# Patient Record
Sex: FEMALE | Race: BLACK OR AFRICAN AMERICAN | NOT HISPANIC OR LATINO | Employment: STUDENT | URBAN - METROPOLITAN AREA
[De-identification: names, ages, dates, MRNs, and addresses within clinical notes are randomized per-mention and may not be internally consistent; named-entity substitution may affect disease eponyms.]

---
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
## 2018-02-01 ENCOUNTER — OFFICE VISIT (OUTPATIENT)
Dept: PSYCHIATRY | Facility: CLINIC | Age: 18
End: 2018-02-01
Attending: PSYCHIATRY & NEUROLOGY
Payer: COMMERCIAL

## 2018-02-01 VITALS
SYSTOLIC BLOOD PRESSURE: 104 MMHG | HEART RATE: 104 BPM | BODY MASS INDEX: 19.95 KG/M2 | DIASTOLIC BLOOD PRESSURE: 66 MMHG | WEIGHT: 123.6 LBS

## 2018-02-01 DIAGNOSIS — F31.2 BIPOLAR DISORDER, CURRENT EPISODE MANIC, SEVERE WITH PSYCHOTIC FEATURES (H): ICD-10-CM

## 2018-02-01 DIAGNOSIS — Z79.899 ENCOUNTER FOR LONG-TERM (CURRENT) USE OF MEDICATIONS: Primary | ICD-10-CM

## 2018-02-01 PROCEDURE — G0463 HOSPITAL OUTPT CLINIC VISIT: HCPCS | Mod: ZF

## 2018-02-01 RX ORDER — LITHIUM CARBONATE 450 MG
900 TABLET, EXTENDED RELEASE ORAL AT BEDTIME
Qty: 60 TABLET | Refills: 1 | Status: SHIPPED | OUTPATIENT
Start: 2018-02-01 | End: 2018-04-30

## 2018-02-01 ASSESSMENT — PAIN SCALES - GENERAL: PAINLEVEL: NO PAIN (0)

## 2018-02-01 NOTE — MR AVS SNAPSHOT
After Visit Summary   2/1/2018    Cristina Boyd    MRN: [id number]           Patient Information     Date Of Birth          2000        Visit Information        Provider Department      2/1/2018 8:55 AM Jazmine Key MD Psychiatry Clinic        Today's Diagnoses     Encounter for long-term (current) use of medications    -  1    Bipolar disorder, current episode manic, severe with psychotic features (H)          Care Instructions    - Take 1 tab (450 mg) of Lithium tonight, increase to 2 tabs (900 mg) tomorrow night    - I will call you within the next few days about starting another medication    - When I'm gone you will follow up with Dr. Wendy Mccray           Follow-ups after your visit        Follow-up notes from your care team     Return in about 3 weeks (around 2/22/2018) for 60 MFU with Dr. Mccray.      Your next 10 appointments already scheduled     Feb 23, 2018  8:45 AM CST   Adult Med Follow UP with Wendy Mccray MD   Psychiatry Clinic ([facility])    [place] [id number] [address] [phone]              Who to contact     Please call your clinic at [phone] to:    Ask questions about your health    Make or cancel appointments    Discuss your medicines    Learn about your test results    Speak to your doctor            Additional Information About Your Visit        MyChart Information     [employer] is an electronic gateway that provides easy, online access to your medical records. With [employer], you can request a clinic appointment, read your test results, renew a prescription or communicate with your care team.     To sign up for [employer] visit the website at [email or web address]   You will be asked to enter the access code listed below, as well as some personal information. Please follow the directions to create your username and password.     Your access code is: [email or web address]  Expires: 2/15/2018  3:05  PM     Your access code will  in 90 days. If you need help or a new code, please contact your [facility] or call [phone] for assistance.        Care EveryWhere ID     This is your Care EveryWhere ID. This could be used by other organizations to access your [facility] medical records  [id number]        Your Vitals Were     Pulse BMI (Body Mass Index)                104 19.95 kg/m2           Blood Pressure from Last 3 Encounters:   18 104/66   17 112/72   17 104/72    Weight from Last 3 Encounters:   18 56.1 kg (123 lb 9.6 oz) (41 %)*   17 61 kg (134 lb 6.4 oz) (61 %)*   05/10/17 63.5 kg (140 lb) (71 %)*     * Growth percentiles are based on [facility] 2-20 Years data.                 Today's Medication Changes          These changes are accurate as of 18 11:59 PM.  If you have any questions, ask your nurse or doctor.               These medicines have changed or have updated prescriptions.        Dose/Directions    lithium 450 MG CR tablet   Commonly known as:  ESKALITH   This may have changed:    - how much to take  - how to take this  - when to take this  - additional instructions   Used for:  Bipolar disorder, current episode manic, severe with psychotic features (H)   Changed by:  Jazmine Key MD        Dose:  900 mg   Take 2 tablets (900 mg) by mouth At Bedtime   Quantity:  60 tablet   Refills:  1            Where to get your medicines      These medications were sent to [facility] - [place], [place] - [address]  [address] [address]     Phone:  [phone]     lithium 450 MG CR tablet                Primary Care Provider Office Phone # Fax #    Adriana Garibay [phone]       [place] [id number] [facility] [address]        Equal Access to Services     HUMAIRA HUFFMAN AH: Aleah Sales, cirilo calvillo, bunny lewis  alfred edwardsjosecollin la'aan ah. So [facility] [phone].    ATENCIÓN: Si vandanala sandy, tiene a shaver disposición servicios gratuitos de asistencia lingüística. Bryon al [phone].    We comply with applicable federal civil rights laws and [place] laws. We do not discriminate on the basis of race, color, national origin, age, disability, sex, sexual orientation, or gender identity.            Thank you!     Thank you for choosing PSYCHIATRY CLINIC  for your care. Our goal is always to provide you with excellent care. Hearing back from our patients is one way we can continue to improve our services. Please take a few minutes to complete the written survey that you may receive in the mail after your visit with us. Thank you!             Your Updated Medication List - Protect others around you: Learn how to safely use, store and throw away your medicines at www.disposemymeds.org.          This list is accurate as of 2/1/18 11:59 PM.  Always use your most recent med list.                   Brand Name Dispense Instructions for use Diagnosis    carboxymethylcellulose 1 % ophthalmic solution    CELLUVISC/REFRESH LIQUIGEL    1 Bottle    Place 2 drops into both eyes 3 times daily as needed for dry eyes Reported on 3/8/2017        lithium 450 MG CR tablet    ESKALITH    60 tablet    Take 2 tablets (900 mg) by mouth At Bedtime    Bipolar disorder, current episode manic, severe with psychotic features (H)       polyethylene glycol Packet    MIRALAX/GLYCOLAX    30 packet    Take 17 g by mouth daily as needed for constipation    Drug-induced constipation

## 2018-02-01 NOTE — NURSING NOTE
Chief Complaint   Patient presents with     Recheck Medication     Bipolar disorder, current episode manic, severe with psychotic features      Obtained weight, blood pressure and heart rate, pain level, and smoking status   Reviewed allergies, medications, and pharmacy preference

## 2018-02-01 NOTE — PATIENT INSTRUCTIONS
- Take 1 tab (450 mg) of Lithium tonight, increase to 2 tabs (900 mg) tomorrow night    - I will call you within the next few days about starting another medication    - When I'm gone you will follow up with Dr. Wendy Mccray

## 2018-02-01 NOTE — PROGRESS NOTES
"PSYCHIATRY CLINIC PROGRESS NOTE   The initial CHILD DIAG EVAL was 3/10/16.  Transfer Eval was 1/30/17    Pertinent background:  Patient has a history of psychotic features during episodes of felton. Treated for Bipolar 1 Disorder with Psychotic Features.    PSYCH CRITICAL ITEM HISTORY:  psychosis [sxs include delusional thoughts] and psych hosp (<3).     INTERIM HISTORY                                                   Cristina Boyd is a 17 year old female who was last seen in clinic on 11/17/17 at which time Lithium was restarted. Reports poor adherence. Mother and older sister present with patient today.     Since the last visit:  - Stopped taking Lithium 2-3 weeks ago and had been taking it intermittently during the period of time before that. Stopped because she had been feeling better.   - Currently reports low mood, poor concentration, decreased appetite, family reports that her weight has dropped and that she is less interactive than usual. Endorses ruminations and frustrations about having this illness. Also reports she has been \"overthinking\" everything. Notes that she has been feeling more anxious which manifested as a tightness in her stomach and feeling physically tense. Family has noticed that when she has a physical ailment it seems to set her mood back. For example, she sprained her ankle within the past 2 weeks and this had a more significant impact on her mood than would typically be expected. Also, she got her menstrual period earlier than usual which caused her to worry.  - Does note that she has an increase in mood related symptoms prior to her period and this resolves pretty quickly after the menses starts.   - Did start school about 3 weeks ago but attendance has been poor the past few weeks. Has a meeting with Disability Services this morning.     RECENT SYMPTOMS:   PSYCHOSIS:  none;  DENIES- delusions  FELTON/HYPOMANIA:  none;  DENIES- elevated mood, decreased need for sleep, pressured speech " (per self, others) and racing thoughts   DEPRESSION: anhedonia, feeling down, sleep difficulty, poor appetite, low energy, difficulty concentrating, psychomotor retardation noticed by others DENIES- SI    RECENT SUBSTANCE USE:     ALCOHOL-  never         TOBACCO- none          CAFFEINE- not discussed        OPIOIDS- none   CANNABIS- none               OTHER ILLICIT DRUGS- none    Financial Support- family or friend  Living Situation- with parents and siblings     MEDICAL ROS:  Reports physical tension, stomach tightness.               PSYCH and CD Critical Summary Points since Jan 2017           - Feb 2017: Hospitalized [facility]    - Abilify 30 --> 15 mg    - Started Ativan, Depakote, Cogentin  - Mar 2017: Decreased Depakote 1000 --> 750 mg  - Apr 2017: Decreased Ativan 2 --> 1 mg  - Sept/Oct 2017: Ran out of all medications (Abilify 15 mg, Cogentin, Depakote 750 mg, Lithium 900 mg, Ativan 1 mg)   - Nov 2017: Restarted Lithium 900 mg  - Jan 2018: pt self discontinued [place]    PAST MED TRIALS   - See Hx of Psychiatric Care under Problem List    MEDICAL / SURGICAL HISTORY                                   CARE TEAM:          PCP- Adriana Garibay                    Therapist- none    Pregnant or breastfeeding:  NO      Contraception- not discussed, not currently sexually active  Patient Active Problem List   Diagnosis     Adela (H)     Bipolar affective disorder, current episode manic with psychotic symptoms (H)     Suicidal ideation     Bipolar disorder, curr episode mixed, severe, with psychotic features (H)     Hx of psychiatric care     Bipolar I disorder, current or most recent episode manic, with psychotic features (H)       ALLERGY                                Review of patient's allergies indicates no known allergies.  MEDICATIONS                               Current Outpatient Prescriptions   Medication Sig Dispense Refill     lithium (ESKALITH) 450 MG CR tablet Take 1 tab (450 mg) x4 days then  "increase to 2 tabs (900 mg) QHS 60 tablet 1     polyethylene glycol (MIRALAX/GLYCOLAX) Packet Take 17 g by mouth daily as needed for constipation 30 packet 0     carboxymethylcellulose (CELLUVISC/REFRESH LIQUIGEL) 1 % ophthalmic solution Place 2 drops into both eyes 3 times daily as needed for dry eyes Reported on 3/8/2017 1 Bottle        VITALS   /66  Pulse 104  Wt 56.1 kg (123 lb 9.6 oz)  BMI 19.95 kg/m2   MENTAL STATUS EXAM                                                             Alertness: alert and oriented  Appearance: well groomed. Of note, neck with protrusion visualized in the past  Behavior/Demeanor: relatively cooperative, with fair eye contact  Speech: normal with regular rhythm and rate  Language: intact  Psychomotor: normal  Mood:  \"not great\"  Affect: blunted; was congruent to mood; was congruent to content   Thought Process/Associations: unremarkable  Thought Content:  Denies suicidal ideation, violent ideation and psychotic thought  Perception:  Denies hallucinations  Insight: limited  Judgment: fair  Cognition:  does appear grossly intact; formal cognitive testing was not done    LABS and DATA     LITHIUM LABS    [level, renal, SG, TSH, WBC]  q6 mo  Recent Labs   Lab Test  11/17/17   1512  03/08/17   1100  02/15/17   0826  02/10/17   0740   LITHIUM  <0.20*  1.0  1.0  1.1     Recent Labs   Lab Test  11/17/17   1512  03/08/17   1100  02/01/17   0936   CR  0.63  0.61  0.61  0.49*   GFRESTIMATED  >90  >90  >90  Non African American GFR Calc    >90  Non  GFR Calc     NA  134  143  143  137   BHANU  9.3  8.9  8.9  9.4     Recent Labs   Lab Test  03/08/17   1408  02/01/17   0124   SG  1.006  1.000*     Recent Labs   Lab Test  11/17/17   1512  03/08/17   1100  02/02/17   0748   TSH  1.52  2.07  2.07  4.64*     ANTIPSYCHOTIC LABS   [glu, A1C, lipids (focus LDL), liver enzymes, WBC, ANEU, Hgb, plts]  q12mo  Recent Labs   Lab Test  11/17/17   1512  03/08/17   1100   GLC  79  83 "  83     Recent Labs   Lab Test  01/13/17   0936  02/06/16   0805   CHOL  83  82   TRIG  34  34   LDL  36  31   HDL  40*  44*     Recent Labs   Lab Test  03/08/17   1100  02/01/17   0936   AST  17  17  14   ALT  19  19  20   ALKPHOS  70  70  83     Recent Labs   Lab Test  02/01/17   0936  01/20/17   0825  01/13/17   0936   WBC  7.6  4.9  5.1   ANEU  4.8   --   2.0   HGB  12.6  12.1  12.5   PLT  315  285  278     VALPROIC ACID LABS     [liver enzymes, WBC, ANEU, Hgb, plts, +ammonia]  q6-12 mo  Recent Labs   Lab Test  02/15/17   0826  02/10/17   0740   IRINA  99  99       PHQ9 TODAY = not completed today  PHQ-9 SCORE 4/19/2017 5/10/2017 5/31/2017   Total Score 0 0 0       PSYCHIATRIC DIAGNOSES                                                                                                   Bipolar 1 Disorder     ASSESSMENT                                     TODAY: Cristina endorses symptoms of depression which is somewhat new for her. Has not been taking Lithium consistently and completely quit taking it about 3 weeks ago. Will plan to resume this and get up to 900 mg as she's been on before. Will review records and consider an additional adjunctive agent. She is open to considering therapy but will need help with transportation as prior insurance Co provided this but current insurance Co does not.     Will write a letter for Disability Services at her college. May be in her best interest to take off this semester.     In the future, will need to explore relationship of mood symptoms to her menstrual period further. Did not have time to get into details about this today but noted sx, so would like to r/o PMDD. Also, did not have time today to f/u on events of November - but would like to do this without family in the room in the future.    Of note, can also try contacting patient's father (per mother) if need to get in touch.                             PLAN                                                                                                        1) MEDICATION:      - Restart Lithium 450 mg x2 nights then increase to 900 mg QHS    2) THERAPY:  None current, would consider it if she could get transportation figured out.     3) LABS NEXT DUE:  Li labs due at next visit - ordered, pt will need to be reminded        RATING SCALES:     none    4) REFERRALS [CD, medical, other]:  Social Work re: transportation / therapy    5) :  none    6) RTC: 2-3 weeks with Dr. Mccray for 60 U    7) CRISIS NUMBERS: Provided routinely in AVS     TREATMENT RISK STATEMENT:  The risks, benefits, alternatives and potential adverse effects have been discussed and are understood by the patient/ patient's guardian. The pt understands the risks of using street drugs or alcohol.  There are no medical contraindications, the pt agrees to treatment with the ability to do so.  The patient understands to call 911 or come to the nearest ED if life threatening or urgent symptoms present.       RESIDENT:   Jazmine Key MD    Staffed with Dr. Cochran who will sign note. Dr. Cochran is supervisor.   I saw the patient with the resident, and participated in key portions of the service, including the mental status examination and developing the plan of care. I reviewed key portions of the history with the resident. I agree with the findings and plan as documented in this note.    Amelia Cochran

## 2018-02-01 NOTE — Clinical Note
Hi - It's the morning of transportation needs. This patient would be interested in therapy (possibly here) but needs help figuring out transportation. Apparently former insurance Co gave rides to [place] when she was in that but current Insurance Co doesn't do that. Can you contact them (they are ok with it) to explore options. If it made more sense for her to see a therapist elsewhere, that would be fine too. It would be great if we could get her connected with someone. She's dealing with frustrations about having Bipolar and the need to be on medications. Lives at home, is Somalian - some clash with family having somewhat unrealistic expectations.  THANKS! Jazmine

## 2018-02-02 ASSESSMENT — PATIENT HEALTH QUESTIONNAIRE - PHQ9: SUM OF ALL RESPONSES TO PHQ QUESTIONS 1-9: 8

## 2018-02-13 ENCOUNTER — TELEPHONE (OUTPATIENT)
Dept: PSYCHIATRY | Facility: CLINIC | Age: 18
End: 2018-02-13

## 2018-02-13 NOTE — TELEPHONE ENCOUNTER
Social Work   Incoming/Outgoing Call  [facility]    Outgoing Call To: Cristina Boyd    Reason for Call:  SW following up on referral from Dr. Key: This patient would be interested in therapy (possibly here) but needs help figuring out transportation. Apparently former insurance Co gave rides to [place] when she was in that but current Insurance Co doesn't do that. Can you contact them to explore options. If it made more sense for her to see a therapist elsewhere, that would be fine too. It would be great if we could get her connected with someone. She's dealing with frustrations about having Bipolar and the need to be on medications. Lives at home, is Somalian.     Response/Plan:  On first attempt to contact patient, she answered and asked that writer call her back in 10 minutes.    Writer called patient for a second time and she did not answer. Her phone does not allow voicemail.    Patient does not have a [place] [employer] insurance, which is why they will not cover medical rides. SW will attempt to call patient in 2-3 business days to explore other options.      Will route to patient's current psychiatric provider(s) as an FYI.   Please call or EPIC message with any questions or concerns.    Elizabeth Jenkins, MARCELINA, [facility] HospitalSW  [phone]

## 2018-02-16 ENCOUNTER — TELEPHONE (OUTPATIENT)
Dept: PSYCHIATRY | Facility: CLINIC | Age: 18
End: 2018-02-16

## 2018-02-16 NOTE — TELEPHONE ENCOUNTER
Social Work   Incoming/Outgoing Call  [facility]    Outgoing Call To: Cristina Boyd    Reason for Call:  SW following up on resources for transportation or services near her home.    Response/Plan:  SW unable to leave a voicemail. SW willing to follow up with patient next time she is in clinic.      Will route to patient's current psychiatric provider(s) as an FYI.   Please call or EPIC message with any questions or concerns.    Elizabeth Jenkins, MSW, LICSW

## 2018-02-20 DIAGNOSIS — Z20.1 H/O EXPOSURE TO TUBERCULOSIS: Primary | ICD-10-CM

## 2018-02-20 NOTE — PROGRESS NOTES
Brother Quantiferon positive and suspected to have active TB.  Needs screening.      Discussed with Dr. Wendy Bautista.    Claudia Shetty MD, PhD  Adult & Pediatric Infectious Diseases Fellow PGY6, [facility]  Phone: [phone]  Pager: [phone]

## 2018-02-26 ENCOUNTER — RADIANT APPOINTMENT (OUTPATIENT)
Dept: GENERAL RADIOLOGY | Facility: CLINIC | Age: 18
End: 2018-02-26
Attending: INTERNAL MEDICINE
Payer: COMMERCIAL

## 2018-02-26 DIAGNOSIS — Z20.1 H/O EXPOSURE TO TUBERCULOSIS: ICD-10-CM

## 2018-02-26 PROCEDURE — 36415 COLL VENOUS BLD VENIPUNCTURE: CPT | Performed by: INTERNAL MEDICINE

## 2018-02-26 PROCEDURE — 86480 TB TEST CELL IMMUN MEASURE: CPT | Performed by: INTERNAL MEDICINE

## 2018-02-26 PROCEDURE — 71046 X-RAY EXAM CHEST 2 VIEWS: CPT

## 2018-02-27 LAB
M TB TUBERC IFN-G BLD QL: NEGATIVE
M TB TUBERC IFN-G/MITOGEN IGNF BLD: 0.01 IU/ML

## 2018-03-01 ENCOUNTER — TELEPHONE (OUTPATIENT)
Dept: INFECTIOUS DISEASES | Facility: CLINIC | Age: 18
End: 2018-03-01

## 2018-03-06 ENCOUNTER — TELEPHONE (OUTPATIENT)
Dept: PSYCHIATRY | Facility: CLINIC | Age: 18
End: 2018-03-06

## 2018-03-09 NOTE — TELEPHONE ENCOUNTER
Social Work   Incoming/Outgoing Email  [facility]    Correspondence with: Cristina Boyd    Reason for contact:  Cristina requested therapy referrals be sent to her via email    Content:      Yonas Evans,  Thank you for calling earlier in the week. As discussed, here are a few possible resources for you. If these do not work, please feel free to call me and I will try to find some other resources. It looks like all 3 accept your insurance, but you may still want to ask when you make an appointment.  [facility]  [address] [place], [place]  [phone]  [facility]  [address]  [place], [place]   [phone]  Tasha Mckay  [address]  [place] (I know this is in [place], but it is really close to the [place] area)  [phone]  ?          Will route to patient's current psychiatric provider(s) as an FYI.   Please call or EPIC message with any questions or concerns.    MARCELINA Dunn, [facility]  [phone]

## 2018-03-09 NOTE — TELEPHONE ENCOUNTER
Social Work   Incoming/Outgoing Call  [facility]    Incoming From:  Cristina Boyd    Reason for Call:  Cristina following up on calls from [facility].    Response/Plan:  Cristina is interested in therapy. She is looking for someone who speaks nicely and is conversational. She had OT in the past and likely attended day treatment. Cristina reported that she liked these sessions.    Based on information about enjoying OT sessions, it may be beneficial for Cristina to engage in art therapy. [facility] provided information on an art therapist, Samanta Hdz at [phone]. She appears to take patient's insurance.     When NHAN provided office location, Cristina felt this was too far away. She would like a therapist either in [place] or near the [facility].    NHAN noted she would follow up with patient after doing some review. Cristina noted that it is hard for her to answer the phone and requested writer send an email with therapist names. NHAN reviewed that content in email is not always secure. Cristina continued to request information by email. She does not have a current My Chart.      Will route to patient's current psychiatric provider(s) as an FYI.   Please call or EPIC message with any questions or concerns.    MARCELINA Dunn, LICSW  [phone]

## 2018-04-02 ENCOUNTER — HOSPITAL ENCOUNTER (EMERGENCY)
Facility: CLINIC | Age: 18
Discharge: HOME | End: 2018-04-02
Attending: EMERGENCY MEDICINE | Admitting: EMERGENCY MEDICINE
Payer: COMMERCIAL

## 2018-04-02 VITALS
TEMPERATURE: 98.2 F | DIASTOLIC BLOOD PRESSURE: 79 MMHG | OXYGEN SATURATION: 100 % | WEIGHT: 125 LBS | HEART RATE: 97 BPM | BODY MASS INDEX: 20.18 KG/M2 | SYSTOLIC BLOOD PRESSURE: 115 MMHG | RESPIRATION RATE: 18 BRPM

## 2018-04-02 DIAGNOSIS — T50.905A ADVERSE EFFECT OF DRUG, INITIAL ENCOUNTER: ICD-10-CM

## 2018-04-02 DIAGNOSIS — F41.9 ANXIETY: ICD-10-CM

## 2018-04-02 LAB
ALBUMIN SERPL-MCNC: 3.9 G/DL (ref 3.4–5)
ALBUMIN UR-MCNC: 10 MG/DL
ALP SERPL-CCNC: 74 U/L (ref 40–150)
ALT SERPL W P-5'-P-CCNC: 11 U/L (ref 0–50)
AMPHETAMINES UR QL SCN: NEGATIVE
ANION GAP SERPL CALCULATED.3IONS-SCNC: 4 MMOL/L (ref 3–14)
APPEARANCE UR: CLEAR
AST SERPL W P-5'-P-CCNC: 20 U/L (ref 0–45)
BARBITURATES UR QL: NEGATIVE
BASOPHILS # BLD AUTO: 0 10E9/L (ref 0–0.2)
BASOPHILS NFR BLD AUTO: 0.1 %
BENZODIAZ UR QL: NEGATIVE
BILIRUB SERPL-MCNC: 0.3 MG/DL (ref 0.2–1.3)
BILIRUB UR QL STRIP: NEGATIVE
BUN SERPL-MCNC: 11 MG/DL (ref 7–30)
CALCIUM SERPL-MCNC: 9 MG/DL (ref 8.5–10.1)
CANNABINOIDS UR QL SCN: NEGATIVE
CHLORIDE SERPL-SCNC: 108 MMOL/L (ref 94–109)
CO2 SERPL-SCNC: 25 MMOL/L (ref 20–32)
COCAINE UR QL: NEGATIVE
COLOR UR AUTO: YELLOW
CREAT SERPL-MCNC: 0.5 MG/DL (ref 0.52–1.04)
DIFFERENTIAL METHOD BLD: NORMAL
EOSINOPHIL # BLD AUTO: 0.1 10E9/L (ref 0–0.7)
EOSINOPHIL NFR BLD AUTO: 0.7 %
ERYTHROCYTE [DISTWIDTH] IN BLOOD BY AUTOMATED COUNT: 13.6 % (ref 10–15)
ETHANOL UR QL SCN: NEGATIVE
GFR SERPL CREATININE-BSD FRML MDRD: >90 ML/MIN/1.7M2
GLUCOSE SERPL-MCNC: 75 MG/DL (ref 70–99)
GLUCOSE UR STRIP-MCNC: NEGATIVE MG/DL
HCG UR QL: NEGATIVE
HCT VFR BLD AUTO: 39.1 % (ref 35–47)
HGB BLD-MCNC: 12.8 G/DL (ref 11.7–15.7)
HGB UR QL STRIP: NEGATIVE
IMM GRANULOCYTES # BLD: 0 10E9/L (ref 0–0.4)
IMM GRANULOCYTES NFR BLD: 0.1 %
KETONES UR STRIP-MCNC: NEGATIVE MG/DL
LACTATE BLD-SCNC: 1.6 MMOL/L (ref 0.7–2)
LEUKOCYTE ESTERASE UR QL STRIP: ABNORMAL
LIPASE SERPL-CCNC: 89 U/L (ref 73–393)
LITHIUM SERPL-SCNC: 0.4 MMOL/L (ref 0.6–1.2)
LYMPHOCYTES # BLD AUTO: 2 10E9/L (ref 0.8–5.3)
LYMPHOCYTES NFR BLD AUTO: 28.2 %
MAGNESIUM SERPL-MCNC: 2.3 MG/DL (ref 1.6–2.3)
MCH RBC QN AUTO: 29.6 PG (ref 26.5–33)
MCHC RBC AUTO-ENTMCNC: 32.7 G/DL (ref 31.5–36.5)
MCV RBC AUTO: 90 FL (ref 78–100)
MONOCYTES # BLD AUTO: 1 10E9/L (ref 0–1.3)
MONOCYTES NFR BLD AUTO: 13.7 %
MUCOUS THREADS #/AREA URNS LPF: PRESENT /LPF
NEUTROPHILS # BLD AUTO: 4.1 10E9/L (ref 1.6–8.3)
NEUTROPHILS NFR BLD AUTO: 57.2 %
NITRATE UR QL: NEGATIVE
NRBC # BLD AUTO: 0 10*3/UL
NRBC BLD AUTO-RTO: 0 /100
OPIATES UR QL SCN: NEGATIVE
PH UR STRIP: 5.5 PH (ref 5–7)
PLATELET # BLD AUTO: 276 10E9/L (ref 150–450)
POTASSIUM SERPL-SCNC: 4.5 MMOL/L (ref 3.4–5.3)
PROT SERPL-MCNC: 9 G/DL (ref 6.8–8.8)
RBC # BLD AUTO: 4.33 10E12/L (ref 3.8–5.2)
RBC #/AREA URNS AUTO: 1 /HPF (ref 0–2)
SODIUM SERPL-SCNC: 137 MMOL/L (ref 133–144)
SOURCE: ABNORMAL
SP GR UR STRIP: 1.02 (ref 1–1.03)
SQUAMOUS #/AREA URNS AUTO: 1 /HPF (ref 0–1)
T4 FREE SERPL-MCNC: 1.04 NG/DL (ref 0.76–1.46)
TSH SERPL DL<=0.005 MIU/L-ACNC: 4.1 MU/L (ref 0.4–4)
UROBILINOGEN UR STRIP-MCNC: NORMAL MG/DL (ref 0–2)
WBC # BLD AUTO: 7.1 10E9/L (ref 4–11)
WBC #/AREA URNS AUTO: 1 /HPF (ref 0–5)

## 2018-04-02 PROCEDURE — 84443 ASSAY THYROID STIM HORMONE: CPT | Performed by: EMERGENCY MEDICINE

## 2018-04-02 PROCEDURE — 25000132 ZZH RX MED GY IP 250 OP 250 PS 637: Performed by: EMERGENCY MEDICINE

## 2018-04-02 PROCEDURE — 81001 URINALYSIS AUTO W/SCOPE: CPT | Performed by: EMERGENCY MEDICINE

## 2018-04-02 PROCEDURE — 99285 EMERGENCY DEPT VISIT HI MDM: CPT | Mod: 25 | Performed by: EMERGENCY MEDICINE

## 2018-04-02 PROCEDURE — 80320 DRUG SCREEN QUANTALCOHOLS: CPT | Performed by: EMERGENCY MEDICINE

## 2018-04-02 PROCEDURE — 83605 ASSAY OF LACTIC ACID: CPT | Performed by: EMERGENCY MEDICINE

## 2018-04-02 PROCEDURE — 90791 PSYCH DIAGNOSTIC EVALUATION: CPT

## 2018-04-02 PROCEDURE — 99284 EMERGENCY DEPT VISIT MOD MDM: CPT | Mod: Z6 | Performed by: EMERGENCY MEDICINE

## 2018-04-02 PROCEDURE — 84439 ASSAY OF FREE THYROXINE: CPT | Performed by: EMERGENCY MEDICINE

## 2018-04-02 PROCEDURE — 85025 COMPLETE CBC W/AUTO DIFF WBC: CPT | Performed by: EMERGENCY MEDICINE

## 2018-04-02 PROCEDURE — 80053 COMPREHEN METABOLIC PANEL: CPT | Performed by: EMERGENCY MEDICINE

## 2018-04-02 PROCEDURE — 83690 ASSAY OF LIPASE: CPT | Performed by: EMERGENCY MEDICINE

## 2018-04-02 PROCEDURE — 80178 ASSAY OF LITHIUM: CPT | Performed by: EMERGENCY MEDICINE

## 2018-04-02 PROCEDURE — 83735 ASSAY OF MAGNESIUM: CPT | Performed by: EMERGENCY MEDICINE

## 2018-04-02 PROCEDURE — 81025 URINE PREGNANCY TEST: CPT | Performed by: EMERGENCY MEDICINE

## 2018-04-02 PROCEDURE — 80307 DRUG TEST PRSMV CHEM ANLYZR: CPT | Performed by: EMERGENCY MEDICINE

## 2018-04-02 RX ORDER — HYDROXYZINE HYDROCHLORIDE 25 MG/1
25 TABLET, FILM COATED ORAL EVERY 6 HOURS PRN
Qty: 10 TABLET | Refills: 0 | Status: SHIPPED | OUTPATIENT
Start: 2018-04-02 | End: 2018-04-30

## 2018-04-02 RX ORDER — LORAZEPAM 0.5 MG/1
0.5 TABLET ORAL ONCE
Status: COMPLETED | OUTPATIENT
Start: 2018-04-02 | End: 2018-04-02

## 2018-04-02 RX ADMIN — LORAZEPAM 0.5 MG: 0.5 TABLET ORAL at 21:44

## 2018-04-02 ASSESSMENT — ENCOUNTER SYMPTOMS
RHINORRHEA: 0
FREQUENCY: 0
NAUSEA: 1
DYSURIA: 0
VOMITING: 0
SORE THROAT: 0
DIARRHEA: 0
TREMORS: 1
COUGH: 0
HALLUCINATIONS: 0

## 2018-04-02 NOTE — LETTER
April 2, 2018      To Whom It May Concern:      Cristina Boyd was seen in our Emergency Department today, 04/02/18.  Pleas excuse her from classwork or allow an extension.       Sincerely,        Yaima Mcmahan MD

## 2018-04-02 NOTE — ED AVS SNAPSHOT
[facility] [place], Emergency Department    [address]    [facility] [address]    Phone:  [phone]    Fax:  [phone]                                       Cristina Boyd   MRN: [id number]    Department:  [facility], Emergency Department   Date of Visit:  4/2/2018           After Visit Summary Signature Page     I have received my discharge instructions, and my questions have been answered. I have discussed any challenges I see with this plan with the nurse or doctor.    ..........................................................................................................................................  Patient/Patient Representative Signature      ..........................................................................................................................................  Patient Representative Print Name and Relationship to Patient    ..................................................               ................................................  Date                                            Time    ..........................................................................................................................................  Reviewed by Signature/Title    ...................................................              ..............................................  Date                                                            Time

## 2018-04-02 NOTE — ED AVS SNAPSHOT
[facility], Emergency Department    [address]    [place] [address]    Phone:  [phone]    Fax:  [phone]                                       Cristina Boyd   MRN: [id number]    Department:  [facility], Emergency Department   Date of Visit:  4/2/2018           Patient Information     Date Of Birth          2000        Your diagnoses for this visit were:     Anxiety     Adverse effect of drug, initial encounter        You were seen by Yaima Mcmahan MD.        Discharge Instructions       Please make an appointment to follow up with your psychiatrist as soon as possible.      Increase lithium to prescribed dose.  You may use hydroxyzine as needed for anxiety.      If you have any worsening symptoms, worsening difficulty sleeping, inability to complete tasks, thoughts of self harm/suicidal thought/harm to others, immediately return to the emergency department for re-evaluation.       Anxiety Reaction  Anxiety is the feeling we all get when we think something bad might happen. It is a normal response to stress and usually causes only a mild reaction. When anxiety becomes more severe, it can interfere with daily life. In some cases, you may not even be aware of what it is you re anxious about. There may also be a genetic link or it may be a learned behavior in the home.  Both psychological and physical triggers cause stress reaction. It's often a response to fear or emotional stress, real or imagined. This stress may come from home, family, work, or social relationships.  During an anxiety reaction, you may feel:    Helpless    Nervous    Depressed    Irritable  Your body may show signs of anxiety in many ways. You may experience:    Dry mouth    Shakiness    Dizziness    Weakness    Trouble breathing    Breathing fast (hyperventilating)    Chest pressure    Sweating    Headache    Nausea    Diarrhea    Tiredness    Inability to sleep    Sexual problems  Home care    Try to locate  the sources of stress in your life. They may not be obvious. These may include:    Daily hassles of life (traffic jams, missed appointments, car troubles, etc.)    Major life changes, both good (new baby, job promotion) and bad (loss of job, loss of loved one)    Overload: feeling that you have too many responsibilities and can't take care of all of them at once    Feeling helpless, feeling that your problems are beyond what you re able to solve    Notice how your body reacts to stress. Learn to listen to your body signals. This will help you take action before the stress becomes severe.    When you can, do something about the source of your stress. (Avoid hassles, limit the amount of change that happens in your life at one time and take a break when you feel overloaded).    Unfortunately, many stressful situations can't be avoided. It is necessary to learn how to better manage stress. There are many proven methods that will reduce your anxiety. These include simple things like exercise, good nutrition and adequate rest. Also, there are certain techniques that are helpful:    Relaxation    Breathing exercises    Visualization    Biofeedback    Meditation  For more information about this, consult your doctor or go to a local bookstore and review the many books and tapes available on this subject.  Follow-up care  If you feel that your anxiety is not responding to self-help measures, contact your doctor or make an appointment with a counselor. You may need short-term psychological counseling and temporary medicine to help you manage stress.  Call 911  Call your healthcare provider right away if any of these occur:    Trouble breathing    Confusion    Drowsiness or trouble wakening    Fainting or loss of consciousness    Rapid heart rate    Seizure    New chest pain that becomes more severe, lasts longer, or spreads into your shoulder, arm, neck, jaw, or back  When to seek medical advice  Call your healthcare provider  right away if any of these occur:    Your symptoms get worse    Severe headache not relieved by rest and mild pain reliever  Date Last Reviewed: 9/29/2015 2000-2017 [employer]. [address] [place] [address], [place] [address]. All rights reserved. This information is not intended as a substitute for professional medical care. Always follow your healthcare professional's instructions.             24 Hour Appointment Hotline       To make an appointment at any [facility], call [phone] ([phone]). If you don't have a family doctor or clinic, we will help you find one. [place] clinics are conveniently located to serve the needs of you and your family.             Review of your medicines      START taking        Dose / Directions Last dose taken    hydrOXYzine 25 MG tablet   Commonly known as:  ATARAX   Dose:  25 mg   Quantity:  10 tablet        Take 1 tablet (25 mg) by mouth every 6 hours as needed for anxiety   Refills:  0          Our records show that you are taking the medicines listed below. If these are incorrect, please call your family doctor or clinic.        Dose / Directions Last dose taken    lithium 450 MG CR tablet   Commonly known as:  ESKALITH   Dose:  900 mg   Quantity:  60 tablet        Take 2 tablets (900 mg) by mouth At Bedtime   Refills:  1                Prescriptions were sent or printed at these locations (1 Prescription)                   Other Prescriptions                Printed at Department/Unit printer (1 of 1)         hydrOXYzine (ATARAX) 25 MG tablet                Procedures and tests performed during your visit     CBC with platelets differential    Comprehensive metabolic panel    Drug abuse screen 6 urine (chem dep)    HCG qualitative urine (UPT)    Lactic acid whole blood    Lipase    Lithium level    Magnesium    T4 free    TSH    UA with Microscopic      Orders Needing Specimen Collection     None      Pending Results     Date and Time Order Name  "Status Description    2018 Drug abuse screen 6 urine (chem dep) In process             Pending Culture Results     Date and Time Order Name Status Description    2018 Drug abuse screen 6 urine (chem dep) In process             Pending Results Instructions     If you had any lab results that were not finalized at the time of your Discharge, you can call the ED Lab Result RN at [phone]. You will be contacted by this team for any positive Lab results or changes in treatment. The nurses are available 7 days a week from 10A to 6:30P.  You can leave a message 24 hours per day and they will return your call.        Thank you for choosing [facility]       Thank you for choosing [facility] for your care. Our goal is always to provide you with excellent care. Hearing back from our patients is one way we can continue to improve our services. Please take a few minutes to complete the written survey that you may receive in the mail after you visit with us. Thank you!        [employer] Information     [employer] lets you send messages to your doctor, view your test results, renew your prescriptions, schedule appointments and more. To sign up, go to [email or web address] . Click on \"Log in\" on the left side of the screen, which will take you to the Welcome page. Then click on \"Sign up Now\" on the right side of the page.     You will be asked to enter the access code listed below, as well as some personal information. Please follow the directions to create your username and password.     Your access code is: [email or web address]  Expires: 2018  9:55 PM     Your access code will  in 90 days. If you need help or a new code, please call your [facility] clinic or [phone].        Care EveryWhere ID     This is your Care EveryWhere ID. This could be used by other organizations to access your [facility] medical records  [id number]        Equal Access to Services     HUMAIRA ONEAL: cirilo Archuleta " bunny calvillo waxay idiin hayaan adeeg kharash la'aan ah. So [facility] [phone].    ATENCIÓN: Si habla sandy, tiene a shaver disposición servicios gratuitos de asistencia lingüística. Llame al [phone].    We comply with applicable federal civil rights laws and [place] laws. We do not discriminate on the basis of race, color, national origin, age, disability, sex, sexual orientation, or gender identity.            After Visit Summary       This is your record. Keep this with you and show to your community pharmacist(s) and doctor(s) at your next visit.

## 2018-04-03 NOTE — DISCHARGE INSTRUCTIONS
Please make an appointment to follow up with your psychiatrist as soon as possible.      Increase lithium to prescribed dose.  You may use hydroxyzine as needed for anxiety.      If you have any worsening symptoms, worsening difficulty sleeping, inability to complete tasks, thoughts of self harm/suicidal thought/harm to others, immediately return to the emergency department for re-evaluation.       Anxiety Reaction  Anxiety is the feeling we all get when we think something bad might happen. It is a normal response to stress and usually causes only a mild reaction. When anxiety becomes more severe, it can interfere with daily life. In some cases, you may not even be aware of what it is you re anxious about. There may also be a genetic link or it may be a learned behavior in the home.  Both psychological and physical triggers cause stress reaction. It's often a response to fear or emotional stress, real or imagined. This stress may come from home, family, work, or social relationships.  During an anxiety reaction, you may feel:    Helpless    Nervous    Depressed    Irritable  Your body may show signs of anxiety in many ways. You may experience:    Dry mouth    Shakiness    Dizziness    Weakness    Trouble breathing    Breathing fast (hyperventilating)    Chest pressure    Sweating    Headache    Nausea    Diarrhea    Tiredness    Inability to sleep    Sexual problems  Home care    Try to locate the sources of stress in your life. They may not be obvious. These may include:    Daily hassles of life (traffic jams, missed appointments, car troubles, etc.)    Major life changes, both good (new baby, job promotion) and bad (loss of job, loss of loved one)    Overload: feeling that you have too many responsibilities and can't take care of all of them at once    Feeling helpless, feeling that your problems are beyond what you re able to solve    Notice how your body reacts to stress. Learn to listen to your body signals.  This will help you take action before the stress becomes severe.    When you can, do something about the source of your stress. (Avoid hassles, limit the amount of change that happens in your life at one time and take a break when you feel overloaded).    Unfortunately, many stressful situations can't be avoided. It is necessary to learn how to better manage stress. There are many proven methods that will reduce your anxiety. These include simple things like exercise, good nutrition and adequate rest. Also, there are certain techniques that are helpful:    Relaxation    Breathing exercises    Visualization    Biofeedback    Meditation  For more information about this, consult your doctor or go to a local bookstore and review the many books and tapes available on this subject.  Follow-up care  If you feel that your anxiety is not responding to self-help measures, contact your doctor or make an appointment with a counselor. You may need short-term psychological counseling and temporary medicine to help you manage stress.  Call 911  Call your healthcare provider right away if any of these occur:    Trouble breathing    Confusion    Drowsiness or trouble wakening    Fainting or loss of consciousness    Rapid heart rate    Seizure    New chest pain that becomes more severe, lasts longer, or spreads into your shoulder, arm, neck, jaw, or back  When to seek medical advice  Call your healthcare provider right away if any of these occur:    Your symptoms get worse    Severe headache not relieved by rest and mild pain reliever  Date Last Reviewed: 9/29/2015 2000-2017 [employer]. [address] [place] [address]. All rights reserved. This information is not intended as a substitute for professional medical care. Always follow your healthcare professional's instructions.

## 2018-04-03 NOTE — ED PROVIDER NOTES
"  History     Chief Complaint   Patient presents with     Medication Reaction     pt states she thinks she is having a lithium reaction. She states she is having hand trmeors, \"I feel like my whole body is buzzing\". Pt went has been on it for 2 months, has not had lithium levels checked     HPI  Cristina Boyd is a 18 year old female with a history of bipolar disorder who presents to the Emergency Department due to multiple complaints. Patient reports slight tremors throughout her body, nausea, and \"buzzing sensation\" throughout her body.  These symptoms have been worsening over the last two weeks. Patient began taking lithium early February 2018. Patient denies vomiting, abdominal pain, chest pain, shortness of breath, runny nose, sore throat, cough, diarrhea, dysuria, or urinary frequency. Last menstrual period ended two weeks ago. Recently, the patient has been only getting 3-4 hours of sleep at night. This has been happening for the last 10-14 days. She also has been having difficulty concentrating, especially earlier tonight when she was taking a test. She notes that she is usually a good student and this is not normal for her. Patient denies use of other medications besides lithium including herbal supplements or OTC meds such as ibuprofen. She also denies visual or auditory hallucinations. Denies thoughts of self harm.  She is currently taking 450 mg lithium as opposed to her recommended 900 mg.      No current facility-administered medications for this encounter.      Current Outpatient Prescriptions   Medication     lithium (ESKALITH) 450 MG CR tablet     Past Medical History:   Diagnosis Date     Bipolar disorder (H)      Depressive disorder        History reviewed. No pertinent surgical history.    Family History   Problem Relation Age of Onset     Depression Other      Depression Maternal Uncle        Social History   Substance Use Topics     Smoking status: Never Smoker     Smokeless tobacco: Never Used " "    Alcohol use No     No Known Allergies    I have reviewed the Medications, Allergies, Past Medical and Surgical History, and Social History in the Epic system.    Review of Systems   HENT: Negative for rhinorrhea and sore throat.    Respiratory: Negative for cough.    Cardiovascular: Negative for chest pain.   Gastrointestinal: Positive for nausea. Negative for diarrhea and vomiting.   Genitourinary: Negative for dysuria and frequency.   Musculoskeletal:        Positive for \"neck spasms\"   Neurological: Positive for tremors.   Psychiatric/Behavioral: Negative for hallucinations.   All other systems reviewed and are negative.      Physical Exam   BP: 123/78  Heart Rate: 107  Temp: 99.8  F (37.7  C)  Resp: 20  Weight: 56.7 kg (125 lb)  SpO2: 100 %      Physical Exam   General: patient is alert and oriented, anxious appearing  Head: atraumatic and normocephalic   EENT: moist mucus membranes without tonsillar erythema or exudates, pupils 3 mm, equal round and reactive, sclera anicteric  Neck: supple full range of motion, no meningismus  Cardiovascular: regular rate and rhythm, no murmur appreciated, extremities warm and well perfused, no lower extremity edema  Pulmonary: lungs clear to auscultation bilaterally   Abdomen: soft, nondistended, initially noted tenderness to palpation in the left upper quadrant however on reevaluation tenderness is not present.  No CVA tenderness   musculoskeletal: normal range of motion   Neurological: alert and oriented, moving all extremities symmetrically, CN II-XII intact, strength 5/5 and symmetric in , elbow flexion/extension, hip flexion/extension, knee flexion/extension and ankle plantar/dorsiflexion, sensation to light touch in distal upper and lower extremities intact, normal gait, no ataxia, very fine tremor in the hands  Skin: warm, dry       ED Course   7:36 PM  The patient was seen and examined by Dr. Mcmahan in Room 5.   ED Course     Procedures             Critical " "Care time:  none             Labs Ordered and Resulted from Time of ED Arrival Up to the Time of Departure from the ED - No data to display         Assessments & Plan (with Medical Decision Making)   Patient is a 18-year-old female with a history of bipolar disorder who presents with multiple concerns including a fine tremor, a \"buzzing sensation throughout her body\" as well as a feeling of increasing anxiety.  She is noted to be slightly tachycardic with a pulse of 107.  She is a febrile, normotensive and in no respiratory distress.  She denies taking any other medications in addition to the lithium or any over-the-counter medications including ibuprofen or supplements.  She is quite anxious appearing and perseverating on multiple symptoms. Labs are obtained including CBC, CMP, TSH, UA, lactate and lithium level. Her lithium level is subtherapeutic at 0.4.  History is not consistent with toxicity.  She does have a very slight tremor which may be medication side effect of lithium versus worsening anxiety.  Her electrolytes are within normal limits.  Renal function is normal at 0.5.  Her TSH is slightly elevated at 4.10 similar to previous values however T4 is appropriate at 1.04.  Does not have signs of thyroid dysfunction secondary to lithium.  She does not appear dehydrated.  UA is negative for evidence of UTI.  Urine drug screen is negative and urine pregnancy is negative.  Her history and exam are not consistent with infectious etiology.  She was seen by [facility] assessors and history and exam are most consistent with anxiety.  She does not have signs of shaina or hypomania at this time.  She denies any suicidal ideation.  She was given 1 dose of lorazepam in the emergency department and a prescription for hydroxyzine at home.  She was recommended to follow-up with her outpatient psychiatrist as soon as possible.  Have also recommended that she take her lithium as prescribed and she is subtherapeutic which may be " contributing to her symptoms.  She was given close return instructions for the emergency department and voiced understanding.    This part of the medical record was transcribed by Chelsie Murguia, Medical Scribe, from a dictation done by Yaima Mcmahan MD.     I have reviewed the nursing notes.    I have reviewed the findings, diagnosis, plan and need for follow up with the patient.    Discharge Medication List as of 4/2/2018  9:55 PM      START taking these medications    Details   hydrOXYzine (ATARAX) 25 MG tablet Take 1 tablet (25 mg) by mouth every 6 hours as needed for anxiety, Disp-10 tablet, R-0, Local Print             Final diagnoses:   Anxiety   Adverse effect of drug, initial encounter   I, Chelsie Murguia, am serving as a trained medical scribe to document services personally performed by Yaima Mcmahan MD, based on the provider's statements to me.   I, Yaima Mcmahan MD, was physically present and have reviewed and verified the accuracy of this note documented by Chelsie Murugia.    4/2/2018   [facility], [place], EMERGENCY DEPARTMENT     Yaima Mcmahan MD  04/02/18 4113

## 2018-04-30 ENCOUNTER — OFFICE VISIT (OUTPATIENT)
Dept: PSYCHIATRY | Facility: CLINIC | Age: 18
End: 2018-04-30
Attending: PSYCHIATRY & NEUROLOGY
Payer: COMMERCIAL

## 2018-04-30 VITALS
SYSTOLIC BLOOD PRESSURE: 108 MMHG | BODY MASS INDEX: 19.82 KG/M2 | DIASTOLIC BLOOD PRESSURE: 72 MMHG | WEIGHT: 122.8 LBS | HEART RATE: 92 BPM

## 2018-04-30 DIAGNOSIS — R94.6 ABNORMAL FINDING ON THYROID FUNCTION TEST: ICD-10-CM

## 2018-04-30 DIAGNOSIS — F43.22 ADJUSTMENT DISORDER WITH ANXIOUS MOOD: Primary | ICD-10-CM

## 2018-04-30 DIAGNOSIS — F31.2 BIPOLAR DISORDER, CURRENT EPISODE MANIC, SEVERE WITH PSYCHOTIC FEATURES (H): ICD-10-CM

## 2018-04-30 LAB
LITHIUM SERPL-SCNC: 0.8 MMOL/L (ref 0.6–1.2)
TSH SERPL DL<=0.005 MIU/L-ACNC: 3.42 MU/L (ref 0.4–4)

## 2018-04-30 PROCEDURE — G0463 HOSPITAL OUTPT CLINIC VISIT: HCPCS | Mod: ZF

## 2018-04-30 PROCEDURE — 80178 ASSAY OF LITHIUM: CPT | Performed by: PSYCHIATRY & NEUROLOGY

## 2018-04-30 PROCEDURE — 86376 MICROSOMAL ANTIBODY EACH: CPT | Performed by: PSYCHIATRY & NEUROLOGY

## 2018-04-30 PROCEDURE — 84443 ASSAY THYROID STIM HORMONE: CPT | Performed by: PSYCHIATRY & NEUROLOGY

## 2018-04-30 PROCEDURE — 36415 COLL VENOUS BLD VENIPUNCTURE: CPT | Performed by: PSYCHIATRY & NEUROLOGY

## 2018-04-30 PROCEDURE — 86800 THYROGLOBULIN ANTIBODY: CPT | Performed by: PSYCHIATRY & NEUROLOGY

## 2018-04-30 RX ORDER — HYDROXYZINE HYDROCHLORIDE 25 MG/1
25 TABLET, FILM COATED ORAL EVERY 6 HOURS PRN
Qty: 30 TABLET | Refills: 0 | Status: SHIPPED | OUTPATIENT
Start: 2018-04-30 | End: 2018-05-30

## 2018-04-30 RX ORDER — LITHIUM CARBONATE 450 MG
900 TABLET, EXTENDED RELEASE ORAL AT BEDTIME
Qty: 60 TABLET | Refills: 1 | Status: SHIPPED | OUTPATIENT
Start: 2018-04-30 | End: 2018-05-30

## 2018-04-30 ASSESSMENT — PAIN SCALES - GENERAL: PAINLEVEL: SEVERE PAIN (6)

## 2018-04-30 NOTE — PATIENT INSTRUCTIONS
Women's Health Provider 5/11/18 10 AM - 3rd Floor Professional Building - with Dr. Jovel  Get labs drawn today    Thank you for coming to the PSYCHIATRY CLINIC.    Lab Testing:  If you had lab testing today and your results are reassuring or normal they will be mailed to you or sent through lifeaction games within 7 days.   If the lab tests need quick action we will call you with the results.  The phone number we will call with results is # [phone] (home) . If this is not the best number please call our clinic and change the number.    Medication Refills:  If you need any refills please call your pharmacy and they will contact us. Our fax number for refills is [phone]. Please allow three business for refill processing.   If you need to  your refill at a new pharmacy, please contact the new pharmacy directly. The new pharmacy will help you get your medications transferred.     Scheduling:  If you have any concerns about today's visit or wish to schedule another appointment please call our office during normal business hours [phone] (8-5:00 M-F)    Contact Us:  Please call [phone] during business hours (8-5:00 M-F).  If after clinic hours, or on the weekend, please call  [phone].    Financial Assistance [phone]  [employer] Billing [phone]  [facility] Billing [phone]  Medical Records [phone]      MENTAL HEALTH CRISIS NUMBERS:  [facility]:   [facility] - [phone]   Crisis Residence [facility] - [phone]   Walk-In Counseling Center [facility] [phone]   COPE 24/7 [place] Mobile Team for Adults - [[phone]]; Child - [[phone]]     Crisis Connection - [phone]     [facility]:   [facility] - [phone]   Walk-in counseling [facility] - [phone]   Walk-in counseling [facility] - [phone]   Crisis Residence [facility] - [phone]   Urgent  Care Adult Mental Health:   --Drop-in, 24/7 crisis line, and [facility] Mobile Team [[phone]]    CRISIS TEXT LINE: Text [phone] from anywhere, anytime, any crisis 24/7;    OR SEE [email or web address]     Poison Control Center - [phone]    CHILD: [facility] needs assessment team - [phone]     [facility] Lifeline - [phone]; or [facility] Lifeline - [phone]    If you have a medical emergency please call 911or go to the nearest ER.                    _____________________________________________    Again thank you for choosing PSYCHIATRY CLINIC and please let us know how we can best partner with you to improve you and your family's health.  You may be receiving a survey in the mail regarding this appointment. We would love to have your feedback, both positive and negative, so please fill out the survey and return it using the provided envolpe. The survey is done by an external company, so your answers are anonymous.

## 2018-04-30 NOTE — PROGRESS NOTES
PSYCHIATRY CLINIC PROGRESS NOTE   The initial CHILD DIAG EVAL was 3/10/16.  Transfer Eval was 1/30/17    Pertinent background:  Patient has a history of psychotic features during episodes of felton. Treated for Bipolar 1 Disorder with Psychotic Features.    PSYCH CRITICAL ITEM HISTORY:  psychosis [sxs include delusional thoughts] and psych hosp (<3).     INTERIM HISTORY                                                   Cristina Boyd is a 18 year old female who was last seen in clinic on 2/1/18 at which time Lithium was restarted. Reports good adherence. Patient presented by herself.     Since the last visit:  -Feels a little anxious with some stomach pain and muscle aches off and on. Doesn't feel bipolar symptom but feels may be side effect of Li.  -Taking Li as prescribed x 3 weeks - parents ask her to do that  -In school at [employer] - [employer] - but finals right now. Has two weeks left.  -Having weird menses - more closely spaced every 3 weeks from every 4 weeks. Last one was really heavy.  -Feels more manic with menses.   -Feels mood is fine except occasionally muscle aches  -Anxiety started 5 weeks ago - really sensitive to caffiene - never used to affect her. Denies chronic use but occasional.    RECENT SYMPTOMS:   FELTON/HYPOMANIA:  reports-none;  DENIES- increased energy, decreased sleep need and distractibility   PSYCHOSIS:  reports-none;  DENIES- delusions, auditory hallucinations and visual hallucinations  ANXIETY:  excessive worry and nervous/overwhelmed   DEPRESSION: anhedonia, feeling down, sleep difficulty, poor appetite, low energy, difficulty concentrating, psychomotor retardation noticed by others DENIES- SI    RECENT SUBSTANCE USE:     ALCOHOL-  never         TOBACCO- none          CAFFEINE- not discussed        OPIOIDS- none   CANNABIS- none               OTHER ILLICIT DRUGS- none    Financial Support- family or friend  Living Situation- with parents and siblings     MEDICAL ROS:  Reports physical  tension, stomach tightness.               PSYCH and CD Critical Summary Points since Jan 2017           - Feb 2017: Hospitalized [facility]    - Abilify 30 --> 15 mg    - Started Ativan, Depakote, Cogentin  - Mar 2017: Decreased Depakote 1000 --> 750 mg  - Apr 2017: Decreased Ativan 2 --> 1 mg  - Sept/Oct 2017: Ran out of all medications (Abilify 15 mg, Cogentin, Depakote 750 mg, Lithium 900 mg, Ativan 1 mg)   - Nov 2017: Restarted Lithium 900 mg  - Jan 2018: pt self discontinued Lithium  - April 2,2018 resumed Li    PAST MED TRIALS   - See Hx of Psychiatric Care under Problem List      MEDICAL / SURGICAL HISTORY                                   CARE TEAM:          PCP- Adriana Garibay                    Therapist- none    Pregnant or breastfeeding:  NO      Contraception- abstinent not on birth control; LMP 4/11/18,  ~3/18/18  Patient Active Problem List   Diagnosis     Adela (H)     Bipolar affective disorder, current episode manic with psychotic symptoms (H)     Suicidal ideation     Bipolar disorder, curr episode mixed, severe, with psychotic features (H)     Hx of psychiatric care     Bipolar I disorder, current or most recent episode manic, with psychotic features (H)       ALLERGY                                Review of patient's allergies indicates no known allergies.  MEDICATIONS                               Current Outpatient Prescriptions   Medication Sig Dispense Refill     hydrOXYzine (ATARAX) 25 MG tablet Take 1 tablet (25 mg) by mouth every 6 hours as needed for anxiety 30 tablet 0     lithium (ESKALITH) 450 MG CR tablet Take 2 tablets (900 mg) by mouth At Bedtime 60 tablet 1       VITALS   /72  Pulse 92  Wt 55.7 kg (122 lb 12.8 oz)  BMI 19.82 kg/m2   MENTAL STATUS EXAM                                                             Alertness: alert and oriented  Appearance: well groomed. Of note, neck with protrusion visualized in the past  Behavior/Demeanor: relatively cooperative,  "with fair eye contact  Speech: normal with regular rhythm and rate  Language: intact  Psychomotor: normal  Mood:  \"not great\"  Affect: blunted; was congruent to mood; was congruent to content   Thought Process/Associations: unremarkable  Thought Content:  Denies suicidal ideation, violent ideation and psychotic thought  Perception:  Denies hallucinations  Insight: limited  Judgment: fair  Cognition:  does appear grossly intact; formal cognitive testing was not done    LABS and DATA     LITHIUM LABS    [level, renal, SG, TSH, WBC]  q6 mo  Recent Labs   Lab Test  04/30/18   1413  04/02/18 2003 11/17/17   1512  03/08/17   1100   LITHIUM  0.80  0.40*  <0.20*  1.0     Recent Labs   Lab Test  04/02/18 2003 11/17/17   1512  03/08/17   1100   CR  0.50*  0.63  0.61  0.61   GFRESTIMATED  >90  >90  >90  >90  Non African American GFR Calc     NA  137  134  143  143   BHANU  9.0  9.3  8.9  8.9     Recent Labs   Lab Test  04/02/18   2057  03/08/17   1408   SG  1.022  1.006     Recent Labs   Lab Test  04/30/18   1413  04/02/18 2003 11/17/17   1512   TSH  3.42  4.10*  1.52     ANTIPSYCHOTIC LABS   [glu, A1C, lipids (focus LDL), liver enzymes, WBC, ANEU, Hgb, plts]  q12mo  Recent Labs   Lab Test  04/02/18 2003 11/17/17   1512   GLC  75  79     Recent Labs   Lab Test  01/13/17   0936  02/06/16   0805   CHOL  83  82   TRIG  34  34   LDL  36  31   HDL  40*  44*     Recent Labs   Lab Test  04/02/18 2003 03/08/17   1100   AST  20  17  17   ALT  11  19  19   ALKPHOS  74  70  70     Recent Labs   Lab Test  04/02/18 2003 02/01/17   0936   WBC  7.1  7.6   ANEU  4.1  4.8   HGB  12.8  12.6   PLT  276  315       PHQ9 TODAY = not completed today  PHQ-9 SCORE 5/10/2017 5/31/2017 2/1/2018   Total Score 0 0 8       PSYCHIATRIC DIAGNOSES                                                                                                   Bipolar 1 Disorder     ASSESSMENT                                     TODAY: Cristnia endorses " "closer spaced menses and some musculoskeletal discomfort in the context of finishing finals for [employer]. Feels euthymic but at times slightly anxious, however feels it's a good anxiety as it motivates her to get her schooling done. Endorses adherence to li for 3 weeks, and agrees to follow up with new PCP in 's since doesn't currently see PCP and has multiple symptoms concerns as well as menstrual issues. Agrees to redraw of Li level as well as TFT's today as had slight increase in TSH early this month.     Per Dr. Villagomez:  \"In the future, will need to explore relationship of mood symptoms to her menstrual period further. Did not have time to get into details about this today but noted sx, so would like to r/o PMDD. Also, did not have time today to f/u on events of November - but would like to do this without family in the room in the future.\"                              PLAN                                                                                                       1) MEDICATION:      - Continue Lithium 900 mg QHS    2) THERAPY:  None current, would consider it if she could get transportation figured out.     3) LABS NEXT DUE:  Normal 4/2018       RATING SCALES:     none    4) REFERRALS [CD, medical, other]:  Social Work re: transportation / therapy    5) :  none    6) RTC: 3 weeks with Dr. Villagomez    7) CRISIS NUMBERS: Provided routinely in AVS     TREATMENT RISK STATEMENT:  The risks, benefits, alternatives and potential adverse effects have been discussed and are understood by the patient/ patient's guardian. The pt understands the risks of using street drugs or alcohol.  There are no medical contraindications, the pt agrees to treatment with the ability to do so.  The patient understands to call 911 or come to the nearest ED if life threatening or urgent symptoms present.       RESIDENT:   Wendy Mccray MD    Staffed with Dr. Agosto who will sign note. Dr. Haq is supervisor. "     Addendum - TFT's, Thyroid Abx normal from 4/30/18 and Li level 0.8. Results called to patient.      Supervisor Attestation:  I saw Cristina Boyd with the resident, and participated in key portions of the service, including the mental status examination and developing the plan of care. I reviewed key portions of the history with the resident. I agree with the findings and plan as documented in this note.  Zhang Agosto MD

## 2018-04-30 NOTE — NURSING NOTE
Chief Complaint   Patient presents with     Recheck Medication     Bipolar disorder, current episode manic, severe with psychotic features

## 2018-04-30 NOTE — MR AVS SNAPSHOT
After Visit Summary   4/30/2018    Cristina Boyd    MRN: [id number]           Patient Information     Date Of Birth          2000        Visit Information        Provider Department      4/30/2018 12:45 PM Wendy Mccray MD Psychiatry Clinic        Today's Diagnoses     Adjustment disorder with anxious mood    -  1    Bipolar disorder, current episode manic, severe with psychotic features (H)        Abnormal finding on thyroid function test          Care Instructions    Women's Health Provider 5/11/18 10 AM - 3rd Floor Professional Building - with Dr. Jovel  Get labs drawn today    Thank you for coming to the PSYCHIATRY CLINIC.    Lab Testing:  If you had lab testing today and your results are reassuring or normal they will be mailed to you or sent through [employer] within 7 days.   If the lab tests need quick action we will call you with the results.  The phone number we will call with results is # [phone] (home) . If this is not the best number please call our clinic and change the number.    Medication Refills:  If you need any refills please call your pharmacy and they will contact us. Our fax number for refills is [phone]. Please allow three business for refill processing.   If you need to  your refill at a new pharmacy, please contact the new pharmacy directly. The new pharmacy will help you get your medications transferred.     Scheduling:  If you have any concerns about today's visit or wish to schedule another appointment please call our office during normal business hours [phone] (8-5:00 M-F)    Contact Us:  Please call [phone] during business hours (8-5:00 M-F).  If after clinic hours, or on the weekend, please call  [phone].    Financial Assistance [phone]  [employer] Billing [phone]  [facility] Billing [phone]  Medical Records [phone]      MENTAL HEALTH CRISIS NUMBERS:  [facility]:   [facility] [place] [facility] - [phone]    Crisis Residence [facility] - [phone]   Walk-In Counseling Center [facility] - [phone]   COPE 24/7 [place] Mobile Team for Adults - [[phone]]; Child - [[phone]]     Crisis Connection - [phone]     [facility]:   [facility] - [phone]   Walk-in counseling [facility] - [phone]   Walk-in counseling [facility] - [phone]   Crisis Residence [facility] - [phone]   Urgent Care Adult Mental Health:   --Drop-in, 24/7 crisis line, and [place] Mobile Team [[phone]]    CRISIS TEXT LINE: Text [phone] from anywhere, anytime, any crisis 24/7;    OR SEE www.crisistextline.org     Poison Control Center - [phone]    CHILD: [facility] needs assessment team - [phone]     [facility] Lifeline - 1-799.646.1910; or [employer] Lifeline - 1-438.195.4697    If you have a medical emergency please call 911or go to the nearest ER.                    _____________________________________________    Again thank you for choosing PSYCHIATRY CLINIC and please let us know how we can best partner with you to improve you and your family's health.  You may be receiving a survey in the mail regarding this appointment. We would love to have your feedback, both positive and negative, so please fill out the survey and return it using the provided envolpe. The survey is done by an external company, so your answers are anonymous.           Follow-ups after your visit        Your next 10 appointments already scheduled     May 30, 2018  8:10 AM CDT   Adult Med Follow UP with Jazmine Key MD   Psychiatry Clinic ([facility])    [address] [address] [phone]              Who to contact     Please call your clinic at [phone] to:    Ask questions about your health    Make or cancel appointments    Discuss your medicines    Learn  about your test results    Speak to your doctor            Additional Information About Your Visit        [employer] Information     [employer]Taxit is an electronic gateway that provides easy, online access to your medical records. With SureFire, you can request a clinic appointment, read your test results, renew a prescription or communicate with your care team.     To sign up for [employer] visit the website at [email or web address]   You will be asked to enter the access code listed below, as well as some personal information. Please follow the directions to create your username and password.     Your access code is: [email or web address]  Expires: 2018  9:55 PM     Your access code will  in 90 days. If you need help or a new code, please contact your [facility] or call [phone] for assistance.        Care EveryWhere ID     This is your Care EveryWhere ID. This could be used by other organizations to access your [place] medical records  [id number]        Your Vitals Were     Pulse BMI (Body Mass Index)                92 19.82 kg/m2           Blood Pressure from Last 3 Encounters:   18 108/72   18 115/79   18 104/66    Weight from Last 3 Encounters:   18 55.7 kg (122 lb 12.8 oz)   18 56.7 kg (125 lb)   18 56.1 kg (123 lb 9.6 oz) (41 %)*     * Growth percentiles are based on [facility] 2-20 Years data.              We Performed the Following     ANTI THYROGLOBULIN ANTIBODY     Lithium Level     THYROID PEROXIDASE ANTIBODY     TSH with free T4 reflex          Where to get your medicines      These medications were sent to [facility] [place], [place] - [address]  [address] [address]     Phone:  [phone]     hydrOXYzine 25 MG tablet    lithium 450 MG CR tablet          Primary Care Provider Fax #    Physician No Ref-Primary [phone]       No address on file        Equal Access to Services     HUMAIRA HUFFMAN  AH: Aleah ferro Steveali, wabejnaminda luqadaha, qaybta kasabina abrillyndsayjohnlove viviane hayrasheed alvaradojosecollin goyal. So [facility] [phone].    ATENCIÓN: Si habla español, tiene a shaver disposición servicios gratuitos de asistencia lingüística. Llame al [phone].    We comply with applicable federal civil rights laws and [place] laws. We do not discriminate on the basis of race, color, national origin, age, disability, sex, sexual orientation, or gender identity.            Thank you!     Thank you for choosing PSYCHIATRY CLINIC  for your care. Our goal is always to provide you with excellent care. Hearing back from our patients is one way we can continue to improve our services. Please take a few minutes to complete the written survey that you may receive in the mail after your visit with us. Thank you!             Your Updated Medication List - Protect others around you: Learn how to safely use, store and throw away your medicines at www.disposemymeds.org.          This list is accurate as of 4/30/18 11:59 PM.  Always use your most recent med list.                   Brand Name Dispense Instructions for use Diagnosis    hydrOXYzine 25 MG tablet    ATARAX    30 tablet    Take 1 tablet (25 mg) by mouth every 6 hours as needed for anxiety    Bipolar disorder, current episode manic, severe with psychotic features (H), Adjustment disorder with anxious mood       lithium 450 MG CR tablet    ESKALITH    60 tablet    Take 2 tablets (900 mg) by mouth At Bedtime    Bipolar disorder, current episode manic, severe with psychotic features (H)

## 2018-05-01 LAB
THYROGLOB AB SERPL IA-ACNC: <20 IU/ML (ref 0–40)
THYROPEROXIDASE AB SERPL-ACNC: 14 IU/ML

## 2018-05-30 ENCOUNTER — OFFICE VISIT (OUTPATIENT)
Dept: PSYCHIATRY | Facility: CLINIC | Age: 18
End: 2018-05-30
Attending: PSYCHIATRY & NEUROLOGY
Payer: COMMERCIAL

## 2018-05-30 VITALS
BODY MASS INDEX: 18.76 KG/M2 | DIASTOLIC BLOOD PRESSURE: 72 MMHG | HEART RATE: 61 BPM | WEIGHT: 116.2 LBS | SYSTOLIC BLOOD PRESSURE: 112 MMHG

## 2018-05-30 DIAGNOSIS — F31.2 BIPOLAR DISORDER, CURRENT EPISODE MANIC, SEVERE WITH PSYCHOTIC FEATURES (H): ICD-10-CM

## 2018-05-30 PROCEDURE — G0463 HOSPITAL OUTPT CLINIC VISIT: HCPCS | Mod: ZF

## 2018-05-30 RX ORDER — LITHIUM CARBONATE 450 MG
900 TABLET, EXTENDED RELEASE ORAL AT BEDTIME
Qty: 60 TABLET | Refills: 1 | Status: ON HOLD | OUTPATIENT
Start: 2018-05-30 | End: 2018-06-12

## 2018-05-30 ASSESSMENT — PAIN SCALES - GENERAL: PAINLEVEL: NO PAIN (0)

## 2018-05-30 NOTE — NURSING NOTE
Chief Complaint   Patient presents with     Recheck Medication     Adjustment disorder with anxious mood     BP and Pulse x2

## 2018-05-30 NOTE — Clinical Note
Yonas Santiago - This is the pt I mentioned to you in the hallway. I'd be more than happy to learn where is a good place to look up therapists so I can use that resource in the future.  Thanks! Jazmine

## 2018-05-30 NOTE — PROGRESS NOTES
"PSYCHIATRY CLINIC PROGRESS NOTE   The initial CHILD DIAG EVAL was 3/10/16.  Transfer Eval was 1/30/17    Pertinent background:  Patient has a history of psychotic features during episodes of felton. Treated for Bipolar 1 Disorder with Psychotic Features.    PSYCH CRITICAL ITEM HISTORY:  psychosis [sxs include delusional thoughts] and psych hosp (<3).     INTERIM HISTORY                                                   Cristina Boyd is a 18 year old female who was last seen in clinic on 4/30/18 by Dr. Mccray at which time no changes were made. Reports good adherence. Patient presented by herself.     Since the last visit:  - Explains anxiety from the past few months. Reports that the month of April was quite bad as she was experiencing muscle twitches, heart palpitations, tremulous hands and GI upset in addition to back pain. This was impacting her sleep and she would \"wake up in a panic\" sometimes. Now that she has completed school for the semester, she attributes the anxiety to school as she is feeling drastically better. Is not currently experiencing those symptoms. School went ok, was taking 6 classes. Failed one (English Composition) due to attendance. Earned As/Bs in all the other classes.   - Is concerned about an itching sensation and a rash that has been intermittent. Reports trying to take showers and use lotion but does not think this is helping. Discussed calling PCP clinic.  - Notes that it is Ramadan right now so she is supposed to fast all day. Hasn't always been doing this because it makes her feel sick. Is drinking water. Also asks about impact on staying up late and sleeping in. Discussed importance of large blocks of sleep.  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   - Would be interested in therapy if she could find something close to home or get help with transportation.    RECENT SYMPTOMS:   FELTON/HYPOMANIA:  reports-none;  DENIES- increased energy, decreased sleep need and " "distractibility   PSYCHOSIS:  reports-none;  DENIES- delusions, auditory hallucinations and visual hallucinations  ANXIETY:  Worry about \"rash\" and itchy sensation, other Sx improved as above   DEPRESSION: poor appetite, feels tired DENIES- SI    RECENT SUBSTANCE USE:     ALCOHOL-  never         TOBACCO- none          CAFFEINE- not discussed        OPIOIDS- none   CANNABIS- none               OTHER ILLICIT DRUGS- none    Financial Support- family or friend  Living Situation- with parents and siblings     MEDICAL ROS:  Reports itchy and concern for rash. Denies polydipsia, polyuria, nocturia, wt gain, gum or teeth problems, ice water craving and acne          PSYCH and CD Critical Summary Points since Jan 2017           - Feb 2017: Hospitalized [facility]    - Abilify 30 --> 15 mg    - Started Ativan, Depakote, Cogentin  - Mar 2017: Decreased Depakote 1000 --> 750 mg  - Apr 2017: Decreased Ativan 2 --> 1 mg  - Sept/Oct 2017: Ran out of all medications (Abilify 15 mg, Cogentin, Depakote 750 mg, Lithium 900 mg, Ativan 1 mg)   - Nov 2017: Restarted Lithium 900 mg  - Jan 2018: pt self discontinued Lithium  - April 2018: resumed Li    PAST MED TRIALS   - See Hx of Psychiatric Care under Problem List    MEDICAL / SURGICAL HISTORY                                   CARE TEAM:          PCP- Adriana Garibay                    Therapist- none    Pregnant or breastfeeding:  NO      Contraception- abstinent not on birth control  Patient Active Problem List   Diagnosis     Adela (H)     Bipolar affective disorder, current episode manic with psychotic symptoms (H)     Suicidal ideation     Bipolar disorder, curr episode mixed, severe, with psychotic features (H)     Hx of psychiatric care     Bipolar I disorder, current or most recent episode manic, with psychotic features (H)       ALLERGY                                Review of patient's allergies indicates no known allergies.  MEDICATIONS                           " "    Current Outpatient Prescriptions   Medication Sig Dispense Refill     hydrOXYzine (ATARAX) 25 MG tablet Take 1 tablet (25 mg) by mouth every 6 hours as needed for anxiety 30 tablet 0     lithium (ESKALITH) 450 MG CR tablet Take 2 tablets (900 mg) by mouth At Bedtime 60 tablet 1       VITALS   /72  Pulse 61  Wt 52.7 kg (116 lb 3.2 oz)  BMI 18.76 kg/m2   MENTAL STATUS EXAM                                                             Alertness: alert and oriented  Appearance: well groomed. Of note, neck with protrusion visualized in the past  Behavior/Demeanor: relatively cooperative, with fair eye contact  Speech: normal with regular rhythm and rate  Language: intact  Psychomotor: normal  Mood:  \"okay\"  Affect: blunted; was congruent to mood; was congruent to content   Thought Process/Associations: unremarkable  Thought Content:  Denies suicidal ideation, violent ideation and psychotic thought  Perception:  Denies hallucinations  Insight: limited  Judgment: fair  Cognition:  does appear grossly intact; formal cognitive testing was not done    LABS and DATA     LITHIUM LABS    [level, renal, SG, TSH, WBC]  q6 mo  Recent Labs   Lab Test  04/30/18   1413  04/02/18 2003 11/17/17   1512  03/08/17   1100   LITHIUM  0.80  0.40*  <0.20*  1.0     Recent Labs   Lab Test  04/02/18 2003 11/17/17   1512  03/08/17   1100   CR  0.50*  0.63  0.61  0.61   GFRESTIMATED  >90  >90  >90  >90  Non African American GFR Calc     NA  137  134  143  143   BHANU  9.0  9.3  8.9  8.9     Recent Labs   Lab Test  04/02/18   2057  03/08/17   1408   SG  1.022  1.006     Recent Labs   Lab Test  04/30/18   1413  04/02/18 2003 11/17/17   1512   TSH  3.42  4.10*  1.52     ANTIPSYCHOTIC LABS   [glu, A1C, lipids (focus LDL), liver enzymes, WBC, ANEU, Hgb, plts]  q12mo  Recent Labs   Lab Test  04/02/18 2003 11/17/17   1512   GLC  75  79     Recent Labs   Lab Test  01/13/17   0936  02/06/16   0805   CHOL  83  82   TRIG  34  34   LDL "  36  31   HDL  40*  44*     Recent Labs   Lab Test  04/02/18 2003 03/08/17   1100   AST  20  17  17   ALT  11  19  19   ALKPHOS  74  70  70     Recent Labs   Lab Test  04/02/18 2003 02/01/17   0936   WBC  7.1  7.6   ANEU  4.1  4.8   HGB  12.8  12.6   PLT  276  315       PHQ9 TODAY = 3  PHQ-9 SCORE 5/10/2017 5/31/2017 2/1/2018   Total Score 0 0 8       PSYCHIATRIC DIAGNOSES                                                                                                   Bipolar 1 Disorder     ASSESSMENT                                     TODAY: Cristina reports improvement in symptoms of anxiety following the completion of the semester. No current Sx of shaina or depression. Continues to take Lithium. Today is concerned about rash and itchy sensation. Reports having a rash a few months ago also. No rash noted on upper back. Did not visualize arms. Encouraged her to contact PCP office, provided her with phone number. Also discussed trial of low dose Benadryl that she could buy OTC. Discussed importance of hydration while on Lithium. No medication changes today. She expressed some interest in therapy but finds it difficult to come here, is willing to talk with SW.                            PLAN                                                                                                       1) MEDICATION:      - Continue Lithium 900 mg QHS    2) THERAPY:  None current, would consider it if she could get transportation figured out.     3) LABS NEXT DUE:  Li labs due 10/2018       RATING SCALES:     none    4) REFERRALS [CD, medical, other]:  Social Work re: transportation / therapy. Contact PCP re: itching.     5) :  none    6) RTC: 4 weeks    7) CRISIS NUMBERS: Provided routinely in AVS     TREATMENT RISK STATEMENT:  The risks, benefits, alternatives and potential adverse effects have been discussed and are understood by the patient/ patient's guardian. The pt understands the risks of using  street drugs or alcohol.  There are no medical contraindications, the pt agrees to treatment with the ability to do so.  The patient understands to call 911 or come to the nearest ED if life threatening or urgent symptoms present.       RESIDENT:   Jazmine Key MD    Not staffed in clinic. Dr. Cochran is supervisor who will sign note.   I did not see this pt directly. I have reviewed the documentation, and I agree with the resident's plan of care.    Amelia Cochran

## 2018-05-30 NOTE — MR AVS SNAPSHOT
After Visit Summary   5/30/2018    Cristina Boyd    MRN: [id number]           Patient Information     Date Of Birth          2000        Visit Information        Provider Department      5/30/2018 8:10 AM Jazmine Key MD Psychiatry Clinic        Today's Diagnoses     Bipolar disorder, current episode manic, severe with psychotic features (H)          Care Instructions    - No medication changes today    - Call your primary doctor to discuss itching - [phone]          Follow-ups after your visit        Follow-up notes from your care team     Return in about 4 weeks (around 6/27/2018) for 30 MFU.      Your next 10 appointments already scheduled     Jun 27, 2018 10:10 AM CDT   Adult Med Follow UP with Jazmine Key MD   Psychiatry Clinic ([facility])    [place] [id number]  [address] [address] [phone]              Who to contact     Please call your clinic at [phone] to:    Ask questions about your health    Make or cancel appointments    Discuss your medicines    Learn about your test results    Speak to your doctor            Additional Information About Your Visit        Care EveryWhere ID     This is your Care EveryWhere ID. This could be used by other organizations to access your [place] medical records  [id number]        Your Vitals Were     Pulse BMI (Body Mass Index)                134 18.76 kg/m2           Blood Pressure from Last 3 Encounters:   05/30/18 139/74   04/30/18 108/72   04/02/18 115/79    Weight from Last 3 Encounters:   05/30/18 52.7 kg (116 lb 3.2 oz)   04/30/18 55.7 kg (122 lb 12.8 oz)   04/02/18 56.7 kg (125 lb)              Today, you had the following     No orders found for display         Today's Medication Changes          These changes are accurate as of 5/30/18  8:46 AM.  If you have any questions, ask your nurse or doctor.               Stop taking these medicines if you haven't  already. Please contact your care team if you have questions.     hydrOXYzine 25 MG tablet   Commonly known as:  ATARAX   Stopped by:  Jazmine Key MD                Where to get your medicines      These medications were sent to [facility] - [place], [place] - [address]  [address], [facility] [place] [address]     Phone:  [phone]     lithium 450 MG CR tablet                Primary Care Provider Fax #    Physician No Ref-Primary [phone]       No address on file        Equal Access to Services     HUMAIRA HUFFMAN : Hadii aad ku hadasho Soomaali, waaxda luqadaha, qaybta kaalmada adeegyada, waxay idiin hayaan umair desouza . So [facility] [phone].    ATENCIÓN: Si habla español, tiene a shaver disposición servicios gratuitos de asistencia lingüística. [facility] [phone].    We comply with applicable federal civil rights laws and [place] laws. We do not discriminate on the basis of race, color, national origin, age, disability, sex, sexual orientation, or gender identity.            Thank you!     Thank you for choosing PSYCHIATRY CLINIC  for your care. Our goal is always to provide you with excellent care. Hearing back from our patients is one way we can continue to improve our services. Please take a few minutes to complete the written survey that you may receive in the mail after your visit with us. Thank you!             Your Updated Medication List - Protect others around you: Learn how to safely use, store and throw away your medicines at www.disposemymeds.org.          This list is accurate as of 5/30/18  8:46 AM.  Always use your most recent med list.                   Brand Name Dispense Instructions for use Diagnosis    lithium 450 MG CR tablet    ESKALITH    60 tablet    Take 2 tablets (900 mg) by mouth At Bedtime    Bipolar disorder, current episode manic, severe with psychotic features (H)

## 2018-05-31 ENCOUNTER — HOSPITAL ENCOUNTER (INPATIENT)
Facility: CLINIC | Age: 18
LOS: 12 days | Discharge: ANOTHER HOSPITAL | DRG: 885 | End: 2018-06-13
Attending: EMERGENCY MEDICINE | Admitting: PSYCHIATRY & NEUROLOGY
Payer: COMMERCIAL

## 2018-05-31 ENCOUNTER — TELEPHONE (OUTPATIENT)
Dept: PSYCHIATRY | Facility: CLINIC | Age: 18
End: 2018-05-31

## 2018-05-31 DIAGNOSIS — R59.1 LA (LYMPHADENOPATHY): ICD-10-CM

## 2018-05-31 DIAGNOSIS — F31.2 BIPOLAR DISORDER, CURRENT EPISODE MANIC, SEVERE WITH PSYCHOTIC FEATURES (H): ICD-10-CM

## 2018-05-31 DIAGNOSIS — H04.123 DRY EYES: ICD-10-CM

## 2018-05-31 DIAGNOSIS — F31.2 BIPOLAR I DISORDER, CURRENT OR MOST RECENT EPISODE MANIC, WITH PSYCHOTIC FEATURES (H): ICD-10-CM

## 2018-05-31 DIAGNOSIS — J30.2 SEASONAL ALLERGIC RHINITIS, UNSPECIFIED CHRONICITY, UNSPECIFIED TRIGGER: Primary | ICD-10-CM

## 2018-05-31 DIAGNOSIS — R23.8 SKIN IRRITATION: ICD-10-CM

## 2018-05-31 DIAGNOSIS — R07.0 THROAT PAIN: ICD-10-CM

## 2018-05-31 DIAGNOSIS — R05.9 COUGH: ICD-10-CM

## 2018-05-31 PROCEDURE — 99285 EMERGENCY DEPT VISIT HI MDM: CPT | Mod: 25 | Performed by: EMERGENCY MEDICINE

## 2018-05-31 PROCEDURE — 90791 PSYCH DIAGNOSTIC EVALUATION: CPT

## 2018-05-31 PROCEDURE — 99285 EMERGENCY DEPT VISIT HI MDM: CPT | Mod: Z6 | Performed by: EMERGENCY MEDICINE

## 2018-05-31 ASSESSMENT — PATIENT HEALTH QUESTIONNAIRE - PHQ9: SUM OF ALL RESPONSES TO PHQ QUESTIONS 1-9: 3

## 2018-05-31 NOTE — IP AVS SNAPSHOT
[address] [address]    Phone:  [phone]                                       After Visit Summary   5/31/2018    Cristina Boyd    MRN: [id number]           After Visit Summary Signature Page     I have received my discharge instructions, and my questions have been answered. I have discussed any challenges I see with this plan with the nurse or doctor.    ..........................................................................................................................................  Patient/Patient Representative Signature      ..........................................................................................................................................  Patient Representative Print Name and Relationship to Patient    ..................................................               ................................................  Date                                            Time    ..........................................................................................................................................  Reviewed by Signature/Title    ...................................................              ..............................................  Date                                                            Time

## 2018-05-31 NOTE — TELEPHONE ENCOUNTER
Social Work   Incoming/Outgoing Call  [facility]    Outgoing Call To: Cristina Boyd    Reason for Call:  Referral by Dr. Key for therapy resources. Patient is looking for therapist that will be easier for her to access as transportation is an issue.    Response/Plan:  SW talked with Cristina. SW found 2 clinics within 5 miles of her address that accept her insurance and appear to be on bus line. Cristina requested that information be emailed to her.    SW provided following referrals via email:    [facility]  [address] [place], [place]  [phone]    [facility]  [address], [place]   [phone]      Will route to patient's current psychiatric provider(s) as an FYI.   Please call or EPIC message with any questions or concerns.    MARCELINA Dunn, LICSW  [phone]

## 2018-05-31 NOTE — IP AVS SNAPSHOT
"                  MRN:[id number]                      After Visit Summary   5/31/2018    Cristina Boyd    MRN: [id number]           Thank you!     Thank you for choosing [facility] for your care. Our goal is always to provide you with excellent care.        Patient Information     Date Of Birth          2000        Designated Caregiver       Most Recent Value    Caregiver    Will someone help with your care after discharge? no [\"I don't know, G-d only knows\"]      About your hospital stay     You were admitted on:  June 1, 2018 You last received care in the:  [facility]    You were discharged on:  June 13, 2018       Who to Call     For medical emergencies, please call 911.  For non-urgent questions about your medical care, please call your primary care provider or clinic, None          Attending Provider     Provider Specialty    Dylan Hartman MD Emergency Medicine    Wichelita, Marychuy Martínez MD Psychiatry    Bonita, Severo BROWN MD Psychiatry       Primary Care Provider Fax #    Physician No Ref-Primary [phone]      Follow-up Appointments     Follow Up ([facility])       Follow up with primary care provider, within 7 days.  See ENT for consultation within 1-2 weeks of discharge.    Appointments on [facility] and/or [facility] (with [facility] or [facility] provider or service). Call [phone] if you haven't heard regarding these appointments within 7 days of discharge.                  Your next 10 appointments already scheduled     Jun 27, 2018 10:10 AM CDT   Adult Med Follow UP with Jazmine Key MD   Psychiatry Clinic ([facility])    [address] [address] [phone]              Additional Services     Medication Therapy Management Referral       MTM referral reason            Lithium prescribed     This service is designed to help you get the most from your medications.  A specially trained pharmacist will work closely with " you and your doctors  to solve any problems related to your medications and to help you get the   best results from taking them.      The Medication Therapy Management staff will call you to schedule an appointment.            OTOLARYNGOLOGY REFERRAL       Your provider has referred you to: [facility]: Adult Ear, Nose and Throat Clinic (Otolaryngology) - [place] [phone]  [email or web address]    Please be aware that coverage of these services is subject to the terms and limitations of your health insurance plan.  Call member services at your health plan with any benefit or coverage questions.      Please bring the following with you to your appointment:    (1) Any X-Rays, CTs or MRIs which have been performed.  Contact the facility where they were done to arrange for  prior to your scheduled appointment.   (2) List of current medications  (3) This referral request   (4) Any documents/labs given to you for this referral                  Future tests that were ordered for you     AFB Culture Non Blood [[id number]]       STOP: SWABS, MATERIAL COLLECTED or TRANSPORTED ON SWABS, and DRY MATERIAL are UNACCEPTABLE FOR AFB CULTURE/STAIN.  SPECIMEN WILL BE REJECTED FOR TESTING.              AFB Culture Non Blood [[id number]]       STOP: SWABS, MATERIAL COLLECTED or TRANSPORTED ON SWABS, and DRY MATERIAL are UNACCEPTABLE FOR AFB CULTURE/STAIN.  SPECIMEN WILL BE REJECTED FOR TESTING.              AFB Culture Non Blood [[id number]]       STOP: SWABS, MATERIAL COLLECTED or TRANSPORTED ON SWABS, and DRY MATERIAL are UNACCEPTABLE FOR AFB CULTURE/STAIN.  SPECIMEN WILL BE REJECTED FOR TESTING.              AFB Stain Non Blood [[id number]]       STOP: SWABS, MATERIAL COLLECTED or TRANSPORTED ON SWABS, and DRY MATERIAL are UNACCEPTABLE FOR AFB CULTURE/STAIN.  SPECIMEN WILL BE REJECTED FOR TESTING.              AFB Stain Non Blood [[id number]]       STOP: SWABS, MATERIAL COLLECTED or TRANSPORTED ON  SWABS, and DRY MATERIAL are UNACCEPTABLE FOR AFB CULTURE/STAIN.  SPECIMEN WILL BE REJECTED FOR TESTING.              AFB Stain Non Blood [[id number]]       STOP: SWABS, MATERIAL COLLECTED or TRANSPORTED ON SWABS, and DRY MATERIAL are UNACCEPTABLE FOR AFB CULTURE/STAIN.  SPECIMEN WILL BE REJECTED FOR TESTING.              Gram stain ([id number])           Gram stain ([id number])           Gram stain ([id number])                 Further instructions from your care team       Behavioral Discharge Planning and Instructions    Summary:   You were admitted to [facility] on 5/31/18 with acute manic symptoms under the care of Dr. Mesa.  You met with Dr. Mesa and his team daily for ongoing psychiatric assessment and medication management.  You had opportunities to participate in therapeutic groups on the unit.   At this time you are requesting discharge. You report your mood is stabilizing and you deny having thoughts or intent to harm yourself or others.   You will be discharged home and will resume care with your outpatient providers.    Main Diagnosis:   Bipolar Affective Disorder    Major Treatments, Procedures and Findings:   Medications were  managed throughout your stay. An internal medicine consult was completed during your stay. You had the opportunity to participate in treatment programming while on the unit including occupational therapy, mental health support and education and spiritual services.     Symptoms to Report:   Please report if you are experiencing increased aggression and/or confusion, problematic loss of sleep, worsening mood, or thoughts of suicide to your treatment team or notify your primary provider.   IF THE SYMPTOMS YOU ARE EXPERIENCING ARE A MEDICAL EMERGENCY, CALL 911 IMMEDIATELY    Lifestyle Adjustment:   1. Adjust your lifestyle to get enough sleep, relaxation, exercise and good nutrition.  Continue to develop healthy coping skills to decrease stress and promote a healthy lifestyle.  2. Abstain  "from all substances of abuse.  3. Take medications as prescribed.  Please work with your doctor to discuss any concerns you have with your medications or side effects you may be experiencing.  4. Follow up with appointments as scheduled.      Psychiatry Follow-up:   Appointment : Psychiatry: Dr. Jazmine Key: 6/27/18 at 10:10am  [facility] 2nd Floor [address] [facility], [address]   [phone]      Resources:   *[facility] Crisis: COPE: ([phone]) 24 hour mobile crisis support for people having a mental health crisis in [facility].   *Acute Psychiatric Services ([phone]). 24-hour walk-in crisis psychiatric support at [facility]; Emergency Medications Clinic available 7:30am - 2:00pm  *Cvnm0zfhn: Text  \"life\"  to [phone]   A trained crisis counselor will respond immediately  *Crisis [facility]: ([phone])  24-hour confidential telephone counseling   *[facility] Emergency Room: [phone]      General Medication Instructions:   See your medication sheet(s) for instructions.   Take all medicines as directed.  Make no changes unless your doctor suggests them.   Go to all your doctor visits.  Be sure to have all your required lab tests. This way, your medicines can be refilled on time.  Do not use any drugs not prescribed by your doctor.    The treatment team has appreciated the opportunity to work with you.  We wish you the best in the future.    If you have any questions or concerns our unit number is [phone].         Pending Results     Date and Time Order Name Status Description    6/11/2018 1427 M Tuberculosis by [facility]iferon ! Follow QTB collection process In process             Admission Information     Date & Time Provider Department Dept. Phone    5/31/2018 Severo Mesa MD [facility] [phone]      Your Vitals Were     Blood Pressure Pulse Temperature " "Respirations Height Weight    120/78 102 98.3  F (36.8  C) (Tympanic) 12 1.676 m (5' 6\") 57.6 kg (127 lb)    Pulse Oximetry BMI (Body Mass Index)                99% 20.5 kg/m2          Care EveryWhere ID     This is your Care EveryWhere ID. This could be used by other organizations to access your [facility] medical records  [id number]        Equal Access to Services     HUMAIRA HUFFMAN AH: Hadii sarika ku hadasho Soomaali, waaxda luqadaha, qaybta kaalmada adeegyada, alfred malhotrain auren umair farrah laakiljayant . So [facility] [phone].    ATENCIÓN: Si habla sandy, tiene a shaver disposición servicios gratuitos de asistencia lingüística. Shaniqueame al [phone].    We comply with applicable federal civil rights laws and [place] laws. We do not discriminate on the basis of race, color, national origin, age, disability, sex, sexual orientation, or gender identity.               Review of your medicines      START taking        Dose / Directions    benzocaine-menthol 15-3.6 MG lozenge   Commonly known as:  CEPACOL   Used for:  Throat pain        Dose:  1 lozenge   Place 1 lozenge inside cheek every hour as needed for sore throat   Quantity:  36 lozenge   Refills:  0       Carboxymethylcellulose Sod PF 0.5 % Soln ophthalmic solution   Commonly known as:  REFRESH PLUS   Used for:  Dry eyes        Dose:  1 drop   Place 1 drop into both eyes 3 times daily as needed for dry eyes   Quantity:  1 Bottle   Refills:  0       cetirizine 10 MG tablet   Commonly known as:  zyrTEC   Used for:  Seasonal allergic rhinitis, unspecified chronicity, unspecified trigger        Dose:  10 mg   Take 1 tablet (10 mg) by mouth daily   Quantity:  30 tablet   Refills:  0       propranolol 20 MG tablet   Commonly known as:  INDERAL   Used for:  Bipolar I disorder, current or most recent episode manic, with psychotic features (H)        Dose:  20 mg   Take 1 tablet (20 mg) by mouth 2 times daily   Quantity:  60 tablet   Refills:  0       * QUEtiapine 100 MG tablet "   Commonly known as:  SEROquel   Used for:  Bipolar I disorder, current or most recent episode manic, with psychotic features (H)        Dose:  100 mg   Take 1 tablet (100 mg) by mouth 3 times daily as needed (insomnia or agitation associated with shaina)   Quantity:  60 tablet   Refills:  0       * QUEtiapine 400 MG tablet   Commonly known as:  SEROquel   Used for:  Bipolar I disorder, current or most recent episode manic, with psychotic features (H)        Dose:  400 mg   Take 1 tablet (400 mg) by mouth At Bedtime   Quantity:  30 tablet   Refills:  0       triamcinolone 0.1 % cream   Commonly known as:  KENALOG   Used for:  Skin irritation        Apply topically 2 times daily as needed for irritation   Quantity:  80 g   Refills:  0       * Notice:  This list has 2 medication(s) that are the same as other medications prescribed for you. Read the directions carefully, and ask your doctor or other care provider to review them with you.      CONTINUE these medicines which have NOT CHANGED        Dose / Directions    lithium 450 MG CR tablet   Commonly known as:  ESKALITH   Used for:  Bipolar disorder, current episode manic, severe with psychotic features (H)        Dose:  900 mg   Take 2 tablets (900 mg) by mouth At Bedtime   Quantity:  60 tablet   Refills:  0            Where to get your medicines      These medications were sent to [facility] [place], [place] - [address]  [address] [address]     Phone:  [phone]     benzocaine-menthol 15-3.6 MG lozenge    Carboxymethylcellulose Sod PF 0.5 % Soln ophthalmic solution    cetirizine 10 MG tablet    lithium 450 MG CR tablet    propranolol 20 MG tablet    QUEtiapine 100 MG tablet    QUEtiapine 400 MG tablet    triamcinolone 0.1 % cream                Protect others around you: Learn how to safely use, store and throw away your medicines at www.disposemymeds.org.             Medication List: This is a list of all your  medications and when to take them. Check marks below indicate your daily home schedule. Keep this list as a reference.      Medications           Morning Afternoon Evening Bedtime As Needed    benzocaine-menthol 15-3.6 MG lozenge   Commonly known as:  CEPACOL   Place 1 lozenge inside cheek every hour as needed for sore throat   Last time this was given:  1 lozenge on 6/11/2018  3:19 AM                                Carboxymethylcellulose Sod PF 0.5 % Soln ophthalmic solution   Commonly known as:  REFRESH PLUS   Place 1 drop into both eyes 3 times daily as needed for dry eyes   Last time this was given:  1 drop on 6/6/2018 11:46 PM                                cetirizine 10 MG tablet   Commonly known as:  zyrTEC   Take 1 tablet (10 mg) by mouth daily   Last time this was given:  10 mg on 6/13/2018  9:05 AM                                lithium 450 MG CR tablet   Commonly known as:  ESKALITH   Take 2 tablets (900 mg) by mouth At Bedtime   Last time this was given:  900 mg on 6/12/2018  9:41 PM                                propranolol 20 MG tablet   Commonly known as:  INDERAL   Take 1 tablet (20 mg) by mouth 2 times daily   Last time this was given:  20 mg on 6/13/2018  9:04 AM                                * QUEtiapine 100 MG tablet   Commonly known as:  SEROquel   Take 1 tablet (100 mg) by mouth 3 times daily as needed (insomnia or agitation associated with shaina)   Last time this was given:  400 mg on 6/12/2018  9:41 PM                                * QUEtiapine 400 MG tablet   Commonly known as:  SEROquel   Take 1 tablet (400 mg) by mouth At Bedtime   Last time this was given:  400 mg on 6/12/2018  9:41 PM                                triamcinolone 0.1 % cream   Commonly known as:  KENALOG   Apply topically 2 times daily as needed for irritation                                * Notice:  This list has 2 medication(s) that are the same as other medications prescribed for you. Read the directions carefully,  and ask your doctor or other care provider to review them with you.

## 2018-06-01 LAB
ALBUMIN SERPL-MCNC: 4.1 G/DL (ref 3.4–5)
ALP SERPL-CCNC: 69 U/L (ref 40–150)
ALT SERPL W P-5'-P-CCNC: 7 U/L (ref 0–50)
AMPHETAMINES UR QL SCN: NEGATIVE
ANION GAP SERPL CALCULATED.3IONS-SCNC: 12 MMOL/L (ref 3–14)
AST SERPL W P-5'-P-CCNC: 14 U/L (ref 0–45)
BARBITURATES UR QL: NEGATIVE
BASOPHILS # BLD AUTO: 0 10E9/L (ref 0–0.2)
BASOPHILS NFR BLD AUTO: 0.3 %
BENZODIAZ UR QL: NEGATIVE
BILIRUB SERPL-MCNC: 0.4 MG/DL (ref 0.2–1.3)
BUN SERPL-MCNC: 12 MG/DL (ref 7–30)
CALCIUM SERPL-MCNC: 9.4 MG/DL (ref 8.5–10.1)
CANNABINOIDS UR QL SCN: NEGATIVE
CHLORIDE SERPL-SCNC: 105 MMOL/L (ref 94–109)
CO2 SERPL-SCNC: 22 MMOL/L (ref 20–32)
COCAINE UR QL: NEGATIVE
CREAT SERPL-MCNC: 0.56 MG/DL (ref 0.52–1.04)
DIFFERENTIAL METHOD BLD: NORMAL
EOSINOPHIL # BLD AUTO: 0.1 10E9/L (ref 0–0.7)
EOSINOPHIL NFR BLD AUTO: 0.7 %
ERYTHROCYTE [DISTWIDTH] IN BLOOD BY AUTOMATED COUNT: 13.3 % (ref 10–15)
ETHANOL UR QL SCN: NEGATIVE
GFR SERPL CREATININE-BSD FRML MDRD: >90 ML/MIN/1.7M2
GLUCOSE SERPL-MCNC: 91 MG/DL (ref 70–99)
HCG SERPL QL: NEGATIVE
HCG UR QL: NEGATIVE
HCT VFR BLD AUTO: 39.6 % (ref 35–47)
HGB BLD-MCNC: 13.1 G/DL (ref 11.7–15.7)
IMM GRANULOCYTES # BLD: 0 10E9/L (ref 0–0.4)
IMM GRANULOCYTES NFR BLD: 0.1 %
LITHIUM SERPL-SCNC: 1.29 MMOL/L (ref 0.6–1.2)
LITHIUM SERPL-SCNC: 1.48 MMOL/L (ref 0.6–1.2)
LYMPHOCYTES # BLD AUTO: 2.5 10E9/L (ref 0.8–5.3)
LYMPHOCYTES NFR BLD AUTO: 36.9 %
MCH RBC QN AUTO: 29.3 PG (ref 26.5–33)
MCHC RBC AUTO-ENTMCNC: 33.1 G/DL (ref 31.5–36.5)
MCV RBC AUTO: 89 FL (ref 78–100)
MONOCYTES # BLD AUTO: 0.8 10E9/L (ref 0–1.3)
MONOCYTES NFR BLD AUTO: 12 %
NEUTROPHILS # BLD AUTO: 3.3 10E9/L (ref 1.6–8.3)
NEUTROPHILS NFR BLD AUTO: 50 %
NRBC # BLD AUTO: 0 10*3/UL
NRBC BLD AUTO-RTO: 0 /100
OPIATES UR QL SCN: NEGATIVE
PLATELET # BLD AUTO: 319 10E9/L (ref 150–450)
POTASSIUM SERPL-SCNC: 4.1 MMOL/L (ref 3.4–5.3)
PROT SERPL-MCNC: 9.1 G/DL (ref 6.8–8.8)
RBC # BLD AUTO: 4.47 10E12/L (ref 3.8–5.2)
SODIUM SERPL-SCNC: 139 MMOL/L (ref 133–144)
T4 FREE SERPL-MCNC: 1.62 NG/DL (ref 0.76–1.46)
TSH SERPL DL<=0.005 MIU/L-ACNC: 4.83 MU/L (ref 0.4–4)
WBC # BLD AUTO: 6.7 10E9/L (ref 4–11)

## 2018-06-01 PROCEDURE — 84439 ASSAY OF FREE THYROXINE: CPT | Performed by: STUDENT IN AN ORGANIZED HEALTH CARE EDUCATION/TRAINING PROGRAM

## 2018-06-01 PROCEDURE — 81025 URINE PREGNANCY TEST: CPT | Performed by: STUDENT IN AN ORGANIZED HEALTH CARE EDUCATION/TRAINING PROGRAM

## 2018-06-01 PROCEDURE — 80053 COMPREHEN METABOLIC PANEL: CPT | Performed by: EMERGENCY MEDICINE

## 2018-06-01 PROCEDURE — 12400007 ZZH R&B MH INTERMEDIATE UMMC

## 2018-06-01 PROCEDURE — 80320 DRUG SCREEN QUANTALCOHOLS: CPT | Performed by: STUDENT IN AN ORGANIZED HEALTH CARE EDUCATION/TRAINING PROGRAM

## 2018-06-01 PROCEDURE — 84703 CHORIONIC GONADOTROPIN ASSAY: CPT | Performed by: EMERGENCY MEDICINE

## 2018-06-01 PROCEDURE — 97150 GROUP THERAPEUTIC PROCEDURES: CPT | Mod: GO

## 2018-06-01 PROCEDURE — 99223 1ST HOSP IP/OBS HIGH 75: CPT | Mod: AI | Performed by: PSYCHIATRY & NEUROLOGY

## 2018-06-01 PROCEDURE — 84443 ASSAY THYROID STIM HORMONE: CPT | Performed by: EMERGENCY MEDICINE

## 2018-06-01 PROCEDURE — 36415 COLL VENOUS BLD VENIPUNCTURE: CPT | Performed by: STUDENT IN AN ORGANIZED HEALTH CARE EDUCATION/TRAINING PROGRAM

## 2018-06-01 PROCEDURE — 80178 ASSAY OF LITHIUM: CPT | Performed by: EMERGENCY MEDICINE

## 2018-06-01 PROCEDURE — 25000132 ZZH RX MED GY IP 250 OP 250 PS 637: Performed by: EMERGENCY MEDICINE

## 2018-06-01 PROCEDURE — 90853 GROUP PSYCHOTHERAPY: CPT

## 2018-06-01 PROCEDURE — 25000132 ZZH RX MED GY IP 250 OP 250 PS 637: Performed by: STUDENT IN AN ORGANIZED HEALTH CARE EDUCATION/TRAINING PROGRAM

## 2018-06-01 PROCEDURE — 80307 DRUG TEST PRSMV CHEM ANLYZR: CPT | Performed by: STUDENT IN AN ORGANIZED HEALTH CARE EDUCATION/TRAINING PROGRAM

## 2018-06-01 PROCEDURE — 85025 COMPLETE CBC W/AUTO DIFF WBC: CPT | Performed by: EMERGENCY MEDICINE

## 2018-06-01 PROCEDURE — 80178 ASSAY OF LITHIUM: CPT | Performed by: STUDENT IN AN ORGANIZED HEALTH CARE EDUCATION/TRAINING PROGRAM

## 2018-06-01 RX ORDER — ACETAMINOPHEN 325 MG/1
650 TABLET ORAL EVERY 4 HOURS PRN
Status: DISCONTINUED | OUTPATIENT
Start: 2018-06-01 | End: 2018-06-13 | Stop reason: HOSPADM

## 2018-06-01 RX ORDER — OLANZAPINE 10 MG/2ML
10 INJECTION, POWDER, FOR SOLUTION INTRAMUSCULAR
Status: DISCONTINUED | OUTPATIENT
Start: 2018-06-01 | End: 2018-06-13 | Stop reason: HOSPADM

## 2018-06-01 RX ORDER — LITHIUM CARBONATE 450 MG
900 TABLET, EXTENDED RELEASE ORAL AT BEDTIME
Status: DISCONTINUED | OUTPATIENT
Start: 2018-06-01 | End: 2018-06-13 | Stop reason: HOSPADM

## 2018-06-01 RX ORDER — POLYETHYLENE GLYCOL 3350 17 G/17G
17 POWDER, FOR SOLUTION ORAL DAILY PRN
Status: DISCONTINUED | OUTPATIENT
Start: 2018-06-01 | End: 2018-06-13 | Stop reason: HOSPADM

## 2018-06-01 RX ORDER — CALCIUM CARBONATE 500 MG/1
500-1000 TABLET, CHEWABLE ORAL EVERY 4 HOURS PRN
Status: DISCONTINUED | OUTPATIENT
Start: 2018-06-01 | End: 2018-06-13 | Stop reason: HOSPADM

## 2018-06-01 RX ORDER — CARBOXYMETHYLCELLULOSE SODIUM 5 MG/ML
1 SOLUTION/ DROPS OPHTHALMIC 3 TIMES DAILY PRN
Status: DISCONTINUED | OUTPATIENT
Start: 2018-06-01 | End: 2018-06-13 | Stop reason: HOSPADM

## 2018-06-01 RX ORDER — OLANZAPINE 10 MG/1
10 TABLET ORAL
Status: DISCONTINUED | OUTPATIENT
Start: 2018-06-01 | End: 2018-06-04

## 2018-06-01 RX ORDER — QUETIAPINE FUMARATE 100 MG/1
100 TABLET, FILM COATED ORAL AT BEDTIME
Status: DISCONTINUED | OUTPATIENT
Start: 2018-06-01 | End: 2018-06-02

## 2018-06-01 RX ORDER — OLANZAPINE 10 MG/1
10 TABLET, ORALLY DISINTEGRATING ORAL ONCE
Status: COMPLETED | OUTPATIENT
Start: 2018-06-01 | End: 2018-06-01

## 2018-06-01 RX ORDER — PROPRANOLOL HYDROCHLORIDE 20 MG/1
20 TABLET ORAL 2 TIMES DAILY
Status: DISCONTINUED | OUTPATIENT
Start: 2018-06-01 | End: 2018-06-13 | Stop reason: HOSPADM

## 2018-06-01 RX ADMIN — PROPRANOLOL HYDROCHLORIDE 20 MG: 20 TABLET ORAL at 22:10

## 2018-06-01 RX ADMIN — OLANZAPINE 10 MG: 10 TABLET, FILM COATED ORAL at 23:31

## 2018-06-01 RX ADMIN — QUETIAPINE FUMARATE 100 MG: 100 TABLET ORAL at 22:10

## 2018-06-01 RX ADMIN — LITHIUM CARBONATE 900 MG: 450 TABLET, EXTENDED RELEASE ORAL at 22:10

## 2018-06-01 RX ADMIN — OLANZAPINE 10 MG: 10 TABLET, ORALLY DISINTEGRATING ORAL at 02:16

## 2018-06-01 ASSESSMENT — ACTIVITIES OF DAILY LIVING (ADL)
ORAL_HYGIENE: PROMPTS
ORAL_HYGIENE: INDEPENDENT
LAUNDRY: WITH SUPERVISION
GROOMING: INDEPENDENT
HYGIENE/GROOMING: PROMPTS
DRESS: SCRUBS (BEHAVIORAL HEALTH);INDEPENDENT
DRESS: SCRUBS (BEHAVIORAL HEALTH);INDEPENDENT
GROOMING: INDEPENDENT
ORAL_HYGIENE: INDEPENDENT
DRESS: SCRUBS (BEHAVIORAL HEALTH)

## 2018-06-01 ASSESSMENT — ENCOUNTER SYMPTOMS
DECREASED CONCENTRATION: 1
SEIZURES: 0
NERVOUS/ANXIOUS: 1
WOUND: 0
NAUSEA: 0
LIGHT-HEADEDNESS: 0
PALPITATIONS: 0
NECK STIFFNESS: 0
SLEEP DISTURBANCE: 1
MYALGIAS: 0
SORE THROAT: 0
HYPERACTIVE: 1
RHINORRHEA: 0
NECK PAIN: 0
CHEST TIGHTNESS: 0
BRUISES/BLEEDS EASILY: 0
SHORTNESS OF BREATH: 0
ARTHRALGIAS: 0
DIARRHEA: 0
DIZZINESS: 0
DYSURIA: 0
TREMORS: 0
DYSPHORIC MOOD: 1
ABDOMINAL PAIN: 0
ACTIVITY CHANGE: 1
APPETITE CHANGE: 1
CHILLS: 0
COUGH: 0
HEADACHES: 0
AGITATION: 1
DIAPHORESIS: 0
FEVER: 0

## 2018-06-01 NOTE — H&P
"Psychiatry History and Physical    Cristina Boyd MRN# [id number]   Age: 18 year old YOB: 2000     Date of Admission:  5/31/2018          Contacts:   Primary Outpatient Psychiatrist: Jazmine Key MD, [facility]  Primary Care Physician: None         Chief Complaint:   History obtained from patient and chart review confirmed with patient  Patient interviewed in ED    \"I'm manic right now\"       History of Present Illness:   Cristina Boyd is a 18 year old female with hx of BPAD1 who was brought to ED by family due to concerns for shaina. Patient has slept very little over the past 5 days, reports about 2-3 hours each of the past 2 nights. Other symptoms include racing thoughts, vague paranoia. No use of drugs or alcohol. Patient reports that she has been lying to her family and providers about Li adherence, hasn't been taking it for about 1 mo. Due to her burgeoning concern about being manic, she took 900mg earlier tonight around 9pm. ROS positive for VH of \"angels\" that look like normal people. ROS negative for AH, SI.            Psychiatric History:   Past diagnoses: BPAD1, with psychosis  Psychiatric Hospitalizations: 2, Jan and Feb 2017 for shaina with psychosis.  History of Psychosis: yes  Prior ECT: no  Court Commitment: unknown  Suicide Attempts: none  Self-injurious Behavior: none  Violence toward others: none  Use of Psychotropics:  - Feb 2017: Hospitalized [facility]                                                - Abilify 30 --> 15 mg                                                - Started Ativan, Depakote, Cogentin  - Mar 2017: Decreased Depakote 1000 --> 750 mg  - Apr 2017: Decreased Ativan 2 --> 1 mg  - Sept/Oct 2017: Ran out of all medications (Abilify 15 mg, Cogentin, Depakote 750 mg, Lithium 900 mg, Ativan 1 mg)   - Nov 2017: Restarted Lithium 900 mg  - Jan 2018: pt self discontinued Lithium  - April 2018: resumed Li         Past Medical and Surgical History:     Past Medical History: " "  Diagnosis Date     Bipolar disorder (H)      Depressive disorder      History reviewed. No pertinent surgical history.    History of seizures: none  History of Hepatitis, Head Trauma with or without loss of consciousness: none         Social History:   Lives with parents. Full time student at [employer], although hasn't been to school in about 2 wks.  No history of trauma, abuse, or legal problems.         Substance Use History:   Alcohol: none  Nicotine: none  Cannabis: none  Stimulants: none  Opiates: none  Benzodiazepines: none  Hallucinogens: none  Other: none    Prior Chemical Dependency treatment: none          Family History:     Family History   Problem Relation Age of Onset     Depression Other      Depression Maternal Uncle        H/o completed suicides in family: none         Medications:   I have reviewed this patient's current medications.    No current facility-administered medications on file prior to encounter.   Current Outpatient Prescriptions on File Prior to Encounter:  lithium (ESKALITH) 450 MG CR tablet Take 2 tablets (900 mg) by mouth At Bedtime            Allergies:    No Known Allergies         Psychiatric Review of Systems:   Depressive Sx: None  Manic Sx: does not need to sleep, increased energy, rapid speech and rapid thoughts  Anxiety Sx: worries  Psychosis: VH disorganized thinking  PTSD: none  Cluster B: none  ADHD: none         Medical Review of Systems:   10 point review of systems is otherwise negative unless noted above.         Psychiatric Mental Status Examination:   /70  Pulse 78  Temp 98  F (36.7  C)  Resp 14  Wt 51.7 kg (114 lb)  SpO2 98%  BMI 18.4 kg/m2  Weight is 114 lbs 0 oz  Body mass index is 18.4 kg/(m^2).    Appearance:  awake, alert, adequately groomed and dressed in hospital scrubs  Attitude:  cooperative  Eye Contact:  good  Mood:  \"manic\"  Affect:  mood congruent and labile  Speech:  hyperverbal, mildly pressured  Psychomotor Behavior:  fidgeting and " physical agitation  Thought Process:  Goal oriented, however disorganized and tangental   Associations:  loosening of associations present  Thought Content:  no evidence of suicidal ideation or homicidal ideation, no auditory hallucinations present and visual hallucinations vs delusions present (marco steward)  Insight:  partial  Judgment:  limited  Oriented to:  time, person, and place  Attention Span and Concentration:  limited  Recent and Remote Memory:  fair  Language: Able to name objects and Able to repeat phrases  Fund of Knowledge: appropriate  Muscle Strength and Tone: normal  Gait and Station: Normal         Physical Exam:   Please refer to physical exam completed by ED provider on this date.          Labs:     Recent Results (from the past 24 hour(s))   CBC with platelets differential    Collection Time: 06/01/18  2:01 AM   Result Value Ref Range    WBC 6.7 4.0 - 11.0 10e9/L    RBC Count 4.47 3.8 - 5.2 10e12/L    Hemoglobin 13.1 11.7 - 15.7 g/dL    Hematocrit 39.6 35.0 - 47.0 %    MCV 89 78 - 100 fl    MCH 29.3 26.5 - 33.0 pg    MCHC 33.1 31.5 - 36.5 g/dL    RDW 13.3 10.0 - 15.0 %    Platelet Count 319 150 - 450 10e9/L    Diff Method Automated Method     % Neutrophils 50.0 %    % Lymphocytes 36.9 %    % Monocytes 12.0 %    % Eosinophils 0.7 %    % Basophils 0.3 %    % Immature Granulocytes 0.1 %    Nucleated RBCs 0 0 /100    Absolute Neutrophil 3.3 1.6 - 8.3 10e9/L    Absolute Lymphocytes 2.5 0.8 - 5.3 10e9/L    Absolute Monocytes 0.8 0.0 - 1.3 10e9/L    Absolute Eosinophils 0.1 0.0 - 0.7 10e9/L    Absolute Basophils 0.0 0.0 - 0.2 10e9/L    Abs Immature Granulocytes 0.0 0 - 0.4 10e9/L    Absolute Nucleated RBC 0.0    Comprehensive metabolic panel    Collection Time: 06/01/18  2:01 AM   Result Value Ref Range    Sodium 139 133 - 144 mmol/L    Potassium 4.1 3.4 - 5.3 mmol/L    Chloride 105 94 - 109 mmol/L    Carbon Dioxide 22 20 - 32 mmol/L    Anion Gap 12 3 - 14 mmol/L    Glucose 91 70 - 99 mg/dL    Urea  Nitrogen 12 7 - 30 mg/dL    Creatinine 0.56 0.52 - 1.04 mg/dL    GFR Estimate >90 >60 mL/min/1.7m2    GFR Estimate If Black >90 >60 mL/min/1.7m2    Calcium 9.4 8.5 - 10.1 mg/dL    Bilirubin Total 0.4 0.2 - 1.3 mg/dL    Albumin 4.1 3.4 - 5.0 g/dL    Protein Total 9.1 (H) 6.8 - 8.8 g/dL    Alkaline Phosphatase 69 40 - 150 U/L    ALT 7 0 - 50 U/L    AST 14 0 - 45 U/L   TSH    Collection Time: 06/01/18  2:01 AM   Result Value Ref Range    TSH 4.83 (H) 0.40 - 4.00 mU/L   Lithium level    Collection Time: 06/01/18  2:01 AM   Result Value Ref Range    Lithium Level 1.48 (H) 0.60 - 1.20 mmol/L            Assessment:   18 year old female with history of BPAD1 who presents with an episode of shaina that began about 5 days ago, characterized by decreased need for sleep, paranoia, delusions, and mixed features (some degree of dysphoria). Patient was brought to ED by family due to their concern for her current state. Presentation occurs in the context of Li non-adherence over the past month. Patient restarted her Li tonight, took 900 mg around 930pm. Spot level at 2am (essentially a peak level) was 1.48. No signs/symptoms of toxicity. Have ordered trough level for AM.  No substance use concerns. Patient's goals of care are to restart Li and stabilize her mood.     Inpatient psychiatric hospitalization is warranted at this time for safety, stabilization, and possible adjustment in medications.    Diagnoses:  1. BPAD1, current episode shaina         Plan:   Medications:  Continue:  - Li CR 900mg QHS starting tomorrow (already took a dose at home this evening around 9pm). Please verify urine has been collected and there is a neg HCG result prior to evening dose    Hold:  none    New:  - olanzapine 10 mg PO/IM Q2H PRN agitation  - miralax 1 packet daily PRN constipation  - Tylenol 650 mg Q4H PRN pain    Laboratory/Imaging: CBC, CMP wnl. Peak Li level 1.48.  - UDS, HCG, TSH pending    Consults: none    Patient will be treated in  therapeutic milieu with appropriate individual and group therapies as described.    Medical diagnoses to be addressed this admission: none    Relevant psychosocial stressors: school, family dynamics    Legal Status: Voluntary    Safety Assessment:   Checks: Status 15  Precautions: Suicide  Self-harm   Single room due to lability  Pt has not required locked seclusion or restraints in the past 24 hours to maintain safety, please refer to RN documentation for further details.  The risks, benefits, alternatives and side effects have been discussed and are understood by the patient and other caregivers.    Anticipated Disposition/Discharge Date: Pending clinical stabilization and formulation of an appropriate outpatient treatment plan.    Patient to be staffed in AM with Marychuy Guzman MD.    Derrick Reese PGY-2  pg [phone]  2:55 AM 06/01/2018

## 2018-06-01 NOTE — PLAN OF CARE
"Problem: Psychotic Symptoms  Goal: Psychotic Symptoms  Signs and symptoms of listed problems will be absent or manageable.   Cristina cooperated with providing a urine sample.  Hcg negative, drug screen negative.  She refused offer of ice water, ginger ale etc. \"No, not now.\".  I sounded like she said that she was thirsty but she corrected and said, \"No I am not thirsty.  (thinking of) ginger ale I feel nauseous.  \".   She did meet with team and attended one group ('s) so far today.      "

## 2018-06-01 NOTE — H&P
"Psychiatry History and Physical    Cristina Boyd MRN# [id number]   Age: 18 year old YOB: 2000     Date of Admission:  5/31/2018          Contacts:   Primary Outpatient Psychiatrist: Jazmine Key MD, [facility]  Primary Care Physician: None         Chief Complaint:   History obtained from patient and chart review confirmed with patient  Patient interviewed in ED    \"I'm manic right now\"       History of Present Illness:   Cristina Boyd is a 18 year old female with hx of BPAD1 who was brought to ED by family due to concerns for shaina. Patient has slept very little over the past 5 days, reports about 2-3 hours each of the past 2 nights. Other symptoms include racing thoughts, vague paranoia. No use of drugs or alcohol. Patient reports that she has been lying to her family and providers about Li adherence, hasn't been taking it for about 1 mo. Due to her burgeoning concern about being manic, she took 900mg earlier tonight around 9pm. ROS positive for VH of \"angels\" that look like normal people. ROS negative for AH, SI.            Psychiatric History:   Past diagnoses: BPAD1, with psychosis  Psychiatric Hospitalizations: 2, Jan and Feb 2017 for shaina with psychosis.  History of Psychosis: yes  Prior ECT: no  Court Commitment: unknown  Suicide Attempts: none  Self-injurious Behavior: none  Violence toward others: none  Use of Psychotropics:  - Feb 2017: Hospitalized [facility]                                                - Abilify 30 --> 15 mg                                                - Started Ativan, Depakote, Cogentin  - Mar 2017: Decreased Depakote 1000 --> 750 mg  - Apr 2017: Decreased Ativan 2 --> 1 mg  - Sept/Oct 2017: Ran out of all medications (Abilify 15 mg, Cogentin, Depakote 750 mg, Lithium 900 mg, Ativan 1 mg)   - Nov 2017: Restarted Lithium 900 mg  - Jan 2018: pt self discontinued Lithium  - April 2018: resumed Li         Past Medical and Surgical History:     Past Medical History: " "  Diagnosis Date     Bipolar disorder (H)      Depressive disorder      History reviewed. No pertinent surgical history.    History of seizures: none  History of Hepatitis, Head Trauma with or without loss of consciousness: none         Social History:   Lives with parents. Full time student at [employer], although hasn't been to school in about 2 wks.  No history of trauma, abuse, or legal problems.         Substance Use History:   Alcohol: none  Nicotine: none  Cannabis: none  Stimulants: none  Opiates: none  Benzodiazepines: none  Hallucinogens: none  Other: none    Prior Chemical Dependency treatment: none          Family History:     Family History   Problem Relation Age of Onset     Depression Other      Depression Maternal Uncle        H/o completed suicides in family: none         Medications:   I have reviewed this patient's current medications.    No current facility-administered medications on file prior to encounter.   Current Outpatient Prescriptions on File Prior to Encounter:  lithium (ESKALITH) 450 MG CR tablet Take 2 tablets (900 mg) by mouth At Bedtime            Allergies:    No Known Allergies         Psychiatric Review of Systems:   Depressive Sx: None  Manic Sx: does not need to sleep, increased energy, rapid speech and rapid thoughts  Anxiety Sx: worries  Psychosis: VH disorganized thinking  PTSD: none  Cluster B: none  ADHD: none         Medical Review of Systems:   10 point review of systems is otherwise negative unless noted above.         Psychiatric Mental Status Examination:   /70  Pulse 78  Temp 98  F (36.7  C)  Resp 14  Wt 51.7 kg (114 lb)  SpO2 98%  BMI 18.4 kg/m2  Weight is 114 lbs 0 oz  Body mass index is 18.4 kg/(m^2).    Appearance:  awake, alert, adequately groomed and dressed in hospital scrubs  Attitude:  cooperative  Eye Contact:  good  Mood:  \"manic\"  Affect:  mood congruent and labile  Speech:  hyperverbal, mildly pressured  Psychomotor Behavior:  fidgeting and " physical agitation  Thought Process:  Goal oriented, however disorganized and tangental   Associations:  loosening of associations present  Thought Content:  no evidence of suicidal ideation or homicidal ideation, no auditory hallucinations present and visual hallucinations vs delusions present (marco steward)  Insight:  partial  Judgment:  limited  Oriented to:  time, person, and place  Attention Span and Concentration:  limited  Recent and Remote Memory:  fair  Language: Able to name objects and Able to repeat phrases  Fund of Knowledge: appropriate  Muscle Strength and Tone: normal  Gait and Station: Normal         Physical Exam:   Please refer to physical exam completed by ED provider on this date.          Labs:     Recent Results (from the past 24 hour(s))   CBC with platelets differential    Collection Time: 06/01/18  2:01 AM   Result Value Ref Range    WBC 6.7 4.0 - 11.0 10e9/L    RBC Count 4.47 3.8 - 5.2 10e12/L    Hemoglobin 13.1 11.7 - 15.7 g/dL    Hematocrit 39.6 35.0 - 47.0 %    MCV 89 78 - 100 fl    MCH 29.3 26.5 - 33.0 pg    MCHC 33.1 31.5 - 36.5 g/dL    RDW 13.3 10.0 - 15.0 %    Platelet Count 319 150 - 450 10e9/L    Diff Method Automated Method     % Neutrophils 50.0 %    % Lymphocytes 36.9 %    % Monocytes 12.0 %    % Eosinophils 0.7 %    % Basophils 0.3 %    % Immature Granulocytes 0.1 %    Nucleated RBCs 0 0 /100    Absolute Neutrophil 3.3 1.6 - 8.3 10e9/L    Absolute Lymphocytes 2.5 0.8 - 5.3 10e9/L    Absolute Monocytes 0.8 0.0 - 1.3 10e9/L    Absolute Eosinophils 0.1 0.0 - 0.7 10e9/L    Absolute Basophils 0.0 0.0 - 0.2 10e9/L    Abs Immature Granulocytes 0.0 0 - 0.4 10e9/L    Absolute Nucleated RBC 0.0    Comprehensive metabolic panel    Collection Time: 06/01/18  2:01 AM   Result Value Ref Range    Sodium 139 133 - 144 mmol/L    Potassium 4.1 3.4 - 5.3 mmol/L    Chloride 105 94 - 109 mmol/L    Carbon Dioxide 22 20 - 32 mmol/L    Anion Gap 12 3 - 14 mmol/L    Glucose 91 70 - 99 mg/dL    Urea  Nitrogen 12 7 - 30 mg/dL    Creatinine 0.56 0.52 - 1.04 mg/dL    GFR Estimate >90 >60 mL/min/1.7m2    GFR Estimate If Black >90 >60 mL/min/1.7m2    Calcium 9.4 8.5 - 10.1 mg/dL    Bilirubin Total 0.4 0.2 - 1.3 mg/dL    Albumin 4.1 3.4 - 5.0 g/dL    Protein Total 9.1 (H) 6.8 - 8.8 g/dL    Alkaline Phosphatase 69 40 - 150 U/L    ALT 7 0 - 50 U/L    AST 14 0 - 45 U/L   TSH    Collection Time: 06/01/18  2:01 AM   Result Value Ref Range    TSH 4.83 (H) 0.40 - 4.00 mU/L   Lithium level    Collection Time: 06/01/18  2:01 AM   Result Value Ref Range    Lithium Level 1.48 (H) 0.60 - 1.20 mmol/L            Assessment:   18 year old female with history of BPAD1 who presents with an episode of shaina that began about 5 days ago, characterized by decreased need for sleep, paranoia, delusions, and mixed features (some degree of dysphoria). Patient was brought to ED by family due to their concern for her current state. Presentation occurs in the context of Li non-adherence over the past month. Patient restarted her Li tonight, took 900 mg around 930pm. Spot level at 2am (essentially a peak level) was 1.48. No signs/symptoms of toxicity. Have ordered trough level for AM.  No substance use concerns. Patient's goals of care are to restart Li and stabilize her mood.     Inpatient psychiatric hospitalization is warranted at this time for safety, stabilization, and possible adjustment in medications.    Diagnoses:  1. BPAD1, current episode shaina         Plan:   Medications:  Continue:  - Li CR 900mg QHS starting tomorrow (already took a dose at home this evening around 9pm). Please verify urine has been collected and there is a neg HCG result prior to evening dose    Hold:  none    New:  - olanzapine 10 mg PO/IM Q2H PRN agitation  - miralax 1 packet daily PRN constipation  - Tylenol 650 mg Q4H PRN pain    Laboratory/Imaging: CBC, CMP wnl. Peak Li level 1.48.  - UDS, HCG, TSH pending    Consults: none    Patient will be treated in  therapeutic milieu with appropriate individual and group therapies as described.    Medical diagnoses to be addressed this admission: none    Relevant psychosocial stressors: school, family dynamics    Legal Status: Voluntary    Safety Assessment:   Checks: Status 15  Precautions: Suicide  Self-harm   Single room due to lability  Pt has not required locked seclusion or restraints in the past 24 hours to maintain safety, please refer to RN documentation for further details.  The risks, benefits, alternatives and side effects have been discussed and are understood by the patient and other caregivers.    Anticipated Disposition/Discharge Date: Pending clinical stabilization and formulation of an appropriate outpatient treatment plan.    Patient to be staffed in AM with Marychuy Guzman MD.    Derrick Reese PGY-2  pg [phone]  2:55 AM 06/01/2018  Attending Admission Attestation Note:    I personally interviewed and examined Cristina on June 1, 2018, I reviewed the admission history/examination and other documents related to the admission.  I confirmed the findings in the admission note and I agree with the diagnosis and treatment plan with the following corrections, clarifications, additional findings, and assessments: I certify that the treatment plan was reviewed and approved or developed by me in accordance with standard psychiatric practice. I certifiy that the inpatient services were ordered in accordance with the Medicare regulations governing the order. This includes certification that hospital inpatient services are reasonable and necessary and in the case of services not specified as inpatient-only under 42 .22(n), that they are appropriately provided as inpatient services in accordance with the 2-midnight benchmark under 42 .3(e).     The reason for inpatient status is Acute Psychosis and Bipolar Disorder. High degree of complexity due to non-adherence with medications, acute psychosis    19 y/o  [place]-American F known to me from admission in 2017, admitted again for shaina in context of non-adherence with Li/antipsychotic combination. She reports she was doing well at some times since that admission, now is sleeping poorly and speaking very rapidly, similar presentation to past relapses. Li+ has already been restarted, but we will consolidate all at HS. Decreased need for sleep will be addressed with addition of quetiapine, as had weight gain on olanzapine and EPS on aripiprazole, starting with 100 mg QHS, titrating rapidly to effective dose.    Pradeep Hernandez M.D.  of Psychiatry  Pager: [phone]    email: [email or web address]

## 2018-06-01 NOTE — ED NOTES
Patient frequently coming out of room, requiring frequent redirection by staff, pushing staff emergency button in room.  Patient able to be redirected, will continue to monitor.

## 2018-06-01 NOTE — PROGRESS NOTES
Pt was difficult to wake this morning and did not eat breakfast. Once pt woke up she paced the halls and attended some groups. Pt appears anxious at times when talking to staff. Pt is hyperverbal and spent some time talking about [employer] and wanting to be out in the sun. Pt cried at times and was sad about having to be in the hospital. Pt agreed that she may need to be in the hospital because she as not been sleeping well. Pt denies SI and SIB. Pt does not feel she is having racing thoughts, but she switches the subject often. Pt is oriented to person, place, date and time.      06/01/18 1415   Behavioral Health   Hallucinations denies / not responding to hallucinations   Thinking distractable   Orientation person: oriented;place: oriented;date: oriented;time: oriented   Memory baseline memory   Insight poor   Judgement impaired   Eye Contact at examiner   Affect full range affect   Mood mood is calm   Physical Appearance/Attire disheveled   Hygiene neglected grooming - unclean body, hair, teeth   1. Wish to be Dead No   2. Non-Specific Active Suicidal Thoughts  No   Elopement Statements about wanting to leave   Activity restless   Speech clear   Medication Sensitivity no observed side effects   Psychomotor / Gait balanced;steady   Sleep/Rest/Relaxation   Day/Evening Time Hours napping   Number of hours napping 3 hours   Safety   Suicidality Status 15   Coping/Psychosocial   Verbalized Emotional State sadness  (understands she may need to be here)   Psycho Education   Type of Intervention 1:1 intervention   Response participates with encouragement   Hours 0.5   Activities of Daily Living   Hygiene/Grooming prompts   Oral Hygiene prompts   Dress scrubs (behavioral health)   Behavioral Health Interventions   Psychotic Symptoms maintain safety precautions;reality orientation;simple, clear language;decrease environmental stimulation;build upon strengths;provide emotional support   Social and Therapeutic Interventions  (Psychotic Symptoms) encourage effective boundaries with peers

## 2018-06-01 NOTE — PROGRESS NOTES
Behavioral Health  Note   Behavioral Health  Spirituality Group Note     [facility]    Name: Cristina Boyd    YOB: 2000   MRN: [id number]    Age: 18 year old     Patient attended -led group, which included discussion of spirituality, coping with illness and building resilience.   Patient attended group for 1.0 hrs.   patient minimally participated, but was respectful to the group process.     [facility]  Staff    Page [phone]

## 2018-06-01 NOTE — PROGRESS NOTES
06/01/18 0404   Patient Belongings   Did you bring any home meds/supplements to the hospital?  No   Patient Belongings plastic bag;clothing   Disposition of Belongings Locker  (plastic bag c clothing)   Belongings Search Yes   Clothing Search Yes   Second Staff Jewels MCCURDY R.N. and Amna Garza Tech   General Info Comment no cash or credit cards   A               Admission:  I am responsible for any personal items that are not sent to the safe or pharmacy.  [facility] is not responsible for loss, theft or damage of any property in my possession.    Signature:  _________________________________ Date: _______  Time: _____                                              Staff Signature:  ____________________________ Date: ________  Time: _____      2nd Staff person, if patient is unable/unwilling to sign:    Signature: ________________________________ Date: ________  Time: _____     Discharge:  [facility] has returned all of my personal belongings:    Signature: _________________________________ Date: ________  Time: _____                                          Staff Signature:  ____________________________ Date: ________  Time: _____

## 2018-06-01 NOTE — PROGRESS NOTES
Initial Psychosocial Assessment    I have reviewed the chart, met with the patient, and developed Care Plan.  Information for assessment was obtained from:     Patient: team interview and Chart: review of admission and history    Presenting Problem:      patient reports she has felt and underlying anxiety that has been worse since the beginning of this year. Not anxiety triggered by identified stressors. Says she is feeling that she is currently having shaina. Is not able to sleep, racing thoughts , is more irritable which she sees are her primary symptoms when manic. Says she does not like the feeling of shaina.  Says she was taking medication sporadically as she was concerned about side effects and specifically that she thought she was developing a tremor from the Lithium. She is open to medication suggestions and is help seeking.     History of Mental Health and Chemical Dependency:  Past diagnoses: BPAD1, with psychosis  Psychiatric Hospitalizations: 2, Jan and Feb 2017 for shaina with psychosis.  History of Psychosis: yes  Prior ECT: no  Court Commitment: unknown  Suicide Attempts: none  Self-injurious Behavior: none  Violence toward others: none    No history of substance abuse.     Family Description (Constellation, Family Psychiatric History):  Family History   Problem Relation Age of Onset     Depression Other      Depression Maternal Uncle     no history of completed suicides in family    Significant Life Events (Illness, Abuse, Trauma, Death):  No history of trauma     Living Situation:  Lives with parents    Educational Background:  Has been taking coursed at [employer] - 6 courses - full time hasn't attended in last few weeks reports that she is not doing well in a a few of the courses.     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 eKy  [facility] ([facility]) - [facility] 2nd Floor [id number]. [facility] [facility], [address] phone: [phone]    Social Service Assessment/Plan:  Medication management per psychiatry . CTC to assess ongoing further social service referral needs.

## 2018-06-01 NOTE — ED NOTES
"Patient pacing and singing in hallway with permission from security.  After 1-2 minutes patient became progressively louder and asked to stay near her chair.  Patient expresses that she does not have to listen to anybody \"except God\".  This RN informed patient that while in the ED there are rules that need to be followed and that these rules are to keep her safe.  Patient became agitated and attempted to throw a chair at this RN.  Security present and used hands on technique to keep patient from throwing another chair.  Patient immediately stopped aggressive behavior and became tearful.  Dr. Hartman present and medication ordered.  Patient allowed to stand still by chair but then began pacing, progressively moving farther away from the nurses/security.  Patient moved to room 15.  "

## 2018-06-01 NOTE — ED PROVIDER NOTES
History     Chief Complaint   Patient presents with     Anxiety     pt's bipolar sx have been trigered by fasting and seasonal allergies. pt has slept carlos than an hour for past several nights. pt vauge about suicide but states she does not feel safe.     Suicidal     HPI  Cristina Boyd is a 18 year old female with history of bipolar affective disorder with psychotic type, shaina, and depressive disorder who presents to the ED for manic symptoms.  Per review of her chart, patient has previous psych admissions here at [facility] and [facility].  She is currently an [employer] student and is followed by the [facility].  She is currently on lithium, but she states that she has not been compliant with her lithium lately.  She denies any other substance use and denies any active suicidal ideation.  Patient has not slept for the past 4-5 days.  Her parents have noted that she is very disorganized and has been talking very fast.  She has also not been eating and losing weight.  Patient believes people are out to get her and feels very anxious and paranoid.  She was last seen by her psychiatrist on 5/30 in clinic. She is pacing and agitated. She walks around the ED singing and refuses to stay in her room. She exhibits rapid and pressured speech. Denies drug or alcohol use. No recent illness. Unclear if compliant with her medications.    PAST MEDICAL HISTORY  Past Medical History:   Diagnosis Date     Bipolar disorder (H)      Depressive disorder      PAST SURGICAL HISTORY  History reviewed. No pertinent surgical history.  FAMILY HISTORY  Family History   Problem Relation Age of Onset     Depression Other      Depression Maternal Uncle      SOCIAL HISTORY  Social History   Substance Use Topics     Smoking status: Never Smoker     Smokeless tobacco: Never Used     Alcohol use No     MEDICATIONS  No current facility-administered medications for this encounter.      Current Outpatient Prescriptions   Medication     lithium  (ESKALITH) 450 MG CR tablet     ALLERGIES  No Known Allergies    I have reviewed the Medications, Allergies, Past Medical and Surgical History, and Social History in the Epic system.    Review of Systems   Constitutional: Positive for activity change (hasn't slept in 4-5 days) and appetite change (loss of appetite). Negative for chills, diaphoresis and fever.   HENT: Negative for congestion, rhinorrhea and sore throat.    Eyes: Negative for visual disturbance.   Respiratory: Negative for cough, chest tightness and shortness of breath.    Cardiovascular: Negative for chest pain, palpitations and leg swelling.   Gastrointestinal: Negative for abdominal pain, diarrhea and nausea.   Genitourinary: Negative for dysuria.   Musculoskeletal: Negative for arthralgias, gait problem, myalgias, neck pain and neck stiffness.   Skin: Negative for rash and wound.   Allergic/Immunologic: Negative for immunocompromised state.   Neurological: Negative for dizziness, tremors, seizures, syncope, light-headedness and headaches.   Hematological: Does not bruise/bleed easily.   Psychiatric/Behavioral: Positive for agitation, decreased concentration, dysphoric mood and sleep disturbance. Negative for suicidal ideas. The patient is nervous/anxious and is hyperactive.    All other systems reviewed and are negative.      Physical Exam   BP: 110/70  Pulse: 78  Temp: 98  F (36.7  C)  Resp: 14  Weight: 51.7 kg (114 lb)  SpO2: 98 %      Physical Exam   Constitutional:   Thin female, awake and alert, rapid and pressured speech, uncooperative, agitated, paranoid, pacing and singing in hallways.   HENT:   Head: Normocephalic and atraumatic.   Mouth/Throat: Oropharynx is clear and moist.   Eyes: Conjunctivae and EOM are normal.   Neck: Normal range of motion. Neck supple.   Cardiovascular: Normal rate, regular rhythm, normal heart sounds and intact distal pulses.    Pulmonary/Chest: Effort normal. No respiratory distress.   Abdominal: Soft. There is  no tenderness.   Musculoskeletal: She exhibits no edema or tenderness.   Neurological: She is alert.   Skin: Skin is warm and dry.   Psychiatric: Her affect is labile and inappropriate. Her speech is rapid and/or pressured. She is agitated. She is not combative. Thought content is paranoid. She expresses inappropriate judgment. She expresses no homicidal and no suicidal ideation.   Nursing note and vitals reviewed.      ED Course     ED Course     Procedures             Critical Care time:  none             Labs Ordered and Resulted from Time of ED Arrival Up to the Time of Departure from the ED   CBC WITH PLATELETS DIFFERENTIAL   COMPREHENSIVE METABOLIC PANEL   TSH   LITHIUM LEVEL   DRUG ABUSE SCREEN 6 CHEM DEP URINE ([facility])   HCG QUALITATIVE URINE            Assessments & Plan (with Medical Decision Making)   Bipolar disorder, manic episode. Labs pending. Ordered olanzapine. Needs emergency admission for safe stabilization and treatment. See also mental health  note.    I have reviewed the nursing notes.    I have reviewed the findings, diagnosis, plan and need for follow up with the patient.    New Prescriptions    No medications on file       Final diagnoses:   Bipolar I disorder, current or most recent episode manic, with psychotic features (H)       5/31/2018   [facility], [place], EMERGENCY DEPARTMENT     Dylan Hartman MD  06/01/18 0238

## 2018-06-01 NOTE — PLAN OF CARE
Problem: Patient Care Overview  Goal: Team Discussion  Team Plan:   BEHAVIORAL TEAM DISCUSSION    Participants:   Pradeep Hernandez MD Attending Psychiatrist  Derrick Germain MD PGY1  Breezy Gardner MD PGY2  Nelly Mendez MSW, [facility] Clinical Treatment Coordinator  CESAR Shay, MS4  Progress: initial team meeting  Continued Stay Criteria/Rationale: patient admitted for assessment and treatment of increased symptoms of shaina  Medical/Physical: see psychiatry physician progress note:   Precautions:   Behavioral Orders   Procedures     Assault precautions     Code 1 - Restrict to Unit     Routine Programming     As clinically indicated     Self Injury Precaution     Single Room     Status 15     Every 15 minutes.     Suicide precautions     Patients on Suicide Precautions should have a Combination Diet ordered that includes a Diet selection(s) AND a Behavioral Tray selection for Safe Tray - with utensils, or Safe Tray - NO utensils       Plan: Psychiatric assessment. Medication management. Therapeutic Milieu. Individual care planning and after care planning. Patient to participate in unit groups and activities. Individual and group support on unit.   Rationale for change in precautions or plan: per initial assessment.

## 2018-06-01 NOTE — PROGRESS NOTES
"Admitted a 17 y/o [place] female w/hx od BPAD1, brought in by family to [facility] ED r/t worsening shaina w/ psychosis;  Per report has not slept for the past 4 to 5 days;  Is experiencing increased paranoia, delusions, racing thoughts, crying episodes, poor appetite w/ weight loss- unknown loss-  and is reportedly not consistently taking her medications including her Lithium.  Pt. states that she hasn't taken the lithium for \"about a month\" until last night at which time she endorses having taken 900 mg around 9 pm.   Lithium Level is 1.48 tonight.    When questioned re: hallucinations, pt. remarked that she sees \"angels all the time\" but they look like normal people.  Does not appear to be actively hallucinating.   Denies being suicidal/homicidal.  Denies any Etoh/illicit drug use.  Quite irritable and resistant on admission, w/ disorganization of thought.  Minimally cooperative although sedated after having been given Zyprexa in ED due to aggressive behaviors -   throwing a chair across the purvis and refusing to follow directives from staff.  Multiple previous admits to this unit. Adamantly and angrily refused participation in the interview process demanding \"i just want to sleep\".  Would only give up her shoes w/ long strings and pants w/ strings following lengthy, patient, supportive  Intervention.  Gait unsteady r/t sedation and impulsivity.  Had po fluids. Accompanied to room and assisted w/ measured conducive to sleep.   Immediately off to sleep w/ regular non-labored respirations.   Safe, therapeutic environment maintained.  Admitted for safety, further eval/tx.  Will endorse to AM shift for admission continuation/completion.    "

## 2018-06-01 NOTE — ED NOTES
Patient requesting food.  This RN asked patient if blood could be drawn, patient requesting to eat first.   Will re-attempt.

## 2018-06-01 NOTE — PROGRESS NOTES
"INITIAL O.T. ASSESSMENT:  Cristina has attended OT regularly since admission. Today she  attended 3 of 3 OT groups. Distractible and preoccupied. Reported fighting with her parents prior to her admission. Stated that she feels she has been \"possessed\" since January. When asked to discuss this further, she shrugged and stated \"just haven't been able to sleep.\"       Was given and completed a written self assessment. Cited \"sleep\" as the reason for her current hospitalization. When asked what staff can do to be most helpful during hospitalization, pt responded \"anything\".     Goals prioritized for hospitalization (selected from list): Getting through this, Self-care, Sleep quality    Goals prioritized for hospitalization (self-described): \"to sleep.\"    OT staff explained the purpose of pt being involved in current treatment plan.      Plan: Encourage ongoing attendance as able to meet self-stated goals, implement positive coping skills, engage in graded opportunities for success, and provide assessment ongoing.       06/01/18 1600   General Information   Date Initially Attended OT 06/01/18   Clinical Impression   Affect Anxious;Vacant;Fluctuates   Orientation Oriented to person, place and time   Appearance and ADLs Disheveled   Attention to Internal Stimuli Appears distracted/preoccupied internally   Interaction Skills Interacts appropriately with staff;Interacts appropriately with peers   Ability to Communicate Needs Does so with prompts   Verbal Content Needs further assessment   Ability to Maintain Boundaries Maintains appropriate physical boundaries;Maintains appropriate verbal boundaries   Participation Participates with minimal encouragement   Concentration Concentrates <5 minutes   Ability to Concentrate With structure;Easily distracted   Follows and Comprehends Directions Comprehends, but disregards direction   Memory Needs further assessment   Organization Needs occasional assistance    Decision Making Changes " mind frequently   Planning and Problem Solving Indentifies problems but not alternatives   Ability to Apply and Learn Concepts Comprehends concepts, but needs assist to apply   Frustrations / Stress Tolerance Independently identifies sources of frustration/stress;Utilizes coping skills with prompts   Level of Insight Some insight;Needs further assessment   Self Esteem Takes risks with support and encouragement   Social Supports Needs further assessment

## 2018-06-01 NOTE — PROGRESS NOTES
"    ----------------------------------------------------------------------------------------------------------  [facility], [place]   Psychiatric Progress Note  Hospital Day 0     Interim History:   The patient's care was discussed with the treatment team and chart notes were reviewed.     Sleep: 2 hours recorded  Scheduled meds: Not applicable. Scheduled meds begin evening of 6/1.  PRN meds: None    Staff report: No acute events since arrival to the unit. Cristina declined to participate in the  admission interview with a psych associate upon arrival to the unit.    Patient interview: Cristina met with the treatment team in the conference room. She reported that she has been feeling a great deal of anxiety, regardless of whether she is \"happy or sad\". She stated that the anxiety started \"at the beginning of the year\" and that she occasionally has a \"good week\". She reported a period of symptom improvement sometime during the spring and then experienced worsening of her anxiety in April due to academic stress related to \"messing up\" one of her courses. Cristina reported that the anxiety has created significant conflict in her life. She is a full-time student at [employer] and was taking six courses this semester, four of which she did well in and 2 of which she failed due to poor attendance. Cristina described her anxiety as \"feeling like [she] should be doing more\". She reported somatic symptoms and stated that she \"hate[s] feeling physically sick more than anything\". Cristina recently saw a primary care provider because she felt \"on fire\" throughout her body however no cause was identified at the visit.  Cristina stated that she is \"for sure\" manic now because she \"cannot sleep\" and is an \"angry...irritable person\". She described her current state as \"the worst thing ever\" and stated that no one ever notices when she is depressed because she appears \"normal\". In addition to poor sleep and anxiety, she " "stated that she has lost her appetite and feels like her stomach is a \"tiny hole\". She reported that she is \"always sleepy\".     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 She reported negative side effects from lithium and believes that her dose may have been too high. She stated that she always has a feeling of tremulousness in her left hand (even if not apparent to others) and that at one point the tremor became so severe that she \"could not type or write\". She reported that she has never been prescribed propanolol but is willing to try it during this hospitalization. At one point Cristina stated that she does not think lithium is helpful and that she may need a different medication however she later agreed that she may just require a higher dose of lithium. She reported that she has never been prescribed scheduled Seroquel in the past to help with difficulty sleeping.     The risks, benefits, alternatives and side effects of any medication changes have been discussed and are understood by the patient and other caregivers.    Psychiatric Review of systems:     The Review of Systems is negative other than noted in the HPI         Psychiatric Examination:   BP 94/70  Pulse 107  Temp 98  F (36.7  C) (Tympanic)  Resp 16  Ht 1.676 m (5' 6\")  Wt 52.2 kg (115 lb)  SpO2 99%  BMI 18.56 kg/m2  Weight is 115 lbs 0 oz  Body mass index is 18.56 kg/(m^2).    Appearance: Awake, alert, and appears stated age. Dressed in hospital scrubs with neon yellow sweatshirt. Wearing a matching pair of earrings.   Attitude: Cooperative  Eye Contact: Limited  Mood: \"Anxious\"    Affect: Mood congruent, blunted.    Speech: Pressured, difficult to understand at times.   Psychomotor Behavior: No evidence of tardive dyskinesia, dystonia, or tics. Continuously runs hands through hair.  "   Thought Process: Logical and linear.    Associations: No loose associations.   Thought Content: Does not appear to be responding to internal stimuli.    Insight: Fair  Judgment: Fair  Oriented to: Person, place, time, and situation.   Attention Span and Concentration: Adequate, able to participate in conversation with treatment team.  Recent and Remote Memory: Seems adequate.  Language: Speaks English fluently with appropriate syntax and grammar.  Fund of Knowledge: Seems appropriate.  Muscle Strength and Tone: Not formally assessed.  Gait and Station: Normal gait. Station not formally assessed.     Assessment    Diagnostic Impression: Cristina Boyd is a 18 year old woman with a history of bipolar I disorder previously managed with lithium who presented to [facility] ED with acute shaina that began around 5/26/18. The current episode is characterized by decreased need for sleep, paranoia, delusions, and mixed features (some dysphoria). The patient's family brought Cristina to the ED due to concern for recent decompensation. The current episode occurs in the context of recent lithium non-adherence. Low concern for substance-induced shaina or shaina explained by another medical condition. Recent psychosocial stressors include being a full-time college student and anxiety that has impacted her ability pass two of her six courses. Cristina's current presentation is consistent with a manic episode, possibly with anxious distress. Her goals of care are to restart lithium and stabilize her mood. Inpatient hospitalization is warranted at this time for safety, clinical stabilization, and medication management.     Hospital course: Cristina Boyd was admitted to [facility] as a voluntary patient with acute shaina on 5/31/18. She had previously been non-adherent to her prescribed lithium but did take one dose of 900 mg prior to admission and agreed to resume treatment with 900 mg lithium QHS. Her lithium level was found to be  1.48 mmol/L approximately 5 hours after taking her first dose. Twelve hours after resuming treatment with lithium, her serum lithium level was 1.28 mmol/L. PRN olanzapine was made available for agitation. Quetiapine was started to target manic symptoms and propranolol was started to address patient's tremor.     Medical course: Cristina Boyd was medically cleared by the ED prior to admission to the unit.     Plan     Principal Diagnosis: Bipolar I disorder, current manic episode    Secondary psychiatric diagnoses of concern this admission: None    Changes Today:  Started Propranolol 20mg BID  Started Quetiapine 100mg qHS  Started Refresh plus eyedrops     Medications:   - 900 mg lithium QHS  - 20 mg propranolol BID  - 100 mg quetiapine QHS  - 10 mg olanzapine PO or IM q2H PRN for agitation    - 650 mg acetaminophen q4H PRN  - 500-1000 mg calcium carbonate q4H PRN  - Refresh plus eyedrops TID PRN  - 17 grams Miralax daily PRN    Laboratory/Imaging:   - See results below.    Plan:   - Initiate treatment with quetiapine for difficulty sleeping with plan to titrate in 100mg daily increments over the weekend to target of 300mg daily.  - Initiate treatment with propranolol for management of hand tremors associated with lithium treatment.  - Patient will be treated in therapeutic milieu with appropriate individual and group therapies as described.  - The patient requires continued inpatient hospitalization due to acute shaina and inability to care for self.     Medical diagnoses to be addressed this admission:     Elevated TSH, free T4: Found to have elevated TSH (4.83) and elevated free T4 (1.62) upon admission. Her TSH has historically ranged from 1.52 to 4.96 and her free T4 has ranged from 1.04 to 1.88. Recent (April 2018) evaluation of thyroid stimulating immunoglobulin and thyroglobulin antibody were within normal limits. Workup in early 2017 for elevated TSH and T4 demonstrated normal thyroid ultrasound and CT, as  well as KHANH within normal limits. TSH level appears to vary based on whether or not Cristina is taking lithium (low TSH when taking lithium), therefore high TSH in context of recent medication non-adherence is to be expected. Furthermore, acute shaina can cause elevated T4 levels.  - Trend TFTs when patient is no longer acutely manic   - Recommend outpatient follow-up after resolution of current episode of shaina.    Legal Status: Voluntary    Safety Assessment:   Behavioral Orders   Procedures     Assault precautions     Code 1 - Restrict to Unit     Routine Programming     As clinically indicated     Self Injury Precaution     Single Room     Status 15     Every 15 minutes.     Suicide precautions     Patients on Suicide Precautions should have a Combination Diet ordered that includes a Diet selection(s) AND a Behavioral Tray selection for Safe Tray - with utensils, or Safe Tray - NO utensils         Anticipated Disposition/Discharge Date: Pending medication management and clinical stabilization.    Scribed by Rahel Alexandre, MS4, for Dr. Derrick Germain, Resident.    I have reviewed and edited the documentation recorded by the scribe.  This documentation accurately reflects the services I personally performed and treatment decisions made by me in consultation with the attending physician Pradeep Hernandez MD.    Landry Germain MD  Psychiatry PGY-1  [phone]    Psychiatry Attending Attestation:  This patient has been seen and evaluated by me, Pradeep Hernandez M.D.  The patient's condition and treatment plan were discussed with the resident, and care coordinated with the CTC and RN. I reviewed, edited and agree with the findings and plan in this note.    17 y/o [place]ican-American F known to me from admission in 2017, admitted again for shaina in context of non-adherence with Li/antipsychotic combination. She reports she was doing well at some times since that admission, now is sleeping poorly and speaking very rapidly, similar  presentation to past relapses. Li+ has already been restarted, but we will consolidate all at HS. Decreased need for sleep will be addressed with addition of quetiapine, as had weight gain on olanzapine and EPS on aripiprazole, starting with 100 mg QHS, titrating rapidly to effective dose.     Pradeep Hernandez M.D.   of Psychiatry     Labs & Imaging   6/1/18  CBC: WNL  CMP: WNL except total protein 9.1 (elevated)  TSH: 4.83 (elevated)  Free T4: 1.62 (elevated)  Lithium level:   0200: 1.48 (elevated)   0810: 1.29 (elevated)  Serum HCG: Negative  Urine drug screen: Negative  Urine HCG: Negative

## 2018-06-02 PROCEDURE — 25000132 ZZH RX MED GY IP 250 OP 250 PS 637: Performed by: STUDENT IN AN ORGANIZED HEALTH CARE EDUCATION/TRAINING PROGRAM

## 2018-06-02 PROCEDURE — 12400007 ZZH R&B MH INTERMEDIATE UMMC

## 2018-06-02 PROCEDURE — 25000132 ZZH RX MED GY IP 250 OP 250 PS 637: Performed by: PSYCHIATRY & NEUROLOGY

## 2018-06-02 RX ORDER — QUETIAPINE FUMARATE 200 MG/1
200 TABLET, FILM COATED ORAL AT BEDTIME
Status: DISCONTINUED | OUTPATIENT
Start: 2018-06-02 | End: 2018-06-03

## 2018-06-02 RX ADMIN — ACETAMINOPHEN 650 MG: 325 TABLET ORAL at 20:58

## 2018-06-02 RX ADMIN — OLANZAPINE 10 MG: 10 TABLET, FILM COATED ORAL at 08:56

## 2018-06-02 RX ADMIN — PROPRANOLOL HYDROCHLORIDE 20 MG: 20 TABLET ORAL at 22:44

## 2018-06-02 RX ADMIN — PROPRANOLOL HYDROCHLORIDE 20 MG: 20 TABLET ORAL at 08:16

## 2018-06-02 RX ADMIN — QUETIAPINE FUMARATE 200 MG: 200 TABLET ORAL at 22:44

## 2018-06-02 RX ADMIN — LITHIUM CARBONATE 900 MG: 450 TABLET, EXTENDED RELEASE ORAL at 22:44

## 2018-06-02 RX ADMIN — BENZOCAINE, MENTHOL 1 LOZENGE: 15; 3.6 LOZENGE ORAL at 11:36

## 2018-06-02 ASSESSMENT — ACTIVITIES OF DAILY LIVING (ADL)
ORAL_HYGIENE: INDEPENDENT
LAUNDRY: WITH SUPERVISION
GROOMING: INDEPENDENT
DRESS: INDEPENDENT

## 2018-06-02 NOTE — PROGRESS NOTES
Psychiatry Cross Cover Note    Per staff, patient reports tolerating the Quetiapine 100mg qhs dose, with no reported side effects.     - Per primary team's recommendation, quetiapine increased to 200mg qhs.     Selma Swanson MD  Psychiatry Resident PGY-2

## 2018-06-02 NOTE — PROGRESS NOTES
Pt irritable and at times liable when she does not get her requests. Pt was upset and tossed menus off counter and was directed to her room for a calming. She took her medication and immediately came out her room. Pt visited with parents and stated they frustrate her because they do not bring her the food she likes. Pt denies SI/SIB.     06/02/18 1456   Behavioral Health   Hallucinations denies / not responding to hallucinations   Thinking distractable;paranoid   Orientation person: oriented   Memory confabulation   Insight poor   Judgement impaired   Eye Contact at examiner   Affect full range affect   Mood anxious;labile;irritable   Suicidality other (see comments)  (pt denies)   Self Injury other (see comment)  (pt denies)   Activity restless   Behavioral Health Interventions   Psychotic Symptoms maintain safety precautions;maintain safe secure environment   Social and Therapeutic Interventions (Psychotic Symptoms) encourage socialization with peers;encourage effective boundaries with peers;encourage participation in therapeutic groups and milieu activities

## 2018-06-02 NOTE — PROGRESS NOTES
"Pt. observed to have slept for approx 3 hrs thus far.  Unable to remain asleep and declined any offer/encouragement for prn.  Continues w/ pressured conversation upon awakening this morning.  Able to verbalize that she feels much better after a little sleep but unwilling to try and sleep anymore.  States \"I would be in bed  every night by 10:00pm but I'm scared to go to sleep\".  Prefers to have lights on in room and does respond positively to realistic, supportive intervention r/t her safety.  Hovering desk area continually, making many menial requests;  At times is quite directable, at other times is resistant and becomes easily irritable.  Very labile w/ loose associations of speech.  Quite disorganized at times - questioned 'how old am I?\"  Then held her hands head in her hands stating \"I'm very confused\".  Reality orientation provided as appropriate.  Paces,  Is intrusive w/ both peers and staff and is for the most part difficult to direct.  Will continue to support, educate and monitor.    "

## 2018-06-02 NOTE — PROGRESS NOTES
"Received patient in the lounge. Patient has been non-stop with both requests and complaints. Patient complaining about her sinuses and yelling that staff did not care. \"you are all Mother F....ers. I'm not [place] nice, i'm [place] ice.\" Patient standing aty work station throwing insults at staff. Patient told nursing that she had a fear of being \"rape.\" patient asked for  And received an ice tiffanie. Patient cursing at nursing \"you're a cunt and I hope you burn in hell.\" patient up and down the halls and visiting with male peer in the lounge. Patient needing constant redirection for her foul verbalizations. Will continue to monitor and assess.  "

## 2018-06-02 NOTE — PROGRESS NOTES
"Pt was present in the milieu throughout most of the shift. Pt was observed pacing around the milieu, talking on the phone, and talking to staff. Pt attended the community meeting and complied to a staff check-in. Pt presented with a full range affect but appeared anxious and paranoid. Pt stated she was anxious and paranoid and said she felt as if she was speaking quickly. Towards the end of the shift the pt sated she was going to kill herself if she couldn't get a room change in with any other patient. When asked, the pt said she would do this by choking herself and then put her hands around her neck. It is in this 's opinion that she did this superficially due to her want to have a roommate which stems from a fear of being \"raped.\" She quickly recanted what she said and said she didn't mean it. Pt denied HI.        06/01/18 2050   Behavioral Health   Hallucinations denies / not responding to hallucinations   Thinking distractable;paranoid;poor concentration   Orientation person: oriented   Memory (appears baseline)   Insight admits / accepts;poor   Judgement impaired   Eye Contact at examiner   Affect full range affect   Mood anxious;mood is calm   Physical Appearance/Attire untidy   Hygiene (adequate)   Suicidality (pt denied thoughts and urges)   Self Injury (pt denied thoughts and urges)   Elopement Statements about wanting to leave   Activity restless   Speech pressured;clear;coherent   Medication Sensitivity no stated side effects;no observed side effects   Psychomotor / Gait balanced;steady   Psycho Education   Type of Intervention (structured groups, 1:1 intervention)   Response participates, initiates socially appropriate   Hours 0.5   Treatment Detail (community meeting, check-in)   Activities of Daily Living   Hygiene/Grooming independent   Oral Hygiene independent   Dress scrubs (behavioral health);independent   Laundry (pt did not complete)   Room Organization independent   Activity   Activity " Assistance Provided independent

## 2018-06-02 NOTE — PROGRESS NOTES
"Psychiatry Cross Cover Note    S: Notified by staff that patient is endorsing stuff nose and sore throat. She reports she was exposed to her brother who had TB about 2 months ago. She denied cough, SOB, chest pain.     O: BP 99/81  Pulse 94  Temp 97.7  F (36.5  C) (Tympanic)  Resp 16  Ht 1.676 m (5' 6\")  Wt 52.2 kg (115 lb)  SpO2 99%  BMI 18.56 kg/m2    A/P:  Per chart review, in feb 2018 patient was assessed for TB. She had a TB Quanteferon test that was negative as well as a CXR that was not significant. She is currently stable and afebrile. Discussed the remington with medicine, who recommended no further intervention at this point.   - lozenge made available for sore throat  - nasal spray prn for stuff nose   - continue plan per primary team  - if febrile or develops symptoms, would reassess.     Selma Swanson MD  Psychiatry Resident PGY-2  "

## 2018-06-02 NOTE — PLAN OF CARE
"Problem: Psychotic Symptoms  Goal: Psychotic Symptoms  Signs and symptoms of listed problems will be absent or manageable.   \"You know I feel violent, physically violent (toward others).  I hate being annoying, I hate asking for things, I should not be here.  All I need is a therapist.  I feel violent.\".  Per report she was throwing some papers and not accepting redirection.  She did go to her room try to regain her composure.  She washed her face and when offered Zyprexa she said, \"I need to dry my face first.\".  She did take medications but blood pressure systolic under 100, she does not complain of dizziness and walks with steady gait.  \"Could we walk the purvis while we talk.\".  She spoke rapidly, changing topics.  She did accept that her brain is working too fast and that the medicine will help.  She talked for 10 minutes after medication given  - 100% reliably took medication.      "

## 2018-06-03 PROCEDURE — 12400007 ZZH R&B MH INTERMEDIATE UMMC

## 2018-06-03 PROCEDURE — 25000132 ZZH RX MED GY IP 250 OP 250 PS 637: Performed by: STUDENT IN AN ORGANIZED HEALTH CARE EDUCATION/TRAINING PROGRAM

## 2018-06-03 RX ORDER — QUETIAPINE FUMARATE 300 MG/1
300 TABLET, FILM COATED ORAL AT BEDTIME
Status: DISCONTINUED | OUTPATIENT
Start: 2018-06-03 | End: 2018-06-04

## 2018-06-03 RX ORDER — QUETIAPINE FUMARATE 100 MG/1
100 TABLET, FILM COATED ORAL DAILY PRN
Status: DISCONTINUED | OUTPATIENT
Start: 2018-06-03 | End: 2018-06-04

## 2018-06-03 RX ADMIN — CARBOXYMETHYLCELLULOSE SODIUM 1 DROP: 5 SOLUTION/ DROPS OPHTHALMIC at 16:19

## 2018-06-03 RX ADMIN — OLANZAPINE 10 MG: 10 TABLET, FILM COATED ORAL at 04:41

## 2018-06-03 RX ADMIN — LITHIUM CARBONATE 900 MG: 450 TABLET, EXTENDED RELEASE ORAL at 19:45

## 2018-06-03 RX ADMIN — PROPRANOLOL HYDROCHLORIDE 20 MG: 20 TABLET ORAL at 19:45

## 2018-06-03 RX ADMIN — QUETIAPINE FUMARATE 300 MG: 300 TABLET ORAL at 21:03

## 2018-06-03 RX ADMIN — OLANZAPINE 10 MG: 10 TABLET, FILM COATED ORAL at 19:44

## 2018-06-03 RX ADMIN — PROPRANOLOL HYDROCHLORIDE 20 MG: 20 TABLET ORAL at 08:17

## 2018-06-03 ASSESSMENT — ACTIVITIES OF DAILY LIVING (ADL)
DRESS: INDEPENDENT
GROOMING: INDEPENDENT
ORAL_HYGIENE: INDEPENDENT
LAUNDRY: WITH SUPERVISION

## 2018-06-03 NOTE — PROGRESS NOTES
"   06/02/18 2100   Behavioral Health   Hallucinations denies / not responding to hallucinations   Thinking poor concentration   Orientation person: oriented;place: oriented   Memory baseline memory   Insight poor   Judgement impaired   Eye Contact at examiner   Affect full range affect   Mood mood is calm   Physical Appearance/Attire neat   Hygiene well groomed   Suicidality other (see comments)  (denies )   1. Wish to be Dead No   2. Non-Specific Active Suicidal Thoughts  No   Self Injury other (see comment)  (denies )   Elopement (none reported or observed )   Activity (attended group and socialized with others )   Speech clear;coherent   Activities of Daily Living   Hygiene/Grooming independent   Oral Hygiene independent   Dress independent   Laundry with supervision   Room Organization independent       Pt denied SI and SIB.  Pt reported feeling depressed (3), sad (3), anxious (3), labile (3), irritated (10) and agitated (10).  Pt seems calm, visible in the milieu, attended group and socialized with others.  Pt daily goal \" to get out of here.\"  Pt goal after discharge  \"just want to go outside listen to music I don't want to work anymore I'm going to live off my parents.\"  Pt reported no nutritional concerns.  Pt is independent with ADL's.  Pt wants visitors.    "

## 2018-06-03 NOTE — PROGRESS NOTES
Patient remains disorganized and intrusive.  She was awake for most of the night and sometimes difficult to redirect. She only slept 2.5 hrs.  PRN Zyprexa 10 mg given at 0441. She continues to maintain good fluid intake.
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
## 2018-06-04 PROCEDURE — 90853 GROUP PSYCHOTHERAPY: CPT

## 2018-06-04 PROCEDURE — 25000132 ZZH RX MED GY IP 250 OP 250 PS 637: Performed by: STUDENT IN AN ORGANIZED HEALTH CARE EDUCATION/TRAINING PROGRAM

## 2018-06-04 PROCEDURE — 12400007 ZZH R&B MH INTERMEDIATE UMMC

## 2018-06-04 PROCEDURE — 99232 SBSQ HOSP IP/OBS MODERATE 35: CPT | Mod: GC | Performed by: PSYCHIATRY & NEUROLOGY

## 2018-06-04 PROCEDURE — 99207 ZZC CONSULT E&M CHANGED TO INITIAL LEVEL: CPT | Performed by: PHYSICIAN ASSISTANT

## 2018-06-04 PROCEDURE — 25000132 ZZH RX MED GY IP 250 OP 250 PS 637: Performed by: PSYCHIATRY & NEUROLOGY

## 2018-06-04 PROCEDURE — 99222 1ST HOSP IP/OBS MODERATE 55: CPT | Performed by: PHYSICIAN ASSISTANT

## 2018-06-04 PROCEDURE — 97150 GROUP THERAPEUTIC PROCEDURES: CPT | Mod: GO

## 2018-06-04 RX ORDER — QUETIAPINE FUMARATE 100 MG/1
100 TABLET, FILM COATED ORAL 3 TIMES DAILY PRN
Status: DISCONTINUED | OUTPATIENT
Start: 2018-06-04 | End: 2018-06-13 | Stop reason: HOSPADM

## 2018-06-04 RX ADMIN — PROPRANOLOL HYDROCHLORIDE 20 MG: 20 TABLET ORAL at 08:24

## 2018-06-04 RX ADMIN — CARBOXYMETHYLCELLULOSE SODIUM 1 DROP: 5 SOLUTION/ DROPS OPHTHALMIC at 22:25

## 2018-06-04 RX ADMIN — ACETAMINOPHEN 650 MG: 325 TABLET ORAL at 23:48

## 2018-06-04 RX ADMIN — QUETIAPINE FUMARATE 100 MG: 100 TABLET ORAL at 03:43

## 2018-06-04 RX ADMIN — PROPRANOLOL HYDROCHLORIDE 20 MG: 20 TABLET ORAL at 20:28

## 2018-06-04 RX ADMIN — QUETIAPINE 100 MG: 100 TABLET, FILM COATED ORAL at 22:47

## 2018-06-04 RX ADMIN — CARBOXYMETHYLCELLULOSE SODIUM 1 DROP: 5 SOLUTION/ DROPS OPHTHALMIC at 13:22

## 2018-06-04 RX ADMIN — LITHIUM CARBONATE 900 MG: 450 TABLET, EXTENDED RELEASE ORAL at 21:26

## 2018-06-04 RX ADMIN — SALINE NASAL SPRAY 1 SPRAY: 1.5 SOLUTION NASAL at 16:40

## 2018-06-04 RX ADMIN — CARBOXYMETHYLCELLULOSE SODIUM 1 DROP: 5 SOLUTION/ DROPS OPHTHALMIC at 04:17

## 2018-06-04 ASSESSMENT — ACTIVITIES OF DAILY LIVING (ADL)
GROOMING: HANDWASHING
LAUNDRY: WITH SUPERVISION
ORAL_HYGIENE: INDEPENDENT
ORAL_HYGIENE: INDEPENDENT
DRESS: INDEPENDENT
DRESS: STREET CLOTHES
GROOMING: INDEPENDENT
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
## 2018-06-04 NOTE — CONSULTS
Internal Medicine Initial Visit      Cristina Boyd MRN# [id number]   YOB: 2000 Age: 18 year old   Date of Admission: 5/31/2018  PCP: No Ref-Primary, Physician    Referring Provider: Behavioral Health - Marychuy Guzman MD  Reason for Visit: Sore throat         Assessment and Recommendations:   Cristina Boyd is a 18 year old woman with a history of bipolar disorder who is admitted to station 22 with shaina.        Bipolar disorder: Management per psychiatry team.     Sore throat: Two day history. Based on exam, most consistent with post-nasal drip 2/2 seasonal allergies vs viral process. No evidence for concern of strep throat.    - Can trial Flonase, 2 sprays each nare daily and/or start scheduled 2nd generation antihistamine (e.g., loratadine 10 mg daily).   - Continue supportive cares of lozenges, tylenol PRN    Goiter / elevated TSH / elevated free T4: TSH 4.83, free T4 1.62 on admission. TSH has ranged from 1.52 to 4.96 and T4 from 1.04 to 1.88 in the past. Had negative thyroglobulin antibody and thyroid stimulating immunoglobulin in April, negative thyroid US & CT in 01/2017. In the past her TSH has been normal when she is on lithium.    - Recommend repeat thyroid studies as an outpatient once shaina has resolved.       No further medicine recommendations at this time, medicine signing off. Please page with any additional concerns.     Bandar Gilliland PA-C  Internal Medicine Hospitalist   [facility]  [phone]        Reason for Admission:   Shaina         History of Present Illness:   History is obtained from the patient and medical record.     This patient is a 18 year old female with a history of bipolar disorder admitted to behavioral health for shaina. She was admitted on 5/31/18 with ~5 days of insomnia, racing thoughts, and vague paranoia. She had not been taking her lithium for approximately 1 month.     Internal Medicine service was asked to see patient for a  general medication evaluation. Currently, patient is reporting 2 days of a sore throat and also neck swelling. The throat pain is constant and worse with swallowing. She has tried lozenges with some relief.  She indicates that her neck swelling is over the thyroid area, where she does have an appreciable goiter. Denies any pain or neck stiffness. Patient is a poor historian and it is unclear how long she has had the swelling for but she reports it has not been worsening. She also has rhinorrhea and endorses a history of seasonal allergies, does not take any medications for this. She denies any cough, fever/chills, headaches. Overall history is difficult to obtain as patient is a poor historian and has a difficult time staying on track during our conversation.             Review of Systems:   Review of systems as mentioned in HPI above, otherwise was pan-positive secondary to patient's acute shaina.             Past Medical History:   Reviewed and updated in Epic.   Past Medical History:   Diagnosis Date     Bipolar disorder (H)      Depressive disorder              Past Surgical History:   Reviewed and updated in Epic.   History reviewed. No pertinent surgical history.          Social History:     Social History     Social History     Marital status: Single     Spouse name: N/A     Number of children: N/A     Years of education: N/A     Occupational History     Not on file.     Social History Main Topics     Smoking status: Never Smoker     Smokeless tobacco: Never Used     Alcohol use No     Drug use: No      Comment: x1 marijuana, in October, 2016     Sexual activity: No     Other Topics Concern     Parent/Sibling W/ Cabg, Mi Or Angioplasty Before 65f 55m? No     Social History Narrative   She lives in [place] with her parents. Single, no children. Was attending [employer] for general classes, finished the semester 2 weeks ago. Not currently working.           Family History:     Family History   Problem Relation Age  "of Onset     Depression Other      Depression Maternal Uncle              Allergies:   No Known Allergies          Medications:     Prescriptions Prior to Admission   Medication Sig Dispense Refill Last Dose     lithium (ESKALITH) 450 MG CR tablet Take 2 tablets (900 mg) by mouth At Bedtime 60 tablet 1 5/31/2018 at Unknown time        Current Facility-Administered Medications   Medication     acetaminophen (TYLENOL) tablet 650 mg     benzocaine-menthol (CEPACOL) 15-3.6 MG lozenge 1 lozenge     calcium carbonate (TUMS) chewable tablet 500-1,000 mg     Carboxymethylcellulose Sod PF (REFRESH PLUS) 0.5 % ophthalmic solution 1 drop     lithium (ESKALITH) CR tablet 900 mg     OLANZapine (zyPREXA) tablet 10 mg    Or     OLANZapine (zyPREXA) injection 10 mg     polyethylene glycol (MIRALAX/GLYCOLAX) Packet 17 g     propranolol (INDERAL) tablet 20 mg     QUEtiapine (SEROquel) tablet 100 mg     QUEtiapine (SEROquel) tablet 300 mg     sodium chloride (OCEAN) 0.65 % nasal spray 1 spray            Physical Exam:   Blood pressure 129/83, pulse 104, temperature 97  F (36.1  C), temperature source Tympanic, resp. rate 14, height 1.676 m (5' 6\"), weight 53.5 kg (118 lb), SpO2 99 %, not currently breastfeeding.  Body mass index is 19.05 kg/(m^2).  GENERAL: Alert and oriented x 3. Racing thoughts, has a difficult time staying on topic.   HEENT: Anicteric sclera. Mucous membranes moist. Oropharynx non-erythematous, clear without exudate, scant clear drainage present in posterior oropharynx. Goiter present, non-tender, no palpable nodules. Palpable preauricular lymph nodes, non-tender, no other lymphadenopathy.   CV: RRR. S1, S2. No murmurs appreciated.   RESPIRATORY: Effort normal on room air. Lungs CTAB with no wheezing, rales, rhonchi.   GI: Abdomen soft, non distended, non tender.   MUSCULOSKELETAL: No joint swelling or tenderness.  NEUROLOGICAL: No focal deficits. Moves all extremities.   EXTREMITIES: No peripheral edema. Intact " bilateral pedal pulses.   SKIN: No jaundice. No rashes.           Data:   CBC:  Recent Labs   Lab Test  06/01/18   0201   WBC  6.7   RBC  4.47   HGB  13.1   HCT  39.6   MCV  89   MCH  29.3   MCHC  33.1   RDW  13.3   PLT  319       CMP:  Recent Labs   Lab Test  06/01/18   0201   NA  139   POTASSIUM  4.1   CHLORIDE  105   BHANU  9.4   CO2  22   BUN  12   CR  0.56   GLC  91   AST  14   ALT  7   BILITOTAL  0.4   ALBUMIN  4.1   PROTTOTAL  9.1*   ALKPHOS  69       TSH:  TSH   Date Value Ref Range Status   06/01/2018 4.83 (H) 0.40 - 4.00 mU/L Final       Tox screen: Negative  HCG: Negative    Unresulted Labs Ordered in the Past 30 Days of this Admission     No orders found from 4/1/2018 to 6/1/2018.

## 2018-06-04 NOTE — PROGRESS NOTES
Pt came out to nursing station at 0315 and appeared to be sleep walking. She was told she needed to go back to her room so she doesn't fall. Pt was then walked back to her room. Writer showed Pt where her bed was and told her to get some sleep. Pt then started to take her clothes off. Pt was told this was inappropriate and her door was closed for privacy. Shortly after that pt came back out to [facility] appearing to walk around with her eyes closed. Pt needed to be redirected because she tried walking into another patients room. PRN Seroquel was administered at 0343. At 0422 pt came up to nursing station complaining of dry eyes. PRN eye drops were administered. At 0445 pt went back into her room and fell back to sleep.

## 2018-06-04 NOTE — PLAN OF CARE
Problem: Psychotic Symptoms  Goal: Social and Therapeutic (Psychotic Symptoms)  Signs and symptoms of listed problems will be absent or manageable.     Cristina attended two of three OT sessions offered this date.  In both groups she had difficulty focusing on task for more than 3-5 minutes, frequently interrupted others and came in and out of the room.  Pleasant and accepting of redirection when given.

## 2018-06-04 NOTE — PROGRESS NOTES
1. What PRN did patient receive? Anti-Psychotic (Seroquel)    2. What was the patient doing that led to the PRN medication? Agitation    3. Did they require R/S? NO    4. Side effects to PRN medication? None    5. After 1 Hour, patient appeared: Calm

## 2018-06-04 NOTE — PROGRESS NOTES
"CLINICAL NUTRITION SERVICES - ASSESSMENT NOTE     Nutrition Prescription    RECOMMENDATIONS FOR MDs/PROVIDERS TO ORDER:  None today     Malnutrition Status:    Non-severe malnutrition in the context of acute illness.     Recommendations already ordered by Registered Dietitian (RD):  Ordered double portions     Future/Additional Recommendations:  1. Encourage PO intakes of TID meals and snacks from the unit. Consider Boost Plus if pt's weight declines.      REASON FOR ASSESSMENT  Cristina Boyd is a 18 year old female assessed by the dietitian for provider order - pt requesting double portions, reports recent spike in hunger, in context of recent start of quetiapine. Pt on Halal diet. Seeking recs to best manage.     NUTRITION HISTORY  Patient reports she had been \"starving\" herself over the past few months unintentionally (related to mental health) but now she feels very hungry and is not satisfied by the amount of food she gets on her meal trays here. She reports she wants to gain weight because she feels weaker.     CURRENT NUTRITION ORDERS  Diet: Regular (no pork, Yarsani Halal Diet)   Intake/Tolerance: Patient states she is eating well but would like more food on her trays.     LABS  Labs reviewed    MEDICATIONS  Medications reviewed    ANTHROPOMETRICS  Height: 167.6 cm (5' 6\")  Most Recent Weight: 53.5 kg (118 lb)    IBW: 59 kg  BMI: 19.05 kg/m^2; Normal BMI  Weight History: Patient reports her UBW is around 125-130 lb. Weight loss of 6% body weight x 3 months.   Wt Readings from Last 10 Encounters:   06/03/18 53.5 kg (118 lb)   04/02/18 56.7 kg (125 lb)   03/14/17 64.4 kg (142 lb) (74 %)*   02/16/17 65.1 kg (143 lb 8 oz) (76 %)*   01/24/17 65 kg (143 lb 4.8 oz) (76 %)*   01/12/17 63.5 kg (140 lb) (72 %)*   02/24/16 62.4 kg (137 lb 9.6 oz) (73 %)*     * Growth percentiles are based on CDC 2-20 Years data.     Dosing Weight: 54 kg (current)     ASSESSED NUTRITION NEEDS  Estimated Energy Needs: 2330-9501 kcals/day " "(25 - 30 kcals/kg)  Justification: Maintenance  Estimated Protein Needs: 55-66 grams protein/day (1 - 1.2 grams of pro/kg)  Justification: Maintenance  Estimated Fluid Needs: 1 mL/kcal  Justification: Maintenance    PHYSICAL FINDINGS  See malnutrition section below.    MALNUTRITION  % Intake: < 75% for >/= 1 month (non-severe)  % Weight Loss: Up to 7.5% in 3 months (non-severe)  Subcutaneous Fat Loss: None observed  Muscle Loss: None observed  Fluid Accumulation/Edema: None noted  Malnutrition Diagnosis: Non-severe malnutrition in the context of acute illness.     NUTRITION DIAGNOSIS  Predicted inadequate nutrient intake (calories/protein) related to variable appetite as evidenced by potential for intakes < estimated needs.       INTERVENTIONS  Implementation  Nutrition education for recommended modifications - discussed importance of TID meals and snacks to meet nutrition needs and prevent further weight loss. Pt requesting double portions to increase her calorie intake. She states she doesn't necessarily need a \"halal\" diet but does not eat pork. Denies need for nutrition supplements at this time.   Modify composition of meals/snacks - ordered double portions     Goals  Patient to consume % of nutritionally adequate meal trays TID, or the equivalent with supplements/snacks.     Monitoring/Evaluation  Progress toward goals will be monitored and evaluated per protocol.    Angela Lam RD, LD  Pager #[phone]        "

## 2018-06-04 NOTE — PROGRESS NOTES
"   06/04/18 0958   Behavioral Health   Hallucinations denies / not responding to hallucinations   Thinking poor concentration   Orientation place: oriented;person: oriented;date: oriented;time: oriented   Memory baseline memory   Insight poor   Judgement impaired   Eye Contact at examiner   Affect blunted, flat   Mood mood is calm   Physical Appearance/Attire attire appropriate to age and situation   Hygiene neglected grooming - unclean body, hair, teeth   Suicidality other (see comments)  (denied)   1. Wish to be Dead No   2. Non-Specific Active Suicidal Thoughts  No   Self Injury other (see comment)  (denied)   Elopement (none observed)   Activity restless;withdrawn   Speech coherent;clear   Medication Sensitivity no observed side effects;no stated side effects   Psychomotor / Gait balanced   Psycho Education   Type of Intervention 1:1 intervention   Response participates, initiates socially appropriate   Hours 0.5   Treatment Detail check in    Activities of Daily Living   Hygiene/Grooming handwashing   Oral Hygiene independent   Dress street clothes   Room Organization independent   Activity   Activity Assistance Provided independent   Behavioral Health Interventions   Psychotic Symptoms monitor need to revise level of observation;maintain safety precautions;maintain safe secure environment   Social and Therapeutic Interventions (Psychotic Symptoms) encourage socialization with peers;encourage effective boundaries with peers;encourage participation in therapeutic groups and milieu activities     Pt was visible in the milieu and attend community meeting. Pt denied any SI/SIb this morning. Pt admit being depressed and anxious this morning due to not sleep well. \"Pt goal today is to stay positive and attend more groups if I'm able to\"   "
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
## 2018-06-05 PROCEDURE — 25000132 ZZH RX MED GY IP 250 OP 250 PS 637: Performed by: STUDENT IN AN ORGANIZED HEALTH CARE EDUCATION/TRAINING PROGRAM

## 2018-06-05 PROCEDURE — H2032 ACTIVITY THERAPY, PER 15 MIN: HCPCS

## 2018-06-05 PROCEDURE — 99232 SBSQ HOSP IP/OBS MODERATE 35: CPT | Mod: GC | Performed by: PSYCHIATRY & NEUROLOGY

## 2018-06-05 PROCEDURE — 25000132 ZZH RX MED GY IP 250 OP 250 PS 637: Performed by: PSYCHIATRY & NEUROLOGY

## 2018-06-05 PROCEDURE — 25000132 ZZH RX MED GY IP 250 OP 250 PS 637

## 2018-06-05 PROCEDURE — 97150 GROUP THERAPEUTIC PROCEDURES: CPT | Mod: GO

## 2018-06-05 PROCEDURE — 12400007 ZZH R&B MH INTERMEDIATE UMMC

## 2018-06-05 RX ORDER — QUETIAPINE 150 MG/1
300 TABLET, FILM COATED, EXTENDED RELEASE ORAL AT BEDTIME
Status: DISCONTINUED | OUTPATIENT
Start: 2018-06-05 | End: 2018-06-05

## 2018-06-05 RX ORDER — QUETIAPINE FUMARATE 300 MG/1
300 TABLET, FILM COATED ORAL AT BEDTIME
Status: DISCONTINUED | OUTPATIENT
Start: 2018-06-05 | End: 2018-06-08

## 2018-06-05 RX ADMIN — BENZOCAINE, MENTHOL 1 LOZENGE: 15; 3.6 LOZENGE ORAL at 07:06

## 2018-06-05 RX ADMIN — PROPRANOLOL HYDROCHLORIDE 20 MG: 20 TABLET ORAL at 08:44

## 2018-06-05 RX ADMIN — SALINE NASAL SPRAY 1 SPRAY: 1.5 SOLUTION NASAL at 08:44

## 2018-06-05 RX ADMIN — PROPRANOLOL HYDROCHLORIDE 20 MG: 20 TABLET ORAL at 21:08

## 2018-06-05 RX ADMIN — QUETIAPINE 300 MG: 300 TABLET, FILM COATED ORAL at 21:08

## 2018-06-05 RX ADMIN — SALINE NASAL SPRAY 1 SPRAY: 1.5 SOLUTION NASAL at 21:08

## 2018-06-05 RX ADMIN — SALINE NASAL SPRAY 1 SPRAY: 1.5 SOLUTION NASAL at 22:49

## 2018-06-05 RX ADMIN — CARBOXYMETHYLCELLULOSE SODIUM 1 DROP: 5 SOLUTION/ DROPS OPHTHALMIC at 21:08

## 2018-06-05 RX ADMIN — ACETAMINOPHEN 650 MG: 325 TABLET ORAL at 07:05

## 2018-06-05 RX ADMIN — LITHIUM CARBONATE 900 MG: 450 TABLET, EXTENDED RELEASE ORAL at 21:08

## 2018-06-05 RX ADMIN — SALINE NASAL SPRAY 1 SPRAY: 1.5 SOLUTION NASAL at 07:03

## 2018-06-05 RX ADMIN — CARBOXYMETHYLCELLULOSE SODIUM 1 DROP: 5 SOLUTION/ DROPS OPHTHALMIC at 07:04

## 2018-06-05 RX ADMIN — CALCIUM CARBONATE (ANTACID) CHEW TAB 500 MG 1000 MG: 500 CHEW TAB at 16:06

## 2018-06-05 ASSESSMENT — ACTIVITIES OF DAILY LIVING (ADL)
ORAL_HYGIENE: INDEPENDENT
GROOMING: INDEPENDENT
DRESS: INDEPENDENT
LAUNDRY: WITH SUPERVISION
LAUNDRY: WITH SUPERVISION
ORAL_HYGIENE: INDEPENDENT
DRESS: INDEPENDENT
GROOMING: INDEPENDENT
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
## 2018-06-05 NOTE — PROGRESS NOTES
Has not slept. Presenting to desk every few minutes with multiple requests. Limits to on the hour requests moderately successful. Very pleasant. Says she is here for PTSD  from falling on the ice two weeks ago. Requesting to return to Station 22 because she liked it better . Eating frequently. Complains of dry eyes,sore throat,sinus pain. Some relief with drops,spray and warm packs.

## 2018-06-05 NOTE — PLAN OF CARE
"Problem: Psychotic Symptoms  Goal: Social and Therapeutic (Psychotic Symptoms)  Signs and symptoms of listed problems will be absent or manageable.     Pt attended 1 out of 2 OT groups offered. Pt actively participated in occupational therapy clinic. Pt was able to ask for assistance as needed, and independently initiated a self-selected, creative expression task, and mentioned that she was familiar with this task from \"day treatment.\" Pt demonstrated limited attention to task and attention to detail, and appeared to have some awareness of this, as she stated \"this is the messiest I've ever done this.\" Shared that she was making her project for her brother. Pt was somewhat demanding in her music requests, which was frustrating to peers; however, pt was pleasant and accepting of redirection. Complained of \"heartburn.\" Shared that she was hoping to discharge today, explaining that she felt better but didn't want to get her hopes up. Pt's affect brightened when listening to songs she requested.         "

## 2018-06-06 LAB
BASOPHILS # BLD AUTO: 0 10E9/L (ref 0–0.2)
BASOPHILS NFR BLD AUTO: 0.1 %
DEPRECATED S PYO AG THROAT QL EIA: NORMAL
DIFFERENTIAL METHOD BLD: ABNORMAL
EOSINOPHIL # BLD AUTO: 0.6 10E9/L (ref 0–0.7)
EOSINOPHIL NFR BLD AUTO: 7.2 %
ERYTHROCYTE [DISTWIDTH] IN BLOOD BY AUTOMATED COUNT: 13.4 % (ref 10–15)
HCT VFR BLD AUTO: 35.9 % (ref 35–47)
HGB BLD-MCNC: 11.5 G/DL (ref 11.7–15.7)
IMM GRANULOCYTES # BLD: 0 10E9/L (ref 0–0.4)
IMM GRANULOCYTES NFR BLD: 0.4 %
LITHIUM SERPL-SCNC: 1.11 MMOL/L (ref 0.6–1.2)
LYMPHOCYTES # BLD AUTO: 1 10E9/L (ref 0.8–5.3)
LYMPHOCYTES NFR BLD AUTO: 12.2 %
MCH RBC QN AUTO: 29.3 PG (ref 26.5–33)
MCHC RBC AUTO-ENTMCNC: 32 G/DL (ref 31.5–36.5)
MCV RBC AUTO: 91 FL (ref 78–100)
MONOCYTES # BLD AUTO: 0.8 10E9/L (ref 0–1.3)
MONOCYTES NFR BLD AUTO: 10.5 %
NEUTROPHILS # BLD AUTO: 5.5 10E9/L (ref 1.6–8.3)
NEUTROPHILS NFR BLD AUTO: 69.6 %
NRBC # BLD AUTO: 0 10*3/UL
NRBC BLD AUTO-RTO: 0 /100
PLATELET # BLD AUTO: 245 10E9/L (ref 150–450)
RBC # BLD AUTO: 3.93 10E12/L (ref 3.8–5.2)
SPECIMEN SOURCE: NORMAL
TSH SERPL DL<=0.005 MIU/L-ACNC: 0.91 MU/L (ref 0.4–4)
WBC # BLD AUTO: 7.9 10E9/L (ref 4–11)

## 2018-06-06 PROCEDURE — 87389 HIV-1 AG W/HIV-1&-2 AB AG IA: CPT | Performed by: STUDENT IN AN ORGANIZED HEALTH CARE EDUCATION/TRAINING PROGRAM

## 2018-06-06 PROCEDURE — 80178 ASSAY OF LITHIUM: CPT

## 2018-06-06 PROCEDURE — 87880 STREP A ASSAY W/OPTIC: CPT | Performed by: STUDENT IN AN ORGANIZED HEALTH CARE EDUCATION/TRAINING PROGRAM

## 2018-06-06 PROCEDURE — 36415 COLL VENOUS BLD VENIPUNCTURE: CPT

## 2018-06-06 PROCEDURE — 25000132 ZZH RX MED GY IP 250 OP 250 PS 637: Performed by: STUDENT IN AN ORGANIZED HEALTH CARE EDUCATION/TRAINING PROGRAM

## 2018-06-06 PROCEDURE — 12400007 ZZH R&B MH INTERMEDIATE UMMC

## 2018-06-06 PROCEDURE — 99232 SBSQ HOSP IP/OBS MODERATE 35: CPT | Mod: GC | Performed by: PSYCHIATRY & NEUROLOGY

## 2018-06-06 PROCEDURE — 25000132 ZZH RX MED GY IP 250 OP 250 PS 637

## 2018-06-06 PROCEDURE — 84443 ASSAY THYROID STIM HORMONE: CPT | Performed by: STUDENT IN AN ORGANIZED HEALTH CARE EDUCATION/TRAINING PROGRAM

## 2018-06-06 PROCEDURE — 86308 HETEROPHILE ANTIBODY SCREEN: CPT | Performed by: STUDENT IN AN ORGANIZED HEALTH CARE EDUCATION/TRAINING PROGRAM

## 2018-06-06 PROCEDURE — 85025 COMPLETE CBC W/AUTO DIFF WBC: CPT | Performed by: STUDENT IN AN ORGANIZED HEALTH CARE EDUCATION/TRAINING PROGRAM

## 2018-06-06 PROCEDURE — 25000132 ZZH RX MED GY IP 250 OP 250 PS 637: Performed by: PSYCHIATRY & NEUROLOGY

## 2018-06-06 PROCEDURE — 87081 CULTURE SCREEN ONLY: CPT | Performed by: STUDENT IN AN ORGANIZED HEALTH CARE EDUCATION/TRAINING PROGRAM

## 2018-06-06 PROCEDURE — 36415 COLL VENOUS BLD VENIPUNCTURE: CPT | Performed by: STUDENT IN AN ORGANIZED HEALTH CARE EDUCATION/TRAINING PROGRAM

## 2018-06-06 RX ORDER — TRIAMCINOLONE ACETONIDE 1 MG/G
CREAM TOPICAL 2 TIMES DAILY PRN
Status: DISCONTINUED | OUTPATIENT
Start: 2018-06-06 | End: 2018-06-13 | Stop reason: HOSPADM

## 2018-06-06 RX ORDER — CETIRIZINE HYDROCHLORIDE 10 MG/1
10 TABLET ORAL DAILY
Status: DISCONTINUED | OUTPATIENT
Start: 2018-06-06 | End: 2018-06-13 | Stop reason: HOSPADM

## 2018-06-06 RX ADMIN — SALINE NASAL SPRAY 1 SPRAY: 1.5 SOLUTION NASAL at 16:19

## 2018-06-06 RX ADMIN — PROPRANOLOL HYDROCHLORIDE 20 MG: 20 TABLET ORAL at 20:47

## 2018-06-06 RX ADMIN — LITHIUM CARBONATE 900 MG: 450 TABLET, EXTENDED RELEASE ORAL at 20:47

## 2018-06-06 RX ADMIN — CARBOXYMETHYLCELLULOSE SODIUM 1 DROP: 5 SOLUTION/ DROPS OPHTHALMIC at 06:51

## 2018-06-06 RX ADMIN — QUETIAPINE 300 MG: 300 TABLET, FILM COATED ORAL at 20:47

## 2018-06-06 RX ADMIN — ACETAMINOPHEN 650 MG: 325 TABLET ORAL at 19:30

## 2018-06-06 RX ADMIN — PROPRANOLOL HYDROCHLORIDE 20 MG: 20 TABLET ORAL at 09:26

## 2018-06-06 RX ADMIN — CETIRIZINE HYDROCHLORIDE 10 MG: 10 TABLET, FILM COATED ORAL at 09:27

## 2018-06-06 RX ADMIN — BENZOCAINE, MENTHOL 1 LOZENGE: 15; 3.6 LOZENGE ORAL at 16:19

## 2018-06-06 RX ADMIN — BENZOCAINE, MENTHOL 1 LOZENGE: 15; 3.6 LOZENGE ORAL at 09:27

## 2018-06-06 RX ADMIN — ACETAMINOPHEN 650 MG: 325 TABLET ORAL at 06:26

## 2018-06-06 RX ADMIN — SALINE NASAL SPRAY 1 SPRAY: 1.5 SOLUTION NASAL at 06:51

## 2018-06-06 RX ADMIN — CARBOXYMETHYLCELLULOSE SODIUM 1 DROP: 5 SOLUTION/ DROPS OPHTHALMIC at 23:46

## 2018-06-06 ASSESSMENT — ACTIVITIES OF DAILY LIVING (ADL)
GROOMING: INDEPENDENT
ORAL_HYGIENE: INDEPENDENT
DRESS: INDEPENDENT
DRESS: STREET CLOTHES;SCRUBS (BEHAVIORAL HEALTH);INDEPENDENT
HYGIENE/GROOMING: INDEPENDENT
ORAL_HYGIENE: INDEPENDENT
LAUNDRY: WITH SUPERVISION

## 2018-06-06 NOTE — PROGRESS NOTES
"    ----------------------------------------------------------------------------------------------------------  [facility], [place]   Psychiatric Progress Note  Hospital Day 5     Interim History:   The patient's care was discussed with the treatment team and chart notes were reviewed.     Sleep: 6.25 hours  Scheduled meds: adherent  PRN meds: none    Staff report: Patient's mood was noted to be labile with restlessness throughout the day yesterday, but continues to be cooperative. Elopement precautions were removed and patient is eating well.  Cristina denies SI, SIB, anxiety, depression, and hallucinations. She had positive visits with Mom and Dad. Patient reported the following somatic concerns: heartburn, stomach ache, ear pain, dry eyes/nose, and swollen feet; treated with PRNs.     Patient interview: Cristina continues to be alert and oriented this morning, but with noted difficulty keeping eyes open and decreased energy during interview despite getting 6.25 hours of sleep. Speech continues to be rapid with a tangential thought process. Patient reports feeling \"fine\" and \"rested\" with no anxiety about sleeping. She would like to discharge today, but will get input from her parents. No tremors or nightmares are reported.     Cristina continues to report somatic symptoms of heartburn, stomach ache, ear pain and swollen feet. She has an increased concern regarding possible TB-infection because of a sore throat, cough, and dry mouth, as well as a recent history of having a brother being treated for Tb in mid-February. During that time, she tested negative on screening.     Appropriate medication compliance is observed.  Patient is tolerating Seroquel 300mg without significant adverse rxn, and not requiring additional PRN doses.     The risks, benefits, alternatives and side effects of any medication changes have been discussed and are understood by the patient and other caregivers.    The " "Review of Systems is negative other than noted above         Psychiatric Examination:   /81 (BP Location: Left arm)  Pulse 90  Temp 98.2  F (36.8  C) (Oral)  Resp 16  Ht 1.676 m (5' 6\")  Wt 54.2 kg (119 lb 8 oz)  SpO2 99%  BMI 19.29 kg/m2  Weight is 119 lbs 8 oz  Body mass index is 19.29 kg/(m^2).    Appearance: awake and alert, some difficulty keeping eyes open, appears stated age, dressed in home clothes.   Attitude: Mostly cooperative  Eye Contact: Limited, but appropriate   Mood: depressed, improving  Affect: Mood congruent, restricted affect  Speech: soft spoken, rapid, hyperverbal  Psychomotor Behavior: No evidence of tardive dyskinesia, dystonia, or tics.    Thought Process: goal-directed, mildly tangential     Associations: No loose associations.   Thought Content: no evidence of SI or hallucinations, focused on discharge   Insight: Fair  Judgment: Fair  Oriented to: Person, place, time, and situation.   Attention Span and Concentration: Adequate, able to participate in conversation with treatment team.  Recent and Remote Memory: Seems adequate.  Language: Speaks English fluently with appropriate syntax and grammar.  Fund of Knowledge: Seems appropriate.  Muscle Strength and Tone: Not formally assessed.  Gait and Station: Normal gait. Station not formally assessed.     Assessment    Diagnostic Impression: Cristina Boyd is a 18 year old female with a history of bipolar I disorder previously managed with lithium who presented to [facility] ED with acute shaina that began around 5/26/18. The current episode is characterized by decreased need for sleep, paranoia, delusions, and mixed features (some dysphoria). The patient's family brought Cristina to the ED due to concern for recent decompensation. The current episode occurs in the context of recent lithium non-adherence. Low concern for substance-induced shaina or shaina explained by another medical condition. Recent psychosocial stressors " include being a full-time college student and anxiety that has impacted her ability pass two of her six courses. Cristina's current presentation is consistent with a mixed manic episode. Her goals of care are to restart lithium and stabilize her mood, though her commitment to lithium is ambivalent at best. Given her history of medication non-adherence, she would be a good candidate for a ASHFORD. However, given her desire for short hospitalization, this may have to be transitioned as an outpatient.     Inpatient hospitalization is warranted at this time for safety, clinical stabilization, and medication management in the context of shaina.     Hospital course: Cristina Boyd was admitted to station 22 as a voluntary patient with acute shaina. She had previously been non-adherent to her prescribed lithium but did take one dose of 900 mg prior to admission and agreed to resume treatment with 900 mg lithium QHS. Her lithium level was found to be 1.48 mmol/L approximately 5 hours after taking her first dose. Twelve hours after resuming treatment with lithium, her serum lithium level was 1.28 mmol/L. PRN olanzapine was made available for agitation. Quetiapine was started to target manic symptoms and it was titrated to a final dose of 300 mg HS.  Propranolol 20 mg BID was started to address patient's tremor, which pt reports was successful.  Pt was transferred to Station 20 at pt's request, to avoid a peer she found upsetting.    Medical course: Cristina Boyd was medically cleared by the ED prior to admission to the unit.     Plan     Principal Diagnosis: Bipolar I disorder, current mixed manic episode    Secondary psychiatric diagnoses of concern this admission: None    Medications:   - Lithium 900 mg QHS  - Propranolol 20 mg BID (hold if BP < 100/60)  - Seroquel 300 mg HS  - Seroquel 100 mg TID prn for insomnia or agitation    Laboratory/Imaging: TB Quantiferon Gold test     - Patient will be treated in therapeutic milieu with  appropriate individual and group therapies as described.    Medical diagnoses to be addressed this admission:     # Elevated TSH, free T4  Found to have elevated TSH (4.83) and elevated free T4 (1.62) upon admission. Workup in early 2017 demonstrated normal thyroid ultrasound and CT, as well as KHANH within normal limits. Current TSH elevation is likely due to recent lithium non-compliance, and elevated T4, secondary to acute shaina.  - Trend TFTs when patient is no longer acutely manic   - Recommend outpatient follow-up after resolution of current episode of shaina.    # Throat pain:   - IM consulted, appreciate recs  - Likely post-nasal drip 2/2 seasonal allergies vs viral process  - Tylenol prn, lozenges prn, nasal spray prn, and cetirizine 10 mg daily    Legal Status: Voluntary    Safety Assessment:   Behavioral Orders   Procedures     Code 2     Routine Programming     As clinically indicated     Single Room     Status 15     Every 15 minutes.       Anticipated Disposition/Discharge Date: Pending medication management and clinical stabilization.    --------------------------------------------------------------------------  Scribed by Huang Suárez, MS4, for Dr. Adam Blanco, Resident.  I have reviewed and edited the documentation recorded by the scribe.  This documentation accurately reflects the services I personally performed and treatment decisions made by me in consultation with the attending physician Severo Mesa MD.    Adam Blanco MD  Psychiatry PGY-1  [phone]    ATTENDING:  Severo SANDERS, saw and evaluated the patient with the resident physician.  I agree with the findings and plan of care as documented in the resident note.  I have reviewed all labs and vital signs.       Labs & Imaging   CBC: WNL  CMP: WNL except total protein 9.1 (elevated)  TSH: 4.83 (elevated)  Free T4: 1.62 (elevated)  Serum HCG: Negative  Urine drug screen: Negative  Urine HCG: Negative  Lithium 1.29 on admission (on 900  mg/daily)  Lithium 1.11 (on 900 mg/daily)

## 2018-06-06 NOTE — PROGRESS NOTES
Visited with pt on the basis of spiritual support of pt. Reflected with pt around her hospital experience, sources of spiritual and emotional support and current spiritual health needs. Pt talked about her current illness experience and what it means for her and her family. She expressed her emotion and cried. She also shared her frustration about her situation and been in the hospital. She said  I am in [facility]  .  Emotional support. Reflective conversation integrating elements of illness. Encouraged and help pt to focus on present. Encouraged self-care, follow and take advised from doctors and nurses. I gave Islamic Prayer Booklet, Several of Islamic payer Bookmarkers and copy of Quran.   Pt received spiritual support and reflective conversation in the context of this hospitalization. Pt. agreed that she needs to be a part of her own self care through more positive coping strategies. Pt expressed appreciation for the visit and the encouragement that she felt.  Will continue to provide support to pt/family during their hospitalization at least 1x/wk.

## 2018-06-06 NOTE — PROGRESS NOTES
Patient was hyperactive in the milieu, in and out of groups and pacing purvis. Complained of abdomen cramps from menstrual, requesting for multiple hot packs to placed on stomach. Reports feeling sad and making statement about wanting to leave. Denied all psychotic symptoms. Patient's father visited and stated visit went well.       06/06/18 1452   Behavioral Health   Hallucinations denies / not responding to hallucinations   Thinking distractable;poor concentration   Orientation person: oriented;place: oriented;date: oriented;time: oriented   Memory baseline memory   Insight poor   Judgement impaired   Eye Contact at examiner   Affect sad;full range affect   Mood anxious   Physical Appearance/Attire appears stated age   Hygiene well groomed   Suicidality other (see comments)  ( denies )   1. Wish to be Dead No   2. Non-Specific Active Suicidal Thoughts  No   Self Injury other (see comment)  (denies)   Elopement Statements about wanting to leave   Activity restless   Speech clear;other (see comments)  (hyperactive )   Medication Sensitivity no stated side effects   Psychomotor / Gait balanced;steady   Safety   Suicidality Status 15   Psycho Education   Type of Intervention 1:1 intervention   Response participates, initiates socially appropriate   Hours 0.5   Treatment Detail (check in)   Activities of Daily Living   Hygiene/Grooming independent   Oral Hygiene independent   Dress independent   Laundry with supervision   Room Organization independent   Groups   Details (in and out of groups)   Behavioral Health Interventions   Psychotic Symptoms maintain safety precautions;encourage nutrition and hydration;encourage participation / independence with adls;provide emotional support;establish therapeutic relationship;assist with developing & utilizing healthy coping strategies;build upon strengths   Social and Therapeutic Interventions (Psychotic Symptoms) encourage socialization with peers;encourage participation in  therapeutic groups and milieu activities

## 2018-06-06 NOTE — PLAN OF CARE
Problem: Psychotic Symptoms  Goal: Psychotic Symptoms  Signs and symptoms of listed problems will be absent or manageable.   Outcome: No Change  Patient was restless most of the evening.  She spent time pacing and was at the desk often making requests.  Patient had several somatic complaints (heartburn, stomachache, ear pain, dry eyes/nose, swollen feet). Patient was observed to have trace non-pitting edema in both feet, vitals obtained and WNL.  Patient was encouraged to rest and elevate her feet, but this was difficult for her to remain still.  Patient denies anxiety, depression, SI, and hallucinations.  She was cooperative with medications and staff direction.    Patient's mom visited and brought in patient's prescribed cetirizine and Triamcinolone, these were sent home with mom as the hospital supplies these medications.  Patient is requesting that these are ordered for her here on the unit.

## 2018-06-07 LAB — HETEROPH AB SER QL: NEGATIVE

## 2018-06-07 PROCEDURE — 12400007 ZZH R&B MH INTERMEDIATE UMMC

## 2018-06-07 PROCEDURE — 99232 SBSQ HOSP IP/OBS MODERATE 35: CPT | Performed by: PHYSICIAN ASSISTANT

## 2018-06-07 PROCEDURE — 25000132 ZZH RX MED GY IP 250 OP 250 PS 637: Performed by: STUDENT IN AN ORGANIZED HEALTH CARE EDUCATION/TRAINING PROGRAM

## 2018-06-07 PROCEDURE — 99207 ZZC CDG-MDM COMPONENT: MEETS LOW - DOWN CODED: CPT | Performed by: PHYSICIAN ASSISTANT

## 2018-06-07 PROCEDURE — 25000132 ZZH RX MED GY IP 250 OP 250 PS 637: Performed by: PSYCHIATRY & NEUROLOGY

## 2018-06-07 PROCEDURE — 99232 SBSQ HOSP IP/OBS MODERATE 35: CPT | Mod: GC | Performed by: PSYCHIATRY & NEUROLOGY

## 2018-06-07 PROCEDURE — 25000132 ZZH RX MED GY IP 250 OP 250 PS 637

## 2018-06-07 PROCEDURE — 90853 GROUP PSYCHOTHERAPY: CPT

## 2018-06-07 PROCEDURE — 97150 GROUP THERAPEUTIC PROCEDURES: CPT | Mod: GO

## 2018-06-07 RX ADMIN — BENZOCAINE, MENTHOL 1 LOZENGE: 15; 3.6 LOZENGE ORAL at 00:43

## 2018-06-07 RX ADMIN — ACETAMINOPHEN 650 MG: 325 TABLET ORAL at 10:08

## 2018-06-07 RX ADMIN — LITHIUM CARBONATE 900 MG: 450 TABLET, EXTENDED RELEASE ORAL at 20:14

## 2018-06-07 RX ADMIN — QUETIAPINE 100 MG: 100 TABLET, FILM COATED ORAL at 16:23

## 2018-06-07 RX ADMIN — CETIRIZINE HYDROCHLORIDE 10 MG: 10 TABLET, FILM COATED ORAL at 09:29

## 2018-06-07 RX ADMIN — ACETAMINOPHEN 650 MG: 325 TABLET ORAL at 05:47

## 2018-06-07 RX ADMIN — PROPRANOLOL HYDROCHLORIDE 20 MG: 20 TABLET ORAL at 20:14

## 2018-06-07 RX ADMIN — SALINE NASAL SPRAY 1 SPRAY: 1.5 SOLUTION NASAL at 06:44

## 2018-06-07 RX ADMIN — QUETIAPINE 300 MG: 300 TABLET, FILM COATED ORAL at 20:14

## 2018-06-07 RX ADMIN — PROPRANOLOL HYDROCHLORIDE 20 MG: 20 TABLET ORAL at 09:29

## 2018-06-07 NOTE — PROGRESS NOTES
"    ----------------------------------------------------------------------------------------------------------  [facility], [place]   Psychiatric Progress Note  Hospital Day 6     Interim History:   The patient's care was discussed with the treatment team and chart notes were reviewed.     Sleep: 0.5 hours  Scheduled meds: adherent  PRN meds: none    Staff report: Patient is frustrated with continuing to remain inpatient and would like to discharge. Reported hyperactivity overnight, needing some redirection. Patient reported myalgias, sore throat, non-pitting pedal edema, and feeling febrile. On assessment, she was found to have slight right tonsillar enlargement, soft enlarged thyroid, and tachycardia. Patient was treated with antipyretics and q2h vitals checks when awake. Negative rapid strep screen,  CBC and TSH unremarkable.     Patient interview: Cristina is alert and oriented on interview with pressured speech and a tangential thought process. She denies SI/SIB. This morning she feels as if the \"world is crashing on my face,\" as a reaction to the somatic symptoms (above) overnight along with increased abdominal cramping and a heavy menstrual period. She is most concerned because her periods have become more frequent over the last year, occurring every 2 weeks and lasting 5-7 days. Cristina denies headaches, vision changes, shortness of breath, stiffness, or new onset abdominal pain. She additionally reports feeling energized this morning, warm, and as if time has slowed down. Similar symptoms were reported when she had used marijuana in the past, which also made her feel like her \"eyes were opened.\"    Cristina expressed concern about being admitted much longer, saying she does not feel safe here around other peers who have been admitted for much longer. She reports fear of depression much more than shaina, which makes her \"feel it all.\" She reports crying as being cathartic and notes " "increased paranoia about peers watching her in the general room. Today Cristina is also worried about dying young, because all the chosen ones die young; although she does not feel like she is a \"chosen one.\"    Cristina looks forward to music group later this afternoon, as music puts her at ease. She repeatedly requested the care team play a song for her in the interview room. She was hesitant to leave the interview room, and she required multiple prompts before walking out of the room.     The risks, benefits, alternatives and side effects of any medication changes have been discussed and are understood by the patient and other caregivers.    The Review of Systems is negative other than noted above         Psychiatric Examination:   /82  Pulse 110  Temp 98.5  F (36.9  C) (Oral)  Resp 16  Ht 1.676 m (5' 6\")  Wt 54.2 kg (119 lb 8 oz)  SpO2 99%  BMI 19.29 kg/m2  Weight is 119 lbs 8 oz  Body mass index is 19.29 kg/(m^2).    Appearance: awake and alert, appears stated age, dressed in home clothes.   Attitude: Mostly cooperative  Eye Contact: Limited, but appropriate   Mood: \"manic\"\"happy\"  Affect: Mood congruent, elevated, labile  Speech: soft spoken, rapid, hyperverbal  Psychomotor Behavior: No evidence of tardive dyskinesia, dystonia, or tics.    Thought Process: tangential     Associations: No loose associations.   Thought Content: no evidence of SI or hallucinations, focused on discharge   Insight: Fair  Judgment: Fair  Oriented to: Person, place, time, and situation.   Attention Span and Concentration: Distractable  Recent and Remote Memory: Intact  Language: Speaks English fluently with appropriate syntax and grammar.  Fund of Knowledge: Appropriate  Muscle Strength and Tone: Not formally assessed.  Gait and Station: Normal gait     Assessment    Diagnostic Impression: Cristina Boyd is a 18 year old female with a history of bipolar I disorder previously managed with lithium who presented to [place] [facility] [place] ED with acute shaina that began around 5/26/18. The current episode is characterized by decreased need for sleep, paranoia, delusions, and mixed features (some dysphoria). The patient's family brought Cristina to the ED due to concern for recent decompensation. The current episode occurs in the context of recent lithium non-adherence. Low concern for substance-induced shaina or shaina explained by another medical condition. Recent psychosocial stressors include being a full-time college student and anxiety that has impacted her ability pass two of her six courses. Cristina's current presentation is consistent with a mixed manic episode. Her goals of care are to restart lithium and stabilize her mood, though her commitment to lithium is ambivalent at best. Given her history of medication non-adherence, she would be a good candidate for a ASHFORD. However, given her desire for short hospitalization, this may have to be transitioned as an outpatient.     Inpatient hospitalization is warranted at this time for safety, clinical stabilization, and medication management in the context of shaina.     Hospital course: Cristina Boyd was admitted to station 22 as a voluntary patient with acute shaina. She had previously been non-adherent to her prescribed lithium but did take one dose of 900 mg prior to admission and agreed to resume treatment with 900 mg lithium QHS. Her lithium level was found to be 1.48 mmol/L approximately 5 hours after taking her first dose. Twelve hours after resuming treatment with lithium, her serum lithium level was 1.28 mmol/L. PRN olanzapine was made available for agitation. Quetiapine was started to target manic symptoms and it was titrated to a final dose of 300 mg HS.  Propranolol 20 mg BID was started to address patient's tremor, which pt reports was successful.  Pt was transferred to Station 20 at pt's request, to avoid a peer she found upsetting.     Medical course: IM was consulted when patient  developed a fever of 101.5, with sore throat and menstrual cramps. Fever and sore throat most likely a viral process, and she was treated with supportive cares. She was given Midol prn for menstrual cramps.     Plan     Principal Diagnosis: Bipolar I disorder, current mixed manic episode    Secondary psychiatric diagnoses of concern this admission: None    Medications:   - Lithium 900 mg QHS  - Propranolol 20 mg BID (hold if BP < 100/60)  - Seroquel 300 mg HS  - Seroquel 100 mg TID prn for insomnia or agitation    Laboratory/Imaging: HIV    - Patient will be treated in therapeutic milieu with appropriate individual and group therapies as described.    Medical diagnoses to be addressed this admission:     # Elevated TSH, free T4  Found to have elevated TSH (4.83) and elevated free T4 (1.62) upon admission. Workup in early 2017 demonstrated normal thyroid ultrasound and CT, as well as KHANH within normal limits. Current TSH elevation is likely due to recent lithium non-compliance, and elevated T4, secondary to acute shaina.  - Trend TFTs when patient is no longer acutely manic   - Recommend outpatient follow-up after resolution of current episode of shaina.    # Throat pain/Fever:  - IM consulted, appreciate recs. Most likely a viral process  - Tylenol prn, lozenges prn, nasal spray prn, and cetirizine 10 mg daily  - Monospot and strep culture pending    # Menstrual cramps:  - Midol prn      Legal Status: Voluntary    Safety Assessment:   Behavioral Orders   Procedures     Code 2     Routine Programming     As clinically indicated     Single Room     Status 15     Every 15 minutes.       Anticipated Disposition/Discharge Date: Pending medication management and clinical stabilization.    --------------------------------------------------------------------------  Scribed by Huang Suárez, MS4, for Dr. Adam Blanco, Resident.  I have reviewed and edited the documentation recorded by the scribe.  This documentation accurately  reflects the services I personally performed and treatment decisions made by me in consultation with the attending physician Severo Mesa MD.    Adam Blanco MD  Psychiatry PGY-1  [phone]    ATTENDING:  I, Severo Mesa, saw and evaluated the patient with the resident physician.  I agree with the findings and plan of care as documented in the resident note.  I have reviewed all labs and vital signs.           Labs & Imaging   CBC: WNL  CMP: WNL except total protein 9.1 (elevated)  TSH: 4.83 (elevated)  Free T4: 1.62 (elevated)  Serum HCG: Negative  Urine drug screen: Negative  Urine HCG: Negative  Lithium 1.29 on admission (on 900 mg/daily)  6/6 Lithium 1.11 (on 900 mg/daily)  6/7 TB Quant Gold: pending  6/7 HIV: pending  6/6 TSH: 0.91  6/6 CBC: slight decrease in hgb (11.5)  6/6 Rapid strep: negative

## 2018-06-07 NOTE — PROGRESS NOTES
Pt had com[laints of not feeling well. She complained of a sore throat, she was given a lozenge. She also reported feeling like her muscles were sore and achy. She was given  Tylenol 650 mg. And a warm pack/She reported the tylenol and warm pack were not helpfulwas  not helpful. She was encouraged fluids. MD called ans she was assessed. T 101.5 P-90 B/p 110/75

## 2018-06-07 NOTE — PROGRESS NOTES
" Patient was in periphery of milieu much of shift.  Patient was at desk making requests frequently.  Patient declines group activities.  Patient had a visit from her father this shift.  Patient states \"my anxiety has improved.  I am also less paranoid.\".  Patient denies SI/SIB.  Patient has numerous physical complaints.     06/07/18 1434   Behavioral Health   Hallucinations denies / not responding to hallucinations   Thinking distractable   Orientation person: oriented;place: oriented;date: oriented;time: oriented   Memory baseline memory   Insight poor   Judgement impaired   Eye Contact at examiner   Affect full range affect   Mood anxious;mood is calm   Physical Appearance/Attire attire appropriate to age and situation   Hygiene well groomed   Suicidality other (see comments)  (denies)   1. Wish to be Dead No   2. Non-Specific Active Suicidal Thoughts  No   Self Injury other (see comment)  (denies)   Elopement (none noted)   Activity restless   Speech clear;coherent   Medication Sensitivity no stated side effects   Psychomotor / Gait balanced;steady   Psycho Education   Type of Intervention 1:1 intervention   Response participates, initiates socially appropriate   Hours 0.5   Behavioral Health Interventions   Psychotic Symptoms maintain safe secure environment   Social and Therapeutic Interventions (Psychotic Symptoms) encourage participation in therapeutic groups and milieu activities     "

## 2018-06-07 NOTE — CONSULTS
Internal Medicine Progress Note     Patient: Cristina Boyd  MRN: [id number]  Admission Date: 5/31/2018  Hospital Day # 6    Assessment & Recommendations: Cristina Boyd is a 18 year old woman with a history of bipolar disorder who is admitted to station 20 with shaina. Medicine consulted today for fever.     Fever / sore throat / rhinorrhea:  5 day history. Based on exam, this is most consistent with viral process. Rapid strep negative with culture pending. No evidence of bacterial infection. Patient concerned that her symptoms are caused by TB, but she does not have a cough.    - Monospot   - TB quantiferon is pending   - Follow strep culture   - Continue supportive cares of Tylenol, lozenges PRN    Dysmenorrhea / menorrhagia: Heavy periods with cramping for the last 5-6 months, previously regular. No h/o bleeding problems. Negative HCG on 06/01, has otherwise not been worked-up in the past. Etiology unclear at this time; possible etiologies include endometriosis, fibroids, ovulatory dysfunction, or thyroid dysregulation.   - Gynecology referral as an outpatient    Normocytic anemia: Hgb 13.1 on admission --> 11.5 yesterday. Heavy menstrual bleeding since yesterday. Most likely 2/2 blood loss related to menorrhagia, recent fasting may also be playing a role but less likely as she broke her fast 2 weeks ago. She is hemodynamically stable without signs/symptoms of other bleeding source.   - Start prn Midol   - Recheck hemoglobin in 2 days if heavy bleeding persists or she is having other signs/symptoms of continued blood loss, otherwise would follow as outpatient    Lower extremity edema: Onset 2 days ago, stable. May be related to decreased venous return 2/2 prolonged standing. Also possible may be related to thyroid disease, but unlikely as appearance is not consistent with pretibial myxedma and TSH is currently WNL. No concern for DVT, cardiac process.   - Encouraged pt to take rest breaks with legs elevated   -  Closely monitor for any worsening, skin changes    Goiter: On admission TSH 4.83 and free T4 1.62. TSH now 0.91 with T4 pending. Restarted on lithium on admission, which may account for normalized TSH. TSH has ranged from 1.52 to 4.96 and T4 from 1.04 to 1.88 in the past. Had negative thyroglobulin antibody and thyroid stimulating immunoglobulin in April, negative thyroid US & CT in 01/2017. In the past her TSH has been normal when she is on lithium.    - Recommend repeat thyroid studies as an outpatient once shaina has resolved      Medicine will follow up on pending monospot test and throat culture. No further medicine recommendations at this time, please page with any additional concerns.      Bandar Gilliland PA-C  Internal Medicine Hospitalist   [facility]  [phone]   _________________________________________________________________    Subjective & Interval History:  Chief complaint of fever, irregular periods.     Patient reports onset of fever yesterday evening to 101.5 and again had a fever this morning. Defervesces with Tylenol. Medicine initially saw her on 06/04, at which time she had 2 days of sore throat and rhinorrhea. She continues to have both the sore throat and rhinorrhea and reports both are unchanged. The sore throat is worse with swallowing. She has a goiter and reports that this is unchanged in size or appearance.     Her primary concern is irregular periods and she is very concerned that she has endometriosis. Her period started yesterday, which is about 14 days early for her. She is having cramping and heavy bleeding, going through 1-2 pads per hour. The cramping improves with heat packs and Tylenol. She is also having nausea and reports 1 episode of nonbloody nonbilious emesis yesterday. LMP was 2 weeks ago. Over the last 5-6 months she has been getting her period every 2 weeks with associated heavy bleeding and cramping. She reports going through 1 pad/hour with bleeding  "typically lasting for 5 days. Prior to this, she was having regular periods every 30 days since menses onset at age 11. During the past 5-6 months she also reports weight loss, polyphagia, insomnia, and increased anterior neck swelling.    She denies any GYN or other work-up for her dysmenorrhea. Has not used hormonal contraception, is not sexually active. Denies personal or known family history of bleeding/clotting disorders, endometriosis, or other GYN problems. Of note, she was fasting for Ramadan for the first ~10 days, has not been fasting for almost 2 weeks now.     Additionally, she reports bilateral swelling of her feet and ankles that she first noticed 2 days ago. She has spent the majority of her time standing on her feet or walking around the unit and feels that the swelling is due to her prolonged standing. The swelling has remained stable, denies warmth or pain. No dyspnea, chest pain/palpitations, headaches.       Last 24 hour care team notes reviewed.   ROS: 4 point ROS (including Respiratory, CV, GI and ) was performed and negative unless otherwise noted in HPI.     Medications: Reviewed in EPIC.    Physical Exam:    Blood pressure 128/82, pulse 110, temperature 98.5  F (36.9  C), temperature source Oral, resp. rate 16, height 1.676 m (5' 6\"), weight 54.2 kg (119 lb 8 oz), SpO2 99 %, not currently breastfeeding.    GENERAL: Alert and oriented x 3. Has a difficult time staying on topic, pacing around the room.   HEENT: Anicteric sclera. Mucous membranes moist. Tonsils 2+ without erythema or exudates, right slightly larger than left. Oropharynx without erythema or exudate, scant clear drainage present in posterior oropharynx.   NECK: Goiter present, non-tender, no palpable nodules. Palpable submandibular lymph nodes, non-tender, no other lymphadenopathy.   CV: RRR. S1, S2. No murmurs appreciated.   RESPIRATORY: Effort normal. Lungs CTAB with no wheezing, rales, rhonchi.   GI: Abdomen soft, non " distended, non tender. Normoactive bowel sounds. No organomegaly.   NEUROLOGICAL: No focal deficits. Moves all extremities.    EXTREMITIES: Bilateral non-pitting edema extending from dorsum of the feet through distal ~1/3 of lower legs. No overlying skin changes. Intact bilateral pedal pulses.   SKIN: No jaundice. No rashes.      Labs & Studies of Note: I personally reviewed the following studies:    CBC: WBC of 7.9, hemoglobin of 11.5 (down from 13.1 on 06/01)  TSH: 0.91    Unresulted Labs Ordered in the Past 30 Days of this Admission     Date and Time Order Name Status Description    6/7/2018 0030 M Tuberculosis by Quantiferon ! Follow QTB collection process In process     6/6/2018 2218 HIV Antigen Antibody Combo In process     6/6/2018 2200 Beta strep group A culture Preliminary

## 2018-06-07 NOTE — PLAN OF CARE
"Problem: Psychotic Symptoms  Goal: Social and Therapeutic (Psychotic Symptoms)  Signs and symptoms of listed problems will be absent or manageable.   Cristina  participated in OT clinic this afternoon. Did not work on a creative project but chatted rapidly and enthusiastically requested songs to play on the iPad. Reported feeling a \"little anxious\" about upcoming discharge.         "

## 2018-06-07 NOTE — PROGRESS NOTES
S:  Writer was notified that patient has developed body temp> 102 (repeat readings confirmed). Patient took Tylenol at 19:30, about an hour and half before this reading. Per chart patient has many somatic concerns. She had reported cough and sore throat in the morning and ear pain in the past few days. She was also concerned that she might have contracted TB from her brother who received treatment for TB in February 2018.     Patient reported that she coughs in frequently, feels palpitation and feverish. She was walking in her room and listening to music in no acute distress. She denied headache, N/V/C/D. She reported sore throat, myalgia, lump in her neck, lack of appetite, and  Leg swelling. She also told me that she got her periods 15 days earlier (today). She was also delusional and paranoid and she was concerned whether or not the sanitary pads are used.    O.      General: awake, alert, cooperative with exam, no acute distress  HEENT: NC/AT; EOMI, no eye discharge or injection; nares clear without congestion or rhinorrhea; no oropharyngeal lesions or exudate; moist mucous membranes, right tonsil slightly enlarged  Neck: supple, no significant cervical lymphadenopathy, exam remarkable for soft palpable enlarged thyroid glad.   CV: Rapid heart rate (>100) and regular rhythm, S1/S2, no murmurs or extra heart sounds  Resp: lungs clear to auscultation bilaterally, normal respiratory effort on room air  Abd: soft, non-tender, non-distended, normoactive bowel sounds, no masses or HSM  Neuro: alert and oriented, normal strength and tone, CN II-XII grossly intact  MSK: moves all extremities equally with full range of motion  Skin: no significant rashes or lesions, warm and well-perfused  Extremities: Bilateral non pitting edema    A/S:     Patient is a 19 yo patient with diagnosis of bipolar disorder who is currently in manic episode, delusional and paranoid. She is on lithium and Seroquel and has been compliant while  "in the hospital. She was detected to have abnl TFTs upon admission. IM consult was placed then and the following is per their recommendations:    \"Sore throat: Two day history. Based on exam, most consistent with post-nasal drip 2/2 seasonal allergies vs viral process. No evidence for concern of strep throat.    - Can trial Flonase, 2 sprays each nare daily and/or start scheduled 2nd generation antihistamine (e.g., loratadine 10 mg daily).   - Continue supportive cares of lozenges, tylenol PRN    Goiter / elevated TSH / elevated free T4: TSH 4.83, free T4 1.62 on admission. TSH has ranged from 1.52 to 4.96 and T4 from 1.04 to 1.88 in the past. Had negative thyroglobulin antibody and thyroid stimulating immunoglobulin in April, negative thyroid US & CT in 01/2017. In the past her TSH has been normal when she is on lithium.    - Recommend repeat thyroid studies as an outpatient once shaina has resolved. \"    The etiology of the fever is unclear. Patient's response is pan positive to physical ROS. Based on exam and the results of TB screening, I'm not concerned about TB at this time. She reports cough but the nurse has not heard her coughing in the entire shift.     Plan:    - Please check vitals every 2 hrs when patient awake.   - Manage the fever per medicine recommendation  - Ordered rapid strep screen test  - Ordered CBC with diff and TSH with free T4 STAT  -- Notify if the patient's fever persisted, patient became more ill and /or temp >104 developed OR if other sxs developed (productive cough, ...).   - Will consider IM consult       Jessica Weinstein MD  PGY2 Psychiatry Resident  Pager: [phone]    "

## 2018-06-07 NOTE — PROGRESS NOTES
Pt still very hyperactive with lots of requests this shift. Denies SI/SIB. Still saying she is here voluntarily and she will leave Friday. However, within seconds she realizes she needs to be able to sleep first. Says only got a little bit this morning before the code happened and woke her up. Still needing lots of redirection. Was quite symptomatic this evening.        06/06/18 2113   Behavioral Health   Hallucinations denies / not responding to hallucinations   Thinking distractable;poor concentration   Orientation person: oriented;place: oriented   Memory baseline memory   Insight poor   Judgement impaired   Eye Contact at examiner   Affect full range affect   Mood mood is calm;anxious   Physical Appearance/Attire appears stated age;neat;attire appropriate to age and situation   Hygiene other (see comment)  (adequate)   Suicidality other (see comments)  (denies)   1. Wish to be Dead No   2. Non-Specific Active Suicidal Thoughts  No   Self Injury other (see comment)  (denies)   Elopement Statements about wanting to leave   Activity restless   Speech clear;coherent   Medication Sensitivity no observed side effects;no stated side effects   Psychomotor / Gait balanced;steady   Activities of Daily Living   Hygiene/Grooming independent   Oral Hygiene independent   Dress street clothes;scrubs (behavioral health);independent   Room Organization independent

## 2018-06-07 NOTE — PROGRESS NOTES
Behavioral Health  Note   Behavioral Health  Spirituality Group Note     Unit [facility]    Name: Cristina Boyd    YOB: 2000   MRN: [id number]    Age: 18 year old     Patient attended -led group, which included discussion of spirituality, coping with illness and building resilience.   Patient attended group for 1.0 hrs.   The patient actively participated in group discussion     [facility]  Staff    Page [phone]

## 2018-06-07 NOTE — PROGRESS NOTES
Patients temp at 0545 was 101.3 given tylenol and rechecked at 0700 it was 99.5. Will continue to monitor.

## 2018-06-08 LAB — HIV 1+2 AB+HIV1 P24 AG SERPL QL IA: NONREACTIVE

## 2018-06-08 PROCEDURE — 25000132 ZZH RX MED GY IP 250 OP 250 PS 637: Performed by: STUDENT IN AN ORGANIZED HEALTH CARE EDUCATION/TRAINING PROGRAM

## 2018-06-08 PROCEDURE — 25000132 ZZH RX MED GY IP 250 OP 250 PS 637

## 2018-06-08 PROCEDURE — 12400007 ZZH R&B MH INTERMEDIATE UMMC

## 2018-06-08 PROCEDURE — 99232 SBSQ HOSP IP/OBS MODERATE 35: CPT | Mod: GC | Performed by: PSYCHIATRY & NEUROLOGY

## 2018-06-08 RX ORDER — QUETIAPINE FUMARATE 400 MG/1
400 TABLET, FILM COATED ORAL AT BEDTIME
Status: DISCONTINUED | OUTPATIENT
Start: 2018-06-08 | End: 2018-06-13 | Stop reason: HOSPADM

## 2018-06-08 RX ORDER — OLANZAPINE 15 MG/1
15 TABLET ORAL ONCE
Status: COMPLETED | OUTPATIENT
Start: 2018-06-08 | End: 2018-06-08

## 2018-06-08 RX ADMIN — OLANZAPINE 15 MG: 15 TABLET, FILM COATED ORAL at 13:14

## 2018-06-08 RX ADMIN — QUETIAPINE FUMARATE 400 MG: 400 TABLET ORAL at 21:15

## 2018-06-08 RX ADMIN — LITHIUM CARBONATE 900 MG: 450 TABLET, EXTENDED RELEASE ORAL at 21:15

## 2018-06-08 RX ADMIN — PROPRANOLOL HYDROCHLORIDE 20 MG: 20 TABLET ORAL at 21:15

## 2018-06-08 RX ADMIN — SALINE NASAL SPRAY 1 SPRAY: 1.5 SOLUTION NASAL at 21:37

## 2018-06-08 ASSESSMENT — ACTIVITIES OF DAILY LIVING (ADL)
LAUNDRY: WITH SUPERVISION
GROOMING: INDEPENDENT
LAUNDRY: UNABLE TO COMPLETE
DRESS: INDEPENDENT
ORAL_HYGIENE: INDEPENDENT
DRESS: INDEPENDENT
GROOMING: SHOWER;INDEPENDENT
ORAL_HYGIENE: INDEPENDENT

## 2018-06-08 NOTE — PROGRESS NOTES
"The pt was disorganized and intrusive.  She made several rude comments towards staff and other pts, and this writer was worried that something may evolve negatively, but thankfully the other pts left it alone.  The pt had to be redirected several times; from the desk, from other pts, etc.  She made repeated requests for the same thing, lost and found her headphones.  She reported to this writer that \"I feel like I'm so special.  U feel like I should be up in the [place] with the birds.  I smell better too.\"  This writer tried to tell the pt that this was a symptom of her shaina, and that she should report these thoughts to staff.\"  She showered in her clothes.  She walked aimlessly around the unit, repeatedly ignoring staff's attempts to get her to lie down and rest.     06/07/18 2114   Behavioral Health   Hallucinations denies / not responding to hallucinations   Thinking delusional;poor concentration   Orientation situation, disoriented;person: oriented;place: oriented   Memory other (see comment)  (unknown)   Insight poor   Judgement impaired   Eye Contact at examiner   Affect incongruent;blunted, flat;tense   Mood grandiose   Physical Appearance/Attire multiple layers;disheveled   Hygiene well groomed   Suicidality other (see comments)  (denies)   1. Wish to be Dead No   2. Non-Specific Active Suicidal Thoughts  No   Self Injury other (see comment)  (denies)   Elopement Statements about wanting to leave   Activity restless   Speech coherent;clear   Medication Sensitivity no observed side effects;no stated side effects   Psychomotor / Gait balanced;steady     "

## 2018-06-08 NOTE — PROGRESS NOTES
"    ----------------------------------------------------------------------------------------------------------  [facility], [place]   Psychiatric Progress Note  Hospital Day 7     Interim History:   The patient's care was discussed with the treatment team and chart notes were reviewed.     Sleep: 5 hours  Scheduled meds: adherent  PRN meds: none    Staff report: Patient remained in the periphery of [facility] most of the day. She approached the desk with frequent requests, often for the same thing. Patient reports less paranoia, anxiety, no SI/SIB. Noted disorganization, intrusive behavior, and rude comments to staff and patents. Patient also witnessed to have wandered around aimlessly over the course of the day and showered with her clothes on.     Over the course of the day, Patient was treated with antipyretics and q2h vitals checks over waking hours. Somatic symptoms and patient health appears stable. Negative rapid strep screen, TSH wnl, monospot negative,  CBC and TSH unremarkable. HIV test pending.     Patient interview: Cristina is alert and oriented with pressured speech and a tangential thought process with a flight of ideas. She denies SI/SIB, with decreasing anxiety and paranoia. Patient is again insisting on discharge today. She reports being irritated and \"definitely angry\" this morning because she feels there are too many restrictions on her.     Cristina's thoughts are very disparate and more delusional. She explains \"the world is deception\" and that the \"truth will come to light.\" She then begins to explain the opening of her \"third eye\" and the feeling that she is in \"literally in the first level of hell\" and can feel herself burning. This makes her want to pray 5 times per day. Cristina then explains her belief in astrology, Scott, and Sikhism, explaining that she has seen the dennis of death and knows her name. She believes that Scott is literally on the earth right now. Her third " "eye also has given her the ability of controlling fate, and this is how she was able to \"control my fever\". Her next thought is that she is the reincarnation of her grandmother with the same name and visage who passed away when the patient was 4 years old. Patient also believes she is developing diabetes because of this relation and her recent craving for sweets.       Patient signed a 12 hour intent to leave. After discussion with her father, the patient later rescinded the 12 hours intent to leave.     The risks, benefits, alternatives and side effects of any medication changes have been discussed and are understood by the patient and other caregivers.    The Review of Systems is negative other than noted above         Psychiatric Examination:   /71  Pulse 99  Temp 99  F (37.2  C) (Oral)  Resp 16  Ht 1.676 m (5' 6\")  Wt 54.2 kg (119 lb 8 oz)  SpO2 99%  BMI 19.29 kg/m2  Weight is 119 lbs 8 oz  Body mass index is 19.29 kg/(m^2).    Appearance: awake and alert, appears stated age, dressed in home clothes.   Attitude: Mostly cooperative  Eye Contact: Limited, but appropriate   Mood: \"angry\", \"irritable\",\"feeling really well\"  Affect: Mood congruent, elevated, labile, angry  Speech: soft spoken, rapid, hyperverbal  Psychomotor Behavior: No evidence of tardive dyskinesia, dystonia, or tics.    Thought Process: tangential w/ flight of ideas    Associations: loose associations  Thought Content: no evidence of SI or hallucinations, focused on discharge  Insight: Fair  Judgment: Fair  Oriented to: Person, place, time, and situation.   Attention Span and Concentration: Distractable  Recent and Remote Memory: Intact  Language: Speaks English fluently with appropriate syntax and grammar.  Fund of Knowledge: Appropriate  Muscle Strength and Tone: Not formally assessed.  Gait and Station: Normal gait     Assessment    Diagnostic Impression: Cristina Boyd is a 18 year old female with a history of bipolar I disorder " previously managed with lithium who presented to [facility] ED with acute shaina that began around 5/26/18. The current episode is characterized by decreased need for sleep, paranoia, delusions, and mixed features (some dysphoria). The patient's family brought Cristina to the ED due to concern for recent decompensation. The current episode occurs in the context of recent lithium non-adherence. Low concern for substance-induced shaina or shaina explained by another medical condition. Recent psychosocial stressors include being a full-time college student and anxiety that has impacted her ability pass two of her six courses. Cristina's current presentation is consistent with a mixed manic episode. Her goals of care are to restart lithium and stabilize her mood, though her commitment to lithium is ambivalent at best. Given her history of medication non-adherence, she would be a good candidate for a [facility]. However, given her desire for short hospitalization, this may have to be transitioned as an outpatient.     Inpatient hospitalization is warranted at this time for safety, clinical stabilization, and medication management in the context of shaina.     Hospital course: Cristina Boyd was admitted to station 22 as a voluntary patient with acute shaina. She had previously been non-adherent to her prescribed lithium but did take one dose of 900 mg prior to admission and agreed to resume treatment with 900 mg lithium QHS. Her lithium level was found to be 1.48 mmol/L approximately 5 hours after taking her first dose. Twelve hours after resuming treatment with lithium, her serum lithium level was 1.28 mmol/L. PRN olanzapine was made available for agitation. Quetiapine was started to target manic symptoms and it was titrated to a final dose of 400 mg HS.  Propranolol 20 mg BID was started to address patient's tremor, which pt reports was successful.  Pt was transferred to Station 20 at pt's request, to avoid a peer she found  upsetting.     Medical course: IM was consulted when patient developed a fever of 101.5, with sore throat and menstrual cramps. Fever and sore throat most likely a viral process, and she was treated with supportive cares. She was given Midol prn for menstrual cramps.     Plan     Principal Diagnosis: Bipolar I disorder, current mixed manic episode    Secondary psychiatric diagnoses of concern this admission: None    Medications:   - Lithium 900 mg QHS  - Propranolol 20 mg BID (hold if BP < 100/60)  - Seroquel 400 mg HS  - Seroquel 100 mg TID prn for insomnia or agitation  - One time dose of Olanzapine 15 mg     Laboratory/Imaging: None    - Patient will be treated in therapeutic milieu with appropriate individual and group therapies as described.    Medical diagnoses to be addressed this admission:     # Elevated TSH, free T4  Found to have elevated TSH (4.83) and elevated free T4 (1.62) upon admission. Workup in early 2017 demonstrated normal thyroid ultrasound and CT, as well as KHANH within normal limits. Current TSH elevation is likely due to recent lithium non-compliance, and elevated T4, secondary to acute shaina.  - Trend TFTs when patient is no longer acutely manic   - Recommend outpatient follow-up after resolution of current episode of shaina.    # Throat pain/Fever:  - IM consulted, appreciate recs. Most likely a viral process  - Tylenol prn, lozenges prn, nasal spray prn, and cetirizine 10 mg daily  - Monospot and strep culture pending    # Menstrual cramps:  - Midol prn      Legal Status: Voluntary    Safety Assessment:   Behavioral Orders   Procedures     Code 2     Routine Programming     As clinically indicated     Single Room     Status 15     Every 15 minutes.       Anticipated Disposition/Discharge Date: Pending medication management and clinical stabilization.    --------------------------------------------------------------------------  Scribed by Huang Suárez, MS4, for Dr. Adam Blanco, Resident.  I  have reviewed and edited the documentation recorded by the scribe.  This documentation accurately reflects the services I personally performed and treatment decisions made by me in consultation with the attending physician Severo Mesa MD.    Adam Blanco MD  Psychiatry PGY-1  [phone]    ATTENDING:  I, Severo Mesa, saw and evaluated the patient with the resident physician.  I agree with the findings and plan of care as documented in the resident note.  I have reviewed all labs and vital signs.         Labs & Imaging   CBC: WNL  CMP: WNL except total protein 9.1 (elevated)  TSH: 4.83 (elevated)  Free T4: 1.62 (elevated)  Serum HCG: Negative  Urine drug screen: Negative  Urine HCG: Negative  Lithium 1.29 on admission (on 900 mg/daily)  6/6 Lithium 1.11 (on 900 mg/daily)  6/7 TB Quant Gold: pending  6/7 HIV: negative  6/6 TSH: 0.91  6/6 CBC: slight decrease in hgb (11.5)  6/6 Rapid strep: negative  6/6 Monospot: negative

## 2018-06-08 NOTE — DISCHARGE INSTRUCTIONS
Behavioral Discharge Planning and Instructions    Summary:   You were admitted to [facility] on 5/31/18 with acute manic symptoms under the care of Dr. Mesa.  You met with Dr. Mesa and his team daily for ongoing psychiatric assessment and medication management.  You had opportunities to participate in therapeutic groups on the unit.   At this time you are requesting discharge. You report your mood is stabilizing and you deny having thoughts or intent to harm yourself or others.   You will be discharged home and will resume care with your outpatient providers.    Main Diagnosis:   Bipolar Affective Disorder    Major Treatments, Procedures and Findings:   Medications were  managed throughout your stay. An internal medicine consult was completed during your stay. You had the opportunity to participate in treatment programming while on the unit including occupational therapy, mental health support and education and spiritual services.     Symptoms to Report:   Please report if you are experiencing increased aggression and/or confusion, problematic loss of sleep, worsening mood, or thoughts of suicide to your treatment team or notify your primary provider.   IF THE SYMPTOMS YOU ARE EXPERIENCING ARE A MEDICAL EMERGENCY, CALL 911 IMMEDIATELY    Lifestyle Adjustment:   1. Adjust your lifestyle to get enough sleep, relaxation, exercise and good nutrition.  Continue to develop healthy coping skills to decrease stress and promote a healthy lifestyle.  2. Abstain from all substances of abuse.  3. Take medications as prescribed.  Please work with your doctor to discuss any concerns you have with your medications or side effects you may be experiencing.  4. Follow up with appointments as scheduled.      Psychiatry Follow-up:   Appointment : Psychiatry: Dr. Jazmine Key: 6/27/18 at 10:10am  [facility] 2nd Floor [id number] [facility], [address]  "  [phone]      Resources:   *[facility] Crisis: COPE: ([phone]) 24 hour mobile crisis support for people having a mental health crisis in [facility].   *Acute Psychiatric Services ([phone]). 24-hour walk-in crisis psychiatric support at [facility]; Emergency Medications Clinic available 7:30am - 2:00pm  *Ycfw3iwpi: Text  \"life\"  to [phone]   A trained crisis counselor will respond immediately  *[facility]: ([phone])  24-hour confidential telephone counseling   *[facility] Emergency Room: [phone]      General Medication Instructions:   See your medication sheet(s) for instructions.   Take all medicines as directed.  Make no changes unless your doctor suggests them.   Go to all your doctor visits.  Be sure to have all your required lab tests. This way, your medicines can be refilled on time.  Do not use any drugs not prescribed by your doctor.    The treatment team has appreciated the opportunity to work with you.  We wish you the best in the future.    If you have any questions or concerns our unit number is [phone].       "

## 2018-06-08 NOTE — PROGRESS NOTES
Vivek tore up her wristband and said she is no longer a patient here.  Asked for a 12 hour intent form, which she signed at 0754.  Cristina's nurse was notified.

## 2018-06-08 NOTE — PROGRESS NOTES
Patient was hypervigilant on this shift, pacing purvis and wants to know everything someone says. At times, patient was disorganized, struggles completing a task and intrusive. Continuously making statements that she is okay and would like to leave. Denied hallucination, SI/SIB and all other psychotic symptoms.        06/08/18 1446   Behavioral Health   Hallucinations denies / not responding to hallucinations   Thinking distractable;delusional;poor concentration   Orientation person: oriented;place: oriented;date: oriented;time: oriented   Memory baseline memory   Insight poor   Judgement impaired   Eye Contact at examiner   Affect tense   Mood anxious   Physical Appearance/Attire appears stated age   Hygiene well groomed   Suicidality other (see comments)  (denies)   1. Wish to be Dead No   2. Non-Specific Active Suicidal Thoughts  No   Self Injury other (see comment)  (denies)   Elopement Statements about wanting to leave   Activity hyperactive (agitated, impulsive)   Speech clear;coherent   Medication Sensitivity no stated side effects   Psychomotor / Gait balanced;steady   Safety   Suicidality Status 15   Psycho Education   Type of Intervention 1:1 intervention   Response participates, initiates socially appropriate   Hours 0.5   Treatment Detail (check in)   Activities of Daily Living   Hygiene/Grooming shower;independent   Oral Hygiene independent   Dress independent   Laundry with supervision   Room Organization independent   Groups   Details (did not attend groups)   Behavioral Health Interventions   Psychotic Symptoms maintain safety precautions;encourage nutrition and hydration;encourage participation / independence with adls;provide emotional support;establish therapeutic relationship;assist with developing & utilizing healthy coping strategies;build upon strengths   Social and Therapeutic Interventions (Psychotic Symptoms) encourage socialization with peers;encourage participation in therapeutic groups and  milieu activities

## 2018-06-08 NOTE — PROGRESS NOTES
Patient slept a total of 5 hours this evening/night shift.  Continues to be very manic with many many requests.  Up at the  repeatedly when awake. Requests for definitions of words, VS, snacks, assessment of her sore throat, putting the TV on in the lounge at 0525, etc.  Seems very distracted and forgetful in not following redirection. No aggression or rude comments so far this night shift.  Continues, however, to walk aimlessly around the unit.

## 2018-06-09 LAB
BACTERIA SPEC CULT: NORMAL
Lab: NORMAL
SPECIMEN SOURCE: NORMAL

## 2018-06-09 PROCEDURE — 25000132 ZZH RX MED GY IP 250 OP 250 PS 637: Performed by: STUDENT IN AN ORGANIZED HEALTH CARE EDUCATION/TRAINING PROGRAM

## 2018-06-09 PROCEDURE — 25000132 ZZH RX MED GY IP 250 OP 250 PS 637

## 2018-06-09 PROCEDURE — 25000132 ZZH RX MED GY IP 250 OP 250 PS 637: Performed by: PSYCHIATRY & NEUROLOGY

## 2018-06-09 PROCEDURE — 12400007 ZZH R&B MH INTERMEDIATE UMMC

## 2018-06-09 RX ADMIN — SALINE NASAL SPRAY 1 SPRAY: 1.5 SOLUTION NASAL at 18:58

## 2018-06-09 RX ADMIN — QUETIAPINE 100 MG: 100 TABLET, FILM COATED ORAL at 18:15

## 2018-06-09 RX ADMIN — PROPRANOLOL HYDROCHLORIDE 20 MG: 20 TABLET ORAL at 09:04

## 2018-06-09 RX ADMIN — BENZOCAINE, MENTHOL 1 LOZENGE: 15; 3.6 LOZENGE ORAL at 00:14

## 2018-06-09 RX ADMIN — PROPRANOLOL HYDROCHLORIDE 20 MG: 20 TABLET ORAL at 21:02

## 2018-06-09 RX ADMIN — CETIRIZINE HYDROCHLORIDE 10 MG: 10 TABLET, FILM COATED ORAL at 09:04

## 2018-06-09 RX ADMIN — QUETIAPINE 100 MG: 100 TABLET, FILM COATED ORAL at 00:14

## 2018-06-09 RX ADMIN — CALCIUM CARBONATE (ANTACID) CHEW TAB 500 MG 1000 MG: 500 CHEW TAB at 00:14

## 2018-06-09 RX ADMIN — QUETIAPINE FUMARATE 400 MG: 400 TABLET ORAL at 21:03

## 2018-06-09 RX ADMIN — LITHIUM CARBONATE 900 MG: 450 TABLET, EXTENDED RELEASE ORAL at 21:03

## 2018-06-09 ASSESSMENT — ACTIVITIES OF DAILY LIVING (ADL)
LAUNDRY: WITH SUPERVISION
ORAL_HYGIENE: INDEPENDENT
HYGIENE/GROOMING: INDEPENDENT
DRESS: INDEPENDENT

## 2018-06-09 NOTE — PROGRESS NOTES
"   06/09/18 1523   Behavioral Health   Hallucinations denies / not responding to hallucinations   Thinking poor concentration;distractable;paranoid   Orientation person: oriented;place: oriented;date: oriented;time: oriented   Memory baseline memory   Insight poor   Judgement impaired   Eye Contact at examiner   Affect tense   Mood other (see comments)  (Manic`)   Physical Appearance/Attire neat   Hygiene well groomed   Suicidality other (see comments)  (Denied)   1. Wish to be Dead No   2. Non-Specific Active Suicidal Thoughts  No   Self Injury other (see comment)  (Denied)   Elopement (\"I need to get out of here.\")   Activity hyperactive (agitated, impulsive)   Speech clear;coherent   Medication Sensitivity no stated side effects   Psychomotor / Gait balanced;steady   Psycho Education   Type of Intervention 1:1 intervention   Response participates, initiates socially appropriate   Hours 0.5   Treatment Detail Check-In   Activities of Daily Living   Hygiene/Grooming independent   Oral Hygiene independent   Dress independent   Laundry with supervision   Room Organization prompts     Pt was manic throughout the shift. Pt was also paranoid and terrified of some patients. Pt kept stating that those patients were psychotic and because of that, she was afraid of them. During lunch, pt was afraid to come out to the milieu, because a pt that was \"obsessed\" with her was out in the milieu. Pt was counseled by her family during visiting hours. After that, pt calmed down a bit and came out to the milieu.   "

## 2018-06-09 NOTE — PLAN OF CARE
"Problem: Psychotic Symptoms  Goal: Psychotic Symptoms  Signs and symptoms of listed problems will be absent or manageable.   Outcome: No Change    Patient has had an okay shift. She has many somatic complaints this evening. She feels as though she has a foot problem, diabetes, acid reflux and infection to name a few. Patient took her night medications willingly. She became tearful during her check in and said \"can I tell you a secret? I think tomorrow is going to be a good day.\" Writer reassured patient that good days are a good thing and that should make her happy and not cry. She appeared confused about that. Patient appears to not be tracking well and have poor insight. She denies SI/SIB. Will continue to monitor and assess.       "

## 2018-06-10 PROCEDURE — 25000132 ZZH RX MED GY IP 250 OP 250 PS 637: Performed by: STUDENT IN AN ORGANIZED HEALTH CARE EDUCATION/TRAINING PROGRAM

## 2018-06-10 PROCEDURE — 97150 GROUP THERAPEUTIC PROCEDURES: CPT | Mod: GO

## 2018-06-10 PROCEDURE — 25000132 ZZH RX MED GY IP 250 OP 250 PS 637

## 2018-06-10 PROCEDURE — 12400007 ZZH R&B MH INTERMEDIATE UMMC

## 2018-06-10 RX ADMIN — LITHIUM CARBONATE 900 MG: 450 TABLET, EXTENDED RELEASE ORAL at 22:09

## 2018-06-10 RX ADMIN — CETIRIZINE HYDROCHLORIDE 10 MG: 10 TABLET, FILM COATED ORAL at 09:02

## 2018-06-10 RX ADMIN — ACETAMINOPHEN 650 MG: 325 TABLET ORAL at 19:05

## 2018-06-10 RX ADMIN — QUETIAPINE 100 MG: 100 TABLET, FILM COATED ORAL at 19:05

## 2018-06-10 RX ADMIN — ACETAMINOPHEN 650 MG: 325 TABLET ORAL at 09:02

## 2018-06-10 RX ADMIN — PROPRANOLOL HYDROCHLORIDE 20 MG: 20 TABLET ORAL at 22:09

## 2018-06-10 RX ADMIN — PROPRANOLOL HYDROCHLORIDE 20 MG: 20 TABLET ORAL at 09:02

## 2018-06-10 ASSESSMENT — ACTIVITIES OF DAILY LIVING (ADL)
GROOMING: INDEPENDENT
LAUNDRY: WITH SUPERVISION
LAUNDRY: WITH SUPERVISION
HYGIENE/GROOMING: INDEPENDENT
DRESS: STREET CLOTHES
DRESS: STREET CLOTHES
ORAL_HYGIENE: INDEPENDENT
ORAL_HYGIENE: INDEPENDENT

## 2018-06-10 NOTE — PROGRESS NOTES
06/10/18 1330   Behavioral Health   Hallucinations appears responding   Thinking distractable;delusional   Orientation time: oriented;date: oriented;place: oriented;person: oriented   Memory baseline memory   Insight poor   Judgement impaired   Eye Contact at examiner   Affect irritable;tense   Mood anxious;labile   Physical Appearance/Attire appears stated age   Hygiene well groomed   Suicidality other (see comments)  (denies)   1. Wish to be Dead No   2. Non-Specific Active Suicidal Thoughts  No   Self Injury other (see comment)  (Denies)   Activity hyperactive (agitated, impulsive)   Speech clear;coherent   Medication Sensitivity no stated side effects   Psychomotor / Gait balanced;steady   Psycho Education   Type of Intervention 1:1 intervention   Response participates, initiates socially appropriate   Hours 0.5   Activities of Daily Living   Hygiene/Grooming independent   Oral Hygiene independent   Dress street clothes   Laundry with supervision   Room Organization independent   Activity   Activity Assistance Provided independent   Behavioral Health Interventions   Psychotic Symptoms maintain safety precautions;assist patient in developing safety plan;assist patient in following safety plan;provide emotional support;encourage participation / independence with adls;build upon strengths;assist with developing & utilizing healthy coping strategies   Social and Therapeutic Interventions (Psychotic Symptoms) encourage socialization with peers;encourage participation in therapeutic groups and milieu activities   Pt appears experiencing internal stimuli and disorganized . She observed listening to headphone and she states that she wants to go back to [facility]. She reported to the writer that other peers says to that she is a trigger. She denies suicidal ideation or hearing voices.

## 2018-06-10 NOTE — PROGRESS NOTES
06/10/18 0900   Values Beliefs and Spiritual Care   C: Community: In support of your spiritual health, is there someone we may contact for you? (identify all that apply) hospital   (Triaged for Kleber for 6-11)   Visit Information   Visit Made By Staff    Type of Visit On-call;Staff consultation/triage   SPIRITUAL HEALTH SERVICES  [facility] ([facility]  ON-CALL VISIT  REFERRAL SOURCE: EPIC consult.     Arranged with staff for Cristina to be seen by Chaplain Shahid ([facility]), on Monday.     PLAN: Will inform Kleber of request.       [address]  Staff   Spiritual Health Services  Pgr: [phone]    * [facility] remains available 24/7 for emergent requests/referrals, either by having the switchboard page the on-call  or by entering an ASAP/STAT consult in Epic (this will also page the on-call ).*

## 2018-06-11 ENCOUNTER — APPOINTMENT (OUTPATIENT)
Dept: CT IMAGING | Facility: CLINIC | Age: 18
DRG: 885 | End: 2018-06-11
Attending: PHYSICIAN ASSISTANT
Payer: COMMERCIAL

## 2018-06-11 LAB
BASOPHILS # BLD AUTO: 0 10E9/L (ref 0–0.2)
BASOPHILS NFR BLD AUTO: 0.3 %
CRP SERPL-MCNC: 70.1 MG/L (ref 0–8)
DIFFERENTIAL METHOD BLD: ABNORMAL
EOSINOPHIL # BLD AUTO: 0.6 10E9/L (ref 0–0.7)
EOSINOPHIL NFR BLD AUTO: 8.7 %
ERYTHROCYTE [DISTWIDTH] IN BLOOD BY AUTOMATED COUNT: 13.4 % (ref 10–15)
ERYTHROCYTE [SEDIMENTATION RATE] IN BLOOD BY WESTERGREN METHOD: 72 MM/H (ref 0–20)
HCT VFR BLD AUTO: 32.5 % (ref 35–47)
HGB BLD-MCNC: 10.7 G/DL (ref 11.7–15.7)
IMM GRANULOCYTES # BLD: 0 10E9/L (ref 0–0.4)
IMM GRANULOCYTES NFR BLD: 0.3 %
LYMPHOCYTES # BLD AUTO: 1.3 10E9/L (ref 0.8–5.3)
LYMPHOCYTES NFR BLD AUTO: 19.8 %
MCH RBC QN AUTO: 29.4 PG (ref 26.5–33)
MCHC RBC AUTO-ENTMCNC: 32.9 G/DL (ref 31.5–36.5)
MCV RBC AUTO: 89 FL (ref 78–100)
MONOCYTES # BLD AUTO: 0.9 10E9/L (ref 0–1.3)
MONOCYTES NFR BLD AUTO: 14.4 %
NEUTROPHILS # BLD AUTO: 3.7 10E9/L (ref 1.6–8.3)
NEUTROPHILS NFR BLD AUTO: 56.5 %
NRBC # BLD AUTO: 0 10*3/UL
NRBC BLD AUTO-RTO: 0 /100
PLATELET # BLD AUTO: 260 10E9/L (ref 150–450)
PROCALCITONIN SERPL-MCNC: <0.05 NG/ML
RBC # BLD AUTO: 3.64 10E12/L (ref 3.8–5.2)
WBC # BLD AUTO: 6.5 10E9/L (ref 4–11)

## 2018-06-11 PROCEDURE — 71260 CT THORAX DX C+: CPT

## 2018-06-11 PROCEDURE — 25000132 ZZH RX MED GY IP 250 OP 250 PS 637

## 2018-06-11 PROCEDURE — 85025 COMPLETE CBC W/AUTO DIFF WBC: CPT | Performed by: STUDENT IN AN ORGANIZED HEALTH CARE EDUCATION/TRAINING PROGRAM

## 2018-06-11 PROCEDURE — 25000125 ZZHC RX 250: Performed by: PSYCHIATRY & NEUROLOGY

## 2018-06-11 PROCEDURE — 99232 SBSQ HOSP IP/OBS MODERATE 35: CPT | Mod: GC | Performed by: PSYCHIATRY & NEUROLOGY

## 2018-06-11 PROCEDURE — 85027 COMPLETE CBC AUTOMATED: CPT | Performed by: PHYSICIAN ASSISTANT

## 2018-06-11 PROCEDURE — 25000132 ZZH RX MED GY IP 250 OP 250 PS 637: Performed by: PSYCHIATRY & NEUROLOGY

## 2018-06-11 PROCEDURE — 99231 SBSQ HOSP IP/OBS SF/LOW 25: CPT | Performed by: PHYSICIAN ASSISTANT

## 2018-06-11 PROCEDURE — 86140 C-REACTIVE PROTEIN: CPT | Performed by: PHYSICIAN ASSISTANT

## 2018-06-11 PROCEDURE — 25000132 ZZH RX MED GY IP 250 OP 250 PS 637: Performed by: STUDENT IN AN ORGANIZED HEALTH CARE EDUCATION/TRAINING PROGRAM

## 2018-06-11 PROCEDURE — 36415 COLL VENOUS BLD VENIPUNCTURE: CPT | Performed by: STUDENT IN AN ORGANIZED HEALTH CARE EDUCATION/TRAINING PROGRAM

## 2018-06-11 PROCEDURE — 70491 CT SOFT TISSUE NECK W/DYE: CPT

## 2018-06-11 PROCEDURE — 36415 COLL VENOUS BLD VENIPUNCTURE: CPT | Performed by: PHYSICIAN ASSISTANT

## 2018-06-11 PROCEDURE — 90853 GROUP PSYCHOTHERAPY: CPT

## 2018-06-11 PROCEDURE — 12400007 ZZH R&B MH INTERMEDIATE UMMC

## 2018-06-11 PROCEDURE — 84145 PROCALCITONIN (PCT): CPT | Performed by: PHYSICIAN ASSISTANT

## 2018-06-11 PROCEDURE — 25000128 H RX IP 250 OP 636: Performed by: PSYCHIATRY & NEUROLOGY

## 2018-06-11 PROCEDURE — 85652 RBC SED RATE AUTOMATED: CPT | Performed by: PHYSICIAN ASSISTANT

## 2018-06-11 RX ORDER — IOPAMIDOL 755 MG/ML
100 INJECTION, SOLUTION INTRAVASCULAR ONCE
Status: COMPLETED | OUTPATIENT
Start: 2018-06-11 | End: 2018-06-11

## 2018-06-11 RX ORDER — IBUPROFEN 600 MG/1
600 TABLET, FILM COATED ORAL EVERY 6 HOURS PRN
Status: DISCONTINUED | OUTPATIENT
Start: 2018-06-11 | End: 2018-06-12

## 2018-06-11 RX ADMIN — SODIUM CHLORIDE 80 ML: 9 INJECTION, SOLUTION INTRAVENOUS at 20:40

## 2018-06-11 RX ADMIN — SALINE NASAL SPRAY 1 SPRAY: 1.5 SOLUTION NASAL at 03:19

## 2018-06-11 RX ADMIN — LITHIUM CARBONATE 900 MG: 450 TABLET, EXTENDED RELEASE ORAL at 21:21

## 2018-06-11 RX ADMIN — IOPAMIDOL 100 ML: 755 INJECTION, SOLUTION INTRAVENOUS at 20:40

## 2018-06-11 RX ADMIN — PROPRANOLOL HYDROCHLORIDE 20 MG: 20 TABLET ORAL at 09:16

## 2018-06-11 RX ADMIN — PROPRANOLOL HYDROCHLORIDE 20 MG: 20 TABLET ORAL at 21:21

## 2018-06-11 RX ADMIN — QUETIAPINE FUMARATE 400 MG: 400 TABLET ORAL at 21:21

## 2018-06-11 RX ADMIN — CETIRIZINE HYDROCHLORIDE 10 MG: 10 TABLET, FILM COATED ORAL at 09:16

## 2018-06-11 RX ADMIN — BENZOCAINE, MENTHOL 1 LOZENGE: 15; 3.6 LOZENGE ORAL at 03:19

## 2018-06-11 ASSESSMENT — ACTIVITIES OF DAILY LIVING (ADL)
LAUNDRY: WITH SUPERVISION
DRESS: STREET CLOTHES
GROOMING: INDEPENDENT
ORAL_HYGIENE: INDEPENDENT
GROOMING: INDEPENDENT
ORAL_HYGIENE: INDEPENDENT
DRESS: STREET CLOTHES

## 2018-06-11 NOTE — PROGRESS NOTES
06/11/18 1328   Behavioral Health   Hallucinations appears responding   Thinking distractable;delusional;poor concentration   Orientation person: oriented;place: oriented;date: oriented;time: oriented   Memory baseline memory   Insight poor   Judgement impaired   Eye Contact at examiner   Affect tense;irritable   Mood anxious;labile;irritable   Physical Appearance/Attire appears stated age;attire appropriate to age and situation   Hygiene well groomed   Suicidality other (see comments)  (pt denies)   1. Wish to be Dead No   2. Non-Specific Active Suicidal Thoughts  No   Self Injury other (see comment)  (pt denies)   Elopement (none observed)   Activity hyperactive (agitated, impulsive)   Speech rambling   Psychomotor / Gait balanced;steady   Psycho Education   Type of Intervention 1:1 intervention   Response participates, initiates socially appropriate   Hours 0.5   Treatment Detail check-in   Activities of Daily Living   Hygiene/Grooming independent   Oral Hygiene independent   Dress street clothes   Room Organization independent   Activity   Activity Assistance Provided independent   Behavioral Health Interventions   Psychotic Symptoms maintain safety precautions;reality orientation;maintain safe secure environment;simple, clear language;build upon strengths;establish therapeutic relationship;provide emotional support   Social and Therapeutic Interventions (Psychotic Symptoms) encourage socialization with peers;encourage effective boundaries with peers;encourage participation in therapeutic groups and milieu activities       Pt presents as manic. Visible around the milieu and social with other patients. Pt was intrusive with some patients and needs some redirection at times. During staff check-in pt stated she feels great and is working on being occupied throughout the day.

## 2018-06-11 NOTE — PROGRESS NOTES
"Asked to review for fever, continued sore throat.     S: The patient notes she came in with a viral illness, now feels that being on the unit is making her sick. Notes intermittent fevers. Notes pain with swallowing and movement of her neck due to her lymph nodes being swollen. Hasn't tried much for it. Notes some nasal congestion. No headache. No nausea/vomiting or photophobia described. Notes a cough, sometimes productive, not worse at any time of the day.     O: /78  Pulse 102  Temp 98.8  F (37.1  C) (Oral)  Resp 16  Ht 1.676 m (5' 6\")  Wt 56.5 kg (124 lb 8 oz)  SpO2 99%  BMI 20.09 kg/m2  GEN: Awake, alert, ambulating independently  HEENT: Throat w/o erythema, no tonsillar hypertrophy or exudates, uvula midline and intact, anterior cervical lymph nodes w/ hypertrophy and TTP, pre auricular lymphadenopathy on right. TMs w/o erythema or bulging.    A/P:  #Fever, lymphadenopathy: Tmax of 101.3 w/ normal WBC count, negative procal and elevated inflammatory markers. Complains of mild viral likely symptoms but exam not c/w viral illness (no edema or erythema of posterior pharynx, no rhinorrhea, no cough). Recent w/u for similar symptoms on 6/6 with negative strep screen and culture, negative HIV and mono. Fortunately, I did see this patient >1 year ago for similar symptoms, CT at that time with lymphadenopathy recommended f/u CT which patient has not obtained. Concern for lymphoma vs. Rheumatologic condition. Also still considering viral etiology.   -Discussed with psychiatry, they will order quant gold as stated in their notes  -Discussed with Dr. Cochran, will order CT chest and soft tissue neck to further evaluate. Pending results, consider ENT consult for biopsy and discussion with Oncology regarding further w/u.    Medicine will continue to follow. Discussed with psychiatry resident, please notify medicine if CT scans w/ acute abnormality.    JOVANI Montenegro  "

## 2018-06-11 NOTE — PROGRESS NOTES
"    ----------------------------------------------------------------------------------------------------------  [facility], [place]   Psychiatric Progress Note  Hospital Day 10     Interim History:   The patient's care was discussed with the treatment team and chart notes were reviewed.     Sleep: 5/8 (4.25 hours), 5/9 (7 hours), 5/10 (3 hours)  Scheduled meds: adherent  PRN meds:   6/8 - no psychoactive prns  6/9 - Quetiapine 100mg 2x (0014, 1815)  6/10 - Quetiapine 100mg 1x    Staff report:  Continues to appear manic and paranoid. Also appeared to respond to internal stimuli at times.    Patient interview:   Patient was seen in the conference room today. She was notably more alert and oriented. She was more logical, and speech was less pressured. Her mood is \"really good, 10/10.\" She initially requested to go down on her medications, stating that she thought that they were making her hallucinate and that she was getting a sensation of being slowed down. When asked about auditory hallucinations and paranoia, she stated that she had this previously but this had improved significantly. She conceded that this may be a result of her medications. Patient stated that she hoped to discharge soon. Team discussed that she was appearing improved but wanted to see a consistent period of stability. Collaborated on a possible goal of discharge by Wednesday.     Patient's father visited during midday and briefly sat down for a family meeting. He notes that he has seen Cristina improve over the weekend. He is aware that Cristina hopes to discharge Wednesday as it is the end of Ramadan and Cristina wants to be part of the celebrations. He agrees that patient would benefit from being in the hospital a few more days to ensure stability. His primary concern is Cristina overwhelming herself by taking too many classes as this historically has been the cause of manic episodes.     Patient continued to endorse having " "some throat discomfort. She was evaluated by IM today who recommended follow-up neck CT imaging as this had been recommended during her previous hospitalization 1.5 years ago. Given the long duration of neck issues, there is some concern for a malignant or rheumatological process.     Reason for continued hospitalization is shaina and need to monitor for mood stability prior to discharge.     The risks, benefits, alternatives and side effects of any medication changes have been discussed and are understood by the patient and other caregivers.    The Review of Systems is negative other than noted above         Psychiatric Examination:   /72  Pulse 109  Temp 100.9  F (38.3  C) (Oral)  Resp 18  Ht 1.676 m (5' 6\")  Wt 56.5 kg (124 lb 8 oz)  SpO2 99%  BMI 20.09 kg/m2  Weight is 124 lbs 8 oz  Body mass index is 20.09 kg/(m^2).    Appearance: awake and alert, appears stated age, dressed in home clothes.   Attitude: Mostly cooperative  Eye Contact: Limited, but appropriate   Mood: \"really good, 10/10\"  Affect: Mood congruent, elevated  Speech: soft spoken, less rapid  Psychomotor Behavior: No evidence of tardive dyskinesia, dystonia, or tics.    Thought Process: more linear, less tangential    Associations: less loose  Thought Content: no evidence of SI or hallucinations, focused on discharge  Insight: Fair  Judgment: Fair  Oriented to: Person, place, time, and situation.   Attention Span and Concentration: Fair, Distractable  Recent and Remote Memory: Intact  Language: Speaks English fluently with appropriate syntax and grammar.  Fund of Knowledge: Appropriate  Muscle Strength and Tone: Not formally assessed.  Gait and Station: Normal gait     Assessment    Diagnostic Impression: Cristina Boyd is a 18 year old female with a history of bipolar I disorder previously managed with lithium who presented to [facility] ED with acute shaina that began around 5/26/18. The current episode is characterized by " decreased need for sleep, paranoia, delusions, and mixed features (some dysphoria). The patient's family brought Cristina to the ED due to concern for recent decompensation. The current episode occurs in the context of recent lithium non-adherence. Low concern for substance-induced shaina or shaina explained by another medical condition. Recent psychosocial stressors include being a full-time college student and anxiety that has impacted her ability pass two of her six courses. Cristina's current presentation is consistent with a mixed manic episode. Her goals of care are to restart lithium and stabilize her mood, though her commitment to lithium is ambivalent at best. Given her history of medication non-adherence, she would be a good candidate for a [facility]. However, given her desire for short hospitalization, this may have to be transitioned as an outpatient.     Inpatient hospitalization is warranted at this time for safety, clinical stabilization, and medication management in the context of shaina.     Hospital course: Cristina Boyd was admitted to station [facility] as a voluntary patient with acute shaina. She had previously been non-adherent to her prescribed lithium but did take one dose of 900 mg prior to admission and agreed to resume treatment with 900 mg lithium QHS. Her lithium level was found to be 1.48 mmol/L approximately 5 hours after taking her first dose. Twelve hours after resuming treatment with lithium, her serum lithium level was 1.28 mmol/L. PRN olanzapine was made available for agitation. Quetiapine was started to target manic symptoms and it was titrated to a final dose of 400 mg HS.  Propranolol 20 mg BID was started to address patient's tremor, which pt reports was successful.  Pt was transferred to [facility] at pt's request, to avoid a peer she found upsetting. By hospital 10, patient's shaina appeared to be improving. Her speech was less pressured, and her thought process became easier to follow.      Medical course: IM was consulted when patient developed a fever of 101.5, with sore throat and menstrual cramps. Fever and sore throat most likely a viral process, and she was treated with supportive cares. She was given Midol prn for menstrual cramps. 6/11, IM was again consulted for continued low grade fever with lymph node enlargement. CT, Quantiferon gold, and inflammatory markers were ordered.    Plan     Principal Diagnosis: Bipolar I disorder, current mixed manic episode    Secondary psychiatric diagnoses of concern this admission: None    Medications:   - Lithium  mg QHS  - Propranolol 20 mg BID (hold if BP < 100/60)  - Seroquel 400 mg HS  - Seroquel 100 mg TID prn for insomnia or agitation     Laboratory/Imaging: None    - Patient will be treated in therapeutic milieu with appropriate individual and group therapies as described.    Medical diagnoses to be addressed this admission:     # Throat pain/Fever:  - IM consulted, appreciate recs  - Tylenol prn, lozenges prn, nasal spray prn, and cetirizine 10 mg daily  - Tests: CXR, TB Quant Gold, neck CT pending    # Elevated TSH, free T4  Found to have elevated TSH (4.83) and elevated free T4 (1.62) upon admission. Workup in early 2017 demonstrated normal thyroid ultrasound and CT, as well as KHANH within normal limits. Current TSH elevation is likely due to recent lithium non-compliance, and elevated T4, secondary to acute shaina.  - Trend TFTs when patient is no longer acutely manic   - Recommend outpatient follow-up after resolution of current episode of shaina.    # Menstrual cramps:  - Midol prn      Legal Status: Voluntary    Safety Assessment:   Behavioral Orders   Procedures     Code 2     Routine Programming     As clinically indicated     Status 15     Every 15 minutes.       Anticipated Disposition/Discharge Date: Pending medication management and clinical  stabilization.    --------------------------------------------------------------------------  Scribed by Huang Suárez, MS4, for Dr. Awan, Resident.    I have reviewed and edited the documentation recorded by the scribe.  This documentation accurately reflects the services I personally performed and treatment decisions made by me in consultation with the attending physician Severo Mesa MD.    Aguilar Awan MD  Psychiatry PGY-2  [phone]    I, Severo Mesa, saw and evaluated the patient with the resident physician.  I agree with the findings and plan of care as documented in the resident note.  I have reviewed all labs and vital signs.             Labs & Imaging   CBC: WNL  CMP: WNL except total protein 9.1 (elevated)  TSH: 4.83 (elevated)  Free T4: 1.62 (elevated)  Serum HCG: Negative  Urine drug screen: Negative  Urine HCG: Negative  Lithium 1.29 on admission (on 900 mg/daily)  6/6 Lithium 1.11 (on 900 mg/daily)  6/7 HIV: negative  6/6 TSH: 0.91  6/6 CBC: slight decrease in hgb (11.5)  6/6 Rapid strep: negative  6/6 Monospot: negative  6/11 CBC: notable for WBC wnl, and low RBC count (3.64) and hgb (10.7)  6/11 ESR: elevated (72)  6/11 CRP: 70.1  6/11 Pro-calcitonin: wnl;

## 2018-06-11 NOTE — PROGRESS NOTES
During my attempted visit, pt was not available.     Will continue to provide support to pt/family during their hospitalization at least 1x/wk.

## 2018-06-11 NOTE — PROGRESS NOTES
"Patient reports feeling \"high\" with tactile hallucinations, slowed perception and hypersensitivity to all sensations. Patient reported numerous physical complaints including swollen feet, swollen glands in her neck, numbness in her extremities, and fever. Patient was hyperactive with rambling speech and labile affect. Patient also reported feeling tired but unable to sleep.     06/10/18 2100   Behavioral Health   Hallucinations appears responding   Thinking distractable;delusional;paranoid;poor concentration   Orientation person: oriented;place: oriented;date: oriented;time: oriented   Memory baseline memory   Insight poor   Judgement impaired   Eye Contact at examiner   Affect tense;irritable   Mood anxious;labile;irritable   Physical Appearance/Attire appears stated age;attire appropriate to age and situation   Hygiene well groomed   Suicidality other (see comments)  (Denies)   1. Wish to be Dead No   2. Non-Specific Active Suicidal Thoughts  No   Self Injury other (see comment)  (Denies)   Elopement Statements about wanting to leave   Activity hyperactive (agitated, impulsive);restless   Speech rambling   Medication Sensitivity no stated side effects;no observed side effects   Psychomotor / Gait balanced;steady   Activities of Daily Living   Hygiene/Grooming independent   Oral Hygiene independent   Dress street clothes   Laundry with supervision   Room Organization independent     "

## 2018-06-11 NOTE — PLAN OF CARE
"Problem: Psychotic Symptoms  Goal: Social and Therapeutic (Psychotic Symptoms)  Signs and symptoms of listed problems will be absent or manageable.     Pt attended 1 out of 3 OT groups offered. Pt actively participated in a structured occupational therapy group with a focus on connecting song titles to life events and personal feelings. Using a list of song titles, pt identified Imagine, Brown Eyed Girl, Amazing Sridevi, and Walking on Sunshine as song titles that relate to her life, as well as I Can See Clearly Now as a song title that indicates something about her future. Pt also independently identified \"Rolling Stone by the Weeknd\" as a song that she personally relates to. Pt then completed a visual scanning task while listening to identified songs to facilitate focus and mood elevation. Attention to task was limited, as she chose to spend a majority of group socializing with peers, and appeared hyperverbal. Appropriately sked a peer to explain the phrase \"rolling stone\" to her. Pt caught herself being disruptive x1, and quickly apologized. Pleasant and bright throughout group.        "

## 2018-06-12 ENCOUNTER — DOCUMENTATION ONLY (OUTPATIENT)
Dept: INTERVENTIONAL RADIOLOGY/VASCULAR | Facility: CLINIC | Age: 18
End: 2018-06-12

## 2018-06-12 LAB
BACTERIA SPEC CULT: ABNORMAL
FUNGUS SPEC CULT: ABNORMAL
GRAM STN SPEC: ABNORMAL
Lab: ABNORMAL
SPECIMEN SOURCE: ABNORMAL

## 2018-06-12 PROCEDURE — 25000132 ZZH RX MED GY IP 250 OP 250 PS 637: Performed by: STUDENT IN AN ORGANIZED HEALTH CARE EDUCATION/TRAINING PROGRAM

## 2018-06-12 PROCEDURE — 87205 SMEAR GRAM STAIN: CPT | Performed by: PHYSICIAN ASSISTANT

## 2018-06-12 PROCEDURE — 97150 GROUP THERAPEUTIC PROCEDURES: CPT | Mod: GO

## 2018-06-12 PROCEDURE — 12400007 ZZH R&B MH INTERMEDIATE UMMC

## 2018-06-12 PROCEDURE — 99207 ZZC CDG-MDM COMPONENT: MEETS LOW - DOWN CODED: CPT | Performed by: PHYSICIAN ASSISTANT

## 2018-06-12 PROCEDURE — 99232 SBSQ HOSP IP/OBS MODERATE 35: CPT | Mod: GC | Performed by: PSYCHIATRY & NEUROLOGY

## 2018-06-12 PROCEDURE — 86480 TB TEST CELL IMMUN MEASURE: CPT | Performed by: STUDENT IN AN ORGANIZED HEALTH CARE EDUCATION/TRAINING PROGRAM

## 2018-06-12 PROCEDURE — 87102 FUNGUS ISOLATION CULTURE: CPT | Performed by: PHYSICIAN ASSISTANT

## 2018-06-12 PROCEDURE — 25000132 ZZH RX MED GY IP 250 OP 250 PS 637

## 2018-06-12 PROCEDURE — 36415 COLL VENOUS BLD VENIPUNCTURE: CPT | Performed by: STUDENT IN AN ORGANIZED HEALTH CARE EDUCATION/TRAINING PROGRAM

## 2018-06-12 PROCEDURE — 99232 SBSQ HOSP IP/OBS MODERATE 35: CPT | Performed by: PHYSICIAN ASSISTANT

## 2018-06-12 RX ORDER — TRIAMCINOLONE ACETONIDE 1 MG/G
CREAM TOPICAL 2 TIMES DAILY PRN
Qty: 80 G | Refills: 0 | Status: SHIPPED | OUTPATIENT
Start: 2018-06-12 | End: 2020-08-31

## 2018-06-12 RX ORDER — CETIRIZINE HYDROCHLORIDE 10 MG/1
10 TABLET ORAL DAILY
Qty: 30 TABLET | Refills: 0 | Status: SHIPPED | OUTPATIENT
Start: 2018-06-13 | End: 2018-07-11

## 2018-06-12 RX ORDER — PROPRANOLOL HYDROCHLORIDE 20 MG/1
20 TABLET ORAL 2 TIMES DAILY
Qty: 60 TABLET | Refills: 0 | Status: ON HOLD | OUTPATIENT
Start: 2018-06-12 | End: 2018-06-21

## 2018-06-12 RX ORDER — CARBOXYMETHYLCELLULOSE SODIUM 5 MG/ML
1 SOLUTION/ DROPS OPHTHALMIC 3 TIMES DAILY PRN
Qty: 1 BOTTLE | Refills: 0 | Status: SHIPPED | OUTPATIENT
Start: 2018-06-12 | End: 2020-08-31

## 2018-06-12 RX ORDER — QUETIAPINE FUMARATE 100 MG/1
100 TABLET, FILM COATED ORAL 3 TIMES DAILY PRN
Qty: 60 TABLET | Refills: 0 | Status: ON HOLD | OUTPATIENT
Start: 2018-06-12 | End: 2018-06-21

## 2018-06-12 RX ORDER — LITHIUM CARBONATE 450 MG
900 TABLET, EXTENDED RELEASE ORAL AT BEDTIME
Qty: 60 TABLET | Refills: 0 | Status: ON HOLD | OUTPATIENT
Start: 2018-06-12 | End: 2018-06-21

## 2018-06-12 RX ORDER — QUETIAPINE FUMARATE 400 MG/1
400 TABLET, FILM COATED ORAL AT BEDTIME
Qty: 30 TABLET | Refills: 0 | Status: ON HOLD | OUTPATIENT
Start: 2018-06-12 | End: 2018-06-21

## 2018-06-12 RX ADMIN — PROPRANOLOL HYDROCHLORIDE 20 MG: 20 TABLET ORAL at 19:55

## 2018-06-12 RX ADMIN — QUETIAPINE FUMARATE 400 MG: 400 TABLET ORAL at 21:41

## 2018-06-12 RX ADMIN — LITHIUM CARBONATE 900 MG: 450 TABLET, EXTENDED RELEASE ORAL at 21:41

## 2018-06-12 RX ADMIN — CETIRIZINE HYDROCHLORIDE 10 MG: 10 TABLET, FILM COATED ORAL at 08:47

## 2018-06-12 RX ADMIN — PROPRANOLOL HYDROCHLORIDE 20 MG: 20 TABLET ORAL at 08:46

## 2018-06-12 RX ADMIN — CALCIUM CARBONATE (ANTACID) CHEW TAB 500 MG 1000 MG: 500 CHEW TAB at 16:54

## 2018-06-12 ASSESSMENT — ACTIVITIES OF DAILY LIVING (ADL)
ORAL_HYGIENE: INDEPENDENT
LAUNDRY: WITH SUPERVISION
DRESS: INDEPENDENT
HYGIENE/GROOMING: INDEPENDENT
DRESS: INDEPENDENT
GROOMING: INDEPENDENT
ORAL_HYGIENE: INDEPENDENT

## 2018-06-12 NOTE — PROGRESS NOTES
06/11/18 2231   Behavioral Health   Hallucinations denies / not responding to hallucinations   Thinking distractable;poor concentration   Orientation time: oriented;date: oriented;place: oriented;person: oriented   Memory baseline memory   Insight poor   Judgement impaired   Eye Contact at examiner   Affect tense;irritable   Mood anxious;labile   Physical Appearance/Attire appears stated age   Hygiene well groomed   Suicidality other (see comments)  (Denies)   1. Wish to be Dead No   2. Non-Specific Active Suicidal Thoughts  No   Self Injury other (see comment)  (Denies)   Activity hyperactive (agitated, impulsive)   Speech coherent;clear   Psychomotor / Gait steady;balanced   Psycho Education   Type of Intervention 1:1 intervention   Response participates, initiates socially appropriate   Hours 0.5   Activities of Daily Living   Hygiene/Grooming independent   Oral Hygiene independent   Dress street clothes   Laundry with supervision   Room Organization independent   Activity   Activity Assistance Provided independent   Behavioral Health Interventions   Psychotic Symptoms maintain safety precautions;assist patient in developing safety plan;assist patient in following safety plan;encourage participation / independence with adls;provide emotional support;assist with developing & utilizing healthy coping strategies;build upon strengths   Social and Therapeutic Interventions (Psychotic Symptoms) encourage socialization with peers;encourage participation in therapeutic groups and milieu activities   Pt attended groups this shift and she did go for CT scan. She denies suicidal ideation and she stated that she is less manic. She appears less intrusive and hyperverbal. She was observed pacing in purvis and napped in her room but reported having nightmares. She was sociable with peers in the lounge.

## 2018-06-12 NOTE — PLAN OF CARE
Problem: Psychotic Symptoms  Goal: Social and Therapeutic (Psychotic Symptoms)  Signs and symptoms of listed problems will be absent or manageable.     Pt attended 1 out of 2 OT groups offered. Pt actively participated in occupational therapy clinic. Pt was able to ask for assistance as needed, and independently initiated a self-selected creative expression task. Pt demonstrated good focus, though took frequent breaks to socialize with peers appropriately. Pt continues to present as somewhat hyperverbal, though was significantly less disruptive during group today. Enjoys discussing music with peers and writer. Pleasant and cooperative.

## 2018-06-12 NOTE — PROGRESS NOTES
"    ----------------------------------------------------------------------------------------------------------  [facility], [place]   Psychiatric Progress Note  Hospital Day 11     Interim History:   The patient's care was discussed with the treatment team and chart notes were reviewed.     Sleep: 7 hrs  Scheduled meds: adherent  PRN meds: none    Staff report:  No acute events. Patient no longer observed to be responding to internal stimuli and is less hyperverbal, intrusive, and manic.  Patient is also more sociable.      Patient interview:   Patient attended team interview in the conference room. Her jacket was zipped up to hide the swelling and lymphadenopathy in her neck. She was alert, oriented, with logical thought, and less pressured, clear speech. Patient reported being tired and having \"low energy\", in contrast to her manic state. No reported hallucinations, depression, or SI.      Patient is looking forward to the previously agreed upon discharge goal of Wednesday. She reports a decreased appetite since improvement of her shaina and shares a desire to \"make up fasting for Ramadan\" in the winter. Cristina reflects that she normally has a depressive episode prior to shaina in the winter months, and she will remain vigilant of a recurrence of this cycle in the future. She was counseled to obtain appropriate support from family and psychiatry resources when she notices this.      Cristina continues to report throat pain with swelling, and mild cough. She reports concern of TB infection because she interacted with her brother, who was previously treated for it. Treatment team discussed results of CT imaging and lab evaluations with the patient. Findings were positive for elevated inflammatory markers (ESR, CRP). CT demonstrated patchy right upper and lower lung opacities, and mild, multilevel cervical lymph node enlargement. Pending TB quantiferon test.  Ongoing concern for chronic " "infection vs malignancy vs rheumatological process.     Reason for continued hospitalization: monitoring for mood stability with resolving shaina prior to discharge.     The risks, benefits, alternatives and side effects of any medication changes have been discussed and are understood by the patient and other caregivers.    The Review of Systems is negative other than noted above         Psychiatric Examination:   /78  Pulse 102  Temp 98.8  F (37.1  C) (Oral)  Resp 16  Ht 1.676 m (5' 6\")  Wt 56.5 kg (124 lb 8 oz)  SpO2 99%  BMI 20.09 kg/m2  Weight is 124 lbs 8 oz  Body mass index is 20.09 kg/(m^2).    Appearance: awake and alert, appears stated age, dressed in home clothes.   Attitude: Cooperative  Eye Contact: Limited, but appropriate   Mood: \"tired\"   Affect: Mood congruent  Speech: soft spoken, quick, less pressured  Psychomotor Behavior: No evidence of tardive dyskinesia, dystonia, or tics.    Thought Process: more linear, less tangential    Associations: less loose  Thought Content: no evidence of SI or hallucinations, focused on discharge  Insight: Fair, improving  Judgment: Fair, improving  Oriented to: Person, place, time, and situation.   Attention Span and Concentration: Fair, Distractable  Recent and Remote Memory: Intact  Language: Speaks English fluently with appropriate syntax and grammar.  Fund of Knowledge: Appropriate  Muscle Strength and Tone: Not formally assessed.  Gait and Station: Normal gait     Assessment    Diagnostic Impression: Cristina Boyd is a 18 year old female with a history of bipolar I disorder previously managed with lithium who presented to [facility] ED with acute shaina that began around 5/26/18. The current episode is characterized by decreased need for sleep, paranoia, delusions, and mixed features (some dysphoria). The patient's family brought Cristina to the ED due to concern for recent decompensation. The current episode occurs in the context of recent " lithium non-adherence. Low concern for substance-induced adela or adela explained by another medical condition. Recent psychosocial stressors include being a full-time college student and anxiety that has impacted her ability pass two of her six courses. Cristina's current presentation is consistent with a mixed manic episode. Her goals of care are to restart lithium and stabilize her mood, though her commitment to lithium is ambivalent at best. Given her history of medication non-adherence, she would be a good candidate for a [facility]. However, given her desire for short hospitalization, this may have to be transitioned as an outpatient.     Inpatient hospitalization was warranted for safety, clinical stabilization, and medication management. Adela is now resolving with patient returning to baseline, likely appropriate for discharge tomorrow.     Hospital course: Cristina Boyd was admitted to station 22 as a voluntary patient with acute adela. She had previously been non-adherent to her prescribed lithium but did take one dose of 900 mg prior to admission and agreed to resume treatment with 900 mg lithium QHS. Her lithium level was found to be 1.48 mmol/L approximately 5 hours after taking her first dose. Twelve hours after resuming treatment with lithium, her serum lithium level was 1.28 mmol/L. PRN olanzapine was made available for agitation. Quetiapine was started to target manic symptoms and it was titrated to a final dose of 400 mg HS.  Propranolol 20 mg BID was started to address patient's tremor, which pt reports was successful.  Pt was transferred to Station 20 at pt's request, to avoid a peer she found upsetting. By hospital 10, patient's adela appeared to be improving. Her speech was less pressured, and her thought process became easier to follow.     Medical course: IM was consulted when patient developed a fever of 101.5, with sore throat and menstrual cramps. Fever and sore throat most likely a viral process,  and she was treated with supportive cares. She was given Midol prn for menstrual cramps. 6/11, IM was again consulted for continued low grade fever with lymph node enlargement. Inflammatory markers were elevated, with normal procalcitonin and WBC count. CT chest shows right sided patchy opacities. CT neck demonstrates multilevel 1-2mm enlargement of cervical lymph nodes. Quantiferon gold ordered for today.   Plan     Principal Diagnosis: Bipolar I disorder, current mixed manic episode    Secondary psychiatric diagnoses of concern this admission: None    Medications:   - Lithium  mg QHS  - Propranolol 20 mg BID (hold if BP < 100/60)  - Seroquel 400 mg HS  - Seroquel 100 mg TID prn for insomnia or agitation     Laboratory/Imaging:   Quantiferon Gold: ordered for today     - Patient will be treated in therapeutic milieu with appropriate individual and group therapies as described.    Medical diagnoses to be addressed this admission:     # Throat pain/Fever/Lymphadenopathy:  Constitutional symptoms in context of normal WBC count, negative procal, elevated inflammatory markers, and history of lymphadenopathy during previous hospitalization (>1yr ago). Noted right sided lung opacities and re-demonstration w/ slight enlargement of cervical lymphnodes on CT. Concern for infection vs malignancy vs rheumatologic process.  - IM consulted, appreciate recs  - Tylenol prn, lozenges prn, nasal spray prn, and cetirizine 10 mg daily  - Tests: TB Quant Gold pending     # Elevated TSH, free T4  Found to have elevated TSH (4.83) and elevated free T4 (1.62) upon admission. Workup in early 2017 demonstrated normal thyroid ultrasound and CT, as well as KHANH within normal limits. Current TSH elevation is likely due to recent lithium non-compliance, and elevated T4, secondary to acute shaina.  - Trend TFTs when patient is no longer acutely manic   - Recommend outpatient follow-up after resolution of current episode of shaina.    #  Menstrual cramps -> resolved   - Midol prn    Legal Status: Voluntary    Safety Assessment:   Behavioral Orders   Procedures     Code 2     Routine Programming     As clinically indicated     Status 15     Every 15 minutes.       Anticipated Disposition/Discharge Date: Pending medication management and clinical stabilization.    --------------------------------------------------------------------------  Scribed by Huang Suárez, MS4, for Dr. Aguilar Awan, Resident.    I have reviewed and edited the documentation recorded by the scribe.  This documentation accurately reflects the services I personally performed and treatment decisions made by me in consultation with the attending physician Severo Mesa MD.    Aguilar Awan MD  Psychiatry PGY-2  [phone]    ATTENDING:  I, Severo Mesa, saw and evaluated the patient with the resident physician.  I agree with the findings and plan of care as documented in the resident note.  I have reviewed all labs and vital signs.           Labs & Imaging   CBC: WNL  CMP: WNL except total protein 9.1 (elevated)  TSH: 4.83 (elevated)  Free T4: 1.62 (elevated)  Serum HCG: Negative  Urine drug screen: Negative  Urine HCG: Negative  Lithium 1.29 on admission (on 900 mg/daily)  6/6 Lithium 1.11 (on 900 mg/daily)  6/7 HIV: negative  6/6 TSH: 0.91  6/6 CBC: slight decrease in hgb (11.5)  6/6 Rapid strep: negative  6/6 Monospot: negative  6/11 CBC: notable for WBC wnl, and low RBC count (3.64) and hgb (10.7)  6/11 ESR: elevated (72)  6/11 CRP: 70.1  6/11 Pro-calcitonin: wnl  6/11 CT Chest: multifocal patchy consolidative opacities in RUL and posterior RLL; No lymphadenopathy  6/11 CT neck/soft tissue: slight increase in size of multilevel enlarged cervical lymph nodes (compared to 3/2/2017 CT)  6/12 TB Quantiferon gold:

## 2018-06-12 NOTE — PLAN OF CARE
Problem: Patient Care Overview  Goal: Team Discussion  Team Plan:   BEHAVIORAL TEAM DISCUSSION    Continued Stay Criteria/Rationale: Patient admitted voluntarily due to shaina  Plan: Psychiatric Assessment. Medication Management. Therapeutic Mileu. Individual and group support.  Participants: Severo Mesa MD, Bereket Awan MD, Kera Mcknight RN, medical student  Plan: Doing a med work up on him.  Tent plan to D/C on Wed.  Slow improvement  Summary/Recommendation: The plan is to assess the patient for mental health and medication needs.  The patient will be prescribed medications to treat the identified symptoms.  Upon discharge the patient will be referred to services as appropriate based on the assessment.  Medical/Physical: Per internal med consult  Progress: Initial assessment  Precautions:   Behavioral Orders   Procedures     Code 2     Routine Programming     As clinically indicated     Status 15     Every 15 minutes.

## 2018-06-12 NOTE — PROGRESS NOTES
"Brief Medicine Note    Reviewed chest and neck CT findings with patient and her parents.     Cristina reports feeling okay today. She notes ongoing throat pain. She previously has reported throat pain for several months, though with her parents present, she reports throat pain for 1-2 weeks prior to admission. She had been having fevers and night sweats for 1-2 weeks prior to admission as well. Her parents have not noticed a cough prior to admission. She does report a cough over the last several weeks, at times productive of yellowish sputum. She denies nausea or vomiting. No diarrhea or constipation. Ongoing neck swelling and pain with swallowing.     She was [place]. She reports that she is often around individuals who have recently travelled from [place]. She travelled to [facility] about 3 years ago. Her brother was treated for active TB, diagnosed in February 2018. He is still completing his prescribed therapy.     Today's vital signs, medications, and nursing notes were reviewed.    /78  Pulse 102  Temp 98.4  F (36.9  C) (Oral)  Resp 16  Ht 1.676 m (5' 6\")  Wt 57.6 kg (127 lb)  SpO2 99%  BMI 20.5 kg/m2  General: A&O. NAD.   HEENT: palpable anterior cervical lymph nodes, tender to palpation, pre auricular lymphadenopathy on the right, throat without erythema or exudates.   Lungs: CTAB    A/P:  1. Fever, lymphadenopathy. Neck CT notes slight increase in size of multilevel enlarged cervical lymph nodes, also noted on CT 3/2/17. Differential includes infection, inflammation and neoplasm.  2. Chest CT with multifocal patchy consolidative opacities in the RUL and posterior segment of RLL, concerning for infectious process.   3. Odynophagia. Worse in the last 2 weeks.    --Reviewed chest CT and neck CT findings with pulmonology, ENT, IR and infectious disease. While her primary reason for this admission is shaina, these findings are concerning and merit further evaluation.  --Will ask ID to formally " consult in the morning.  --Orders placed for sputum sample -- bacterial and fungal. Will plan to give a kit for her to obtain 3 additional samples as outpatient for AFB stain. (ordered)  --If cultures are negative, then would advise lymph node biopsy. This can be completed as outpatient through Interventional Radiology. (to schedule call [phone])  --Given that throat pain is of primary concern, would also advise outpatient ENT evaluation. They may also be able to complete fine needle aspiration in clinic. She may also benefit from further scoping and/or swallow evaluation.  --Reviewed the above recommendations with Cristina, as well as her mother and father. We reviewed the importance of close clinic follow up, given that plans are in place for likely discharge tomorrow or later this week.  --Advise that she establish care with a primary care provider within one week of discharge.      Angeli Swan PA-C  Hospitalist Service  Pager [phone]

## 2018-06-12 NOTE — PLAN OF CARE
Problem: Psychotic Symptoms  Goal: Psychotic Symptoms  Signs and symptoms of listed problems will be absent or manageable.   Outcome: Improving  48 hour assessment- at desk a lot less, pt having lot less requests. Pt appears less paranoid.  Pt no longer paranoid about coming to the medications window. Pt reports excited about going home tomorrow.  Pt did attend couple of groups but did stay for whole group.  Pt some pacing, having difficulty focusing.

## 2018-06-12 NOTE — PROGRESS NOTES
Patient to be placed  on Neuroradiology schedule  for  a US guided FNA neck  Lymph node Radiologist choice  .    Discussed with Dr. Jayla Bolden IR RPA  [phone] Call pager  [phone] pager

## 2018-06-13 ENCOUNTER — HOSPITAL ENCOUNTER (INPATIENT)
Facility: CLINIC | Age: 18
LOS: 8 days | Discharge: HOME | DRG: 854 | End: 2018-06-21
Attending: INTERNAL MEDICINE | Admitting: HOSPITALIST
Payer: COMMERCIAL

## 2018-06-13 VITALS
BODY MASS INDEX: 20.41 KG/M2 | DIASTOLIC BLOOD PRESSURE: 78 MMHG | HEART RATE: 100 BPM | RESPIRATION RATE: 20 BRPM | SYSTOLIC BLOOD PRESSURE: 130 MMHG | TEMPERATURE: 97.8 F | OXYGEN SATURATION: 99 % | HEIGHT: 66 IN | WEIGHT: 127 LBS

## 2018-06-13 DIAGNOSIS — F31.2 BIPOLAR DISORDER, CURRENT EPISODE MANIC, SEVERE WITH PSYCHOTIC FEATURES (H): ICD-10-CM

## 2018-06-13 DIAGNOSIS — E04.9 THYROID ENLARGEMENT: Primary | ICD-10-CM

## 2018-06-13 DIAGNOSIS — F31.2 BIPOLAR I DISORDER, CURRENT OR MOST RECENT EPISODE MANIC, WITH PSYCHOTIC FEATURES (H): ICD-10-CM

## 2018-06-13 PROBLEM — R59.1 LYMPHADENOPATHY: Status: ACTIVE | Noted: 2018-06-13

## 2018-06-13 LAB
BACTERIA SPEC CULT: ABNORMAL
FUNGUS SPEC CULT: ABNORMAL
GRAM STN SPEC: ABNORMAL
Lab: ABNORMAL
M TB TUBERC IFN-G BLD QL: NEGATIVE
M TB TUBERC IFN-G/MITOGEN IGNF BLD: 0.03 IU/ML
SPECIMEN SOURCE: ABNORMAL

## 2018-06-13 PROCEDURE — 99232 SBSQ HOSP IP/OBS MODERATE 35: CPT | Mod: GC | Performed by: PSYCHIATRY & NEUROLOGY

## 2018-06-13 PROCEDURE — 25000132 ZZH RX MED GY IP 250 OP 250 PS 637

## 2018-06-13 PROCEDURE — 25000132 ZZH RX MED GY IP 250 OP 250 PS 637: Performed by: PHYSICIAN ASSISTANT

## 2018-06-13 PROCEDURE — 99207 ZZC APP CREDIT; MD BILLING SHARED VISIT: CPT | Performed by: PHYSICIAN ASSISTANT

## 2018-06-13 PROCEDURE — 12000008 ZZH R&B INTERMEDIATE UMMC

## 2018-06-13 PROCEDURE — 87102 FUNGUS ISOLATION CULTURE: CPT | Performed by: STUDENT IN AN ORGANIZED HEALTH CARE EDUCATION/TRAINING PROGRAM

## 2018-06-13 PROCEDURE — 25000132 ZZH RX MED GY IP 250 OP 250 PS 637: Performed by: STUDENT IN AN ORGANIZED HEALTH CARE EDUCATION/TRAINING PROGRAM

## 2018-06-13 PROCEDURE — 99223 1ST HOSP IP/OBS HIGH 75: CPT | Mod: AI | Performed by: HOSPITALIST

## 2018-06-13 PROCEDURE — 87205 SMEAR GRAM STAIN: CPT | Performed by: STUDENT IN AN ORGANIZED HEALTH CARE EDUCATION/TRAINING PROGRAM

## 2018-06-13 PROCEDURE — 90853 GROUP PSYCHOTHERAPY: CPT

## 2018-06-13 RX ORDER — TRIAMCINOLONE ACETONIDE 1 MG/G
CREAM TOPICAL 2 TIMES DAILY PRN
Status: DISCONTINUED | OUTPATIENT
Start: 2018-06-13 | End: 2018-06-22 | Stop reason: HOSPADM

## 2018-06-13 RX ORDER — PROPRANOLOL HYDROCHLORIDE 20 MG/1
20 TABLET ORAL 2 TIMES DAILY
Status: DISCONTINUED | OUTPATIENT
Start: 2018-06-13 | End: 2018-06-22 | Stop reason: HOSPADM

## 2018-06-13 RX ORDER — ALBUTEROL SULFATE 0.83 MG/ML
2.5 SOLUTION RESPIRATORY (INHALATION)
Status: DISCONTINUED | OUTPATIENT
Start: 2018-06-13 | End: 2018-06-13 | Stop reason: HOSPADM

## 2018-06-13 RX ORDER — CETIRIZINE HYDROCHLORIDE 10 MG/1
10 TABLET ORAL DAILY
Status: DISCONTINUED | OUTPATIENT
Start: 2018-06-13 | End: 2018-06-22 | Stop reason: HOSPADM

## 2018-06-13 RX ORDER — NALOXONE HYDROCHLORIDE 0.4 MG/ML
.1-.4 INJECTION, SOLUTION INTRAMUSCULAR; INTRAVENOUS; SUBCUTANEOUS
Status: DISCONTINUED | OUTPATIENT
Start: 2018-06-13 | End: 2018-06-18

## 2018-06-13 RX ORDER — LITHIUM CARBONATE 450 MG
900 TABLET, EXTENDED RELEASE ORAL AT BEDTIME
Status: DISCONTINUED | OUTPATIENT
Start: 2018-06-13 | End: 2018-06-22 | Stop reason: HOSPADM

## 2018-06-13 RX ORDER — QUETIAPINE FUMARATE 100 MG/1
400 TABLET, FILM COATED ORAL AT BEDTIME
Status: DISCONTINUED | OUTPATIENT
Start: 2018-06-13 | End: 2018-06-22 | Stop reason: HOSPADM

## 2018-06-13 RX ORDER — ACETYLCYSTEINE 100 MG/ML
4 SOLUTION ORAL; RESPIRATORY (INHALATION) ONCE
Status: DISCONTINUED | OUTPATIENT
Start: 2018-06-13 | End: 2018-06-13 | Stop reason: HOSPADM

## 2018-06-13 RX ORDER — CARBOXYMETHYLCELLULOSE SODIUM 5 MG/ML
1 SOLUTION/ DROPS OPHTHALMIC 3 TIMES DAILY PRN
Status: DISCONTINUED | OUTPATIENT
Start: 2018-06-13 | End: 2018-06-22 | Stop reason: HOSPADM

## 2018-06-13 RX ORDER — QUETIAPINE FUMARATE 100 MG/1
100 TABLET, FILM COATED ORAL 3 TIMES DAILY PRN
Status: DISCONTINUED | OUTPATIENT
Start: 2018-06-13 | End: 2018-06-22 | Stop reason: HOSPADM

## 2018-06-13 RX ADMIN — PROPRANOLOL HYDROCHLORIDE 20 MG: 20 TABLET ORAL at 09:04

## 2018-06-13 RX ADMIN — CETIRIZINE HYDROCHLORIDE 10 MG: 10 TABLET, FILM COATED ORAL at 09:05

## 2018-06-13 RX ADMIN — QUETIAPINE FUMARATE 400 MG: 100 TABLET ORAL at 21:14

## 2018-06-13 RX ADMIN — PROPRANOLOL HYDROCHLORIDE 20 MG: 20 TABLET ORAL at 21:14

## 2018-06-13 RX ADMIN — LITHIUM CARBONATE 900 MG: 450 TABLET, EXTENDED RELEASE ORAL at 21:14

## 2018-06-13 ASSESSMENT — ACTIVITIES OF DAILY LIVING (ADL)
DRESS: 0-->INDEPENDENT
FALL_HISTORY_WITHIN_LAST_SIX_MONTHS: YES
NUMBER_OF_TIMES_PATIENT_HAS_FALLEN_WITHIN_LAST_SIX_MONTHS: 1
SWALLOWING: 0-->SWALLOWS FOODS/LIQUIDS WITHOUT DIFFICULTY
WHICH_OF_THE_ABOVE_FUNCTIONAL_RISKS_HAD_A_RECENT_ONSET_OR_CHANGE?: COGNITION
AMBULATION: 0-->INDEPENDENT
RETIRED_COMMUNICATION: 0-->UNDERSTANDS/COMMUNICATES WITHOUT DIFFICULTY
BATHING: 0-->INDEPENDENT
TRANSFERRING: 0-->INDEPENDENT
RETIRED_EATING: 0-->INDEPENDENT
COGNITION: 2 - DIFFICULTY WITH ORGANIZING THOUGHTS
TOILETING: 0-->INDEPENDENT

## 2018-06-13 NOTE — H&P
[facility]    Internal Medicine History and Physical - Gold Service       Date of Admission:  6/13/2018    Assessment & Plan   Cristina Boyd is a 18 year old female admitted on 6/13/2018. She has a history of depression and bipolar disorder and was transferred from inpatient psychiatry to medicine for further evaluation of fevers and cervical lymphadenopathy.    Fevers, Cervical Lymphadenopathy. Onset of fevers ~2 days PTA on psychiatry, intermittently spiking temps up to 101.5 F with last documented fever on 6/10. Endorsed symptoms including cough, congestion, rhinorrhea, and odynophagia. Noted to have cervical LAD on exam, CT neck 6/11 revealed slight increase in size of multilevel enlarged cervical lymph nodes which were previously observed on CT 2/2017. ID consulted on psychiatry and recommended transfer to [place] for further evaluation. Unclear etiology of LAD/fevers. Exposure to TB as her brother was diagnosed 2/2018 and is currently on DOT. Reports weight loss associated with Ramadan, now regained weight. Patient's quant gold negative 2/2018 and again on 6/12. TB antigen negative. HIV and mononucleosis negative, procal <0.05, WBC 6.5, ESR 72, CRP 70.1. Differential currently broad including infection (TB vs other), neoplastic, reactive/inflammatory, autoimmune. Patient currently afebrile, HR 87, /71.  - Hold off on antibiotics at this time, patient has remained afebrile >48 hours   - ID consulted, appreciate assistance  - RT to induce sputum for cultures (bacterial, fungal, AFB)  - ENT consulted to discuss possible lymph node excision  - Airborne/TB precautions  - CBC, CMP, CRP, ESR, and KHANH in AM    Productive Cough, Pulmonary Opacities. Endorses persistent cough over the past 2-3 weeks with associated rhinorrhea, nasal congestion. Also intermittently spiking fevers per above, last fever on 6/10. CT chest 6/11 with multifocal patchy consolidative opacities in the  RUL and posterior segment of RLL concerning for infectious process. Reports improvement in respiratory symptoms without intervention.  - Hold antibiotics per above, consider broad coverage if spikes  - Further work-up per above    Peripheral Edema. Acute onset over the past 2 weeks. Possibly side effect from seroquel (4% incidence of peripheral edema), also could be ?nephrotic syndrome or underlying autoimmune process.  - UA, urine protein/Cr ordered    Depression, Bipolar Disorder. Admitted to  psychiatry 5/31 with acute shaina in setting of medication non-compliance. Restarted on PTA lithium, also started on seroquel for sleep and propranolol for tremors. Mood improved and patient deemed safe for discharge from psychiatry perspective. No need to transfer back to psychiatry after medical work-up complete.  - Continue lithium 900 mg QHS  - Continue seroquel 400 mg QHS  - Continue propranolol 20 mg BID  - PRN seroquel 100 mg TID for agitation    # Pain Assessment:  Current Pain Score 6/13/2018   Patient currently in pain? denies   Pain score (0-10) -   Pain location -   Pain descriptors -   Cristina edmonds pain level was assessed and she currently denies pain.      Diet: Regular Diet Adult  Fluids: N/A  DVT Prophylaxis: Low Risk/Ambulatory with no VTE prophylaxis indicated  Code Status: Full Code    Disposition Plan   Expected discharge: 3-5 days; recommended to prior living arrangement once lymphadenopathy evaluation completed.     Entered: Linette Do 06/13/2018, 6:36 PM   Information in the above section will display in the discharge planner report.    The patient's care was discussed with the Attending Physician, Dr. Chapin.    Linette Do  Internal Medicine Staff Hospitalist Service  [facility]  Pager: [phone]  Please see sticky note for cross cover information  ______________________________________________________________________    Chief Complaint   Fevers,  LAD    History is obtained from the patient    History of Present Illness   Cristina Boyd is a 18 year old female admitted on 6/13/2018. She has a history of depression and bipolar disorder and was transferred from inpatient psychiatry to medicine for further evaluation of fevers and cervical lymphadenopathy.    Patient was admitted to psychiatry on 5/31 with acute shaina in setting of lithium non-compliance. While on psychiatry, she had intermittent fevers (Tmax of 101.5 F) with last documented fever on 6/10. Concurrent symptoms included rhinorrhea, congestion, odynophagia, and productive cough as well as lymph node swelling underneath her mandible which has reportedly been present for the past 2 weeks. Medicine evaluated patient while on psychiatry and ordered a CT of the neck which revealed slightly increased size of multilevel enlarged cervical lymph nodes. Patient recalls having similar neck swelling ~1 year ago which she believes was related to a viral illness and resolved shortly after. A CT chest was also obtained on psychiatry which revealed multifocal patchy consolidative opacities in RUL and RLL concerning for infectious process. Patient reports having TB exposure as her brother was diagnosed back in February of this year and was started on DOT for treatment. Patient's mother notes her brother's main symptoms were generalized fatigue and an enlarged node above his clavicle. Patient endorses an ~8 lb weight loss while fasting for Ramadan but has since regained this weight. No SOB, hemoptysis, night sweats, or excessive fatigue. Complains of some lower extremity swelling which is new over the past week or so. Believes this is related to her psychiatric medications. Reports good appetite, no nausea/vomiting. No recent travel. Otherwise denies headaches, dizziness, chest pain, SOB, abdominal pain, dysuria, diarrhea, constipation.    Review of Systems   The 10 point Review of Systems is negative other than noted  in the HPI.    Past Medical History    I have reviewed this patient's medical history and updated it with pertinent information if needed.   Past Medical History:   Diagnosis Date     Bipolar disorder (H)      Depressive disorder       Past Surgical History   I have reviewed this patient's surgical history and updated it with pertinent information if needed.  No past surgical history on file.     Social History   Social History   Substance Use Topics     Smoking status: Never Smoker     Smokeless tobacco: Never Used     Alcohol use No     Family History   I have reviewed this patient's family history and updated it with pertinent information if needed.   Family History   Problem Relation Age of Onset     Depression Other      Depression Maternal Uncle      Prior to Admission Medications   Prior to Admission Medications   Prescriptions Last Dose Informant Patient Reported? Taking?   Carboxymethylcellulose Sod PF (REFRESH PLUS) 0.5 % SOLN ophthalmic solution   No No   Sig: Place 1 drop into both eyes 3 times daily as needed for dry eyes   QUEtiapine (SEROQUEL) 100 MG tablet   No No   Sig: Take 1 tablet (100 mg) by mouth 3 times daily as needed (insomnia or agitation associated with shaina)   QUEtiapine (SEROQUEL) 400 MG tablet   No No   Sig: Take 1 tablet (400 mg) by mouth At Bedtime   benzocaine-menthol (CEPACOL) 15-3.6 MG lozenge   No No   Sig: Place 1 lozenge inside cheek every hour as needed for sore throat   cetirizine (ZYRTEC) 10 MG tablet   No No   Sig: Take 1 tablet (10 mg) by mouth daily   lithium (ESKALITH) 450 MG CR tablet   No No   Sig: Take 2 tablets (900 mg) by mouth At Bedtime   propranolol (INDERAL) 20 MG tablet   No No   Sig: Take 1 tablet (20 mg) by mouth 2 times daily   triamcinolone (KENALOG) 0.1 % cream   No No   Sig: Apply topically 2 times daily as needed for irritation      Facility-Administered Medications: None     Allergies   No Known Allergies    Physical Exam   Vital Signs: Temp: 99.1  F  (37.3  C) Temp src: Oral BP: 97/61 Pulse: 87   Resp: 16 SpO2: 100 % O2 Device: None (Room air)    Weight: 0 lbs 0 oz    General Appearance: Well-appearing female ambulating around room, NAD.  Eyes: Anicteric sclera.  HEENT: NC/AT. Moist mucous membranes. Tender cervical LAD, L>R.   Respiratory: Respiratory effort normal on RA. Lungs CTAB without rales, rhonchi, or wheezing.  Cardiovascular: RRR, S1/S2. No murmurs, rubs, or gallops.  GI: Abdomen soft, non-distended, non-tender. Bowel sounds normoactive.  Skin: No jaundice, rashes, or lesions evident on exposed skin.  Musculoskeletal/Extremities: 1-2+ pitting edema in bilateral LE, L>R. No swelling or tenderness.  Neurologic: A&O x 3. Passive movement of all extremities while ambulating around room. No focal neurologic deficits.  Psychiatric: Pleasant affect. Somewhat rapid speech. Brief responds to all questions.     Data   Data reviewed today: I reviewed all medications, new labs and imaging results over the last 24 hours. I personally reviewed no images or EKG's today.    Data     Recent Labs  Lab 06/11/18  0731 06/06/18  2218   WBC 6.5 7.9   HGB 10.7* 11.5*   MCV 89 91    245

## 2018-06-13 NOTE — IP AVS SNAPSHOT
[address] [address]    Phone:  [phone]                                       After Visit Summary   6/13/2018    Cristina Boyd    MRN: [id number]           After Visit Summary Signature Page     I have received my discharge instructions, and my questions have been answered. I have discussed any challenges I see with this plan with the nurse or doctor.    ..........................................................................................................................................  Patient/Patient Representative Signature      ..........................................................................................................................................  Patient Representative Print Name and Relationship to Patient    ..................................................               ................................................  Date                                            Time    ..........................................................................................................................................  Reviewed by Signature/Title    ...................................................              ..............................................  Date                                                            Time

## 2018-06-13 NOTE — PROGRESS NOTES
Called infection control re pt being ruled out for TB.  Per infection control pt to stay in room with door shut.  Pt to wear mask when out of room.  Staff to wear make when in contact with pt.  Staff are right now picking up N95 masks from [facility].

## 2018-06-13 NOTE — IP AVS SNAPSHOT
MRN:[id number]                      After Visit Summary   6/13/2018    Cristina Boyd    MRN: [id number]           Thank you!     Thank you for choosing [facility] for your care. Our goal is always to provide you with excellent care. Hearing back from our patients is one way we can continue to improve our services. Please take a few minutes to complete the written survey that you may receive in the mail after you visit with us. Thank you!        Patient Information     Date Of Birth          2000        About your hospital stay     You were admitted on:  June 13, 2018 You last received care in the:  [address] [facility]    You were discharged on:  June 21, 2018        Reason for your hospital stay       Admitted with fever, sore throat, cough and cervical LN enlargement after active TB exposure. Has been ruled out for TB.                  Who to Call     For medical emergencies, please call 911.  For non-urgent questions about your medical care, please call your primary care provider or clinic, None  For questions related to your surgery, please call your surgery clinic        Attending Provider     Provider Specialty    Adam Cote MD Internal Medicine    Servando Chapin MD Internal Medicine    Abraham Pickering MD Internal Medicine    Petr Mcgregor MD Internal Medicine       Primary Care Provider Fax #    Physician No Ref-Primary [phone]      After Care Instructions     Activity       Your activity upon discharge: activity as tolerated            Diet       Follow this diet upon discharge: Orders Placed This Encounter      Regular Diet Adult                  Follow-up Appointments     Adult [facility] Follow-up and recommended labs and tests       Follow up with primary care provider, Physician No Ref-Primary, within 7 days for hospital follow- up.  No follow up labs or test are needed.  FU with ID as scheduled on 7/11 at 830 am  FU with Psychiatry in two weeks time for  [employer]        Appointments on [place] and/or [facility] (with [facility] or [facility] provider or service). Call [phone] if you haven't heard regarding these appointments within 7 days of discharge.                  Your next 10 appointments already scheduled     Jun 27, 2018 10:10 AM CDT   Adult Med Follow UP with Jazmine Key MD   Psychiatry Clinic ([facility])    [address]  [address]  [facility] [id number] [phone]            Jul 11, 2018  8:30 AM CDT   New Visit with Jaz Richards MD   [facility], [place], Infectious Disease ([facility], [facility])    [address]  [address]  [facility] [address] [phone]              Future tests that were ordered for you     US Thyroid           TSH with free T4 reflex       Last Lab Result: TSH (mU/L)       Date                     Value                 06/06/2018               0.91             ----------                  Pending Results     Date and Time Order Name Status Description    6/16/2018 1217 Nocardia culture Preliminary     6/16/2018 1217 Fungus Culture, non-blood Preliminary     6/16/2018 1217 AFB Culture Non Blood Preliminary     6/15/2018 1241 Fungal Antibodies In process             Statement of Approval     Ordered          06/21/18 1504  I have reviewed and agree with all the recommendations and orders detailed in this document.  EFFECTIVE NOW     Approved and electronically signed by:  Petr Mcgregor MD             Admission Information     Date & Time Provider Department Dept. Phone    6/13/2018 Petr Mcgregor MD [address] [facility] [place] [phone]      Your Vitals Were     Blood Pressure Pulse Temperature Respirations Weight Pulse Oximetry    100/56 (BP Location: Left arm) 86 97.2  F (36.2  C) (Oral) 16 57.4 kg (126 lb 8 oz) 96%    BMI (Body Mass Index)                   20.42 kg/m2           Care EveryWhere ID     This  is your Care EveryWhere ID. This could be used by other organizations to access your [place] medical records  [id number]        Equal Access to Services     HUMAIRA HUFFMAN : Hadii sarika Sales, wabenjaminda coralkatelynnha, bunny kasabina edwards, alfred maryain hayaajayant samuelsstephanie yan laakiljayant brian So [facility] [phone].    ATENCIÓN: Si habla español, tiene a shaver disposición servicios gratuitos de asistencia lingüística. Llame al [phone].    We comply with applicable federal civil rights laws and [place] laws. We do not discriminate on the basis of race, color, national origin, age, disability, sex, sexual orientation, or gender identity.               Review of your medicines      CONTINUE these medicines which have NOT CHANGED        Dose / Directions    Carboxymethylcellulose Sod PF 0.5 % Soln ophthalmic solution   Commonly known as:  REFRESH PLUS   Used for:  Dry eyes        Dose:  1 drop   Place 1 drop into both eyes 3 times daily as needed for dry eyes   Quantity:  1 Bottle   Refills:  0       cetirizine 10 MG tablet   Commonly known as:  zyrTEC   Used for:  Seasonal allergic rhinitis, unspecified chronicity, unspecified trigger        Dose:  10 mg   Take 1 tablet (10 mg) by mouth daily   Quantity:  30 tablet   Refills:  0       lithium 450 MG CR tablet   Commonly known as:  ESKALITH   Used for:  Bipolar disorder, current episode manic, severe with psychotic features (H)        Dose:  900 mg   Take 2 tablets (900 mg) by mouth At Bedtime   Quantity:  60 tablet   Refills:  0       propranolol 20 MG tablet   Commonly known as:  INDERAL   Used for:  Bipolar I disorder, current or most recent episode manic, with psychotic features (H)        Dose:  20 mg   Take 1 tablet (20 mg) by mouth 2 times daily   Quantity:  60 tablet   Refills:  0       * QUEtiapine 100 MG tablet   Commonly known as:  SEROquel   Used for:  Bipolar I disorder, current or most recent episode manic, with psychotic features (H)        Dose:  100 mg    Take 1 tablet (100 mg) by mouth 3 times daily as needed (insomnia or agitation associated with shaina)   Quantity:  60 tablet   Refills:  0       * QUEtiapine 400 MG tablet   Commonly known as:  SEROquel   Used for:  Bipolar I disorder, current or most recent episode manic, with psychotic features (H)        Dose:  400 mg   Take 1 tablet (400 mg) by mouth At Bedtime   Quantity:  30 tablet   Refills:  0       triamcinolone 0.1 % cream   Commonly known as:  KENALOG   Used for:  Skin irritation        Apply topically 2 times daily as needed for irritation   Quantity:  80 g   Refills:  0       * Notice:  This list has 2 medication(s) that are the same as other medications prescribed for you. Read the directions carefully, and ask your doctor or other care provider to review them with you.      STOP taking     benzocaine-menthol 15-3.6 MG lozenge   Commonly known as:  CEPACOL                Where to get your medicines      These medications were sent to [facility] [address] [address]     Phone:  [phone]     lithium 450 MG CR tablet    propranolol 20 MG tablet    QUEtiapine 100 MG tablet    QUEtiapine 400 MG tablet                Protect others around you: Learn how to safely use, store and throw away your medicines at www.disposemymeds.org.             Medication List: This is a list of all your medications and when to take them. Check marks below indicate your daily home schedule. Keep this list as a reference.      Medications           Morning Afternoon Evening Bedtime As Needed    Carboxymethylcellulose Sod PF 0.5 % Soln ophthalmic solution   Commonly known as:  REFRESH PLUS   Place 1 drop into both eyes 3 times daily as needed for dry eyes                                cetirizine 10 MG tablet   Commonly known as:  zyrTEC   Take 1 tablet (10 mg) by mouth daily   Last time this was given:  10 mg on 6/20/2018 10:45 AM                                 lithium 450 MG CR tablet   Commonly known as:  ESKALITH   Take 2 tablets (900 mg) by mouth At Bedtime   Last time this was given:  900 mg on 6/20/2018 11:07 PM                                propranolol 20 MG tablet   Commonly known as:  INDERAL   Take 1 tablet (20 mg) by mouth 2 times daily   Last time this was given:  20 mg on 6/20/2018 10:45 AM                                * QUEtiapine 100 MG tablet   Commonly known as:  SEROquel   Take 1 tablet (100 mg) by mouth 3 times daily as needed (insomnia or agitation associated with shaina)   Last time this was given:  100 mg on 6/21/2018  4:30 AM                                * QUEtiapine 400 MG tablet   Commonly known as:  SEROquel   Take 1 tablet (400 mg) by mouth At Bedtime   Last time this was given:  100 mg on 6/21/2018  4:30 AM                                triamcinolone 0.1 % cream   Commonly known as:  KENALOG   Apply topically 2 times daily as needed for irritation                                * Notice:  This list has 2 medication(s) that are the same as other medications prescribed for you. Read the directions carefully, and ask your doctor or other care provider to review them with you.

## 2018-06-13 NOTE — PROGRESS NOTES
Pt has been isolated to  room wearing a N 95 mask. She is [facility]  [facility] t [facility] Pt has been afebrile She was sent with all belongings and denied ans SI. Repot was given to Nurse on 7c Dad and Resident were both notified of the dsicharge.

## 2018-06-13 NOTE — CONSULTS
"  GENERAL INFECTIOUS DISEASES CONSULTATION ([facility])      Patient:  Cristina Boyd   Date of birth 2000, Medical record number [id number]  Date of Visit:  06/13/2018  Date of Admission: 5/31/2018  Consult Requested by: Severo Mesa MD  Reason for Consult:  Concern for atypical infection or possible TB, abn CT findings, lymphadenopathy         Assessment and Recommendations:   Cristina Boyd is a 18 year old female with history of bipolar disorder, admitted to station [facility] ( psychiatric unit) on 5/31/18 with shaina.   She has scattered mildly prominent lymph nodes bilaterally on CT soft tissue neck with contrast on 2/2/17.  She has noted increased discomfort in her neck, sore throat and trouble with swallowing. She has on off productive cough with yellow colored sputum, nasal discharge . She had fever started 2 days prior to admission and temp of 101.5F on 6/6/18, 100.1F on 6/8/18 and 100.9F on 6/10/18. She denies chronic fever or night sweats. She has upper back pain, denies headaches. She has gained some weight but this is attributed to her psychiatric medications. She has also noticed  increased bilateral lower extremity swelling and numbness in her feet.  Appetite is described as good. She lives at home her parents and her brother,  who is 9 years old diagnosed with active TB in Feb 2018, currently on DOT. She says her brother also had enlarged cervical lymph nodes and   \" I think I have TB\".       1. Multifocal nodular consolidative opacities in the right upper lobe and posterior segment of right lower lobe   2. Enlarged cervical lymphadenopathy   3. Fever   4. Productive cough   5. Close contact ( brother) recently diagnosed with active TB in Feb 2018 , currently  On DOT      RECOMMENDATION:  - recommend transfer to [facility] for further work up to evaluate for active Tuberculosis.  - patient is unable to produce good sputum specimen. Will need induced sputum /RT to induce sputum/ Pulmonary " "consult  For bronchoscopy/BAL > gram stain, culture, AFB stain and culture, fungal stain and culture   - Enlarged cervical lymph nodes  - consult surgery/ ENT to excise lymph node for diagnosis . DDX - infectious/ inflammation/ malignancy   - TB / airborne precaution    Plan discussed with Primary team and Dr Tasha Manning ([facility] hospital )     Thank you for allowing us to participate in the care of this patient    Brian Mancilla MD, M.Med.Sc  Staff, Infectious Diseases   Pager : [phone]         History of Present Illness:     Cristina Boyd is a 18 year old female with history of bipolar disorder, admitted to station 20 ( psychiatric unit) on 5/31/18 with shaina.   She has scattered mildly prominent lymph nodes bilaterally on CT soft tissue neck with contrast on 2/2/17.   She has noted increased discomfort in her neck, sore throat and trouble with swallowing. She has on off productive cough with yellow colored sputum, nasal discharge . She had fever started 2 days prior to admission and 101.5F on 6/6/18, 100.1F on 6/8/18 and 100.9F on 6/10/18. She denies chronic fever or night sweats. She has upper back pain, denies headaches. She has gained some weight but this is attributed to her psychiatric medications. She  Has also noticed  increased bilateral lower extremity swelling and numbness in feet.  Appetite is described as good. She lives at home her parents and her brother,  who is 9 years old diagnosed with active TB in Feb 2018, currently on DOT. She says her brother also had enlarged cervical lymph nodes and   \" I think I have TB\". Primary team is planning to discharge her home this afternoon.       She is not able to produce good sputum specimen so far.   Beta Strep A - negative 6/6/18   Quantiferon negative on 2/26/18 and pending on 6/12/18   HIV Ag/Ab non reactive 6/6/18   Mononucleosis - negative 6/6/18     A repeat CT soft tissue neck w contrast 6/11/18   Findings:   Evaluation of " the mucosal space demonstrates no evident abnormality in the nasopharynx, oropharynx, hypopharynx or the glottis. The tongue base appears normal. The major salivary glands appear unremarkable.  The thyroid gland appears normal.  Redemonstration of multilevel large cervical lymph nodes, whichincrease in size compared to prior study for example left  submandibular lymph node measuring 1.3 x 1.2 cm, previously 1 x 1 cm using same measurement technique or conglomerate of mildly  hypoattenuating lymph nodesin the left level 2 measuring 2 x 1.4 cm, previously 1.4 x 1 cm 1.2 x 0.9 cm lymph node in the left level 4,  previously 0.8 x 1 cm. Persistent enlarged presumed intraparotid nodes, right larger than left. The fascial spaces in the neck are  intact bilaterally. The major vascular structures in the neck appear unremarkable.  Evaluation of the osseous structures demonstrate no worrisome lytic or sclerotic lesion. No overt spinal canal or neuroforaminal stenosis.  The visualized paranasal sinuses are clear. The mastoid air cells are clear. Reversal of the normal cervical lordosis, more notable at the  level of C4-C5.  Impression:  Slight increase in the size of multilevel enlarged cervical lymph nodes compared to CT on 3/2/2017. Differential includes  infection, inflammation and neoplasm.     CT chest w contrast 6/11/18   FINDINGS:   Lines and tubes: None    Lung: Multifocal nodular consolidative opacities in the right upper lobe and posterior segment of right lower lobe. No pleural effusion or  pneumothorax. The trachea and main bronchial tree are patent.  Mediastinum: The cardiac size is within normal limits. No pericardial effusion. The pulmonary arteries are unremarkable. The aortic arch and main arterial branches are within normal limits. No mediastinal, hilar, or axillary lymphadenopathy by size criteria.  The esophagus is  within normal limits.   Limited evaluation of upper abdomen: Unremarkable.     Bony  structures and subcutaneous tissue: No suspicious bony lesion.  IMPRESSION:   1. Multifocal patchy consolidative opacities in the right upper lobe and posterior segment of right lower lobe. These are concerning for an  infectious process. Recommend follow-up CT in 3 months.   2. No lymphadenopathy in the chest.    Recent culture results include:  Culture Micro   Date Value Ref Range Status   06/13/2018 Canceled, Test credited  Final   06/13/2018 (A)  Final    >10 Squamous epithelial cells/low power field indicates oral contamination. Please   recollect.     06/13/2018   Final    Notification of test cancellation was given to  Kera Mcknight RN on station [id number] at 10:45am 6/13/2018 ([facility])     06/13/2018 Canceled, Test credited  Final   06/13/2018 (A)  Final    >10 Squamous epithelial cells/low power field indicates oral contamination. Please   recollect.     06/13/2018   Final    Notification of test cancellation was given to  Kera Mcknight RN on station [id number] at 10:45am 6/13/2018 ()     06/12/2018 Canceled, Test credited  Final   06/12/2018 (A)  Final    >10 Squamous epithelial cells/low power field indicates oral contamination. Please   recollect.     06/12/2018   Final    Notification of test cancellation was given to  Amber Cabral RN/[facility]20[facility] at 2112 on 6/12/18. .     06/12/2018 Canceled, Test credited  Final   06/12/2018 (A)  Final    >10 Squamous epithelial cells/low power field indicates oral contamination. Please   recollect.     06/12/2018   Final    Notification of test cancellation was given to  Amber Cabral RN/UR20[facility] at 2112 on 6/12/18. EH.            Review of Systems:   CONSTITUTIONAL:  See HPI   EYES: negative for icterus  ENT:  See HPI   RESPIRATORY:  See HPI   CARDIOVASCULAR:  negative for chest pain, dyspnea  GASTROINTESTINAL:  negative for nausea, vomiting, diarrhea and constipation  GENITOURINARY:  negative for dysuria  HEME:  No easy bruising  INTEGUMENT:  negative for rash and  pruritus  NEURO:  See HPI          Past Medical History:     Past Medical History:   Diagnosis Date     Bipolar disorder (H)      Depressive disorder           Past Surgical History:   History reviewed. No pertinent surgical history.         Family History:     Family History   Problem Relation Age of Onset     Depression Other      Depression Maternal Uncle           Social History:     Social History   Substance Use Topics     Smoking status: Never Smoker     Smokeless tobacco: Never Used     Alcohol use No     History   Sexual Activity     Sexual activity: No          Current Medications (antimicrobials listed in bold):       acetylcysteine  4 mL Nebulization Once     cetirizine  10 mg Oral Daily     lithium  900 mg Oral At Bedtime     propranolol  20 mg Oral BID     QUEtiapine  400 mg Oral At Bedtime          Allergies:   No Known Allergies         Physical Exam:   Vitals were reviewed  Patient Vitals for the past 24 hrs:   Temp Temp [facility] Resp   06/13/18 0729 98.3  F (36.8  C) Tympanic 12     Ranges for his vital signs:  Temp:  [98.3  F (36.8  C)] 98.3  F (36.8  C)  Resp:  [12] 12    Physical Examination:  GENERAL:  Alert, oriented, in bed in no acute distress.   HEENT:  Head is normocephalic, atraumatic   EYES:  Eyes have anicteric sclerae without conjunctival injection   ENT:  Oropharynx is moist without exudates or ulcers. Tongue is midline  NECK:  No neck stiffness. Enlarged cervical lymphnodes , discomfort +   LUNGS:  Clear to auscultation bilateral.   CARDIOVASCULAR:  Regular rate and rhythm with no murmurs  ABDOMEN:  Normal bowel sounds, soft, nontender. No appreciable hepatosplenomegaly  SKIN:  No acute rashes.    NEUROLOGIC:  Grossly nonfocal. Active x4 extremities  No palpable enlarged axillary lymph nodes. Unable to examine inguinal area. Patient declined.             Laboratory Data:     Inflammatory Markers    Recent Labs   Lab Test  06/11/18   1309  02/09/16   0750   SED  72*  48*   CRP  70.1*   --       Hematology Studies  Recent Labs   Lab Test  06/11/18   0731  06/06/18 2218 06/01/18   0201 04/02/18 2003 02/01/17   0936  01/20/17   0825  01/13/17   0936   WBC  6.5  7.9  6.7  7.1  7.6  4.9  5.1   ANEU  3.7  5.5  3.3  4.1  4.8   --   2.0   AEOS  0.6  0.6  0.1  0.1  0.3   --   0.1   HGB  10.7*  11.5*  13.1  12.8  12.6  12.1  12.5   MCV  89  91  89  90  88  88  84   PLT  260  245  319  276  315  285  278     Metabolic Studies   Recent Labs   Lab Test  06/01/18 0201 04/02/18 2003 11/17/17   1512  03/08/17   1100  02/01/17   0936   NA  139  137  134  143  143  137   POTASSIUM  4.1  4.5  4.1  3.7  3.7  3.6   CHLORIDE  105  108  104  110  110  104   CO2  22  25  23  27  27  25   BUN  12  11  9  7  7  6*   CR  0.56  0.50*  0.63  0.61  0.61  0.49*   GFRESTIMATED  >90  >90  >90  >90  >90  Non African American GFR Calc    >90  Non  GFR Calc         Hepatic Studies  Recent Labs   Lab Test  06/01/18 0201 04/02/18 2003 03/08/17 1100 02/01/17   0936  01/13/17   0936  03/23/16   0940   BILITOTAL  0.4  0.3  0.3  0.3  0.4  0.4  0.4   ALKPHOS  69  74  70  70  83  77  79   ALBUMIN  4.1  3.9  3.2  3.2*  3.6  3.7  3.7   AST  14  20  17  17  14  16  25   ALT  7  11  19  19  20  17  18       Thyroid Studies    Recent Labs   Lab Test  06/06/18 2218 06/01/18   0810  06/01/18   0201 04/02/18 2003   TSH  0.91   --   4.83*   < >  4.10*   T4   --   1.62*   --    --   1.04    < > = values in this interval not displayed.       Microbiology:  Culture Micro   Date Value Ref Range Status   06/13/2018 Canceled, Test credited  Final   06/13/2018 (A)  Final    >10 Squamous epithelial cells/low power field indicates oral contamination. Please   recollect.     06/13/2018   Final    Notification of test cancellation was given to  Kera Mcknight RN on station [id number] at 10:45am 6/13/2018 (MC)     06/13/2018 Canceled, Test credited  Final   06/13/2018 (A)  Final    >10 Squamous epithelial cells/low  power field indicates oral contamination. Please   recollect.     06/13/2018   Final    Notification of test cancellation was given to  Kera Mcknight RN on station [id number] at 10:45am 6/13/2018 (MC)     06/12/2018 Canceled, Test credited  Final   06/12/2018 (A)  Final    >10 Squamous epithelial cells/low power field indicates oral contamination. Please   recollect.     06/12/2018   Final    Notification of test cancellation was given to  Amber Cabral RN/UR20[facility] at 2112 on 6/12/18. EH.     06/12/2018 Canceled, Test credited  Final   06/12/2018 (A)  Final    >10 Squamous epithelial cells/low power field indicates oral contamination. Please   recollect.     06/12/2018   Final    Notification of test cancellation was given to  Amber Cabral RN/UR20[facility] at 2112 on 6/12/18. EH.     06/06/2018 No Beta Streptococcus isolated  Final   01/21/2017 (A)  Final    Light growth Beta hemolytic Streptococcus, not group A     Urine Studies    Recent Labs   Lab Test  04/02/18   2057  02/01/17   0124  01/20/17   0930  02/10/16   1743   LEUKEST  Small*  Negative  Negative  Negative   WBCU  1  <1  <1   --

## 2018-06-13 NOTE — PROGRESS NOTES
Reviewed discharge paper with pt.  Pt denies SI.  Pt cooperative with reviewing discharge paperwork.  Currently waiting for for bed on medicine.  Pt currently in room with door closed per infection control.  Pt has discharge paperwork.

## 2018-06-13 NOTE — PROGRESS NOTES
Patient reports good mood, denies SI/SIB or psychotic symptoms. Patient does still report altered perception due to shaina, and complains of swollen throat and feet. Patient was visible and social throughout the shift with full range affect, often hyperverbal.     06/12/18 2250   Behavioral Health   Hallucinations denies / not responding to hallucinations   Thinking distractable;poor concentration   Orientation person: oriented;place: oriented;date: oriented;time: oriented   Memory baseline memory   Insight admits / accepts   Judgement impaired   Eye Contact at examiner   Affect full range affect   Mood elated   Physical Appearance/Attire appears stated age;attire appropriate to age and situation   Hygiene well groomed   Suicidality other (see comments)  (Denies)   1. Wish to be Dead No   2. Non-Specific Active Suicidal Thoughts  No   Self Injury other (see comment)  (Denies)   Activity hyperactive (agitated, impulsive);restless   Speech rambling;tangential   Medication Sensitivity no stated side effects;no observed side effects   Psychomotor / Gait balanced;steady   Activities of Daily Living   Hygiene/Grooming independent   Oral Hygiene independent   Dress independent   Laundry with supervision   Room Organization independent

## 2018-06-13 NOTE — DISCHARGE SUMMARY
"    ----------------------------------------------------------------------------------------------------------  [facility], [place]   Discharge Summary  Hospital Day #12      Cristina Boyd MRN# [id number]   Age: 18 year old YOB: 2000     Date of Admission:  5/31/2018  Date of Discharge:  6/13/2018  Admitting Physician:  Severo Mesa MD  Discharge Physician:  Severo Mesa MD         Event Leading to Hospitalization:     Cristina Boyd is a 18 year old female with hx of BPAD1 who was brought to ED by family due to concerns for shaina. Patient has slept very little over the past 5 days, reports about 2-3 hours each of the past 2 nights. Other symptoms include racing thoughts, vague paranoia. No use of drugs or alcohol. Patient reports that she has been lying to her family and providers about Li adherence, hasn't been taking it for about 1 mo. Due to her burgeoning concern about being manic, she took 900mg earlier tonight around 9pm. ROS positive for VH of \"angels\" that look like normal people. ROS negative for AH, SI.       See Admission note by Severo Mesa MD on 5/31/2018 for additional details.          Diagnoses:   Principal Diagnosis: Bipolar I disorder, current mixed manic episode     Secondary psychiatric diagnoses of concern this admission: None         Consults:     Internal Medicine  Infectious Disease         Hospital Course:   Psychiatric Course:  Cristina Boyd was admitted to station 22 as a voluntary patient with acute shaina. She had previously been non-adherent to her prescribed lithium but did take one dose of 900 mg prior to admission and agreed to resume treatment with 900 mg lithium QHS. Her lithium level was found to be 1.48 mmol/L approximately 5 hours after taking her first dose. Twelve hours after resuming treatment with lithium, her serum lithium level was 1.28 mmol/L. PRN olanzapine was made available for agitation. Quetiapine was started to " target manic symptoms and it was titrated to a final dose of 400 mg HS.  Propranolol 20 mg BID was started to address patient's tremor, which pt reports was successful.  Pt was transferred to Station 20 at pt's request, to avoid a peer she found upsetting. By hospital 10, patient's shaina appeared to be improving. Her speech was less pressured, and her thought process became easier to follow. Her father also confirmed that she appeared to be improving. By hospital day 12, patient appeared ready for discharge from a psychiatric standpoint. However, due to medical developments, she required transfer to a medical unit (see below)    Cristina Boyd was discharged to medicine. At the time of discharge Cristina Boyd was determined to not be a danger to herself or others. We reviewed the importance of medication adherence, keeping to a regular sleep schedule, and managing stress as measures to prevent another episode of shaina, and patient expressed understanding in this.     Medical Course:  IM was consulted when patient developed a fever of 101.5, with sore throat and menstrual cramps. Fever and sore throat most likely a viral process, and she was treated with supportive cares. She was given Midol prn for menstrual cramps. 6/11, IM was again consulted for continued low grade fever with lymph node enlargement. Inflammatory markers including ESR and CRP were elevated, with normal procalcitonin and WBC count. CT chest shows right sided patchy opacities. CT neck demonstrates multilevel 1-2mm enlargement of cervical lymph nodes compared to previous CT imaging. Infectious disease was subsequently consulted. They expressed significant concerns for possible TB infection given patient's exposure to a active TB through her brother, her low-grade fevers, abnormal lung CT findings, and her lymphadenopathy. Recommendations were made for patient to transfer to medicine [facility] in an isolation room for further workup. At time of  discharge from psychiatry, patient's Quantiferon gold remains pending.     Risk Assessment:  Cristina Boyd has notable risk factors for self-harm, including history of shaina. However, risk is mitigated by commitment to family, sobriety, absence of past attempts and ability to volunteer a safety plan. Additional steps taken to minimize risk include: arranging outpatient psychiatry follow-up. Therefore, based on all available evidence including the factors cited above, Cristina Boyd does not appear to be at imminent risk for self-harm, and is appropriate for outpatient level of care.     This document serves as a transfer of care to Cristina Boyd's outpatient providers.         Discharge Medications:     Current Discharge Medication List      START taking these medications    Details   benzocaine-menthol (CEPACOL) 15-3.6 MG lozenge Place 1 lozenge inside cheek every hour as needed for sore throat  Qty: 36 lozenge, Refills: 0    Associated Diagnoses: Throat pain      Carboxymethylcellulose Sod PF (REFRESH PLUS) 0.5 % SOLN ophthalmic solution Place 1 drop into both eyes 3 times daily as needed for dry eyes  Qty: 1 Bottle, Refills: 0    Associated Diagnoses: Dry eyes      cetirizine (ZYRTEC) 10 MG tablet Take 1 tablet (10 mg) by mouth daily  Qty: 30 tablet, Refills: 0    Associated Diagnoses: Seasonal allergic rhinitis, unspecified chronicity, unspecified trigger      propranolol (INDERAL) 20 MG tablet Take 1 tablet (20 mg) by mouth 2 times daily  Qty: 60 tablet, Refills: 0    Associated Diagnoses: Bipolar I disorder, current or most recent episode manic, with psychotic features (H)      !! QUEtiapine (SEROQUEL) 100 MG tablet Take 1 tablet (100 mg) by mouth 3 times daily as needed (insomnia or agitation associated with shaina)  Qty: 60 tablet, Refills: 0    Associated Diagnoses: Bipolar I disorder, current or most recent episode manic, with psychotic features (H)      !! QUEtiapine (SEROQUEL) 400 MG tablet Take 1 tablet (400  "mg) by mouth At Bedtime  Qty: 30 tablet, Refills: 0    Associated Diagnoses: Bipolar I disorder, current or most recent episode manic, with psychotic features (H)      triamcinolone (KENALOG) 0.1 % cream Apply topically 2 times daily as needed for irritation  Qty: 80 g, Refills: 0    Associated Diagnoses: Skin irritation       !! - Potential duplicate medications found. Please discuss with provider.      CONTINUE these medications which have CHANGED    Details   lithium (ESKALITH) 450 MG CR tablet Take 2 tablets (900 mg) by mouth At Bedtime  Qty: 60 tablet, Refills: 0    Associated Diagnoses: Bipolar disorder, current episode manic, severe with psychotic features (H)                  Psychiatric Examination:   Appearance:  awake, alert and adequately groomed  Attitude:  bright; initially upset about needing to transfer to medicine, but appeared to understand rationale  Eye Contact:  good  Mood:  \"feeling good\"  Affect:  mood congruent and generally bright  Speech:  hyperverbal but interruptible  Psychomotor Behavior:  no evidence of tardive dyskinesia, dystonia, or tics  Thought Process:  circumstantial  Associations:  no loose associations  Thought Content:  no evidence of suicidal ideation or homicidal ideation, no auditory hallucinations present and no visual hallucinations present  Insight:  fair  Judgment:  fair  Oriented to:  time, person, and place  Attention Span and Concentration:  intact  Recent and Remote Memory:  intact  Language: fluent in English  Fund of Knowledge: appropriate  Muscle Strength and Tone: normal  Gait and Station: Normal         Discharge Plan:   Psychiatry Follow-up:   Appointment : Psychiatry: Dr. Jazmine Key: 6/27/18 at 10:10am  [facility] 2nd Floor [id number] [place] [address], [id number] [phone]     Pt seen and discussed with my attending, MD Aguilar Romero MD  PGY-2 Resident  Pager: " [phone]    Attestation:  ISevero, saw and evaluated the patient with the resident physician.  I agree with the findings and plan of care as documented in the resident note.  I have reviewed all labs and vital signs.  I, Severo Mesa, have reviewed this summary and agree with the findings and discharge plan as written.            Appendix A: All Labs This Admission:     Results for orders placed or performed during the hospital encounter of 05/31/18   CT Soft Tissue Neck w Contrast    Narrative    CT SOFT TISSUE NECK W CONTRAST 6/11/2018 8:39 PM    History:  eval lymphadenopathy, compare to 02/02/2017 scan;       Comparison: CT on 2/2/2017    Technique: Following intravenous administration of nonionic iodinated  contrast medium, thin section helical CT images were obtained from the  skull base down to the level of the aortic arch.  Axial, coronal and  sagittal reformations were performed with 2-3 mm slice thickness  reconstruction. Images were reviewed in soft tissue, lung and bone  windows.    Contrast: SJF920, 100MLS    Findings:   Evaluation of the mucosal space demonstrates no evident abnormality in  the nasopharynx, oropharynx, hypopharynx or the glottis. The tongue  base appears normal. The major salivary glands appear unremarkable.  The thyroid gland appears normal.    Redemonstration of multilevel large cervical lymph nodes, which  increase in size compared to prior study for example left  submandibular lymph node measuring 1.3 x 1.2 cm, previously 1 x 1 cm  using same measurement technique or conglomerate of mildly  hypoattenuating lymph nodesin the left level 2 measuring 2 x 1.4 cm,  previously 1.4 x 1 cm 1.2 x 0.9 cm lymph node in the left level 4,  previously 0.8 x 1 cm. Persistent enlarged presumed intraparotid  nodes, right larger than left. The fascial spaces in the neck are  intact bilaterally. The major vascular structures in the neck appear  unremarkable.    Evaluation of the  osseous structures demonstrate no worrisome lytic or  sclerotic lesion. No overt spinal canal or neuroforaminal stenosis.  The visualized paranasal sinuses are clear. The mastoid air cells are  clear. Reversal of the normal cervical lordosis, more notable at the  level of C4-C5.      Impression    Impression:  Slight increase in the size of multilevel enlarged  cervical lymph nodes compared to CT on 3/2/2017. Differential includes  infection, inflammation and neoplasm.     I have personally reviewed the examination and initial interpretation  and I agree with the findings.    LETY RADER MD   CT Chest w Contrast    Narrative    EXAM: CT CHEST W CONTRAST 6/11/2018 8:41 PM    HISTORY:  eval for lymphadenopathy;      COMPARISON: None available    TECHNIQUE: Helical acquisition of the chest was obtained without   intravenous contrast and images are displayed at 1 and 5 mm intervals.  Images reviewed in lung, soft tissue, and bone windows.    Total DLP: 191 mGy*cm    FINDINGS:   Lines and tubes: None     Lung: Multifocal nodular consolidative opacities in the right upper  lobe and posterior segment of right lower lobe. No pleural effusion or  pneumothorax. The trachea and main bronchial tree are patent.    Mediastinum: The cardiac size is within normal limits. No pericardial  effusion. The pulmonary arteries are unremarkable. The aortic arch and  main arterial branches are within normal limits. No mediastinal,  hilar, or axillary lymphadenopathy by size criteria.  The esophagus is  within normal limits.    Limited evaluation of upper abdomen: Unremarkable.      Bony structures and subcutaneous tissue: No suspicious bony lesion.      Impression    IMPRESSION:   1. Multifocal patchy consolidative opacities in the right upper lobe  and posterior segment of right lower lobe. These are concerning for an  infectious process. Recommend follow-up CT in 3 months.   2. No lymphadenopathy in the chest.    I have personally  reviewed the examination and initial interpretation  and I agree with the findings.    CY HEATON MD   Drug abuse screen 6 urine (tox)   Result Value Ref Range    Amphetamine Qual Urine Negative NEG^Negative    Barbiturates Qual Urine Negative NEG^Negative    Benzodiazepine Qual Urine Negative NEG^Negative    Cannabinoids Qual Urine Negative NEG^Negative    Cocaine Qual Urine Negative NEG^Negative    Ethanol Qual Urine Negative NEG^Negative    Opiates Qualitative Urine Negative NEG^Negative   HCG qualitative urine   Result Value Ref Range    HCG Qual Urine Negative NEG^Negative   CBC with platelets differential   Result Value Ref Range    WBC 6.7 4.0 - 11.0 10e9/L    RBC Count 4.47 3.8 - 5.2 10e12/L    Hemoglobin 13.1 11.7 - 15.7 g/dL    Hematocrit 39.6 35.0 - 47.0 %    MCV 89 78 - 100 fl    MCH 29.3 26.5 - 33.0 pg    MCHC 33.1 31.5 - 36.5 g/dL    RDW 13.3 10.0 - 15.0 %    Platelet Count 319 150 - 450 10e9/L    Diff Method Automated Method     % Neutrophils 50.0 %    % Lymphocytes 36.9 %    % Monocytes 12.0 %    % Eosinophils 0.7 %    % Basophils 0.3 %    % Immature Granulocytes 0.1 %    Nucleated RBCs 0 0 /100    Absolute Neutrophil 3.3 1.6 - 8.3 10e9/L    Absolute Lymphocytes 2.5 0.8 - 5.3 10e9/L    Absolute Monocytes 0.8 0.0 - 1.3 10e9/L    Absolute Eosinophils 0.1 0.0 - 0.7 10e9/L    Absolute Basophils 0.0 0.0 - 0.2 10e9/L    Abs Immature Granulocytes 0.0 0 - 0.4 10e9/L    Absolute Nucleated RBC 0.0    Comprehensive metabolic panel   Result Value Ref Range    Sodium 139 133 - 144 mmol/L    Potassium 4.1 3.4 - 5.3 mmol/L    Chloride 105 94 - 109 mmol/L    Carbon Dioxide 22 20 - 32 mmol/L    Anion Gap 12 3 - 14 mmol/L    Glucose 91 70 - 99 mg/dL    Urea Nitrogen 12 7 - 30 mg/dL    Creatinine 0.56 0.52 - 1.04 mg/dL    GFR Estimate >90 >60 mL/min/1.7m2    GFR Estimate If Black >90 >60 mL/min/1.7m2    Calcium 9.4 8.5 - 10.1 mg/dL    Bilirubin Total 0.4 0.2 - 1.3 mg/dL    Albumin 4.1 3.4 - 5.0 g/dL     Protein Total 9.1 (H) 6.8 - 8.8 g/dL    Alkaline Phosphatase 69 40 - 150 U/L    ALT 7 0 - 50 U/L    AST 14 0 - 45 U/L   TSH   Result Value Ref Range    TSH 4.83 (H) 0.40 - 4.00 mU/L   Lithium level   Result Value Ref Range    Lithium Level 1.48 (H) 0.60 - 1.20 mmol/L   Lithium level   Result Value Ref Range    Lithium Level 1.29 (H) 0.60 - 1.20 mmol/L   HCG qualitative   Result Value Ref Range    HCG Qualitative Serum Negative NEG^Negative   T4 free   Result Value Ref Range    T4 Free 1.62 (H) 0.76 - 1.46 ng/dL   Lithium level   Result Value Ref Range    Lithium Level 1.11 0.60 - 1.20 mmol/L   CBC with platelets differential   Result Value Ref Range    WBC 7.9 4.0 - 11.0 10e9/L    RBC Count 3.93 3.8 - 5.2 10e12/L    Hemoglobin 11.5 (L) 11.7 - 15.7 g/dL    Hematocrit 35.9 35.0 - 47.0 %    MCV 91 78 - 100 fl    MCH 29.3 26.5 - 33.0 pg    MCHC 32.0 31.5 - 36.5 g/dL    RDW 13.4 10.0 - 15.0 %    Platelet Count 245 150 - 450 10e9/L    Diff Method Automated Method     % Neutrophils 69.6 %    % Lymphocytes 12.2 %    % Monocytes 10.5 %    % Eosinophils 7.2 %    % Basophils 0.1 %    % Immature Granulocytes 0.4 %    Nucleated RBCs 0 0 /100    Absolute Neutrophil 5.5 1.6 - 8.3 10e9/L    Absolute Lymphocytes 1.0 0.8 - 5.3 10e9/L    Absolute Monocytes 0.8 0.0 - 1.3 10e9/L    Absolute Eosinophils 0.6 0.0 - 0.7 10e9/L    Absolute Basophils 0.0 0.0 - 0.2 10e9/L    Abs Immature Granulocytes 0.0 0 - 0.4 10e9/L    Absolute Nucleated RBC 0.0    TSH with free T4 reflex   Result Value Ref Range    TSH 0.91 0.40 - 4.00 mU/L   HIV Antigen Antibody Combo   Result Value Ref Range    HIV Antigen Antibody Combo Nonreactive NR^Nonreactive       Mononucleosis screen   Result Value Ref Range    Mononucleosis Screen Negative NEG^Negative   CBC with platelets differential   Result Value Ref Range    WBC 6.5 4.0 - 11.0 10e9/L    RBC Count 3.64 (L) 3.8 - 5.2 10e12/L    Hemoglobin 10.7 (L) 11.7 - 15.7 g/dL    Hematocrit 32.5 (L) 35.0 - 47.0 %    MCV 89  78 - 100 fl    MCH 29.4 26.5 - 33.0 pg    MCHC 32.9 31.5 - 36.5 g/dL    RDW 13.4 10.0 - 15.0 %    Platelet Count 260 150 - 450 10e9/L    Diff Method Automated Method     % Neutrophils 56.5 %    % Lymphocytes 19.8 %    % Monocytes 14.4 %    % Eosinophils 8.7 %    % Basophils 0.3 %    % Immature Granulocytes 0.3 %    Nucleated RBCs 0 0 /100    Absolute Neutrophil 3.7 1.6 - 8.3 10e9/L    Absolute Lymphocytes 1.3 0.8 - 5.3 10e9/L    Absolute Monocytes 0.9 0.0 - 1.3 10e9/L    Absolute Eosinophils 0.6 0.0 - 0.7 10e9/L    Absolute Basophils 0.0 0.0 - 0.2 10e9/L    Abs Immature Granulocytes 0.0 0 - 0.4 10e9/L    Absolute Nucleated RBC 0.0    CRP inflammation   Result Value Ref Range    CRP Inflammation 70.1 (H) 0.0 - 8.0 mg/L   Erythrocyte sedimentation rate auto   Result Value Ref Range    Sed Rate 72 (H) 0 - 20 mm/h   Procalcitonin   Result Value Ref Range    Procalcitonin <0.05 ng/ml   Internal Medicine Adult IP Consult for [facility] General in [facility]: Patient to be seen: Routine within 24 hrs; Call back #: Station 22: *[phone]; Pt p/w 2 days of throat pain and external swelling not relieved by lozenges. Reports tylenol not help...    Narrative    Jovanny Gilliland PA-C     6/4/2018  2:30 PM    Internal Medicine Initial Visit      Cristina Boyd MRN# [id number]   YOB: 2000 Age: 18 year old   Date of Admission: 5/31/2018  PCP: No Ref-Primary, Physician    Referring Provider: Behavioral Health - Marychuy Guzman MD  Reason for Visit: Sore throat         Assessment and Recommendations:   Cristina Boyd is a 18 year old woman with a history of bipolar   disorder who is admitted to station 22 with shaina.        Bipolar disorder: Management per psychiatry team.     Sore throat: Two day history. Based on exam, most consistent with   post-nasal drip 2/2 seasonal allergies vs viral process. No   evidence for concern of strep throat.    - Can trial Flonase, 2 sprays each nare daily and/or start    scheduled 2nd generation antihistamine (e.g., loratadine 10 mg   daily).   - Continue supportive cares of lozenges, tylenol PRN    Goiter / elevated TSH / elevated free T4: TSH 4.83, free T4 1.62   on admission. TSH has ranged from 1.52 to 4.96 and T4 from 1.04   to 1.88 in the past. Had negative thyroglobulin antibody and   thyroid stimulating immunoglobulin in April, negative thyroid US   & CT in 01/2017. In the past her TSH has been normal when she is   on lithium.    - Recommend repeat thyroid studies as an outpatient once shaina   has resolved.       No further medicine recommendations at this time, medicine   signing off. Please page with any additional concerns.     Bandar Gilliland PA-C  Internal Medicine Hospitalist   [facility]  [phone]        Reason for Admission:   Shaina         History of Present Illness:   History is obtained from the patient and medical record.     This patient is a 18 year old female with a history of bipolar   disorder admitted to behavioral health for shaina. She was   admitted on 5/31/18 with ~5 days of insomnia, racing thoughts,   and vague paranoia. She had not been taking her lithium for   approximately 1 month.     Internal Medicine service was asked to see patient for a general   medication evaluation. Currently, patient is reporting 2 days of   a sore throat and also neck swelling. The throat pain is constant   and worse with swallowing. She has tried lozenges with some   relief.  She indicates that her neck swelling is over the thyroid   area, where she does have an appreciable goiter. Denies any pain   or neck stiffness. Patient is a poor historian and it is unclear   how long she has had the swelling for but she reports it has not   been worsening. She also has rhinorrhea and endorses a history of   seasonal allergies, does not take any medications for this. She   denies any cough, fever/chills, headaches. Overall history is   difficult to obtain as  patient is a poor historian and has a   difficult time staying on track during our conversation.             Review of Systems:   Review of systems as mentioned in HPI above, otherwise was   pan-positive secondary to patient's acute shaina.             Past Medical History:   Reviewed and updated in Epic.   Past Medical History:   Diagnosis Date     Bipolar disorder (H)      Depressive disorder              Past Surgical History:   Reviewed and updated in Epic.   History reviewed. No pertinent surgical history.          Social History:     Social History     Social History     Marital status: Single     Spouse name: N/A     Number of children: N/A     Years of education: N/A     Occupational History     Not on file.     Social History Main Topics     Smoking status: Never Smoker     Smokeless tobacco: Never Used     Alcohol use No     Drug use: No      Comment: x1 marijuana, in October, 2016     Sexual activity: No     Other Topics Concern     Parent/Sibling W/ Cabg, Mi Or Angioplasty Before 65f 55m? No     Social History Narrative   She lives in [place] with her parents. Single, no children. Was   attending [employer] for general classes, finished the semester 2 weeks   ago. Not currently working.           Family History:     Family History   Problem Relation Age of Onset     Depression Other      Depression Maternal Uncle              Allergies:   No Known Allergies          Medications:     Prescriptions Prior to Admission   Medication Sig Dispense Refill Last Dose     lithium (ESKALITH) 450 MG CR tablet Take 2 tablets (900 mg) by   mouth At Bedtime 60 tablet 1 5/31/2018 at Unknown time        Current Facility-Administered Medications   Medication     acetaminophen (TYLENOL) tablet 650 mg     benzocaine-menthol (CEPACOL) 15-3.6 MG lozenge 1 lozenge     calcium carbonate (TUMS) chewable tablet 500-1,000 mg     Carboxymethylcellulose Sod PF (REFRESH PLUS) 0.5 % ophthalmic   solution 1 drop     lithium (ESKALITH) CR  "tablet 900 mg     OLANZapine (zyPREXA) tablet 10 mg    Or     OLANZapine (zyPREXA) injection 10 mg     polyethylene glycol (MIRALAX/GLYCOLAX) Packet 17 g     propranolol (INDERAL) tablet 20 mg     QUEtiapine (SEROquel) tablet 100 mg     QUEtiapine (SEROquel) tablet 300 mg     sodium chloride (OCEAN) 0.65 % nasal spray 1 spray            Physical Exam:   Blood pressure 129/83, pulse 104, temperature 97  F (36.1  C),   temperature source Tympanic, resp. rate 14, height 1.676 m (5'   6\"), weight 53.5 kg (118 lb), SpO2 99 %, not currently   breastfeeding.  Body mass index is 19.05 kg/(m^2).  GENERAL: Alert and oriented x 3. Racing thoughts, has a difficult   time staying on topic.   HEENT: Anicteric sclera. Mucous membranes moist. Oropharynx   non-erythematous, clear without exudate, scant clear drainage   present in posterior oropharynx. Goiter present, non-tender, no   palpable nodules. Palpable preauricular lymph nodes, non-tender,   no other lymphadenopathy.   CV: RRR. S1, S2. No murmurs appreciated.   RESPIRATORY: Effort normal on room air. Lungs CTAB with no   wheezing, rales, rhonchi.   GI: Abdomen soft, non distended, non tender.   MUSCULOSKELETAL: No joint swelling or tenderness.  NEUROLOGICAL: No focal deficits. Moves all extremities.   EXTREMITIES: No peripheral edema. Intact bilateral pedal pulses.   SKIN: No jaundice. No rashes.           Data:   CBC:  Recent Labs   Lab Test  06/01/18   0201   WBC  6.7   RBC  4.47   HGB  13.1   HCT  39.6   MCV  89   MCH  29.3   MCHC  33.1   RDW  13.3   PLT  319       CMP:  Recent Labs   Lab Test  06/01/18   0201   NA  139   POTASSIUM  4.1   CHLORIDE  105   BHANU  9.4   CO2  22   BUN  12   CR  0.56   GLC  91   AST  14   ALT  7   BILITOTAL  0.4   ALBUMIN  4.1   PROTTOTAL  9.1*   ALKPHOS  69       TSH:  TSH   Date Value Ref Range Status   06/01/2018 4.83 (H) 0.40 - 4.00 mU/L Final       Tox screen: Negative  HCG: Negative    Unresulted Labs Ordered in the Past 30 Days of this " Admission     No orders found from 4/1/2018 to 6/1/2018.                    Internal Medicine Adult IP Consult for [facility] General in [facility]: Patient to be seen: Routine within 24 hrs; Call back #: [phone]; 18 year old female with new onset fever to 101.5, sore throat, nausea, and possible vomiting (uncomfirmed b...    Narrative    Jovanny Gilliland PA-C     6/7/2018  1:54 PM    Internal Medicine Progress Note     Patient: Cristina Boyd  MRN: [id number]  Admission Date: 5/31/2018  Hospital Day # 6    Assessment & Recommendations: Cristina Boyd is a 18 year old woman   with a history of bipolar disorder who is admitted to [facility]   with shaina. Medicine consulted today for fever.     Fever / sore throat / rhinorrhea:  5 day history. Based on exam,   this is most consistent with viral process. Rapid strep negative   with culture pending. No evidence of bacterial infection. Patient   concerned that her symptoms are caused by TB, but she does not   have a cough.    - Monospot   - TB quantiferon is pending   - Follow strep culture   - Continue supportive cares of Tylenol, lozenges PRN    Dysmenorrhea / menorrhagia: Heavy periods with cramping for the   last 5-6 months, previously regular. No h/o bleeding problems.   Negative HCG on 06/01, has otherwise not been worked-up in the   past. Etiology unclear at this time; possible etiologies include   endometriosis, fibroids, ovulatory dysfunction, or thyroid   dysregulation.   - Gynecology referral as an outpatient    Normocytic anemia: Hgb 13.1 on admission --> 11.5 yesterday.   Heavy menstrual bleeding since yesterday. Most likely 2/2 blood   loss related to menorrhagia, recent fasting may also be playing a   role but less likely as she broke her fast 2 weeks ago. She is   hemodynamically stable without signs/symptoms of other bleeding   source.   - Start prn Midol   - Recheck hemoglobin in 2 days if heavy bleeding persists or she   is having other  signs/symptoms of continued blood loss, otherwise   would follow as outpatient    Lower extremity edema: Onset 2 days ago, stable. May be related   to decreased venous return 2/2 prolonged standing. Also possible   may be related to thyroid disease, but unlikely as appearance is   not consistent with pretibial myxedma and TSH is currently WNL.   No concern for DVT, cardiac process.   - Encouraged pt to take rest breaks with legs elevated   - Closely monitor for any worsening, skin changes    Goiter: On admission TSH 4.83 and free T4 1.62. TSH now 0.91 with   T4 pending. Restarted on lithium on admission, which may account   for normalized TSH. TSH has ranged from 1.52 to 4.96 and T4 from   1.04 to 1.88 in the past. Had negative thyroglobulin antibody and   thyroid stimulating immunoglobulin in April, negative thyroid US   & CT in 01/2017. In the past her TSH has been normal when she is   on lithium.    - Recommend repeat thyroid studies as an outpatient once shaina   has resolved      Medicine will follow up on pending monospot test and throat   culture. No further medicine recommendations at this time, please   page with any additional concerns.      Bandar Gilliland PA-C  Internal Medicine Hospitalist   [facility]  [phone]   _________________________________________________________________    Subjective & Interval History:  Chief complaint of fever,   irregular periods.     Patient reports onset of fever yesterday evening to 101.5 and   again had a fever this morning. Defervesces with Tylenol.   Medicine initially saw her on 06/04, at which time she had 2 days   of sore throat and rhinorrhea. She continues to have both the   sore throat and rhinorrhea and reports both are unchanged. The   sore throat is worse with swallowing. She has a goiter and   reports that this is unchanged in size or appearance.     Her primary concern is irregular periods and she is very   concerned that she has  "endometriosis. Her period started   yesterday, which is about 14 days early for her. She is having   cramping and heavy bleeding, going through 1-2 pads per hour. The   cramping improves with heat packs and Tylenol. She is also having   nausea and reports 1 episode of nonbloody nonbilious emesis   yesterday. LMP was 2 weeks ago. Over the last 5-6 months she has   been getting her period every 2 weeks with associated heavy   bleeding and cramping. She reports going through 1 pad/hour with   bleeding typically lasting for 5 days. Prior to this, she was   having regular periods every 30 days since menses onset at age   11. During the past 5-6 months she also reports weight loss,   polyphagia, insomnia, and increased anterior neck swelling.    She denies any GYN or other work-up for her dysmenorrhea. Has not   used hormonal contraception, is not sexually active. Denies   personal or known family history of bleeding/clotting disorders,   endometriosis, or other GYN problems. Of note, she was fasting   for Ramadan for the first ~10 days, has not been fasting for   almost 2 weeks now.     Additionally, she reports bilateral swelling of her feet and   ankles that she first noticed 2 days ago. She has spent the   majority of her time standing on her feet or walking around the   unit and feels that the swelling is due to her prolonged   standing. The swelling has remained stable, denies warmth or   pain. No dyspnea, chest pain/palpitations, headaches.       Last 24 hour care team notes reviewed.   ROS: 4 point ROS (including Respiratory, CV, GI and ) was   performed and negative unless otherwise noted in HPI.     Medications: Reviewed in EPIC.    Physical Exam:    Blood pressure 128/82, pulse 110, temperature 98.5  F (36.9  C),   temperature source Oral, resp. rate 16, height 1.676 m (5' 6\"),   weight 54.2 kg (119 lb 8 oz), SpO2 99 %, not currently   breastfeeding.    GENERAL: Alert and oriented x 3. Has a difficult time " staying on   topic, pacing around the room.   HEENT: Anicteric sclera. Mucous membranes moist. Tonsils 2+   without erythema or exudates, right slightly larger than left.   Oropharynx without erythema or exudate, scant clear drainage   present in posterior oropharynx.   NECK: Goiter present, non-tender, no palpable nodules. Palpable   submandibular lymph nodes, non-tender, no other lymphadenopathy.   CV: RRR. S1, S2. No murmurs appreciated.   RESPIRATORY: Effort normal. Lungs CTAB with no wheezing, rales,   rhonchi.   GI: Abdomen soft, non distended, non tender. Normoactive bowel   sounds. No organomegaly.   NEUROLOGICAL: No focal deficits. Moves all extremities.    EXTREMITIES: Bilateral non-pitting edema extending from dorsum of   the feet through distal ~1/3 of lower legs. No overlying skin   changes. Intact bilateral pedal pulses.   SKIN: No jaundice. No rashes.      Labs & Studies of Note: I personally reviewed the following   studies:    CBC: WBC of 7.9, hemoglobin of 11.5 (down from 13.1 on 06/01)  TSH: 0.91    Unresulted Labs Ordered in the Past 30 Days of this Admission     Date and Time Order Name Status Description    6/7/2018 0030 M Tuberculosis by Quantiferon ! Follow QTB   collection process In process     6/6/2018 2218 HIV Antigen Antibody Combo In process     6/6/2018 2200 Beta strep group A culture Preliminary              Rapid strep screen   Result Value Ref Range    Specimen Description Throat     Rapid Strep A Screen       NEGATIVE: No Group A streptococcal antigen detected by immunoassay, await culture report.   Beta strep group A culture   Result Value Ref Range    Specimen Description Throat     Special Requests Specimen collected in eSwab transport (white cap)     Culture Micro No Beta Streptococcus isolated    Sputum Culture Aerobic Bacterial   Result Value Ref Range    Specimen Description Sputum     Culture Micro Canceled, Test credited     Culture Micro (A)      >10 Squamous epithelial  cells/low power field indicates oral contamination. Please   recollect.      Culture Micro       Notification of test cancellation was given to  Amber Cabral RN/UR20NB at 2112 on 6/12/18. EH.     Gram stain   Result Value Ref Range    Specimen Description Sputum     Special Requests Screen     Gram Stain (A)      >10 Squamous epithelial cells/low power field indicates oral contamination. Please   recollect.      Gram Stain <25 PMNs/low power field     Gram Stain       Many  Mixed gram positive and gram negative bacteria present.     Fungus culture   Result Value Ref Range    Specimen Description Sputum     Culture Micro Canceled, Test credited     Culture Micro (A)      >10 Squamous epithelial cells/low power field indicates oral contamination. Please   recollect.      Culture Micro       Notification of test cancellation was given to  Abmer Cabral RN/UR20NB at 2112 on 6/12/18. EH.     Gram stain   Result Value Ref Range    Specimen Description Sputum     Special Requests Screen     Gram Stain (A)      >10 Squamous epithelial cells/low power field indicates oral contamination. Please   recollect.      Gram Stain <25 PMNs/low power field     Gram Stain       Many  Mixed gram positive and gram negative bacteria present.     Sputum Culture Aerobic Bacterial   Result Value Ref Range    Specimen Description Sputum     Culture Micro Canceled, Test credited     Culture Micro (A)      >10 Squamous epithelial cells/low power field indicates oral contamination. Please   recollect.      Culture Micro       Notification of test cancellation was given to  Kera Mcknight RN on station [id number] at 10:45am 6/13/2018 ()     Fungus culture   Result Value Ref Range    Specimen Description Sputum     Culture Micro Canceled, Test credited     Culture Micro (A)      >10 Squamous epithelial cells/low power field indicates oral contamination. Please   recollect.      Culture Micro       Notification of test cancellation was given  to  Kera Mcknight RN on station [id number] at 10:45am 6/13/2018 ()

## 2018-06-13 NOTE — PROGRESS NOTES
Spoke to pt's dad via phone.  He confirmed a family member will pick her up for discharge at 1pm.

## 2018-06-13 NOTE — PROGRESS NOTES
"Pt attended 1.5 out of 3.0 OT groups offered. Pt actively participated in a structured occupational therapy group involving constructing a personal journal using various materials. Pt demonstrated improved planning, task organization, and attention to detail. Pleasant and cooperative. Reported that she is \"excited to discharge.\" Bright affect.  "

## 2018-06-13 NOTE — PROGRESS NOTES
[facility]  Transfer Triage Note    Date of call: 06/13/18  Time of call: 1:02 PM    Reason for Transfer:Need for negative airflow  Diagnosis: tb rule out    Outside Records: Available  Additional records requested to be faxed to [phone].    Stability of Patient: Patient is vitally stable, with no critical labs, and will likely remain stable throughout the transfer process    Expected Time of Arrival for Transfer: 0-8 hours    Recommendations for Management and Stabilization: Not needed    Additional Comments [facility] with bipolar[facility], now resolved.  Was in process of being discharged, however workup for mild fevers and lymphadenopathy led to concerns for active tb, and she has a recent exposure.    Accepted to medicine, awaiting negative airflow    Adam Cote MD

## 2018-06-14 LAB
ALBUMIN SERPL-MCNC: 2.8 G/DL (ref 3.4–5)
ALBUMIN UR-MCNC: NEGATIVE MG/DL
ALP SERPL-CCNC: 66 U/L (ref 40–150)
ALT SERPL W P-5'-P-CCNC: 31 U/L (ref 0–50)
ANION GAP SERPL CALCULATED.3IONS-SCNC: 8 MMOL/L (ref 3–14)
APPEARANCE UR: CLEAR
AST SERPL W P-5'-P-CCNC: 20 U/L (ref 0–45)
BACTERIA SPEC CULT: ABNORMAL
BACTERIA SPEC CULT: ABNORMAL
BACTERIA SPEC CULT: NORMAL
BILIRUB SERPL-MCNC: 0.1 MG/DL (ref 0.2–1.3)
BILIRUB UR QL STRIP: NEGATIVE
BUN SERPL-MCNC: 7 MG/DL (ref 7–30)
CALCIUM SERPL-MCNC: 8.8 MG/DL (ref 8.5–10.1)
CHLORIDE SERPL-SCNC: 108 MMOL/L (ref 94–109)
CO2 SERPL-SCNC: 20 MMOL/L (ref 20–32)
COLOR UR AUTO: ABNORMAL
CREAT SERPL-MCNC: 0.46 MG/DL (ref 0.52–1.04)
CREAT UR-MCNC: 52 MG/DL
CRP SERPL-MCNC: 32 MG/L (ref 0–8)
ERYTHROCYTE [DISTWIDTH] IN BLOOD BY AUTOMATED COUNT: 14.4 % (ref 10–15)
ERYTHROCYTE [SEDIMENTATION RATE] IN BLOOD BY WESTERGREN METHOD: 67 MM/H (ref 0–20)
FUNGUS SPEC CULT: ABNORMAL
FUNGUS SPEC CULT: ABNORMAL
GFR SERPL CREATININE-BSD FRML MDRD: >90 ML/MIN/1.7M2
GLUCOSE SERPL-MCNC: 122 MG/DL (ref 70–99)
GLUCOSE UR STRIP-MCNC: NEGATIVE MG/DL
GRAM STN SPEC: ABNORMAL
HCT VFR BLD AUTO: 36.5 % (ref 35–47)
HGB BLD-MCNC: 11.3 G/DL (ref 11.7–15.7)
HGB UR QL STRIP: NEGATIVE
KETONES UR STRIP-MCNC: NEGATIVE MG/DL
LEUKOCYTE ESTERASE UR QL STRIP: ABNORMAL
Lab: ABNORMAL
Lab: NORMAL
MCH RBC QN AUTO: 29.9 PG (ref 26.5–33)
MCHC RBC AUTO-ENTMCNC: 31 G/DL (ref 31.5–36.5)
MCV RBC AUTO: 97 FL (ref 78–100)
MUCOUS THREADS #/AREA URNS LPF: PRESENT /LPF
NITRATE UR QL: NEGATIVE
PH UR STRIP: 6 PH (ref 5–7)
PLATELET # BLD AUTO: 254 10E9/L (ref 150–450)
POTASSIUM SERPL-SCNC: 3.9 MMOL/L (ref 3.4–5.3)
PROT SERPL-MCNC: 7.3 G/DL (ref 6.8–8.8)
PROT UR-MCNC: 0.08 G/L
PROT/CREAT 24H UR: 0.14 G/G CR (ref 0–0.2)
RBC # BLD AUTO: 3.78 10E12/L (ref 3.8–5.2)
RBC #/AREA URNS AUTO: 1 /HPF (ref 0–2)
SODIUM SERPL-SCNC: 136 MMOL/L (ref 133–144)
SOURCE: ABNORMAL
SP GR UR STRIP: 1.01 (ref 1–1.03)
SPECIMEN SOURCE: ABNORMAL
SPECIMEN SOURCE: NORMAL
SQUAMOUS #/AREA URNS AUTO: 2 /HPF (ref 0–1)
UROBILINOGEN UR STRIP-MCNC: NORMAL MG/DL (ref 0–2)
WBC # BLD AUTO: 5.4 10E9/L (ref 4–11)
WBC #/AREA URNS AUTO: 3 /HPF (ref 0–5)

## 2018-06-14 PROCEDURE — 85027 COMPLETE CBC AUTOMATED: CPT | Performed by: PHYSICIAN ASSISTANT

## 2018-06-14 PROCEDURE — 87205 SMEAR GRAM STAIN: CPT | Performed by: PHYSICIAN ASSISTANT

## 2018-06-14 PROCEDURE — 86039 ANTINUCLEAR ANTIBODIES (ANA): CPT | Performed by: PHYSICIAN ASSISTANT

## 2018-06-14 PROCEDURE — 84156 ASSAY OF PROTEIN URINE: CPT | Performed by: PHYSICIAN ASSISTANT

## 2018-06-14 PROCEDURE — 12000001 ZZH R&B MED SURG/OB UMMC

## 2018-06-14 PROCEDURE — 25000132 ZZH RX MED GY IP 250 OP 250 PS 637: Performed by: PHYSICIAN ASSISTANT

## 2018-06-14 PROCEDURE — 80053 COMPREHEN METABOLIC PANEL: CPT | Performed by: PHYSICIAN ASSISTANT

## 2018-06-14 PROCEDURE — 87116 MYCOBACTERIA CULTURE: CPT | Performed by: PHYSICIAN ASSISTANT

## 2018-06-14 PROCEDURE — 86140 C-REACTIVE PROTEIN: CPT | Performed by: PHYSICIAN ASSISTANT

## 2018-06-14 PROCEDURE — 86698 HISTOPLASMA ANTIBODY: CPT | Performed by: PHYSICIAN ASSISTANT

## 2018-06-14 PROCEDURE — 87086 URINE CULTURE/COLONY COUNT: CPT | Performed by: HOSPITALIST

## 2018-06-14 PROCEDURE — 85652 RBC SED RATE AUTOMATED: CPT | Performed by: PHYSICIAN ASSISTANT

## 2018-06-14 PROCEDURE — 94640 AIRWAY INHALATION TREATMENT: CPT

## 2018-06-14 PROCEDURE — 40000275 ZZH STATISTIC RCP TIME EA 10 MIN

## 2018-06-14 PROCEDURE — 36415 COLL VENOUS BLD VENIPUNCTURE: CPT | Performed by: PHYSICIAN ASSISTANT

## 2018-06-14 PROCEDURE — 99233 SBSQ HOSP IP/OBS HIGH 50: CPT | Performed by: PEDIATRICS

## 2018-06-14 PROCEDURE — 87102 FUNGUS ISOLATION CULTURE: CPT | Performed by: PHYSICIAN ASSISTANT

## 2018-06-14 PROCEDURE — 81001 URINALYSIS AUTO W/SCOPE: CPT | Performed by: PHYSICIAN ASSISTANT

## 2018-06-14 PROCEDURE — 87206 SMEAR FLUORESCENT/ACID STAI: CPT | Performed by: PHYSICIAN ASSISTANT

## 2018-06-14 PROCEDURE — 86038 ANTINUCLEAR ANTIBODIES: CPT | Performed by: PHYSICIAN ASSISTANT

## 2018-06-14 RX ADMIN — PROPRANOLOL HYDROCHLORIDE 20 MG: 20 TABLET ORAL at 21:03

## 2018-06-14 RX ADMIN — LITHIUM CARBONATE 900 MG: 450 TABLET, EXTENDED RELEASE ORAL at 21:03

## 2018-06-14 RX ADMIN — CETIRIZINE HYDROCHLORIDE 10 MG: 10 TABLET, FILM COATED ORAL at 08:11

## 2018-06-14 RX ADMIN — PROPRANOLOL HYDROCHLORIDE 20 MG: 20 TABLET ORAL at 08:11

## 2018-06-14 RX ADMIN — QUETIAPINE FUMARATE 100 MG: 100 TABLET ORAL at 01:06

## 2018-06-14 NOTE — CONSULTS
[facility] ID SERVICE: NEW CONSULTATION   Cristina Boyd : 2000 Sex: Female   MRN: [id number]  Admission Date: 2018  Consult Requester: Abraham Pickering MD  Date of Service: 2018    REASON FOR CONSULTATION: Tuberculosis r/o    PROBLEM LIST:  1. Fever, submandibular/cervical lymphadenopathy, productive cough; known active tuberculosis exposure   2. Bipolar disorder  3. Goiter     RECOMMENDATIONS:   1. Induced sputum with AFB smear + culture and bacterial + fungal culture; strongly suggest bronchoscopy if induced sputum unsuccessful; collect samples Q8H with 1 in morning   2. Check fungal Ab panel, cryptococcal Ag, and urine histoplasma     3. Lymph node biopsy scheduled on 18; suggest sending lymph node biopsy tissue for AFB smear + culture and bacterial + fungal culture  4. Continue airborne precautions     DISCUSSION:   Oliver Evans is a 17 y/o female with h/o bipolar disorder who was admitted on 18 for shaina, currently being evaluated for tuberculosis in the setting of known active tuberculosis exposure in her shared household. Reports URI symptoms, fever, and lymphadenopathy that began approximately 2 days PTA. Febrile to 101.5F on 18 and 101.3F on 6/10/18; WBC stable at 6-7 throughout admission. CT chest/neck on 18 showed multifocal patchy consolidations in RUL and RLL concerning for infectious process and cervical lymphadenopathy. Agree with w/u to r/o tuberculosis; presentation may also be consistent with atypical pneumonia or fungal process. Recommend collecting induced sputum for AFB smear + culture and bacterial + fungal culture and checking fungal antibody panel, cryptococcal Ag, and urine histoplasma . Also suggest sending lymph node biopsy tissue for AFB smear + culture and bacterial + fungal culture. Continue airborne precautions until 3 negative sputum AFB stain + cultures.     Rahel Logan, MS4. Please page me with questions about this patient  Pager:  [phone]    Scribe Disclosure:   I, Rahel Lora, am serving as a scribe; to document services personally performed by Jaz Richards- -based on data collection and the provider's statements to me.     Provider Disclosure:  I agree with above History, Review of Systems, Physical exam and Plan.  I have reviewed the content of the documentation and have edited it as needed. I have personally performed the services documented here and the documentation accurately represents those services and the decisions I have made.      Electronically signed by:  Jaz Richards MD   of Medicine, Division of Infectious Diseases  pgr [phone]        HPI:   Oliver Evans is a 19 y/o female with h/o bipolar disorder who was admitted on 5/31/18 for shaina in the setting of lithium noncompliance. While in psych, reported fever, nasal congestion, sore throat, productive cough with yellow sputum, neck swelling, and odynophagia that started approximately 2 days PTA. She lives with her parents and younger brother who was diagnosed with active tuberculosis in 2/2018 and currently on DOT. No other family members have subsequently been diagnosed with tuberculosis. Febrile to 101.5F on 6/6/18 and 101.3F on 6/10/18; WBC stable at 6-7 throughout admission. CT chest/neck on 6/11/18 showed multifocal patchy consolidations in RUL and RLL concerning for infectious process and submandibular/cervical lymphadenopathy. Now complains of mild sore throat and nasal congestion; cough has since improved. Denies fever, chills, night sweats, headache, cough, hemoptysis, n/v, diarrhea, dysuria, or rashes. States that left submandibular lymph node is causing some discomfort.     Pertinent labs:  - Rapid strep: Negative 6/6/18  - Beta strep group A culture: Negative 6/6/18  - Quantiferon gold: Negative on 2/26/18, negative on 6/12/18  - HIV Ag/Ab combo: Nonreactive on 6/6/18  - Mononucleosis: Negative on 6/6/18    ANTI-INFECTIVES:    None    ROS:  A ten point ROS was obtained and was negative with the exception of that which is described in the above.    PMH:   Past Medical History:   Diagnosis Date     Bipolar disorder (H)      Depressive disorder      PSH:  No past surgical history on file.    ALLERGIES:  No known drugs allergies.     SOCIAL HISTORY:   Social History   Substance Use Topics     Smoking status: Never Smoker     Smokeless tobacco: Never Used     Alcohol use No     History   Sexual Activity     Sexual activity: No     FAMILY HISTORY:   Family History   Problem Relation Age of Onset     Depression Other      Depression Maternal Uncle      EXAMINATION:   VS: /69 (BP Location: Left arm)  Pulse 81  Temp 97.2  F (36.2  C) (Oral)  Resp 18  SpO2 99%  GENERAL: AOx3. NAD. Sitting comfortably in chair.   EYES: Sclerae anicteric without conjunctival injection or stigmata of endocarditis.  No proptosis.   ENT: AT/NC. MMM without lesions or exudates.   NECK:  Supple. Large goiter. Bilateral cervical lymphadenopathy. Large tender left submandibular lymph node.   HEART: RRR. No MRG.  LUNGS: CTAB. Normal respiratory effort.   ABDOMEN: Bowel sounds present. S/NT/ND. No hepatosplenomegaly.  SKIN: No rashes or sores.   NEURO: Grossly nonfocal. Spontaneously moves all extremities.   PSYCH: Mood appropriate. Somewhat flat affect.     BASIC LABS:  The following basic labs were personally reviewed:  Inflammatory Markers    Recent Labs   Lab Test  06/11/18   1309  02/09/16   0750   SED  72*  48*   CRP  70.1*   --      Hematology Studies    Recent Labs   Lab Test  06/11/18   0731  06/06/18   2218  06/01/18   0201  04/02/18 2003 02/01/17   0936  01/20/17   0825  01/13/17   0936   WBC  6.5  7.9  6.7  7.1  7.6  4.9  5.1   ANEU  3.7  5.5  3.3  4.1  4.8   --   2.0   AEOS  0.6  0.6  0.1  0.1  0.3   --   0.1   HGB  10.7*  11.5*  13.1  12.8  12.6  12.1  12.5   MCV  89  91  89  90  88  88  84   PLT  260  245  319  276  315  285  278     Metabolic  Studies     Recent Labs   Lab Test  06/01/18   0201  04/02/18 2003 11/17/17   1512  03/08/17   1100  02/01/17   0936   NA  139  137  134  143  143  137   POTASSIUM  4.1  4.5  4.1  3.7  3.7  3.6   CHLORIDE  105  108  104  110  110  104   CO2  22  25  23  27  27  25   BUN  12  11  9  7  7  6*   CR  0.56  0.50*  0.63  0.61  0.61  0.49*   GFRESTIMATED  >90  >90  >90  >90  >90  Non African American GFR Calc    >90  Non  GFR Calc       Hepatic Studies    Recent Labs   Lab Test  06/01/18   0201  04/02/18 2003 03/08/17   1100  02/01/17   0936  01/13/17   0936  03/23/16   0940   BILITOTAL  0.4  0.3  0.3  0.3  0.4  0.4  0.4   ALKPHOS  69  74  70  70  83  77  79   ALBUMIN  4.1  3.9  3.2  3.2*  3.6  3.7  3.7   AST  14  20  17  17  14  16  25   ALT  7  11  19  19  20  17  18     Thyroid Studies    Recent Labs   Lab Test  06/06/18   2218  06/01/18   0810  06/01/18   0201 04/02/18 2003   TSH  0.91   --   4.83*   < >  4.10*   T4   --   1.62*   --    --   1.04    < > = values in this interval not displayed.     MICROBIOLOGY LABS:  The following microbiology studies were personally reviewed:  Culture Micro   Date Value Ref Range Status   06/14/2018 (A)  Final    Canceled, Test credited  >10 Squamous epithelial cells/low power field indicates oral contamination. Please   recollect.     06/14/2018   Final    Notification of test cancellation was given to  TOMY LÓPEZ RN @0634 6/14/18. [facility]     06/14/2018 (A)  Final    Canceled, Test credited  >10 Squamous epithelial cells/low power field indicates oral contamination. Please   recollect.     06/14/2018   Final    Notification of test cancellation was given to  TOMY LÓPEZ RN @0634 6/14/18. [facility]     06/14/2018 PENDING  Preliminary   06/13/2018 Canceled, Test credited  Final   06/13/2018 (A)  Final    >10 Squamous epithelial cells/low power field indicates oral contamination. Please   recollect.     06/13/2018   Final    Notification of test  cancellation was given to  Kera Mcknight RN on station [id number] at 10:45am 6/13/2018 ()     06/13/2018 Canceled, Test credited  Final   06/13/2018 (A)  Final    >10 Squamous epithelial cells/low power field indicates oral contamination. Please   recollect.     06/13/2018   Final    Notification of test cancellation was given to  Kera Mcknight RN on station [id number] at 10:45am 6/13/2018 ()     06/12/2018 Canceled, Test credited  Final   06/12/2018 (A)  Final    >10 Squamous epithelial cells/low power field indicates oral contamination. Please   recollect.     06/12/2018   Final    Notification of test cancellation was given to  Amber Cabral RN/SUNSHINE at 2112 on 6/12/18. EH.     06/12/2018 Canceled, Test credited  Final   06/12/2018 (A)  Final    >10 Squamous epithelial cells/low power field indicates oral contamination. Please   recollect.     06/12/2018   Final    Notification of test cancellation was given to  Amber Cabral RN/SCOT20NB at 2112 on 6/12/18. EH.     06/06/2018 No Beta Streptococcus isolated  Final   01/21/2017 (A)  Final    Light growth Beta hemolytic Streptococcus, not group A     Urine Studies    Recent Labs   Lab Test  06/14/18   0030  04/02/18   2057  02/01/17   0124  01/20/17   0930  02/10/16   1743   LEUKEST  Large*  Small*  Negative  Negative  Negative   WBCU  3  1  <1  <1   --      IMAGING RESULTS  The following imaging studies were personally reviewed:    CT neck (6/11/18): Multilevel enlarged cervical lymph nodes slightly increased in size compared to prior on 3/2/17.   CT chest (6/11/18): Multifocal patchy consolidations in RUL and RLL concerning for infectious process.

## 2018-06-14 NOTE — PROGRESS NOTES
"Clinical Nutrition Services- Brief Note/+Admission screen: unintentional loss of 10 lbs or more in the past 2 mos    Per chart review: Patient with weight loss related to Ramadan. Now reports good appetite.     Pt seen and assessed by RD at inpatient psych on 6/1/18. Pt was requesting double portions. Per this visit pt notes she had been \"starving\" herself over the past few months (unintentionally) related to mental illness. She was wanting to gain weight because she felt weak.    Wt Readings from Last 10 Encounters:   06/12/18 57.6 kg (127 lb)   04/02/18 56.7 kg (125 lb)   03/14/17 64.4 kg (142 lb) (74 %)*   02/16/17 65.1 kg (143 lb 8 oz) (76 %)*   01/24/17 65 kg (143 lb 4.8 oz) (76 %)*   01/12/17 63.5 kg (140 lb) (72 %)*   02/24/16 62.4 kg (137 lb 9.6 oz) (73 %)*     * Growth percentiles are based on CDC 2-20 Years data.     Weight on 6/3: 53.5kg.     -Pt appears to be gaining weight back.    INTERVENTIONS  1. Ordered high calorie/protein diet to allow pt ability to order additional food items as desired.     No nutrition assessment warranted at this time. RD will continue to monitor and follow per nutrition protocol.    Hallie Mcknight RD, LD  Pager: [phone]    "

## 2018-06-14 NOTE — PLAN OF CARE
Problem: Patient Care Overview  Goal: Plan of Care/Patient Progress Review  Outcome: No Change  /71  Pulse 87  Temp 99.1  F (37.3  C) (Oral)  Resp 16  SpO2 100%    A & O x4, mood seems anxious, pt's speech is fast. Admitted for fevers and swollen lymphnodes from psych. Pt has been exposed to her brother who had TB in february. Dry cough, infrequently. Lungs clears. Had a BM today. Voiding. Up ad zuhair. No IV access. No order or need for pt to have IV.

## 2018-06-14 NOTE — PLAN OF CARE
Problem: Patient Care Overview  Goal: Plan of Care/Patient Progress Review  Outcome: Improving  Patient is unable to get a sputum sample, states she's feeling better and is not coughing anything up. Continues on airborne precautions for possible TB. Neck is swollen, will get a biopsy Monday.  Patient up ad zuhair in room. Tolerating diet. Patient showered this morning.

## 2018-06-14 NOTE — PLAN OF CARE
Problem: Patient Care Overview  Goal: Plan of Care/Patient Progress Review  Outcome: No Change  BP 99/57 (BP Location: Left arm)  Pulse 89  Temp 98.9  F (37.2  C) (Oral)  Resp 16  SpO2 99%    Denies pain and nausea. Gave PRN Seroquel, pt states she had watched a scary video and was shook up about it. Was pacing in her room. Calmed down after awhile. Speech still fast. Pt pleasant. Pt slept well through the night. Voiding. LE edema noted. Pt states she does have LE numbness that comes and goes. Dry cough. RT to induce sputum. Reminded pt to keep her mask on if for some reason she would have to come out in the purvis otherwise she needs to stay in her room for infection reasons. Pt has been compliant with cares. Regular diet. Up ad zuhair.

## 2018-06-15 LAB
ACID FAST STN SPEC QL: NORMAL
ANA PAT SER IF-IMP: ABNORMAL
ANA SER QL IF: ABNORMAL
ANA TITR SER IF: ABNORMAL {TITER}
COCCIDIOIDES AB TITR SER CF: NORMAL {TITER}
ERYTHROCYTE [DISTWIDTH] IN BLOOD BY AUTOMATED COUNT: 13.7 % (ref 10–15)
HCT VFR BLD AUTO: 33.2 % (ref 35–47)
HGB BLD-MCNC: 10.5 G/DL (ref 11.7–15.7)
MCH RBC QN AUTO: 28.9 PG (ref 26.5–33)
MCHC RBC AUTO-ENTMCNC: 31.6 G/DL (ref 31.5–36.5)
MCV RBC AUTO: 92 FL (ref 78–100)
MYCOBACTERIUM SPEC CULT: NORMAL
PLATELET # BLD AUTO: 337 10E9/L (ref 150–450)
RBC # BLD AUTO: 3.63 10E12/L (ref 3.8–5.2)
SPECIMEN SOURCE: NORMAL
SPECIMEN SOURCE: NORMAL
WBC # BLD AUTO: 6.6 10E9/L (ref 4–11)

## 2018-06-15 PROCEDURE — 86612 BLASTOMYCES ANTIBODY: CPT | Performed by: HOSPITALIST

## 2018-06-15 PROCEDURE — 86698 HISTOPLASMA ANTIBODY: CPT | Performed by: PEDIATRICS

## 2018-06-15 PROCEDURE — 86606 ASPERGILLUS ANTIBODY: CPT | Performed by: HOSPITALIST

## 2018-06-15 PROCEDURE — 85027 COMPLETE CBC AUTOMATED: CPT | Performed by: PEDIATRICS

## 2018-06-15 PROCEDURE — 99233 SBSQ HOSP IP/OBS HIGH 50: CPT | Performed by: PEDIATRICS

## 2018-06-15 PROCEDURE — 86635 COCCIDIOIDES ANTIBODY: CPT | Performed by: HOSPITALIST

## 2018-06-15 PROCEDURE — 25000132 ZZH RX MED GY IP 250 OP 250 PS 637: Performed by: PHYSICIAN ASSISTANT

## 2018-06-15 PROCEDURE — 36415 COLL VENOUS BLD VENIPUNCTURE: CPT | Performed by: PEDIATRICS

## 2018-06-15 PROCEDURE — 12000001 ZZH R&B MED SURG/OB UMMC

## 2018-06-15 PROCEDURE — 86698 HISTOPLASMA ANTIBODY: CPT | Performed by: HOSPITALIST

## 2018-06-15 RX ADMIN — PROPRANOLOL HYDROCHLORIDE 20 MG: 20 TABLET ORAL at 07:51

## 2018-06-15 RX ADMIN — CETIRIZINE HYDROCHLORIDE 10 MG: 10 TABLET, FILM COATED ORAL at 07:51

## 2018-06-15 RX ADMIN — QUETIAPINE FUMARATE 400 MG: 100 TABLET ORAL at 01:28

## 2018-06-15 NOTE — CONSULTS
[facility]    Pulmonary, Allergy, Critical Care and Sleep Medicine    Initial Consultation  Ashlee 15, 2018      Cristina Boyd MRN# [id number]   Age: 18 year old YOB: 2000     Date of Admission:  6/13/2018  Reason for Consultation: Bronchoscopy and evaluation for possible tuberculosis  Requesting Physician: Dr. Pickering     Assessment and Plan:  Cristina Boyd IS a 18 year old female with bipolar shaina and cervical lymphadenopathy, abnormal chest imaging admitted on 6/13/2018.  Given her exposure, there is a concern for tuberculosis and she has had multiple unsuccessful attempts at sputum production.  Other diagnostic considerations include other infectious etiologies, including fungal disease and nontuberculous microbacteria, less likely lymphoma.    -Bronchoscopy with bronchoalveolar lavage scheduled for Saturday at noon endoscopy, patient should be n.p.o. after midnight            Chief Complaint:     History obtained from patient.    History of Present Illness: Klaus was admitted to this hospital 2 days ago after a several week stay at the inpatient psychiatry unit.  Initially, her mother took her there because she believes she was having shaina, as manifested by not sleeping for several nights in a row.  Klaus reports that she was not sleeping because she was itching in her skin was hot.  She does have a history of bipolar shaina, but does not think she was having it then and does not think she is having it now.   She does report a cough recently which is not normal for her, it has been a dry cough and multiple attempts to induce sputum have been made unsuccessfully.  She is [place]-American, was born and raised in the [place], but did spend 3 years in [place] and returned in 2015.  Her brother was recently diagnosed with TB, and she believes that his symptoms are very different than hers currently.  He had fevers and was coughing up blood.  She has been googling her symptoms and  is worried that she might have lymphoma.  She denies any abdominal complaints.  She has submandibular lymphadenopathy, but she did not notice it until it was detected on physical exam by a psychiatrist at the [facility].  She does however think that is getting better, based on her research she believes is most likely a viral syndrome.  She initially had a sore throat and fatigue which are both improved.  She has never had a PPD skin test but says she has had blood tests that were negative for TB, she is not known to have ever had or been treated for tuberculosis.  Her brother is currently undergoing a six-month course of treatment.      She reports a history of frequent sinusitis, currently has nasal congestion.                    Past Medical History:     Past Medical History:   Diagnosis Date     Bipolar disorder (H)      Depressive disorder             Past Surgical History:   none         Social History:     Social History     Social History     Marital status: Single     Spouse name: N/A     Number of children: N/A     Years of education: N/A     Occupational History     Not on file.     Social History Main Topics     Smoking status: Never Smoker     Smokeless tobacco: Never Used     Alcohol use No     Drug use: No      Comment: x1 marijuana, in October, 2016     Sexual activity: No     Other Topics Concern     Parent/Sibling W/ Cabg, Mi Or Angioplasty Before 65f 55m? No     Social History Narrative            Family History:     Family History   Problem Relation Age of Onset     Depression Other      Depression Maternal Uncle           Allergies:   Please see allergy list which was reviewed this admission.         Medications:       cetirizine  10 mg Oral Daily     lithium  900 mg Oral At Bedtime     propranolol  20 mg Oral BID     QUEtiapine  400 mg Oral At Bedtime     benzocaine-menthol, Carboxymethylcellulose Sod PF, melatonin, naloxone, QUEtiapine, triamcinolone         Review of Systems:   CONSTITUTIONAL:  negative for fever, chills, change in weight  INTEGUMENTARY/SKIN: no rash or obvious new lesions  ENT/MOUTH: no sore throat, new sinus pain or nasal drainage  RESP: see interval history  CV: negative for chest pain, palpitations or peripheral edema  GI: no nausea, vomiting, change in stools  : no dysuria  MUSCULOSKELETAL: no myalgias, arthralgias  ENDOCRINE: blood sugars with adequate control  PSYCHIATRIC: mood stable  LYMPHATIC: no new lymphadenopathy  HEME: no bleeding or easy bruisability  NEURO: no numbness, weakness, headaches         Physical Exam:   Temp:  [96.8  F (36  C)-99.4  F (37.4  C)] 98.2  F (36.8  C)  Pulse:  [] 107  Resp:  [16-18] 18  BP: ()/(50-66) 95/62  SpO2:  [98 %-100 %] 99 %    Intake/Output Summary (Last 24 hours) at 06/15/18 1609  Last data filed at 06/14/18 1657   Gross per 24 hour   Intake                0 ml   Output              400 ml   Net             -400 ml       Constitutional:   Awake, alert and in no apparent distress     Eyes:   Nonicteric, TERRELL     ENT:    oral mucosa moist without lesions     Neck:   Supple without supraclavicular or cervical lymphadenopathy     Lungs:   Good air flow.  No crackles. No rhonchi.  No wheezes.     Cardiovascular:   Normal S1 and S2.  RRR.  No murmur, gallop or rub.  Radial, DP and PT pulses normal and symmetric     Abdomen:   NABS, soft, nontender, nondistended.  No HSM.     Musculoskeletal:   No edema. Strength 5/5 and symmetric     Neurologic:   Alert and conversant. Cranial nerves II-XII intact.       Skin:   Warm, dry.  No rash on limited exam.           Data:   All laboratory and imaging data reviewed.    CMP  Recent Labs  Lab 06/14/18  1014      POTASSIUM 3.9   CHLORIDE 108   CO2 20   ANIONGAP 8   *   BUN 7   CR 0.46*   GFRESTIMATED >90   GFRESTBLACK >90   BHANU 8.8   PROTTOTAL 7.3   ALBUMIN 2.8*   BILITOTAL 0.1*   ALKPHOS 66   AST 20   ALT 31     CBC  Recent Labs  Lab 06/15/18  1241 06/14/18  1014 06/11/18  0731    WBC 6.6 5.4 6.5   RBC 3.63* 3.78* 3.64*   HGB 10.5* 11.3* 10.7*   HCT 33.2* 36.5 32.5*   MCV 92 97 89   MCH 28.9 29.9 29.4   MCHC 31.6 31.0* 32.9   RDW 13.7 14.4 13.4    254 260     INRNo lab results found in last 7 days.  Arterial Blood GasNo lab results found in last 7 days.  Urine Studies  Recent Labs   Lab Test  06/14/18   0030  04/02/18   2057  02/01/17   0124  01/20/17   0930  02/10/16   1743   URINEPH  6.0  5.5  6.0  6.0  5.5   NITRITE  Negative  Negative  Negative  Negative  Negative   LEUKEST  Large*  Small*  Negative  Negative  Negative   WBCU  3  1  <1  <1   --      CMV viral loads  No lab results found.  No results found for: CMQNT, CMVQ  EBV viral loads   No lab results found.  No results found for: EBVDN, EBRES, EBVDN, EBVSP, EBVPC, EBVPCR  Respiratory Virus Testing    No results found for: RS, FLUAG   Sputum Cultures in the last 3 months:  Specimen Description   Date Value Ref Range Status   06/14/2018 Sputum  Final   06/14/2018 Sputum  Final   06/14/2018 Sputum  Final   06/14/2018 Sputum  Final    Culture Micro   Date Value Ref Range Status   06/14/2018 (A)  Final    Canceled, Test credited  >10 Squamous epithelial cells/low power field indicates oral contamination. Please   recollect.     06/14/2018   Final    Notification of test cancellation was given to  TOMY LÓPEZ RN @0634 6/14/18. [facility]     06/14/2018 (A)  Final    Canceled, Test credited  >10 Squamous epithelial cells/low power field indicates oral contamination. Please   recollect.     06/14/2018   Final    Notification of test cancellation was given to  TOMY LÓPEZ RN @0634 6/14/18. [facility]

## 2018-06-15 NOTE — PROGRESS NOTES
SW following via chart review as pt was an admission from [facility].  Per [facility] team, pt was discharged to community and does not at this time appear to be requiring continued inpatient treatment.  Medical team updated that if pt is assessed as needing inpatient psych, a psychiatry referral/consult will be needed for review.  No other needs identified for SW at this time.    RUTHY Parr, DEREKW  6C Unit   Phone: [phone]  Pager: [phone]  Unit: [phone]

## 2018-06-15 NOTE — PROGRESS NOTES
Internal Medicine Progress Note    Date of Service (when I saw the patient): 06/15/2018    Assessment & Plan   Cristina Boyd is a 18 year old female admitted on 6/13/2018. She has a history of depression and bipolar disorder and was transferred from inpatient psychiatry to medicine for further evaluation of fevers and cervical lymphadenopathy.     Onset of fevers ~2 days PTA on psychiatry, intermittently spiking temps up to 101.5 F with last documented fever on 6/10. Had associated cough, congestion, rhinorrhea, and odynophagia. Exam showed cervical LAD, CT neck 6/11 revealed slight increase in size of multilevel enlarged cervical lymph nodes which were previously observed on CT 2/2017    Today:  Stable, afebrile.  Still unable to provide adequate sputum.  Discussed with pulmonology who will perform a bronch tomorrow.    Possible Active TB: Exposure to TB as her brother was diagnosed 2/2018 and is currently on DOT. Reports weight loss associated with Ramadan, now regained weight. Patient's quant gold negative 2/2018 and again on 6/12. TB antigen negative. HIV and mononucleosis negative, procal <0.05, WBC 6.5, ESR 72, CRP 70.1. Patient currently afebrile, HR 87, /71.  ID following.   Afebrile >48 hours.  CRP dropped to 32 (6/14/18).   So far sputums have not been of sufficient quality to run AFBs.   - RT to induce sputum for cultures (bacterial, fungal, AFB)   - IR unable to excise lymph node until Monday   - Bronchoscopy tomorrow by pulmonary   - NPO after midnight   - Continue isolation in negative pressure room    Fevers, Cervical Lymphadenopathy. Ddx currently broad including infection (TB vs other), neoplastic, reactive/inflammatory, autoimmune.  Exposure to TB as her brother was diagnosed 2/2018 and is currently on DOT. Reports weight loss associated with Ramadan, now regained weight. Patient's quant gold negative 2/2018 and again on 6/12. TB antigen negative. HIV and mononucleosis negative, procal  <0.05, WBC 6.5, ESR 72, CRP 70.1. Patient currently afebrile, HR 87, /71.  ID following.  Afebrile >48 hours.  CRP dropped to 32.   So far sputums have not been of sufficient quality to run AFBs.  - No antibiotics at this time  - ID consulted, appreciate assistance    Productive Cough, Pulmonary Opacities. Endorses persistent cough over the past 2-3 weeks with associated rhinorrhea, nasal congestion. Also intermittently spiking fevers per above, last fever on 6/10. CT chest 6/11 with multifocal patchy consolidative opacities in the RUL and posterior segment of RLL concerning for infectious process. Reports improvement in respiratory symptoms without intervention.  - Hold antibiotics per above, consider broad coverage if spikes      Peripheral Edema. Acute onset over the past 2 weeks. Possibly side effect from seroquel (4% incidence of peripheral edema), also could be ?nephrotic syndrome or underlying autoimmune process.  UA negative for protein or blood.        Depression, Bipolar Disorder. Admitted to  psychiatry 5/31 with acute shaina in setting of medication non-compliance. Restarted on PTA lithium, also started on seroquel for sleep and propranolol for tremors. Mood improved and patient deemed safe for discharge from psychiatry perspective.  - Continue lithium 900 mg QHS  - Continue seroquel 400 mg QHS  - Continue propranolol 20 mg BID  - PRN seroquel 100 mg TID for agitation  - Will reevaluate       Diet:   Active Diet Order      High Kcal/High Protein Diet, ADULT      NPO for Medical/Clinical Reasons Except for: Meds, Ice Chips  DVT Prophylaxis: Low Risk/Ambulatory with no VTE prophylaxis indicated  Code Status: Full Code    Disposition: Expected discharge in >5 days once evaluation for TB is completed.      Abraham Pickering MD  Internal Medicine-Pediatrics Staff  Pager: [phone]    Please see sticky note for cross cover information     ------    Interval History   Feeling Ok.  No fevers, chills, shortness  of breath.  Anxious about possibly having lymphoma today.  Discussed differential and reassured that we are pursuing a broad differential.  Reviewed need to fully evaluate for TB before going home.      Appetite good.  Slept well.    Physical Exam     There were no vitals filed for this visit.  Vital Signs with Ranges  Temp:  [96.8  F (36  C)-99.4  F (37.4  C)] 98.2  F (36.8  C)  Pulse:  [] 107  Resp:  [16-18] 18  BP: ()/(50-66) 95/62  SpO2:  [98 %-100 %] 99 %  I/O last 3 completed shifts:  In: -   Out: 400 [Urine:400]    (Exam unchanged from yesterday 6/14/18)  Constitutional: alert and no distress   Lymph: Mildly tender cervical lymphadenopathy  Cardiovascular: RRR. No murmurs.  Respiratory: Percussion normal. Good diaphragmatic excursion. Lungs clear  Psychiatric: mentation appears normal and anxious    Medications       cetirizine  10 mg Oral Daily     lithium  900 mg Oral At Bedtime     propranolol  20 mg Oral BID     QUEtiapine  400 mg Oral At Bedtime       Data     ---    Nursing notes reviewed.  Patient seen with bedside nurse.     I have reviewed today's Medications, Vital Signs and Labs    Discussed with: Pulmonology

## 2018-06-15 NOTE — PLAN OF CARE
Problem: Patient Care Overview  Goal: Plan of Care/Patient Progress Review  Outcome: No Change  Patient cannot get a sputum sample, will possibly need it bronch induced but patient ate lunch today and needs to be NPO. IR biopsy scheduled Monday. Patient is up independently in room, family visiting, gave them all masks to wear in the room. Patient is bored, reinforced that TB needs to be ruled out.

## 2018-06-15 NOTE — PROGRESS NOTES
[facility] ID SERVICE: ONGOING CONSULTATION   Cristina Boyd : 2000 Sex: female  MRN: [id number] Attending Physician: Abraham Pickering MD  Date of Service: Ashlee 15, 2018    REASON FOR CONSULTATION: Tuberculosis r/o     PROBLEM LIST:  1. Fever, submandibular/cervical lymphadenopathy, productive cough; known active tuberculosis exposure   2. Bipolar disorder  3. Goiter      RECOMMENDATIONS (no new recommendations today):   1. Induced sputum with AFB smear + culture and bacterial + fungal culture - collect samples Q8H with 1 in morning ; strongly suggest bronchoscopy if induced sputum unsuccessful;   2. f/u fungal Ab panela     3. Lymph node biopsy scheduled on 18; suggest sending lymph node biopsy tissue for AFB smear + culture and bacterial + fungal culture  4. Continue airborne precautions      DISCUSSION:   Oliver Evans is a 19 y/o female with h/o bipolar disorder who was admitted on 18 for shaina, currently being evaluated for tuberculosis in the setting of known active tuberculosis exposure in her shared household. Reports URI symptoms, fever, and lymphadenopathy that began approximately 2 days PTA. Febrile to 101.5F on 18 and 101.3F on 6/10/18; WBC stable at 6-7 throughout admission. CT chest/neck on 18 showed multifocal patchy consolidations in RUL and RLL concerning for infectious process and cervical lymphadenopathy. Agree with w/u to r/o tuberculosis; presentation may also be consistent with atypical pneumonia or fungal process. Recommend collecting induced sputum for AFB smear + culture and bacterial + fungal culture and checking fungal antibody panel, cryptococcal Ag, and urine histoplasma . Also suggest sending lymph node biopsy tissue for AFB smear + culture and bacterial + fungal culture. Continue airborne precautions until 3 negative sputum AFB stain + cultures.     Patients brother being treated for extrapulmonary tuberculosis is Emilia Boyd; updated [facility]  Health (#[phone]) regarding r/o of a potential secondary case.      Rahel Desmond, MS4.   Please page me with questions about this patient.  Pager: [phone]     Scribe Disclosure:   I, Rahel Lora, am serving as a scribe; to document services personally performed by Jaz Richards- -based on data collection and the provider's statements to me.      Provider Disclosure:  I agree with above History, Review of Systems, Physical exam and Plan.  I have reviewed the content of the documentation and have edited it as needed. I have personally performed the services documented here and the documentation accurately represents those services and the decisions I have made.       Electronically signed by:  Jaz Richards MD   of Medicine, Division of Infectious Diseases  [facility] [phone]         CHIEF INFECTIOUS DISEASES COMPLAINT:  Tuberculosis r/o    INTERVAL HISTORY:   No overnight events. Continues to be afebrile; Tmax 99.4F; WBC 6.6. States that she is sometimes itchy and warm which keeps her from sleeping. Denies fever, chills, headache, cough, n/v, diarrhea, dysuria, or rashes.     ROS:  A five-point review of systems was obtained and was negative with the exception of that which is described above.    ALLERGIES:  No known drug allergies.     CURRENT MEDICATIONS:  Current Facility-Administered Medications   Medication     benzocaine-menthol (CEPACOL) 15-3.6 MG lozenge 1 lozenge     Carboxymethylcellulose Sod PF (REFRESH PLUS) 0.5 % ophthalmic solution 1 drop     cetirizine (zyrTEC) tablet 10 mg     lithium (ESKALITH) CR tablet 900 mg     melatonin tablet 1 mg     naloxone (NARCAN) injection 0.1-0.4 mg     propranolol (INDERAL) tablet 20 mg     QUEtiapine (SEROquel) tablet 100 mg     QUEtiapine (SEROquel) tablet 400 mg     triamcinolone (KENALOG) 0.1 % cream     CURRENT ANTI-INFECTIVES:   None    EXAMINATION:   VS: /66 (BP Location: Left arm)  Pulse 99  Temp 97.7  F (36.5  C) (Oral)   Resp 16  SpO2 98%  GENERAL: AOx3. NAD. Eating breakfast in bed.   EYES: Sclerae anicteric without conjunctival injection or stigmata of endocarditis.  No proptosis.   ENT: AT/NC. MMM without lesions or exudates.   NECK:  Supple. Large goiter. Bilateral cervical lymphadenopathy. Large tender left submandibular lymph node.   HEART: RRR. No MRG.  LUNGS: CTAB. Normal respiratory effort.   ABDOMEN: Bowel sounds present. S/NT/ND. No hepatosplenomegaly.  SKIN: No rashes or sores.   EXTREMITIES: Bilateral shin/foot nonpitting edema.    NEURO: Grossly nonfocal. Spontaneously moves all extremities.   PSYCH: Mood appropriate. Somewhat flat affect.     NEW DATA/RESULTS:   All interval basic labs, microbiology results, and imaging were personally reviewed.    Culture Micro   Date Value Ref Range Status   06/14/2018 (A)  Final    Canceled, Test credited  >10 Squamous epithelial cells/low power field indicates oral contamination. Please   recollect.     06/14/2018   Final    Notification of test cancellation was given to  TOMY LÓPEZ RN @0634 6/14/18. [facility]     06/14/2018 (A)  Final    Canceled, Test credited  >10 Squamous epithelial cells/low power field indicates oral contamination. Please   recollect.     06/14/2018   Final    Notification of test cancellation was given to  TOMY LÓPEZ RN @0634 6/14/18. [facility]     06/14/2018   Final    10,000 to 50,000 colonies/mL  mixed urogenital zina  Susceptibility testing not routinely done       Recent Labs   Lab Test  06/14/18   1014  06/11/18   1309   CRP  32.0*  70.1*     Recent Labs   Lab Test  06/14/18   1014  06/11/18   0731  06/06/18   2218  06/01/18   0201  04/02/18 2003 02/01/17   0936   WBC  5.4  6.5  7.9  6.7  7.1  7.6     Recent Labs   Lab Test  06/14/18   1014  06/01/18   0201  04/02/18 2003 11/17/17   1512   CR  0.46*  0.56  0.50*  0.63   GFRESTIMATED  >90  >90  >90  >90     Hematology Studies:   Recent Labs   Lab Test  06/14/18   1014  06/11/18   0731  06/06/18    2218  06/01/18   0201  04/02/18 2003 02/01/17   0936   01/13/17   0936   WBC  5.4  6.5  7.9  6.7  7.1  7.6   < >  5.1   ANEU   --   3.7  5.5  3.3  4.1  4.8   --   2.0   AEOS   --   0.6  0.6  0.1  0.1  0.3   --   0.1   HCT  36.5  32.5*  35.9  39.6  39.1  38.1   < >  36.8   PLT  254  260  245  319  276  315   < >  278    < > = values in this interval not displayed.     Metabolic Studies:   Recent Labs   Lab Test  06/14/18   1014  06/01/18   0201  04/02/18 2003   NA  136  139  137   BUN  7  12  11   CO2  20  22  25   CR  0.46*  0.56  0.50*   GFRESTIMATED  >90  >90  >90     Hepatic Studies:   Recent Labs   Lab Test  06/14/18   1014  06/01/18   0201  04/02/18 2003   BILITOTAL  0.1*  0.4  0.3   ALKPHOS  66  69  74   ALBUMIN  2.8*  4.1  3.9   AST  20  14  20   ALT  31  7  11     Immunologlobulins:   Recent Labs   Lab Test  06/14/18   1014  06/11/18   1309  02/09/16   0750   SED  67*  72*  48*

## 2018-06-15 NOTE — PROGRESS NOTES
Internal Medicine Progress Note    Date of Service (when I saw the patient): 06/14/2018    Assessment & Plan   Cristina Boyd is a 18 year old female admitted on 6/13/2018. She has a history of depression and bipolar disorder and was transferred from inpatient psychiatry to medicine for further evaluation of fevers and cervical lymphadenopathy.    Onset of fevers ~2 days PTA on psychiatry, intermittently spiking temps up to 101.5 F with last documented fever on 6/10. Had associated cough, congestion, rhinorrhea, and odynophagia. Exam showed cervical LAD, CT neck 6/11 revealed slight increase in size of multilevel enlarged cervical lymph nodes which were previously observed on CT 2/2017     Fevers, Cervical Lymphadenopathy.  Ddx currently broad including infection (TB vs other), neoplastic, reactive/inflammatory, autoimmune.  Exposure to TB as her brother was diagnosed 2/2018 and is currently on DOT. Reports weight loss associated with Ramadan, now regained weight. Patient's quant gold negative 2/2018 and again on 6/12. TB antigen negative. HIV and mononucleosis negative, procal <0.05, WBC 6.5, ESR 72, CRP 70.1. Patient currently afebrile, HR 87, /71.  ID following.  Afebrile >48 hours.  CRP dropped to 32.   So far sputums have not been of sufficient quality to run AFBs.  - Hold off on antibiotics at this time  - RT to induce sputum for cultures (bacterial, fungal, AFB)  - IR unable to excise lymph node until Monday  - Airborne/TB precautions    Productive Cough, Pulmonary Opacities. Endorses persistent cough over the past 2-3 weeks with associated rhinorrhea, nasal congestion. Also intermittently spiking fevers per above, last fever on 6/10. CT chest 6/11 with multifocal patchy consolidative opacities in the RUL and posterior segment of RLL concerning for infectious process. Reports improvement in respiratory symptoms without intervention.  - Hold antibiotics per above, consider broad coverage if  spikes     Peripheral Edema. Acute onset over the past 2 weeks. Possibly side effect from seroquel (4% incidence of peripheral edema), also could be ?nephrotic syndrome or underlying autoimmune process.  UA negative for protein or blood.       Depression, Bipolar Disorder. Admitted to  psychiatry 5/31 with acute shaina in setting of medication non-compliance. Restarted on PTA lithium, also started on seroquel for sleep and propranolol for tremors. Mood improved and patient deemed safe for discharge from psychiatry perspective.  - Continue lithium 900 mg QHS  - Continue seroquel 400 mg QHS  - Continue propranolol 20 mg BID  - PRN seroquel 100 mg TID for agitation  - Will reevaluate       Diet:   Active Diet Order      High Kcal/High Protein Diet, ADULT  DVT Prophylaxis: Low Risk/Ambulatory with no VTE prophylaxis indicated  Code Status: Full Code    Disposition: Expected discharge in > 3 days once TB evaluated.      Abraham Pickering MD  Internal Medicine-Pediatrics Staff  Pager: [phone]    Please see sticky note for cross cover information     ------    Interval History   Feeling better today.  Requesting to go home soon.  Discussed need to rule out TB before leaving.  No fevers or chills overnight.  Feels lymph nodes are smaller now.  No cough.    Appetite ok.  Slept ok.    Physical Exam     There were no vitals filed for this visit.  Vital Signs with Ranges  Temp:  [97.2  F (36.2  C)-98.9  F (37.2  C)] 98.4  F (36.9  C)  Pulse:  [81-89] 86  Resp:  [16-18] 18  BP: ()/(57-69) 102/59  SpO2:  [99 %] 99 %  I/O last 3 completed shifts:  In: 1170 [P.O.:1170]  Out: 2900 [Urine:2900]    Constitutional: alert and no distress   Lymph: Mildly tender cervical lymphadenopathy  Cardiovascular: RRR. No murmurs.  Respiratory: Percussion normal. Good diaphragmatic excursion. Lungs clear  Psychiatric: mentation appears normal and anxious    Medications       cetirizine  10 mg Oral Daily     lithium  900 mg Oral At Bedtime      propranolol  20 mg Oral BID     QUEtiapine  400 mg Oral At Bedtime       Data   Results for orders placed or performed during the hospital encounter of 05/31/18   CT Soft Tissue Neck w Contrast    Narrative    CT SOFT TISSUE NECK W CONTRAST 6/11/2018 8:39 PM    History:  eval lymphadenopathy, compare to 02/02/2017 scan;       Comparison: CT on 2/2/2017      Impression:  Slight increase in the size of multilevel enlarged  cervical lymph nodes compared to CT on 3/2/2017. Differential includes  infection, inflammation and neoplasm.     I have personally reviewed the examination and initial interpretation  and I agree with the findings.    LETY RADER MD   CT Chest w Contrast    Narrative    EXAM: CT CHEST W CONTRAST 6/11/2018 8:41 PM    HISTORY:  eval for lymphadenopathy;      COMPARISON: None available    TECHNIQUE: Helical acquisition of the chest was obtained without   intravenous contrast and images are displayed at 1 and 5 mm intervals.  Images reviewed in lung, soft tissue, and bone windows.    Total DLP: 191 mGy*cm      Impression    IMPRESSION:   1. Multifocal patchy consolidative opacities in the right upper lobe  and posterior segment of right lower lobe. These are concerning for an  infectious process. Recommend follow-up CT in 3 months.   2. No lymphadenopathy in the chest.    I have personally reviewed the examination and initial interpretation  and I agree with the findings.    CY HEATON MD        ---    Nursing notes reviewed.  Patient seen with bedside nurse.     I have reviewed today's Medications, Vital Signs, Labs and Imaging    Discussed with: Interventional Radiology

## 2018-06-15 NOTE — PLAN OF CARE
"Problem: Patient Care Overview  Goal: Plan of Care/Patient Progress Review  Outcome: Improving  Pt A/Ox4 high temp 99.4 otherwise VSS, pt enies pain however states Amber foot is sensitive when palpate, BLE +2--+3, refused scheduled seroquel for previous shift, however did take @0100, pt later stated that medication makes her unable to move and concern dosage is \"too much\". Sputum needed continue to monitor and follow POC      "

## 2018-06-15 NOTE — PLAN OF CARE
Problem: Patient Care Overview  Goal: Plan of Care/Patient Progress Review  Outcome: Improving  VSS, afebrile. Airborne precautions for TB rule out. Up independently.  Fair appetite. States she is coughing, but has been unable to produce a sputum sample thus far. Plan for a biopsy of cervical lymph nodes on Monday. Refused seroquel this shift, will continue to encourage adherence to scheduled medications.   Continue POC.

## 2018-06-16 LAB
ANION GAP SERPL CALCULATED.3IONS-SCNC: 7 MMOL/L (ref 3–14)
APPEARANCE FLD: NORMAL
BUN SERPL-MCNC: 9 MG/DL (ref 7–30)
CALCIUM SERPL-MCNC: 9.3 MG/DL (ref 8.5–10.1)
CHLORIDE SERPL-SCNC: 108 MMOL/L (ref 94–109)
CO2 SERPL-SCNC: 25 MMOL/L (ref 20–32)
COLOR FLD: COLORLESS
CREAT SERPL-MCNC: 0.49 MG/DL (ref 0.52–1.04)
EOSINOPHIL NFR FLD MANUAL: 1 %
ERYTHROCYTE [DISTWIDTH] IN BLOOD BY AUTOMATED COUNT: 13.6 % (ref 10–15)
GFR SERPL CREATININE-BSD FRML MDRD: >90 ML/MIN/1.7M2
GLUCOSE SERPL-MCNC: 86 MG/DL (ref 70–99)
GRAM STN SPEC: NORMAL
HCT VFR BLD AUTO: 32.8 % (ref 35–47)
HGB BLD-MCNC: 10.6 G/DL (ref 11.7–15.7)
INR PPP: 1.09 (ref 0.86–1.14)
LYMPHOCYTES NFR FLD MANUAL: 6 %
MCH RBC QN AUTO: 29.1 PG (ref 26.5–33)
MCHC RBC AUTO-ENTMCNC: 32.3 G/DL (ref 31.5–36.5)
MCV RBC AUTO: 90 FL (ref 78–100)
OTHER CELLS FLD MANUAL: 93 %
PLATELET # BLD AUTO: 325 10E9/L (ref 150–450)
POTASSIUM SERPL-SCNC: 3.9 MMOL/L (ref 3.4–5.3)
RBC # BLD AUTO: 3.64 10E12/L (ref 3.8–5.2)
SODIUM SERPL-SCNC: 140 MMOL/L (ref 133–144)
SPECIMEN SOURCE FLD: NORMAL
SPECIMEN SOURCE: NORMAL
WBC # BLD AUTO: 5.5 10E9/L (ref 4–11)
WBC # FLD AUTO: 87 /UL

## 2018-06-16 PROCEDURE — 99152 MOD SED SAME PHYS/QHP 5/>YRS: CPT | Performed by: INTERNAL MEDICINE

## 2018-06-16 PROCEDURE — 88312 SPECIAL STAINS GROUP 1: CPT | Performed by: INTERNAL MEDICINE

## 2018-06-16 PROCEDURE — 25000128 H RX IP 250 OP 636: Performed by: INTERNAL MEDICINE

## 2018-06-16 PROCEDURE — 87102 FUNGUS ISOLATION CULTURE: CPT | Performed by: INTERNAL MEDICINE

## 2018-06-16 PROCEDURE — 87206 SMEAR FLUORESCENT/ACID STAI: CPT | Performed by: INTERNAL MEDICINE

## 2018-06-16 PROCEDURE — 87070 CULTURE OTHR SPECIMN AEROBIC: CPT | Performed by: INTERNAL MEDICINE

## 2018-06-16 PROCEDURE — 87106 FUNGI IDENTIFICATION YEAST: CPT | Performed by: INTERNAL MEDICINE

## 2018-06-16 PROCEDURE — 87205 SMEAR GRAM STAIN: CPT | Performed by: INTERNAL MEDICINE

## 2018-06-16 PROCEDURE — 87633 RESP VIRUS 12-25 TARGETS: CPT | Performed by: INTERNAL MEDICINE

## 2018-06-16 PROCEDURE — 36415 COLL VENOUS BLD VENIPUNCTURE: CPT | Performed by: PEDIATRICS

## 2018-06-16 PROCEDURE — 12000001 ZZH R&B MED SURG/OB UMMC

## 2018-06-16 PROCEDURE — 85027 COMPLETE CBC AUTOMATED: CPT | Performed by: PEDIATRICS

## 2018-06-16 PROCEDURE — 40000802 ZZH SITE CHECK

## 2018-06-16 PROCEDURE — 80048 BASIC METABOLIC PNL TOTAL CA: CPT | Performed by: PEDIATRICS

## 2018-06-16 PROCEDURE — 25000132 ZZH RX MED GY IP 250 OP 250 PS 637: Performed by: PHYSICIAN ASSISTANT

## 2018-06-16 PROCEDURE — 25000125 ZZHC RX 250: Performed by: INTERNAL MEDICINE

## 2018-06-16 PROCEDURE — 0B9D8ZX DRAINAGE OF RIGHT MIDDLE LUNG LOBE, VIA NATURAL OR ARTIFICIAL OPENING ENDOSCOPIC, DIAGNOSTIC: ICD-10-PCS | Performed by: INTERNAL MEDICINE

## 2018-06-16 PROCEDURE — 87116 MYCOBACTERIA CULTURE: CPT | Performed by: INTERNAL MEDICINE

## 2018-06-16 PROCEDURE — 89051 BODY FLUID CELL COUNT: CPT | Performed by: INTERNAL MEDICINE

## 2018-06-16 PROCEDURE — 88108 CYTOPATH CONCENTRATE TECH: CPT | Performed by: INTERNAL MEDICINE

## 2018-06-16 PROCEDURE — 40000141 ZZH STATISTIC PERIPHERAL IV START W/O US GUIDANCE

## 2018-06-16 PROCEDURE — 87015 SPECIMEN INFECT AGNT CONCNTJ: CPT | Performed by: INTERNAL MEDICINE

## 2018-06-16 PROCEDURE — 31624 DX BRONCHOSCOPE/LAVAGE: CPT | Performed by: INTERNAL MEDICINE

## 2018-06-16 PROCEDURE — 25000132 ZZH RX MED GY IP 250 OP 250 PS 637: Performed by: PEDIATRICS

## 2018-06-16 PROCEDURE — 85610 PROTHROMBIN TIME: CPT | Performed by: PEDIATRICS

## 2018-06-16 PROCEDURE — 40000588 ZZH STATISTIC BRONCH IN ENDO (20 MIN) THERAPIST TIME

## 2018-06-16 RX ORDER — LIDOCAINE HYDROCHLORIDE 40 MG/ML
INJECTION, SOLUTION RETROBULBAR PRN
Status: DISCONTINUED | OUTPATIENT
Start: 2018-06-16 | End: 2018-06-16 | Stop reason: HOSPADM

## 2018-06-16 RX ORDER — ACETAMINOPHEN 325 MG/1
650 TABLET ORAL EVERY 4 HOURS PRN
Status: DISCONTINUED | OUTPATIENT
Start: 2018-06-16 | End: 2018-06-22 | Stop reason: HOSPADM

## 2018-06-16 RX ORDER — ALBUTEROL SULFATE 0.83 MG/ML
SOLUTION RESPIRATORY (INHALATION) PRN
Status: DISCONTINUED | OUTPATIENT
Start: 2018-06-16 | End: 2018-06-16 | Stop reason: HOSPADM

## 2018-06-16 RX ORDER — FENTANYL CITRATE 50 UG/ML
INJECTION, SOLUTION INTRAMUSCULAR; INTRAVENOUS PRN
Status: DISCONTINUED | OUTPATIENT
Start: 2018-06-16 | End: 2018-06-16 | Stop reason: HOSPADM

## 2018-06-16 RX ORDER — LIDOCAINE 40 MG/G
CREAM TOPICAL
Status: DISCONTINUED | OUTPATIENT
Start: 2018-06-16 | End: 2018-06-22 | Stop reason: HOSPADM

## 2018-06-16 RX ADMIN — QUETIAPINE FUMARATE 400 MG: 100 TABLET ORAL at 02:48

## 2018-06-16 RX ADMIN — ACETAMINOPHEN 650 MG: 325 TABLET, FILM COATED ORAL at 06:41

## 2018-06-16 RX ADMIN — LITHIUM CARBONATE 900 MG: 450 TABLET, EXTENDED RELEASE ORAL at 00:14

## 2018-06-16 NOTE — OR NURSING
Bronchoscopy with lavage completed and pt tolerated well with conscious sedation and on 2L nc oxygen.  Report called to 7C RN and transport to take pt back to dept.  Pt is conversing, but sleepy, and is now on RA oxygen.

## 2018-06-16 NOTE — PLAN OF CARE
Problem: Patient Care Overview  Goal: Plan of Care/Patient Progress Review  Patient remains A&O, pleasant and up ad zuhair on airborne isolation for TB r/o. Patient aware of plan for noon bronch tomorrow with NPO at midnight; IR lymph node biopsy plan for Monday. Propanolol BID held for BP 94/60 per parameters. Patient with good appetite; reporting boredom. Patient will need to have IV placed prior to bronch tomorrow.

## 2018-06-16 NOTE — PLAN OF CARE
Problem: Patient Care Overview  Goal: Plan of Care/Patient Progress Review  Outcome: No Change  Patient was NPO for a bronch this morning, returned from the procedure just after noon. VSS, patient states she's hungry, is walking around her room alert and oriented. No swallowing difficulties as she resumes diet. Patient showered this morning. Continues in airborne isolation to rule out TB. No coughing noted.

## 2018-06-16 NOTE — PROCEDURES
Procedure:  Bronchoscopy  Indication:  Pulmonary infiltrates, rule out Tb  Providers:  Lorena Rene MD, Manjeet Holland  Medications:   9 ml 4% lidocaine, 6 ml 1% lidocaine, 0.5 mg versed and 25 mcg fentanyl    Time Out:  Performed    The patient's medical record has been reviewed.  The indication for the procedure was reviewed and is pulmonary infiltrates, concern for Tb.  The necessary history and physical examination was performed.  The risks, benefits and alternatives of the procedure were discussed with the the patient in detail and she had the opportunity to ask questions.  All questions were answered and verbal and written informed consent was obtained.  The proposed procedure and the patient's identification were verified prior to the procedure.     The patient was assessed for the adequacy for the procedure and to receive medications.   Mental Status:  Alert and oriented x 3  Pulmonary:  Clear to ausculation bilaterally  CV:  RRR, no murmurs or gallops  ASA Grade:  (I)  Normal healthy patient    After clinical evaluation and reviewing the indication, risks, alternatives and benefits of the procedure the patient was deemed to be in satisfactory condition to undergo the procedure.      Immediately before administration of medications the patient was re-assessed for adequacy to receive sedatives including the heart rate, respiratory rate, mental status, oxygen saturation, blood pressure and adequacy of pulmonary ventilation. These same parameters were continuously monitored throughout the procedure.    Maneuvers / Procedure:     The bronchoscope was inserted through the right Nare, the cords were anesthetized with lidocaine. Upper airway structures, vocal cords (anatomy and function) appear to be normal.  A complete airway examination was performed of the right lung. There were no endobronchial lesion, there were minimal thin secretions, and the mucosa was normal.      A BAL was performed in the [facility]  Medial.  A total of 120 ml of fluid was instilled.   A total of 40 ml of cellular fluid was returned.   The samples were  combined and sent for immunocompromised battery, including AFB stain and culture.     Complications: None    Estimated Blood Loss: None    The patient tolerated the procedure well without undue discomfort, hypotension or arrhythmia, or hypoxia.    The procedure was performed in Endoscopy    The case is discussed with the patient after the procedure.     Procedure was supervised by Dr. Skyler Rene  Pulmonary and Critical Care Fellow

## 2018-06-16 NOTE — PLAN OF CARE
Problem: Patient Care Overview  Goal: Plan of Care/Patient Progress Review  Outcome: No Change  VSS. Denies pain. Tolerating diet. Voiding spontaneously, not saving. Patient requested IV removal. Continues in airborne isolation to rule out TB. No coughing noted. Sputum collected during bronch today. Continue current POC.

## 2018-06-17 LAB
BASOPHILS # BLD AUTO: 0 10E9/L (ref 0–0.2)
BASOPHILS NFR BLD AUTO: 0.6 %
CMV DNA SPEC NAA+PROBE-ACNC: NORMAL [IU]/ML
CMV DNA SPEC NAA+PROBE-LOG#: NORMAL {LOG_IU}/ML
DIFFERENTIAL METHOD BLD: ABNORMAL
EOSINOPHIL # BLD AUTO: 0.5 10E9/L (ref 0–0.7)
EOSINOPHIL NFR BLD AUTO: 6.3 %
ERYTHROCYTE [DISTWIDTH] IN BLOOD BY AUTOMATED COUNT: 13.8 % (ref 10–15)
HCT VFR BLD AUTO: 34.5 % (ref 35–47)
HGB BLD-MCNC: 11.3 G/DL (ref 11.7–15.7)
IMM GRANULOCYTES # BLD: 0.1 10E9/L (ref 0–0.4)
IMM GRANULOCYTES NFR BLD: 1.5 %
INR PPP: 1.01 (ref 0.86–1.14)
LYMPHOCYTES # BLD AUTO: 1.6 10E9/L (ref 0.8–5.3)
LYMPHOCYTES NFR BLD AUTO: 21.5 %
MCH RBC QN AUTO: 29.3 PG (ref 26.5–33)
MCHC RBC AUTO-ENTMCNC: 32.8 G/DL (ref 31.5–36.5)
MCV RBC AUTO: 89 FL (ref 78–100)
MONOCYTES # BLD AUTO: 0.7 10E9/L (ref 0–1.3)
MONOCYTES NFR BLD AUTO: 10.2 %
NEUTROPHILS # BLD AUTO: 4.4 10E9/L (ref 1.6–8.3)
NEUTROPHILS NFR BLD AUTO: 59.9 %
NRBC # BLD AUTO: 0 10*3/UL
NRBC BLD AUTO-RTO: 0 /100
PLATELET # BLD AUTO: 407 10E9/L (ref 150–450)
RBC # BLD AUTO: 3.86 10E12/L (ref 3.8–5.2)
SPECIMEN SOURCE: NORMAL
WBC # BLD AUTO: 7.3 10E9/L (ref 4–11)

## 2018-06-17 PROCEDURE — 85025 COMPLETE CBC W/AUTO DIFF WBC: CPT | Performed by: RADIOLOGY PRACTITIONER ASSISTANT

## 2018-06-17 PROCEDURE — 85610 PROTHROMBIN TIME: CPT | Performed by: RADIOLOGY PRACTITIONER ASSISTANT

## 2018-06-17 PROCEDURE — 36415 COLL VENOUS BLD VENIPUNCTURE: CPT | Performed by: RADIOLOGY PRACTITIONER ASSISTANT

## 2018-06-17 PROCEDURE — 25000132 ZZH RX MED GY IP 250 OP 250 PS 637: Performed by: PHYSICIAN ASSISTANT

## 2018-06-17 PROCEDURE — 12000001 ZZH R&B MED SURG/OB UMMC

## 2018-06-17 RX ADMIN — QUETIAPINE FUMARATE 400 MG: 100 TABLET ORAL at 00:03

## 2018-06-17 RX ADMIN — LITHIUM CARBONATE 900 MG: 450 TABLET, EXTENDED RELEASE ORAL at 00:03

## 2018-06-17 NOTE — PLAN OF CARE
Problem: Patient Care Overview  Goal: Plan of Care/Patient Progress Review  Outcome: No Change  Tachycardic, OVSS on room air. Temp max 99.5. Denies pain and nausea. Voiding spontaneously, not saving. On regular diet with fair appetite. Alert and oriented x4. Up ad zuhair. On airborne precautions for TB rule out.

## 2018-06-17 NOTE — PROVIDER NOTIFICATION
Notified MD at 0835 AM regarding changes in vital signs.      Spoke with: Gem Pickering MD Pager [phone]    Orders were obtained.    Comments: Paged MD regarding BP 71/54. Pt asymptomatic. Per MD take BP again in 30 minutes.

## 2018-06-17 NOTE — PROVIDER NOTIFICATION
Notified MD at 0206 PM regarding lab results.      Spoke with: Gem Pickering MD    Orders were not obtained.    Comments: Gem VERNON regarding lab result of + for Human Rhino Virus. Waiting for orders.

## 2018-06-17 NOTE — PROGRESS NOTES
Internal Medicine Progress Note    Date of Service (when I saw the patient): 06/17/2018    Assessment & Plan   Cristina Boyd is a 18 year old female admitted on 6/13/2018. She has a history of depression and bipolar disorder and was transferred from inpatient psychiatry to medicine for further evaluation of fevers and cervical lymphadenopathy.     Onset of fevers ~2 days PTA on psychiatry, intermittently spiking temps up to 101.5 F with last documented fever on 6/10. Had associated cough, congestion, rhinorrhea, and odynophagia. Exam showed cervical LAD, CT neck 6/11 revealed slight increase in size of multilevel enlarged cervical lymph nodes which were previously observed on CT 2/2017    Today:  Stable, afebrile.  Still unable to provide adequate sputum.  Discussed with pulmonology who will perform a bronch tomorrow.    Possible Active TB: Exposure to TB as her brother was diagnosed 2/2018 and is currently on DOT. Reports weight loss associated with Ramadan, now regained weight. Patient's quant gold negative 2/2018 and again on 6/12. TB antigen negative. HIV and mononucleosis negative, procal <0.05, WBC 6.5, ESR 72, CRP 70.1. Patient currently afebrile, HR 87, /71.  ID following.   Afebrile >48 hours.  CRP dropped to 32 (6/14/18).   So far sputums have not been of sufficient quality to run AFBs.   - RT to induce sputum for cultures (bacterial, fungal, AFB)   - IR unable to excise lymph node until Monday   - Bronchoscopy tomorrow by pulmonary   - NPO after midnight   - Continue isolation in negative pressure room    Fevers, Cervical Lymphadenopathy. Ddx currently broad including infection (TB vs other), neoplastic, reactive/inflammatory, autoimmune.  Exposure to TB as her brother was diagnosed 2/2018 and is currently on DOT. Reports weight loss associated with Ramadan, now regained weight. Patient's quant gold negative 2/2018 and again on 6/12. TB antigen negative. HIV and mononucleosis negative, procal  <0.05, WBC 6.5, ESR 72, CRP 70.1. Patient currently afebrile, HR 87, /71.  ID following.  Afebrile >48 hours.  CRP dropped to 32.   So far sputums have not been of sufficient quality to run AFBs.  - No antibiotics at this time  - ID consulted, appreciate assistance    Productive Cough, Pulmonary Opacities. Endorses persistent cough over the past 2-3 weeks with associated rhinorrhea, nasal congestion. Also intermittently spiking fevers per above, last fever on 6/10. CT chest 6/11 with multifocal patchy consolidative opacities in the RUL and posterior segment of RLL concerning for infectious process. Reports improvement in respiratory symptoms without intervention.  - Hold antibiotics per above, consider broad coverage if spikes      Peripheral Edema. Acute onset over the past 2 weeks. Possibly side effect from seroquel (4% incidence of peripheral edema), also could be ?nephrotic syndrome or underlying autoimmune process.  UA negative for protein or blood.        Depression, Bipolar Disorder. Admitted to  psychiatry 5/31 with acute shaina in setting of medication non-compliance. Restarted on PTA lithium, also started on seroquel for sleep and propranolol for tremors. Mood improved and patient deemed safe for discharge from psychiatry perspective.  - Continue lithium 900 mg QHS  - Continue seroquel 400 mg QHS  - Continue propranolol 20 mg BID  - PRN seroquel 100 mg TID for agitation  - Will reevaluate       Diet:   Active Diet Order      NPO per Anesthesia Guidelines for Procedure/Surgery Except for: Meds      Regular Diet Adult      NPO for Medical/Clinical Reasons Except for: Meds, Ice Chips  DVT Prophylaxis: Low Risk/Ambulatory with no VTE prophylaxis indicated  Code Status: Full Code    Disposition: Expected discharge in >5 days once evaluation for TB is completed.      Abraham Pickering MD  Internal Medicine-Pediatrics Staff  Pager: [phone]    Please see sticky note for cross cover information      ------    Interval History   Feeling Ok.  No fevers, chills, shortness of breath.  Anxious about possibly having lymphoma today.  Discussed differential and reassured that we are pursuing a broad differential.  Reviewed need to fully evaluate for TB before going home.      Appetite good.  Slept well.    Physical Exam     Vitals:    06/16/18 0900   Weight: 57.4 kg (126 lb 8 oz)     Vital Signs with Ranges  Temp:  [96.4  F (35.8  C)-99.5  F (37.5  C)] 99.2  F (37.3  C)  Pulse:  [] 118  Heart Rate:  [116-118] 116  Resp:  [12-16] 16  BP: ()/(54-67) 105/67  SpO2:  [97 %-100 %] 99 %  I/O last 3 completed shifts:  In: 360 [P.O.:360]  Out: -     (Exam unchanged from yesterday 6/14/18)  Constitutional: alert and no distress   Lymph: Mildly tender cervical lymphadenopathy  Cardiovascular: RRR. No murmurs.  Respiratory: Percussion normal. Good diaphragmatic excursion. Lungs clear  Psychiatric: mentation appears normal and anxious    Medications       cetirizine  10 mg Oral Daily     lithium  900 mg Oral At Bedtime     propranolol  20 mg Oral BID     QUEtiapine  400 mg Oral At Bedtime     sodium chloride (PF)  3 mL Intracatheter Q8H       Data     ---    Nursing notes reviewed.  Patient seen with bedside nurse.     I have reviewed today's Medications, Vital Signs and Labs    Discussed with: Pulmonology

## 2018-06-17 NOTE — PLAN OF CARE
Problem: Patient Care Overview  Goal: Plan of Care/Patient Progress Review  Outcome: No Change  VSS. Denies pain. Tolerating diet. Voiding spontaneously, not saving. Continues in airborne isolation to rule out TB. No coughing noted. + for Human Rhinovirus. NPO at midnight for lymph node biopsy in IR. Continue current POC.

## 2018-06-18 ENCOUNTER — APPOINTMENT (OUTPATIENT)
Dept: INTERVENTIONAL RADIOLOGY/VASCULAR | Facility: CLINIC | Age: 18
DRG: 854 | End: 2018-06-18
Attending: RADIOLOGY PRACTITIONER ASSISTANT
Payer: COMMERCIAL

## 2018-06-18 LAB
ANION GAP SERPL CALCULATED.3IONS-SCNC: 7 MMOL/L (ref 3–14)
B-HCG SERPL-ACNC: <1 IU/L (ref 0–5)
BACTERIA SPEC CULT: NORMAL
BUN SERPL-MCNC: 10 MG/DL (ref 7–30)
CALCIUM SERPL-MCNC: 8.7 MG/DL (ref 8.5–10.1)
CHLORIDE SERPL-SCNC: 107 MMOL/L (ref 94–109)
CO2 SERPL-SCNC: 23 MMOL/L (ref 20–32)
COPATH REPORT: NORMAL
CREAT SERPL-MCNC: 0.51 MG/DL (ref 0.52–1.04)
CRP SERPL-MCNC: 13 MG/L (ref 0–8)
ERYTHROCYTE [DISTWIDTH] IN BLOOD BY AUTOMATED COUNT: 13.8 % (ref 10–15)
FLUAV H1 2009 PAND RNA SPEC QL NAA+PROBE: NEGATIVE
FLUAV H1 RNA SPEC QL NAA+PROBE: NEGATIVE
FLUAV H3 RNA SPEC QL NAA+PROBE: NEGATIVE
FLUAV RNA SPEC QL NAA+PROBE: NEGATIVE
FLUBV RNA SPEC QL NAA+PROBE: NEGATIVE
GFR SERPL CREATININE-BSD FRML MDRD: >90 ML/MIN/1.7M2
GLUCOSE SERPL-MCNC: 84 MG/DL (ref 70–99)
HADV DNA SPEC QL NAA+PROBE: NEGATIVE
HADV DNA SPEC QL NAA+PROBE: NEGATIVE
HCT VFR BLD AUTO: 33 % (ref 35–47)
HGB BLD-MCNC: 10.4 G/DL (ref 11.7–15.7)
HMPV RNA SPEC QL NAA+PROBE: NEGATIVE
HPIV1 RNA SPEC QL NAA+PROBE: NEGATIVE
HPIV2 RNA SPEC QL NAA+PROBE: NEGATIVE
HPIV3 RNA SPEC QL NAA+PROBE: NEGATIVE
MCH RBC QN AUTO: 28.6 PG (ref 26.5–33)
MCHC RBC AUTO-ENTMCNC: 31.5 G/DL (ref 31.5–36.5)
MCV RBC AUTO: 91 FL (ref 78–100)
MICROBIOLOGIST REVIEW: NORMAL
PLATELET # BLD AUTO: 364 10E9/L (ref 150–450)
POTASSIUM SERPL-SCNC: 3.8 MMOL/L (ref 3.4–5.3)
RBC # BLD AUTO: 3.64 10E12/L (ref 3.8–5.2)
RHINOVIRUS RNA SPEC QL NAA+PROBE: NEGATIVE
RSV RNA SPEC QL NAA+PROBE: NEGATIVE
RSV RNA SPEC QL NAA+PROBE: NEGATIVE
SODIUM SERPL-SCNC: 137 MMOL/L (ref 133–144)
SPECIMEN SOURCE: NORMAL
SPECIMEN SOURCE: NORMAL
WBC # BLD AUTO: 6.3 10E9/L (ref 4–11)

## 2018-06-18 PROCEDURE — 40000141 ZZH STATISTIC PERIPHERAL IV START W/O US GUIDANCE

## 2018-06-18 PROCEDURE — 88184 FLOWCYTOMETRY/ TC 1 MARKER: CPT | Performed by: PEDIATRICS

## 2018-06-18 PROCEDURE — 88185 FLOWCYTOMETRY/TC ADD-ON: CPT | Performed by: PEDIATRICS

## 2018-06-18 PROCEDURE — 99153 MOD SED SAME PHYS/QHP EA: CPT

## 2018-06-18 PROCEDURE — 86140 C-REACTIVE PROTEIN: CPT | Performed by: PEDIATRICS

## 2018-06-18 PROCEDURE — 10022 IR LYMPH NODE BIOPSY: CPT

## 2018-06-18 PROCEDURE — 88172 CYTP DX EVAL FNA 1ST EA SITE: CPT | Performed by: PEDIATRICS

## 2018-06-18 PROCEDURE — 40001004 ZZHCL STATISTIC FLOW INT 9-15 ABY TC 88188: Performed by: PEDIATRICS

## 2018-06-18 PROCEDURE — 36415 COLL VENOUS BLD VENIPUNCTURE: CPT | Performed by: PEDIATRICS

## 2018-06-18 PROCEDURE — 07924ZX DRAINAGE OF LEFT NECK LYMPHATIC, PERCUTANEOUS ENDOSCOPIC APPROACH, DIAGNOSTIC: ICD-10-PCS | Performed by: RADIOLOGY

## 2018-06-18 PROCEDURE — 88173 CYTOPATH EVAL FNA REPORT: CPT | Performed by: PEDIATRICS

## 2018-06-18 PROCEDURE — 25000125 ZZHC RX 250: Performed by: STUDENT IN AN ORGANIZED HEALTH CARE EDUCATION/TRAINING PROGRAM

## 2018-06-18 PROCEDURE — 25000128 H RX IP 250 OP 636: Performed by: STUDENT IN AN ORGANIZED HEALTH CARE EDUCATION/TRAINING PROGRAM

## 2018-06-18 PROCEDURE — 85027 COMPLETE CBC AUTOMATED: CPT | Performed by: PEDIATRICS

## 2018-06-18 PROCEDURE — 84702 CHORIONIC GONADOTROPIN TEST: CPT | Performed by: PEDIATRICS

## 2018-06-18 PROCEDURE — 12000001 ZZH R&B MED SURG/OB UMMC

## 2018-06-18 PROCEDURE — 88305 TISSUE EXAM BY PATHOLOGIST: CPT | Performed by: PEDIATRICS

## 2018-06-18 PROCEDURE — 25000132 ZZH RX MED GY IP 250 OP 250 PS 637: Performed by: PHYSICIAN ASSISTANT

## 2018-06-18 PROCEDURE — 80048 BASIC METABOLIC PNL TOTAL CA: CPT | Performed by: PEDIATRICS

## 2018-06-18 PROCEDURE — 00000155 ZZHCL STATISTIC H-CELL BLOCK W/STAIN: Performed by: PEDIATRICS

## 2018-06-18 RX ORDER — NALOXONE HYDROCHLORIDE 0.4 MG/ML
.1-.4 INJECTION, SOLUTION INTRAMUSCULAR; INTRAVENOUS; SUBCUTANEOUS
Status: DISCONTINUED | OUTPATIENT
Start: 2018-06-18 | End: 2018-06-18 | Stop reason: HOSPADM

## 2018-06-18 RX ORDER — FLUMAZENIL 0.1 MG/ML
0.2 INJECTION, SOLUTION INTRAVENOUS
Status: DISCONTINUED | OUTPATIENT
Start: 2018-06-18 | End: 2018-06-18 | Stop reason: HOSPADM

## 2018-06-18 RX ORDER — FENTANYL CITRATE 50 UG/ML
25-50 INJECTION, SOLUTION INTRAMUSCULAR; INTRAVENOUS EVERY 5 MIN PRN
Status: DISCONTINUED | OUTPATIENT
Start: 2018-06-18 | End: 2018-06-18 | Stop reason: HOSPADM

## 2018-06-18 RX ADMIN — QUETIAPINE FUMARATE 400 MG: 100 TABLET ORAL at 22:34

## 2018-06-18 RX ADMIN — MIDAZOLAM 1.5 MG: 1 INJECTION INTRAMUSCULAR; INTRAVENOUS at 15:51

## 2018-06-18 RX ADMIN — LIDOCAINE HYDROCHLORIDE 15 ML: 10 INJECTION, SOLUTION EPIDURAL; INFILTRATION; INTRACAUDAL; PERINEURAL at 15:50

## 2018-06-18 RX ADMIN — QUETIAPINE FUMARATE 100 MG: 100 TABLET ORAL at 00:10

## 2018-06-18 RX ADMIN — FENTANYL CITRATE 75 MCG: 50 INJECTION INTRAMUSCULAR; INTRAVENOUS at 15:51

## 2018-06-18 RX ADMIN — LITHIUM CARBONATE 900 MG: 450 TABLET, EXTENDED RELEASE ORAL at 21:47

## 2018-06-18 RX ADMIN — LITHIUM CARBONATE 900 MG: 450 TABLET, EXTENDED RELEASE ORAL at 00:10

## 2018-06-18 NOTE — PROGRESS NOTES
Per Lab, previously reported + Human Rhinovirus result was incorrect. Patient is negative for Human Rhinovirus. Droplet precautions discontinued.

## 2018-06-18 NOTE — PROGRESS NOTES
Internal Medicine Progress Note    Date of Service (when I saw the patient): 06/16/2018    Assessment & Plan   Cristina Boyd is a 18 year old female admitted on 6/13/2018. She has a history of depression and bipolar disorder and was transferred from inpatient psychiatry to medicine for further evaluation of fevers and cervical lymphadenopathy.     Onset of fevers ~2 days PTA on psychiatry, intermittently spiking temps up to 101.5 F with last documented fever on 6/10. Had associated cough, congestion, rhinorrhea, and odynophagia. Exam showed cervical LAD, CT neck 6/11 revealed slight increase in size of multilevel enlarged cervical lymph nodes which were previously observed on CT 2/2017    Today:  Stable, afebrile.  Still unable to provide adequate sputum.  Pulmonology successfully perform a bronch today.    Possible Active TB: Exposure to TB as her brother was diagnosed 2/2018 and is currently on DOT. Reports weight loss associated with Ramadan, now regained weight. Patient's quant gold negative 2/2018 and again on 6/12. TB antigen negative. HIV and mononucleosis negative, procal <0.05, WBC 6.5, ESR 72, CRP 70.1. Patient currently afebrile, HR 87, /71.  ID following.   Afebrile >48 hours.  CRP dropped to 32 (6/14/18).   So far sputums have not been of sufficient quality to run AFBs.   - RT to induce sputum for cultures (bacterial, fungal, AFB)   - IR unable to excise lymph node until Monday   - Bronchoscopy today by pulmonary   - Continue isolation in negative pressure room    Fevers, Cervical Lymphadenopathy. Ddx currently broad including infection (TB vs other), neoplastic, reactive/inflammatory, autoimmune.  Exposure to TB as her brother was diagnosed 2/2018 and is currently on DOT. Reports weight loss associated with Ramadan, now regained weight. Patient's quant gold negative 2/2018 and again on 6/12. TB antigen negative. HIV and mononucleosis negative, procal <0.05, WBC 6.5, ESR 72, CRP 70.1. Patient  currently afebrile, HR 87, /71.  ID following.  Afebrile >48 hours.  CRP dropped to 32.   So far sputums have not been of sufficient quality to run AFBs.  - No antibiotics at this time  - ID consulted, appreciate assistance    Productive Cough, Pulmonary Opacities. Endorses persistent cough over the past 2-3 weeks with associated rhinorrhea, nasal congestion. Also intermittently spiking fevers per above, last fever on 6/10. CT chest 6/11 with multifocal patchy consolidative opacities in the RUL and posterior segment of RLL concerning for infectious process. Reports improvement in respiratory symptoms without intervention.  - Hold antibiotics per above, consider broad coverage if spikes      Peripheral Edema. Acute onset over the past 2 weeks. Possibly side effect from seroquel (4% incidence of peripheral edema), also could be ?nephrotic syndrome or underlying autoimmune process.  UA negative for protein or blood.        Depression, Bipolar Disorder. Admitted to  psychiatry 5/31 with acute shaina in setting of medication non-compliance. Restarted on PTA lithium, also started on seroquel for sleep and propranolol for tremors. Mood improved and patient deemed safe for discharge from psychiatry perspective.  - Continue lithium 900 mg QHS  - Continue seroquel 400 mg QHS  - Continue propranolol 20 mg BID  - PRN seroquel 100 mg TID for agitation  - Will reevaluate       Diet:   Active Diet Order      NPO for Medical/Clinical Reasons Except for: Meds, Ice Chips  DVT Prophylaxis: Low Risk/Ambulatory with no VTE prophylaxis indicated  Code Status: Full Code    Disposition: Expected discharge in >5 days once evaluation for TB is completed.      Abraham Pickering MD  Internal Medicine-Pediatrics Staff  Pager: [phone]    Please see sticky note for cross cover information     ------    Interval History   Feeling Ok.  No fevers, chills, shortness of breath.  Decreased tenderness of lymph nodes.  Anticipating bronch today.       Appetite good.  Slept well.    Physical Exam     Vitals:    06/16/18 0900   Weight: 57.4 kg (126 lb 8 oz)     Vital Signs with Ranges  Temp:  [96.5  F (35.8  C)-99.4  F (37.4  C)] 96.5  F (35.8  C)  Pulse:  [118] 118  Heart Rate:  [] 90  Resp:  [16-20] 20  BP: ()/(53-71) 99/71  SpO2:  [99 %-100 %] 100 %  I/O last 3 completed shifts:  In: 840 [P.O.:840]  Out: -     (Exam unchanged from yesterday 6/15/18)  Constitutional: alert and no distress   Lymph: Mildly tender cervical lymphadenopathy  Cardiovascular: RRR. No murmurs.  Respiratory: Percussion normal. Good diaphragmatic excursion. Lungs clear  Psychiatric: mentation appears normal and anxious    Medications       cetirizine  10 mg Oral Daily     lithium  900 mg Oral At Bedtime     propranolol  20 mg Oral BID     QUEtiapine  400 mg Oral At Bedtime     sodium chloride (PF)  3 mL Intracatheter Q8H       Data     ---    Nursing notes reviewed.  Patient seen with bedside nurse.     I have reviewed today's Medications, Vital Signs and Labs

## 2018-06-18 NOTE — PLAN OF CARE
Problem: Patient Care Overview  Goal: Plan of Care/Patient Progress Review  Outcome: No Change  VSS. Denies pain. Tolerating diet. Voiding spontaneously, not saving. Continues in airborne isolation to rule out TB. Droplet isolation discontinued. No coughing noted. Per lab, negative for Human Rhinovirus. NPO since midnight for lymph node biopsy in IR. Left unit for IR at 1400. Continue current POC.

## 2018-06-18 NOTE — BRIEF OP NOTE
Interventional Radiology Brief Post Procedure Note    Procedure: IR LYMPH NODE BIOPSY    Proceduralist: Bandar Patel MD     Assistant: Harshad Rodrigues MD    Time Out: Prior to the start of the procedure and with procedural staff participation, I verbally confirmed the patient s identity using two indicators, relevant allergies, that the procedure was appropriate and matched the consent or emergent situation, and that the correct equipment/implants were available. Immediately prior to starting the procedure I conducted the Time Out with the procedural staff and re-confirmed the patient s name, procedure, and site/side. (The Joint Commission universal protocol was followed.)  Yes    Medications   Medication Event Details Admin User Admin Time       Sedation: IR Nurse Monitored Care   Post Procedure Summary:  Prior to the start of the procedure and with procedural staff participation, I verbally confirmed the patient s identity using two indicators, relevant allergies, that the procedure was appropriate and matched the consent or emergent situation, and that the correct equipment/implants were available. Immediately prior to starting the procedure I conducted the Time Out with the procedural staff and re-confirmed the patient s name, procedure, and site/side. (The Joint Commission universal protocol was followed.)  Yes       Sedatives: Fentanyl and Midazolam (Versed)    Vital signs, airway and pulse oximetry were monitored and remained stable throughout the procedure and sedation was maintained until the procedure was complete.  The patient was monitored by staff until sedation discharge criteria were met.    Patient tolerance: Patient tolerated the procedure well with no immediate complications.    Time of sedation in minutes: 60 Minutes minutes from beginning to end of physician one to one monitoring.          Findings: Cervical lymph adenopathy     Estimated Blood Loss: None    Fluoroscopy Time: 0  minute(s)    SPECIMENS: Fine needle aspirate for cytological analysis    Complications: 1. None     Condition: Stable    Plan:   Return to Unit. ADAT. Resume cares per primary team. Samples to lab for analysis.    Comments: See dictated procedure note for full details.    Harshad Rodrigues MD

## 2018-06-18 NOTE — PROGRESS NOTES
Patient Name: Cristina Boyd  Medical Record Number: [id number]  Today's Date: 6/18/2018    Procedure: Cervical lymph biopsy  Proceduralist: Dr. Patel    Fentanyl Administered: 75 mcg  Versed Administered: 1.5 mg    Sedation start time: 1430  Sedation end time: 1530  Sedation medications administered: Fentanyl and Versed   Total sedation time: 1 hour    Procedure start time: 1515  Puncture time: 1517  Procedure end time: 1530    Report given to: Imelda GUERRERO    Other Notes: Pt arrived to IR room 6 from . Pt denies any questions or concerns regarding procedure. Pt positioned supine and monitored per protocol. Samples obtained and examined with Surg-Path present. Pt tolerated procedure without any noted complications. Pt transferred back to .    Cam Uribe RN

## 2018-06-18 NOTE — PROGRESS NOTES
Interventional Radiology Pre-Procedure Sedation Assessment   Time of Assessment: 2:36 PM    Expected Level: Moderate Sedation    Indication: Sedation is required for the following type of Procedure: Biopsy    Sedation and procedural consent: Risks, benefits and alternatives were discussed with Patient    PO Intake: Appropriately NPO for procedure    ASA Class: Class 3 - SEVERE SYSTEMIC DISEASE, DEFINITE FUNCTIONAL LIMITATIONS.    Mallampati: Grade 2:  Soft palate, base of uvula, tonsillar pillars, and portion of posterior pharyngeal wall visible    Lungs: Lungs Clear with good breath sounds bilaterally    Heart: Normal heart sounds and rate    History and physical reviewed and no updates needed. I have reviewed the lab findings, diagnostic data, medications, and the plan for sedation. I have determined this patient to be an appropriate candidate for the planned sedation and procedure and have reassessed the patient IMMEDIATELY PRIOR to sedation and procedure.    Harshad Rodrigues MD

## 2018-06-18 NOTE — PLAN OF CARE
Problem: Patient Care Overview  Goal: Plan of Care/Patient Progress Review  Outcome: No Change  Tachycardic, OVSS on room air. Temp max 99.4. On airborne precautions for TB rule out. No cough noted. Denies pain and nausea. Voiding spontaneously, not saving. Alert and oriented x4, excitable. Up ad zuhair. NPO and ice chips since 0000 for neck lymph node biopsy. Scrubs x2 completed last evening and showered before bed.

## 2018-06-19 ENCOUNTER — TELEPHONE (OUTPATIENT)
Dept: PSYCHIATRY | Facility: CLINIC | Age: 18
End: 2018-06-19

## 2018-06-19 LAB
ACID FAST STN SPEC QL: NORMAL
ACID FAST STN SPEC QL: NORMAL
COPATH REPORT: NORMAL
SPECIMEN SOURCE: NORMAL

## 2018-06-19 PROCEDURE — 25000132 ZZH RX MED GY IP 250 OP 250 PS 637: Performed by: PHYSICIAN ASSISTANT

## 2018-06-19 PROCEDURE — 12000001 ZZH R&B MED SURG/OB UMMC

## 2018-06-19 PROCEDURE — 99232 SBSQ HOSP IP/OBS MODERATE 35: CPT | Performed by: HOSPITALIST

## 2018-06-19 PROCEDURE — 99207 ZZC CDG-MDM COMPONENT: MEETS MODERATE - UP CODED: CPT | Performed by: HOSPITALIST

## 2018-06-19 PROCEDURE — 25000132 ZZH RX MED GY IP 250 OP 250 PS 637: Performed by: PEDIATRICS

## 2018-06-19 RX ADMIN — LITHIUM CARBONATE 900 MG: 450 TABLET, EXTENDED RELEASE ORAL at 22:53

## 2018-06-19 RX ADMIN — ACETAMINOPHEN 650 MG: 325 TABLET, FILM COATED ORAL at 20:21

## 2018-06-19 ASSESSMENT — PAIN DESCRIPTION - DESCRIPTORS: DESCRIPTORS: HEADACHE

## 2018-06-19 NOTE — PLAN OF CARE
Problem: Patient Care Overview  Goal: Plan of Care/Patient Progress Review  Outcome: No Change  VSS. Denies pain. Tolerating diet. Voiding spontaneously, not saving. Continues in airborne isolation to rule out TB. No coughing noted. Tolerating regular diet. Continue current POC.

## 2018-06-19 NOTE — TELEPHONE ENCOUNTER
On 6/1/2018, 7 pages of records was received from [facility]. This writer sent the original copies to scanning and put copies in Dr. Key's folder for her review.Alysha Dai LPN

## 2018-06-19 NOTE — PLAN OF CARE
Problem: Patient Care Overview  Goal: Plan of Care/Patient Progress Review  Outcome: Improving  A&Ox4, vitals at pt's baseline, afebrile. Tolerating RA. Denies SOB/pain. Ambulating independent in room. Adequate oral intake. Good UO. PIV SL. Awaiting TB result. Continue with cares.

## 2018-06-19 NOTE — PLAN OF CARE
Problem: Patient Care Overview  Goal: Plan of Care/Patient Progress Review  Outcome: Therapy, progress towards functional goals is fair  HD6 admitted with fevers and lymphadenopathy. A&Ox4, VSS, hypotensive at times. Denies pain, denies cough. Moves independently, LSC, BS+ passing gas, Voiding independently not saving. Tolerating a regular diet. PIV is SL. Had left lymph node biopsy today, site is CDI. Plan is to wait for results of TB. Expected DC >4 days.

## 2018-06-19 NOTE — PROGRESS NOTES
Internal Medicine Progress Note    Date of Service (when I saw the patient): 06/16/2018    Assessment & Plan   Cristina Boyd is a 18 year old female admitted on 6/13/2018. She has a history of depression and bipolar disorder and was transferred from inpatient psychiatry to medicine for further evaluation of fevers and cervical lymphadenopathy.     Onset of fevers ~2 days PTA on psychiatry, intermittently spiking temps up to 101.5 F with last documented fever on 6/10. Had associated cough, congestion, rhinorrhea, and odynophagia. Exam showed cervical LAD, CT neck 6/11 revealed slight increase in size of multilevel enlarged cervical lymph nodes which were previously observed on CT 2/2017      Possible Active TB: Exposure to TB as her brother was diagnosed 2/2018 and is currently on DOT. Reports weight loss associated with Ramadan, now regained weight. Patient's quant gold negative 2/2018 and again on 6/12. TB antigen negative. HIV and mononucleosis negative, procal <0.05, WBC 6.5, ESR 72, CRP 70.1. Patient currently afebrile, HR 87, /71.  ID following.   Afebrile >48 hours.  CRP dropped to 32 (6/14/18).   So far sputums have not been of sufficient quality to run AFBs.  -- FU broncoscopy and LN biopsy results.    -- Continue isolation in negative pressure room    Fevers, Cervical Lymphadenopathy. Ddx currently broad including infection (TB vs other), neoplastic, reactive/inflammatory, autoimmune.  Exposure to TB as her brother was diagnosed 2/2018 and is currently on DOT. Reports weight loss associated with Ramadan, now regained weight. Patient's quant gold negative 2/2018 and again on 6/12. TB antigen negative. HIV and mononucleosis negative, procal <0.05, WBC 6.5, ESR 72, CRP 70.1. Patient currently afebrile, HR 87, /71.  ID following.  Afebrile >48 hours.  CRP dropped to 32.   So far sputums have not been of sufficient quality to run AFBs.  - No antibiotics at this time  - ID consulted, appreciate  assistance    Productive Cough, Pulmonary Opacities. Endorses persistent cough over the past 2-3 weeks with associated rhinorrhea, nasal congestion. Also intermittently spiking fevers per above, last fever on 6/10. CT chest 6/11 with multifocal patchy consolidative opacities in the RUL and posterior segment of RLL concerning for infectious process. Reports improvement in respiratory symptoms without intervention.  - Hold antibiotics per above, consider broad coverage if spikes      Peripheral Edema. Acute onset over the past 2 weeks. Possibly side effect from seroquel (4% incidence of peripheral edema), also could be ?nephrotic syndrome or underlying autoimmune process.  UA negative for protein or blood.     Bipolar I disorder, current mixed manic episode       Bipolar I disorder, current mixed manic episode: Admitted to  psychiatry 5/31 with acute shaina in setting of medication non-compliance. Restarted on PTA lithium, also started on seroquel for sleep and propranolol for tremors. Mood improved and patient deemed safe for discharge from psychiatry perspective.  - Continue lithium 900 mg QHS  - Continue seroquel 400 mg QHS  - Continue propranolol 20 mg BID  - PRN seroquel 100 mg TID for agitation  - Will reevaluate       Diet:   Active Diet Order      Regular Diet Adult  DVT Prophylaxis: Low Risk/Ambulatory with no VTE prophylaxis indicated  Code Status: Full Code    Disposition: Expected discharge in >5 days once evaluation for TB is completed.      Petr Mcgregor MD  Internal Medicine-Pediatrics Staff  Pager: [phone]    Please see sticky note for cross cover information     ------    Interval History     No fever or cough. Feels better. No new issues.     Physical Exam     Vitals:    06/16/18 0900   Weight: 57.4 kg (126 lb 8 oz)     Vital Signs with Ranges  Temp:  [96.5  F (35.8  C)-99.2  F (37.3  C)] 99.2  F (37.3  C)  Heart Rate:  [] 114  Resp:  [16] 16  BP: ()/(56-72) 109/72  SpO2:  [98 %-100 %]  100 %  I/O last 3 completed shifts:  In: 600 [P.O.:600]  Out: 425 [Urine:425]    (Exam unchanged from yesterday 6/15/18)  Constitutional: alert and no distress   Lymph: Mildly tender cervical lymphadenopathy  Cardiovascular: RRR. No murmurs.  Respiratory: Percussion normal. Good diaphragmatic excursion. Lungs clear  Psychiatric: mentation appears normal and anxious    Medications       cetirizine  10 mg Oral Daily     lithium  900 mg Oral At Bedtime     propranolol  20 mg Oral BID     QUEtiapine  400 mg Oral At Bedtime     sodium chloride (PF)  3 mL Intracatheter Q8H       Data     ---    Nursing notes reviewed.  Patient seen with bedside nurse.     I have reviewed today's Medications, Vital Signs and Labs

## 2018-06-20 ENCOUNTER — TRANSFERRED RECORDS (OUTPATIENT)
Dept: HEALTH INFORMATION MANAGEMENT | Facility: CLINIC | Age: 18
End: 2018-06-20

## 2018-06-20 LAB — COPATH REPORT: NORMAL

## 2018-06-20 PROCEDURE — 40000957 ZZHCL STATISTIC MUMPS VIRUS PCR: Performed by: HOSPITALIST

## 2018-06-20 PROCEDURE — 12000001 ZZH R&B MED SURG/OB UMMC

## 2018-06-20 PROCEDURE — 25000132 ZZH RX MED GY IP 250 OP 250 PS 637: Performed by: PHYSICIAN ASSISTANT

## 2018-06-20 RX ADMIN — PROPRANOLOL HYDROCHLORIDE 20 MG: 20 TABLET ORAL at 10:45

## 2018-06-20 RX ADMIN — QUETIAPINE FUMARATE 100 MG: 100 TABLET ORAL at 04:40

## 2018-06-20 RX ADMIN — LITHIUM CARBONATE 900 MG: 450 TABLET, EXTENDED RELEASE ORAL at 23:07

## 2018-06-20 RX ADMIN — CETIRIZINE HYDROCHLORIDE 10 MG: 10 TABLET, FILM COATED ORAL at 10:45

## 2018-06-20 NOTE — PLAN OF CARE
Problem: Patient Care Overview  Goal: Plan of Care/Patient Progress Review  Outcome: No Change  On airborne precautions for TB rule out. No cough noted. Tylenol given x1 for headache. Primapore dressing CDI on left neck from lymph node biopsy. Pt refused bedtime Seroquel. Writer was completing hourly rounding at 0430, pt was still awake. PRN Seroquel given at that time. Voiding spontaneously, not saving. Alert and oriented x4. Up ad zuhair. On regular diet. Tachycardic, OVSS on room air. Temp max 99.4.

## 2018-06-20 NOTE — PLAN OF CARE
Problem: Patient Care Overview  Goal: Plan of Care/Patient Progress Review  Outcome: No Change  HD7- history of depression and bipolar disorder and was transferred from inpatient psychiatry to medicine for further evaluation of fevers and cervical lymphadenopathy(per MD note)  VS: AVSS  Neuro: A+Ox4, talks fast but no concerns for manic/depressive episodes.  Cardiac: wnl                 Respiratory: clear LS.  GI/: +BS, per pt has been passing flatus and BM last night, voiding but saved.  Diet/appetite: on regular diet, has refrigerator in room and family brings food, said not hungry when asked to order meals this shift.  Activity: UAL in room.  Pain: denies pain.  Skin/drains: left neck area/biopsy site with intact dressing.  Lines: refused PIVs.   P: will continue to monitor gen status and continue to monitor labs/diagnostic tests and maintain airborne precs.      Addendum 6:19 PM  Mumps PCR/swab specimen still needed to be collected.    Addendum 7:32 PM  Mumps PCR specimen collected.

## 2018-06-20 NOTE — PROGRESS NOTES
[facility] ID SERVICE: ONGOING CONSULTATION   Cristina Boyd : 2000 Sex: female:   Medical record number [id number] Attending Physician: Petr Mcgregor*  Date of Service: 2018  PROBLEM LIST:  1. Fever, submandibular/cervical lymphadenopathy, productive cough; known active tuberculosis exposure. BAL from  with AFB-smear neg specimen. FNA from  not sent for cuture, path pending.  Interestingly, symptoms seem to be self-limited.  2. Bipolar disorder  3. Goiter       RECOMMENDATIONS (no new recommendations today):   1. Would favor continuing airborne precautions since she has not formally been ruled out for TB. If alternate diagnosis is supported by FNA or other testing, could consider lifting airborne iso.   2. Consider sending mumps PCR. Await additional testing from BAL.  3. If she is feeling well, would not object to d/c to home with self-isolation until path returns. We have informed the [facility] that she is a possible secondary case. If her anticipated d/c is to a place other than home this plan would require modification. She reports she has been discharged from [facility] psych unit, though does still exhibit signs of hypomania.   4. If FNA yields AFB-positive staining, or her lymphadenoapthy is otherwise unexplained and does not remit in 1-2 weeks, would consider excisional biopsy.      DISCUSSION:   Oliver Evans is a 17 y/o female with h/o bipolar disorder who was admitted on 18 for shaina, currently being evaluated for tuberculosis in the setting of known active tuberculosis exposure in her shared household. Reports URI symptoms, fever, and lymphadenopathy that began approximately 2 days PTA. Febrile to 101.5F on 18 and 101.3F on 6/10/18; WBC stable at 6-7 throughout admission. CT chest/neck on 18 showed multifocal patchy consolidations in RUL and RLL concerning for infectious process and cervical lymphadenopathy.   Some her symptoms seem to be self-resolving,  which would be atypical for TB. Viral process possible, possibly mumps which would not be captured on RVP which was negative. Other possibility is NTM. As above would consider excisional bx if sx persist and remain unexplained.  Jaz Richards MD   of Medicine, Division of Infectious Diseases  [facility] [phone]      INTERVAL HISTORY: (Extended HPI requires four HPI elements or the status of three chronic problems)  Feeling well. No more cough. Feels her neck ERICA is improving. No diarrhea, No skin symptoms.  ROS: (Recommend ? 2systems)   A five-point review of systems was obtained and was negative with the exception of that which is described above.  No Known Allergies  Allergies were reviewed.  No current outpatient prescriptions on file.     CURRENT ANTI-INFECTIVES:   none  EXAMINATION: (Recommend at least 12 bullets from any organ systems)   Vital Signs: /60 (BP Location: Left arm)  Pulse 118  Temp 99.4  F (37.4  C) (Oral)  Resp 15  Wt 57.4 kg (126 lb 8 oz)  SpO2 100%  BMI 20.42 kg/m2   Awake, alert  Speech slightly pressured  Breathing normal, lungs clear. No cough  HR regular  L submandibular node less tender than previously, still markedly swollen  No skin rash  NEW DATA/RESULTS:   All interval basic labs, microbiology results and imaging were personally reviewed.  Reviewed medicine test (PFTs, EKG, cardiac echo or cath): NO    Culture Micro   Date Value Ref Range Status   06/16/2018   Preliminary    Culture received and in progress.  Positive AFB results are called as soon as detected.    Final report to follow in 7 to 8 weeks.     06/16/2018   Preliminary    Assayed at [facility] [address], [place] [id number] [phone]   06/16/2018 Culture negative after 3 days  Preliminary   06/16/2018 No growth after 3 days  Preliminary   06/16/2018 Light growth  Normal respiratory zina    Final       Recent Labs   Lab Test  06/18/18   0708  06/14/18   1014  06/11/18   1309    CRP  13.0*  32.0*  70.1*     Recent Labs   Lab Test  06/18/18   0708  06/17/18   1839  06/16/18   0900  06/15/18   1241  06/14/18   1014  06/11/18   0731   WBC  6.3  7.3  5.5  6.6  5.4  6.5     Recent Labs   Lab Test  06/18/18   0708  06/16/18   0838  06/14/18   1014  06/01/18   0201   CR  0.51*  0.49*  0.46*  0.56   GFRESTIMATED  >90  >90  >90  >90       Hematology Studies  Recent Labs   Lab Test  06/18/18   0708  06/17/18   1839  06/16/18   0900  06/15/18   1241  06/14/18   1014  06/11/18   0731  06/06/18   2218  06/01/18   0201  04/02/18 2003 02/01/17   0936   WBC  6.3  7.3  5.5  6.6  5.4  6.5  7.9  6.7  7.1  7.6   ANEU   --   4.4   --    --    --   3.7  5.5  3.3  4.1  4.8   AEOS   --   0.5   --    --    --   0.6  0.6  0.1  0.1  0.3   HCT  33.0*  34.5*  32.8*  33.2*  36.5  32.5*  35.9  39.6  39.1  38.1   PLT  364  407  325  337  254  260  245  319  276  315       Metabolic  Recent Labs   Lab Test  06/18/18   0708  06/16/18   0838  06/14/18   1014   NA  137  140  136   BUN  10  9  7   CO2  23  25  20   CR  0.51*  0.49*  0.46*   GFRESTIMATED  >90  >90  >90       Hepatic Studies  Recent Labs   Lab Test  06/14/18   1014  06/01/18   0201  04/02/18 2003   BILITOTAL  0.1*  0.4  0.3   ALKPHOS  66  69  74   ALBUMIN  2.8*  4.1  3.9   AST  20  14  20   ALT  31  7  11       Immunologlobulins  Recent Labs   Lab Test  06/14/18   1014  06/11/18   1309  02/09/16   0750   SED  67*  72*  48*

## 2018-06-21 VITALS
TEMPERATURE: 97.2 F | BODY MASS INDEX: 20.42 KG/M2 | OXYGEN SATURATION: 96 % | WEIGHT: 126.5 LBS | DIASTOLIC BLOOD PRESSURE: 56 MMHG | SYSTOLIC BLOOD PRESSURE: 100 MMHG | HEART RATE: 86 BPM | RESPIRATION RATE: 16 BRPM

## 2018-06-21 PROCEDURE — 25000132 ZZH RX MED GY IP 250 OP 250 PS 637: Performed by: PHYSICIAN ASSISTANT

## 2018-06-21 PROCEDURE — 99239 HOSP IP/OBS DSCHRG MGMT >30: CPT | Performed by: HOSPITALIST

## 2018-06-21 RX ORDER — PROPRANOLOL HYDROCHLORIDE 20 MG/1
20 TABLET ORAL 2 TIMES DAILY
Qty: 60 TABLET | Refills: 0 | Status: SHIPPED | OUTPATIENT
Start: 2018-06-21 | End: 2018-11-02

## 2018-06-21 RX ORDER — QUETIAPINE FUMARATE 400 MG/1
400 TABLET, FILM COATED ORAL AT BEDTIME
Qty: 30 TABLET | Refills: 0 | Status: ON HOLD | OUTPATIENT
Start: 2018-06-21 | End: 2018-07-10

## 2018-06-21 RX ORDER — LITHIUM CARBONATE 450 MG
900 TABLET, EXTENDED RELEASE ORAL AT BEDTIME
Qty: 60 TABLET | Refills: 0 | Status: SHIPPED | OUTPATIENT
Start: 2018-06-21 | End: 2018-11-02

## 2018-06-21 RX ORDER — QUETIAPINE FUMARATE 100 MG/1
100 TABLET, FILM COATED ORAL 3 TIMES DAILY PRN
Qty: 60 TABLET | Refills: 0 | Status: ON HOLD | OUTPATIENT
Start: 2018-06-21 | End: 2018-07-10

## 2018-06-21 RX ADMIN — QUETIAPINE FUMARATE 100 MG: 100 TABLET ORAL at 04:30

## 2018-06-21 NOTE — PLAN OF CARE
Problem: Patient Care Overview  Goal: Plan of Care/Patient Progress Review  Outcome: No Change  VSS on RA. Tmax 97.2 this shift. On airborne precautions for TB rule out. No cough noted. Primapore dressing CDI on left neck from lymph node biopsy.  Voiding spontaneously, not saving. Alert and oriented x4. Up ad zuhair. On regular diet. Per MD on Gold 1, pt will discharge today. Continue current plan of care.

## 2018-06-21 NOTE — PLAN OF CARE
Problem: Patient Care Overview  Goal: Plan of Care/Patient Progress Review  Outcome: Adequate for Discharge Date Met: 06/21/18  Adequate for discharge. Discharge education and paperwork completed, no further questions. Pt was given N95 masks and instructed to self isolate and call hospital if symptoms present. Pt waiting for father to get off work so he can pick her up.

## 2018-06-21 NOTE — PLAN OF CARE
Problem: Patient Care Overview  Goal: Plan of Care/Patient Progress Review  Outcome: No Change  On airborne precautions for TB rule out. No cough noted. Primapore dressing CDI on left neck from lymph node biopsy. Pt refused bedtime Seroquel, pt was still awake at 0430. PRN Seroquel given at that time. Voiding spontaneously, not saving. Alert and oriented x4. Up ad zuhair. On regular diet. Temp max 99.9, OVSS on room air.

## 2018-06-21 NOTE — PROGRESS NOTES
[facility] ID SERVICE: ONGOING CONSULTATION   Cristina Boyd : 2000 Sex: female:   Medical record number [id number] Attending Physician: Petr Mcgregor*  Date of Service: 2018  PROBLEM LIST:  1. Fever, submandibular/cervical lymphadenopathy, productive cough; known active tuberculosis exposure. BAL from  with AFB-smear neg specimen. FNA from  not sent for cuture, path benign (shows lymphocytes).  Interestingly, symptoms seem to be self-limited, suggesting possible viral process. TB lymphadenitis without pulmonary disease is another possibility. I have instructed her to self-isolate if symptoms return. She resides at home and does not begin school until this fall.  2. Bipolar disorder  3. Goiter       RECOMMENDATIONS (no new recommendations today):   1. Would favor continuing airborne precautions since she has not formally been ruled out for TB. If mumps is confirmed, and/or if her symptoms spontaneously cresencio, would lift airborne isolation..   2. Await mumps virus PCR. Await additional testing from BAL.  3. I have informed the [facility] that she was being worked up as a possible secondary case and I updated them on  with new data making pulm TB much less likely.  4. I have requested my clinic staff schedule her to be seen by me on  in [facility] ID clinic.      DISCUSSION:   Oliver Evans is a 17 y/o female with h/o bipolar disorder who was admitted on 18 for shaina, currently being evaluated for tuberculosis in the setting of known active tuberculosis exposure in her shared household. Reports URI symptoms, fever, and lymphadenopathy that began approximately 2 days PTA. Febrile to 101.5F on 18 and 101.3F on 6/10/18; WBC stable at 6-7 throughout admission. CT chest/neck on 18 showed multifocal patchy consolidations in RUL and RLL concerning for infectious process and cervical lymphadenopathy.   Some her symptoms seem to be self-resolving,  which would be atypical for TB. Viral process possible, possibly mumps which would not be captured on RVP which was negative. Other possibility is NTM. Would consider excisional bx if sx persist and remain unexplained.    Thanks for this consult. ID will sign off, but please page with additional questions!  Jaz Richards MD   of Medicine, Division of Infectious Diseases  [facility] [phone]      INTERVAL HISTORY: (Extended HPI requires four HPI elements or the status of three chronic problems)  Feeling well. No more cough. Feels her neck ERICA is improving. No diarrhea, No skin symptoms.  ROS: (Recommend ? 2systems)   A five-point review of systems was obtained and was negative with the exception of that which is described above.  No Known Allergies  Allergies were reviewed.  No current outpatient prescriptions on file.     CURRENT ANTI-INFECTIVES:   none  EXAMINATION: (Recommend at least 12 bullets from any organ systems)   Vital Signs: /56 (BP Location: Left arm)  Pulse 86  Temp 97.2  F (36.2  C) (Oral)  Resp 16  Wt 57.4 kg (126 lb 8 oz)  SpO2 96%  BMI 20.42 kg/m2   Awake, alert  Speech slightly pressured, impulsive  Breathing normal, lungs clear. No cough  HR regular  L submandibular node less tender than previously, still swollen  No skin rash  NEW DATA/RESULTS:   All interval basic labs, microbiology results and imaging were personally reviewed.  Reviewed medicine test (PFTs, EKG, cardiac echo or cath): NO    Culture Micro   Date Value Ref Range Status   06/16/2018   Preliminary    Culture received and in progress.  Positive AFB results are called as soon as detected.    Final report to follow in 7 to 8 weeks.     06/16/2018   Preliminary    Assayed at [facility] [address] [place] [address] [phone]   06/16/2018 Yeast isolated (A)  Preliminary   06/16/2018 No growth after 4 days  Preliminary   06/16/2018 Light growth  Normal respiratory zina    Final       Recent  Labs   Lab Test  06/18/18   0708  06/14/18   1014  06/11/18   1309   CRP  13.0*  32.0*  70.1*     Recent Labs   Lab Test  06/18/18   0708  06/17/18   1839  06/16/18   0900  06/15/18   1241  06/14/18   1014  06/11/18   0731   WBC  6.3  7.3  5.5  6.6  5.4  6.5     Recent Labs   Lab Test  06/18/18   0708  06/16/18   0838  06/14/18   1014  06/01/18   0201   CR  0.51*  0.49*  0.46*  0.56   GFRESTIMATED  >90  >90  >90  >90       Hematology Studies  Recent Labs   Lab Test  06/18/18   0708  06/17/18   1839  06/16/18   0900  06/15/18   1241  06/14/18   1014  06/11/18   0731  06/06/18   2218  06/01/18   0201  04/02/18 2003 02/01/17   0936   WBC  6.3  7.3  5.5  6.6  5.4  6.5  7.9  6.7  7.1  7.6   ANEU   --   4.4   --    --    --   3.7  5.5  3.3  4.1  4.8   AEOS   --   0.5   --    --    --   0.6  0.6  0.1  0.1  0.3   HCT  33.0*  34.5*  32.8*  33.2*  36.5  32.5*  35.9  39.6  39.1  38.1   PLT  364  407  325  337  254  260  245  319  276  315       Metabolic  Recent Labs   Lab Test  06/18/18   0708  06/16/18   0838  06/14/18   1014   NA  137  140  136   BUN  10  9  7   CO2  23  25  20   CR  0.51*  0.49*  0.46*   GFRESTIMATED  >90  >90  >90       Hepatic Studies  Recent Labs   Lab Test  06/14/18   1014  06/01/18   0201  04/02/18 2003   BILITOTAL  0.1*  0.4  0.3   ALKPHOS  66  69  74   ALBUMIN  2.8*  4.1  3.9   AST  20  14  20   ALT  31  7  11       Immunologlobulins  Recent Labs   Lab Test  06/14/18   1014  06/11/18   1309  02/09/16   0750   SED  67*  72*  48*

## 2018-06-22 ENCOUNTER — CARE COORDINATION (OUTPATIENT)
Dept: CARE COORDINATION | Facility: CLINIC | Age: 18
End: 2018-06-22

## 2018-06-22 NOTE — PROGRESS NOTES
Patient was called three times and no answer so post 24 hr DC follow up calls will be closed out, message was left with contact number for department seen by or following up with     Follow-up Appointments           [facility] Follow-up and recommended labs and tests         Follow up with primary care provider, Physician No Ref-Primary, within 7 days for hospital follow- up.  No follow up labs or test are needed.  FU with ID as scheduled on 7/11 at 830 am  FU with Psychiatry in two weeks time for BiPolar

## 2018-06-22 NOTE — PLAN OF CARE
2000: A&Ox4, AVSS on RA. Denies pain, nausea, or dyspnea. Voiding adequately. Good appetite. Pt adequate for discharge, waiting for ride.     2020: Pt family arrived to  pt. Discharge paperwork already completed by previous shift RN, reviewed briefly w/ pt, no questions. Reminded pt of followup appt on 7/11/18 w/ Dr. Richards. Assisted pt with gathering belongings. Pt left at approximately 2020.

## 2018-06-26 ENCOUNTER — TELEPHONE (OUTPATIENT)
Dept: PHARMACY | Facility: OTHER | Age: 18
End: 2018-06-26

## 2018-06-26 NOTE — TELEPHONE ENCOUNTER
MTM referral from: Transitions of Care (recent hospital discharge or ED visit)    MTM referral outreach attempt #2 on June 26, 2018 at 10:47 AM      Outcome: Patient not reachable after several attempts, will route to MTM Pharmacist/Provider as an FYI. Thank you for the referral.    Portia Cox, MTM Coordinator

## 2018-06-30 ENCOUNTER — HOSPITAL ENCOUNTER (INPATIENT)
Facility: CLINIC | Age: 18
LOS: 10 days | Discharge: HOME | DRG: 885 | End: 2018-07-10
Attending: EMERGENCY MEDICINE | Admitting: PSYCHIATRY & NEUROLOGY
Payer: COMMERCIAL

## 2018-06-30 DIAGNOSIS — R45.851 SUICIDAL IDEATION: ICD-10-CM

## 2018-06-30 DIAGNOSIS — F30.9 MANIA (H): ICD-10-CM

## 2018-06-30 DIAGNOSIS — F31.2 BIPOLAR AFFECTIVE DISORDER, CURRENT EPISODE MANIC WITH PSYCHOTIC SYMPTOMS (H): ICD-10-CM

## 2018-06-30 DIAGNOSIS — F31.89 MANIC DISORDER, RECURRENT EPISODE, MILD (H): ICD-10-CM

## 2018-06-30 DIAGNOSIS — F31.64 BIPOLAR DISORDER, CURR EPISODE MIXED, SEVERE, WITH PSYCHOTIC FEATURES (H): Primary | ICD-10-CM

## 2018-06-30 PROBLEM — F31.9 BIPOLAR 1 DISORDER (H): Status: ACTIVE | Noted: 2018-06-30

## 2018-06-30 LAB
AMPHETAMINES UR QL SCN: NEGATIVE
BARBITURATES UR QL: NEGATIVE
BENZODIAZ UR QL: NEGATIVE
CANNABINOIDS UR QL SCN: NEGATIVE
COCAINE UR QL: NEGATIVE
ETHANOL UR QL SCN: NEGATIVE
HCG UR QL: NEGATIVE
OPIATES UR QL SCN: NEGATIVE

## 2018-06-30 PROCEDURE — 80307 DRUG TEST PRSMV CHEM ANLYZR: CPT | Performed by: EMERGENCY MEDICINE

## 2018-06-30 PROCEDURE — 12400007 ZZH R&B MH INTERMEDIATE UMMC

## 2018-06-30 PROCEDURE — 99285 EMERGENCY DEPT VISIT HI MDM: CPT | Mod: Z6 | Performed by: EMERGENCY MEDICINE

## 2018-06-30 PROCEDURE — 81025 URINE PREGNANCY TEST: CPT | Performed by: EMERGENCY MEDICINE

## 2018-06-30 PROCEDURE — 90791 PSYCH DIAGNOSTIC EVALUATION: CPT

## 2018-06-30 PROCEDURE — 25000132 ZZH RX MED GY IP 250 OP 250 PS 637: Performed by: NURSE PRACTITIONER

## 2018-06-30 PROCEDURE — 99222 1ST HOSP IP/OBS MODERATE 55: CPT | Performed by: NURSE PRACTITIONER

## 2018-06-30 PROCEDURE — 80320 DRUG SCREEN QUANTALCOHOLS: CPT | Performed by: EMERGENCY MEDICINE

## 2018-06-30 PROCEDURE — H2032 ACTIVITY THERAPY, PER 15 MIN: HCPCS

## 2018-06-30 PROCEDURE — 99285 EMERGENCY DEPT VISIT HI MDM: CPT | Mod: 25 | Performed by: EMERGENCY MEDICINE

## 2018-06-30 PROCEDURE — 99207 ZZC CDG-MDM COMPONENT: MEETS MODERATE - DOWN CODED: CPT | Performed by: NURSE PRACTITIONER

## 2018-06-30 PROCEDURE — 25000132 ZZH RX MED GY IP 250 OP 250 PS 637: Performed by: FAMILY MEDICINE

## 2018-06-30 RX ORDER — QUETIAPINE FUMARATE 200 MG/1
400 TABLET, FILM COATED ORAL AT BEDTIME
Status: DISCONTINUED | OUTPATIENT
Start: 2018-06-30 | End: 2018-07-02

## 2018-06-30 RX ORDER — TRAZODONE HYDROCHLORIDE 50 MG/1
50 TABLET, FILM COATED ORAL
Status: DISCONTINUED | OUTPATIENT
Start: 2018-06-30 | End: 2018-07-10 | Stop reason: HOSPADM

## 2018-06-30 RX ORDER — LITHIUM CARBONATE 450 MG
900 TABLET, EXTENDED RELEASE ORAL AT BEDTIME
Status: DISCONTINUED | OUTPATIENT
Start: 2018-06-30 | End: 2018-07-10 | Stop reason: HOSPADM

## 2018-06-30 RX ORDER — HYDROXYZINE HYDROCHLORIDE 25 MG/1
25 TABLET, FILM COATED ORAL EVERY 4 HOURS PRN
Status: DISCONTINUED | OUTPATIENT
Start: 2018-06-30 | End: 2018-07-10 | Stop reason: HOSPADM

## 2018-06-30 RX ORDER — OLANZAPINE 10 MG/2ML
10 INJECTION, POWDER, FOR SOLUTION INTRAMUSCULAR
Status: DISCONTINUED | OUTPATIENT
Start: 2018-06-30 | End: 2018-07-10 | Stop reason: HOSPADM

## 2018-06-30 RX ORDER — OLANZAPINE 10 MG/1
10 TABLET ORAL
Status: DISCONTINUED | OUTPATIENT
Start: 2018-06-30 | End: 2018-07-10 | Stop reason: HOSPADM

## 2018-06-30 RX ORDER — ALUMINA, MAGNESIA, AND SIMETHICONE 2400; 2400; 240 MG/30ML; MG/30ML; MG/30ML
30 SUSPENSION ORAL EVERY 4 HOURS PRN
Status: DISCONTINUED | OUTPATIENT
Start: 2018-06-30 | End: 2018-07-10 | Stop reason: HOSPADM

## 2018-06-30 RX ORDER — ACETAMINOPHEN 325 MG/1
650 TABLET ORAL EVERY 4 HOURS PRN
Status: DISCONTINUED | OUTPATIENT
Start: 2018-06-30 | End: 2018-07-10 | Stop reason: HOSPADM

## 2018-06-30 RX ORDER — PROPRANOLOL HYDROCHLORIDE 20 MG/1
20 TABLET ORAL 2 TIMES DAILY
Status: DISCONTINUED | OUTPATIENT
Start: 2018-06-30 | End: 2018-07-10 | Stop reason: HOSPADM

## 2018-06-30 RX ORDER — OLANZAPINE 10 MG/1
10 TABLET, ORALLY DISINTEGRATING ORAL ONCE
Status: COMPLETED | OUTPATIENT
Start: 2018-06-30 | End: 2018-06-30

## 2018-06-30 RX ORDER — QUETIAPINE FUMARATE 100 MG/1
100 TABLET, FILM COATED ORAL ONCE
Status: DISCONTINUED | OUTPATIENT
Start: 2018-06-30 | End: 2018-06-30

## 2018-06-30 RX ORDER — PROPRANOLOL HYDROCHLORIDE 20 MG/1
20 TABLET ORAL 2 TIMES DAILY
Status: DISCONTINUED | OUTPATIENT
Start: 2018-06-30 | End: 2018-06-30

## 2018-06-30 RX ORDER — QUETIAPINE FUMARATE 100 MG/1
100 TABLET, FILM COATED ORAL 3 TIMES DAILY PRN
Status: DISCONTINUED | OUTPATIENT
Start: 2018-06-30 | End: 2018-07-10 | Stop reason: HOSPADM

## 2018-06-30 RX ADMIN — PROPRANOLOL HYDROCHLORIDE 20 MG: 10 TABLET ORAL at 22:07

## 2018-06-30 RX ADMIN — OLANZAPINE 10 MG: 10 TABLET, ORALLY DISINTEGRATING ORAL at 08:28

## 2018-06-30 RX ADMIN — LITHIUM CARBONATE 900 MG: 450 TABLET, EXTENDED RELEASE ORAL at 22:07

## 2018-06-30 RX ADMIN — HYDROXYZINE HYDROCHLORIDE 25 MG: 25 TABLET ORAL at 23:49

## 2018-06-30 RX ADMIN — HYDROXYZINE HYDROCHLORIDE 25 MG: 25 TABLET ORAL at 17:09

## 2018-06-30 RX ADMIN — PROPRANOLOL HYDROCHLORIDE 20 MG: 10 TABLET ORAL at 09:14

## 2018-06-30 ASSESSMENT — ACTIVITIES OF DAILY LIVING (ADL)
DRESS: INDEPENDENT
ORAL_HYGIENE: INDEPENDENT
LAUNDRY: UNABLE TO COMPLETE
TOILETING: 0-->INDEPENDENT
SWALLOWING: 0-->SWALLOWS FOODS/LIQUIDS WITHOUT DIFFICULTY
GROOMING: INDEPENDENT
BATHING: 0-->INDEPENDENT
RETIRED_EATING: 0-->INDEPENDENT
DRESS: 0-->INDEPENDENT
RETIRED_COMMUNICATION: 0-->UNDERSTANDS/COMMUNICATES WITHOUT DIFFICULTY

## 2018-06-30 NOTE — PROGRESS NOTES
" 06/30/18 1500   Art Therapy   Type of Intervention structured groups   Response participates with encouragement   Hours 2   Treatment Detail (Art Therapy/ coping)   Problem-Suicidal Ideation  Goal- Coping through Art Therapy/ Leisure  Outcome- the day was a lot of group activities and games and focus on coping skills. Group was for the most part cohesive. Pt was present in some groups. She is hyper verbal and interrupts a lot while others are speaking. She openly discussed her lack of patience and her boredom. She curses a lot. In group she did many times. It was starting to trigger a pt with DD. Writer told her if there is one more cuss word she will need to take a break, but that writer thought she could control it. She said\" yes I can control it,I live in a very Baptism household, do you think I do it there?\" Writer continued to give her gentle reminders about patience. She was rushing the group playing scrabble, to hurry up with their turns.  "

## 2018-06-30 NOTE — ED NOTES
ED to Behavioral Floor Handoff    SITUATION  Cristina Boyd is a 18 year old female who speaks Gibralt[place] and lives in a home with family members The patient arrived in the ED by private car from home with a complaint of Manic Behavior (Manic and unable to sleep at night.)  .The patient's current symptoms started/worsened 1 month(s) ago and during this time the symptoms have increased.   In the ED, pt was diagnosed with   Final diagnoses:   Manic disorder, recurrent episode, mild (H)   Suicidal ideation        Initial vitals were: BP: 120/80  Pulse: 92  Temp: 98.7  F (37.1  C)  Resp: 18  Weight: 57.3 kg (126 lb 6.4 oz)  SpO2: 96 %   --------  Is the patient diabetic? No   If yes, last blood glucose? --     If yes, was this treated in the ED? --  --------  Is the patient inebriated (ETOH) No or Impaired on other substances? No  MSSA done? N/A  Last MSSA score: --    Were withdrawal symptoms treated? N/A  Does the patient have a seizure history? No. If yes, date of most recent seizure--  --------  Is the patient patient experiencing suicidal ideation? denies current or recent suicidal ideation     Homicidal ideation? denies current or recent homicidal ideation or behaviors.    Self-injurious behavior/urges? denies current or recent self injurious behavior or ideation.  ------  Was pt aggressive in the ED No  Was a code called No  Is the pt now cooperative? Yes  -------  Meds given in ED:   Medications   QUEtiapine (SEROquel) tablet 100 mg (not administered)   OLANZapine zydis (zyPREXA) ODT tab 10 mg (10 mg Oral Given 6/30/18 7828)      Family present during ED course? Yes  Family currently present? No    BACKGROUND  Does the patient have a cognitive impairment or developmental disability? No  Allergies: No Known Allergies.   Social demographics are   Social History     Social History     Marital status: Single     Spouse name: N/A     Number of children: N/A     Years of education: N/A     Social History Main Topics      Smoking status: Never Smoker     Smokeless tobacco: Never Used     Alcohol use No     Drug use: No      Comment: x1 marijuana, in October, 2016     Sexual activity: No     Other Topics Concern     Parent/Sibling W/ Cabg, Mi Or Angioplasty Before 65f 55m? No     Social History Narrative        ASSESSMENT  Labs results   Labs Ordered and Resulted from Time of ED Arrival Up to the Time of Departure from the ED   DRUG ABUSE SCREEN 6 CHEM DEP URINE ([facility])   HCG QUALITATIVE URINE      Imaging Studies: No results found for this or any previous visit (from the past 24 hour(s)).   Most recent vital signs /76  Pulse 84  Temp 98.6  F (37  C) (Oral)  Resp 12  Wt 57.3 kg (126 lb 6.4 oz)  LMP  (LMP Unknown)  SpO2 97%  Breastfeeding? No  BMI 20.4 kg/m2   Abnormal labs/tests/findings requiring intervention:---   Pain control: pt had none  Nausea control: pt had none    RECOMMENDATION  Are any infection precautions needed (MRSA, VRE, etc.)? No If yes, what infection? --  ---  Does the patient have mobility issues? independently. If yes, what device does the pt use? ---  ---  Is patient on 72 hour hold or commitment? No If on 72 hour hold, have hold and rights been given to patient? N/A  Are admitting orders written if after 10 p.m. ?N/A  Tasks needing to be completed:---     Tommy harley-- [id number]   [phone] [place] ED   [phone] [facility] ED

## 2018-06-30 NOTE — IP AVS SNAPSHOT
MRN:[id number]                      After Visit Summary   6/30/2018    Cristina Boyd    MRN: [id number]           Patient Information     Date Of Birth          2000        About your hospital stay     You were admitted on:  June 30, 2018 You last received care in the:  [facility]    You were discharged on:  July 10, 2018       Who to Call     For medical emergencies, please call 911.  For non-urgent questions about your medical care, please call your primary care provider or clinic, None          Attending Provider     Provider Specialty    René Orellana MD Emergency Medicine    Emir Mac MD Emergency Medicine    Otf Hoyt MD Psychiatry    Derrick Metz MD Psychiatry       Primary Care Provider Fax #    Physician No Ref-Primary [phone]      Your next 10 appointments already scheduled     Jul 11, 2018  9:30 AM CDT   New Visit with Jaz Richards MD   [facility] [place], Infectious Disease ([facility])    [address]  [address]  [facility] [address] [phone]            Jul 12, 2018  2:30 PM CDT   (Arrive by 2:00 PM)   New Visit with MARIKA Adams   [facility] ([facility])    [facility]  [address]  [facility] [id number] [phone]            Jul 13, 2018  3:10 PM CDT   Adult Med Follow UP with Jazmine Key MD   Psychiatry Clinic ([facility])    [address]  [address] [place] [id number] [phone]              Additional Services     Medication Therapy Management Referral       MTM referral reason            antipsychotic medications: 2 or more prescribed    Lithium prescribed     This service is designed to help you get the most from your medications.  A specially trained pharmacist will work closely with you and your doctors  to solve any problems  related to your medications and to help you get the   best results from taking them.      The Medication Therapy Management staff will call you to schedule an appointment.                  Further instructions from your care team       Behavioral Discharge Planning and Instructions      Summary: You were admitted on 6/30/2018 to [address] due to Bipolar d/o, hypomanic.  You were treated by Debra Naegele, APRN CNS and Darrell Ferraro MD and discharged on 07/10/2018 to Home    Principal Diagnosis: Bipolar disorder type 1, severe, manic    Health Care Follow-up Appointments:   Medication Management  Date: 07/13/2018  Time: 3:10PM      Provider:Dr. Key  Address: [facility] ([facility]) [facility] 2nd Floor [address] [place] [address]. [facility], [facility]   Phone:[phone]       Therapy Appointment   Date: 07/12/2018  Time: 2:00pm      Provider: Keyana Caldera  Address: [address] Suite [facility], [place] [address]  Phone:[phone]          Attend all scheduled appointments with your outpatient providers. Call at least 24 hours in advance if you need to reschedule an appointment to ensure continued access to your outpatient providers.   Major Treatments, Procedures and Findings: You were provided with: a psychiatric assessment, assessed for medical stability, medication evaluation and/or management, group therapy, art therapy, milieu management, medical interventions and skills/OT groups.    Symptoms to Report: If you experience any of the following symptoms please report them right away to your provider or to family/friends; feeling more aggressive, increased confusion, losing more sleep, mood getting worse or thoughts of suicide.    Early warning signs can include: Early warning signs that could signal a potential relapse could include but not limited to the following; increased depression or anxiety sleep disturbances  "increased thoughts or behaviors of suicide or self-harm increased unusual thinking, such as paranoia or hearing voices.     Safety and Wellness:  Take all medicines as directed.  Make no changes unless your doctor suggests them. Follow treatment recommendations.  Refrain from alcohol and non-prescribed drugs. If there is a concern for safety, call [phone].    Resources: Mental health crisis response for your [facility] is offered 24 hours a day, 7 days a week. A trained counselor will assess your current situation, offer support and counseling and connect you with local resources. Please call  [facility] Crisis Response - Adult [phone]    Crisis Intervention: 163.612.8452 or 550-101-4233 (TTY: 484.480.5850).  Call anytime for help.  National [place] on Mental Illness (www.[place].norberto.org): 993.861.7237 or 348-230-2790.  Suicide Awareness Voices of Education (SAVE) (www.save.org): 972-924-XNAB (4149)  National Suicide Prevention Line (www.mentalhealthmn.org): 871-362-CTDS (3070)  Mental Health Consumer/Survivor Network of [place] (www.mhcsn.net): 209.699.2225 or 077-019-1328  Mental Health Association of [place] (www.mentalhealth.org): 755.551.5752 or 713-245-2470  Self- Management and Recovery Training., SMART-- Toll free: 187.295.8968  www.[employer]  Text 4 Life: txt \"LIFE\" to 14011 for immediate support and crisis intervention  Crisis text line: Text \"[place]\" to 643730. Free, confidential, 24/7.    The treatment team has appreciated the opportunity to work with you. Cristina, please take care and make your recovery a daily recovery. If you have any questions or concerns our unit number is [phone].  You will be receiving a follow-up phone call within the next three days from a representative from behavioral health.  You have identified the best phone number to reach you as [phone] (home)         Pending Results     No orders found from 6/28/2018 to 7/1/2018.            Admission Information     Date & Time Provider " "Department Dept. Phone    6/30/2018 Derrick Metz MD [facility] [phone]      Your Vitals Were     Blood Pressure Pulse Temperature Respirations Height Weight    101/73 (BP Location: Right arm) 94 98.6  F (37  C) (Tympanic) 16 1.676 m (5' 6\") 59.9 kg (132 lb 0.9 oz)    Last Period Pulse Oximetry BMI (Body Mass Index)             (LMP Unknown) 100% 21.31 kg/m2         Care EveryWhere ID     This is your Care EveryWhere ID. This could be used by other organizations to access your [facility] medical records  [id number]        Equal Access to Services     [facility] Hospital[facility]PHANIE AH: Hadjuan Sales, cirilo calvillo, bunny kaalmabrandon edwards, alfred goyal. [facility] [phone].    ATENCIÓN: Si habla español, tiene a shaver disposición servicios gratuitos de asistencia lingüística. [facility] [phone].    We comply with applicable federal civil rights laws and [place] laws. We do not discriminate on the basis of race, color, national origin, age, disability, sex, sexual orientation, or gender identity.               Review of your medicines      START taking        Dose / Directions    ARIPiprazole 30 MG tablet   Commonly known as:  ABILIFY   Used for:  Bipolar affective disorder, current episode manic with psychotic symptoms (H)        Dose:  30 mg   Start taking on:  7/11/2018   Take 1 tablet (30 mg) by mouth daily   Quantity:  30 tablet   Refills:  1       LORazepam 1 MG tablet   Commonly known as:  ATIVAN   Used for:  Adela (H)        Dose:  1 mg   Take 1 tablet (1 mg) by mouth once for 1 dose   Quantity:  1 tablet   Refills:  0         CONTINUE these medicines which have NOT CHANGED        Dose / Directions    Carboxymethylcellulose Sod PF 0.5 % Soln ophthalmic solution   Commonly known as:  REFRESH PLUS   Used for:  Dry eyes        Dose:  1 drop   Place 1 drop into both eyes 3 times daily as needed for dry eyes   Quantity:  1 Bottle   Refills:  0    "    cetirizine 10 MG tablet   Commonly known as:  zyrTEC   Used for:  Seasonal allergic rhinitis, unspecified chronicity, unspecified trigger        Dose:  10 mg   Take 1 tablet (10 mg) by mouth daily   Quantity:  30 tablet   Refills:  0       lithium 450 MG CR tablet   Commonly known as:  ESKALITH   Used for:  Bipolar disorder, current episode manic, severe with psychotic features (H)        Dose:  900 mg   Take 2 tablets (900 mg) by mouth At Bedtime   Quantity:  60 tablet   Refills:  0       propranolol 20 MG tablet   Commonly known as:  INDERAL   Used for:  Bipolar I disorder, current or most recent episode manic, with psychotic features (H)        Dose:  20 mg   Take 1 tablet (20 mg) by mouth 2 times daily   Quantity:  60 tablet   Refills:  0       triamcinolone 0.1 % cream   Commonly known as:  KENALOG   Used for:  Skin irritation        Apply topically 2 times daily as needed for irritation   Quantity:  80 g   Refills:  0         STOP taking     QUEtiapine 100 MG tablet   Commonly known as:  SEROquel           QUEtiapine 400 MG tablet   Commonly known as:  SEROquel                Where to get your medicines      These medications were sent to [facility] [address]  [address] [address]     Phone:  [phone]     ARIPiprazole 30 MG tablet         Some of these will need a paper prescription and others can be bought over the counter. Ask your nurse if you have questions.     Bring a paper prescription for each of these medications     LORazepam 1 MG tablet                Protect others around you: Learn how to safely use, store and throw away your medicines at www.disposemymeds.org.             Medication List: This is a list of all your medications and when to take them. Check marks below indicate your daily home schedule. Keep this list as a reference.      Medications           Morning Afternoon Evening Bedtime As Needed    ARIPiprazole 30 MG  tablet   Commonly known as:  ABILIFY   Take 1 tablet (30 mg) by mouth daily   Start taking on:  7/11/2018   Last time this was given:  30 mg on 7/10/2018  8:25 AM                                Carboxymethylcellulose Sod PF 0.5 % Soln ophthalmic solution   Commonly known as:  REFRESH PLUS   Place 1 drop into both eyes 3 times daily as needed for dry eyes                                cetirizine 10 MG tablet   Commonly known as:  zyrTEC   Take 1 tablet (10 mg) by mouth daily                                lithium 450 MG CR tablet   Commonly known as:  ESKALITH   Take 2 tablets (900 mg) by mouth At Bedtime   Last time this was given:  900 mg on 7/9/2018  8:48 PM                                LORazepam 1 MG tablet   Commonly known as:  ATIVAN   Take 1 tablet (1 mg) by mouth once for 1 dose   Last time this was given:  1 mg on 7/10/2018  8:25 AM                                propranolol 20 MG tablet   Commonly known as:  INDERAL   Take 1 tablet (20 mg) by mouth 2 times daily   Last time this was given:  20 mg on 7/10/2018  8:25 AM                                triamcinolone 0.1 % cream   Commonly known as:  KENALOG   Apply topically 2 times daily as needed for irritation

## 2018-06-30 NOTE — H&P
"Admitted:     06/30/2018      IDENTIFYING INFORMATION:  Ms. Cristina Boyd is a 18-year-old female admitted to the [facility] station 4A.  She was admitted as a voluntary patient through the Emergency Department due to manic symptoms.      CHIEF COMPLAINT:  \"My family thinks I'm crazy.\"      HISTORY OF PRESENT ILLNESS:  Ms. Boyd provides information for this assessment.  She repeatedly stated that she was too tired to talk, thus the assessment was very brief.  She is not a wholly reliable historian.  Intake data, outpatient records and records from previous hospitalizations were reviewed.      Ms. Boyd has a previous diagnosis of bipolar disorder type 1.  She was most recently hospitalized on the [place] team 05/31/2018 through 06/13/2018.  At that time, she was sleeping minimally and experiencing racing thoughts.  She had been noncompliant with her lithium.  She was stabilized on a combination of Seroquel, lithium and Inderal and was discharged to home.  The patient is currently claiming that she has been taking her medications as prescribed.  A lithium level will be drawn tomorrow.  Family brought her to the Emergency Department due to her increased energy and poor sleep.  Her father reported to the ER physician that she had made a statement about wanting to die.  She was described as having pressured speech.  Family reported she had been outdoors in the nearly 100 degree heat for much of the day yesterday.  Her behavior was erratic and she was making threats.  Her father indicated he did not feel as though he could keep her safe at home.      During the present assessment, the patient is extremely restless and distractible.  She inquired about a cup of water and then asked for a sweatshirt.  Eventually, she did agree to sit down and meet, though persistently stated that she was too tired to talk as a result of the Zyprexa given to her in the Emergency Department.  She " "perseverated on her family believing that she is \"crazy\" and the perceived injustice of the situation.  She described her mood as \"bipolar.\"  She stated that her appetite has been low.  Her concentration has been poor.  Her energy has been low.  She has been feeling irritable and anxious.  She is currently denying auditory and visual hallucinations.  She denies thoughts of harming herself or others.      PAST PSYCHIATRIC HISTORY:  The patient was diagnosed with bipolar disorder in February 2016, hospitalized on the [facility] ITC.  She was also hospitalized in January 2017, February 2017, and May through June 2018.  She denies any history of suicide attempts, self-injury or aggressive behavior toward others. In the past she has taken Abilify, Depakote, Ativan and Zyprexa.  No history of commitment.  No history of ECT.  Her psychiatrist is Dr. Jazmine Key at [facility].     SUBSTANCE USE HISTORY:  She denies any history of using illicit substances or abusing prescription medications, though previous records indicate she has tried marijuana.  She does not consume alcohol.  She does not use tobacco.      PAST MEDICAL HISTORY:  She reports no history of major medical concerns, surgeries, seizures or head injuries.      PHYSICAL EXAMINATION:  Please refer to the exam completed by Dr. Orellana in the Emergency Department 06/30/2018.      REVIEW OF SYSTEMS:  She endorses muscle twitches, heart palpitations and painful joints.  A 10-point review of systems was completed and is otherwise negative with the exception of HPI.      LABORATORY DATA:  Urine toxicology was negative.  Urine pregnancy was negative.      VITAL SIGNS:  Temperature 98.7, pulse 92, respirations 18, blood pressure 120/80.      PRIOR TO ADMISSION MEDICATIONS:   1.  Lithium  mg p.o. at bedtime.   2.  Propranolol 20 mg p.o. b.i.d.   3.  Seroquel 400 mg p.o. at bedtime.   4.  Seroquel 100 mg p.o. t.i.d. p.r.n.      FAMILY HISTORY:  Per " records, her maternal aunt and uncle have bipolar disorder.  She has a cousin with bipolar disorder.  She reports no family history of chemical dependency or suicides.      SOCIAL HISTORY:  She was [place]  Her family is originally from [place].  She was in [place] for 3 years and then moved back to the  the summer of 2015.  She resides with her mother, father, 3 brothers and 1 sister.  She has no history of abuse.  She has a history of some college course work.  She reports no legal problems.      MENTAL STATUS EXAMINATION:  She was awake, alert with adequate grooming and hygiene.  Attitude was minimally cooperative.  Eye contact was poor.  Mood was anxious.  Affect was notable for increased intensity.  Speech was pressured.  Volume was normal.  She had psychomotor agitation.  No evidence of tardive dyskinesia, dystonia or tics.  Thought process was somewhat disorganized and illogical.  She had no loosening of associations.  She currently denies suicidal and homicidal ideations.  She denies hallucinations.  She may have some paranoia regarding her family.  Insight was fair, judgment was fair.  She was oriented to person, place, time, date and reason for hospitalization.  Attention span and concentration were poor.  Recent and remote memory was impaired.  She had no peculiar use of language.  Fund of knowledge was appropriate.  Muscle strength and tone were normal.  Gait and station were normal.      DIAGNOSIS:  Bipolar disorder type 1, severe, manic.      RECOMMENDATIONS:  Ms. Boyd will continue as a voluntary patient on station 4A.  Her care will be assumed by Dr. Hoyt on Monday.  We will encourage her to be involved in unit activities.  We discussed options for medication management.  She will be resumed on prior to admission medications with p.r.n. Zyprexa available.  A Lithium level will be drawn tomorrow.  She will discharge to home when stable.  She does have outpatient psychiatry.  I provided  her with information regarding the risks and benefits of this treatment plan including medications and she provided consent.         EDWARD REDDY NP             D: 2018   T: 2018   MT: GULIHERME      Name:     SALLY KAPLAN   MRN:      [id number]        Account:      [id number]   :      2000        Admitted:     2018                   Document: [id number]

## 2018-06-30 NOTE — PROGRESS NOTES
06/30/18 1014   Patient Belongings   Did you bring any home meds/supplements to the hospital?  No   Patient Belongings clothing   Disposition of Belongings Locker   Belongings Search Yes   Clothing Search Yes   Second Staff Jack Aquino   General Info Comment no cash, no credit/debit cards, nothing sent to security   To locker: backpack, spiral notebook, assorted makeup, puzzle book, dress x2, slip, jacket, sweater with a hang-up string, scarf x2, socks  A               Admission:  I am responsible for any personal items that are not sent to the safe or pharmacy.  [facility] is not responsible for loss, theft or damage of any property in my possession.    Signature:  _________________________________ Date: _______  Time: _____                                              Staff Signature:  ____________________________ Date: ________  Time: _____      2nd Staff person, if patient is unable/unwilling to sign:    Signature: ________________________________ Date: ________  Time: _____     Discharge:  [facility] has returned all of my personal belongings:    Signature: _________________________________ Date: ________  Time: _____                                          Staff Signature:  ____________________________ Date: ________  Time: _____

## 2018-06-30 NOTE — ED PROVIDER NOTES
"  History     Chief Complaint   Patient presents with     Manic Behavior     Manic and unable to sleep at night.     HPI  Cristina Byod is a 18 year old female with bipolar I disorder who presents with increased energy and inability to sleep. Patient presents along with her father and brother who provide additional information. Patient reports \"being hypomanic the past two days\". She states she last slept during the day 1.5 days ago, feels increased energy and is \"more irritated\". Patient denies any suicidal ideation but the father stated that she said she wanted to die earlier this past day.     I have reviewed the Medications, Allergies, Past Medical and Surgical History, and Social History in the Epic system.    Review of Systems  A 10-system review of systems was completed with pertinent positives and negatives noted in the HPI, otherwise negative.     Physical Exam   BP: 120/80  Pulse: 92  Temp: 98.7  F (37.1  C)  Resp: 18  Weight: 57.3 kg (126 lb 6.4 oz)  SpO2: 96 %      Physical Exam  /80  Pulse 92  Temp 98.7  F (37.1  C)  Resp 18  Wt 57.3 kg (126 lb 6.4 oz)  LMP  (LMP Unknown)  SpO2 96%  Breastfeeding? No  BMI 20.4 kg/m2  General: well-appearing, overly active in room, speaking rapidly  HENT: MMM, no oropharyngeal lesions  Eyes: PERRL, normal sclerae   Cardio: Regular rate, extremities well perfused  Resp: Normal work of breathing, normal respiratory rate  Neuro: alert and fully oriented. CN II-XII grossly intact. Grossly normal strength and sensation in all extremities.   MSK: no deformities.   Integumentary/Skin: no rash, normal color  Psych: anxious affect, erratic behavior. Pressured speech. Sometimes endorsing and sometimes denying SI.     ED Course     ED Course     Procedures        Critical Care time:  none       Labs Ordered and Resulted from Time of ED Arrival Up to the Time of Departure from the ED   DRUG ABUSE SCREEN 6 CHEM DEP URINE ([facility])   HCG QUALITATIVE URINE        "     Assessments & Plan (with Medical Decision Making)   In the ED, the patient was afebrile and hemodynamically stable. History notable for lack of sleep, increased energy, occasionally endorsing suicidal ideation. Exam notable for pressured speech and overly active behavior. Initial ddx includes but not limited to psychosis, shaina, hypomania, suicidal ideation.    Patient had recent admission for shaina, prolonged by infection concerns. No further infection symptoms recently per patient and family.     DEC assessment completed. Hypomanic symptoms, pressured speech, initially denied SI, later endorsed SI, doesn't feel safe at home. Patient's father reported that she left the house in the nearly 100 degree heat for much of the day, has been erratic at home, making some threats. He feels she cannot be safely managed at home with her escalating behavior, is strongly urging admission. With hypomania that has been escalating over the past couple days, home disposition not available given father's current feeling he can't safely have her at home, the patient would likely benefit from inpatient psych stay. Patient did report long sleep a couple days ago after quetiapine, reports she will not take it again. Inpatient stay would allow for monitoring with medication adjustment as well.       Clinical Impression:  Hypomania   Suicidal ideation       René Orellana MD  Emergency Medicine       I have reviewed the nursing notes.    I have reviewed the findings, diagnosis, plan and need for follow up with the patient.    New Prescriptions    No medications on file       Final diagnoses:   Manic disorder, recurrent episode, mild (H)   Suicidal ideation       6/30/2018   [facility], [place], EMERGENCY DEPARTMENT     René Orellana MD  06/30/18 0803

## 2018-06-30 NOTE — PROGRESS NOTES
"Initial Psychosocial Assessment    I have reviewed the chart, met with the patient, and developed Care Plan. Writer not able to wake patient up to meet for assessment, all information taken from EMR and previous  admissions.    Presenting Problem:  Per ED: Cristina Boyd is a 18 year old female with bipolar I disorder who presents with increased energy and inability to sleep. Patient presents along with her father and brother who provide additional information. Patient reports \"being hypomanic the past two days\". She states she last slept during the day 1.5 days ago, feels increased energy and is \"more irritated\". Patient denies any suicidal ideation but the father stated that she said she wanted to die earlier this past day.     History of Mental Health and Chemical Dependency:  Mental health history: Patient was admitted at [facility] admission: 5/31/18-6/13/18, previous hospitalizations in Jan and Feb of 2017.  Past diagnoses: BPAD1, with psychosis     No history of substance abuse. Utox negative    Family Description (Const[facility], Family Psychiatric History):  She was born in the [place] Her family is originally from [place]. She was in [place] for 3 years and then [place] of [place]. Per records, her maternal aunt and uncle have bipolar disorder. She has a cousin with bipolar disorder. She reports no family history of chemical dependency or suicides.     Significant Life Events (Illness, Abuse, Trauma, Death):  Patient denies any abuse/trauma or negative life events that may be impacting their mental health symptoms.     Living Situation:  Patient resides with parent    Educational Background:  Has been taking coursed at [employer] - 6 courses - full time hasn't attended in last few weeks reports that she is not doing well in a a few of the courses.     Occupational History:  Patient is a student    Financial Status:  Supported by family    Legal Issues:  Patient denies     Ethnic/Cultural " Issues:  Jordanian female     Spiritual Orientation:  None identified     Service History:  Denies     Current Treatment Providers are:  Psychiatry - Dr. Key  [facility] ([facility]) - [facility] 2nd Floor [id number]. [place], [address] phone: [phone]    Social Functioning:  Patients mental health symptoms are not controlled at this point. Psychiatric stabilization is necessary to determine patient baseline.    Social Service Assessment/Plan:  Patient has been admitted for psychiatric stabilization. Patient will have psychiatric assessment and medication management by the psychiatrist. Medications will be reviewed and adjusted per MD as indicated. The treatment team will continue to assess and stabilize the patient's mental health symptoms with the use of medications and therapeutic programming. Hospital staff will provide a safe environment and a therapeutic milieu. Staff will continue to assess patient as needed. Patient will participate in unit groups and activities. Patient will receive individual and group support on the unit.  CTC will do individual inpatient treatment planning and after care planning. CTC will discuss options for increasing community supports with the patient. CTC will coordinate with outpatient providers and will place referrals to ensure appropriate follow up care is in place.  Patient would benefit from: Medication management

## 2018-06-30 NOTE — PLAN OF CARE
"Problem: Manic Symptoms  Goal: Manic Symptoms  Signs and symptoms of listed problems will be absent or manageable.        ADMIT: This is one of 6 admits for this young manic bipolar young lady. She has been on sta 32, 22 x2 & 20 x2 prior. Pt arrives on the unit at 0930 very hyper verbal, manic but is alert and oriented. She denies SI, no thoughts or plans but states she needs to get away from her family so is happy to be here. \"I hate everything and everyone\". Pt has settled on the unit doing ok at this moment. Pt was given zyprexa and propanaol in the ED this morning & pt fell asleep for a few hours today, awoke at 1315. Will continue to assess pt's shaina as the day moves forward.      "

## 2018-06-30 NOTE — IP AVS SNAPSHOT
[facility]    [facility] Station [address]AW    [address] [address]    Phone:  [phone]                                       After Visit Summary   6/30/2018    Cristina Boyd    MRN: [id number]           After Visit Summary Signature Page     I have received my discharge instructions, and my questions have been answered. I have discussed any challenges I see with this plan with the nurse or doctor.    ..........................................................................................................................................  Patient/Patient Representative Signature      ..........................................................................................................................................  Patient Representative Print Name and Relationship to Patient    ..................................................               ................................................  Date                                            Time    ..........................................................................................................................................  Reviewed by Signature/Title    ...................................................              ..............................................  Date                                                            Time

## 2018-06-30 NOTE — PROGRESS NOTES
Pt  indicated goal to stay calm and indicated being triggered by another patient  Pt indicated feeling paranoid that another patient on the unit will attack her  Pt indicated feeling depressed, agitated,irritable, sad, confused and anxious with poor concentration  Pt indicated SI/SIB thoughts only with no plans or intent to act  Pt denies visual or auditory hallucination  Pt indicated poor appetite, good sleep with pain  In all joints     06/30/18 1818   Behavioral Health   Hallucinations denies / not responding to hallucinations   Thinking confused;distractable;delusional;paranoid;poor concentration   Orientation person: oriented;place: oriented;date: oriented;time: oriented   Memory baseline memory   Insight admits / accepts;poor   Judgement impaired   Eye Contact at examiner   Affect blunted, flat;sad;irritable   Mood depressed;anxious;mood is calm   Physical Appearance/Attire attire appropriate to age and situation;neat   Suicidality thoughts only  (Pt indicated SI thoughts only with no plans or intent to act)   1. Wish to be Dead No   2. Non-Specific Active Suicidal Thoughts  No   Self Injury thoughts only  (Pt indicated SDIB thoughts only with no plans or intent to a)   Elopement (Pt is not at risk of elopement at the moment)   Activity (Pt participated in grp,socialized,visible in the milieu)   Speech clear;coherent   Medication Sensitivity no stated side effects;no observed side effects   Psychomotor / Gait balanced;steady   Psycho Education   Type of Intervention 1:1 intervention   Response participates with encouragement   Hours 0.5   Daily Care   Activity activity encouraged   Patient Performed Hygiene shaved face;shower   Activities of Daily Living   Hygiene/Grooming independent   Oral Hygiene independent   Dress independent   Laundry unable to complete   Room Organization independent   Activity   Activity Assistance Provided independent

## 2018-06-30 NOTE — ED NOTES
The pt is in room 8. She is refusing to stay in room. Wants to wander about the ed, she is pleasant and not aggressive but will not stay in room. I reviewed latest admission and discharge instructions- meds are seroquel 100 mg tid prn agitation and 400 mg at hs. The pt is not taking. I offered seroquel but she refused stating it makes me too sleepy. I will take something else. Will offer zyprexa. Admission is pending     Emir Mac MD  06/30/18 1327

## 2018-07-01 LAB — LITHIUM SERPL-SCNC: 0.96 MMOL/L (ref 0.6–1.2)

## 2018-07-01 PROCEDURE — H2032 ACTIVITY THERAPY, PER 15 MIN: HCPCS

## 2018-07-01 PROCEDURE — 36415 COLL VENOUS BLD VENIPUNCTURE: CPT | Performed by: NURSE PRACTITIONER

## 2018-07-01 PROCEDURE — 80178 ASSAY OF LITHIUM: CPT | Performed by: NURSE PRACTITIONER

## 2018-07-01 PROCEDURE — 25000132 ZZH RX MED GY IP 250 OP 250 PS 637: Performed by: NURSE PRACTITIONER

## 2018-07-01 PROCEDURE — 25000132 ZZH RX MED GY IP 250 OP 250 PS 637: Performed by: FAMILY MEDICINE

## 2018-07-01 PROCEDURE — 12400007 ZZH R&B MH INTERMEDIATE UMMC

## 2018-07-01 RX ADMIN — QUETIAPINE 100 MG: 100 TABLET, FILM COATED ORAL at 01:38

## 2018-07-01 RX ADMIN — LITHIUM CARBONATE 900 MG: 450 TABLET, EXTENDED RELEASE ORAL at 21:58

## 2018-07-01 RX ADMIN — QUETIAPINE 100 MG: 100 TABLET, FILM COATED ORAL at 22:43

## 2018-07-01 RX ADMIN — PROPRANOLOL HYDROCHLORIDE 20 MG: 10 TABLET ORAL at 08:27

## 2018-07-01 RX ADMIN — PROPRANOLOL HYDROCHLORIDE 20 MG: 10 TABLET ORAL at 21:58

## 2018-07-01 ASSESSMENT — ACTIVITIES OF DAILY LIVING (ADL)
DRESS: INDEPENDENT
DRESS: INDEPENDENT
GROOMING: INDEPENDENT
ORAL_HYGIENE: INDEPENDENT
HYGIENE/GROOMING: INDEPENDENT
ORAL_HYGIENE: INDEPENDENT

## 2018-07-01 NOTE — PROGRESS NOTES
"Pt's father arrived to visit pt at 1950. Staff went out to let father onto the unit, and he presented a [employer] employee badge to staff indicating that he was here to visit his daughter. When redirected to the main desk to obtain an ID sticker, pt's father became argumentative, raising his voice and posturing toward staff member. Staff member returned to notify writer, and writer went out with another staff to speak with father, again reiterating that per policy, we require that all visitors obtain an ID sticker from security. Pt's father was again argumentative, stating that his daughter was in the [facility] 20 days ago and that he never had a problem coming onto the unit, as he was a \"[employer] employee.\" Father requested to speak to the ANS. ANS notified and came to speak with pt's father, who was eventually agreeable to obtaining an ID sticker.   "

## 2018-07-01 NOTE — PROGRESS NOTES
Received page from Unit 4AW Charge RN Mayte Rucker at 1961 with request to come to unit to speak with a gentleman who reported that he is pt's father and who presented a [employer] Employee Badge when attempting to enter/access the unit to see pt. Per Mayte's description of the matter, when asked for his visitor ID, he stated he did not need one and, that when he has visited his daughter previously while she was admitted to other units, his [employer] ID badge was all that they required. Per Mayte, the aforementioned individual became angry/upset and raised his voice when  he was instructed to go to the security desk to sign in and get a visitor badge and had requested to speak with  a supervisor. After being so informed, I made my way to [facility] inpatient unit ASAP and had a brief conversation with Mayte Rucker RN , and also with Ishaan Hudson, [facility] Associate. Per Mayte, pt's father was presently sitting in the lobby just outside of the unit. During my discussion with Ishaan, she reported that she she was concerned because pt's father had raised his voice and moved closer to her during their conversation. I advised immediately aforementioned persons that I would meet with pt's father in the lobby and would discuss behavioral expectations with him including the expectation that when he visits his daughter on the unit, he will need to sign in and get a visitor badge before being allowed on the unit and also that when he interacts with staff, he will need to remain calm and not raise his voice. I then met with Mr Faria (who reported that he prefers to be addressed as Lawrence). During our brief conversation, Lawrence remained calm. 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 During our conversation, Lawrence pointed out that the wording on signage here is such that it seems to him that if he has an employee badge, that is all he needs. He also showed me a pair of glasses and indicated that all he wanted to do was spend a few minutes with his daughter and to give her the eye glasses. I discussed with Lawrence that while I understand his concerns and his frustration, I am uncertain as to why it may have been that he was not held to this standard when visiting his daughter previously, but that  because he is here as a visitor and not in his capacity as an employee, it is an expectation that he sign in as a visitor and get a visitor badge. Discussed with him also that he will need to do this in the future but that I will convey his concerns to the Nurse Manager so that any needed follow-up can occur .Lawrence verbalized understanding and was agreeable to obtaining a visitor ID badge. I advised him that I would be happy to accompany him to the security desk but before I leave the unit, I would like to let the unit Charge nurse know of the outcome of our discussion and also that I would like to call Security to be sure that an officer is still available in the [facility] to assist him with the visitor ID badge. He was agreeable to plan as stated. I then briefly stepped back on to unit 4AW where I informed aforementioned Charge RN and Psych Associate of the out come of my discussion with patient's father. I then also made TC to Security Dispatch and confirmed that an Officer was still present in the [facility]  to assist with the visitor ID process. After exiting the aforementioned nursing unit, I discussed with Lawrence that while I understand his frustration, it is important that he remain calm and not raise his voice when interacting with nursing staff. He verbalized  understanding and agreement. After accompanying Lawrence to the [facility] area, I departed the [facility] but have since had a follow-up telephone conversation with the 4AW Charge RN Mayte (At approx. 22:01). Mayte informed me that when patient's father returned to the unit, he was there for a brief amount of time and there were no further problems.

## 2018-07-01 NOTE — PROGRESS NOTES
" 07/01/18 1300   Art Therapy   Type of Intervention structured groups   Response participates with encouragement   Hours 3   Treatment Detail (Art Therapy)   Probem- Psychosis  Goal- Therapeutic intervention Art and Leisure- groups Art Therapy, positivity/ optimism themes  Outcome- pt was disruptive in groups. She would swear, get agitated about small things such as the hot water or tea wasn't being made quick enough. She had a positive contriibution to group discussion about positive words of wisdom. She stated a quote from the [employer] she likes \" After hardship comes ease. \" After that contribution however, she was unable to follow group expectations about no side talk when others are speaking and no provocative language. When writer outlined, no drug references, cursing or disrespect nor sexual references, she started talking about how sex was a sin. She was triggering other pts and interrupting, writer asked her to refrain or she would have to leave group. She got up on her own and said \" that is why I hate this place.\"  "

## 2018-07-01 NOTE — PROGRESS NOTES
"Pt was awake at the start of the shift and requested PRN hydroxyzine. Writer asked pt why she refused her HS seroquel, and pt stated that it made her \"trip out.\" Pt stated that the dose was too high and she believes that this made her manic. Pt continued to appear restless, had frequent requests and needed a lot of redirection to step away from the  after receiving PRN hydroxyzine. At 0130, pt reported to staff that she was considering taking her seroquel. When writer wasn't immediately available, pt stated she was just going to try to sleep instead. A few minutes later, pt came back out and declined seroquel when offered. Writer informed pt that she had a 100mg PRN dose that she could take instead of the 400 mg dose, and she agreed to take it.     At 0200 pt requested another 100mg of seroquel. Writer informed pt that she would not be able to receive another PRN dose so close to the last dose. Pt drank a cup of tea and returned to her room at 0215 and appeared sleeping at 0245.  "

## 2018-07-01 NOTE — PROGRESS NOTES
Pt declined HS Seroquel.  RN talked to pt about the risk vs benefits of taking her medication.  Pt continued to decline, RN re approached with no success, however pt did take her Propanolol and Lithium.  To monitor.

## 2018-07-01 NOTE — PROGRESS NOTES
"Pt has flight of ideas and is argumentative, but will consider staff ideas and accept redirection. Pt hyperverbal and tangential. Pt has spent most of the morning working on her makeup. Pt intrusive and needing redirection around peers because of inappropriate questions, swearing, and physical space boundary issues. Pt denies SI/SIB and states she lied about being suicidal to get in here because \"the doctor wanted to send me home, but I wanted to stay.\" Pt states she is hypomanic and when she is in a manic episode, she has panic attacks.      07/01/18 1300   Behavioral Health   Hallucinations denies / not responding to hallucinations   Thinking confused;distractable;poor concentration;paranoid   Orientation person: oriented;place: oriented   Memory short term;baseline memory   Insight admits / accepts   Judgement impaired   Eye Contact at examiner   Affect incongruent;irritable;blunted, flat;tense   Mood anxious;labile;irritable   Physical Appearance/Attire attire appropriate to age and situation   Hygiene pre-occupied with grooming   Suicidality other (see comments)  (denies)   1. Wish to be Dead No   2. Non-Specific Active Suicidal Thoughts  No   Self Injury other (see comment)  (\"nope\")   Elopement Statements about wanting to leave   Activity hyperactive (agitated, impulsive)   Speech rambling;tangential;flight of ideas   Medication Sensitivity no stated side effects;no observed side effects   Psychomotor / Gait balanced;steady   Psycho Education   Type of Intervention 1:1 intervention   Response participates with cues/redirection   Hours 0.5   Activities of Daily Living   Hygiene/Grooming independent   Oral Hygiene independent   Dress independent   Room Organization independent   Activity   Activity Assistance Provided independent     "

## 2018-07-02 PROCEDURE — 25000132 ZZH RX MED GY IP 250 OP 250 PS 637: Performed by: FAMILY MEDICINE

## 2018-07-02 PROCEDURE — 25000132 ZZH RX MED GY IP 250 OP 250 PS 637: Performed by: PSYCHIATRY & NEUROLOGY

## 2018-07-02 PROCEDURE — 99232 SBSQ HOSP IP/OBS MODERATE 35: CPT | Performed by: PSYCHIATRY & NEUROLOGY

## 2018-07-02 PROCEDURE — G0177 OPPS/PHP; TRAIN & EDUC SERV: HCPCS

## 2018-07-02 PROCEDURE — H2032 ACTIVITY THERAPY, PER 15 MIN: HCPCS

## 2018-07-02 PROCEDURE — 12400007 ZZH R&B MH INTERMEDIATE UMMC

## 2018-07-02 PROCEDURE — 25000132 ZZH RX MED GY IP 250 OP 250 PS 637: Performed by: NURSE PRACTITIONER

## 2018-07-02 RX ORDER — QUETIAPINE FUMARATE 100 MG/1
100 TABLET, FILM COATED ORAL 3 TIMES DAILY
Status: DISCONTINUED | OUTPATIENT
Start: 2018-07-02 | End: 2018-07-05

## 2018-07-02 RX ADMIN — QUETIAPINE 100 MG: 100 TABLET, FILM COATED ORAL at 23:31

## 2018-07-02 RX ADMIN — QUETIAPINE 100 MG: 100 TABLET, FILM COATED ORAL at 20:19

## 2018-07-02 RX ADMIN — TRAZODONE HYDROCHLORIDE 50 MG: 50 TABLET ORAL at 00:25

## 2018-07-02 RX ADMIN — QUETIAPINE 100 MG: 100 TABLET, FILM COATED ORAL at 03:05

## 2018-07-02 RX ADMIN — LITHIUM CARBONATE 900 MG: 450 TABLET, EXTENDED RELEASE ORAL at 20:19

## 2018-07-02 RX ADMIN — HYDROXYZINE HYDROCHLORIDE 25 MG: 25 TABLET ORAL at 15:19

## 2018-07-02 RX ADMIN — PROPRANOLOL HYDROCHLORIDE 20 MG: 10 TABLET ORAL at 20:19

## 2018-07-02 RX ADMIN — QUETIAPINE 100 MG: 100 TABLET, FILM COATED ORAL at 15:19

## 2018-07-02 RX ADMIN — TRAZODONE HYDROCHLORIDE 50 MG: 50 TABLET ORAL at 01:50

## 2018-07-02 RX ADMIN — PROPRANOLOL HYDROCHLORIDE 20 MG: 10 TABLET ORAL at 08:20

## 2018-07-02 ASSESSMENT — ACTIVITIES OF DAILY LIVING (ADL)
DRESS: INDEPENDENT
GROOMING: INDEPENDENT
ORAL_HYGIENE: INDEPENDENT
LAUNDRY: UNABLE TO COMPLETE
ORAL_HYGIENE: INDEPENDENT
DRESS: INDEPENDENT
GROOMING: INDEPENDENT

## 2018-07-02 NOTE — PROGRESS NOTES
Remains awake. Frequently walks in and out of room, in halls and lounge. Makes many requests. Once requests are made frequently makes another request. Has received Trazodone 50 mg and repeated once. Appears tired and staff frequently help her to her room and encourage sleep but patient leaves room a short time later. Reports she does not like having a roommate and worries they will wake up. Has also voiced concern about spirits getting to her at night.

## 2018-07-02 NOTE — PROGRESS NOTES
Pt was visible and social in milieu for shift. Pt did paced halls a lot. Pt socialized with peers a lot but did annoy some pts at times when talking to them. Pt would start talking on a subject but then lose focus and forget what she was talking about a few times. Denies SI, SIB AH and VH.        07/01/18 2200   Behavioral Health   Hallucinations denies / not responding to hallucinations   Thinking distractable;confused   Orientation person: oriented;place: oriented   Memory baseline memory   Insight insight appropriate to situation   Judgement impaired   Eye Contact at examiner   Affect blunted, flat   Mood anxious   Physical Appearance/Attire appears stated age;attire appropriate to age and situation   Hygiene well groomed   Suicidality other (see comments)  (denies)   1. Wish to be Dead No   2. Non-Specific Active Suicidal Thoughts  No   Self Injury other (see comment)  (denies)   Elopement (no concerns)   Activity hyperactive (agitated, impulsive)   Speech coherent;clear   Medication Sensitivity no stated side effects;no observed side effects   Psychomotor / Gait steady;balanced   Psycho Education   Type of Intervention 1:1 intervention   Response participates, initiates socially appropriate   Hours 0.5   Activities of Daily Living   Hygiene/Grooming independent   Oral Hygiene independent   Dress independent   Room Organization independent

## 2018-07-02 NOTE — PROGRESS NOTES
07/02/18 1300   Art Therapy   Type of Intervention structured groups   Response participates with encouragement   Hours 2   Treatment Detail (Art Therapy)   Probem- Bi Polar- Manic  Goal- Therapeutic intervention Art and Leisure- groups Art Therapy, Chris/ samson  Outcome- pt participated in group. She needed much redirection for swearing, interrupting and bringing up inappropriate comments. She was irritating multiple pts. They were having a hard time focusing in group with so many disruptions. Writer was concerned she was targeting a vulnerable pt. She repeatedly asked the other pt to stop staring at her. Writer redirected and talked abut respect. Writer brought concerns to staff during the day. Right after writer alerted staff, the other pt came out and told her nurse this pt was bothering her and she didn't know what to do. Writer was worried the interaction might get volatile if not monitored closely based on both pts reactions historically.

## 2018-07-02 NOTE — PROGRESS NOTES
Resting on bed in room at this time. Appeared to fall asleep around 0500. Received PRN Trazodone twice this shift at 0025 and 0150. Appeared tired after receiving the Trazodone but continued to stay out of her room making many requests and complained of being unable to sleep due to anxiety. Makes frequent requests. Eventually patient requested PRN Seroquel which was given at 0305. Continued to pace in purvis and made requests for some time after the PRN Seroquel was given. Appeared to be tired and staff encouraged her to attempt to sleep. Did not actually rest on bed until 0500. Once she did rest she appeared to fall asleep.

## 2018-07-02 NOTE — PROGRESS NOTES
Pt declined HS Seroquel for the second night in a row.  Pt states the medication makes her go crazy.  RN re- approached with no success.  Provider updated via sticky note.

## 2018-07-02 NOTE — PROGRESS NOTES
Pt indicated goal is to stay calm and relax  Pt denies SI/SIB thoughts, denies visual auditory hallucination  Pt indicated feeling depressed, sad and anxious with poor concentration  Pt indicated good appetite,good sleep with no pain  Pt did not participate in a group, paced the purvis,socialized and was visible in the milieu     07/02/18 1245   Behavioral Health   Hallucinations denies / not responding to hallucinations   Thinking confused;distractable;poor concentration   Orientation person: oriented;place: oriented;date: oriented;time: oriented   Memory baseline memory   Insight admits / accepts;poor   Judgement impaired   Eye Contact at examiner   Affect blunted, flat;sad;irritable   Mood depressed;anxious;mood is calm   Physical Appearance/Attire flamboyant;attire appropriate to age and situation;neat   Hygiene well groomed   Suicidality (Pt denies SI thoughts)   1. Wish to be Dead No   2. Non-Specific Active Suicidal Thoughts  No   Self Injury (Pt denies SI thoughts)   Elopement (Pt is not at risk of elopement)   Activity (Pt did not participate in a group, paced the purvis,socialized)   Speech rambling;tangential;clear;coherent   Medication Sensitivity no stated side effects;no observed side effects   Psychomotor / Gait balanced;steady   Psycho Education   Type of Intervention 1:1 intervention   Response observes from a distance   Hours 0.5   Activities of Daily Living   Hygiene/Grooming independent   Oral Hygiene independent   Dress independent   Laundry unable to complete   Room Organization independent   Activity   Activity Assistance Provided independent

## 2018-07-02 NOTE — PROGRESS NOTES
Behavioral Health  Note  Behavioral Health  Spirituality Group Note    Unit 4AW    Name: Cristina Boyd    YOB: 2000   MRN: [id number]    Age: 18 year old    Topic:  Shame  Spiritual Practice/Coping Skill taught:  Self-compassion  CBT/DBT connection:  Cognitive restructuring    Patient attended -led group, which included discussion of spirituality, coping with illness and building resilience.  Patient attended group for 1 hrs.  The patient actively participated in group discussion and had a hard time staying on topic and curbing excessive comments.    Adelita Preciado M.S., M.Div.  Staff   Pager [phone]

## 2018-07-02 NOTE — PROGRESS NOTES
"[facility], [place]   Psychiatric Progress Note        Interim History:   The patient's care was discussed with the treatment team during the daily team meeting and/or staff's chart notes were reviewed.  Staff report patient is pressured, disorganized and sleeping poorly. Refused Seroquel hs, but agreed to a smaller PRN dose. Lithium level was 0.9. Frequent requested and poor boundaries with peers. No SI or NERI. Independent with ADL.     Met patient with staff. The patient was guarded and suspicious. Asked for the door and blinds to be closed.  The patient was pressured and had difficulty staying on task. Refused to continued Seroquel and demanded Ativan. When I declined citing lack of indications, she agreed to change it to tid dosing. Noted that racing thoughts subsiding and denied hallucinations. Noted that feeling depr, anxi and \"angry\". Believes that she slept well and appetite is intact. Tolerating lithium well. Requested to start on birth control. She is willing to consider Depakote if shaina persist.     Discussed medications and care plan.        Medications:       lithium  900 mg Oral At Bedtime     propranolol  20 mg Oral BID     QUEtiapine  400 mg Oral At Bedtime          Allergies:   No Known Allergies       Labs:   No results found for this or any previous visit (from the past 24 hour(s)).       Psychiatric Examination:     Vitals:    07/01/18 0900 07/01/18 2155 07/01/18 2158 07/02/18 0813   BP: 95/63 103/63 103/63 113/57   Pulse: 83 75 75 112   Resp:  16  16   Temp: 96  F (35.6  C)   96.8  F (36  C)   TempSrc: Tympanic   Tympanic   SpO2: 100%      Weight: 57.9 kg (127 lb 10.3 oz)      Height:                         Sitting Orthostatic BP: 113/57      Sitting Orthostatic Pulse: 112 bpm      Standing Orthostatic BP: 148/125      Standing Orthostatic Pulse: 94 bpm       Weight is 127 lbs 10.34 oz  Body mass index is 20.6 kg/(m^2).    Appearance: awake, alert, adequately " groomed, appeared as age stated and no apparent distress  Attitude:  somewhat cooperative  Eye Contact:  intense  Mood:  angry, anxious and depressed  Affect:  intensity is heightened and guarded  Speech:  pressured speech  Psychomotor Behavior:  no evidence of tardive dyskinesia, dystonia, or tics and intact station, gait and muscle tone  Throught Process:  illogical and tangental  Associations:  no loose associations  Thought Content:  no evidence of suicidal ideation or homicidal ideation, no auditory hallucinations present, no visual hallucinations present and paranoia suspected.   Insight:  fair  Judgement:  fair  Oriented to:  time, person, and place  Attention Span and Concentration:  intact  Recent and Remote Memory:  intact           Pain Assessment:     # Pain Assessment:  Current Pain Score 7/2/2018   Patient currently in pain? yes   Pain score (0-10) -   Pain location Generalized   Pain descriptors -   Cristina edmonds pain level was assessed and she currently denies pain.             Precautions:     Behavioral Orders   Procedures     Code 1 - Restrict to Unit     Routine Programming     As clinically indicated     Status 15     Every 15 minutes.          Diagnoses:      Bipolar disorder type 1, severe, manic         Plan:     Medications:  -- Lithium: continued at 900 mg qhs. Lithium level on 7/1 was 0.96. May consider cross taper to Depakote if shaina persist.   -- Seroquel: continued at 400 mg qhs on admission. The patient refused to take if but agreed to switch to 100 mg TID.     Legal Status and Disposition:  -- volunt.   -- discharge will be granted once achieved mood stabilization, remission of shaina and safety in the community.

## 2018-07-03 PROCEDURE — 97150 GROUP THERAPEUTIC PROCEDURES: CPT | Mod: GO

## 2018-07-03 PROCEDURE — 99207 ZZC CDG-MDM COMPONENT: MEETS MODERATE - DOWN CODED: CPT | Performed by: PSYCHIATRY & NEUROLOGY

## 2018-07-03 PROCEDURE — 25000132 ZZH RX MED GY IP 250 OP 250 PS 637: Performed by: PSYCHIATRY & NEUROLOGY

## 2018-07-03 PROCEDURE — 99232 SBSQ HOSP IP/OBS MODERATE 35: CPT | Performed by: PSYCHIATRY & NEUROLOGY

## 2018-07-03 PROCEDURE — 25000132 ZZH RX MED GY IP 250 OP 250 PS 637: Performed by: FAMILY MEDICINE

## 2018-07-03 PROCEDURE — 12400007 ZZH R&B MH INTERMEDIATE UMMC

## 2018-07-03 PROCEDURE — 25000132 ZZH RX MED GY IP 250 OP 250 PS 637: Performed by: NURSE PRACTITIONER

## 2018-07-03 RX ADMIN — QUETIAPINE 100 MG: 100 TABLET, FILM COATED ORAL at 22:34

## 2018-07-03 RX ADMIN — QUETIAPINE 100 MG: 100 TABLET, FILM COATED ORAL at 14:05

## 2018-07-03 RX ADMIN — PROPRANOLOL HYDROCHLORIDE 20 MG: 10 TABLET ORAL at 20:48

## 2018-07-03 RX ADMIN — LITHIUM CARBONATE 900 MG: 450 TABLET, EXTENDED RELEASE ORAL at 20:48

## 2018-07-03 RX ADMIN — QUETIAPINE 100 MG: 100 TABLET, FILM COATED ORAL at 10:23

## 2018-07-03 RX ADMIN — PROPRANOLOL HYDROCHLORIDE 20 MG: 10 TABLET ORAL at 10:23

## 2018-07-03 RX ADMIN — QUETIAPINE 100 MG: 100 TABLET, FILM COATED ORAL at 01:53

## 2018-07-03 RX ADMIN — HYDROXYZINE HYDROCHLORIDE 25 MG: 25 TABLET ORAL at 22:33

## 2018-07-03 RX ADMIN — QUETIAPINE 100 MG: 100 TABLET, FILM COATED ORAL at 20:48

## 2018-07-03 ASSESSMENT — ACTIVITIES OF DAILY LIVING (ADL)
HYGIENE/GROOMING: INDEPENDENT
GROOMING: INDEPENDENT
DRESS: INDEPENDENT
DRESS: INDEPENDENT
ORAL_HYGIENE: INDEPENDENT
ORAL_HYGIENE: INDEPENDENT
LAUNDRY: WITH SUPERVISION

## 2018-07-03 NOTE — PROGRESS NOTES
"Pt was visible in the milieu and selectively social with peers and staff. Pt denies SI/SIB, anxiety, and depression today. Pt reports, \"I feel bad for being mean to [a peer]. I'm meaner when I'm manic.\" When pt asked if she feels manic today she reported \"no, not anymore.\" Pt adds that her goal for the evening is, \"to be calmer.\" Pt does present less agitated, though continues to have pressured, rambling speech. Pt approached staff and peers several times this evening saying, \"I swear my Haleigh is my relative. No one believes me.\"  Pt reports no new concerns this evening.    Update: Pt was disruptive with peers, telling multiple people to leave the community spaces, continued to misgender peers, and used the word \"nigger\" around peers even after being verbally redirected by staff. Pt ignores attempts to redirect this behavior, or states \"I can say whatever I want.\" Writer and other staff responded to many peers that expressed their frustration, stress, and discomfort being around this pt for the aforementioned reasons.      07/03/18 1639   Behavioral Health   Hallucinations denies / not responding to hallucinations   Thinking distractable   Orientation person: oriented;place: oriented;date: oriented   Memory baseline memory   Insight other (see comment)  (This is improving)   Judgement impaired   Eye Contact at examiner   Affect full range affect   Mood mood is calm   Physical Appearance/Attire appears stated age   Hygiene other (see comment)  (Fair)   Suicidality other (see comments)  (Denies)   1. Wish to be Dead No   2. Non-Specific Active Suicidal Thoughts  No   Self Injury other (see comment)  (Denies)   Elopement (None stated, none observed)   Activity other (see comment)  (Visible in milieu)   Speech clear;rambling   Medication Sensitivity no stated side effects   Psychomotor / Gait balanced;steady   Psycho Education   Type of Intervention 1:1 intervention   Response participates, initiates socially appropriate "   Hours 0.5   Treatment Detail ( Check-in)   Activities of Daily Living   Hygiene/Grooming independent   Oral Hygiene independent   Dress independent   Laundry with supervision   Room Organization independent

## 2018-07-03 NOTE — PROGRESS NOTES
Writer met with pt. Pt is asking about discharge. Pt states that she is no longer manic, but she is still a little hypomanic. Writer explained that if she is still experiencing any type of shaina she will most likely not be discharged. Writer explained that the provider would like her to be closer to her baseline. Pt states that she understands.

## 2018-07-03 NOTE — PROGRESS NOTES
"   07/03/18 1200   Behavioral Health   Hallucinations denies / not responding to hallucinations   Thinking poor concentration   Orientation person: oriented;place: oriented   Memory baseline memory   Insight poor   Judgement impaired   Eye Contact at examiner   Affect full range affect;irritable   Mood anxious;mood is calm   Physical Appearance/Attire appears stated age   Hygiene well groomed   Suicidality other (see comments)  (denies)   1. Wish to be Dead No   2. Non-Specific Active Suicidal Thoughts  No   Self Injury other (see comment)  (denies)   Elopement Statements about wanting to leave   Activity hyperactive (agitated, impulsive)   Speech rambling;tangential   Medication Sensitivity no stated side effects;no observed side effects   Psychomotor / Gait balanced;steady   Activities of Daily Living   Hygiene/Grooming independent   Oral Hygiene independent   Dress independent   Room Organization independent     Night staff reported that Pt was up all shift until Pt laid down in bed around 5am. Pt spelt until 1030. Pt respected top of the hour requests, was not irritable with waiting. Pt did frequently check-in about requests but was reminded of when the requests could be completed. Pt was visible in the milieu, in and out of group. Pt stated they did not want to be in group, \"they don't help me\", \"I don't need to be here\", \"I don't understand why I'm still here\", \"I'm pretty anxious and would feel better if I left\". Pt's speech was difficult to follow, bouncing from topic to topic. Pt denied SI and SIB. Pt denied hallucinations/psychotic symptoms.   "

## 2018-07-03 NOTE — PLAN OF CARE
Problem: Patient Care Overview  Goal: Individualization & Mutuality  BEHAVIORAL TEAM DISCUSSION    Participants: 4A Provider: Dr. Darrell Ferraro MD; 4A RN's: Giacomo Shi, RN and Mayda Koenig, RN; 4A CTC's: Paola Smalls (CTC) and Mar Garcia (CTC).  Progress: No Change.  Continued Stay Criteria/Rationale: N/A  Medical/Physical: Deferred (see medical notes).  Precautions:    Behavioral Orders   Procedures     Code 1 - Restrict to Unit     Routine Programming     As clinically indicated     Status 15     Every 15 minutes.     Plan: The following services will be provided to the patient; psychiatric assessment, medication management, therapeutic milieu, individual and group support, art therapy, and skills/OT groups.   Rationale for change in precautions or plan: N/A

## 2018-07-03 NOTE — PROGRESS NOTES
[facility], [place]   Psychiatric Progress Note      Impression:   This is a 18 year old female admitted for shaina.  We are adjusting medications to target mood.  We are also working with the patient on therapeutic skill building.           Diagnoses and Plan:     Principal Diagnosis: Bipolar I disorder, most recent episode manic with psychotic features  Unit: 4A  Attending: Jasmine  Medications: risks/benefits discussed with patient  - Lithium 900mg HS  -Seroquel 100mg TID ( after it was switched from 400mg HS, pt started taking it)  Laboratory/Imaging:  - Upreg neg, UDS neg, COMP wnl, TSH wnl, CBC wnl except for low Hb 10.4, Hct 33.0,  and Li level 0.96  Consults:  - as needed  Patient will be treated in therapeutic milieu with appropriate individual and group therapies as described.  Secondary psychiatric diagnoses of concern this admission:      Medical diagnoses to be addressed this admission:         Legal Status: Voluntary    Safety Assessment:   Checks: Status 15  Precautions: Restrict to Unit  Pt has not required locked seclusion or restraints in the past 24 hours to maintain safety, please refer to RN documentation for further details.    The risks, benefits, alternatives and side effects have been discussed and are understood by the patient and other caregivers.     Target symptoms to stabilize: mood lability, sleep issues, disorganization, impulsive and pressured speech, loose association, distractibility, increased psycomotor activity. insomnia  Target disposition: Day treatment    Attestation:  Patient has been seen and evaluated by me,  Darrell Ferraro MD          Interim History:   The patient's care was discussed with the treatment team and chart notes were reviewed.    Pt has shown some improvement in shaina but she continues to be disorganized, distractible, switches from one activity to another, unfocused, with pressured speech and loose association.   She started  "taking Seroquel 100mg TId which was switched from 400mg HS yesterday, due to pt's refusal .   She denies psychosis and SI, HI.   She is always moving around, does not sit still in one spot. She is restricted to unit.    Side effects to medication: denies  Sleep: slept through the night  Intake: eating/drinking without difficulty  Groups: demonstrating poor attention  Peer interactions: easily agitated by peers        The 10 point Review of Systems is negative other than noted in the HPI         Medications:       lithium  900 mg Oral At Bedtime     propranolol  20 mg Oral BID     QUEtiapine  100 mg Oral TID             Allergies:   No Known Allergies         Psychiatric Examination:   /67  Pulse 92  Temp 96.8  F (36  C) (Tympanic)  Resp 16  Ht 1.676 m (5' 6\")  Wt 57.9 kg (127 lb 10.3 oz)  LMP  (LMP Unknown)  SpO2 100%  Breastfeeding? No  BMI 20.6 kg/m2  Weight is 127 lbs 10.34 oz  Body mass index is 20.6 kg/(m^2).    Appearance:  awake, alert, adequately groomed, appeared as age stated, distracted, no apparent distress and casually dressed  Attitude:  very distractible and somewhat cooperative  Eye Contact:  fair  Mood:  reported as fine. does not look angry or sad  Affect:  intensity is flat  Speech:  Pressured.   Psychomotor Behavior:  no evidence of tardive dyskinesia, dystonia, or tics, fidgeting and goes from one activity to next. increased psychomotor activiti  Thought Process:  disorganized and tangental  Associations:  loosening of associations present  Thought Content:  no evidence of suicidal ideation or homicidal ideation, no auditory hallucinations present and no visual hallucinations present  Insight:  limited  Judgment:  poor  Oriented to:  time, person, and place  Attention Span and Concentration:  limited  Recent and Remote Memory:  limited  Language: Able to name objects  Fund of Knowledge: appropriate  Muscle Strength and Tone: normal  Gait and Station: Normal         Labs:   No " results found for this or any previous visit (from the past 24 hour(s)).

## 2018-07-03 NOTE — PLAN OF CARE
"Problem: Manic Symptoms  Goal: Manic Symptoms  Signs and symptoms of listed problems will be absent or manageable.     Pt presents with poor concentration and distractible thinking. Affect blunted, irritable and tense. Mood calm, depressed and anxious. Activity hyperactive. Speech tangential, rambling and pressured.   Pt explains that her appetite is \"really good\" with no issues reported. Pt explains that her sleep hygiene \"has not been good.\" Pt states \"I haven't been doing well with someone in my room, especially being around another female right. It's been triggering me to think of my Sister who I've had issues with in the past while living together. I know I'll start sleeping better now.\"  When asked if Pt is experiencing any adverse effects to medications, Pt states \"Yeah, I dunno. My main thing is that I'm more forgetful than I ever am. I am also trembling a little at times.\" No trembling seen this evening.   When asked if Pt is experience any acute physical ailments, Pt states \"My nails hurt me. I rip the skin by my cuticles and I tend to scratch myself because I'm a little nervous. I'm not trying to do it intentionally. It's like a being nervous thing or something.\" No scratches seen on Pt this evening.   Pt rates her anxiety a 2/10 and her depression a 10/10. Pt denies SI/SIB/HI. Pt denies AH and VH.   Pt seen visible in milieu for majority of shift. Pt seen to have poor boundaries and having many requests per hour. Pt also seen to need redirection multiple times during evening from staff. Pt exhibits irritability while stating \"I just don't always like people around me. I need me time and to be able to not be bothered by others.         "

## 2018-07-04 PROCEDURE — 25000132 ZZH RX MED GY IP 250 OP 250 PS 637: Performed by: FAMILY MEDICINE

## 2018-07-04 PROCEDURE — H2032 ACTIVITY THERAPY, PER 15 MIN: HCPCS

## 2018-07-04 PROCEDURE — 25000132 ZZH RX MED GY IP 250 OP 250 PS 637: Performed by: NURSE PRACTITIONER

## 2018-07-04 PROCEDURE — 12400007 ZZH R&B MH INTERMEDIATE UMMC

## 2018-07-04 PROCEDURE — 25000132 ZZH RX MED GY IP 250 OP 250 PS 637: Performed by: PSYCHIATRY & NEUROLOGY

## 2018-07-04 RX ADMIN — QUETIAPINE 100 MG: 100 TABLET, FILM COATED ORAL at 14:19

## 2018-07-04 RX ADMIN — SALINE NASAL SPRAY 1 SPRAY: 1.5 SOLUTION NASAL at 06:12

## 2018-07-04 RX ADMIN — ACETAMINOPHEN 650 MG: 325 TABLET, FILM COATED ORAL at 01:00

## 2018-07-04 RX ADMIN — SALINE NASAL SPRAY 1 SPRAY: 1.5 SOLUTION NASAL at 09:16

## 2018-07-04 RX ADMIN — TRAZODONE HYDROCHLORIDE 50 MG: 50 TABLET ORAL at 01:24

## 2018-07-04 RX ADMIN — QUETIAPINE 100 MG: 100 TABLET, FILM COATED ORAL at 10:04

## 2018-07-04 RX ADMIN — QUETIAPINE 100 MG: 100 TABLET, FILM COATED ORAL at 02:13

## 2018-07-04 RX ADMIN — LITHIUM CARBONATE 900 MG: 450 TABLET, EXTENDED RELEASE ORAL at 21:24

## 2018-07-04 RX ADMIN — QUETIAPINE 100 MG: 100 TABLET, FILM COATED ORAL at 08:03

## 2018-07-04 RX ADMIN — PROPRANOLOL HYDROCHLORIDE 20 MG: 10 TABLET ORAL at 21:24

## 2018-07-04 RX ADMIN — HYDROXYZINE HYDROCHLORIDE 25 MG: 25 TABLET ORAL at 10:04

## 2018-07-04 RX ADMIN — TRAZODONE HYDROCHLORIDE 50 MG: 50 TABLET ORAL at 00:16

## 2018-07-04 RX ADMIN — QUETIAPINE 100 MG: 100 TABLET, FILM COATED ORAL at 21:24

## 2018-07-04 RX ADMIN — PROPRANOLOL HYDROCHLORIDE 20 MG: 10 TABLET ORAL at 08:02

## 2018-07-04 RX ADMIN — HYDROXYZINE HYDROCHLORIDE 25 MG: 25 TABLET ORAL at 05:28

## 2018-07-04 RX ADMIN — QUETIAPINE 100 MG: 100 TABLET, FILM COATED ORAL at 23:28

## 2018-07-04 ASSESSMENT — ACTIVITIES OF DAILY LIVING (ADL)
DRESS: STREET CLOTHES;INDEPENDENT
ORAL_HYGIENE: INDEPENDENT
LAUNDRY: UNABLE TO COMPLETE
GROOMING: INDEPENDENT

## 2018-07-04 NOTE — PROGRESS NOTES
" 07/04/18 1600   Art Therapy   Type of Intervention structured groups   Response participates with encouragement   Hours 3   Treatment Detail (Art Therapy- free choice/ self compassion)   Problem- Psychosis  Goal- Coping and Expression through Art Therapy  Outcome- Pt was in attendance to some groups. She continues to need redirection multiple times for interruptions and oversharing. She is gaining a little more self awareness and now when there is an interruption, she apologizes. She is irritating some other pts with manic behavior. One pt in particular complained that she is\" treating them like a spectacle\" because they are transgender.  Writer will work to keep a distance between these pts in group.  "

## 2018-07-04 NOTE — PLAN OF CARE
Problem: Manic Symptoms  Goal: Manic Symptoms  Signs and symptoms of listed problems will be absent or manageable.   48 hour nursing assessment:  Pt evaluation continues. Assessed mood, anxiety, thoughts, and behavior. Is disorganized with thoughts towards goals. Pt requires a lot of redirection, constantly hanging out at the . Tangential, hypo manic. Only slept 2 hours last evening. Hopefully medication will continue to progress with pt becoming less manic and less disorganized. Encourage participation in groups and developing healthy coping skills. Pt denies auditory or visual  hallucinations. Refer to daily team meeting notes for individualized plan of care. Will continue to assess.

## 2018-07-04 NOTE — PROGRESS NOTES
Visited with pt on the basis of spiritual support of pt. Reflected with pt around her hospital experience, sources of spiritual and emotional support and current spiritual health needs. Pt talked about her current illness experience and what it means for her and her family. She expressed her emotion and cried. She also shared her frustration about her situation and been in the hospital.  Emotional support. Reflective conversation integrating elements of illness. Encouraged and help pt to focus on present. Encouraged self-care, follow and take advised from doctors and nurses. I gave Islamic Prayer Booklet and several of Islamic payer Bookmarkers.   Pt received spiritual support and reflective conversation in the context of this hospitalization. Pt. agreed that she needs to be a part of her own self care through more positive coping strategies. Pt expressed appreciation for the visit and the encouragement that she felt.  Will continue to provide support to pt/family during their hospitalization at least 1x/wk.

## 2018-07-05 ENCOUNTER — TELEPHONE (OUTPATIENT)
Dept: BEHAVIORAL HEALTH | Facility: CLINIC | Age: 18
End: 2018-07-05
Payer: COMMERCIAL

## 2018-07-05 PROCEDURE — 25000132 ZZH RX MED GY IP 250 OP 250 PS 637: Performed by: PSYCHIATRY & NEUROLOGY

## 2018-07-05 PROCEDURE — 12400007 ZZH R&B MH INTERMEDIATE UMMC

## 2018-07-05 PROCEDURE — 25000132 ZZH RX MED GY IP 250 OP 250 PS 637: Performed by: FAMILY MEDICINE

## 2018-07-05 PROCEDURE — 25000132 ZZH RX MED GY IP 250 OP 250 PS 637: Performed by: NURSE PRACTITIONER

## 2018-07-05 PROCEDURE — 97150 GROUP THERAPEUTIC PROCEDURES: CPT | Mod: GO

## 2018-07-05 PROCEDURE — 99233 SBSQ HOSP IP/OBS HIGH 50: CPT | Performed by: PSYCHIATRY & NEUROLOGY

## 2018-07-05 RX ORDER — LORAZEPAM 1 MG/1
1 TABLET ORAL 3 TIMES DAILY
Status: DISCONTINUED | OUTPATIENT
Start: 2018-07-05 | End: 2018-07-09

## 2018-07-05 RX ORDER — ARIPIPRAZOLE 10 MG/1
10 TABLET ORAL DAILY
Status: DISCONTINUED | OUTPATIENT
Start: 2018-07-05 | End: 2018-07-06

## 2018-07-05 RX ADMIN — SALINE NASAL SPRAY 1 SPRAY: 1.5 SOLUTION NASAL at 22:29

## 2018-07-05 RX ADMIN — OLANZAPINE 10 MG: 10 TABLET, FILM COATED ORAL at 14:06

## 2018-07-05 RX ADMIN — OLANZAPINE 10 MG: 10 TABLET, FILM COATED ORAL at 07:40

## 2018-07-05 RX ADMIN — PROPRANOLOL HYDROCHLORIDE 20 MG: 10 TABLET ORAL at 07:40

## 2018-07-05 RX ADMIN — LORAZEPAM 1 MG: 1 TABLET ORAL at 14:26

## 2018-07-05 RX ADMIN — ACETAMINOPHEN 650 MG: 325 TABLET, FILM COATED ORAL at 22:28

## 2018-07-05 RX ADMIN — PROPRANOLOL HYDROCHLORIDE 20 MG: 10 TABLET ORAL at 21:39

## 2018-07-05 RX ADMIN — SALINE NASAL SPRAY 1 SPRAY: 1.5 SOLUTION NASAL at 20:16

## 2018-07-05 RX ADMIN — LITHIUM CARBONATE 900 MG: 450 TABLET, EXTENDED RELEASE ORAL at 21:39

## 2018-07-05 RX ADMIN — ARIPIPRAZOLE 10 MG: 10 TABLET ORAL at 14:26

## 2018-07-05 RX ADMIN — LORAZEPAM 1 MG: 1 TABLET ORAL at 21:39

## 2018-07-05 RX ADMIN — HYDROXYZINE HYDROCHLORIDE 25 MG: 25 TABLET ORAL at 03:31

## 2018-07-05 RX ADMIN — QUETIAPINE 100 MG: 100 TABLET, FILM COATED ORAL at 07:40

## 2018-07-05 ASSESSMENT — ACTIVITIES OF DAILY LIVING (ADL)
ORAL_HYGIENE: INDEPENDENT
GROOMING: INDEPENDENT
DRESS: INDEPENDENT

## 2018-07-05 NOTE — TELEPHONE ENCOUNTER
Spoke with Dr. Ferraro. Dr. Ferraro is transferring pt to [facility] accepts. Both units aware.

## 2018-07-05 NOTE — PLAN OF CARE
Problem: Manic Symptoms  Goal: Manic Symptoms  Signs and symptoms of listed problems will be absent or manageable.       Pt remains quite intrusive with poor boundaries with other pt's. She remains hypomanic, disorganized and continues to not sleep at night even with prn medication. Pt requires a lot of redirection. Along with her scheduled meds, pt was given a po zyprexa 10 mg in the morning due to her verbal shaina and constant rambling to other pt's with disregard to others feelings. will discuss this with her provider.

## 2018-07-05 NOTE — PLAN OF CARE
"Problem: Manic Symptoms  Goal: Manic Symptoms  Signs and symptoms of listed problems will be absent or manageable.   Outcome: No Change   18   Manic Symptoms Assessed/Present   Manic Symptoms Assessed all   Manic Symptoms Present mood;anxiety;insight;thought process   Pt was labile this shift.  Pt continues to attend and participate in groups.  Pt reports she is sad because \"one of my best friend  last week by gunshot violence\".  Pt appeared upset and weepy this shift because she wanted to leave and continued asking staff if she is leaving tomorrow.  Pt was asked to discuss this with her doctor tomorrow.  Pt has poor insight into her illness and is convinced she is better and ready to leave.  Staff had 1:1 with pt on and off all evening.  Pt declined a prn but took all HS medication.  Pt denies SI/SIB but endorses depression which she rated 5 out of 10.  Pt has been trying to use her coping skills to distract herself (journaling and checking-in).  No c/o pain or medication side effect.  Will continue to assess.      "

## 2018-07-05 NOTE — PROGRESS NOTES
[facility], [place]   Psychiatric Progress Note      Impression:   This is a 18 year old female admitted for shaina.  We are adjusting medications to target mood.  We are also working with the patient on therapeutic skill building.            Diagnoses and Plan:      Principal Diagnosis: Bipolar I disorder, most recent episode manic with psychotic features  Unit: 4A  Attending: Jasmine  Medications: risks/benefits discussed with patient  - Lithium 900mg HS  -discontinue Seroquel 100mg TID ( after it was switched from 400mg HS, pt started taking it)  - -Start Abilify 10mg daily   -Start Ativan 1 mg TID today.  Laboratory/Imaging:  - Upreg neg, UDS neg, COMP wnl, TSH wnl, CBC wnl except for low Hb 10.4, Hct 33.0,  and Li level 0.96  Consults:  - as needed  Patient will be treated in therapeutic milieu with appropriate individual and group therapies as described.  Secondary psychiatric diagnoses of concern this admission:        Medical diagnoses to be addressed this admission:            Legal Status: Voluntary     Safety Assessment:   Checks: Status 15  Precautions: Restrict to Unit  Pt has not required locked seclusion or restraints in the past 24 hours to maintain safety, please refer to RN documentation for further details.    The risks, benefits, alternatives and side effects have been discussed and are understood by the patient and other caregivers.     Target symptoms to stabilize: mood lability, sleep issues, disorganization, impulsive and pressured speech, loose association, distractibility, increased psycomotor activity. insomnia  Target disposition: Transfer patient to the station 32, as requested by pt's father and patient today.     Today pt's father visited her and requested that pt transferred to the station 32, where she was admitted in the previous admission. His concern is that pt is not getting better, as quickly as she did in the last admission.   Writer offered to  change meds to the meds she was on when she was on the station 32, but her father insisted.   Writer later confirmed that previous meds were Lithium 450mg BID, Abilify 30mg HS. In the hospital, Ativan was used, too.           Attestation:  Patient has been seen and evaluated by me,  Darrell Ferraro MD          Interim History:   The patient's care was discussed with the treatment team and chart notes were reviewed.    Pt slept only 4474-2830 last night. She continues to be very manic, with poor sleep, intrusive comments and behaviors. Loose association in speech is worse than yesterday. She has been taking Seroquel 100mg TID and Lithium.   She shows anger and irritability today, which she did not show yesterday. She stated a few times that she was going to sign the 12 hour intent to leave, as she cannot stay here, she is very bothered by her peers.   Peers report that they have been offended by her comments. She told the patient who is a transgender that she is not a male, but she is a woman.   After one dose of Ativan 1mg and Abilify 10mg, she participated in groups and said that she was feeling much better.     She is going to be transferred to the station 32 this afternoon.     Side effects to medication: denies  Sleep: difficulty falling asleep, difficulty staying asleep and restless  Intake: eating/drinking without difficulty  Groups: participating, demonstrating poor attention, impulsive behaviors and agitating peers with inappropriate comments  Peer interactions: bullies peers and agitating peers with inappropriate comments      The 10 point Review of Systems is negative other than noted in the HPI         Medications:       ARIPiprazole  10 mg Oral Daily     lithium  900 mg Oral At Bedtime     LORazepam  1 mg Oral TID     propranolol  20 mg Oral BID             Allergies:   No Known Allergies         Psychiatric Examination:   /70  Pulse 88  Temp 97.5  F (36.4  C) (Oral)  Resp 16  Ht 1.676 m (5'  "6\")  Wt 59.7 kg (131 lb 9.8 oz)  LMP  (LMP Unknown)  SpO2 100%  Breastfeeding? No  BMI 21.24 kg/m2  Weight is 131 lbs 9.83 oz  Body mass index is 21.24 kg/(m^2).    Appearance:  awake, alert, appeared as age stated, uncooperative, severe distress and casually dressed  Attitude:  uncooperative  Eye Contact:  poor   Mood:  angry  Affect:  mood congruent  Speech:  clear, coherent and pressured speech  Psychomotor Behavior:  no evidence of tardive dyskinesia, dystonia, or tics, physical agitation and restless, pacing, frequent move  Thought Process:  disorganized and illogical  Associations:  loosening of associations present  Thought Content:  no evidence of suicidal ideation or homicidal ideation, no auditory hallucinations present, no visual hallucinations present and delusion present- that she is pregnant, talks about pregnancy a lot  Insight:  limited  Judgment:  poor  Oriented to:  time, person, and place  Attention Span and Concentration:  poor  Recent and Remote Memory:  limited  Language: Able to name objects  Fund of Knowledge: appropriate  Muscle Strength and Tone: normal  Gait and Station: Normal         Labs:   No results found for this or any previous visit (from the past 24 hour(s)).  "
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
## 2018-07-05 NOTE — PROGRESS NOTES
Pt went to bed at 0000 and woke up at 0300.  Pt got up, got out of her room and proceeded to come to the nursing desk.  Pt had multiple requests all night and had to be redirected back to her room several times, however, pt made several requests to sit in the lounge area even though she was told the lounge is closed till 0630.  Pt was given prn medication x 2.  Seroquel and Hydroxyzine. Medications were not effective.  Pt did not go back to sleep.  Pt slept 2.5 hours total.  Pt reports she is not sure she saw her menses.  Pt was told a pregnancy test was done on the 30th of June and it was negative.  Pt continues to perseverate on discharge and being pregnant.
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
## 2018-07-06 LAB
ASPERGILLUS AB TITR SER CF: NORMAL {TITER}
B DERMAT AB SER-ACNC: 0.3 IV
COCCIDIOIDES AB TITR SER CF: NORMAL {TITER}
H CAPSUL MYC AB TITR SER CF: NORMAL {TITER}
H CAPSUL YST AB TITR SER CF: NORMAL {TITER}

## 2018-07-06 PROCEDURE — 97150 GROUP THERAPEUTIC PROCEDURES: CPT | Mod: GO

## 2018-07-06 PROCEDURE — 25000132 ZZH RX MED GY IP 250 OP 250 PS 637: Performed by: FAMILY MEDICINE

## 2018-07-06 PROCEDURE — 99207 ZZC CDG-MDM COMPONENT: MEETS MODERATE - DOWN CODED: CPT | Performed by: PSYCHIATRY & NEUROLOGY

## 2018-07-06 PROCEDURE — 25000132 ZZH RX MED GY IP 250 OP 250 PS 637: Performed by: NURSE PRACTITIONER

## 2018-07-06 PROCEDURE — 25000132 ZZH RX MED GY IP 250 OP 250 PS 637: Performed by: PSYCHIATRY & NEUROLOGY

## 2018-07-06 PROCEDURE — 99232 SBSQ HOSP IP/OBS MODERATE 35: CPT | Performed by: PSYCHIATRY & NEUROLOGY

## 2018-07-06 PROCEDURE — 12400007 ZZH R&B MH INTERMEDIATE UMMC

## 2018-07-06 RX ORDER — ARIPIPRAZOLE 10 MG/1
20 TABLET ORAL DAILY
Status: COMPLETED | OUTPATIENT
Start: 2018-07-06 | End: 2018-07-06

## 2018-07-06 RX ORDER — ARIPIPRAZOLE 30 MG/1
30 TABLET ORAL DAILY
Status: DISCONTINUED | OUTPATIENT
Start: 2018-07-07 | End: 2018-07-10 | Stop reason: HOSPADM

## 2018-07-06 RX ADMIN — LORAZEPAM 1 MG: 1 TABLET ORAL at 14:39

## 2018-07-06 RX ADMIN — ARIPIPRAZOLE 20 MG: 10 TABLET ORAL at 07:58

## 2018-07-06 RX ADMIN — PROPRANOLOL HYDROCHLORIDE 20 MG: 10 TABLET ORAL at 07:58

## 2018-07-06 RX ADMIN — LORAZEPAM 1 MG: 1 TABLET ORAL at 07:58

## 2018-07-06 RX ADMIN — ACETAMINOPHEN 650 MG: 325 TABLET, FILM COATED ORAL at 12:08

## 2018-07-06 RX ADMIN — LITHIUM CARBONATE 900 MG: 450 TABLET, EXTENDED RELEASE ORAL at 20:35

## 2018-07-06 RX ADMIN — LORAZEPAM 1 MG: 1 TABLET ORAL at 20:35

## 2018-07-06 RX ADMIN — PROPRANOLOL HYDROCHLORIDE 20 MG: 10 TABLET ORAL at 20:34

## 2018-07-06 ASSESSMENT — ACTIVITIES OF DAILY LIVING (ADL)
ORAL_HYGIENE: WITH SUPERVISION
LAUNDRY: UNABLE TO COMPLETE
GROOMING: INDEPENDENT
DRESS: INDEPENDENT
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
## 2018-07-06 NOTE — PROGRESS NOTES
Problem: Manic Symptoms  Goal: Manic Symptoms  Signs and symptoms of listed problems will be absent or manageable.    OT: compared to previous days pt was more quiet and collect, remaining in seat and engaging in task, pt minimally completed project remaining to have difficulty with focusing on task and sequencing, pt remains tangential, though did not talk excessively throughout group. Pt reported being excited about discharge plans and traveling with pt father.       07/06/18 2692   Occupational Therapy   Type of Intervention structured groups   Response Participates   Hours 1

## 2018-07-06 NOTE — PROGRESS NOTES
Pt indicated gaol to attend groups but did not was withdrawn and isolated  Pt denies SI/SIB thoughts, denies visual or auditory hallucination     07/06/18 1400   Behavioral Health   Hallucinations denies / not responding to hallucinations   Thinking confused;distractable;poor concentration   Orientation person: oriented;place: oriented;date: oriented;time: oriented   Memory confabulation;baseline memory   Insight poor;insight appropriate to events   Judgement impaired   Eye Contact at examiner   Affect sad;tense;full range affect;irritable   Mood depressed;hopeless;shame/guilt;grandiose   1. Wish to be Dead No   2. Non-Specific Active Suicidal Thoughts  No
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
## 2018-07-06 NOTE — PROGRESS NOTES
Pt father called stating that he would like to come visit the pt around 2:00pm. He states that the medications are being changed he feels that she should stay on this unit. He states that he will attempt to talk to her about it when he visits.

## 2018-07-06 NOTE — PROGRESS NOTES
"Patient has an emesis and asked writer for clean clothes. Writer Cleaned patients room and provided her clean scrubs. Patient states \"this always happens on the first day on my period\" . VSS. BP 11/3 70, P 78, T 96 R16. Patient napped in her room briefly and then attended OT group. Will continue to monitor     Patients mom called, ALLI was signed today by patient. Mom wanted to know the patients night time sleep hours and If any medication changes were made.     "

## 2018-07-06 NOTE — PROGRESS NOTES
Patient requested PRN tylenol for menstrual cramps. She also c/o nausea and vomiting. Patient given gingerale and encouraged to eat small portions to ensure she is able to keep the food down. Writer also informed patient saltines can be used to help manage nausea. Will continue to monitor.

## 2018-07-06 NOTE — PROGRESS NOTES
[facility], [place]   Psychiatric Progress Note      Impression:   This is a 18 year old female admitted for shaina.  We are adjusting medications to target mood.  We are also working with the patient on therapeutic skill building.             Diagnoses and Plan:       Principal Diagnosis: Bipolar I disorder, most recent episode manic with psychotic features  Unit: 4A  Attending: Jasmine  Medications: risks/benefits discussed with patient  - Lithium 900mg HS  -discontinued Seroquel 100mg TID ( after it was switched from 400mg HS, pt started taking it)  - -Increase  Abilify to 20mg daily today, to 30mg daily tomorrow.   -Ativan 1 mg TID ( this should be tapered off as soon as patient becomes stable)  Laboratory/Imaging:  - Upreg neg, UDS neg, COMP wnl, TSH wnl, CBC wnl except for low Hb 10.4, Hct 33.0,  and Li level 0.96  Consults:  - as needed  Patient will be treated in therapeutic milieu with appropriate individual and group therapies as described.  Secondary psychiatric diagnoses of concern this admission:          Medical diagnoses to be addressed this admission:               Legal Status: Voluntary      Safety Assessment:   Checks: Status 15  Precautions: Restrict to Unit  Pt has not required locked seclusion or restraints in the past 24 hours to maintain safety, please refer to RN documentation for further details.    The risks, benefits, alternatives and side effects have been discussed and are understood by the patient and other caregivers.     Target symptoms to stabilize: mood lability, sleep issues, disorganization, impulsive and pressured speech, loose association, distractibility, increased psycomotor activity. insomnia  Target disposition: Transfer patient to the station 32, as requested by pt's father and patient .             Attestation:  Patient has been seen and evaluated by me,  Darrell Ferraro MD          Interim History:   The patient's care was discussed with the  "treatment team and chart notes were reviewed.    Pt states that she still wants to be transferred to the station 32. She cites various reasons why .   Currently we are waiting for private room to be open on the station 32.   She is tolerating Ativan and Abilify well without side effect. She states that as soon as they were started, she felt  Better yesterday.   On my examination, she appears slightly calmer and less distractible, a little more focused .     Side effects to medication: denies  Sleep: difficulty falling asleep and difficulty staying asleep  Intake: eating/drinking without difficulty  Groups: participating, demonstrating poor attention and impulsive behaviors  Peer interactions: agitates peers by rude comments/inappropriate comments        The 10 point Review of Systems is negative other than noted in the HPI         Medications:       [START ON 7/7/2018] ARIPiprazole  30 mg Oral Daily     lithium  900 mg Oral At Bedtime     LORazepam  1 mg Oral TID     propranolol  20 mg Oral BID             Allergies:   No Known Allergies         Psychiatric Examination:   BP 98/73  Pulse 77  Temp 96.8  F (36  C) (Tympanic)  Resp 16  Ht 1.676 m (5' 6\")  Wt 59.7 kg (131 lb 9.8 oz)  LMP  (LMP Unknown)  SpO2 100%  Breastfeeding? No  BMI 21.24 kg/m2  Weight is 131 lbs 9.83 oz  Body mass index is 21.24 kg/(m^2).    Appearance:  awake, alert, adequately groomed, appeared as age stated, cooperative, distracted, no apparent distress and casually dressed  Attitude:  very distractible and somewhat cooperative  Eye Contact:  poor   Mood:  good and irritable  Affect:  mood congruent  Speech:  pressured speech  Psychomotor Behavior:  no evidence of tardive dyskinesia, dystonia, or tics, fidgeting and intact station, gait and muscle tone  Thought Process:  disorganized and illogical  Associations:  loosening of associations present  Thought Content:  no evidence of suicidal ideation or homicidal ideation, no auditory " hallucinations present, no visual hallucinations present and paranoid ideation present.  Insight:  limited  Judgment:  poor  Oriented to:  time, person, and place  Attention Span and Concentration:  limited  Recent and Remote Memory:  limited  Language: Able to name objects  Fund of Knowledge: appropriate  Muscle Strength and Tone: normal  Gait and Station: Normal         Labs:   No results found for this or any previous visit (from the past 24 hour(s)).

## 2018-07-07 PROCEDURE — 97150 GROUP THERAPEUTIC PROCEDURES: CPT | Mod: GO

## 2018-07-07 PROCEDURE — 25000132 ZZH RX MED GY IP 250 OP 250 PS 637: Performed by: PSYCHIATRY & NEUROLOGY

## 2018-07-07 PROCEDURE — 12400007 ZZH R&B MH INTERMEDIATE UMMC

## 2018-07-07 PROCEDURE — 25000132 ZZH RX MED GY IP 250 OP 250 PS 637: Performed by: FAMILY MEDICINE

## 2018-07-07 PROCEDURE — 25000132 ZZH RX MED GY IP 250 OP 250 PS 637: Performed by: NURSE PRACTITIONER

## 2018-07-07 RX ADMIN — LORAZEPAM 1 MG: 1 TABLET ORAL at 08:08

## 2018-07-07 RX ADMIN — ACETAMINOPHEN 650 MG: 325 TABLET, FILM COATED ORAL at 12:40

## 2018-07-07 RX ADMIN — LORAZEPAM 1 MG: 1 TABLET ORAL at 15:23

## 2018-07-07 RX ADMIN — LORAZEPAM 1 MG: 1 TABLET ORAL at 21:02

## 2018-07-07 RX ADMIN — ACETAMINOPHEN 650 MG: 325 TABLET, FILM COATED ORAL at 01:09

## 2018-07-07 RX ADMIN — HYDROXYZINE HYDROCHLORIDE 25 MG: 25 TABLET ORAL at 01:09

## 2018-07-07 RX ADMIN — HYDROXYZINE HYDROCHLORIDE 25 MG: 25 TABLET ORAL at 23:51

## 2018-07-07 RX ADMIN — PROPRANOLOL HYDROCHLORIDE 20 MG: 10 TABLET ORAL at 21:02

## 2018-07-07 RX ADMIN — LITHIUM CARBONATE 900 MG: 450 TABLET, EXTENDED RELEASE ORAL at 21:02

## 2018-07-07 RX ADMIN — PROPRANOLOL HYDROCHLORIDE 20 MG: 10 TABLET ORAL at 08:08

## 2018-07-07 RX ADMIN — ARIPIPRAZOLE 30 MG: 30 TABLET ORAL at 08:08

## 2018-07-07 RX ADMIN — OLANZAPINE 10 MG: 10 TABLET, FILM COATED ORAL at 23:51

## 2018-07-07 ASSESSMENT — ACTIVITIES OF DAILY LIVING (ADL)
GROOMING: INDEPENDENT
LAUNDRY: UNABLE TO COMPLETE
DRESS: STREET CLOTHES;INDEPENDENT
LAUNDRY: UNABLE TO COMPLETE
ORAL_HYGIENE: PROMPTS
ORAL_HYGIENE: INDEPENDENT
GROOMING: INDEPENDENT
DRESS: INDEPENDENT

## 2018-07-07 NOTE — PLAN OF CARE
Problem: Manic Symptoms  Goal: Manic Symptoms  Signs and symptoms of listed problems will be absent or manageable.   Outcome: No Change  48 hour nursing assessment:  Pt evaluation continues. Assessed mood, anxiety, thoughts, and behavior. Is progressing towards goals. Encourage participation in groups and developing healthy coping skills. Pt denies auditory or visual  hallucinations. Refer to daily team meeting notes for individualized plan of care. Will continue to assess.    Pt presents with mostly flat affect, is visible and active in the milieu and at times will be intrusive with peers and have inappropriate boundaries. Pt's intrusive behavior has been upsetting to another peer (rm [phone]), and when pt is approached by staff about this pt is dismissive and accuses peer of similar behavior. Pt denies any thoughts or intent to harm self, denies experiencing any hallucinations at this time. Pt is eating well and denies any physical concerns at this time. Pt seems confused at times (will ask for something then walk away). Pt is awaiting transfer to another unit pending private bed availability. No other concerns at this time. Nursing will continue to monitor and assess.
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
## 2018-07-08 PROCEDURE — 12400007 ZZH R&B MH INTERMEDIATE UMMC

## 2018-07-08 PROCEDURE — 97150 GROUP THERAPEUTIC PROCEDURES: CPT | Mod: GO

## 2018-07-08 PROCEDURE — 25000132 ZZH RX MED GY IP 250 OP 250 PS 637: Performed by: NURSE PRACTITIONER

## 2018-07-08 PROCEDURE — 25000132 ZZH RX MED GY IP 250 OP 250 PS 637: Performed by: FAMILY MEDICINE

## 2018-07-08 PROCEDURE — 25000132 ZZH RX MED GY IP 250 OP 250 PS 637: Performed by: PSYCHIATRY & NEUROLOGY

## 2018-07-08 RX ADMIN — LORAZEPAM 1 MG: 1 TABLET ORAL at 20:28

## 2018-07-08 RX ADMIN — HYDROXYZINE HYDROCHLORIDE 25 MG: 25 TABLET ORAL at 21:52

## 2018-07-08 RX ADMIN — LITHIUM CARBONATE 900 MG: 450 TABLET, EXTENDED RELEASE ORAL at 20:27

## 2018-07-08 RX ADMIN — LORAZEPAM 1 MG: 1 TABLET ORAL at 15:22

## 2018-07-08 RX ADMIN — LORAZEPAM 1 MG: 1 TABLET ORAL at 09:20

## 2018-07-08 RX ADMIN — PROPRANOLOL HYDROCHLORIDE 20 MG: 10 TABLET ORAL at 09:21

## 2018-07-08 RX ADMIN — PROPRANOLOL HYDROCHLORIDE 20 MG: 10 TABLET ORAL at 20:27

## 2018-07-08 RX ADMIN — ARIPIPRAZOLE 30 MG: 30 TABLET ORAL at 09:21

## 2018-07-08 ASSESSMENT — ACTIVITIES OF DAILY LIVING (ADL)
GROOMING: INDEPENDENT
DRESS: INDEPENDENT
ORAL_HYGIENE: INDEPENDENT

## 2018-07-08 NOTE — PROGRESS NOTES
07/08/18 1400   Behavioral Health   Hallucinations denies / not responding to hallucinations   Thinking distractable;poor concentration   Orientation person: oriented;place: oriented   Memory confabulation;baseline memory   Insight poor   Judgement impaired   Eye Contact at examiner   Affect full range affect   Mood mood is calm   Physical Appearance/Attire attire appropriate to age and situation   Hygiene well groomed   Suicidality other (see comments)  (denies)   1. Wish to be Dead No   2. Non-Specific Active Suicidal Thoughts  No   Self Injury other (see comment)  (denies)   Elopement (none stated or observed)   Activity restless   Speech clear;coherent   Medication Sensitivity no stated side effects;no observed side effects   Psychomotor / Gait balanced;steady   Activities of Daily Living   Hygiene/Grooming independent   Oral Hygiene independent   Dress independent   Room Organization independent     Pt was calm, approachable, and cooperative. Pt was needed to be redirected from the desk at the start of the shift (first couple hours), the rest of the shift Pt did well with top of the hour requests and directing them their staff. Pt was appropriate with peers - boundaries and conversations. Pt appeared to have full range affect - bright. Pt did not appear tired, anxious, or irritable - mostly calm, restless at times due to excitement about planned trip in August. Pt attended and participated in groups. Pt denied SI and SIB. Pt denied hallucinations/psychotic symptoms.

## 2018-07-08 NOTE — PROGRESS NOTES
PRN hydroxyzine 25 mg and olanzapine 10 mg administered per pt request for anxiety, agitation and to help her relax and return to sleep. Will continue to monitor

## 2018-07-08 NOTE — PROGRESS NOTES
"Problem: Manic Symptoms  Goal: Manic Symptoms  Signs and symptoms of listed problems will be absent or manageable.     OT: pt was calm throughout groups however pt could not focus on completing task despite encouragement to pick out pages and magazine pt liked to complete pt directed task of collage, however, pt would not maintain focus. Pt had difficulty with verbal boundaries with peers. X3-4 during OT clinic pt was asked to leave after reporting inappropriate comments to peers including \"your all pussy's.\" Pt triggered several pts who were able to redirect with staff assistance. Pt continues to be tangential but not pressured, pt perseverates on traveling to [place] and reports \"I'm bored here.\" pt completed activity planning task with therapist but did not follow-through despite encouragement and materials provided.    "

## 2018-07-08 NOTE — PROGRESS NOTES
Pt had an okay shift. Pt was irritating other pts and got into several arguments, pt is still somewhat intrusive but has improved. Pt is redirectable. Pt has decent insight into mental health and admits she is hypo-manic, but insight is limited. Pt has less hypo-manic symptoms, pt is able to stick to one activity and memory has improved. Pt took a nap. Pt is hoping to leave Monday. Pt had a good visit with dad and another family member. Pt denies SI/SIB. Pt ate dinner.    SI: denies  SIB: denies  HALLUCINATIONS: denies  SLEEP: improved  APPETITE: decent  HYGIENE: adequate       07/07/18 2300   Behavioral Health   Hallucinations denies / not responding to hallucinations   Thinking distractable;poor concentration   Orientation person: oriented;place: oriented;date: oriented;time: oriented   Memory confabulation;baseline memory   Insight insight appropriate to situation;insight appropriate to events;other (see comment)  (limited)   Judgement impaired   Eye Contact at examiner   Affect blunted, flat   Mood mood is calm;irritable   Physical Appearance/Attire attire appropriate to age and situation   Hygiene other (see comment)  (fine)   Suicidality other (see comments)  (denies)   1. Wish to be Dead No   2. Non-Specific Active Suicidal Thoughts  No   Self Injury other (see comment)  (denies)   Elopement (none stated or observed)   Activity restless;other (see comment)  (present on milieu)   Speech rambling;clear;coherent   Medication Sensitivity no stated side effects;no observed side effects   Psychomotor / Gait paces;balanced;steady   Activities of Daily Living   Hygiene/Grooming independent   Oral Hygiene independent   Dress street clothes;independent   Laundry unable to complete   Room Organization independent

## 2018-07-09 PROCEDURE — 99232 SBSQ HOSP IP/OBS MODERATE 35: CPT | Performed by: CLINICAL NURSE SPECIALIST

## 2018-07-09 PROCEDURE — 12400007 ZZH R&B MH INTERMEDIATE UMMC

## 2018-07-09 PROCEDURE — 25000132 ZZH RX MED GY IP 250 OP 250 PS 637: Performed by: FAMILY MEDICINE

## 2018-07-09 PROCEDURE — 25000132 ZZH RX MED GY IP 250 OP 250 PS 637: Performed by: CLINICAL NURSE SPECIALIST

## 2018-07-09 PROCEDURE — H2032 ACTIVITY THERAPY, PER 15 MIN: HCPCS

## 2018-07-09 PROCEDURE — 25000132 ZZH RX MED GY IP 250 OP 250 PS 637: Performed by: PSYCHIATRY & NEUROLOGY

## 2018-07-09 PROCEDURE — 25000132 ZZH RX MED GY IP 250 OP 250 PS 637: Performed by: NURSE PRACTITIONER

## 2018-07-09 RX ORDER — LORAZEPAM 1 MG/1
1 TABLET ORAL 2 TIMES DAILY
Status: DISCONTINUED | OUTPATIENT
Start: 2018-07-09 | End: 2018-07-10

## 2018-07-09 RX ADMIN — LORAZEPAM 1 MG: 1 TABLET ORAL at 20:48

## 2018-07-09 RX ADMIN — ARIPIPRAZOLE 30 MG: 30 TABLET ORAL at 08:37

## 2018-07-09 RX ADMIN — PROPRANOLOL HYDROCHLORIDE 20 MG: 10 TABLET ORAL at 08:37

## 2018-07-09 RX ADMIN — PROPRANOLOL HYDROCHLORIDE 20 MG: 10 TABLET ORAL at 20:48

## 2018-07-09 RX ADMIN — LORAZEPAM 1 MG: 1 TABLET ORAL at 08:37

## 2018-07-09 RX ADMIN — LITHIUM CARBONATE 900 MG: 450 TABLET, EXTENDED RELEASE ORAL at 20:48

## 2018-07-09 ASSESSMENT — ACTIVITIES OF DAILY LIVING (ADL)
HYGIENE/GROOMING: INDEPENDENT
DRESS: INDEPENDENT
HYGIENE/GROOMING: INDEPENDENT
ORAL_HYGIENE: INDEPENDENT
DRESS: STREET CLOTHES
ORAL_HYGIENE: INDEPENDENT
LAUNDRY: WITH SUPERVISION

## 2018-07-09 NOTE — PLAN OF CARE
Problem: Manic Symptoms  Intervention: Manic Symptoms  Pt in and out of the milieu but not taking part in any activities.  Did not go to community meeting or the last OT group. Rude to another patient on the unit.  Did go to apologize to the other patient later. Seemed irritable the whole shift. Hygiene is good. Took a shower this evening. Medication compliant. No c/o nausea this shift. No problem with appetite. Denies SI/SIB.

## 2018-07-09 NOTE — PROGRESS NOTES
"Pt asked for her make-up from writer. Writer gave pt her make-up to put on. Pt returned make-up but did not return it all but said that she did. This is at least the second time that writer knows that has happened regarding her make-up. Writer informed pt that writer will no longer give pt make-up anymore and informed RN that pt should not be allowed to have make-up if she is going to keep pieces and lie about it. Writer confronted pt and she said \"what do you mean?\" writer said \"i know that you didn't return the compact with mirror\" pt laughed and said \"how do you know these things?\" writer asked pt where her compact was and she said on my pillow. Writer went into pts room and could not find compact anywhere. Writer went back to pt and she said \"oh yeah, I forgot, it's right here\" and pulled it out of the magazine in from of her. Writer told her she would not be able to have her makeup anymore to which pt responded \"i don't care because i'm leaving here tomorrow and will be able to have it again.   "

## 2018-07-09 NOTE — PROGRESS NOTES
"Dicussed with patient his/her Personal Plan of Care.      \"Reasons you are in the hospital;\" The patient identifies the following reasons for current hospitalization:   I thought I had a bad disease.  I was in the hospital for 2 months.  2 weeks all alone.  My friend passed away.      \"Goals for Discharge\" The patient identifies the following goals for discharge:  Therapist  Stabalize medications  Travel    "

## 2018-07-09 NOTE — PROGRESS NOTES
Behavioral Health  Note  Behavioral Health  Spirituality Group Note    Unit 4AW    Name: Cristina Boyd    YOB: 2000   MRN: [id number]    Age: 18 year old    Topic:  Hope  Spiritual Practice/Coping Skill taught:  Imaginative Hope Practice  CBT/DBT connection:  Cognitive restructuring    Patient attended -led group, which included discussion of spirituality, coping with illness and building resilience.  Patient attended group for nb hrs.  The patient actively participated in group discussion    Adelita Preciado M.S., M.Div.  Staff   Pager [phone]

## 2018-07-09 NOTE — PROGRESS NOTES
"Pt was restless during this shift, and required reminders to maintain appropriate boundaries with peers. Pt initially requested to transfer to another unit this morning, stating \"I hate it here. I get bullied and no one likes me.\" Shortly thereafter pt stated, \"I changed my mind. I like it here.\" Pt continues to be on top of the hour requests, though continues to make multiple requests throughout the hour. Pt reports no new concerns at this time.       07/09/18 1020   Behavioral Health   Hallucinations denies / not responding to hallucinations   Thinking distractable   Orientation place: oriented;person: oriented   Memory baseline memory   Insight poor   Judgement impaired   Eye Contact at examiner   Affect full range affect   Mood anxious   Physical Appearance/Attire appears stated age   Hygiene well groomed   Suicidality other (see comments)  (Denies)   1. Wish to be Dead No   2. Non-Specific Active Suicidal Thoughts  No   Self Injury other (see comment)  (Denies)   Elopement (None stated, none observed)   Activity restless   Speech tangential;rambling   Medication Sensitivity no stated side effects   Psychomotor / Gait balanced;steady   Psycho Education   Type of Intervention 1:1 intervention   Response participates, initiates socially appropriate   Hours 0.5   Treatment Detail ( Check-in)   Activities of Daily Living   Hygiene/Grooming independent   Oral Hygiene independent   Dress street clothes   Laundry with supervision   Room Organization independent     "

## 2018-07-09 NOTE — PLAN OF CARE
Problem: Manic Symptoms  Goal: Social and Therapeutic (Manic Symptoms)  Signs and symptoms of listed problems will be absent or manageable.   Pt did not attend community meeting or last OT group this shift. Being rude at times to other patients. Did go to apologize to one of the patients she was rude to. In and out of the milieu but not really participating. Took a shower this evening. Affect appears somewhat flat. Denies SI or SIB. No problems with medications. Asked for a sleeping pill tonight and was given.

## 2018-07-09 NOTE — PLAN OF CARE
Problem: Manic Symptoms  Goal: Social and Therapeutic (Manic Symptoms)  Signs and symptoms of listed problems will be absent or manageable.   Pt up and around in the milieu but not taking part in it. Did not go to community meeting or the last OT group. Was rude to other people on the unit. Did go to apologize to one of them afterward. Hygiene is good.Took a shower this evening. Affect appeared flat and a bit irritable. Denies SI/SIB. Med compliant.

## 2018-07-09 NOTE — PROGRESS NOTES
"[facility], [place]   Psychiatric Progress Note        Interim History:   The patient's care was discussed with the treatment team during the daily team meeting and/or staff's chart notes were reviewed.  Staff report patient is visible in the mileu.     Pateint presents with a stable mood. Pateint's sleep is improving. Speech is spontaneous not pressured. She is demonstrating organzied thinking. Patient has made paranoid statements about her peers.     Psychiatric symptoms and interventions:   Discussed Lithium 900 mg with patient. Patient has had trouble with her thyroid. Discussed switching to Depkoate. Patient is not willing to switch. Patient will attend appointment for her thyroid on 7/11. Patient will discussed lithium with her physician.     Medical issues; Thyroid    Psychological/ behavioral/social:   Eliciaeinlilliam has not been restratined since her admission. She has not been aggressive.          Medications:       ARIPiprazole  30 mg Oral Daily     lithium  900 mg Oral At Bedtime     LORazepam  1 mg Oral TID     propranolol  20 mg Oral BID          Allergies:   No Known Allergies       Labs:   No results found for this or any previous visit (from the past 24 hour(s)).       Psychiatric Examination:     /78  Pulse 98  Temp 98.9  F (37.2  C) (Oral)  Resp 16  Ht 1.676 m (5' 6\")  Wt 58.1 kg (128 lb)  LMP  (LMP Unknown)  SpO2 100%  Breastfeeding? No  BMI 20.66 kg/m2  Weight is 128 lbs 0 oz  Body mass index is 20.66 kg/(m^2).  Orthostatic Vitals       Most Recent      Sitting Orthostatic /66 07/09 0823    Sitting Orthostatic Pulse (bpm) 71 07/09 0823    Standing Orthostatic /72 07/09 0823    Standing Orthostatic Pulse (bpm) 92 07/09 0823            Appearance: awake, alert and adequately groomed  Attitude:  cooperative  Eye Contact:  good  Mood:  better  Affect:  appropriate and in normal range  Speech:  normal prosody  Psychomotor Behavior:  no evidence of " tardive dyskinesia, dystonia, or tics  Throught Process:  linear  Associations:  no loose associations  Thought Content:  no evidence of suicidal ideation or homicidal ideation  Insight:  fair  Judgement:  fair  Oriented to:  time, person, and place  Attention Span and Concentration:  intact  Recent and Remote Memory:  intact    Clinical Global Impressions  First:  Considering your total clinical experience with this particular patient population, how severe are the patient's symptoms at this time?: 7 (06/30/18 0956)  Compared to the patient's condition at the START of treatment, this patient's condition is:: 4 (06/30/18 0956)  Most recent:  Considering your total clinical experience with this particular patient population, how severe are the patient's symptoms at this time?: 7 (06/30/18 0956)  Compared to the patient's condition at the START of treatment, this patient's condition is:: 4 (06/30/18 0956)    # Pain Assessment:  Current Pain Score 7/8/2018   Patient currently in pain? denies   Pain score (0-10) -   Pain location -   Pain descriptors -   Patient denies any chronic pain issues.            Precautions:     Behavioral Orders   Procedures     Code 1 - Restrict to Unit     Routine Programming     As clinically indicated     Status 15     Every 15 minutes.          DIagnoses:     Bipolar I disorder, most recent episode manic        Plan:      Legal:Voluntary    Medication management: Continue Abilify 30 mg, lithium 900 mg, tapering Ativan, propranolol 20 mg Bid for impulsivity.    Disposition plan: Stabilization with medications, return to home with therapy.

## 2018-07-10 VITALS
TEMPERATURE: 98.6 F | OXYGEN SATURATION: 100 % | DIASTOLIC BLOOD PRESSURE: 73 MMHG | BODY MASS INDEX: 21.22 KG/M2 | WEIGHT: 132.06 LBS | HEIGHT: 66 IN | SYSTOLIC BLOOD PRESSURE: 101 MMHG | HEART RATE: 94 BPM | RESPIRATION RATE: 16 BRPM

## 2018-07-10 PROCEDURE — H2032 ACTIVITY THERAPY, PER 15 MIN: HCPCS

## 2018-07-10 PROCEDURE — 25000132 ZZH RX MED GY IP 250 OP 250 PS 637: Performed by: FAMILY MEDICINE

## 2018-07-10 PROCEDURE — 25000132 ZZH RX MED GY IP 250 OP 250 PS 637: Performed by: PSYCHIATRY & NEUROLOGY

## 2018-07-10 PROCEDURE — 25000132 ZZH RX MED GY IP 250 OP 250 PS 637: Performed by: CLINICAL NURSE SPECIALIST

## 2018-07-10 PROCEDURE — 99239 HOSP IP/OBS DSCHRG MGMT >30: CPT | Performed by: CLINICAL NURSE SPECIALIST

## 2018-07-10 RX ORDER — LORAZEPAM 1 MG/1
1 TABLET ORAL ONCE
Qty: 1 TABLET | Refills: 0 | Status: SHIPPED | OUTPATIENT
Start: 2018-07-10 | End: 2018-07-10

## 2018-07-10 RX ORDER — ARIPIPRAZOLE 30 MG/1
30 TABLET ORAL DAILY
Qty: 30 TABLET | Refills: 1 | Status: SHIPPED | OUTPATIENT
Start: 2018-07-11 | End: 2018-11-02

## 2018-07-10 RX ORDER — LORAZEPAM 1 MG/1
1 TABLET ORAL ONCE
Status: DISCONTINUED | OUTPATIENT
Start: 2018-07-10 | End: 2018-07-10 | Stop reason: HOSPADM

## 2018-07-10 RX ADMIN — PROPRANOLOL HYDROCHLORIDE 20 MG: 10 TABLET ORAL at 08:25

## 2018-07-10 RX ADMIN — ARIPIPRAZOLE 30 MG: 30 TABLET ORAL at 08:25

## 2018-07-10 RX ADMIN — LORAZEPAM 1 MG: 1 TABLET ORAL at 08:25

## 2018-07-10 NOTE — PROGRESS NOTES
Pt was visible in milieu. Pt mostly walked around and engaged in conversations with people on unit. Pt spent a while at  and had to be constantly redirected. Pt was given make-up (see other note by writer) and dishonest about what she returned. Pt said I don't care because i'm leaving tomorrow and i'll be able to have it on the other station. Denies SI, SIB, AH and VH.        07/09/18 1800   Behavioral Health   Hallucinations denies / not responding to hallucinations   Thinking distractable   Orientation person: oriented;place: oriented   Memory baseline memory   Insight poor   Judgement impaired   Eye Contact at examiner   Affect full range affect   Mood mood is calm   Physical Appearance/Attire attire appropriate to age and situation;appears stated age   Hygiene well groomed   Suicidality other (see comments)  (denies)   1. Wish to be Dead No   2. Non-Specific Active Suicidal Thoughts  No   Self Injury other (see comment)  (denies)   Elopement Statements about wanting to leave   Activity restless   Speech clear;coherent;rambling   Medication Sensitivity no observed side effects;no stated side effects   Psychomotor / Gait balanced;steady   Psycho Education   Type of Intervention 1:1 intervention   Response participates, initiates socially appropriate   Hours 0.5   Activities of Daily Living   Hygiene/Grooming independent   Oral Hygiene independent   Dress independent   Room Organization independent

## 2018-07-10 NOTE — PROGRESS NOTES
Art Therapy   Type of Intervention structured groups   Response participates with encouragement   Hours 2.5   Treatment Detail (Art Therapy)   Problem- Adela  Goal - Art Therapy coping and expression  Outcome- participated in groups, was positive and cooperative, she did better today in groups. She was less disruptive and engaged in collage and music selections.

## 2018-07-10 NOTE — DISCHARGE SUMMARY
Psychiatric Discharge Summary    Cristina Boyd MRN# [id number]   Age: 18 year old YOB: 2000     Date of Admission:  6/30/2018  Date of Discharge:  7/10/2018  Admitting Physician:  Derrick Metz MD  Discharge Physician:  Debra A. Naegele, APRN CNS (Contact: [phone])         Event Leading to Hospitalization:   Ms. Boyd provides information for this assessment.  She repeatedly stated that she was too tired to talk, thus the assessment was very brief.  She is not a wholly reliable historian.  Intake data, outpatient records and records from previous hospitalizations were reviewed.       Ms. Boyd has a previous diagnosis of bipolar disorder type 1.  She was most recently hospitalized on the [facility] team 05/31/2018 through 06/13/2018.  At that time, she was sleeping minimally and experiencing racing thoughts.  She had been noncompliant with her lithium.  She was stabilized on a combination of Seroquel, lithium and Inderal and was discharged to home.  The patient is currently claiming that she has been taking her medications as prescribed.  A lithium level will be drawn tomorrow.  Family brought her to the Emergency Department due to her increased energy and poor sleep.  Her father reported to the ER physician that she had made a statement about wanting to die.  She was described as having pressured speech.  Family reported she had been outdoors in the nearly 100 degree heat for much of the day yesterday.  Her behavior was erratic and she was making threats.  Her father indicated he did not feel as though he could keep her safe at home.       During the present assessment, the patient is extremely restless and distractible.  She inquired about a cup of water and then asked for a sweatshirt.  Eventually, she did agree to sit down and meet, though persistently stated that she was too tired to talk as a result of the Zyprexa given to her in the Emergency Department.  She perseverated on her family  "believing that she is \"crazy\" and the perceived injustice of the situation.  She described her mood as \"bipolar.\"  She stated that her appetite has been low.  Her concentration has been poor.  Her energy has been low.  She has been feeling irritable and anxious.  She is currently denying auditory and visual hallucinations.  She denies thoughts of harming herself or others.            See Admission note by Jessica Gaming NP on 6/30/2018 for additional details.          DIagnoses:     Bipolar I disorder, most recent episode manic          Labs:     Results for orders placed or performed during the hospital encounter of 06/30/18   Drug abuse screen 6 urine (chem dep)   Result Value Ref Range    Amphetamine Qual Urine Negative NEG^Negative    Barbiturates Qual Urine Negative NEG^Negative    Benzodiazepine Qual Urine Negative NEG^Negative    Cannabinoids Qual Urine Negative NEG^Negative    Cocaine Qual Urine Negative NEG^Negative    Ethanol Qual Urine Negative NEG^Negative    Opiates Qualitative Urine Negative NEG^Negative   HCG qualitative urine   Result Value Ref Range    HCG Qual Urine Negative NEG^Negative   Lithium level   Result Value Ref Range    Lithium Level 0.96 0.60 - 1.20 mmol/L            Consults:   No consultations were requested during this admission         Hospital Course:   Cristina Boyd was admitted to [facility] with attending Derrick Metz MD as a voluntary patient. The patient was placed under status 15 (15 minute checks) to ensure patient safety.     Patient was admitted for shaina. She was making delusional statements and was not sleeping. Patient was being treated with Seroquel but did not feel if was effective so it was discontinued. Patient's Abilify was increased from 20 mg to 30. She was re-started on Lithium 900 mg. Patient manic symptoms resolved. Ativan 1 mg TID ws tapered.     Reviewed labs: Hcg is negative, Creatinine 0.51, TSH 1.46 as 7/11    Patient will follow up with " her PCP.    Treatment plan discussed with patient and her father. Both agree with the treatment plan.     Cristina Boyd did participate in groups and was visible in the milieu. The patient's symptoms of adela improved.Patient has protective factors of supportive family and seeking out treatment.  Patient presents with a stable mood and denies suicidal thinking.     Patient no longer meets criteria for hospital level or care. She is at moderate risk of relapse.     # Discharge Pain Plan:   - Patient currently has NO PAIN and is not being prescribed pain medications on discharge.      Cristina Boyd was released to home. At the time of discharge Cristina Boyd was determined to not be a danger to herself or others.          Discharge Medications:     Current Discharge Medication List      START taking these medications    Details   ARIPiprazole (ABILIFY) 30 MG tablet Take 1 tablet (30 mg) by mouth daily  Qty: 30 tablet, Refills: 1    Associated Diagnoses: Bipolar affective disorder, current episode manic with psychotic symptoms (H)      LORazepam (ATIVAN) 1 MG tablet Take 1 tablet (1 mg) by mouth once for 1 dose  Qty: 1 tablet, Refills: 0    Associated Diagnoses: Adela (H)         CONTINUE these medications which have NOT CHANGED    Details   lithium (ESKALITH) 450 MG CR tablet Take 2 tablets (900 mg) by mouth At Bedtime  Qty: 60 tablet, Refills: 0    Associated Diagnoses: Bipolar disorder, current episode manic, severe with psychotic features (H)      propranolol (INDERAL) 20 MG tablet Take 1 tablet (20 mg) by mouth 2 times daily  Qty: 60 tablet, Refills: 0    Associated Diagnoses: Bipolar I disorder, current or most recent episode manic, with psychotic features (H)      Carboxymethylcellulose Sod PF (REFRESH PLUS) 0.5 % SOLN ophthalmic solution Place 1 drop into both eyes 3 times daily as needed for dry eyes  Qty: 1 Bottle, Refills: 0    Associated Diagnoses: Dry eyes      cetirizine (ZYRTEC) 10 MG tablet Take 1 tablet  (10 mg) by mouth daily  Qty: 30 tablet, Refills: 0    Associated Diagnoses: Seasonal allergic rhinitis, unspecified chronicity, unspecified trigger      triamcinolone (KENALOG) 0.1 % cream Apply topically 2 times daily as needed for irritation  Qty: 80 g, Refills: 0    Associated Diagnoses: Skin irritation         STOP taking these medications       QUEtiapine (SEROQUEL) 100 MG tablet Comments:   Reason for Stopping:         QUEtiapine (SEROQUEL) 400 MG tablet Comments:   Reason for Stopping:                    Psychiatric Examination:   Appearance:  awake, alert and adequately groomed  Attitude:  cooperative  Eye Contact:  good  Mood:  better  Affect:  intensity is normal  Speech:  clear, coherent  Psychomotor Behavior:  no evidence of tardive dyskinesia, dystonia, or tics  Thought Process:  logical, linear and goal oriented  Associations:  no loose associations  Thought Content:  no evidence of suicidal ideation or homicidal ideation  Insight:  fair  Judgment:  fair  Oriented to:  time, person, and place  Attention Span and Concentration:  intact  Recent and Remote Memory:  intact  Language: Able to name objects, Able to repeat phrases and Able to read and write  Fund of Knowledge: appropriate  Muscle Strength and Tone: normal  Gait and Station: Normal         Discharge Plan:         Summary: You were admitted on 6/30/2018 to [address] due to Bipolar d/o, hypomanic.  You were treated by Debra Naegele, APRN CNS and aDrrell Ferraro MD and discharged on 07/10/2018 to Home     Principal Diagnosis: Bipolar disorder type 1, severe, manic     Health Care Follow-up Appointments:   Medication Management  Date: 07/13/2018  Time: 3:10PM      Provider:Dr. Key  Address: [facility] ([facility] [facility] [address] [place] [address], [address]   Phone:[phone]         Therapy Appointment   Date: 07/12/2018  Time: 2:00pm       Provider: Keyana Caldera  Address: [address], [place] [address]  Phone:[phone]             Attend all scheduled appointments with your outpatient providers. Call at least 24 hours in advance if you need to reschedule an appointment to ensure continued access to your outpatient providers.   Major Treatments, Procedures and Findings: You were provided with: a psychiatric assessment, assessed for medical stability, medication evaluation and/or management, group therapy, art therapy, milieu management, medical interventions and skills/OT groups.     Symptoms to Report: If you experience any of the following symptoms please report them right away to your provider or to family/friends; feeling more aggressive, increased confusion, losing more sleep, mood getting worse or thoughts of suicide.     Early warning signs can include: Early warning signs that could signal a potential relapse could include but not limited to the following; increased depression or anxiety sleep disturbances increased thoughts or behaviors of suicide or self-harm increased unusual thinking, such as paranoia or hearing voices.      Safety and Wellness:  Take all medicines as directed.  Make no changes unless your doctor suggests them. Follow treatment recommendations.  Refrain from alcohol and non-prescribed drugs. If there is a concern for safety, call 481.     Resources: Mental health crisis response for your [facility] is offered 24 hours a day, 7 days a week. A trained counselor will assess your current situation, offer support and counseling and connect you with local resources. Please call  [facility] Crisis Response - Adult [phone]     Crisis Intervention: 784.677.6381 or 624-775-7292 (TTY: 631.910.3607).  Call anytime for help.  National [place] on Mental Illness (www.[place].norberto.org): 992.855.3109 or 567-084-5675.  Suicide Awareness Voices of Education (SAVE) (www.save.org): 256-101-DISP (5417)  National  "Suicide Prevention Line (www.mentalhealthmn.org): 576-650-SOSR (8255)  Mental Health Consumer/Survivor Network of [place] (www.mhcsn.net): 356.522.9884 or 355-146-9934  Mental Health Association of [place] (www.mentalhealth.org): 382.814.8675 or 019-373-2492  Self- Management and Recovery Training., SMART-- Toll free: [phone]  [employer]  Text 4 Life: txt \"LIFE\" to 81471 for immediate support and crisis intervention  Crisis text line: Text \"[place]\" to 730264. Free, confidential, 24/7.     The treatment team has appreciated the opportunity to work with you. Cristina, please take care and make your recovery a daily recovery. If you have any questions or concerns our unit number is [phone].  You will be receiving a follow-up phone call within the next three days from a representative from behavioral health.  You have identified the best phone number to reach you as [phone] (home)      Attestation:  The patient has been seen and evaluated by me,  Debra A. Naegele, LINDEN CNS on 7/10/2018  Discharge summary time > 30 minutes  "

## 2018-07-10 NOTE — DISCHARGE INSTRUCTIONS
Behavioral Discharge Planning and Instructions      Summary: You were admitted on 6/30/2018 to Station [address] [place] [address] due to Bipolar d/o, hypomanic.  You were treated by Debra Naegele, APRN CNS and Darrell Ferraro MD and discharged on 07/10/2018 to Home    Principal Diagnosis: Bipolar disorder type 1, severe, manic    Health Care Follow-up Appointments:   Medication Management  Date: 07/13/2018  Time: 3:10PM      Provider:Dr. Key  Address: [facility] ([facility]) [facility] 2nd Floor [address] [place] [address]. [facility]   Phone:[phone]       Therapy Appointment   Date: 07/12/2018  Time: 2:00pm      Provider: Keyana Caldera  Address: [address], [place] [address]  Phone:[phone]          Attend all scheduled appointments with your outpatient providers. Call at least 24 hours in advance if you need to reschedule an appointment to ensure continued access to your outpatient providers.   Major Treatments, Procedures and Findings: You were provided with: a psychiatric assessment, assessed for medical stability, medication evaluation and/or management, group therapy, art therapy, milieu management, medical interventions and skills/OT groups.    Symptoms to Report: If you experience any of the following symptoms please report them right away to your provider or to family/friends; feeling more aggressive, increased confusion, losing more sleep, mood getting worse or thoughts of suicide.    Early warning signs can include: Early warning signs that could signal a potential relapse could include but not limited to the following; increased depression or anxiety sleep disturbances increased thoughts or behaviors of suicide or self-harm increased unusual thinking, such as paranoia or hearing voices.     Safety and Wellness:  Take all medicines as directed.  Make no changes unless your doctor suggests them. Follow  "treatment recommendations.  Refrain from alcohol and non-prescribed drugs. If there is a concern for safety, call 911.    Resources: Mental health crisis response for your [facility] is offered 24 hours a day, 7 days a week. A trained counselor will assess your current situation, offer support and counseling and connect you with local resources. Please call  [facility] Crisis Response - Adult [phone]    Crisis Intervention: 727.998.3896 or [phone] (TTY: 128.392.5583).  Call anytime for help.  National [place] on Mental Illness (www.mn.norberto.org): 581.470.3830 or 218-159-5633.  Suicide Awareness Voices of Education (SAVE) (www.save.org): 013-759-LCDJ (7689)  National Suicide Prevention Line (www.mentalhealthmn.org): 722-485-EMTJ (6378)  Mental Health Consumer/Survivor Network of [place] (www.mhcsn.net): 803.131.5219 or 369-586-5548  Mental Health Association of [place] (www.mentalhealth.org): 254.190.7279 or 263-333-7557  Self- Management and Recovery Training., [employer]-- Toll free: 283.806.5490  www.[employer]  Text 4 Life: txt \"LIFE\" to 91745 for immediate support and crisis intervention  Crisis text line: Text \"[place]\" to 225878. Free, confidential, 24/7.    The treatment team has appreciated the opportunity to work with you. Cristina, please take care and make your recovery a daily recovery. If you have any questions or concerns our unit number is [phone].  You will be receiving a follow-up phone call within the next three days from a representative from behavioral health.  You have identified the best phone number to reach you as [phone] (home)       "

## 2018-07-11 ENCOUNTER — OFFICE VISIT (OUTPATIENT)
Dept: CT IMAGING | Facility: CLINIC | Age: 18
End: 2018-07-11
Attending: INTERNAL MEDICINE
Payer: COMMERCIAL

## 2018-07-11 VITALS
HEART RATE: 99 BPM | DIASTOLIC BLOOD PRESSURE: 84 MMHG | TEMPERATURE: 99.2 F | OXYGEN SATURATION: 100 % | SYSTOLIC BLOOD PRESSURE: 124 MMHG

## 2018-07-11 DIAGNOSIS — E04.9 GOITER: Primary | ICD-10-CM

## 2018-07-11 DIAGNOSIS — R59.1 LYMPHADENOPATHY: ICD-10-CM

## 2018-07-11 LAB
ALBUMIN SERPL-MCNC: 3.6 G/DL (ref 3.4–5)
ALP SERPL-CCNC: 73 U/L (ref 40–150)
ALT SERPL W P-5'-P-CCNC: 17 U/L (ref 0–50)
ANION GAP SERPL CALCULATED.3IONS-SCNC: 5 MMOL/L (ref 3–14)
AST SERPL W P-5'-P-CCNC: 14 U/L (ref 0–45)
BASOPHILS # BLD AUTO: 0 10E9/L (ref 0–0.2)
BASOPHILS NFR BLD AUTO: 0.5 %
BILIRUB SERPL-MCNC: 0.2 MG/DL (ref 0.2–1.3)
BUN SERPL-MCNC: 7 MG/DL (ref 7–30)
CALCIUM SERPL-MCNC: 8.6 MG/DL (ref 8.5–10.1)
CHLORIDE SERPL-SCNC: 107 MMOL/L (ref 94–109)
CO2 SERPL-SCNC: 27 MMOL/L (ref 20–32)
CREAT SERPL-MCNC: 0.51 MG/DL (ref 0.52–1.04)
CRP SERPL-MCNC: 4.5 MG/L (ref 0–8)
DIFFERENTIAL METHOD BLD: NORMAL
EOSINOPHIL # BLD AUTO: 0.1 10E9/L (ref 0–0.7)
EOSINOPHIL NFR BLD AUTO: 2.2 %
ERYTHROCYTE [DISTWIDTH] IN BLOOD BY AUTOMATED COUNT: 14.3 % (ref 10–15)
GFR SERPL CREATININE-BSD FRML MDRD: >90 ML/MIN/1.7M2
GLUCOSE SERPL-MCNC: 110 MG/DL (ref 70–99)
HCT VFR BLD AUTO: 37 % (ref 35–47)
HGB BLD-MCNC: 11.7 G/DL (ref 11.7–15.7)
IMM GRANULOCYTES # BLD: 0 10E9/L (ref 0–0.4)
IMM GRANULOCYTES NFR BLD: 0.3 %
LYMPHOCYTES # BLD AUTO: 1.8 10E9/L (ref 0.8–5.3)
LYMPHOCYTES NFR BLD AUTO: 31.4 %
MCH RBC QN AUTO: 28.3 PG (ref 26.5–33)
MCHC RBC AUTO-ENTMCNC: 31.6 G/DL (ref 31.5–36.5)
MCV RBC AUTO: 90 FL (ref 78–100)
MONOCYTES # BLD AUTO: 0.6 10E9/L (ref 0–1.3)
MONOCYTES NFR BLD AUTO: 9.8 %
NEUTROPHILS # BLD AUTO: 3.2 10E9/L (ref 1.6–8.3)
NEUTROPHILS NFR BLD AUTO: 55.8 %
NRBC # BLD AUTO: 0 10*3/UL
NRBC BLD AUTO-RTO: 0 /100
PLATELET # BLD AUTO: 310 10E9/L (ref 150–450)
POTASSIUM SERPL-SCNC: 3.7 MMOL/L (ref 3.4–5.3)
PROT SERPL-MCNC: 8.3 G/DL (ref 6.8–8.8)
RBC # BLD AUTO: 4.13 10E12/L (ref 3.8–5.2)
SODIUM SERPL-SCNC: 139 MMOL/L (ref 133–144)
T4 FREE SERPL-MCNC: 0.98 NG/DL (ref 0.76–1.46)
TSH SERPL DL<=0.005 MIU/L-ACNC: 1.46 MU/L (ref 0.4–4)
WBC # BLD AUTO: 5.8 10E9/L (ref 4–11)

## 2018-07-11 PROCEDURE — 80053 COMPREHEN METABOLIC PANEL: CPT | Performed by: INTERNAL MEDICINE

## 2018-07-11 PROCEDURE — G0463 HOSPITAL OUTPT CLINIC VISIT: HCPCS | Mod: ZF

## 2018-07-11 PROCEDURE — 86140 C-REACTIVE PROTEIN: CPT | Performed by: INTERNAL MEDICINE

## 2018-07-11 PROCEDURE — 84439 ASSAY OF FREE THYROXINE: CPT | Performed by: INTERNAL MEDICINE

## 2018-07-11 PROCEDURE — 84443 ASSAY THYROID STIM HORMONE: CPT | Performed by: INTERNAL MEDICINE

## 2018-07-11 PROCEDURE — 36415 COLL VENOUS BLD VENIPUNCTURE: CPT | Mod: ZF

## 2018-07-11 PROCEDURE — 85025 COMPLETE CBC W/AUTO DIFF WBC: CPT | Performed by: INTERNAL MEDICINE

## 2018-07-11 ASSESSMENT — PAIN SCALES - GENERAL: PAINLEVEL: NO PAIN (0)

## 2018-07-11 NOTE — MR AVS SNAPSHOT
After Visit Summary   7/11/2018    Cristina Boyd    MRN: [id number]           Patient Information     Date Of Birth          2000        Visit Information        Provider Department      7/11/2018 9:30 AM Jaz Richards MD [facility] [place], Infectious Disease        Today's Diagnoses     Goiter    -  1    Lymphadenopathy           Follow-ups after your visit        Follow-up notes from your care team     Return if symptoms worsen or fail to improve.      Your next 10 appointments already scheduled     Jul 13, 2018  3:10 PM CDT   Adult Med Follow UP with Jazmine Key MD   Psychiatry Clinic ([facility])    [address]  [address] [facility] [id number] [phone]            Aug 10, 2018  1:30 PM CDT   Return Visit with MARIKA Adams   [facility] ([facility])    [facility]  [address]  [address]  [facility] [id number] [phone]              Who to contact     Please call your clinic at [phone] to:    Ask questions about your health    Make or cancel appointments    Discuss your medicines    Learn about your test results    Speak to your doctor            Additional Information About Your Visit        Care EveryWhere ID     This is your Care EveryWhere ID. This could be used by other organizations to access your [place] medical records  [id number]        Your Vitals Were     Pulse Temperature Last Period Pulse Oximetry          99 99.2  F (37.3  C) (Oral) (LMP Unknown) 100%         Blood Pressure from Last 3 Encounters:   07/11/18 124/84   07/10/18 101/73   06/21/18 100/56    Weight from Last 3 Encounters:   07/10/18 59.9 kg (132 lb 0.9 oz)   06/16/18 57.4 kg (126 lb 8 oz)   06/12/18 57.6 kg (127 lb)              We Performed the Following     CBC with platelets differential     Comprehensive metabolic panel (BMP + Alb, Alk Phos, ALT, AST, Total. Bili, TP)     CRP inflammation      T4 FREE     TSH        Primary Care Provider Fax #    Physician No Ref-Primary [phone]       No address on file        Equal Access to Services     HUMAIRA HUFFMAN : Aleah aad ku hadozzie Sales, wabenjaminda sherimusa, bunny edwards, alfred reneejayant jyotsna. [facility] [phone].    ATENCIÓN: Si habla español, tiene a shaver disposición servicios gratuitos de asistencia lingüística. Shaniqueame al [phone].    We comply with applicable federal civil rights laws and [place] laws. We do not discriminate on the basis of race, color, national origin, age, disability, sex, sexual orientation, or gender identity.            Thank you!     Thank you for choosing [facility] [place], INFECTIOUS DISEASE  for your care. Our goal is always to provide you with excellent care. Hearing back from our patients is one way we can continue to improve our services. Please take a few minutes to complete the written survey that you may receive in the mail after your visit with us. Thank you!             Your Updated Medication List - Protect others around you: Learn how to safely use, store and throw away your medicines at www.disposemymeds.org.          This list is accurate as of 7/11/18 11:59 PM.  Always use your most recent med list.                   Brand Name Dispense Instructions for use Diagnosis    ARIPiprazole 30 MG tablet    ABILIFY    30 tablet    Take 1 tablet (30 mg) by mouth daily    Bipolar affective disorder, current episode manic with psychotic symptoms (H)       Carboxymethylcellulose Sod PF 0.5 % Soln ophthalmic solution    REFRESH PLUS    1 Bottle    Place 1 drop into both eyes 3 times daily as needed for dry eyes    Dry eyes       lithium 450 MG CR tablet    ESKALITH    60 tablet    Take 2 tablets (900 mg) by mouth At Bedtime    Bipolar disorder, current episode manic, severe with psychotic features (H)       propranolol 20 MG tablet    INDERAL    60 tablet    Take 1 tablet (20 mg) by mouth 2  times daily    Bipolar I disorder, current or most recent episode manic, with psychotic features (H)       triamcinolone 0.1 % cream    KENALOG    80 g    Apply topically 2 times daily as needed for irritation    Skin irritation

## 2018-07-11 NOTE — NURSING NOTE
Visit Reason: New, post hospital visit.      Evaluation / Assessment:   Patient states no pain today. Vitals taken, allergies and medications reviewed.      Labs: Yes, labs drawn peripherally by Infusion RN and dropped off at lab.      Procedure ordered? NA      Dressing Change: NA      Vaccine ordered / administered: NA      Other: NA

## 2018-07-11 NOTE — PROGRESS NOTES
Infectious disease follow-up visit note    1.  Fever, resolved  2.  Lymphadenopathy, primarily submandibular, resolving.  Etiology could have been self-limited viral infection versus TB lymphadenitis; lymphoma quite unlikely based on negative FNA.  If there is worsening of her symptoms, I would be inclined to refer for excisional biopsy, but I do not think this is necessary now.  3.  Goiter.  4.  Bipolar disorder, suboptimally controlled on Abilify and lithium.  Patient despite having recent hospitalization appears to have good insight into her disorder and follows closely with psychiatry.    Plan:  1.  We will recheck basic labs including CRP today.  2.  We will check thyroid function tests, as these can sometimes compound assessment of temp elevation.  3.  Follow-up with ID as needed.  Specifically, if she notices recurrence of lymphadenopathy, a draining sinus from any of her nodes, weight loss or other constitutional symptoms including fever, would aim to repeat CT of her neck and consider excisional lymph node biopsy, sending for AFB stain and culture.    Visit length 25 minutes, greater than 50% clinical counseling/care coordination.    Jaz Richards MD   of Medicine, Division of Infectious Diseases  [facility] [phone]      HPI:  This is a 18-year-old female known to me from her recent hospitalization at [facility] for lymphadenopathy and fever.  During a recent hospitalization on [facility] for decompensated shaina, the patient was found to have fever to 102 Fahrenheit.  She was found to have rather marked submandibular lymphadenopathy.  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  She defervesced upon arrival to [facility].  Workup included HIV antibody testing which was negative, Monospot which is negative, CRP which peaked at 70 and decreased to  13, fine-needle aspiration of left submandibular lymph node which revealed lymphoid tissue and was stain negative for AFB (per my review of specimen was unfortunately not sent for culture) and bronchoalveolar lavage which revealed growth of Candida albicans, AFB smear negative and culture negative to date.  Mumps PCR was also sent and was negative.  She is now following up approximately 4 weeks after her fever onset.  She is fortunately doing well, but was rehospitalized for reemergence of shaina, [facility].  She reports intermittent low-grade temperature elevations to  Fahrenheit.  She still has symptoms of hypomania.  She discloses that she had a boyfriend and sexual contact in early 2018, but ceased sexual contact in January or February 2018.  She still has some neck tenderness but her lymphadenopathy is markedly improved.      Current Outpatient Prescriptions on File Prior to Visit:  ARIPiprazole (ABILIFY) 30 MG tablet Take 1 tablet (30 mg) by mouth daily   lithium (ESKALITH) 450 MG CR tablet Take 2 tablets (900 mg) by mouth At Bedtime   propranolol (INDERAL) 20 MG tablet Take 1 tablet (20 mg) by mouth 2 times daily   Carboxymethylcellulose Sod PF (REFRESH PLUS) 0.5 % SOLN ophthalmic solution Place 1 drop into both eyes 3 times daily as needed for dry eyes (Patient not taking: Reported on 7/11/2018)   triamcinolone (KENALOG) 0.1 % cream Apply topically 2 times daily as needed for irritation (Patient not taking: Reported on 7/11/2018)     No current facility-administered medications on file prior to visit.     Exam:  /84 (BP Location: Left arm, Patient Position: Sitting, Cuff Size: Adult Regular)  Pulse 99  Temp 99.2  F (37.3  C) (Oral)  LMP  (LMP Unknown)  SpO2 100%  Awake, alert. Speech slightly pressure. Paces in the room a bit  Goiter present  Much improved submandibular lymphadenoapthy is slightly tender  HR regular  Lungs clear  No skin lesions  Well-developed,  well-nourished

## 2018-07-12 ENCOUNTER — OFFICE VISIT (OUTPATIENT)
Dept: PSYCHOLOGY | Facility: CLINIC | Age: 18
End: 2018-07-12
Payer: COMMERCIAL

## 2018-07-12 DIAGNOSIS — F31.9 BIPOLAR 1 DISORDER (H): Primary | ICD-10-CM

## 2018-07-12 PROCEDURE — 90834 PSYTX W PT 45 MINUTES: CPT | Performed by: SOCIAL WORKER

## 2018-07-12 ASSESSMENT — PATIENT HEALTH QUESTIONNAIRE - PHQ9: 5. POOR APPETITE OR OVEREATING: NEARLY EVERY DAY

## 2018-07-12 ASSESSMENT — ANXIETY QUESTIONNAIRES
6. BECOMING EASILY ANNOYED OR IRRITABLE: NEARLY EVERY DAY
2. NOT BEING ABLE TO STOP OR CONTROL WORRYING: NEARLY EVERY DAY
7. FEELING AFRAID AS IF SOMETHING AWFUL MIGHT HAPPEN: NEARLY EVERY DAY
GAD7 TOTAL SCORE: 21
5. BEING SO RESTLESS THAT IT IS HARD TO SIT STILL: NEARLY EVERY DAY
1. FEELING NERVOUS, ANXIOUS, OR ON EDGE: NEARLY EVERY DAY
3. WORRYING TOO MUCH ABOUT DIFFERENT THINGS: NEARLY EVERY DAY

## 2018-07-12 NOTE — DISCHARGE SUMMARY
[facility]  Discharge Summary    Cristina Boyd MRN# [id number]   YOB: 2000 Age: 18 year old     Date of Admission:  6/13/2018  Date of Discharge:  6/21/2018 10:11 PM  Admitting Physician:  Servando Chapin MD  Discharge Physician:  Petr Mcgregor MD  Discharging Service:  Internal Medicine     Primary Provider: No Ref-Primary, Physician              Discharge Diagnosis:     Primary Discharge Diagnosis:       Fevers, Cervical Lymphadenopathy, suspect viral infection, getting rule out for TB  Productive Cough, Pulmonary Opacities  Peripheral Edema  Bipolar I disorder, current mixed manic episode    Past Medical History:   Diagnosis Date     Bipolar disorder (H)      Depressive disorder                     Discharge Medications:     Discharge Medication List as of 6/21/2018  9:58 PM      CONTINUE these medications which have CHANGED    Details   lithium (ESKALITH) 450 MG CR tablet Take 2 tablets (900 mg) by mouth At Bedtime, Disp-60 tablet, R-0, E-Prescribe      propranolol (INDERAL) 20 MG tablet Take 1 tablet (20 mg) by mouth 2 times daily, Disp-60 tablet, R-0, E-Prescribe      !! QUEtiapine (SEROQUEL) 100 MG tablet Take 1 tablet (100 mg) by mouth 3 times daily as needed (insomnia or agitation associated with shaina), Disp-60 tablet, R-0, E-Prescribe      !! QUEtiapine (SEROQUEL) 400 MG tablet Take 1 tablet (400 mg) by mouth At Bedtime, Disp-30 tablet, R-0, E-Prescribe       !! - Potential duplicate medications found. Please discuss with provider.      CONTINUE these medications which have NOT CHANGED    Details   cetirizine (ZYRTEC) 10 MG tablet Take 1 tablet (10 mg) by mouth daily, Disp-30 tablet, R-0, E-Prescribe      Carboxymethylcellulose Sod PF (REFRESH PLUS) 0.5 % SOLN ophthalmic solution Place 1 drop into both eyes 3 times daily as needed for dry eyes, Disp-1 Bottle, R-0, E-Prescribe      triamcinolone (KENALOG) 0.1 % cream Apply topically 2 times daily as needed for  irritationDisp-80 g, [id number]         STOP taking these medications       benzocaine-menthol (CEPACOL) 15-3.6 MG lozenge Comments:   Reason for Stopping:                    Hospital Course:     Cristina Boyd is a 18 year old female admitted on 6/13/2018. She has a history of depression and bipolar disorder and was transferred from inpatient psychiatry to medicine for further evaluation of fevers and cervical lymphadenopathy.      Onset of fevers ~2 days PTA on psychiatry unit, intermittently spiking temps up to 101.5 F with last documented fever on 6/10. Had associated cough, congestion, rhinorrhea, and odynophagia. Exam showed cervical Lymph Adenopathy, CT neck 6/11 revealed slight increase in size of multilevel enlarged cervical lymph nodes which were previously observed on CT 2/2017     Hospital Course:          Fevers, Cervical Lymphadenopathy,    Productive Cough, Pulmonary Opacities    Exposure to TB as her brother was diagnosed 2/2018 and is currently on DOT. Reports weight loss associated with Ramadan, now regained weight. Patient's quant gold negative 2/2018 and again on 6/12. TB antigen negative.     Endorses persistent cough over the past 2-3 weeks with associated rhinorrhea, nasal congestion. Also intermittently spiking fevers per above, last fever on 6/10. CT chest 6/11 with multifocal patchy consolidative opacities in the RUL and posterior segment of RLL concerning for infectious process.     She underwent broncoscopy and LN biopsy results. Bronchoscopy cultures were negative. Fungal cultures showed candida albicans. NOcardia cultures negative. Fungal serologies are ngative. Brochial sample was negative for AFB on the stain, cultures are pending. LN biopsy did not show granuloma or lymphoma. LN biospy was not sent for cultures. HIV was negative and mononucleosis negative.       While in the hospital, her symptoms got better on its own. No antibiotics given. Fever, Cough and other symptoms  resolved. Given those are gone, this could be a viral infection.  ]    She was placed in airborne isolation while in the hospital. Seen by ID. All the work up is negative and symptoms have resolved. She is being discharged home. Per ID recommendations she does not need to be in isolation, but she was asked to report MD if her symptoms come back. State department was notified. FU plan has been outlined.          Peripheral Edema. Acute onset over the past 2 weeks. Possibly side effect from seroquel (4% incidence of peripheral edema), also could be ?nephrotic syndrome or underlying autoimmune process.  UA negative for protein or blood.           Bipolar I disorder, current mixed manic episode: Admitted to  psychiatry 5/31 with acute shaina in setting of medication non-compliance. Restarted on PTA lithium, also started on seroquel for sleep and propranolol for tremors. Mood improved and patient deemed safe for discharge from psychiatry perspective.  - Continue lithium 900 mg QHS  - Continue seroquel 400 mg QHS  - Continue propranolol 20 mg BID  - PRN seroquel 100 mg TID for agitation  - Will reevaluate                   Discharge Disposition:   Discharged to home           Condition on Discharge:   Discharge condition: Stable   Code status on discharge: Full Code           Procedures:               Consultations:   Consultation during this admission received from ID.               Final Day of Progress before Discharge:       Physical Exam:  Blood pressure 100/56, pulse 86, temperature 97.2  F (36.2  C), temperature source Oral, resp. rate 16, weight 57.4 kg (126 lb 8 oz), SpO2 96 %, not currently breastfeeding.  GENERAL: Alert and oriented x 3. NAD.   HEENT: Anicteric sclera. PERRL. Mucous membranes moist and without lesions.   CV: RRR. S1, S2. No murmurs appreciated.   RESPIRATORY: Effort normal. Lungs CTAB with no wheezing, rales, rhonchi.   GI: Abdomen soft and non distended with normoactive bowel sounds present  in all quadrants. No tenderness, rebound, guarding.      Data:  All laboratory data reviewed             Significant Results:     Results for orders placed or performed during the hospital encounter of 06/13/18   IR Lymph Node Biopsy    Narrative    Procedure: Ultrasound-guided fine-needle aspiration of left  submandibular lymph node.    History: Cervical lymphadenopathy,history of tuberculosis exposure.  Diagnostic fine-needle aspiration.    Radiologist(s): oJvanny Patel MD.    Medications: Fentanyl 75 mcg IV, Versed 1.5 mg IV, 1% lidocaine 5.0 ml  local.    Conscious sedation was provided by a trained IR nurse under my direct  supervision.    Sedation time: 60 minutes.    Nursing: Throughout the case the patient's vital signs were monitored  by nursing staff and remained stable.    Consent: Informed written consent was obtained from the patient prior  to performing the procedure. Risks, benefits, and alternatives were  discussed.    Procedure/Findings: Ultrasound was used to identify the location and  best approach to the left submandibular lymph nodes (level 1b). This  area was prepped and draped in usual sterile fashion. Approximately  5.0 ml of 1% lidocaine for local anesthesia was used to anesthetize  the overlying area of the left submandibular region over the left  submandibular lymph node. A 25 gaugeneedle system was used to sample  the left submandibular lymph node. A total of 5 specimens were  obtained from the left submandibular lymph node, which were submitted  to pathology for histologic evaluation. Pathology was on hand to  confirm that the submitted tissue was adequate for diagnosis. The site  was cleansed and dressed. Pressure was held over the biopsy site for  approximately 30 seconds. The procedure was well tolerated. The  patient was transferred back to the observation unit in good  condition.    Complications: None.      Impression    Impression: Technically successful ultrasound-guided  fine-needle  aspiration of the left submandibular lymph nodes (level 1b). Samples  sent for laboratory analysis.    I, NANCY RUIZ MD, attest that I was present in the procedure  room for the entire procedure.    I have personally reviewed the examination and initial interpretation  and I agree with the findings.    NANCY RUIZ MD   Protein  random urine with Creat Ratio   Result Value Ref Range    Protein Random Urine 0.08 g/L    Protein Total Urine g/gr Creatinine 0.14 0 - 0.2 g/g Cr   UA with Microscopic reflex to Culture   Result Value Ref Range    Color Urine Light Yellow     Appearance Urine Clear     Glucose Urine Negative NEG^Negative mg/dL    Bilirubin Urine Negative NEG^Negative    Ketones Urine Negative NEG^Negative mg/dL    Specific Gravity Urine 1.007 1.003 - 1.035    Blood Urine Negative NEG^Negative    pH Urine 6.0 5.0 - 7.0 pH    Protein Albumin Urine Negative NEG^Negative mg/dL    Urobilinogen mg/dL Normal 0.0 - 2.0 mg/dL    Nitrite Urine Negative NEG^Negative    Leukocyte Esterase Urine Large (A) NEG^Negative    Source Midstream Urine     WBC Urine 3 0 - 5 /HPF    RBC Urine 1 0 - 2 /HPF    Squamous Epithelial /HPF Urine 2 (H) 0 - 1 /HPF    Mucous Urine Present (A) NEG^Negative /LPF   Creatinine random urine   Result Value Ref Range    Creatinine Urine Random 52 mg/dL   CBC with platelets   Result Value Ref Range    WBC 5.4 4.0 - 11.0 10e9/L    RBC Count 3.78 (L) 3.8 - 5.2 10e12/L    Hemoglobin 11.3 (L) 11.7 - 15.7 g/dL    Hematocrit 36.5 35.0 - 47.0 %    MCV 97 78 - 100 fl    MCH 29.9 26.5 - 33.0 pg    MCHC 31.0 (L) 31.5 - 36.5 g/dL    RDW 14.4 10.0 - 15.0 %    Platelet Count 254 150 - 450 10e9/L   CRP inflammation   Result Value Ref Range    CRP Inflammation 32.0 (H) 0.0 - 8.0 mg/L   Erythrocyte sedimentation rate auto   Result Value Ref Range    Sed Rate 67 (H) 0 - 20 mm/h   Comprehensive metabolic panel   Result Value Ref Range    Sodium 136 133 - 144 mmol/L    Potassium 3.9 3.4 -  5.3 mmol/L    Chloride 108 94 - 109 mmol/L    Carbon Dioxide 20 20 - 32 mmol/L    Anion Gap 8 3 - 14 mmol/L    Glucose 122 (H) 70 - 99 mg/dL    Urea Nitrogen 7 7 - 30 mg/dL    Creatinine 0.46 (L) 0.52 - 1.04 mg/dL    GFR Estimate >90 >60 mL/min/1.7m2    GFR Estimate If Black >90 >60 mL/min/1.7m2    Calcium 8.8 8.5 - 10.1 mg/dL    Bilirubin Total 0.1 (L) 0.2 - 1.3 mg/dL    Albumin 2.8 (L) 3.4 - 5.0 g/dL    Protein Total 7.3 6.8 - 8.8 g/dL    Alkaline Phosphatase 66 40 - 150 U/L    ALT 31 0 - 50 U/L    AST 20 0 - 45 U/L   Anti Nuclear Bhavani IgG by IFA with Reflex   Result Value Ref Range    KHANH interpretation Borderline Positive (A) NEG^Negative    KHANH pattern 1 SPECKLED     KHANH titer 1 1:40    CBC with platelets   Result Value Ref Range    WBC 6.6 4.0 - 11.0 10e9/L    RBC Count 3.63 (L) 3.8 - 5.2 10e12/L    Hemoglobin 10.5 (L) 11.7 - 15.7 g/dL    Hematocrit 33.2 (L) 35.0 - 47.0 %    MCV 92 78 - 100 fl    MCH 28.9 26.5 - 33.0 pg    MCHC 31.6 31.5 - 36.5 g/dL    RDW 13.7 10.0 - 15.0 %    Platelet Count 337 150 - 450 10e9/L   Fungal Antibodies   Result Value Ref Range    Coccidiomycosis Bhavani Canceled, Test credited    Fungal Antibodies   Result Value Ref Range    Coccidiomycosis Bhavani SEE NOTE     Blastomyces Bhavani by EIA 0.3 <=0.9 IV    Histoplasmosis Myc <1:8 <1:8    Histoplasmosis Yeast <1:8 <1:8    Aspergillus Antibody <1:8 <1:8   Basic metabolic panel   Result Value Ref Range    Sodium 140 133 - 144 mmol/L    Potassium 3.9 3.4 - 5.3 mmol/L    Chloride 108 94 - 109 mmol/L    Carbon Dioxide 25 20 - 32 mmol/L    Anion Gap 7 3 - 14 mmol/L    Glucose 86 70 - 99 mg/dL    Urea Nitrogen 9 7 - 30 mg/dL    Creatinine 0.49 (L) 0.52 - 1.04 mg/dL    GFR Estimate >90 >60 mL/min/1.7m2    GFR Estimate If Black >90 >60 mL/min/1.7m2    Calcium 9.3 8.5 - 10.1 mg/dL   CBC with platelets   Result Value Ref Range    WBC 5.5 4.0 - 11.0 10e9/L    RBC Count 3.64 (L) 3.8 - 5.2 10e12/L    Hemoglobin 10.6 (L) 11.7 - 15.7 g/dL    Hematocrit 32.8 (L)  35.0 - 47.0 %    MCV 90 78 - 100 fl    MCH 29.1 26.5 - 33.0 pg    MCHC 32.3 31.5 - 36.5 g/dL    RDW 13.6 10.0 - 15.0 %    Platelet Count 325 150 - 450 10e9/L   INR   Result Value Ref Range    INR 1.09 0.86 - 1.14   CMV DNA quantification   Result Value Ref Range    CMV DNA Quantitation Specimen Bronchoalveolar Lavage     CMV Quant IU/mL CMV DNA Not Detected CMVND^CMV DNA Not Detected [IU]/mL    Log IU/mL of CMVQNT Not Calculated <2.1 [Log_IU]/mL   Cytology non gyn   Result Value Ref Range    Copath Report       Patient Name: SALLY KAPLAN  MR#: [id number]  Specimen #: [id number]  Collected: 6/16/2018  Received: 6/18/2018  Reported: 6/18/2018 12:59  Ordering Phy(s): GHISLAINE ESCALANTE    For improved result formatting, select 'View Enhanced Report Format' under   Linked Documents section.    SPECIMEN/STAIN PROCESS:  Bronchoalveolar lavage w/GMS, right middle lobe       Pap-Cyto x 2, GMS-Cyto x 2    ----------------------------------------------------------------    CYTOLOGIC INTERPRETATION:    Bronchoalveolar Lavage w/GMS, Right Middle Lobe:  - Negative for malignancy.  - Fungal and Pneumocystis organisms are absent on GMS stained   preparations.  - Viral cytopathic effect is absent.  Specimen Adequacy: Satisfactory for evaluation.    I have personally reviewed all specimens and/or slides, including the   listed special stains, and used them  with my medical judgement to determine or confirm the final diagnosis.    Electronically signed out by:  Alexi Hawthorne M.D., Physicians    Processed a nd screened at [facility]    CLINICAL HISTORY:  The patient is a 18-year-old female with recent tuberculosis exposure, now   with fevers and cervical  lymphadenopathy with clinical concern for possible tuberculosis.    ,    GROSS:  Bronchoalveolar lavage w/GMS, right middle lobe: Received 20 ml of   colorless, cloudy fluid, concentrated,  resuspended and processed as 2  Pap stained direct smears and 2 GMS stained   direct smears.    STAT results for fungal, Pneumocystis organisms and/or viral cytopathic   effect were reported to Manjeet Holland MD on 6/18/018 at 12:03. Results were read back to the reporting   technologist to verify accuracy.  MICKEY    MICROSCOPIC:  Microscopic examination performed.    Jimmy Cruz MD, Cytopathology Fellow          Alexi Hawthorne MD    CPT Codes:  A: 80568-LLB, 23355-CUXC    TESTING LAB LOCATION:  [address]   [address]  [phone]    COLLECTION SITE:  Client:  [facility]  Location:  [facility] (B)    Resident  IF     INR   Result Value Ref Range    INR 1.01 0.86 - 1.14   CBC with platelets differential   Result Value Ref Range    WBC 7.3 4.0 - 11.0 10e9/L    RBC Count 3.86 3.8 - 5.2 10e12/L    Hemoglobin 11.3 (L) 11.7 - 15.7 g/dL    Hematocrit 34.5 (L) 35.0 - 47.0 %    MCV 89 78 - 100 fl    MCH 29.3 26.5 - 33.0 pg    MCHC 32.8 31.5 - 36.5 g/dL    RDW 13.8 10.0 - 15.0 %    Platelet Count 407 150 - 450 10e9/L    Diff Method Automated Method     % Neutrophils 59.9 %    % Lymphocytes 21.5 %    % Monocytes 10.2 %    % Eosinophils 6.3 %    % Basophils 0.6 %    % Immature Granulocytes 1.5 %    Nucleated RBCs 0 0 /100    Absolute Neutrophil 4.4 1.6 - 8.3 10e9/L    Absolute Lymphocytes 1.6 0.8 - 5.3 10e9/L    Absolute Monocytes 0.7 0.0 - 1.3 10e9/L    Absolute Eosinophils 0.5 0.0 - 0.7 10e9/L    Absolute Basophils 0.0 0.0 - 0.2 10e9/L    Abs Immature Granulocytes 0.1 0 - 0.4 10e9/L    Absolute Nucleated RBC 0.0    Basic metabolic panel   Result Value Ref Range    Sodium 137 133 - 144 mmol/L    Potassium 3.8 3.4 - 5.3 mmol/L    Chloride 107 94 - 109 mmol/L    Carbon Dioxide 23 20 - 32 mmol/L    Anion Gap 7 3 - 14 mmol/L    Glucose 84 70 - 99 mg/dL    Urea Nitrogen 10 7 - 30 mg/dL    Creatinine 0.51 (L) 0.52 - 1.04 mg/dL     GFR Estimate >90 >60 mL/min/1.7m2    GFR Estimate If Black >90 >60 mL/min/1.7m2    Calcium 8.7 8.5 - 10.1 mg/dL   CBC with platelets   Result Value Ref Range    WBC 6.3 4.0 - 11.0 10e9/L    RBC Count 3.64 (L) 3.8 - 5.2 10e12/L    Hemoglobin 10.4 (L) 11.7 - 15.7 g/dL    Hematocrit 33.0 (L) 35.0 - 47.0 %    MCV 91 78 - 100 fl    MCH 28.6 26.5 - 33.0 pg    MCHC 31.5 31.5 - 36.5 g/dL    RDW 13.8 10.0 - 15.0 %    Platelet Count 364 150 - 450 10e9/L   CRP inflammation   Result Value Ref Range    CRP Inflammation 13.0 (H) 0.0 - 8.0 mg/L   HCG quantitative pregnancy   Result Value Ref Range    HCG Quantitative Serum <1 0 - 5 IU/L   Fine Needle Aspiration   Result Value Ref Range    Copath Report       Patient Name: SALLY KAPLAN  MR#: [id number]  Specimen #: [id number]  Collected: 6/18/2018  Received: 6/19/2018  Reported: 6/20/2018 13:12  Ordering Phy(s): MARY GRACE BONE  Additional Phy(s): WILLIAM MCNALLY    For improved result formatting, select 'View Enhanced Report Format' under   Linked Documents section.    SPECIMEN/STAIN PROCESS:  FNA-lymph node- Left Submandibular lymph node       Diff Quick Stain-cyto x 4, Cell Block w/ H&E-Cyto x 1, Save Ribbon, 1   x 1, Cell Block, Level 2 x 1,  Save Ribbon, 2 x 1, Cell Block, Level 3 x 1    ----------------------------------------------------------------    CYTOLOGIC INTERPRETATION:    Lymph Node, Left Submandibular, Ultrasound-Guided Fine Needle Aspiration:  Polymorphous lymphoid population consistent with lymph node sampling.   Negative for malignancy.  Please see comment.  Specimen Adequacy: Satisfactory for evaluation.    COMMENT:  The specimen demonstrates a polymorphous lymphoid population consistent   with lymph node fitz pling. There is no  morphologic evidence of carcinoma. No granulomas are identified. Please   correlate with concurrent flow  cytometry ([id number]) for complete evaluation.    I have personally reviewed all specimens and/or slides, including the    listed special stains, and used them  with my medical judgement to determine or confirm the final diagnosis.    Electronically signed out by:  Alexi Hawthorne M.D., [facility]    Processed and screened at [facility]    CLINICAL HISTORY:  The patient is a 18-year-old female with lymphadenopathy.    ,    GROSS:  FNA-lymph node - Left Submandibular lymph node: Received are 4 air dried   slides, processed for Diff Quik  stain, and material in formalin, processed for one hematoxylin stained   cell block. Material in RPMI sent to  Flow Cytometry.    INTRAOPERATIVE CONSULTATION:  FNA Performance: Fine needle aspiration was not performed by [facility],    Pathology staff.    Immediate [place]qua cy: On site specimen adequacy evaluation was performed by   Dr. SAI Hawthorne III, MD via  telepathology.    Onsite adequacy/interpretation:  Pass A2- A4: Adequate, Pass A5: put into RPMI.    MICROSCOPIC:  Microscopic examination performed.    Jimmy Cruz MD, Cytopathology Fellow          Alexi Hawthorne MD    CPT Codes:   26781-ZUDF-MPY, 57157-OEKB-JJS  A: 20828-YPSL, 19379-CBG, HCB    TESTING LAB LOCATION:  [facility], [address]   [id number] [phone]    COLLECTION SITE:  Client:  [facility]  Location:  [facility] ([facility])    Resident  [email or web address]     Leukemia Lymphoma Evaluation (Flow Cytometry)   Result Value Ref Range    Copath Report       Patient Name: SALLY KAPLAN  MR#: [id number]  Specimen #: [id number]  Collected: 6/18/2018 15:25  Received: 6/18/2018 16:01  Reported: 6/19/2018 16:06  Ordering Phy(s): MARY GRACE BONE  Additional Phy(s): TAYE KEY    For improved result formatting, select 'View Enhanced Report Format' under   Linked Documents section.  _________________________________________    SPECIMEN(S):  Lymph node, left  submandibular    INTERPRETATION:  Lymph node, left submandibular:       Polytypic B cells       No aberrant immunophenotype on T cells       See comment    COMMENT:  This specimen has decreased viability, which may affect the ability to   detect a neoplastic process.  There is no immunophenotypic evidence of non-Hodgkin lymphoma. Hodgkin   lymphoma cannot be excluded by flow  cytometry. T cell lymphomas cannot always be detected by this flow   cytometry assay. Neoplastic cells,  including large cells, may not survive specimen processing. This sample   may not be representative. F inal  interpretation requires correlation with morphologic and clinical   features.    RESULTS:  32% of total events are CD45 positive and are viable by 7-AAD. A low   percentage may be caused by low viability  and/or a low number of CD45 positive cells. Of the CD45 positive   leukocytes, 31% are viable by 7-AAD.    Percentages reported below are based on the total number of CD45 positive   viable leukocytes.    18% polytypic B cells  16% T cells with a CD4:CD8 ratio of 1.9:1.  0.5% NK cells    ANTIBODIES:  Eight and nine color analyses are performed for the following markers:   CD2, CD3, CD4, CD5, CD7, CD8, CD10,  CD19, CD20, CD34, CD38, CD45, CD56, and kappa and lambda immunoglobulin   light chains. Cells are gated to  isolate populations (CD45 versus side scatter and forward scatter versus   side scatter), to exclude debris  (forward scatter versus side scatter) and to exclude cell doublets   (forward scatter height versus forward  scatter width and side scatter height versus side s catter width). Forward   scatter varies with cell size. Side  scatter varies with the amount of cytoplasmic granules. Intensity for CD45   usually increases as hematolymphoid  cells mature.    CLINICAL HISTORY:  18 year old female undergoing biopsy of a left submandibular lymph node.    I have personally reviewed all specimens and/or slides, including the    listed special stains, and used them  with my medical judgment to determine the final diagnosis.    Electronically signed out by:    Rhoda Reyes M.D., [facility]    This test was developed and its performance characteristics determined by   [facility]. It has not been cleared or   approved by the US Food and Drug  Administration.  FDA does not require this test to go through premarket   FDA review. This test is used for  clinical purposes and should not be regarded as investigational or for   research.  This laboratory is certified  under the Clinical Laboratory I mprovement Amendments (CLIA) as qualified   to perform high complexity clinical  laboratory testing.    CPT Codes:  A: 20374-FF, 85611-CBNESEF, 80679-25-VGDO90(13), 68255-JOHX4-83    TESTING LAB LOCATION:  [address] [address]  [phone]    COLLECTION SITE:  Client:  [facility]  Location:  [facility] (B)     Infectious Disease General Adult IP Consult: Patient to be seen: Routine within 24 hrs; Call back #: [phone]; Cervical LAD, intermittent fevers, TB exposure; Consultant may enter orders: Yes    Narrative    Jaz Richards MD     2018  9:36 PM    [facility] GENERAL ID SERVICE: NEW CONSULTATION   Cristina Boyd : 2000 Sex: Female   MRN: [id number]  Admission Date: 2018  Consult Requester: Abraham Pickering MD  Date of Service: 2018    REASON FOR CONSULTATION: Tuberculosis r/o    PROBLEM LIST:  1. Fever, submandibular/cervical lymphadenopathy, productive   cough; known active tuberculosis exposure   2. Bipolar disorder  3. Goiter     RECOMMENDATIONS:   1. Induced sputum with AFB smear + culture and bacterial + fungal   culture; strongly suggest bronchoscopy if induced sputum   unsuccessful; collect samples Q8H with 1 in morning   2. Check fungal Ab  panel, cryptococcal Ag, and urine histoplasma       3. Lymph node biopsy scheduled on 6/18/18; suggest sending lymph   node biopsy tissue for AFB smear + culture and bacterial + fungal   culture  4. Continue airborne precautions     DISCUSSION:   Oliver Evans is a 19 y/o female with h/o bipolar disorder who was   admitted on 5/31/18 for shaina, currently being evaluated for   tuberculosis in the setting of known active tuberculosis exposure   in her shared household. Reports URI symptoms, fever, and   lymphadenopathy that began approximately 2 days PTA. Febrile to   101.5F on 6/6/18 and 101.3F on 6/10/18; WBC stable at 6-7   throughout admission. CT chest/neck on 6/11/18 showed multifocal   patchy consolidations in RUL and RLL concerning for infectious   process and cervical lymphadenopathy. Agree with w/u to r/o   tuberculosis; presentation may also be consistent with atypical   pneumonia or fungal process. Recommend collecting induced sputum   for AFB smear + culture and bacterial + fungal culture and   checking fungal antibody panel, cryptococcal Ag, and urine   histoplasma . Also suggest sending lymph node biopsy tissue for   AFB smear + culture and bacterial + fungal culture. Continue   airborne precautions until 3 negative sputum AFB stain +   cultures.     Rahel Logan, MS4. Please page me with questions about this   patient  Pager: [phone]    Scribe Disclosure:   I, Rahel Lora, am serving as a scribe; to document services   personally performed by Jaz Richards- -based on data collection   and the provider's statements to me.     Provider Disclosure:  I agree with above History, Review of Systems, Physical exam and   Plan.  I have reviewed the content of the documentation and have   edited it as needed. I have personally performed the services   documented here and the documentation accurately represents those   services and the decisions I have made.      Electronically signed by:  Jaz Richards,  MD   of Medicine, Division of Infectious Diseases  [facility] [phone]        HPI:   Oliver Evans is a 19 y/o female with h/o bipolar disorder who was   admitted on 5/31/18 for shaina in the setting of lithium   noncompliance. While in psych, reported fever, nasal congestion,   sore throat, productive cough with yellow sputum, neck swelling,   and odynophagia that started approximately 2 days PTA. She lives   with her parents and younger brother who was diagnosed with   active tuberculosis in 2/2018 and currently on DOT. No other   family members have subsequently been diagnosed with   tuberculosis. Febrile to 101.5F on 6/6/18 and 101.3F on 6/10/18;   WBC stable at 6-7 throughout admission. CT chest/neck on 6/11/18   showed multifocal patchy consolidations in RUL and RLL concerning   for infectious process and submandibular/cervical   lymphadenopathy. Now complains of mild sore throat and nasal   congestion; cough has since improved. Denies fever, chills, night   sweats, headache, cough, hemoptysis, n/v, diarrhea, dysuria, or   rashes. States that left submandibular lymph node is causing some   discomfort.     Pertinent labs:  - Rapid strep: Negative 6/6/18  - Beta strep group A culture: Negative 6/6/18  - Quantiferon gold: Negative on 2/26/18, negative on 6/12/18  - HIV Ag/Ab combo: Nonreactive on 6/6/18  - Mononucleosis: Negative on 6/6/18    ANTI-INFECTIVES:   None    ROS:  A ten point ROS was obtained and was negative with the exception   of that which is described in the above.    PMH:   Past Medical History:   Diagnosis Date     Bipolar disorder (H)      Depressive disorder      PSH:  No past surgical history on file.    ALLERGIES:  No known drugs allergies.     SOCIAL HISTORY:   Social History   Substance Use Topics     Smoking status: Never Smoker     Smokeless tobacco: Never Used     Alcohol use No     History   Sexual Activity     Sexual activity: No     FAMILY HISTORY:   Family History    Problem Relation Age of Onset     Depression Other      Depression Maternal Uncle      EXAMINATION:   VS: /69 (BP Location: Left arm)  Pulse 81  Temp 97.2  F   (36.2  C) (Oral)  Resp 18  SpO2 99%  GENERAL: AOx3. NAD. Sitting comfortably in chair.   EYES: Sclerae anicteric without conjunctival injection or   stigmata of endocarditis.  No proptosis.   ENT: AT/NC. MMM without lesions or exudates.   NECK:  Supple. Large goiter. Bilateral cervical lymphadenopathy.   Large tender left submandibular lymph node.   HEART: RRR. No MRG.  LUNGS: CTAB. Normal respiratory effort.   ABDOMEN: Bowel sounds present. S/NT/ND. No hepatosplenomegaly.  SKIN: No rashes or sores.   NEURO: Grossly nonfocal. Spontaneously moves all extremities.   PSYCH: Mood appropriate. Somewhat flat affect.     BASIC LABS:  The following basic labs were personally reviewed:  Inflammatory Markers    Recent Labs   Lab Test  06/11/18   1309  02/09/16   0750   SED  72*  48*   CRP  70.1*   --      Hematology Studies    Recent Labs   Lab Test  06/11/18   0731  06/06/18   2218  06/01/18   0201  04/02/18 2003 02/01/17   0936  01/20/17   0825  01/13/17   0936   WBC  6.5  7.9  6.7  7.1  7.6  4.9  5.1   ANEU  3.7  5.5  3.3  4.1  4.8   --   2.0   AEOS  0.6  0.6  0.1  0.1  0.3   --   0.1   HGB  10.7*  11.5*  13.1  12.8  12.6  12.1  12.5   MCV  89  91  89  90  88  88  84   PLT  260  245  319  276  315  285  278     Metabolic Studies     Recent Labs   Lab Test  06/01/18   0201  04/02/18 2003 11/17/17   1512  03/08/17   1100  02/01/17   0936   NA  139  137  134  143  143  137   POTASSIUM  4.1  4.5  4.1  3.7  3.7  3.6   CHLORIDE  105  108  104  110  110  104   CO2  22  25  23  27  27  25   BUN  12  11  9  7  7  6*   CR  0.56  0.50*  0.63  0.61  0.61  0.49*   GFRESTIMATED  >90  >90  >90  >90  >90  Non African American GFR Calc    >90  Non  GFR Calc       Hepatic Studies    Recent Labs   Lab Test  06/01/18   0201  04/02/18 2003   03/08/17   1100  02/01/17   0936  01/13/17   0936  03/23/16   0940   BILITOTAL  0.4  0.3  0.3  0.3  0.4  0.4  0.4   ALKPHOS  69  74  70  70  83  77  79   ALBUMIN  4.1  3.9  3.2  3.2*  3.6  3.7  3.7   AST  14  20  17  17  14  16  25   ALT  7  11  19  19  20  17  18     Thyroid Studies    Recent Labs   Lab Test  06/06/18   2218  06/01/18   0810  06/01/18   0201   04/02/18 2003   TSH  0.91   --   4.83*   < >  4.10*   T4   --   1.62*   --    --   1.04    < > = values in this interval not displayed.     MICROBIOLOGY LABS:  The following microbiology studies were personally reviewed:  Culture Micro   Date Value Ref Range Status   06/14/2018 (A)  Final    Canceled, Test credited  >10 Squamous epithelial cells/low power field indicates oral   contamination. Please   recollect.     06/14/2018   Final    Notification of test cancellation was given to  TOMY LÓPEZ RN @0634 6/14/18. [facility] [place]     06/14/2018 (A)  Final    Canceled, Test credited  >10 Squamous epithelial cells/low power field indicates oral   contamination. Please   recollect.     06/14/2018   Final    Notification of test cancellation was given to  TOMY LÓPEZ RN @0634 6/14/18. [facility] [place]     06/14/2018 PENDING  Preliminary   06/13/2018 Canceled, Test credited  Final   06/13/2018 (A)  Final    >10 Squamous epithelial cells/low power field indicates oral   contamination. Please   recollect.     06/13/2018   Final    Notification of test cancellation was given to  Kera Mcknight RN on station [id number] at 10:45am 6/13/2018 ()     06/13/2018 Canceled, Test credited  Final   06/13/2018 (A)  Final    >10 Squamous epithelial cells/low power field indicates oral   contamination. Please   recollect.     06/13/2018   Final    Notification of test cancellation was given to  Kera Mcknight RN on station [id number] at 10:45am 6/13/2018 ()     06/12/2018 Canceled, Test credited  Final   06/12/2018 (A)  Final    >10 Squamous epithelial cells/low power field indicates oral    contamination. Please   recollect.     06/12/2018   Final    Notification of test cancellation was given to  Amber Cabral RN/UR20NB at 2112 on 6/12/18. [facility].     06/12/2018 Canceled, Test credited  Final   06/12/2018 (A)  Final    >10 Squamous epithelial cells/low power field indicates oral   contamination. Please   recollect.     06/12/2018   Final    Notification of test cancellation was given to  Amber Cabral RN/UR20NB at 2112 on 6/12/18. [facility].     06/06/2018 No Beta Streptococcus isolated  Final   01/21/2017 (A)  Final    Light growth Beta hemolytic Streptococcus, not group A     Urine Studies    Recent Labs   Lab Test  06/14/18   0030  04/02/18   2057  02/01/17   0124  01/20/17   0930  02/10/16   1743   LEUKEST  Large*  Small*  Negative  Negative  Negative   WBCU  3  1  <1  <1   --      IMAGING RESULTS  The following imaging studies were personally reviewed:    CT neck (6/11/18): Multilevel enlarged cervical lymph nodes   slightly increased in size compared to prior on 3/2/17.   CT chest (6/11/18): Multifocal patchy consolidations in RUL and   RLL concerning for infectious process.       Interventional Radiology Adult/Peds IP Consult: Patient to be seen: Routine within 24 hours; Call back #: Edwardo hill) [phone], (p) [phone]; Core biopsy of cervical lymph node to evaluate for TB vs Inflammatory vs Other    Narrative    Niya Bolden, AVINASH     6/14/2018 10:50 AM      Patient is on Neuroradiology schedule 6/18/2018  for a US guided   cervical lymph node biopsy.   Labs WNL for procedure.    Orders for NPO, scrubs  have been entered.  Consent will be done prior to procedure.     Please contact the IR charge RN at [phone] for estimated time of   procedure.     Discussed with Dr. Dallin Bolden IR RPA  [phone] Call pager  [phone] pager               Pulmonary General Adult IP Consult: Patient to be seen: Routine within 24 hrs; Call back #: Edwardo Pickering [phone], [phone];  Evaluating for TB, known exposure, unable to obtain sputum.  Request for bronch.; Consultant may enter orders: Yes    Narrative    Dary Fuentes MD     6/15/2018  5:50 PM  [facility]    Pulmonary, Allergy, Critical Care and Sleep Medicine    Initial Consultation  Ashlee 15, 2018      Cristina Boyd MRN# [id number]   Age: 18 year old YOB: 2000     Date of Admission:  6/13/2018  Reason for Consultation: Bronchoscopy and evaluation for possible   tuberculosis  Requesting Physician: Dr. Pickering     Assessment and Plan:  Cristina Boyd IS a 18 year old female with bipolar shaina and   cervical lymphadenopathy, abnormal chest imaging admitted on   6/13/2018.  Given her exposure, there is a concern for   tuberculosis and she has had multiple unsuccessful attempts at   sputum production.  Other diagnostic considerations include other   infectious etiologies, including fungal disease and   nontuberculous microbacteria, less likely lymphoma.    -Bronchoscopy with bronchoalveolar lavage scheduled for Saturday   at noon endoscopy, patient should be n.p.o. after midnight            Chief Complaint:     History obtained from patient.    History of Present Illness: Klaus was admitted to this hospital 2   days ago after a several week stay at the inpatient psychiatry   unit.  Initially, her mother took her there because she believes   she was having shaina, as manifested by not sleeping for several   nights in a row.  Klaus reports that she was not sleeping because   she was itching in her skin was hot.  She does have a history of   bipolar shaina, but does not think she was having it then and does   not think she is having it now.   She does report a cough   recently which is not normal for her, it has been a dry cough and   multiple attempts to induce sputum have been made unsuccessfully.    She is [place]American, was born [place], but did spend 3 years in [place] and  returned in 2015.    Her brother was recently diagnosed with TB, and she believes that   his symptoms are very different than hers currently.  He had   fevers and was coughing up blood.  She has been googling her   symptoms and is worried that she might have lymphoma.  She denies   any abdominal complaints.  She has submandibular lymphadenopathy,   but she did not notice it until it was detected on physical exam   by a psychiatrist at the [facility] [place].  She does however think that   is getting better, based on her research she believes is most   likely a viral syndrome.  She initially had a sore throat and   fatigue which are both improved.  She has never had a PPD skin   test but says she has had blood tests that were negative for TB,   she is not known to have ever had or been treated for   tuberculosis.  Her brother is currently undergoing a six-month   course of treatment.      She reports a history of frequent sinusitis, currently has nasal   congestion.                    Past Medical History:     Past Medical History:   Diagnosis Date     Bipolar disorder (H)      Depressive disorder             Past Surgical History:   none         Social History:     Social History     Social History     Marital status: Single     Spouse name: N/A     Number of children: N/A     Years of education: N/A     Occupational History     Not on file.     Social History Main Topics     Smoking status: Never Smoker     Smokeless tobacco: Never Used     Alcohol use No     Drug use: No      Comment: x1 marijuana, in October, 2016     Sexual activity: No     Other Topics Concern     Parent/Sibling W/ Cabg, Mi Or Angioplasty Before 65f 55m? No     Social History Narrative            Family History:     Family History   Problem Relation Age of Onset     Depression Other      Depression Maternal Uncle           Allergies:   Please see allergy list which was reviewed this admission.         Medications:       cetirizine  10 mg Oral Daily      lithium  900 mg Oral At Bedtime     propranolol  20 mg Oral BID     QUEtiapine  400 mg Oral At Bedtime     benzocaine-menthol, Carboxymethylcellulose Sod PF, melatonin,   naloxone, QUEtiapine, triamcinolone         Review of Systems:   CONSTITUTIONAL: negative for fever, chills, change in weight  INTEGUMENTARY/SKIN: no rash or obvious new lesions  ENT/MOUTH: no sore throat, new sinus pain or nasal drainage  RESP: see interval history  CV: negative for chest pain, palpitations or peripheral edema  GI: no nausea, vomiting, change in stools  : no dysuria  MUSCULOSKELETAL: no myalgias, arthralgias  ENDOCRINE: blood sugars with adequate control  PSYCHIATRIC: mood stable  LYMPHATIC: no new lymphadenopathy  HEME: no bleeding or easy bruisability  NEURO: no numbness, weakness, headaches         Physical Exam:   Temp:  [96.8  F (36  C)-99.4  F (37.4  C)] 98.2  F (36.8  C)  Pulse:  [] 107  Resp:  [16-18] 18  BP: ()/(50-66) 95/62  SpO2:  [98 %-100 %] 99 %    Intake/Output Summary (Last 24 hours) at 06/15/18 1609  Last data filed at 06/14/18 1657   Gross per 24 hour   Intake                0 ml   Output              400 ml   Net             -400 ml       Constitutional:   Awake, alert and in no apparent distress     Eyes:   Nonicteric, TERRELL     ENT:    oral mucosa moist without lesions     Neck:   Supple without supraclavicular or cervical lymphadenopathy     Lungs:   Good air flow.  No crackles. No rhonchi.  No wheezes.     Cardiovascular:   Normal S1 and S2.  RRR.  No murmur, gallop or rub.  Radial, DP and PT pulses normal and symmetric     Abdomen:   NABS, soft, nontender, nondistended.  No HSM.     Musculoskeletal:   No edema. Strength 5/5 and symmetric     Neurologic:   Alert and conversant. Cranial nerves II-XII intact.       Skin:   Warm, dry.  No rash on limited exam.           Data:   All laboratory and imaging data reviewed.    CMP  Recent Labs  Lab 06/14/18  1014      POTASSIUM 3.9   CHLORIDE 108    CO2 20   ANIONGAP 8   *   BUN 7   CR 0.46*   GFRESTIMATED >90   GFRESTBLACK >90   BHANU 8.8   PROTTOTAL 7.3   ALBUMIN 2.8*   BILITOTAL 0.1*   ALKPHOS 66   AST 20   ALT 31     CBC  Recent Labs  Lab 06/15/18  1241 06/14/18  1014 06/11/18  0731   WBC 6.6 5.4 6.5   RBC 3.63* 3.78* 3.64*   HGB 10.5* 11.3* 10.7*   HCT 33.2* 36.5 32.5*   MCV 92 97 89   MCH 28.9 29.9 29.4   MCHC 31.6 31.0* 32.9   RDW 13.7 14.4 13.4    254 260     INRNo lab results found in last 7 days.  Arterial Blood GasNo lab results found in last 7 days.  Urine Studies  Recent Labs   Lab Test  06/14/18   0030  04/02/18   2057  02/01/17   0124  01/20/17   0930  02/10/16   1743   URINEPH  6.0  5.5  6.0  6.0  5.5   NITRITE  Negative  Negative  Negative  Negative  Negative   LEUKEST  Large*  Small*  Negative  Negative  Negative   WBCU  3  1  <1  <1   --      CMV viral loads  No lab results found.  No results found for: CMQNT, CMVQ  EBV viral loads   No lab results found.  No results found for: EBVDN, EBRES, EBVDN, EBVSP, EBVPC, EBVPCR  Respiratory Virus Testing    No results found for: RS, FLUAG   Sputum Cultures in the last 3 months:  Specimen Description   Date Value Ref Range Status   06/14/2018 Sputum  Final   06/14/2018 Sputum  Final   06/14/2018 Sputum  Final   06/14/2018 Sputum  Final    Culture Micro   Date Value Ref Range Status   06/14/2018 (A)  Final    Canceled, Test credited  >10 Squamous epithelial cells/low power field indicates oral   contamination. Please   recollect.     06/14/2018   Final    Notification of test cancellation was given to  TOMY LÓPEZ RN @0634 6/14/18. [facility] [place]     06/14/2018 (A)  Final    Canceled, Test credited  >10 Squamous epithelial cells/low power field indicates oral   contamination. Please   recollect.     06/14/2018   Final    Notification of test cancellation was given to  TOMY LÓPEZ RN @0634 6/14/18. [facility]                    AFB Culture Non Blood   Result Value Ref Range    Specimen Description  Sputum     Culture Micro Canceled, Test credited  Quantity not sufficient       Culture Micro       Insufficient volume received for Acid Fast Bacillus Culture and AFB Stain  with Reflex to Mycobacterium Tuberculosis Complex Detection and Rifampin Resistance by PCR  0.5mL was received, minimum required volume is 1 mL      Culture Micro       Notification of test cancellation was given to  CESAR Connelly on UU7C     AFB Stain Non Blood   Result Value Ref Range    Specimen Description Sputum     AFB Stain Canceled, Test credited  Quantity not sufficient       AFB Stain       Insufficient volume received for Acid Fast Bacillus Culture and AFB Stain  with Reflex to Mycobacterium Tuberculosis Complex Detection and Rifampin Resistance by PCR  0.5mL was received, minimum required volume is 1 mL      AFB Stain       Notification of test cancellation was given to  CESAR Connelly on UU7C     Fungus culture   Result Value Ref Range    Specimen Description Sputum     Culture Micro (A)      Canceled, Test credited  >10 Squamous epithelial cells/low power field indicates oral contamination. Please   recollect.      Culture Micro       Notification of test cancellation was given to  TOMY LÓPEZ RN @0634 6/14/18. [facility]     Sputum Culture Aerobic Bacterial   Result Value Ref Range    Specimen Description Sputum     Culture Micro (A)      Canceled, Test credited  >10 Squamous epithelial cells/low power field indicates oral contamination. Please   recollect.      Culture Micro       Notification of test cancellation was given to  TOMY LÓPEZ RN @0634 6/14/18. [facility]     Gram stain   Result Value Ref Range    Specimen Description Sputum     Special Requests Screen     Gram Stain (A)      >10 Squamous epithelial cells/low power field indicates oral contamination. Please   recollect.      Gram Stain >25 PMNs/low power field     Gram Stain       Many  Mixed gram positive and gram negative bacteria present.     Urine Culture Aerobic  Bacterial   Result Value Ref Range    Specimen Description Unspecified Urine     Special Requests Specimen received in preservative     Culture Micro       10,000 to 50,000 colonies/mL  mixed urogenital zina  Susceptibility testing not routinely done       *Note: Due to a large number of results and/or encounters for the requested time period, some results have not been displayed. A complete set of results can be found in Results Review.      No results found for this or any previous visit (from the past 48 hour(s)).             Pending Results:   Unresulted Labs Ordered in the Past 30 Days of this Admission     Date and Time Order Name Status Description    6/16/2018 1217 Nocardia culture Preliminary     6/16/2018 1217 Fungus Culture, non-blood Preliminary     6/16/2018 1217 AFB Culture Non Blood Preliminary                   Discharge Instructions and Follow-Up:     Discharge Procedure Orders  US Thyroid   Standing Status: Future  Standing Exp. Date: 09/19/18   Order Specific Question Answer Comments   Priority Routine      TSH with free T4 reflex   Standing Status: Future  Standing Exp. Date: 08/20/18   Order Comments: Last Lab Result: TSH (mU/L)      Date                     Value                06/06/2018               0.91             ----------     Reason for your hospital stay   Order Comments: Admitted with fever, sore throat, cough and cervical LN enlargement after active TB exposure. Has been ruled out for TB.     Adult [facility] Follow-up and recommended labs and tests   Order Comments: Follow up with primary care provider, Physician No Ref-Primary, within 7 days for hospital follow- up.  No follow up labs or test are needed.  FU with ID as scheduled on 7/11 at 830 am  FU with Psychiatry in two weeks time for BiPolar       Appointments on [place] and/or [facility] (with [facility] or [facility] provider or service). Call [phone] if you haven't heard regarding these appointments within 7 days of  discharge.     Activity   Order Comments: Your activity upon discharge: activity as tolerated   Order Specific Question Answer Comments   Is discharge order? Yes      Diet   Order Comments: Follow this diet upon discharge: Orders Placed This Encounter     Regular Diet Adult   Order Specific Question Answer Comments   Is discharge order? Yes               Petr Mcgregor  Internal Medicine Staff Hospitalist  [phone]  July 12, 2018        Time spent on patient: 35 minutes total including face to face and coordinating care time reviewing current illness, any medication changes, and the care plan for today.

## 2018-07-12 NOTE — PROGRESS NOTES
"                 Progress Note - Initial Session    Client Name:  Cristina Boyd Date: 7/12/2018         Service Type: Individual      Session Start Time: 2:35 pm  Session End Time: 3:20 pm      Session Length: 38 - 52      Session #: 1     Attendees: Client attended alone         Diagnostic Assessment in progress. Intake packet not completed, client will bring back next session completed.  Unable to complete documentation at the conclusion of the first session due to reviewing expectation for therapy and client's recent hospitalization. Client says she was hospitalized middle of May and was discharged 2 days ago due to manic episode. She says medication has helped her shaina, currently in hypomanic state.       Mental Status Assessment:  Appearance:   Appropriate   Eye Contact:   Good   Psychomotor Behavior: Agitated  Hyperactive   Attitude:   Cooperative   Orientation:   All  Speech   Rate / Production: Hyperverbal    Volume:  Normal   Mood:    Anxious  Hypomanic   Affect:    Blunted   Thought Content:  Clear   Thought Form:  Coherent  Flight of Ideas  Logical   Insight:    Fair       Safety Issues and Plan for Safety and Risk Management:  Client denies current fears or concerns for personal safety.  Client reports the following current or recent suicidal ideation or behaviors: Client answered \"several days\" to questions #9 on PHQ 9. She says \"this is when I am a drama villatoro.\" She explains that thoughts typically happen when she is depressed, and are automatic thoughts, no intent in doing it.  Client denies current or recent homicidal ideation or behaviors.  Client denies current or recent self injurious behavior or ideation.  Client denies other safety concerns.  A safety and risk management plan has not been developed at this time, however client was given the after-hours number / 911 should there be a change in any of these risk factors.  Client reports there are no firearms in the house.      Diagnostic " Criteria:  MANIC EPISODE - At least one lifetime manic episode is required for the dx of Bipolar I Disorder as evidenced by present symptoms or by history  A. A distinct period of abnormally and persistently elevated, expansive, or irritable mood, lasting at least 1 week (or any duration if hospitalization is necessary).   B. During the period of mood disturbance, three (or more) of the following symptoms (four if the mood is only irritable) have persisted and have been present to a significant degree:   - decreased need for sleep (e.g., feels rested after only 3 hours of sleep)    - more talkative than usual or pressure to keep talking    - flight of ideas or subjectivie experience that thoughts are racing   - distractibility   - increase in goal-directed activity   - excessive involvement in pleasurable activities that have a high potential for painful consequences, such as spending money or sexual indiscretion.  C. The mood disturbance is sufficiently severe to cause marked impairment in social or occupational functioning or to necessitate hospitalization to prevent harm to self or others, or there are severe psychotic features  D. The symptoms are not attributable to the physiologicial effects of a substance or to another medical condition  E. The episode is sufficiently severe enough to cause marked impairment in social or occupational functioning or to necessitate hospitalization to prevent harm to self or others, or there are psychotic features  F. The symptoms are not due to the direct physiological effects of a substance (eg, a drug of abuse, a medication, or other treatment) or a general medical condition (eg, hyperthyroidism).    - The aformentioned symptoms began 3 year(s) ago and occurs 7 days per week and is experienced as moderate.      DSM5 Diagnoses: (Sustained by DSM5 Criteria Listed Above)  Diagnoses: 296.40 Bipolar I Disorder Current or Most Recent Episode Hypomanic  300.02 (F41.1) Generalized  Anxiety Disorder  Psychosocial & Contextual Factors: Diagnosed with bipolar 3 years ago, family support is present, recent inpatient stay  WHODAS 2.0 (12 item)    Collateral Reports Completed:  no PCP listed for this client      PLAN: (Homework, other):  Client stated that she may follow up for ongoing services with [facility].  Second session scheduled in 8/2018.      MARCELINA Adams LGSW                             July 12, 2018      Note reviewed and clinical supervision by MARCELINA Butler [facility] 7/16/2018

## 2018-07-12 NOTE — MR AVS SNAPSHOT
MRN:[id number]                      After Visit Summary   7/12/2018    Cristina Boyd    MRN: [id number]           Visit Information        Provider Department      7/12/2018 2:30 PM Keyana Caldera LGSW [facility] Generic      Your next 10 appointments already scheduled     Jul 17, 2018  8:00 AM CDT   Office Visit with Adriana Torres [facility] ([facility])    [address] [address] [phone]           Bring a current list of meds and any records pertaining to this visit. For Physicals, please bring immunization records and any forms needing to be filled out. Please arrive 10 minutes early to complete paperwork.            Jul 25, 2018  8:40 AM CDT   Adult Med Follow UP with Jazmine Key MD   Psychiatry Clinic ([facility])    [place] [address]  [address] [address] [phone]            Aug 10, 2018  1:30 PM CDT   Return Visit with MARIKA Adams   [facility] ([facility])    [facility]  [address] [facility] [address]  [facility] [id number] [phone]              Care EveryWhere ID     This is your Care EveryWhere ID. This could be used by other organizations to access your [place] medical records  [id number]        Equal Access to Services     HUMAIRA HUFFMAN AH: Hadii sarika cervantes hadasho Soalejandroali, waaxda luqadaha, qaybta kaalmada adeegyada, alfred goyal. So [facility] [phone].    ATENCIÓN: Si habla español, tiene a shaver disposición servicios gratuitos de asistencia lingüística. Bryon al [phone].    We comply with applicable federal civil rights laws and [place] laws. We do not discriminate on the basis of race, color, national origin, age, disability, sex, sexual orientation, or gender  identity.

## 2018-07-13 ENCOUNTER — OFFICE VISIT (OUTPATIENT)
Dept: PSYCHIATRY | Facility: CLINIC | Age: 18
End: 2018-07-13
Attending: PSYCHIATRY & NEUROLOGY
Payer: COMMERCIAL

## 2018-07-13 VITALS
DIASTOLIC BLOOD PRESSURE: 77 MMHG | BODY MASS INDEX: 20.79 KG/M2 | HEART RATE: 109 BPM | SYSTOLIC BLOOD PRESSURE: 114 MMHG | WEIGHT: 128.8 LBS

## 2018-07-13 DIAGNOSIS — F31.9 BIPOLAR 1 DISORDER (H): Primary | ICD-10-CM

## 2018-07-13 PROBLEM — F31.64 BIPOLAR DISORDER, CURR EPISODE MIXED, SEVERE, WITH PSYCHOTIC FEATURES (H): Status: RESOLVED | Noted: 2017-02-01 | Resolved: 2018-07-13

## 2018-07-13 PROCEDURE — G0463 HOSPITAL OUTPT CLINIC VISIT: HCPCS | Mod: ZF

## 2018-07-13 ASSESSMENT — ANXIETY QUESTIONNAIRES: GAD7 TOTAL SCORE: 21

## 2018-07-13 ASSESSMENT — PAIN SCALES - GENERAL: PAINLEVEL: NO PAIN (0)

## 2018-07-13 ASSESSMENT — PATIENT HEALTH QUESTIONNAIRE - PHQ9: SUM OF ALL RESPONSES TO PHQ QUESTIONS 1-9: 18

## 2018-07-13 NOTE — MR AVS SNAPSHOT
After Visit Summary   7/13/2018    Cristina Boyd    MRN: [id number]           Patient Information     Date Of Birth          2000        Visit Information        Provider Department      7/13/2018 3:10 PM Jazmine Key MD Psychiatry Clinic        Today's Diagnoses     Bipolar 1 disorder (H)    -  1      Care Instructions    - Ok to use Seroquel 100 mg at bedtime for the next 2 weeks     - I will call Day Treatment          Follow-ups after your visit        Follow-up notes from your care team     Return in about 2 weeks (around 7/27/2018) for 30 MFU.      Your next 10 appointments already scheduled     Jul 19, 2018 10:00 AM CDT   Evaluation with Robyn Jordan [facility] ([facility])    [address] [place] [id number] [phone]            Jul 25, 2018  8:40 AM CDT   Adult Med Follow UP with Jazmine Key MD   Psychiatry Clinic ([facility])    [place] [id number]  [address] [id number] [phone]            Aug 10, 2018  1:30 PM CDT   Return Visit with Keyana Caldera NHAN   [facility]    [facility]  [address] [id number] [phone]              Who to contact     Please call your clinic at [phone] to:    Ask questions about your health    Make or cancel appointments    Discuss your medicines    Learn about your test results    Speak to your doctor            Additional Information About Your Visit        Care EveryWhere ID     This is your Care EveryWhere ID. This could be used by other organizations to access your [place] medical records  [id number]        Your Vitals Were     Pulse Last Period BMI (Body Mass Index)             109 (LMP Unknown) 20.79 kg/m2          Blood Pressure from Last 3 Encounters:   07/13/18 114/77   07/11/18 124/84    07/10/18 101/73    Weight from Last 3 Encounters:   07/13/18 58.4 kg (128 lb 12.8 oz)   07/10/18 59.9 kg (132 lb 0.9 oz)   06/16/18 57.4 kg (126 lb 8 oz)              Today, you had the following     No orders found for display       Primary Care Provider Fax #    Physician No Ref-Primary [phone]       No address on file        Equal Access to Services     HUMAIRA HUFFMAN : Hadii aad ku hadizabelo Soalejandroali, waaxda luqadaha, qaybta kaalmada adeegyada, waxlove viviane aurejayant samuelsstephanie yan lyly . So [facility] [phone].    ATENCIÓN: Si vandanala sandy, tiene a shaver disposición servicios gratuitos de asistencia lingüística. Shaniqueame al [phone].    We comply with applicable federal civil rights laws and [place] laws. We do not discriminate on the basis of race, color, national origin, age, disability, sex, sexual orientation, or gender identity.            Thank you!     Thank you for choosing PSYCHIATRY CLINIC  for your care. Our goal is always to provide you with excellent care. Hearing back from our patients is one way we can continue to improve our services. Please take a few minutes to complete the written survey that you may receive in the mail after your visit with us. Thank you!             Your Updated Medication List - Protect others around you: Learn how to safely use, store and throw away your medicines at www.disposemymeds.org.          This list is accurate as of 7/13/18 11:59 PM.  Always use your most recent med list.                   Brand Name Dispense Instructions for use Diagnosis    ARIPiprazole 30 MG tablet    ABILIFY    30 tablet    Take 1 tablet (30 mg) by mouth daily    Bipolar affective disorder, current episode manic with psychotic symptoms (H)       Carboxymethylcellulose Sod PF 0.5 % Soln ophthalmic solution    REFRESH PLUS    1 Bottle    Place 1 drop into both eyes 3 times daily as needed for dry eyes    Dry eyes       lithium 450 MG CR tablet    ESKALITH    60 tablet    Take 2 tablets (900  mg) by mouth At Bedtime    Bipolar disorder, current episode manic, severe with psychotic features (H)       propranolol 20 MG tablet    INDERAL    60 tablet    Take 1 tablet (20 mg) by mouth 2 times daily    Bipolar I disorder, current or most recent episode manic, with psychotic features (H)       triamcinolone 0.1 % cream    KENALOG    80 g    Apply topically 2 times daily as needed for irritation    Skin irritation

## 2018-07-13 NOTE — PROGRESS NOTES
PSYCHIATRY CLINIC PROGRESS NOTE   The initial CHILD DIAG EVAL was 3/10/16.  Transfer Eval was 1/30/17    Pertinent background:  Patient has a history of psychotic features during episodes of felton. Treated for Bipolar 1 Disorder with Psychotic Features.    PSYCH CRITICAL ITEM HISTORY:  psychosis [sxs include delusional thoughts] and psych hosp (<3).     INTERIM HISTORY                                                   Cristina Boyd is a 18 year old female who was last seen in clinic on 5/30/18 at which time no changes were made but patient was hospitalized shortly thereafter. Reports good adherence. Patient presented by herself.     Since the last visit:  - Discharged a few days ago from young adult unit.   - Reports feeling somewhat hypomanic. Endorses good sleep. Has some racing thoughts but feels these are improving. Mother agrees that things have been improving but has a little ways to go. A few days ago she was quite tearful.  - Cristina has been managing her own medications. Mom would like to be more involved and has questions about what she is to take. Cristina is aware of the medications currently prescribed but has also been taking 100 mg of Seroquel QHS as this had been prescribed during first admission but discontinued during most recent.   - Prior to first admission she hadn't been taking Li, reports that she'd often throw it away. States she has now accepted her diagnosis and is willing to take medications.     RECENT SYMPTOMS:   FELTON/HYPOMANIA:  reports- racing thoughts that are improving, pressured speech, distractibility;  DENIES- decreased sleep need   PSYCHOSIS:  reports-none;  DENIES- delusions, auditory hallucinations and visual hallucinations  ANXIETY:  Some generalized worry   DEPRESSION: feels down, low energy, appetite changes DENIES- SI    RECENT SUBSTANCE USE:     ALCOHOL-  never         TOBACCO- none          CAFFEINE- not discussed        OPIOIDS- none   CANNABIS- none               OTHER  ILLICIT DRUGS- none    Financial Support- family or friend  Living Situation- with parents and siblings     MEDICAL ROS:  Reports none Denies polydipsia, polyuria, nocturia, wt gain, gum or teeth problems, ice water craving and acne          PSYCH and CD Critical Summary Points since Jan 2017           - Feb 2017: [facility]ized [facility]    - Abilify 30 --> 15 mg    - Started Ativan, Depakote, Cogentin  - Mar 2017: Decreased Depakote 1000 --> 750 mg  - Apr 2017: Decreased Ativan 2 --> 1 mg  - Sept/Oct 2017: Ran out of all medications (Abilify 15 mg, Cogentin, Depakote 750 mg, Lithium 900 mg, Ativan 1 mg)   - Nov 2017: Restarted Lithium 900 mg  - Jan 2018: pt self discontinued Lithium  - April 2018: resumed Li  - May-July:   - 5/31-6/13 Admission on [facility] with acute adela --> transferred to Medicine and discharged 6/21 for r/o TB    - Resumed Li 900 mg    - Started Seroquel 400 mg QHS & 100 mg TID PRN    - Started propranolol 20 mg BID   - 6/30-7/10 Admission to Psychiatry with acute adela    - Started Abilify 30 mg    - Discontinued Seroquel    PAST MED TRIALS   - See Hx of Psychiatric Care under Problem List    MEDICAL / SURGICAL HISTORY                                   CARE TEAM:          PCP- Adriana Garibay                    Therapist- none    Pregnant or breastfeeding:  NO      Contraception- abstinent not on birth control  Patient Active Problem List   Diagnosis     Adela (H)     Bipolar affective disorder, current episode manic with psychotic symptoms (H)     Suicidal ideation     Bipolar disorder, curr episode mixed, severe, with psychotic features (H)     Hx of psychiatric care     Bipolar I disorder, current or most recent episode manic, with psychotic features (H)     Lymphadenopathy     Bipolar 1 disorder (H)       ALLERGY                                Review of patient's allergies indicates no active allergies.  MEDICATIONS                               Current Outpatient Prescriptions  "  Medication Sig Dispense Refill     ARIPiprazole (ABILIFY) 30 MG tablet Take 1 tablet (30 mg) by mouth daily 30 tablet 1     Carboxymethylcellulose Sod PF (REFRESH PLUS) 0.5 % SOLN ophthalmic solution Place 1 drop into both eyes 3 times daily as needed for dry eyes (Patient not taking: Reported on 7/11/2018) 1 Bottle 0     lithium (ESKALITH) 450 MG CR tablet Take 2 tablets (900 mg) by mouth At Bedtime 60 tablet 0     propranolol (INDERAL) 20 MG tablet Take 1 tablet (20 mg) by mouth 2 times daily 60 tablet 0     triamcinolone (KENALOG) 0.1 % cream Apply topically 2 times daily as needed for irritation (Patient not taking: Reported on 7/11/2018) 80 g 0       VITALS   /77  Pulse 109  Wt 58.4 kg (128 lb 12.8 oz)  LMP  (LMP Unknown)  BMI 20.79 kg/m2   MENTAL STATUS EXAM                                                             Alertness: alert and oriented  Appearance: well groomed.   Behavior/Demeanor: relatively cooperative, with fair eye contact  Speech:  Slightly pressured with increased rate  Language: intact  Psychomotor: normal  Mood:  \"okay\"  Affect: full range; was congruent to mood; was congruent to content   Thought Process/Associations: unremarkable  Thought Content:  Denies suicidal ideation, violent ideation and psychotic thought  Perception:  Denies hallucinations  Insight: limited  Judgment: fair  Cognition:  does appear grossly intact; formal cognitive testing was not done    LABS and DATA     LITHIUM LABS    [level, renal, SG, TSH, WBC]  q6 mo  Recent Labs   Lab Test  07/01/18   0829  06/06/18   0915  06/01/18   0810  06/01/18   0201   LITHIUM  0.96  1.11  1.29*  1.48*     Recent Labs   Lab Test  07/11/18   1050  06/18/18   0708  06/16/18   0838   CR  0.51*  0.51*  0.49*   GFRESTIMATED  >90  >90  >90   NA  139  137  140   BHANU  8.6  8.7  9.3     Recent Labs   Lab Test  06/14/18   0030  04/02/18   2057   SG  1.007  1.022     Recent Labs   Lab Test  07/11/18   1050  06/06/18   2218  06/01/18   " 0201   TSH  1.46  0.91  4.83*     ANTIPSYCHOTIC LABS   [glu, A1C, lipids (focus LDL), liver enzymes, WBC, ANEU, Hgb, plts]  q12mo  Recent Labs   Lab Test  07/11/18   1050  06/18/18   0708   GLC  110*  84     Recent Labs   Lab Test  01/13/17   0936  02/06/16   0805   CHOL  83  82   TRIG  34  34   LDL  36  31   HDL  40*  44*     Recent Labs   Lab Test  07/11/18   1050  06/14/18   1014   AST  14  20   ALT  17  31   ALKPHOS  73  66     Recent Labs   Lab Test  07/11/18   1050  06/18/18   0708  06/17/18   1839   WBC  5.8  6.3  7.3   ANEU  3.2   --   4.4   HGB  11.7  10.4*  11.3*   PLT  310  364  407       PHQ9 TODAY = 15  PHQ-9 SCORE 2/1/2018 5/30/2018 7/12/2018   Total Score 8 3 18       PSYCHIATRIC DIAGNOSES                                                                                                   Bipolar 1 Disorder     ASSESSMENT                                     TODAY: Cristina reports current hypomanic symptoms that she feels are slowly improving as noted above. Reviewed medications with patient and her mother. Discussed sleep hygiene and importance of regular sleep schedule. Will continue medications following current hospital discharge with additional okay to use Seroquel 100 mg tabs (as she has been since discharge) she had from previous admission during current hypomanic state to target insomnia. Will also refer to Day Treatment as this was helpful for her in the past. Additionally, plan to coordinate with therapist. Li labs not due until October but may consider getting a level sooner.                              PLAN                                                                                                       1) MEDICATION:      - Continue Lithium 900 mg QHS   - Continue Abilify 30 mg daily   - Continue propranolol 20 mg BID   - Continue seroquel 100 mg QHS PRN as pt has been taking during current hypomanic phase    2) THERAPY:  Continue    3) LABS NEXT DUE:  Li labs due 10/2018       RATING  SCALES:     none    4) REFERRALS [CD, medical, other]:  Day Treatment    5) :  none    6) RTC: 2-3 weeks    7) CRISIS NUMBERS: Provided routinely in AVS     TREATMENT RISK STATEMENT:  The risks, benefits, alternatives and potential adverse effects have been discussed and are understood by the patient/ patient's guardian. The pt understands the risks of using street drugs or alcohol.  There are no medical contraindications, the pt agrees to treatment with the ability to do so.  The patient understands to call 911 or come to the nearest ED if life threatening or urgent symptoms present.       RESIDENT:   Jazmine Key MD    Staffed in clinic Dr. Cochran who is supervisor and will sign note.   I saw the patient with the resident, and participated in key portions of the service, including the mental status examination and developing the plan of care. I reviewed key portions of the history with the resident. I agree with the findings and plan as documented in this note.    Amelia Cochran

## 2018-07-16 LAB
BACTERIA SPEC CULT: NORMAL
FUNGUS SPEC CULT: ABNORMAL
FUNGUS SPEC CULT: ABNORMAL
SPECIMEN SOURCE: ABNORMAL
SPECIMEN SOURCE: NORMAL

## 2018-07-17 ASSESSMENT — PATIENT HEALTH QUESTIONNAIRE - PHQ9: SUM OF ALL RESPONSES TO PHQ QUESTIONS 1-9: 15

## 2018-07-19 ENCOUNTER — BEH TREATMENT PLAN (OUTPATIENT)
Dept: BEHAVIORAL HEALTH | Facility: CLINIC | Age: 18
End: 2018-07-19
Attending: PSYCHIATRY & NEUROLOGY

## 2018-07-19 ENCOUNTER — HOSPITAL ENCOUNTER (OUTPATIENT)
Dept: BEHAVIORAL HEALTH | Facility: CLINIC | Age: 18
Discharge: HOME | End: 2018-07-19
Attending: PSYCHIATRY & NEUROLOGY | Admitting: PSYCHIATRY & NEUROLOGY
Payer: COMMERCIAL

## 2018-07-19 PROCEDURE — 90791 PSYCH DIAGNOSTIC EVALUATION: CPT

## 2018-07-19 ASSESSMENT — PAIN SCALES - GENERAL: PAINLEVEL: MILD PAIN (2)

## 2018-07-19 NOTE — PROGRESS NOTES
"Initial Individual Treatment Plan     Patient: Cristina Boyd   MRN: [id number]  : 2000  Age: 18 year old  Sex: female    Diagnostic Assessment Date / Date of Initial Individual Treatment Plan: 18      Immediate Health Concerns:  Yes. Identify health concern and plan to address: numb hands and feet, and slurred speech - call Dr. Cadet at [facility] to check into medicaiton     Immediate Safety Concerns:  No    Identify the issues to be addressed in treatment:  Symptom Management, Personal Safety, Community Resources/Discharge Planning, Abstinence/Relapse Prevention, Develop / Improve Independent Living Skills, Develop Socialization / Interpersonal Relationship Skills and Physical Health     Client Initial Individualized Goals for Treatment: \"I want to get rid of the side effects. I want to learn coping skills. Learn how to be patient\"    Initial Treatment suggestions for the client during the time between Diagnostic Assessment and completion of the Individualized Treatment Plan:  Follow Safety Plan  Abstain from Substance Use   Ask for more information, support and/or assistance as needed.  Follow up with providers/community supports as needed: psychiatry and therapy at [facility]  Report increases or changes in symptoms to staff.  Report any personal safety concerns to staff.   Take medications as prescribed.  Report medication changes and/or side effects to staff.  Attend and participate in groups as scheduled or notify staff if unable to do so.  Report any use of substances to staff as this may impact your symptoms and/or  personal safety.  Notify staff if you have any other issues that need to be addressed. This may include  any current abuse / neglect / exploitation or other vulnerability.  Follow recommendations of your treatment team and discuss concerns if not in  agreement.     Treatment Team Responsible: Day Treatment (DT)      Therapeutic Interventions/Treatment Strategies may " include:  Support, Redirection, Feedback, Limit/Boundaries, Safety Assessments, Structured Activity, Problem Solving, Clarification, Education, Motivational Enhancement and Relapse Prevention as needed.    Robyn Jordan, [facility] CenterSW

## 2018-07-19 NOTE — PROGRESS NOTES
Acknowledgement of Current Treatment Plan       I have reviewed my treatment plan with my therapist / counselor on 7/19/2018. I agree with the plan as it is written in the electronic health record.    Name Signature   Cristina Boyd    Name of Therapist / Counselor    RUTHY Vernon

## 2018-07-19 NOTE — PROGRESS NOTES
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 energy. She denies substance use for over 6 months, stating in the past she used cannabis and hookah. She denies suicidal ideation today.  She did state in April she impulsively looked up ways to kill herself on the Internet, and has also taken a knife out to cut herself, but claims it is for the drama and she never really wants to harm herself. She rated as a moderate risk on the Safety Scale. We did complete a Safety Plan.Cristina does recognize she has bipolar disorder and recognizes that she can benefit from medication. She is taking medication as prescribed.     Cristina lives with her parents and her older sister, and three younger brothers. She says her parents put pressure on her and she fights a lot with her brothers. Cristina was born in [place]. She did live in [place] for three years, between the ages of [age]. She says she first experienced anxiety and panic at age 14 in [place]. She is a student at [employer]. She said she passed 4 out of 6 classes last semester. She is planning on returning in the Fall, but may not take as many classes. She has some vulnerability with men, stating they ask her to send nude pictures. She did say she feels she can put up good boundaries and feels safe.     Cristina and this writer talked about the need for her to do well in the group setting in order to  be part of the program. Writer will talk with the treatment team further about these expectations for her so they can talk with her on start date. Writer also talked about her attending the program as scheduled since attendance was sporadic last year. She agrees to these terms, and they will be further developed by the  treatment team.  Cristina is working with a psychiatrist and therapist through the [facility] / [facility]. Her psychiatrist is Dr. Key and therapist is Keyana Escobar. Writer has sent a message to both that she will be starting the program and relaying concerns of medication side effects as well as presentation of symptoms during this interevie    Prognosis is Guarded and Fair. Without the recommended intervention, the client is likely to experience the following consequences of their symptoms: Increase in symptoms, decrease in overall functioning with possibility of requiring a higher level of care and intervention, including need for hosptializaton .    Referrals to services required by statute or rule:   Report to child/adult protection services was NA.   Referral to another professional/service is not indicated at this time..    Program Recommendation: Day Treatment (DT) .      Assessment Completed by: RUTHY Vernon

## 2018-07-19 NOTE — PROGRESS NOTES
"  Adult Outpatient Mental Health Programs    MY COPING PLAN FOR SAFETY  NAME: Cristina Boyd  MRN: [id number]      People and things that are most important to me & reasons for living: \"I live for myself. Life is an adventure. Traveling\"              My relapse Warning Signs: \"Increased sleep, increased anger and irritability\"  I will make my environment safer by: nothing required - concerns about parents watching her at home  When in crisis, I will use the following coping skills:     Distress Tolerance Strategies:  listen to positive and upbeat music: ., read a book: . and watch movies    Physical Activities: exercise: walking and being outside    Focus on helpful thoughts:  \"This is temporary\", \"It always passes\" and \"I need to calm down\"  I will use My Support System:     Personal Supports: I can ask for reminders, support, or for them to stay with me  Trusted Friend/s:  Moea  Family Member/s : Sister                    Professional Supports: I can ask for med changes, emergency appointments, help  [facility]  Therapist: [facility]       Crisis Lines I can contact to discuss options and to access support:    Suicide Prevention Lifeline: 5-643-128-TALK (9026)    Crisis Text Line Service (available 24 hours a day, 7 days a week): Text [place] to 588659    Call  **CRISIS (059841) from a cell phone to talk to a team of professionals who can help you.  Crisis Services By [facility]: Phone Number:   [place]     [phone]   [place]    [phone]   [place]    [phone]   [place]    [phone]   [place]    [phone]   [place] [phone]   [place]     [phone]       Call 911 or go to my nearest emergency department     X  I will attend my Treatment Program and talk to my one of my therapists:      X   I agree that I will report any current / recent thoughts, impulses or plans of suicide,           homicide, self injurious behavior or substance use .   X   I agree that " I will not act on the above symptoms, but will follow the above plan         instead.

## 2018-07-20 LAB
MUMPS CONFIRMATION PCR: NORMAL
MUMPS SPECIMEN TYPE: NORMAL

## 2018-07-23 ENCOUNTER — HOSPITAL ENCOUNTER (OUTPATIENT)
Dept: BEHAVIORAL HEALTH | Facility: CLINIC | Age: 18
End: 2018-07-23
Attending: PSYCHIATRY & NEUROLOGY
Payer: COMMERCIAL

## 2018-07-23 PROCEDURE — H2012 BEHAV HLTH DAY TREAT, PER HR: HCPCS

## 2018-07-23 ASSESSMENT — ANXIETY QUESTIONNAIRES
IF YOU CHECKED OFF ANY PROBLEMS ON THIS QUESTIONNAIRE, HOW DIFFICULT HAVE THESE PROBLEMS MADE IT FOR YOU TO DO YOUR WORK, TAKE CARE OF THINGS AT HOME, OR GET ALONG WITH OTHER PEOPLE: SOMEWHAT DIFFICULT
3. WORRYING TOO MUCH ABOUT DIFFERENT THINGS: SEVERAL DAYS
1. FEELING NERVOUS, ANXIOUS, OR ON EDGE: SEVERAL DAYS
GAD7 TOTAL SCORE: 7
5. BEING SO RESTLESS THAT IT IS HARD TO SIT STILL: SEVERAL DAYS
6. BECOMING EASILY ANNOYED OR IRRITABLE: SEVERAL DAYS
7. FEELING AFRAID AS IF SOMETHING AWFUL MIGHT HAPPEN: SEVERAL DAYS
2. NOT BEING ABLE TO STOP OR CONTROL WORRYING: SEVERAL DAYS

## 2018-07-23 ASSESSMENT — PATIENT HEALTH QUESTIONNAIRE - PHQ9: 5. POOR APPETITE OR OVEREATING: SEVERAL DAYS

## 2018-07-24 ENCOUNTER — HOSPITAL ENCOUNTER (OUTPATIENT)
Dept: BEHAVIORAL HEALTH | Facility: CLINIC | Age: 18
End: 2018-07-24
Attending: PSYCHIATRY & NEUROLOGY
Payer: COMMERCIAL

## 2018-07-24 PROCEDURE — H2012 BEHAV HLTH DAY TREAT, PER HR: HCPCS

## 2018-07-24 ASSESSMENT — PATIENT HEALTH QUESTIONNAIRE - PHQ9: SUM OF ALL RESPONSES TO PHQ QUESTIONS 1-9: 5

## 2018-07-24 ASSESSMENT — ANXIETY QUESTIONNAIRES: GAD7 TOTAL SCORE: 7

## 2018-07-27 NOTE — PROGRESS NOTES
"Adult Mental Health Outpatient Group Therapy Progress Note     Client Initial Individualized Goals for Treatment: \"I want to get rid of the side effects. I want to learn coping skills. Learn how to be patient\"    See Initial Treatment suggestions for the client during the time between Diagnostic Assessment and completion of the Master Individualized Treatment Plan.    Treatment Goals:     Follow Safety Plan  Abstain from Substance Use   Ask for more information, support and/or assistance as needed.  Follow up with providers/community supports as needed: psychiatry and therapy at [facility]  Report increases or changes in symptoms to staff.  Report any personal safety concerns to staff.   Take medications as prescribed.  Report medication changes and/or side effects to staff.  Attend and participate in groups as scheduled or notify staff if unable to do so.  Report any use of substances to staff as this may impact your symptoms and/or                      personal safety.  Notify staff if you have any other issues that need to be addressed. This may include              any current abuse / neglect / exploitation or other vulnerability.  Follow recommendations of your treatment team and discuss concerns if not in            agreement.     Area of Treatment Focus:  Symptom Management, Develop / Improve Independent Living Skills and Develop Socialization / Interpersonal Relationship Skills    Therapeutic Interventions/Treatment Strategies:  Support, Feedback, Safety Assessments, Structured Activity and Problem Solving    Response to Treatment Strategies:  Accepted Feedback, Gave Feedback, Listened, Focused on Goals, Attentive and Accepted Support    Name of Group:  Life Skills 100-150     Group Participants: 7 of 7.      Description and Outcome:  Client attended and participated in a structured life skills psychoeducation group where intervention focused on experiential learning, practice, and generalization " of skills through the use of supported social interaction, structured therapeutic tasks, and engagement in functional tasks in order to improve function in valued roles, routines, and independent living skills. Client had high level of energy. amber Mayers. Provided client with verbal psychoeducation material focused on benefits of life skills group in order to promote participation in valued roles, routines, and relationships. Client would benefit from additional opportunities to practice and implement content from this session. Client addressed ITP goal number initial goals in this session.    Is this a Weekly Review of the Progress on the Treatment Plan?  No

## 2018-07-30 ENCOUNTER — HOSPITAL ENCOUNTER (OUTPATIENT)
Dept: BEHAVIORAL HEALTH | Facility: CLINIC | Age: 18
End: 2018-07-30
Attending: PSYCHIATRY & NEUROLOGY
Payer: COMMERCIAL

## 2018-07-30 ENCOUNTER — CARE COORDINATION (OUTPATIENT)
Dept: PSYCHIATRY | Facility: CLINIC | Age: 18
End: 2018-07-30

## 2018-07-30 DIAGNOSIS — F31.9 BIPOLAR 1 DISORDER (H): Primary | ICD-10-CM

## 2018-07-30 PROCEDURE — H2012 BEHAV HLTH DAY TREAT, PER HR: HCPCS

## 2018-07-30 RX ORDER — QUETIAPINE FUMARATE 50 MG/1
50 TABLET, FILM COATED ORAL EVERY MORNING
Qty: 30 TABLET | Refills: 0 | Status: SHIPPED | OUTPATIENT
Start: 2018-07-30 | End: 2018-11-02

## 2018-07-30 RX ORDER — QUETIAPINE FUMARATE 300 MG/1
300 TABLET, FILM COATED ORAL AT BEDTIME
Qty: 30 TABLET | Refills: 0 | Status: SHIPPED | OUTPATIENT
Start: 2018-07-30 | End: 2018-11-02

## 2018-07-30 NOTE — PROGRESS NOTES
"- staff called writer as pt and pt's father at  wanting to be seen due to worsening symptoms/possible side effects    -According to the pt, she is feeling manic and more agitated. She says, \"It's like I can't keep my mouth closed.\" Pt did have pressured speech during interaction with writer. The pt also reports feeling \"sedated and fatigued.\" She reports getting approximately 7 hours of sleep a night, but the pt's father disagrees. He believes the pt often wakes during the night, falls back asleep and wakes up early in the morning. The pt reports an increase in appetite and risky behaviors. Recently, the pt jumped into a lake, fully clothed because she \"loves to swim and is a good swimmer.\" Due to her increased risky behaviors and agitation, the pt reports her sister asks her to take Seroquel more often. The pt says this is not a \"happy manic\" and that she feels as if she has \"no serotonin.\" She also believes she may be on the verge of full blown shaina when compared to her most recent visit on 7/13 when she was hypomanic.    Patient's current medication regimen:  -Abilify 30 mg daily: pt reports taking one tablet any time of the day  -Lithium 450 mg CR, 900 mg at bedtime: pt says she takes medication \"religiously\"  -Propranolol 20 mg BID: reports she takes 20 mg every morning and at bedtime  -Seroquel 100 mg at bedtime: pt taking at bedtime  -PRN Seroquel TID    -Writer discussed situation with pt's provider. Provider will discuss with Dr. Cochran and report back to writer with plan.   "

## 2018-07-30 NOTE — PROGRESS NOTES
-Writer called pt's father, Lawrence at [phone] to relay message. Requested that he or the pt's mother administer her medications. Lawrence agreed to this, but says it's often difficult since she is taking medications at different times of the day. Writer offered to send medication list with directions and scheduled times to administer. Lawrence said this can be emailed to [email or web address]. Printed and emailed to Lawrence.     -Writer called pt at [phone] and also relayed this information. Pt put on hold for 60 min appt on Thursday with Dr. Izquierdo. Message sent to scheduling. Pt requested medications with new directions be sent to preferred pharmacy. Writer e-prescribed 30 d/s of Seroquel 300 mg and 50 mg.

## 2018-07-30 NOTE — PROGRESS NOTES
"Adult Mental Health Outpatient Group Therapy Progress Note     Client Initial Individualized Goals for Treatment: \"I want to get rid of the side effects. I want to learn coping skills. Learn how to be patient\"     See Initial Treatment suggestions for the client during the time between Diagnostic Assessment and completion of the Master Individualized Treatment Plan.     Treatment Goals:      Follow Safety Plan  Abstain from Substance Use   Ask for more information, support and/or assistance as needed.  Follow up with providers/community supports as needed: psychiatry and therapy at [facility]  Report increases or changes in symptoms to staff.  Report any personal safety concerns to staff.   Take medications as prescribed.  Report medication changes and/or side effects to staff.  Attend and participate in groups as scheduled or notify staff if unable to do so.  Report any use of substances to staff as this may impact your symptoms and/or personal safety.  Notify staff if you have any other issues that need to be addressed. This may include any current abuse / neglect / exploitation or other vulnerability.  Follow recommendations of your treatment team and discuss concerns if not in agreement.    Area of Treatment Focus:  Symptom Management, Personal Safety and Community Resources/Discharge Planning    Therapeutic Interventions/Treatment Strategies:  Support, Feedback, Safety Assessments and Cognitive Behavioral Therapy    Response to Treatment Strategies:  Listened, Focused on Goals and Interrupted    Name of Group:  Group Psychotherapy 2:00-2:50 pm     Group Participants: 8 of 8.      Description and Outcome:  Writer welcomed and oriented client to groups.  Writer reviewed confidentiality, limitations of confidentiality, and group guidelines.  Cristina reported recent hospitalization.  Speech was pressured and rapid.  Client was observed to giggle and not be able to stop giggling.  She interrupted writer " and peers while they talked.  She apologized for interrupting.  She stated that she needed to take a break to help her regain control over the laughter.  Client denied suicidal ideation, intent and plan.     Client demonstrated understanding of session content by introducing herself and her symptoms to peers.  .      Is this a Weekly Review of the Progress on the Treatment Plan?  No

## 2018-07-30 NOTE — PROGRESS NOTES
Brief Note    Discussed case with RN team, Dr. Cochran.     Plan:  - Add Seroquel 300 mg at bedtime  - Add Seroquel 50 mg daily  - Discontinue PRN Seroquel  - Offer appointment on Thursday AM with either Dr. Izquierdo or Dr. Ann d/t this provider's absence    =============================================================================  Jazmine Key M.D.  PGY-4 Psychiatry Resident

## 2018-07-31 ENCOUNTER — HOSPITAL ENCOUNTER (OUTPATIENT)
Dept: BEHAVIORAL HEALTH | Facility: CLINIC | Age: 18
End: 2018-07-31
Attending: PSYCHIATRY & NEUROLOGY
Payer: COMMERCIAL

## 2018-07-31 ENCOUNTER — OFFICE VISIT (OUTPATIENT)
Dept: PSYCHOLOGY | Facility: CLINIC | Age: 18
End: 2018-07-31
Payer: COMMERCIAL

## 2018-07-31 DIAGNOSIS — F31.2 BIPOLAR I DISORDER, CURRENT OR MOST RECENT EPISODE MANIC, WITH PSYCHOTIC FEATURES (H): Primary | ICD-10-CM

## 2018-07-31 PROCEDURE — 90834 PSYTX W PT 45 MINUTES: CPT | Performed by: SOCIAL WORKER

## 2018-07-31 PROCEDURE — H2012 BEHAV HLTH DAY TREAT, PER HR: HCPCS

## 2018-07-31 NOTE — ADDENDUM NOTE
Encounter addended by: Tasha Murillo [facility] on: 7/31/2018  9:51 AM<BR>     Actions taken: Sign clinical note Vomiting x 6 episodes, some post-tussive.  Last 3 episodes were bilious.  +Cold symptoms for past week.  Received vaccines 2 days ago.  Decreased po/uo.  Mother reported pt having 10 cheerios and Pedialyte while in waiting room and tolerated it.

## 2018-07-31 NOTE — MR AVS SNAPSHOT
MRN:[id number]                      After Visit Summary   7/31/2018    Cristina Boyd    MRN: [id number]           Visit Information        Provider Department      7/31/2018 12:00 PM Keyana Caldera LGSW [facility] Generic      Your next 10 appointments already scheduled     Aug 06, 2018  1:00 PM CDT   Return Visit with ADULT MH DAY 3C   [facility] ([facility])    [address] [place] [address]   [phone]            Aug 07, 2018  1:00 PM CDT   Return Visit with ADULT MH DAY 3C   [facility] ([facility])    [address] [place] [address]   [phone]            Aug 09, 2018  1:00 PM CDT   Return Visit with ADULT MH DAY 3C   [facility] ([facility])    [address] [place] [address]   [phone]            Aug 10, 2018  1:30 PM CDT   Return Visit with MARIKA Adams   [facility] ([facility])    [facility]  [address] [facility]  [facility] [address]   [phone]            Aug 13, 2018  1:00 PM CDT   Return Visit with ADULT MH DAY 3C   [facility] ([facility])    [address] [place] [address]   [phone]            Aug 14, 2018  1:00 PM CDT   Return Visit with ADULT MH DAY 3C   [facility] ([facility])    [address] [place] [address]   [phone]            Aug 16, 2018  1:00 PM CDT   Return Visit with ADULT MH DAY 3C   [facility] ([facility])    [address] [place] [address]   [phone]            Aug 20, 2018  1:00 PM  CDT   Return Visit with ADULT  DAY 3C   [facility] ([facility])    [address] [address]   [phone]            Aug 21, 2018  1:00 PM CDT   Return Visit with ADULT  DAY 3C   [facility] ([facility])    [address] [address]   [phone]            Aug 23, 2018  1:00 PM CDT   Return Visit with ADULT  DAY 3C   [facility] ([facility])    [address] [address]   [phone]              Care EveryWhere ID     This is your Care EveryWhere ID. This could be used by other organizations to access your [facility] medical records  [id number]        Equal Access to Services     HUMAIRA HUFFMAN AH: Aleah Sales, wayana calvillo, bunny batesalgibran edwards, alfred goyal. So [facility] [phone].    ATENCIÓN: Si habla español, tiene a shaver disposición servicios gratuitos de asistencia lingüística. Llame al [phone].    We comply with applicable federal civil rights laws and [place] laws. We do not discriminate on the basis of race, color, national origin, age, disability, sex, sexual orientation, or gender identity.

## 2018-07-31 NOTE — ADDENDUM NOTE
Encounter addended by: Tasha Murillo [facility] on: 7/31/2018  9:50 AM<BR>     Actions taken: Flowsheet accepted, Sign clinical note

## 2018-07-31 NOTE — PROGRESS NOTES
"Adult Mental Health Outpatient Group Therapy Progress Note     Client Initial Individualized Goals for Treatment: \"I want to get rid of the side effects. I want to learn coping skills. Learn how to be patient\"     See Initial Treatment suggestions for the client during the time between Diagnostic Assessment and completion of the Master Individualized Treatment Plan.     Treatment Goals:      Follow Safety Plan  Abstain from Substance Use   Ask for more information, support and/or assistance as needed.  Follow up with providers/community supports as needed: psychiatry and therapy at [facility]  Report increases or changes in symptoms to staff.  Report any personal safety concerns to staff.   Take medications as prescribed.  Report medication changes and/or side effects to staff.  Attend and participate in groups as scheduled or notify staff if unable to do so.  Report any use of substances to staff as this may impact your symptoms and/or personal safety.  Notify staff if you have any other issues that need to be addressed. This may include any current abuse / neglect / exploitation or other vulnerability.  Follow recommendations of your treatment team and discuss concerns if not in agreement.    Area of Treatment Focus:  Symptom Management, Personal Safety and Community Resources/Discharge Planning    Therapeutic Interventions/Treatment Strategies:  Support, Feedback, Safety Assessments and Cognitive Behavioral Therapy    Response to Treatment Strategies:  Listened, Focused on Goals and Interrupted    Name of Group:  Group Psychotherapy 2:00-2:50 pm     Group Participants: 7 of 7.     Description and Outcome:  Cristina reported \"feeling more serious\".  She reported some irritable mood and some low mood. Client denied suicidal ideation, intent and plan.   She stated that she was ill over the weekend.  She stated that she was concerned about the impact of her symptoms on her ability to maintain friendships.  " Peers shared how they managed when they lost friendships due to symptoms.  Peers also shared how they reconnected with friends and shared symptoms.      Client demonstrated understanding of session content by introducing herself and her symptoms to peers.  .      Is this a Weekly Review of the Progress on the Treatment Plan?  No

## 2018-07-31 NOTE — ADDENDUM NOTE
Encounter addended by: Vesta Lopez, OT on: 7/31/2018  7:32 AM<BR>     Actions taken: Episode edited, Flowsheet accepted

## 2018-08-01 NOTE — PROGRESS NOTES
"                                             Progress Note    Client Name: Cristina Boyd  Date: 8/1/2018         Service Type: Individual      Session Start Time: 11 am  Session End Time: 11:45 am      Session Length: 45 mins     Session #: 2     Attendees: Client attended alone    Treatment Plan Last Reviewed: n/a  PHQ-9 / REGINO-7 : n/a     DATA      Progress Since Last Session (Related to Symptoms / Goals / Homework):   Symptoms: Improving -less activity, yet feeling low and unmotivated    Homework: Did not complete-did not complete intake packet      Episode of Care Goals: No improvement - CONTEMPLATION (Considering change and yet undecided); Intervened by assessing the negative and positive thinking (ambivalence) about behavior change     Current / Ongoing Stressors and Concerns:   She reports feeling really agitated lately, particularly towards her boyfriend and family members. She says she feels \"guilty\" afterwards however can't seem to stop herself in the moment. The medication has helped with the shaina, however now she feels \"depressed\" and unable to fully enjoy things she did before. Client has been taking her medications on her own for a month now, yet still feels her family doesn't trust her with taking it as prescribed. When she reflects on relationships in her life, she feels it is not an appropriate time for her to be in a relationship, and it might be important for her to remove \"toxic\" people in her life so she can focus on feeling better.      Treatment Objective(s) Addressed in This Session:   identify 1 strategies for more effectively managing Bipolar Disorder  Decrease frequency and intensity of feeling down, depressed, hopeless  Feel less tired and more energy during the day        Intervention:   CBT: Evaluating and challenging her concerns about how her \"impulsive\" response and aggression is impacting others  Motivational Interviewing: questions around reason for change, understanding around " her diagnosis, affirming client's commitment to her health and well-being, reflecting on the frustration that people will not know what we're going through ,emphasizing client's personal choice and control in how she would like her life to be        ASSESSMENT: Current Emotional / Mental Status (status of significant symptoms):   Risk status (Self / Other harm or suicidal ideation)   Client denies current fears or concerns for personal safety.   Client denies current or recent suicidal ideation or behaviors.   Client denies current or recent homicidal ideation or behaviors.   Client denies current or recent self injurious behavior or ideation.   Client denies other safety concerns.   Client Client reports there has been no change in risk factors since their last session.     Client Client reports there has been no change in protective factors since their last session.     A safety and risk management plan has not been developed at this time, however client was given the after-hours number / 911 should there be a change in any of these risk factors.     Appearance:   Appropriate    Eye Contact:   Good    Psychomotor Behavior: Normal    Attitude:   Cooperative    Orientation:   All   Speech    Rate / Production: Hyperverbal     Volume:  Normal    Mood:    Anxious  Depressed    Affect:    Flat    Thought Content:  Clear    Thought Form:  Coherent  Flight of Ideas  Logical    Insight:    Poor      Medication Review:   No changes to current psychiatric medication(s)     Medication Compliance:   Yes     Changes in Health Issues:   None reported     Chemical Use Review:   Substance Use: Chemical use reviewed, no active concerns identified      Tobacco Use: No current tobacco use.       Collateral Reports Completed:   Not Applicable    PLAN: (Client Tasks / Therapist Tasks / Other)  Client: Complete intake packet and return for next session        MARCELINA Adams                         August 1, 2018  Note reviewed and  clinical supervision by MARCELINA Butler [facility] 8/3/2018

## 2018-08-01 NOTE — PROGRESS NOTES
"Adult Mental Health Outpatient Group Therapy Progress Note     Client Initial Individualized Goals for Treatment: \"I want to get rid of the side effects. I want to learn coping skills. Learn how to be patient\"     See Initial Treatment suggestions for the client during the time between Diagnostic Assessment and completion of the Master Individualized Treatment Plan.     Treatment Goals:      Follow Safety Plan  Abstain from Substance Use   Ask for more information, support and/or assistance as needed.  Follow up with providers/community supports as needed: psychiatry and therapy at [facility]  Report increases or changes in symptoms to staff.  Report any personal safety concerns to staff.   Take medications as prescribed.  Report medication changes and/or side effects to staff.  Attend and participate in groups as scheduled or notify staff if unable to do so.  Report any use of substances to staff as this may impact your symptoms and/or personal safety.  Notify staff if you have any other issues that need to be addressed. This may include any current abuse / neglect / exploitation or other vulnerability.  Follow recommendations of your treatment team and discuss concerns if not in agreement.    Area of Treatment Focus:  Symptom Management, Personal Safety and Community Resources/Discharge Planning    Therapeutic Interventions/Treatment Strategies:  Support, Feedback, Safety Assessments and Cognitive Behavioral Therapy    Response to Treatment Strategies:  Listened, Focused on Goals and Interrupted    Name of Group:  Group Psychotherapy 2:00-2:50 pm     Group Participants: 6 of 6.     Description and Outcome:  Cristina reported feeling tired due to cold symptoms.  She stated that her Seroquel dose was adjusted by the psychiatry clinic nurse to help with morning sedation.  She stated that she had restful sleep of 8 hours.  She reported plan to meet with her psychiatrist on 8/2/18.  She reported taking her " medication as scheduled.  She took a break from group to control laughter.  Client looked sleepy, but speech continued to be rapid.  Client actively participated in session.  Writer redirected client to hold questions about her own concerns to her time to talk rather than in the middle of a peer's time to talk.  She denied current paranoia.  Client did not endorse suicidal ideation, intent, or plan.    Client demonstrated understanding of session content by reporting frustration with sedation and sharing current symptoms and stressors.        Is this a Weekly Review of the Progress on the Treatment Plan?  No

## 2018-08-02 ENCOUNTER — OFFICE VISIT (OUTPATIENT)
Dept: PSYCHIATRY | Facility: CLINIC | Age: 18
End: 2018-08-02
Attending: PSYCHIATRY & NEUROLOGY
Payer: COMMERCIAL

## 2018-08-02 VITALS
SYSTOLIC BLOOD PRESSURE: 93 MMHG | DIASTOLIC BLOOD PRESSURE: 51 MMHG | WEIGHT: 131.4 LBS | HEART RATE: 75 BPM | BODY MASS INDEX: 21.21 KG/M2

## 2018-08-02 DIAGNOSIS — F31.9 BIPOLAR I DISORDER (H): Primary | ICD-10-CM

## 2018-08-02 PROCEDURE — G0463 HOSPITAL OUTPT CLINIC VISIT: HCPCS | Mod: ZF

## 2018-08-02 RX ORDER — QUETIAPINE FUMARATE 200 MG/1
TABLET, FILM COATED ORAL
Qty: 30 TABLET | Refills: 1 | Status: SHIPPED | OUTPATIENT
Start: 2018-08-02 | End: 2018-11-02

## 2018-08-02 RX ORDER — QUETIAPINE FUMARATE 50 MG/1
TABLET, FILM COATED ORAL
Qty: 30 TABLET | Refills: 1 | Status: SHIPPED | OUTPATIENT
Start: 2018-08-02 | End: 2018-11-02

## 2018-08-02 ASSESSMENT — PAIN SCALES - GENERAL: PAINLEVEL: MODERATE PAIN (4)

## 2018-08-02 NOTE — MR AVS SNAPSHOT
After Visit Summary   8/2/2018    Cristina Boyd    MRN: [id number]           Patient Information     Date Of Birth          2000        Visit Information        Provider Department      8/2/2018 9:00 AM Krystal Izquierdo MD [facility]iat[facility]        Today's Diagnoses     Bipolar I disorder (H)    -  1       Follow-ups after your visit        Your next 10 appointments already scheduled     Aug 13, 2018  1:00 PM CDT   Return Visit with ADULT  DAY [address] [place] [address] [facility] ([facility])    [address] [place] [address]   [phone]            Aug 14, 2018  1:00 PM CDT   Return Visit with ADULT  DAY 3C [facility] ([facility])    [address] [place] [address]   [phone]            Aug 16, 2018  1:00 PM CDT   Return Visit with ADULT  DAY [address] [place] [address] [facility] ([facility])    [address] [place] [address]   [phone]            Aug 20, 2018  1:00 PM CDT   Return Visit with ADULT  DAY [address] [place] [address] [facility] ([facility])    [address] [place] [address]   [phone]            Aug 21, 2018  1:00 PM CDT   Return Visit with ADULT [address] [facility] ([facility])    [address] [place] [address]   [phone]            Aug 23, 2018  1:00 PM CDT   Return Visit with ADULT [address] [facility] ([facility])    [address] [place] [address]   [phone]            Aug 27, 2018  1:00 PM CDT   Return Visit with ADULT [address] [facility] ([facility])     [address] [address]   [phone]            Aug 28, 2018  1:00 PM CDT   Return Visit with ADULT  DAY 3C   [facility] ([facility])    [place] [address] [address]   [phone]            Aug 30, 2018  1:00 PM CDT   Return Visit with ADULT  DAY 3C   [facility] ([facility])    [place] [address] [address]   [phone]            Sep 04, 2018  1:00 PM CDT   Return Visit with ADULT  DAY 3C   [facility] ([facility])    [place] [address] [address]   [phone]              Who to contact     Please call your clinic at [phone] to:    Ask questions about your health    Make or cancel appointments    Discuss your medicines    Learn about your test results    Speak to your doctor            Additional Information About Your Visit        Care EveryWhere ID     This is your Care EveryWhere ID. This could be used by other organizations to access your [facility] medical records  [id number]        Your Vitals Were     Pulse Last Period BMI (Body Mass Index)             75 (LMP Unknown) 21.21 kg/m2          Blood Pressure from Last 3 Encounters:   08/02/18 93/51   07/13/18 114/77   07/11/18 124/84    Weight from Last 3 Encounters:   08/02/18 59.6 kg (131 lb 6.4 oz)   07/13/18 58.4 kg (128 lb 12.8 oz)   07/10/18 59.9 kg (132 lb 0.9 oz)              Today, you had the following     No orders found for display         Today's Medication Changes          These changes are accurate as of 8/2/18 11:59 PM.  If you have any questions, ask your nurse or doctor.               These medicines have changed or have updated prescriptions.        Dose/Directions    * QUEtiapine 50 MG tablet   Commonly known as:  SEROquel   This may have changed:  Another  medication with the same name was added. Make sure you understand how and when to take each.   Used for:  Bipolar 1 disorder (H)   Changed by:  Krystal Izquierdo MD        Dose:  50 mg   Take 1 tablet (50 mg) by mouth every morning   Quantity:  30 tablet   Refills:  0       * QUEtiapine 300 MG tablet   Commonly known as:  SEROquel   This may have changed:  Another medication with the same name was added. Make sure you understand how and when to take each.   Used for:  Bipolar 1 disorder (H)   Changed by:  Krystal Izquierdo MD        Dose:  300 mg   Take 1 tablet (300 mg) by mouth At Bedtime   Quantity:  30 tablet   Refills:  0       * QUEtiapine 200 MG tablet   Commonly known as:  SEROquel   This may have changed:  You were already taking a medication with the same name, and this prescription was added. Make sure you understand how and when to take each.   Used for:  Bipolar I disorder (H)   Changed by:  Krystal Izquierdo MD        Take one tab (200mg) by mouth at bedtime, along with one 50 mg tab, for a total daily dose of 250 mg   Quantity:  30 tablet   Refills:  1       * QUEtiapine 50 MG tablet   Commonly known as:  SEROQUEL   This may have changed:  You were already taking a medication with the same name, and this prescription was added. Make sure you understand how and when to take each.   Used for:  Bipolar I disorder (H)   Changed by:  Krystal Izquierdo MD        Take one tab (50 mg) by mouth at bedtime along with one 200 mg tab, for a total dose of 250 mg.   Quantity:  30 tablet   Refills:  1       * Notice:  This list has 4 medication(s) that are the same as other medications prescribed for you. Read the directions carefully, and ask your doctor or other care provider to review them with you.         Where to get your medicines      These medications were sent to [place], [place] - [address] [facility]  [address] [facility] [address]     Phone:  [phone]     QUEtiapine 200 MG  tablet    QUEtiapine 50 MG tablet                Primary Care Provider Fax #    Physician No Ref-Primary [phone]       No address on file        Equal Access to Services     HUMAIRA HUFFMAN : Hadii sarika cervantes pillo Sales, cirilo calvillo, bunny kasabina edwards, alfred goyal. So [facility] [phone].    ATENCIÓN: Si habla español, tiene a shaver disposición servicios gratuitos de asistencia lingüística. Llame al [phone].    We comply with applicable federal civil rights laws and [place] laws. We do not discriminate on the basis of race, color, national origin, age, disability, sex, sexual orientation, or gender identity.            Thank you!     Thank you for choosing PSY[facility]ATRY CLINIC  for your care. Our goal is always to provide you with excellent care. Hearing back from our patients is one way we can continue to improve our services. Please take a few minutes to complete the written survey that you may receive in the mail after your visit with us. Thank you!             Your Updated Medication List - Protect others around you: Learn how to safely use, store and throw away your medicines at www.disposemymeds.org.          This list is accurate as of 8/2/18 11:59 PM.  Always use your most recent med list.                   Brand Name Dispense Instructions for use Diagnosis    ARIPiprazole 30 MG tablet    ABILIFY    30 tablet    Take 1 tablet (30 mg) by mouth daily    Bipolar affective disorder, current episode manic with psychotic symptoms (H)       Carboxymethylcellulose Sod PF 0.5 % Soln ophthalmic solution    REFRESH PLUS    1 Bottle    Place 1 drop into both eyes 3 times daily as needed for dry eyes    Dry eyes       lithium 450 MG CR tablet    ESKALITH    60 tablet    Take 2 tablets (900 mg) by mouth At Bedtime    Bipolar disorder, current episode manic, severe with psychotic features (H)       propranolol 20 MG tablet    INDERAL    60 tablet    Take 1 tablet (20 mg) by  mouth 2 times daily    Bipolar I disorder, current or most recent episode manic, with psychotic features (H)       * QUEtiapine 50 MG tablet    SEROquel    30 tablet    Take 1 tablet (50 mg) by mouth every morning    Bipolar 1 disorder (H)       * QUEtiapine 300 MG tablet    SEROquel    30 tablet    Take 1 tablet (300 mg) by mouth At Bedtime    Bipolar 1 disorder (H)       * QUEtiapine 200 MG tablet    SEROquel    30 tablet    Take one tab (200mg) by mouth at bedtime, along with one 50 mg tab, for a total daily dose of 250 mg    Bipolar I disorder (H)       * QUEtiapine 50 MG tablet    SEROQUEL    30 tablet    Take one tab (50 mg) by mouth at bedtime along with one 200 mg tab, for a total dose of 250 mg.    Bipolar I disorder (H)       triamcinolone 0.1 % cream    KENALOG    80 g    Apply topically 2 times daily as needed for irritation    Skin irritation       * Notice:  This list has 4 medication(s) that are the same as other medications prescribed for you. Read the directions carefully, and ask your doctor or other care provider to review them with you.

## 2018-08-02 NOTE — PROGRESS NOTES
"Adult Mental Health Outpatient Group Therapy Progress Note     Client Initial Individualized Goals for Treatment: \"I want to get rid of the side effects. I want to learn coping skills. Learn how to be patient\"     See Initial Treatment suggestions for the client during the time between Diagnostic Assessment and completion of the Master Individualized Treatment Plan.     Treatment Goals:   Follow Safety Plan  Abstain from Substance Use   Ask for more information, support and/or assistance as needed.  Follow up with providers/community supports as needed: psychiatry and therapy at [facility]  Report increases or changes in symptoms to staff.  Report any personal safety concerns to staff.   Take medications as prescribed.  Report medication changes and/or side effects to staff.  Attend and participate in groups as scheduled or notify staff if unable to do so.  Report any use of substances to staff as this may impact your symptoms and/or personal safety.  Notify staff if you have any other issues that need to be addressed. This may include any current abuse / neglect / exploitation or other vulnerability.  Follow recommendations of your treatment team and discuss concerns if not in agreement.    Area of Treatment Focus:  Symptom Management, Personal Safety and Community Resources/Discharge Planning    Therapeutic Interventions/Treatment Strategies:  Support, Feedback, Safety Assessments and Cognitive Behavioral Therapy    Response to Treatment Strategies:  Listened, Focused on Goals and Interrupted    Name of Group:  Group Psychotherapy 2:00-2:50 pm     Group Participants: 3 of 6.     Description and Outcome:  Cristina reported feeling sedated and depressed.  She stated that she was \"less hypomanic\".  She  stated that she was fighting with her boyfriend of three months and was unsure if they would continue dating.  She stated that he was not understanding of her mental health symptoms.  She reported reading " for distraction.  Client was observed checking her make up in her phone's camera.  Writer redirected her to put the phone away.  Later client was observed to be text messaging.  Writer again asked client to put phone away and reviewed group rule of no cell phones on during group time.  She denied current paranoia.  Client did not endorse suicidal ideation, intent, or plan. She reported feeling unsupported by friends and boyfriends.    Client demonstrated understanding of session content by reporting frustration with sedation and sharing current symptoms and stressors.        Is this a Weekly Review of the Progress on the Treatment Plan?  No

## 2018-08-02 NOTE — PROGRESS NOTES
PSYCHIATRY CLINIC PROGRESS NOTE   The initial CHILD DIAG EVAL was 3/10/16.  Transfer Eval was 1/30/17    Pertinent background:  Patient has a history of psychotic features during episodes of felton. Treated for Bipolar 1 Disorder with Psychotic Features.    PSYCH CRITICAL ITEM HISTORY:  psychosis [sxs include delusional thoughts] and psych hosp (<3).     INTERIM HISTORY                                                   Cristina Boyd is a 18 year old female who was last seen in clinic on 5/30/18 at which time no changes were made but patient was hospitalized shortly thereafter. Reports good adherence. Patient presented with her mother.    Since the last visit: Note - covering for Dr. Key, safety check   - Cristina and her mother report that she is quite sedated since increasing Seroquel. Clarified dose - it seems have been mistakenly giving her 400 mg at night and  mg in the morning  - Both agree that pt is showing improvement in hypomanic symptoms. Reviewed warning signs, feel they can call should concerns arise.  - Klaus is currently enrolled in day treatment, this has been going well. Will not be going today due to opthalmology appt. Encouraged ongoing attendance.  - Clarified pt's score of one on safety question of PHQ -9. She states that she was rushing and didn't read the question fully. Denies SI.   - Plan is for Klaus to go to [place] next week with her father, and stay for two weeks. Encouraged her to call Dr. Key prior to this should she have any medication concerns. Agrees to schedule as as possible when she returns.    RECENT SYMPTOMS:   FELTON/HYPOMANIA:  reports- racing thoughts that are improving DENIES- decreased sleep need   PSYCHOSIS:  reports-none;  DENIES- delusions, auditory hallucinations and visual hallucinations  ANXIETY:  Some generalized worry   DEPRESSION: feels down, low energy, appetite changes DENIES- SI    RECENT SUBSTANCE USE:     ALCOHOL-  never         TOBACCO- none           CAFFEINE- not discussed        OPIOIDS- none   CANNABIS- none               OTHER ILLICIT DRUGS- none    Financial Support- family or friend  Living Situation- with parents and siblings     MEDICAL ROS:  Reports none Denies polydipsia, polyuria, nocturia, wt gain, gum or teeth problems, ice water craving and acne          PSYCH and CD Critical Summary Points since Jan 2017           - Feb 2017: [facility]    - Abilify 30 --> 15 mg    - Started Ativan, Depakote, Cogentin  - Mar 2017: Decreased Depakote 1000 --> 750 mg  - Apr 2017: Decreased Ativan 2 --> 1 mg  - Sept/Oct 2017: Ran out of all medications (Abilify 15 mg, Cogentin, Depakote 750 mg, Lithium 900 mg, Ativan 1 mg)   - Nov 2017: Restarted Lithium 900 mg  - Jan 2018: pt self discontinued Lithium  - April 2018: resumed Li  - May-July:   - 5/31-6/13 Admission on [facility] with acute adela --> transferred to Medicine and discharged 6/21 for r/o TB    - Resumed Li 900 mg    - Started Seroquel 400 mg QHS & 100 mg TID PRN    - Started propranolol 20 mg BID   - 6/30-7/10 Admission to Psychiatry with acute adela    - Started Abilify 30 mg    - Discontinued Seroquel    PAST MED TRIALS   - See Hx of Psychiatric Care under Problem List    MEDICAL / SURGICAL HISTORY                                   CARE TEAM:          PCP- Adriana Garibay                    Therapist- none    Pregnant or breastfeeding:  NO      Contraception- abstinent not on birth control  Patient Active Problem List   Diagnosis     Adela (H)     Bipolar affective disorder, current episode manic with psychotic symptoms (H)     Suicidal ideation     Hx of psychiatric care     Bipolar I disorder, current or most recent episode manic, with psychotic features (H)     Lymphadenopathy     Bipolar 1 disorder (H)       ALLERGY                                Review of patient's allergies indicates no known allergies.  MEDICATIONS                               Current Outpatient Prescriptions  "  Medication Sig Dispense Refill     ARIPiprazole (ABILIFY) 30 MG tablet Take 1 tablet (30 mg) by mouth daily 30 tablet 1     lithium (ESKALITH) 450 MG CR tablet Take 2 tablets (900 mg) by mouth At Bedtime 60 tablet 0     propranolol (INDERAL) 20 MG tablet Take 1 tablet (20 mg) by mouth 2 times daily 60 tablet 0     QUEtiapine (SEROQUEL) 300 MG tablet Take 1 tablet (300 mg) by mouth At Bedtime 30 tablet 0     QUEtiapine (SEROQUEL) 50 MG tablet Take 1 tablet (50 mg) by mouth every morning 30 tablet 0     Carboxymethylcellulose Sod PF (REFRESH PLUS) 0.5 % SOLN ophthalmic solution Place 1 drop into both eyes 3 times daily as needed for dry eyes (Patient not taking: Reported on 7/11/2018) 1 Bottle 0     triamcinolone (KENALOG) 0.1 % cream Apply topically 2 times daily as needed for irritation (Patient not taking: Reported on 7/11/2018) 80 g 0       VITALS   BP 93/51  Pulse 75  Wt 59.6 kg (131 lb 6.4 oz)  LMP  (LMP Unknown)  BMI 21.21 kg/m2   MENTAL STATUS EXAM                                                             Alertness: alert and oriented  Appearance: well groomed.   Behavior/Demeanor: relatively cooperative, with fair eye contact  Speech:  Regular rate  Language: intact  Psychomotor: normal  Mood:  \"okay\"  Affect: full range; was congruent to mood; was congruent to content   Thought Process/Associations: unremarkable  Thought Content:  Denies suicidal ideation, violent ideation and psychotic thought  Perception:  Denies hallucinations  Insight: limited  Judgment: fair  Cognition:  does appear grossly intact; formal cognitive testing was not done    LABS and DATA     LITHIUM LABS    [level, renal, SG, TSH, WBC]  q6 mo  Recent Labs   Lab Test  07/01/18   0829  06/06/18   0915  06/01/18   0810  06/01/18   0201   LITHIUM  0.96  1.11  1.29*  1.48*     Recent Labs   Lab Test  07/11/18   1050  06/18/18   0708  06/16/18   0838   CR  0.51*  0.51*  0.49*   GFRESTIMATED  >90  >90  >90   NA  139  137  140   BHANU  8.6  " 8.7  9.3     Recent Labs   Lab Test  06/14/18   0030  04/02/18   2057   SG  1.007  1.022     Recent Labs   Lab Test  07/11/18   1050  06/06/18   2218  06/01/18   0201   TSH  1.46  0.91  4.83*     ANTIPSYCHOTIC LABS   [glu, A1C, lipids (focus LDL), liver enzymes, WBC, ANEU, Hgb, plts]  q12mo  Recent Labs   Lab Test  07/11/18   1050  06/18/18   0708   GLC  110*  84     Recent Labs   Lab Test  01/13/17   0936  02/06/16   0805   CHOL  83  82   TRIG  34  34   LDL  36  31   HDL  40*  44*     Recent Labs   Lab Test  07/11/18   1050  06/14/18   1014   AST  14  20   ALT  17  31   ALKPHOS  73  66     Recent Labs   Lab Test  07/11/18   1050  06/18/18   0708  06/17/18   1839   WBC  5.8  6.3  7.3   ANEU  3.2   --   4.4   HGB  11.7  10.4*  11.3*   PLT  310  364  407       PHQ9 TODAY = 15  PHQ-9 SCORE 7/12/2018 7/13/2018 7/23/2018   Total Score 18 15 5       PSYCHIATRIC DIAGNOSES                                                                                                   Bipolar 1 Disorder     ASSESSMENT                                     TODAY: Cristina presents for urgent follow up after reporting increase in hypomanic symptoms and concern for impending shaina. Dr. Key instructed pt to increase PM Seroquel to 300 mg and add AM dose (50 mg). It seems that mother and pt mistakenly increased to  + 400, causing significant sedation. Hypomanic symptoms have reduced. Will decrease Seroquel for now to combat sedation. Patient and mother aware of signs of shaina, pt currently enrolled in day treatment.                            PLAN                                                                                                       1) MEDICATION:      - Continue Lithium 900 mg QHS   - Continue Abilify 30 mg daily   - Continue propranolol 20 mg BID   - Reduce Seroquel to 250 mg at bedtime. Discontinue AM dose due to sedation    2) THERAPY:  Continue    3) LABS NEXT DUE:  Li labs due 10/2018       RATING SCALES:      none    4) REFERRALS [CD, medical, other]:  Day Treatment    5) :  none    6) RTC: 2-3 weeks    7) CRISIS NUMBERS: Provided routinely in AVS     TREATMENT RISK STATEMENT:  The risks, benefits, alternatives and potential adverse effects have been discussed and are understood by the patient/ patient's guardian. The pt understands the risks of using street drugs or alcohol.  There are no medical contraindications, the pt agrees to treatment with the ability to do so.  The patient understands to call 911 or come to the nearest ED if life threatening or urgent symptoms present.       RESIDENT:   Jazmnie Key MD    Patient not staffed in clinic. Note will be reviewed and signed by supervisor, Dr. Espitia      I did not see this patient directly. I have reviewed the documentation and I agree with the resident's plan of care.     Adriana Espitia MD

## 2018-08-03 ASSESSMENT — PATIENT HEALTH QUESTIONNAIRE - PHQ9: SUM OF ALL RESPONSES TO PHQ QUESTIONS 1-9: 13

## 2018-08-06 ENCOUNTER — TELEPHONE (OUTPATIENT)
Dept: BEHAVIORAL HEALTH | Facility: CLINIC | Age: 18
End: 2018-08-06

## 2018-08-06 NOTE — TELEPHONE ENCOUNTER
Writer left VM for client as she has missed last two days of groups and per note from psychiatry,  she is in [place] for 2 weeks with her father. Left VM to verify.

## 2018-08-07 ENCOUNTER — HOSPITAL ENCOUNTER (OUTPATIENT)
Dept: BEHAVIORAL HEALTH | Facility: CLINIC | Age: 18
End: 2018-08-07
Attending: PSYCHIATRY & NEUROLOGY
Payer: COMMERCIAL

## 2018-08-07 PROCEDURE — H2012 BEHAV HLTH DAY TREAT, PER HR: HCPCS

## 2018-08-07 NOTE — PROGRESS NOTES
"Adult Mental Health Outpatient Group Therapy Progress Note    Client Initial Individualized Goals for Treatment: \"I want to get rid of the side effects. I want to learn coping skills. Learn how to be patient\"     See Initial Treatment suggestions for the client during the time between Diagnostic Assessment and completion of the Master Individualized Treatment Plan.     Treatment Goals:      Follow Safety Plan  Abstain from Substance Use   Ask for more information, support and/or assistance as needed.  Follow up with providers/community supports as needed: psychiatry and therapy at [facility]  Report increases or changes in symptoms to staff.  Report any personal safety concerns to staff.   Take medications as prescribed.  Report medication changes and/or side effects to staff.  Attend and participate in groups as scheduled or notify staff if unable to do so.  Report any use of substances to staff as this may impact your symptoms and/or                      personal safety.  Notify staff if you have any other issues that need to be addressed. This may include              any current abuse / neglect / exploitation or other vulnerability.  Follow recommendations of your treatment team and discuss concerns if not in            agreement.      Area of Treatment Focus:  Symptom Management, Develop / Improve Independent Living Skills and Develop Socialization / Interpersonal Relationship Skills     Therapeutic Interventions/Treatment Strategies:  Support, Feedback, Safety Assessments, Structured Activity and Problem Solving, redirection and limits     Response to Treatment Strategies:  Accepted Feedback, Gave Feedback, Listened, Focused on Goals, Attentive and Accepted Support     Name of Group:  Life Skills 100-150      Group Participants: 7 of 7.       Description and Outcome:  Cristina attended and participated in a structured life skills psychoeducation group where intervention focused on experiential " learning, practice, and generalization of skills through the use of supported social interaction, structured therapeutic tasks, and engagement in functional tasks in order to improve function in valued roles, routines, and independent living skills. She was hyperverbal throughout the session, often interrupting others, or making attempts to answer for others. She was able to choose to work on an individual therapeutic task, but needed some assistance to locate things, noting that the regular staff was to have left materials for her. She was able to focus on this for a few minutes at a time, often stating self negatives re: what she was doing and the results she was getting. Provided client with verbal psychoeducation material focused on benefits of life skills group in order to promote participation in valued roles, routines, and relationships. She was able to note at the end of the session that she had improved slightly with her skill level.    Is this a Weekly Review of the Progress on the Treatment Plan?  No

## 2018-08-09 ENCOUNTER — HOSPITAL ENCOUNTER (OUTPATIENT)
Dept: BEHAVIORAL HEALTH | Facility: CLINIC | Age: 18
End: 2018-08-09
Attending: PSYCHIATRY & NEUROLOGY
Payer: COMMERCIAL

## 2018-08-09 PROCEDURE — H2012 BEHAV HLTH DAY TREAT, PER HR: HCPCS

## 2018-08-09 NOTE — PROGRESS NOTES
Acknowledgement of Current Treatment Plan       I have reviewed my treatment plan with my therapist / counselor on 8/9/18.   I agree with the plan as it is written in the electronic health record. (3C)    Name:      Signature:  Cristina Cueto MD  Psychiatrist    Lucinda Brewer, NP    Tasha Murillo, MSW, [facility]  Psychotherapist    GURWINDER Herrera, RN, PHN  Nurse Liaison    Vesta Lopez MA, OTR/L   Occupational Therapist

## 2018-08-10 ENCOUNTER — CARE COORDINATION (OUTPATIENT)
Dept: PSYCHIATRY | Facility: CLINIC | Age: 18
End: 2018-08-10

## 2018-08-10 ENCOUNTER — OFFICE VISIT (OUTPATIENT)
Dept: PSYCHOLOGY | Facility: CLINIC | Age: 18
End: 2018-08-10
Payer: COMMERCIAL

## 2018-08-10 ENCOUNTER — FCC EXTENDED DOCUMENTATION (OUTPATIENT)
Dept: PSYCHOLOGY | Facility: CLINIC | Age: 18
End: 2018-08-10

## 2018-08-10 DIAGNOSIS — F31.2 BIPOLAR AFFECTIVE DISORDER, CURRENT EPISODE MANIC WITH PSYCHOTIC SYMPTOMS (H): Primary | ICD-10-CM

## 2018-08-10 PROCEDURE — 90791 PSYCH DIAGNOSTIC EVALUATION: CPT | Performed by: SOCIAL WORKER

## 2018-08-10 ASSESSMENT — ANXIETY QUESTIONNAIRES
GAD7 TOTAL SCORE: 0
3. WORRYING TOO MUCH ABOUT DIFFERENT THINGS: NOT AT ALL
6. BECOMING EASILY ANNOYED OR IRRITABLE: NOT AT ALL
5. BEING SO RESTLESS THAT IT IS HARD TO SIT STILL: NOT AT ALL
1. FEELING NERVOUS, ANXIOUS, OR ON EDGE: NOT AT ALL
2. NOT BEING ABLE TO STOP OR CONTROL WORRYING: NOT AT ALL
7. FEELING AFRAID AS IF SOMETHING AWFUL MIGHT HAPPEN: NOT AT ALL

## 2018-08-10 ASSESSMENT — PATIENT HEALTH QUESTIONNAIRE - PHQ9: 5. POOR APPETITE OR OVEREATING: NOT AT ALL

## 2018-08-10 NOTE — PROGRESS NOTES
"                                                                                                                                                                        Adult Intake Structured Interview  Standard Diagnostic Assessment      CLIENT'S NAME: Cristina Boyd  MRN:   [id number]  :   2000  ACCT. NUMBER: [id number]  DATE OF SERVICE: 8/10/18      Identifying Information:  Client is a 18 year old, [place], single female. Client was referred for counseling by self. Client is currently a student. Client attended the session alone.       Client's Statement of Presenting Concern:  Client reports the reason for seeking therapy at this time as due to multiple recommendations from providers. Client was recently hospitalized early this year and is concern about \"relapsing\" or experiencing another episode of shaina. Client stated that her symptoms have resulted in the following functional impairments: academic performance, management of the household and or completion of tasks, relationship(s), self-care and social interactions      History of Presenting Concern:  Client reports that these problem(s) began at 14 years old. Client remembers having typhoid and became \"depressed\" people people didn't visit. Client was free of symptoms after school ended and then her depression returned her senior year. Shortly afterwards, she experienced her first manic episode and was hospitalized 2016 and first diagnosed with Bipolar.  Client has attempted to resolve these concerns in the past through hospitalization, medications and day treatment. Client reports that other professional(s) are involved in providing support / services.       Social History:  Client reported she grew up in [place], [place]. Client spent 3 years in [facility] [employer] [facility]. They were the second born of five children. This is an intact family and parents remain . Client reported that her childhood was \"ok, I was a quiet kid.\" Client described herself as a \"perfect " "child\" with good grades and following rules. Client described her current relationships with family of origin as \"it's ok, sometime they breath down on my neck but it's ok.\"    Client reported a history of two committed relationships. Client has been partnered / significant other for less than a year. Client reported having no children. Client identified few stable and meaningful social connections. Client reported that she has been involved with the legal system, in [id number] charged with [employer].  Client's highest education level was high school graduate and some college. Client did not identify any learning problems. There are ethnic, cultural or Anabaptist factors that may be relavent for therapy. These factors will be addressed in the Preliminary Treatment plan. Client identified her preferred language to be English. Client reported she does not need the assistance of an  or other support involved in therapy. Modifications will not be used to assist communication in therapy. Client did not serve in the .     Client reports family history includes Depression in her maternal uncle and another family member.    Mental Health History:  Client reported the following biological family members or relatives with mental health issues: Uncle experienced Schizophrenia and Cousin experienced Bipolar Disorder.  Client previously received the following mental health diagnosis: Anxiety, Bipolar Disorder and Depression.  Client has received the following mental health services in the past: day treatment, inpatient mental health services, medication(s) from physician / PCP, primary care behavioral health provider and psychiatry.  Hospitalizations: [facility].  Client is currently receiving the following services: counseling, day treatment, inpatient mental health services, medication(s) from physician / PCP, primary care behavioral health provider and psychiatry.      Chemical " Health History:  Client reported no family history of chemical health issues. Client has not received chemical dependency treatment in the past. Client is not currently receiving any chemical dependency treatment. Client reports no problems as a result of their drinking / drug use.      Client Reports:  Client denies using alcohol.  Client denies using tobacco.  Client denies using marijuana.  Client reports using caffeine 3-4 times per week and drinks 2 at a time. Client started using caffeine at age 17.  Client denies using street drugs.  Client denies the non-medical use of prescription or over the counter drugs.    CAGE: None of the patient's responses to the CAGE screening were positive / Negative CAGE score   Based on the negative Cage-Aid score and clinical interview there  are not indications of drug or alcohol abuse.    Discussed the general effects of drugs and alcohol on health and well-being. Therapist gave client printed information about the effects of chemical use on her health and well being.      Significant Losses / Trauma / Abuse / Neglect Issues:  There are indications or report of significant loss, trauma, abuse or neglect issues related to: death of friend who  tragicly.    Issues of possible neglect are not present.      Medical Issues:  Client has had a physical exam to rule out medical causes for current symptoms. Date of last physical exam was within the past year. Client was encouraged to follow up with PCP if symptoms were to develop. The client has a [facility] Provider, who is named No Ref-Primary, Physician.. The client has a psychiatrist whose name and location are: Dr Jazmine Key, [facility]. Client reports no current medical concerns. The client denies the presence of chronic or episodic pain. There are not significant nutritional concerns.     Client reports current meds as:   Current Outpatient Prescriptions   Medication Sig     ARIPiprazole (ABILIFY) 30 MG  tablet Take 1 tablet (30 mg) by mouth daily     Carboxymethylcellulose Sod PF (REFRESH PLUS) 0.5 % SOLN ophthalmic solution Place 1 drop into both eyes 3 times daily as needed for dry eyes (Patient not taking: Reported on 7/11/2018)     lithium (ESKALITH) 450 MG CR tablet Take 2 tablets (900 mg) by mouth At Bedtime     propranolol (INDERAL) 20 MG tablet Take 1 tablet (20 mg) by mouth 2 times daily     QUEtiapine (SEROQUEL) 200 MG tablet Take one tab (200mg) by mouth at bedtime, along with one 50 mg tab, for a total daily dose of 250 mg     QUEtiapine (SEROQUEL) 300 MG tablet Take 1 tablet (300 mg) by mouth At Bedtime     QUEtiapine (SEROQUEL) 50 MG tablet Take one tab (50 mg) by mouth at bedtime along with one 200 mg tab, for a total dose of 250 mg.     QUEtiapine (SEROQUEL) 50 MG tablet Take 1 tablet (50 mg) by mouth every morning     triamcinolone (KENALOG) 0.1 % cream Apply topically 2 times daily as needed for irritation (Patient not taking: Reported on 7/11/2018)     No current facility-administered medications for this visit.        Client Allergies:  No Known Allergies  no allergies to medications    Medical History:  Past Medical History:   Diagnosis Date     Bipolar disorder (H)      Depressive disorder          Medication Adherence:  Client reports taking prescribed medications as prescribed.    Client was provided recommendation to follow-up with prescribing physician.    Mental Status Assessment:  Appearance:   Appropriate   Eye Contact:   Good   Psychomotor Behavior: Normal   Attitude:   Cooperative   Orientation:   All  Speech   Rate / Production: Normal    Volume:  Normal   Mood:    Normal  Affect:    Appropriate   Thought Content:  Clear   Thought Form:  Coherent  Logical   Insight:    Fair       Review of Symptoms:  Depression: Sleep Interest Energy Concentration Appetite Suicide Hopeless Helpless Worthless Ruminations Irritability  Adela:  Distractibility Impulsiveness Grandiosity Racing Thoughts  Activity Sleepless Pressured Speech  Psychosis: Auditory or Visual Hallucinations (saw a monster image) this was once, could be due to lack of sleep  Anxiety: Worries Nervousness  Panic:  Palpitations Tremors Shortness of Breath Tingling Sense of Impending Doom  Post Traumatic Stress Disorder: No symptoms  Obsessive Compulsive Disorder: No symptoms  Eating Disorder: No symptoms  Oppositional Defiant Disorder: No symptoms  ADD / ADHD: No symptoms  Conduct Disorder: No symptoms      Safety Assessment:    History of Safety Concerns:   Client reported a history of suicidal ideation.  Onset: 15 years old and frequency: a few days.  Client identified the following triggers to suicidal ideation: returning [place], school preparation, stress  Client denied a history of suicide attempts.    Client denied a history of homicidal ideation.    Client denied a history of self-injurious ideation and behaviors.    Client denied a history of personal safety concerns.    Client denied a history of assaultive behaviors.        Current Safety Concerns:  Client denies current suicidal ideation.    Client denies current homicidal ideation and behaviors.  Client denies current self-injurious ideation and behaviors.    Client denies current concerns for personal safety.    Client reports the following protective factors: spirituality, positive relationships positive family connections, forward/future oriented thinking, safe and stable environment, secure attachment, living with other people, committment to well-being and healthy fear of risky behaviors or pain    Client reports there are no firearms in the house.     Plan for Safety and Risk Management:  A safety and risk management plan has not been developed at this time, however client was given the after-hours number / 911 should there be a change in any of these risk factors.    Client's Strengths and Limitations:  Client identified the following strengths or resources that will help her  succeed in counseling: commitment to health and well being, vidal / spirituality, friends / good social support, family support, intelligence and sense of humor. Client identified the following supports: family, Catholic / spirituality and friends. Things that may interfere with the client's success in counseling include: few friends, financial hardship and transportation concerns.      Diagnostic Criteria:  MANIC EPISODE - At least one lifetime manic episode is required for the dx of Bipolar I Disorder as evidenced by present symptoms or by history  A. A distinct period of abnormally and persistently elevated, expansive, or irritable mood, lasting at least 1 week (or any duration if hospitalization is necessary).   B. During the period of mood disturbance, three (or more) of the following symptoms (four if the mood is only irritable) have persisted and have been present to a significant degree:   - inflated self-esteem or grandiosity    - decreased need for sleep (e.g., feels rested after only 3 hours of sleep)    - more talkative than usual or pressure to keep talking    - flight of ideas or subjectivie experience that thoughts are racing   - distractibility   - increase in goal-directed activity   - excessive involvement in pleasurable activities that have a high potential for painful consequences, such as spending money or sexual indiscretion.  C. The mood disturbance is sufficiently severe to cause marked impairment in social or occupational functioning or to necessitate hospitalization to prevent harm to self or others, or there are severe psychotic features  D. The symptoms are not attributable to the physiologicial effects of a substance or to another medical condition  E. The episode is sufficiently severe enough to cause marked impairment in social or occupational functioning or to necessitate hospitalization to prevent harm to self or others, or there are psychotic features  F. The symptoms are not due  to the direct physiological effects of a substance (eg, a drug of abuse, a medication, or other treatment) or a general medical condition (eg, hyperthyroidism).    - The aformentioned symptoms began 3 year(s) ago and occurs 5 days per week and is experienced as moderate.   - Depressed mood. Note: In children and adolescents, can be irritable mood.     - Diminished interest or pleasure in all, or almost all, activities.    - Feelings of worthlessness or inappropriate guilt.       Functional Status:  Client's symptoms have caused reduced functional status in the following areas: Academics / Education - getting to class, not sleeping, hyperfocusing  Activities of Daily Living - completing daily tasks, controlling impulses  Social / Relational - irritability towards others when depressed      DSM5 Diagnoses: (Sustained by DSM5 Criteria Listed Above)  Diagnoses: 296.45 Bipolar I Disorder Current or Most Recent Episode Hypomanic, in partial remssion  Psychosocial & Contextual Factors: Diagnosed with bipolar 3 years ago, family support is present, recent inpatient stay  WHODAS 2.0 (12 item)            This questionnaire asks about difficulties due to health conditions. Health conditions  include  disease or illnesses, other health problems that may be short or long lasting,  injuries, mental health or emotional problems, and problems with alcohol or drugs.                     Think back over the past 30 days and answer these questions, thinking about how much  difficulty you had doing the following activities. For each question, please [place] only  one response.    S1 Standing for long periods such as 30 minutes? Mild =           2   S2 Taking care of household responsibilities? Mild =           2   S3 Learning a new task, for example, learning how to get to a new place? Mild =           2   S4 How much of a problem do you have joining community activities (for example, festivals, Orthodox or other activities) in the same  way as anyone else can? Mild =           2   S5 How much have you been emotionally affected by your health problems? Mild =           2     In the past 30 days, how much difficulty did you have in:   S6 Concentrating on doing something for ten minutes? Mild =           2   S7 Walking a long distance such as a kilometer (or equivalent)? Mild =           2   S8 Washing your whole body? None =         1   S9 Getting dressed? None =         1   S10 Dealing with people you do not know? Mild =           2   S11 Maintaining a friendship? Moderate =   3   S12 Your day to day work? Moderate =   3     H1 Overall, in the past 30 days, how many days were these difficulties present? Record number of days 14   H2 In the past 30 days, for how many days were you totally unable to carry out your usual activities or work because of any health condition? Record number of days  5   H3 In the past 30 days, not counting the days that you were totally unable, for how many days did you cut back or reduce your usual activities or work because of any health condition? Record number of days 7     Attendance Agreement:  Client has signed Attendance Agreement:No: .      Collaboration:  The client is receiving treatment / structured support from the following professional(s) / service and treatment. Collaboration will be initiated with: primary care physician and psychiatry.      Preliminary Treatment Plan:  Client's identified from [place] background, does not identified herself as black. This can create conflicts with other individuals who identifies themselves as black. Client and her family are Muslims.     services are not indicated.    Modifications to assist communication are not indicated.    The concerns identified by the client will be addressed in therapy.    Initial Treatment will focus on: Depressed Mood - depressed mood, negative self-talk, cognitive distortions  Mood Instability - identifying triggers to mood  instability, psychoeducation around Bipolar.    As a preliminary treatment goal, client will experience a reduction in depressed mood, will develop more effective coping skills to manage depressive symptoms, will develop healthy cognitive patterns and beliefs, will increase ability to function adaptively and will continue to take medications as prescribed / participate in supportive activities and services , will experience a reduction in anxiety, will develop more effective coping skills to manage anxiety symptoms, will develop healthy cognitive patterns and beliefs and will increase ability to function adaptively and will develop better understanding of triggers and coping strategies to stabilize mood.    The focus of initial interventions will be to alleviate anxiety, alleviate depressed mood, alleviate lability of mood, facilitate appropriate expression of feelings, increase coping skills, provide homework to reinforce skill development, provide psychoeduction regarding depression and anxiety, reduce panic attacks, teach CBT skills, teach DBT skills, teach distress tolerance skills, teach emotional regulation, teach mindfulness skills and teach relaxation strategies.    Referral to another professional/service is not indicated at this time..    A Release of Information is not needed at this time.    Report to child / adult protection services was NA.    Client will have access to their [facility]' medical record.    MARCELINA Adams LGSW  August 10, 2018  Note reviewed and clinical supervision by MARCELINA Butler LICSW 8/17/2018

## 2018-08-10 NOTE — PROGRESS NOTES
"                                                                                                                                                                          Adult Intake Structured Interview  Standard Diagnostic Assessment      CLIENT'S NAME: Cristina Boyd  MRN:   [id number]  :   2000  ACCT. NUMBER: [id number]  DATE OF SERVICE: 8/10/18      Identifying Information:  Client is a 18 year old, [place], single female. Client was referred for counseling by self. Client is currently a student. Client attended the session alone.       Client's Statement of Presenting Concern:  Client reports the reason for seeking therapy at this time as due to multiple recommendations from providers. Client was recently hospitalized early this year and is concern about \"relapsing\" or experiencing another episode of shaina. Client stated that her symptoms have resulted in the following functional impairments: academic performance, management of the household and or completion of tasks, relationship(s), self-care and social interactions      History of Presenting Concern:  Client reports that these problem(s) began at 14 years old. Client remembers having typhoid and became \"depressed\" people people didn't visit. Client was free of symptoms after school ended and then her depression returned her senior year. Shortly afterwards, she experienced her first manic episode and was hospitalized 2016 and first diagnosed with Bipolar.  Client has attempted to resolve these concerns in the past through hospitalization, medications and day treatment. Client reports that other professional(s) are involved in providing support / services.       Social History:  Client reported she grew up in [place], [place]. Client spent 3 years in [facility]. They were the second born of five children. This is an intact family and parents remain . Client reported that her childhood was \"ok, I was a quiet kid.\" Client described herself as a \"perfect " "child\" with good grades and following rules. Client described her current relationships with family of origin as \"it's ok, sometime they breath down on my neck but it's ok.\"    Client reported a history of two committed relationships. Client has been partnered / significant other for less than a year. Client reported having no children. Client identified few stable and meaningful social connections. Client reported that she has been involved with the legal system, in [id number] charged with [employer].  Client's highest education level was high school graduate and some college. Client did not identify any learning problems. There are ethnic, cultural or Christian factors that may be relavent for therapy. These factors will be addressed in the Preliminary Treatment plan. Client identified her preferred language to be English. Client reported she does not need the assistance of an  or other support involved in therapy. Modifications will not be used to assist communication in therapy. Client did not serve in the .     Client reports family history includes Depression in her maternal uncle and another family member.    Mental Health History:  Client reported the following biological family members or relatives with mental health issues: Uncle experienced Schizophrenia and Cousin experienced Bipolar Disorder.  Client previously received the following mental health diagnosis: Anxiety, Bipolar Disorder and Depression.  Client has received the following mental health services in the past: day treatment, inpatient mental health services, medication(s) from physician / PCP, primary care behavioral health provider and psychiatry.  Hospitalizations: [facility].  Client is currently receiving the following services: counseling, day treatment, inpatient mental health services, medication(s) from physician / PCP, primary care behavioral health provider and psychiatry.      Chemical " Health History:  Client reported no family history of chemical health issues. Client has not received chemical dependency treatment in the past. Client is not currently receiving any chemical dependency treatment. Client reports no problems as a result of their drinking / drug use.      Client Reports:  Client denies using alcohol.  Client denies using tobacco.  Client denies using marijuana.  Client reports using caffeine 3-4 times per week and drinks 2 at a time. Client started using caffeine at age 17.  Client denies using street drugs.  Client denies the non-medical use of prescription or over the counter drugs.    CAGE: None of the patient's responses to the CAGE screening were positive / Negative CAGE score   Based on the negative Cage-Aid score and clinical interview there  are not indications of drug or alcohol abuse.    Discussed the general effects of drugs and alcohol on health and well-being. Therapist gave client printed information about the effects of chemical use on her health and well being.      Significant Losses / Trauma / Abuse / Neglect Issues:  There are indications or report of significant loss, trauma, abuse or neglect issues related to: death of friend who  tragicly.    Issues of possible neglect are not present.      Medical Issues:  Client has had a physical exam to rule out medical causes for current symptoms. Date of last physical exam was within the past year. Client was encouraged to follow up with PCP if symptoms were to develop. The client has a [facility] Primary Care Provider, who is named No Ref-Primary, Physician.. The client has a psychiatrist whose name and location are: Dr Jazmine Key, [facility]. Client reports no current medical concerns. The client denies the presence of chronic or episodic pain. There are not significant nutritional concerns.     Client reports current meds as:   Current Outpatient Prescriptions   Medication Sig     ARIPiprazole (ABILIFY) 30 MG  tablet Take 1 tablet (30 mg) by mouth daily     Carboxymethylcellulose Sod PF (REFRESH PLUS) 0.5 % SOLN ophthalmic solution Place 1 drop into both eyes 3 times daily as needed for dry eyes (Patient not taking: Reported on 7/11/2018)     lithium (ESKALITH) 450 MG CR tablet Take 2 tablets (900 mg) by mouth At Bedtime     propranolol (INDERAL) 20 MG tablet Take 1 tablet (20 mg) by mouth 2 times daily     QUEtiapine (SEROQUEL) 200 MG tablet Take one tab (200mg) by mouth at bedtime, along with one 50 mg tab, for a total daily dose of 250 mg     QUEtiapine (SEROQUEL) 300 MG tablet Take 1 tablet (300 mg) by mouth At Bedtime     QUEtiapine (SEROQUEL) 50 MG tablet Take one tab (50 mg) by mouth at bedtime along with one 200 mg tab, for a total dose of 250 mg.     QUEtiapine (SEROQUEL) 50 MG tablet Take 1 tablet (50 mg) by mouth every morning     triamcinolone (KENALOG) 0.1 % cream Apply topically 2 times daily as needed for irritation (Patient not taking: Reported on 7/11/2018)     No current facility-administered medications for this visit.        Client Allergies:  No Known Allergies  no allergies to medications    Medical History:  Past Medical History:   Diagnosis Date     Bipolar disorder (H)      Depressive disorder          Medication Adherence:  Client reports taking prescribed medications as prescribed.    Client was provided recommendation to follow-up with prescribing physician.    Mental Status Assessment:  Appearance:   Appropriate   Eye Contact:   Good   Psychomotor Behavior: Normal   Attitude:   Cooperative   Orientation:   All  Speech   Rate / Production: Normal    Volume:  Normal   Mood:    Normal  Affect:    Appropriate   Thought Content:  Clear   Thought Form:  Coherent  Logical   Insight:    Fair       Review of Symptoms:  Depression: Sleep Interest Energy Concentration Appetite Suicide Hopeless Helpless Worthless Ruminations Irritability  Adela:  Distractibility Impulsiveness Grandiosity Racing Thoughts  Activity Sleepless Pressured Speech  Psychosis: Auditory or Visual Hallucinations (saw a monster image) this was once, could be due to lack of sleep  Anxiety: Worries Nervousness  Panic:  Palpitations Tremors Shortness of Breath Tingling Sense of Impending Doom  Post Traumatic Stress Disorder: No symptoms  Obsessive Compulsive Disorder: No symptoms  Eating Disorder: No symptoms  Oppositional Defiant Disorder: No symptoms  ADD / ADHD: No symptoms  Conduct Disorder: No symptoms      Safety Assessment:    History of Safety Concerns:   Client reported a history of suicidal ideation.  Onset: 15 years old and frequency: a few days.  Client identified the following triggers to suicidal ideation: returning [place], school preparation, stress  Client denied a history of suicide attempts.    Client denied a history of homicidal ideation.    Client denied a history of self-injurious ideation and behaviors.    Client denied a history of personal safety concerns.    Client denied a history of assaultive behaviors.        Current Safety Concerns:  Client denies current suicidal ideation.    Client denies current homicidal ideation and behaviors.  Client denies current self-injurious ideation and behaviors.    Client denies current concerns for personal safety.    Client reports the following protective factors: spirituality, positive relationships positive family connections, forward/future oriented thinking, safe and stable environment, secure attachment, living with other people, committment to well-being and healthy fear of risky behaviors or pain    Client reports there are no firearms in the house.     Plan for Safety and Risk Management:  A safety and risk management plan has not been developed at this time, however client was given the after-hours number / 911 should there be a change in any of these risk factors.    Client's Strengths and Limitations:  Client identified the following strengths or resources that will help her  succeed in counseling: commitment to health and well being, vidal / spirituality, friends / good social support, family support, intelligence and sense of humor. Client identified the following supports: family, Hindu / spirituality and friends. Things that may interfere with the client's success in counseling include: few friends, financial hardship and transportation concerns.      Diagnostic Criteria:  MANIC EPISODE - At least one lifetime manic episode is required for the dx of Bipolar I Disorder as evidenced by present symptoms or by history  A. A distinct period of abnormally and persistently elevated, expansive, or irritable mood, lasting at least 1 week (or any duration if hospitalization is necessary).   B. During the period of mood disturbance, three (or more) of the following symptoms (four if the mood is only irritable) have persisted and have been present to a significant degree:   - inflated self-esteem or grandiosity    - decreased need for sleep (e.g., feels rested after only 3 hours of sleep)    - more talkative than usual or pressure to keep talking    - flight of ideas or subjectivie experience that thoughts are racing   - distractibility   - increase in goal-directed activity   - excessive involvement in pleasurable activities that have a high potential for painful consequences, such as spending money or sexual indiscretion.  C. The mood disturbance is sufficiently severe to cause marked impairment in social or occupational functioning or to necessitate hospitalization to prevent harm to self or others, or there are severe psychotic features  D. The symptoms are not attributable to the physiologicial effects of a substance or to another medical condition  E. The episode is sufficiently severe enough to cause marked impairment in social or occupational functioning or to necessitate hospitalization to prevent harm to self or others, or there are psychotic features  F. The symptoms are not due  to the direct physiological effects of a substance (eg, a drug of abuse, a medication, or other treatment) or a general medical condition (eg, hyperthyroidism).    - The aformentioned symptoms began 3 year(s) ago and occurs 5 days per week and is experienced as moderate.   - Depressed mood. Note: In children and adolescents, can be irritable mood.     - Diminished interest or pleasure in all, or almost all, activities.    - Feelings of worthlessness or inappropriate guilt.       Functional Status:  Client's symptoms have caused reduced functional status in the following areas: Academics / Education - getting to class, not sleeping, hyperfocusing  Activities of Daily Living - completing daily tasks, controlling impulses  Social / Relational - irritability towards others when depressed      DSM5 Diagnoses: (Sustained by DSM5 Criteria Listed Above)  Diagnoses: 296.45 Bipolar I Disorder Current or Most Recent Episode Hypomanic, in partial remssion  Psychosocial & Contextual Factors: Diagnosed with bipolar 3 years ago, family support is present, recent inpatient stay  WHODAS 2.0 (12 item)            This questionnaire asks about difficulties due to health conditions. Health conditions  include  disease or illnesses, other health problems that may be short or long lasting,  injuries, mental health or emotional problems, and problems with alcohol or drugs.                     Think back over the past 30 days and answer these questions, thinking about how much  difficulty you had doing the following activities. For each question, please Inaja only  one response.    S1 Standing for long periods such as 30 minutes? Mild =           2   S2 Taking care of household responsibilities? Mild =           2   S3 Learning a new task, for example, learning how to get to a new place? Mild =           2   S4 How much of a problem do you have joining community activities (for example, festivals, Yarsani or other activities) in the same  way as anyone else can? Mild =           2   S5 How much have you been emotionally affected by your health problems? Mild =           2     In the past 30 days, how much difficulty did you have in:   S6 Concentrating on doing something for ten minutes? Mild =           2   S7 Walking a long distance such as a kilometer (or equivalent)? Mild =           2   S8 Washing your whole body? None =         1   S9 Getting dressed? None =         1   S10 Dealing with people you do not know? Mild =           2   S11 Maintaining a friendship? Moderate =   3   S12 Your day to day work? Moderate =   3     H1 Overall, in the past 30 days, how many days were these difficulties present? Record number of days 14   H2 In the past 30 days, for how many days were you totally unable to carry out your usual activities or work because of any health condition? Record number of days  5   H3 In the past 30 days, not counting the days that you were totally unable, for how many days did you cut back or reduce your usual activities or work because of any health condition? Record number of days 7     Attendance Agreement:  Client has signed Attendance Agreement:No: .      Collaboration:  The client is receiving treatment / structured support from the following professional(s) / service and treatment. Collaboration will be initiated with: primary care physician and psychiatry.      Preliminary Treatment Plan:  Client's identified from [place] background, does not identified herself as black. This can create conflicts with other individuals who identifies themselves as black. Client and her family are Muslims.     services are not indicated.    Modifications to assist communication are not indicated.    The concerns identified by the client will be addressed in therapy.    Initial Treatment will focus on: Depressed Mood - depressed mood, negative self-talk, cognitive distortions  Mood Instability - identifying triggers to mood  instability, psychoeducation around Bipolar.    As a preliminary treatment goal, client will experience a reduction in depressed mood, will develop more effective coping skills to manage depressive symptoms, will develop healthy cognitive patterns and beliefs, will increase ability to function adaptively and will continue to take medications as prescribed / participate in supportive activities and services , will experience a reduction in anxiety, will develop more effective coping skills to manage anxiety symptoms, will develop healthy cognitive patterns and beliefs and will increase ability to function adaptively and will develop better understanding of triggers and coping strategies to stabilize mood.    The focus of initial interventions will be to alleviate anxiety, alleviate depressed mood, alleviate lability of mood, facilitate appropriate expression of feelings, increase coping skills, provide homework to reinforce skill development, provide psychoeduction regarding depression and anxiety, reduce panic attacks, teach CBT skills, teach DBT skills, teach distress tolerance skills, teach emotional regulation, teach mindfulness skills and teach relaxation strategies.    Referral to another professional/service is not indicated at this time..    A Release of Information is not needed at this time.    Report to child / adult protection services was NA.    Client will have access to their [facility]' medical record.    MARCELINA Adams LGSW  August 10, 2018  Note reviewed and clinical supervision by MARCELINA Butler LICSW 8/17/2018

## 2018-08-10 NOTE — MR AVS SNAPSHOT
MRN:[id number]                      After Visit Summary   8/10/2018    Cristina Boyd    MRN: [id number]           Visit Information        Provider Department      8/10/2018 1:30 PM Keyana Caldera LGSW [facility]      Your next 10 appointments already scheduled     Sep 27, 2018  8:00 AM CDT   Return Visit with MARIKA Adams   [facility] ([facility])    [facility]  [address] [place], [place] [address]  [facility] [address]   [phone]              Care EveryWhere ID     This is your Care EveryWhere ID. This could be used by other organizations to access your [place] medical records  [id number]        Equal Access to Services     HUMAIRA HUFFMAN : Aleah Sales, cirilo calvillo, bunny edwards, alfred goyal. So [facility] [phone].    ATENCIÓN: Si habla español, tiene a shaver disposición servicios gratuitos de asistencia lingüística. Llame al [phone].    We comply with applicable federal civil rights laws and [place] laws. We do not discriminate on the basis of race, color, national origin, age, disability, sex, sexual orientation, or gender identity.

## 2018-08-10 NOTE — PROGRESS NOTES
Care Coordination  Received: Today       Parvez Saleh Laura, RN       Phone Number: [phone] (Call me)                     Pt in clinic 8/10/18 regarding letter for school. Pt asked if the provider Yesi could please provide her with a letter for school, stating when she was inpatient at [facility].     Thanks       -Routed to provider

## 2018-08-10 NOTE — Clinical Note
Hi Dr. Key,  Please see note for completed diagnostic assessment. We will spend next session talking about treatment goals. Please advise if there are areas you believe would be helpful for us to include in the treatment plan.   Keyana

## 2018-08-10 NOTE — PROGRESS NOTES
"Adult Mental Health Outpatient Group Therapy Progress Note     Client Initial Individualized Goals for Treatment: \"I want to get rid of the side effects. I want to learn coping skills. Learn how to be patient\"    See Initial Treatment suggestions for the client during the time between Diagnostic Assessment and completion of the Master Individualized Treatment Plan.    Treatment Goals:     Follow Safety Plan  Abstain from Substance Use   Ask for more information, support and/or assistance as needed.  Follow up with providers/community supports as needed: psychiatry and therapy at [facility]  Report increases or changes in symptoms to staff.  Report any personal safety concerns to staff.   Take medications as prescribed.  Report medication changes and/or side effects to staff.  Attend and participate in groups as scheduled or notify staff if unable to do so.  Report any use of substances to staff as this may impact your symptoms and/or                      personal safety.  Notify staff if you have any other issues that need to be addressed. This may include              any current abuse / neglect / exploitation or other vulnerability.  Follow recommendations of your treatment team and discuss concerns if not in            agreement.     Area of Treatment Focus:  Symptom Management, Develop / Improve Independent Living Skills and Develop Socialization / Interpersonal Relationship Skills    Therapeutic Interventions/Treatment Strategies:  Support, Feedback, Safety Assessments, Structured Activity and Problem Solving    Response to Treatment Strategies:  Accepted Feedback, Gave Feedback, Listened, Focused on Goals, Attentive and Accepted Support    Name of Group:  Life Skills 100-150     Group Participants: 6/6    Description and Outcome:  Client attended and participated in a structured life skills psychoeducation group where intervention focused on experiential learning, practice, and generalization of " skills through the use of supported social interaction, structured therapeutic tasks, and engagement in functional tasks in order to improve function in valued roles, routines, and independent living skills. Client had an elevated energy level. Hyperverbal. Required frequent redirection. Provided client with verbal and experiential psychoeducation material focused on impulse control in order to promote participation in valued roles, routines, and relationships. Client would benefit from additional opportunities to practice and implement content from this session. Client addressed ITP goal number initial goals in this session.    Is this a Weekly Review of the Progress on the Treatment Plan?  No

## 2018-08-10 NOTE — ADDENDUM NOTE
Encounter addended by: Vesta Lopez, OT on: 8/10/2018  1:48 PM<BR>     Actions taken: Sign clinical note

## 2018-08-10 NOTE — Clinical Note
Yonas Key,    Not sure what else would be needed in this letter. Can you edit/add additional information if needed?    -Adina

## 2018-08-10 NOTE — LETTER
August 16, 2018      RE: Cristina Boyd  [address] [place] [address]         Whom it May Concern,    Please excuse any absences of Cristina Boyd from 5/31/18 to 7/10/18 as she was hospitalized during this time. If you have any additional questions, please call the clinic at the number listed above.      Sincerely,      Jazmine Key MD

## 2018-08-11 ASSESSMENT — ANXIETY QUESTIONNAIRES: GAD7 TOTAL SCORE: 0

## 2018-08-11 ASSESSMENT — PATIENT HEALTH QUESTIONNAIRE - PHQ9: SUM OF ALL RESPONSES TO PHQ QUESTIONS 1-9: 2

## 2018-08-12 LAB
MYCOBACTERIUM SPEC CULT: NORMAL
MYCOBACTERIUM SPEC CULT: NORMAL
SPECIMEN SOURCE: NORMAL

## 2018-08-13 NOTE — PROGRESS NOTES
Message  Received: Today       Jazmine Key MD Pratt, Laura, RN       Caller: Unspecified (3 days ago,  4:42 PM)                     Yonas Holden,     That would be great. Might be helpful to contact her and see who this letter needs to be addressed to?     Thanks,   Jazmine      -Johan provider, writer called pt to gather more information. According to the pt, she needs a letter stating she was inpatient from 5/31/18 to 7/10/18 (pt requested this timeframe to cover all admissions). The pt says the letter does not need to be addressed to anyone specifically with the school. She says her mother suggested getting a signed letter from the provider in case she runs into any issues in the future. Writer agreed to pass this along to the provider.

## 2018-08-14 NOTE — PROGRESS NOTES
"Adult Mental Health Outpatient Group Therapy Progress Note     Client Initial Individualized Goals for Treatment: \"I want to get rid of the side effects. I want to learn coping skills. Learn how to be patient\"    See Initial Treatment suggestions for the client during the time between Diagnostic Assessment and completion of the Master Individualized Treatment Plan.    Treatment Goals:     Follow Safety Plan  Abstain from Substance Use   Ask for more information, support and/or assistance as needed.  Follow up with providers/community supports as needed: psychiatry and therapy at [facility]  Report increases or changes in symptoms to staff.  Report any personal safety concerns to staff.   Take medications as prescribed.  Report medication changes and/or side effects to staff.  Attend and participate in groups as scheduled or notify staff if unable to do so.  Report any use of substances to staff as this may impact your symptoms and/or                      personal safety.  Notify staff if you have any other issues that need to be addressed. This may include              any current abuse / neglect / exploitation or other vulnerability.  Follow recommendations of your treatment team and discuss concerns if not in            agreement.     Area of Treatment Focus:  Symptom Management, Develop / Improve Independent Living Skills and Develop Socialization / Interpersonal Relationship Skills    Therapeutic Interventions/Treatment Strategies:  Support, Feedback, Safety Assessments, Structured Activity and Problem Solving    Response to Treatment Strategies:  Accepted Feedback, Gave Feedback, Listened, Focused on Goals, Attentive and Accepted Support    Name of Group:  Life Skills 100-150     Group Participants: 7/7    Description and Outcome:  Client attended and participated in a structured life skills psychoeducation group where intervention focused on experiential learning, practice, and generalization of " skills through the use of supported social interaction, structured therapeutic tasks, and engagement in functional tasks in order to improve function in valued roles, routines, and independent living skills. Client had a bright affect. Focused on school schedule. Preparing for a family trip Provided client with verbal and experiential psychoeducation material focused on prioritizing and planning to manage stress and plan for medication supply on trip in order to promote participation in valued roles, routines, and relationships. Client would benefit from additional opportunities to practice and implement content from this session. Client addressed ITP goal number initial goals in this session.    Is this a Weekly Review of the Progress on the Treatment Plan?  Yes.      Are Treatment Plan Goals being addressed?  Yes, continue treatment goals      Are Treatment Plan Strategies to Address Goals Effective?  Yes, continue treatment strategies      Are there any current contracts in place?  No

## 2018-08-14 NOTE — PROGRESS NOTES
"Adult Mental Health Outpatient Group Therapy Progress Note     Client Initial Individualized Goals for Treatment: \"I want to get rid of the side effects. I want to learn coping skills. Learn how to be patient\"    See Initial Treatment suggestions for the client during the time between Diagnostic Assessment and completion of the Master Individualized Treatment Plan.    Treatment Goals:     Follow Safety Plan  Abstain from Substance Use   Ask for more information, support and/or assistance as needed.  Follow up with providers/community supports as needed: psychiatry and therapy at [facility]  Report increases or changes in symptoms to staff.  Report any personal safety concerns to staff.   Take medications as prescribed.  Report medication changes and/or side effects to staff.  Attend and participate in groups as scheduled or notify staff if unable to do so.  Report any use of substances to staff as this may impact your symptoms and/or                      personal safety.  Notify staff if you have any other issues that need to be addressed. This may include              any current abuse / neglect / exploitation or other vulnerability.  Follow recommendations of your treatment team and discuss concerns if not in            agreement.     Area of Treatment Focus:  Symptom Management, Develop / Improve Independent Living Skills and Develop Socialization / Interpersonal Relationship Skills    Therapeutic Interventions/Treatment Strategies:  Support, Feedback, Safety Assessments, Structured Activity and Problem Solving    Response to Treatment Strategies:  Accepted Feedback, Gave Feedback, Listened, Focused on Goals, Attentive and Accepted Support    Name of Group:  Life Skills 100-150     Group Participants: 3/6    Description and Outcome:  Client attended and participated in a structured life skills psychoeducation group where intervention focused on experiential learning, practice, and generalization of " skills through the use of supported social interaction, structured therapeutic tasks, and engagement in functional tasks in order to improve function in valued roles, routines, and independent living skills. Client had an increased energy level. Distractible throughout session, requiring occasional cues to redirect. Provided client with verbal and experiential psychoeducation material focused on social and communication skills  in order to promote participation in valued roles, routines, and relationships. Client demonstrated understanding of session content by practicing skills in context of session. Client addressed ITP goal number initial goals in this session.    Is this a Weekly Review of the Progress on the Treatment Plan?  No

## 2018-08-14 NOTE — ADDENDUM NOTE
Encounter addended by: Vesta Lopez, OT on: 8/14/2018 10:15 AM<BR>     Actions taken: Sign clinical note
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
## 2018-08-14 NOTE — DISCHARGE SUMMARY
Adult Mental Health Intensive Outpatient Discharge Summary/Instructions      Patient: Cristina Boyd MRN: [id number]   : 2000 Age: 18 year old Sex: female     Admission Date: 18  Discharge Date: 18  Diagnosis: 296.44 Bipolar I Disorder Current or Most Recent Episode Manic, with psychotic features    Focus of Treatment / Progress    Personal Safety: Cristina denied safety concerns throughout participation in day treatment program.     * Follow your safety plan     * Call crisis lines as needed:    [facility] [phone] [facility] [phone]  [facility] [phone] Crisis Connection [phone]  [facility] [phone] [facility]E [phone]  [facility] [phone] National Suicide Prevention [phone]  [facility] [phone] Suicide Prevention [phone]  [facility] [phone]    Managing symptoms of:  In life skills groups, Cristina focused on prioritizing needs for structure for school year. She considered strategies to manage stressors related to workload.    Community support/health:  Cristina focused on medication compliance and reinforcing her decision to take her medications as prescribed.    Managing Symptoms and Preventing Relapse    * Go to all of your appointments    * Take all medications as directed.      * Carry a current list if medication with you    * Do not use illicit (street) drugs.  Avoid alcohol    * Report these symptoms to your care team. These are early signs of relapse:   Thoughts of suicide   Losing more sleep or a lot of daytime sleeping   Increased confusion   Mood getting worse or experiencing Delusions   Feeling more aggressive   Other:  Maintain Sobriety. Adela warning signs; racing thoughts, impulsiveness, unsafe behavior, grandiose thoughts, decreased need for sleep yet having tons of energy, pressured speech, hyperverbal.    *Use these skills daily:  Exercise regularly, ask for help     Copy of summary sent to:  Available in EPIC    Follow up with psychiatrist / main caregiver: [facility]    Next visit: As scheduled    Follow up with your therapist: MADDIE Adams   Next visit: 9/27/18 at 800AM, sees every 2-3 weeks.    Go to group therapy and / or support groups at: Consider [facility] young adult connections groups. Explore student social groups at [employer].    See your medical doctor about:  General health needs.    Other:  Cristina is leaving for a family trip to [place]. She plans to coordinate with her dad and pharmacy to ensure she has adequate medication supply while out of town. She will start classes at [employer] on 8/27/18. *Your treatment teams strongly recommends that you reduce the amount of courses that you are taking this semester.*    Client Signature:_______________________  Date / Time:___________  Staff Signature:________________________   Date / Time:___________

## 2018-08-16 NOTE — PROGRESS NOTES
Message  Received: 2 days ago       Jazmine Key MD Pratt, Laura, RN       Caller: Unspecified (6 days ago,  4:42 PM)                     Yonas Holden - Thanks for getting that info. Definitely ok with this.     Jazmine       -Letter drafted and routed to provider

## 2018-08-21 NOTE — PROGRESS NOTES
-Received signed letter from provider  -Called pt to confirm where she'd like it sent. Pt prefers to pick it up in clinic next week. Letter placed at  for pickup.

## 2018-09-26 ENCOUNTER — CARE COORDINATION (OUTPATIENT)
Dept: PSYCHIATRY | Facility: CLINIC | Age: 18
End: 2018-09-26

## 2018-09-26 NOTE — LETTER
September 27, 2018      RE: Cristina Boyd  [address] [place] [address]         To Whom it May Concern,    Cristina Boyd has been under my care since January of 2017. She was recently hospitalized from 5/31/18 to 7/10/18 for shaina associated with her diagnosis of Bipolar I Disorder. She continues to have symptoms related to her psychiatric diagnosis. Please allow Cristina extended time to complete assignments and tests. If you have questions regarding this matter, please call the clinic at the number above.      Sincerely,      Jazmine Key MD

## 2018-09-26 NOTE — Clinical Note
Hi Dr. Key,    Again, please review and edit this letter. Print, sign, and place in my folder. Thanks!    -Adina

## 2018-09-26 NOTE — Clinical Note
Hi Dr. Key,    Here is a drafted school letter for the pt. Please edit, as I typed this up quickly. I can print if needed, otherwise print, sign, and place in my folder. Thanks!    -Adina

## 2018-09-26 NOTE — LETTER
September 27, 2018      RE: Cristina Boyd  [address] [place] [address]         To Whom it May Concern,    On 7/30/18, Cristina Boyd presented to the [facility] for worsening symptoms related to her Bipolar I Disorder diagnosis. She was assessed by my RN. After discussing the RN's findings, it was evident Cristina was hypomanic at the time. Please consider Cristina's diagnosis when reviewing this case.      Sincerely,      Jazmine Key MD
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
## 2018-09-27 NOTE — PROGRESS NOTES
Message  Received: Today       Jazmine Key MD Pratt, Laura, RN       Caller: Unspecified (Yesterday,  4:07 PM)                     Yes of course.     Thanks so much for taking care of this! Also, FYI I'm planning to transfer this pt to Dr. Henao. She should probably make an appointment with him soon.     Jazmine      -Writer drafted two letters and routed to provider for review and completion.

## 2018-10-16 NOTE — PROGRESS NOTES
"Adult Mental Health Outpatient Group Therapy Progress Note     Client Initial Individualized Goals for Treatment: \"I want to get rid of the side effects. I want to learn coping skills. Learn how to be patient\"     See Initial Treatment suggestions for the client during the time between Diagnostic Assessment and completion of the Master Individualized Treatment Plan.     Treatment Goals:   Follow Safety Plan  Abstain from Substance Use   Ask for more information, support and/or assistance as needed.  Follow up with providers/community supports as needed: psychiatry and therapy at [facility]  Report increases or changes in symptoms to staff.  Report any personal safety concerns to staff.   Take medications as prescribed.  Report medication changes and/or side effects to staff.  Attend and participate in groups as scheduled or notify staff if unable to do so.  Report any use of substances to staff as this may impact your symptoms and/or personal safety.  Notify staff if you have any other issues that need to be addressed. This may include any current abuse / neglect / exploitation or other vulnerability.  Follow recommendations of your treatment team and discuss concerns if not in agreement.    Area of Treatment Focus:  Symptom Management, Personal Safety and Community Resources/Discharge Planning    Therapeutic Interventions/Treatment Strategies:  Support, Feedback, Safety Assessments and Cognitive Behavioral Therapy    Response to Treatment Strategies:  Listened, Focused on Goals and Interrupted    Name of Group:  Group Psychotherapy 2:00-2:50 pm     Group Participants: 3 of 6.     Description and Outcome:  Cristina reported feeling sedated and depressed.  She attributes the fatigue to sedation side effects.  She stated that her provider is decreasing the Seroquel to address this.  She is planning a family trip to [place] for about two weeks.  Writer discussed discontinuing the program earlier than planned " due to this trip and the start of college courses upon her return.  She denied current paranoia.  Client did not endorse suicidal ideation, intent, or plan..    Client demonstrated understanding of session content by reporting frustration with sedation and sharing current symptoms and stressors.        Is this a Weekly Review of the Progress on the Treatment Plan?  No

## 2018-10-16 NOTE — ADDENDUM NOTE
Encounter addended by: Tasha Murillo [facility] on: 10/16/2018 10:35 AM<BR>     Actions taken: Flowsheet accepted, Sign clinical note

## 2018-11-02 ENCOUNTER — OFFICE VISIT (OUTPATIENT)
Dept: PSYCHIATRY | Facility: CLINIC | Age: 18
End: 2018-11-02
Attending: PSYCHIATRY & NEUROLOGY
Payer: COMMERCIAL

## 2018-11-02 VITALS
SYSTOLIC BLOOD PRESSURE: 96 MMHG | HEART RATE: 85 BPM | DIASTOLIC BLOOD PRESSURE: 71 MMHG | WEIGHT: 152.4 LBS | BODY MASS INDEX: 24.6 KG/M2

## 2018-11-02 DIAGNOSIS — Z51.81 ENCOUNTER FOR THERAPEUTIC DRUG MONITORING: Primary | ICD-10-CM

## 2018-11-02 DIAGNOSIS — F31.9 BIPOLAR I DISORDER (H): ICD-10-CM

## 2018-11-02 DIAGNOSIS — F31.2 BIPOLAR DISORDER, CURRENT EPISODE MANIC, SEVERE WITH PSYCHOTIC FEATURES (H): ICD-10-CM

## 2018-11-02 PROCEDURE — G0463 HOSPITAL OUTPT CLINIC VISIT: HCPCS | Mod: ZF

## 2018-11-02 RX ORDER — LITHIUM CARBONATE 450 MG
900 TABLET, EXTENDED RELEASE ORAL AT BEDTIME
Qty: 60 TABLET | Refills: 1 | Status: SHIPPED | OUTPATIENT
Start: 2018-11-02 | End: 2019-01-04

## 2018-11-02 RX ORDER — QUETIAPINE FUMARATE 50 MG/1
TABLET, FILM COATED ORAL
Qty: 30 TABLET | Refills: 1 | Status: SHIPPED | OUTPATIENT
Start: 2018-11-02 | End: 2019-01-04

## 2018-11-02 RX ORDER — QUETIAPINE FUMARATE 200 MG/1
TABLET, FILM COATED ORAL
Qty: 30 TABLET | Refills: 1 | Status: SHIPPED | OUTPATIENT
Start: 2018-11-02 | End: 2019-01-04

## 2018-11-02 ASSESSMENT — PAIN SCALES - GENERAL: PAINLEVEL: NO PAIN (0)

## 2018-11-02 ASSESSMENT — PATIENT HEALTH QUESTIONNAIRE - PHQ9: SUM OF ALL RESPONSES TO PHQ QUESTIONS 1-9: 0

## 2018-11-02 NOTE — PATIENT INSTRUCTIONS
Continue Lithium 900mg at bedtime    Continue Seroquel 250mg at bedtime    Get fasting lipid labs done    CRISIS NUMBERS:    Provided routinely in AVS.   [facility] [phone] (clinic)    [phone] (after hours)  ONLY if a [facility] PT: [facility] [phone] (clinic), [phone] (after hours)

## 2018-11-02 NOTE — PROGRESS NOTES
[facility] PSYCHIATRY CLINIC TRANSFER DIAGNOSTIC ASSESSMENT       CARE TEAM:  PCP- No Ref-Primary, Physician      Cristina Oliver is a 18 year old female who prefers the name Klaus & pronouns she, her, hers. Date of initial diagnostic assessment is 3/10/16.  Date of most recent transfer of care assessment is 11/02/18.     Pertinent Background:  This patient first experienced mental health issues in February 2016 and has received treatment for Bipolar I most recent episode manic with history of psychosis.  History details in last diagnostic assessment.  Notably, the patient has been hospitalized several times this year.  The notes describe manic symptoms and psychotic features.  She has a history of non-adherence to medications followed by decompensation and hospitalization.  After her most recent hospitalization in July 2018 she did complete a Day Program.  She is currentlly in school at [employer].         Psych critical item history includes non adherence to medications.     INTERIM HISTORY                                                                                              4, 4   The patient reports good treatment adherence.  History was provided by the patient who was a fair historian.  The last visit ended with the following med change: Return of medication to therapeutic doses.as patient was taking less than prescribed.    Recent Timeline:  - Feb 2017: Hospitalized [facility]                                                - Abilify 30 --> 15 mg                                                - Started Ativan, Depakote, Cogentin  - Mar 2017: Decreased Depakote 1000 --> 750 mg  - Apr 2017: Decreased Ativan 2 --> 1 mg  - Sept/Oct 2017: Ran out of all medications (Abilify 15 mg, Cogentin, Depakote 750 mg, Lithium 900 mg, Ativan 1 mg)   - Nov 2017: Restarted Lithium 900 mg  - Jan 2018: pt self discontinued Lithium  - April 2018: resumed Li  - May 31-June 13 2018 Admission on [facility] with acute shaina --> trnsfr to  Medicine and discharged 6/21 for r/o TB                                                - Resumed Li 900 mg                                                - Started Seroquel 400 mg QHS & 100 mg TID PRN                                                - Started propranolol 20 mg BID  - June 30-July 10 2018 Admission to Psychiatry with acute shaina                                                - Started Abilify 30 mg                                                - Discontinued Seroquel    Since the last visit:   The patient informs me that she has not been taking the medication as Dr. Izquierdo documented in the last appointment.  The patient is a bit evasive about why she is taking lower doses, and says the pharmacy 'ran out' of the medications.  It appears she did not have the pharmacy contact us to provide refills.  Klaus stopped taking Abilify in August because she says it was affecting her sleep, says she would take it either day or night depending on the day.  Says it also made her forgetful.  She stopped taking propranolol one week ago.  Has only been taking 50mg of the seroquel since last week (rather than the 250mg dose she is prescribed).  She says she is taking the Lithium CR 900mg HS as prescribed.  We discuss importance of taking medication to prevent future decompensation and hospitalization.  She and father express understanding and agreement.  I explain that 50mg of quetiapine is not a therapeutic dose for bipolar disorder and that she will have to go back up to the 250mg HS dose to prevent decompensation, and she says she will.      Mood has been 'OK.'  Not feeling down or depressed.  Sleep is 'good.'  Goes to bed around 11pm or midnight; falls asleep right away, no difficulty staying asleep through the night.  Wakes up around 9 or 10am if has school.  If doesn't have school will wake up around 11am or noon.  Denies low energy.  Low motivation for recreational activities, but denies anhedonia.    Taking  classes at [employer], going full time, says she is able to focus, study, do her homework without difficulty.      RECENT SYMPTOMS:   DEPRESSION:  reports-appetite changes and weight changes;  DENIES- suicidal ideation without plan, without intent and depressed mood  FELTON/HYPOMANIA:  reports-none;  DENIES- increased energy, decreased sleep need, increased activity, distractibility  and racing thoughts  PSYCHOSIS:  reports-none;  DENIES- auditory hallucinations and visual hallucinations  EATING DISORDER: No    RECENT SUBSTANCE USE:     ALCOHOL- no      TOBACCO- no     CAFFEINE- no  OPIOIDS- no         NARCAN KIT- no        CANNABIS- no            OTHER ILLICIT DRUGS- none      CURRENT SOCIAL HISTORY:  FINANCIAL SUPPORT- family or friend       CHILDREN- no      LIVING SITUATION- Lives with family      SOCIAL/ SPIRITUAL SUPPORT- Family and friends     FEELS SAFE AT HOME- yes     MEDICAL ROS (2,10):  Reports A comprehensive review of systems was performed and is negative other than noted in the HPI.      Denies A comprehensive review of systems was performed and is negative other than noted in the HPI.    SUBSTANCE USE HISTORY                                                                             Past Use- none reported  Treatment- #, most recent- no  Medical Consequences- no  HIV/Hepatitis- N/A  Legal Consequences- no  Past Use- none reported  PSYCHIATRIC HISTORY     SIB- no  Suicidal Ideation Hx- yes  Suicide Attempt- #- no, most recent- no      Violence/Aggression Hx- no  Psychosis Hx- yes  Psych Hosp- #- yes, multiple, most recent- July 2018   ECT- no    Eating Disorder- no  Outpatient Programs [ DBT, Day TX, Eating Disorder etc]- yes    SOCIAL and FAMILY HISTORY                           patient reported                          1ea, 1ea   Financial Support- documented above  Living Situation/Family/Relationships- documented above;  Children- documented above  Trauma History (self-report)- unknown  Legal-  no  Cultural/ Social/ Spiritual Support- Friends and Family  Early History/Education-  Mother: Joy Does live with patient.  Father: Americo Does live with patient.  Sisters (including ages): Polly (20) Does live with patient.  Brothers (including ages): Sam (15), Drew (13), Cristi (6) Does live with patient.   Lives in [place].    FAMILY MENTAL HEALTH HX- unknown    PAST PSYCH MED TRIALS   see EMR Problem List: Hx of psychiatric care    MEDICAL / SURGICAL HISTORY                                   Pregnant or breastfeeding- no      Contraception- Not discussed    Neurologic Hx- no   Patient Active Problem List   Diagnosis     Adela (H)     Bipolar affective disorder, current episode manic with psychotic symptoms (H)     Suicidal ideation     Hx of psychiatric care     Bipolar I disorder, current or most recent episode manic, with psychotic features (H)     Lymphadenopathy     Bipolar 1 disorder (H)       Major Surgery- unknown    ALLERGY                                Review of patient's allergies indicates no known allergies.  MEDICATIONS                               **See Interim History section above re: non-adherence to rx regimen.**    Current Outpatient Prescriptions   Medication Sig Dispense Refill     ARIPiprazole (ABILIFY) 30 MG tablet Take 1 tablet (30 mg) by mouth daily 30 tablet 1     Carboxymethylcellulose Sod PF (REFRESH PLUS) 0.5 % SOLN ophthalmic solution Place 1 drop into both eyes 3 times daily as needed for dry eyes (Patient not taking: Reported on 7/11/2018) 1 Bottle 0     lithium (ESKALITH) 450 MG CR tablet Take 2 tablets (900 mg) by mouth At Bedtime 60 tablet 0     propranolol (INDERAL) 20 MG tablet Take 1 tablet (20 mg) by mouth 2 times daily 60 tablet 0     QUEtiapine (SEROQUEL) 200 MG tablet Take one tab (200mg) by mouth at bedtime, along with one 50 mg tab, for a total daily dose of 250 mg 30 tablet 1     QUEtiapine (SEROQUEL) 300 MG tablet Take 1 tablet (300 mg) by mouth At  Bedtime 30 tablet 0     QUEtiapine (SEROQUEL) 50 MG tablet Take one tab (50 mg) by mouth at bedtime along with one 200 mg tab, for a total dose of 250 mg. 30 tablet 1     QUEtiapine (SEROQUEL) 50 MG tablet Take 1 tablet (50 mg) by mouth every morning 30 tablet 0     triamcinolone (KENALOG) 0.1 % cream Apply topically 2 times daily as needed for irritation (Patient not taking: Reported on 7/11/2018) 80 g 0     VITALS                                                                                                                                  3, 3   There were no vitals taken for this visit.   MENTAL STATUS EXAM                                                                         9, 14 cog gs     Alertness: alert   Appearance: well groomed  Behavior/Demeanor: calm, with fair  eye contact   Speech: normal  Language: no problems  Psychomotor: normal or unremarkable  Mood: description consistent with euthymia  Affect: restricted; was congruent to mood; was congruent to content  Thought Process/Associations: unremarkable  Thought Content:  Reports none;  Denies suicidal ideation without plan; without intent [details in Interim History] and violent ideation without plan; without intent [details in Interim History]  Perception:  Reports none;  Denies auditory hallucinations and visual hallucinations  Insight: limited  Judgment: limited  Cognition: (6) does  appear grossly intact; formal cognitive testing was not done  Gait and Station: unremarkable    LABS and DATA     AIMS:  Will perform at next visit    PHQ9 TODAY = 0  PHQ-9 SCORE 7/23/2018 8/2/2018 8/10/2018   Total Score 5 13 2         LITHIUM LABS     [level, renal, SG, TSH, WBC]    q6 mo  Recent Labs   Lab Test  07/01/18   0829  06/06/18   0915  06/01/18   0810  06/01/18   0201   LITHIUM  0.96  1.11  1.29*  1.48*     Recent Labs   Lab Test  07/11/18   1050  06/18/18   0708  06/16/18   0838  06/14/18   1014   CR  0.51*  0.51*  0.49*  0.46*   GFRESTIMATED  >90  >90   >90  >90   NA  139  137  140  136   POTASSIUM  3.7  3.8  3.9  3.9   BHANU  8.6  8.7  9.3  8.8     Recent Labs   Lab Test  06/14/18   0030  04/02/18   2057  03/08/17   1408  02/01/17   0124   SG  1.007  1.022  1.006  1.000*     Recent Labs   Lab Test  07/11/18   1050  06/06/18   2218  06/01/18   0201  04/30/18   1413   TSH  1.46  0.91  4.83*  3.42       ANTIPSYCHOTIC LABS  [glu, A1C, lipids (roslyn LDL), liver enzymes, WBC, ANEU, Hgb, plts]  q12 mo  Recent Labs   Lab Test  07/11/18   1050  06/18/18   0708  06/16/18   0838  06/14/18   1014   GLC  110*  84  86  122*     Recent Labs   Lab Test  01/13/17   0936  02/06/16   0805   CHOL  83  82   TRIG  34  34   LDL  36  31   HDL  40*  44*     Recent Labs   Lab Test  07/11/18   1050  06/14/18   1014  06/01/18   0201  04/02/18 2003   AST  14  20  14  20   ALT  17  31  7  11   ALKPHOS  73  66  69  74     Recent Labs   Lab Test  07/11/18   1050  06/18/18   0708  06/17/18   1839  06/16/18   0900   06/11/18   0731  06/06/18   2218   WBC  5.8  6.3  7.3  5.5   < >  6.5  7.9   ANEU  3.2   --   4.4   --    --   3.7  5.5   HGB  11.7  10.4*  11.3*  10.6*   < >  10.7*  11.5*   PLT  310  364  407  325   < >  260  245    < > = values in this interval not displayed.       DIAGNOSIS     Bipolar I disorder, most recent episode manic with history of psychotic features, in remission  Non-adherence to psychiatric medication regimen       ASSESSMENT                                                                                                   m2, h3     TODAY:  Cristina Boyd is a 17yo woman of [place] descent with Bipolar I disorder; she has a history of manic symptoms with psychotic features.  There is also a history of poor adherence to medications with subsequent decompensation and hospitalization.  Today I believe she may be underreporting her symptoms due to PHQ of 0 and evasiveness around questions about reasons why she has not been taking the medications as prescribed.  My primary  concern is her well being and that she could decompensate without appropriate pharmacologic interventions.        Pt and father say they are aware of signs of manic episode beginning such as difficult sleep, faster thoughts, talking faster, taking on multiple projects.  Pt and father agree to calling clinic to make earlier appointment/emergent appointment if these symptoms develop.  Pt or father to contact crisis services should thoughts intents plans of harm to self or others develop.    SUICIDE RISK ASSESSMENT:  Rate SI-desire: 0/5, intent: 0/5.  Cristina Boyd reports no thoughts intents or plans of harm to herself or anyone else.  In addition, she has notable risk factors for self-harm including recent psych inpt stay and multiple mood decompensations.  However, risk is mitigated by no plan or intent, describes a safety plan, h/o seeking help when needed, symptom improvement, future oriented, none to minimal alcohol use , commitment to family, good social support  , [employer]ist beliefs, stable housing and committed to school.  Based on all available evidence she does not appear to be at imminent risk for self-harm therefore does not meet criteria for a 72-hr hold/  involuntary hospitalization.  However, based on degree of symptoms close psych FU was recommended which the pt did agree to.  Additional steps to minimize risk include: SAFETY PLAN completed see above.  Safety Plan placed in AVS: Yes.        [place] PRESCRIPTION MONITORING PROGRAM [] was not checked today:  not using controlled substances.    PSYCHOTROPIC DRUG INTERACTIONS:   no.  MANAGEMENT:  N/A     PLAN                                                                                                              m2, h3     1) PSYCHOTROPIC MEDICATIONS:  - Continue Lithium CR 900mg HS  - Increase Quetiapine from 50mg HS to 250mg HS, pt was being non-adherent to dose    2) THERAPY:    Continue and no    3) NEXT DUE:    Labs- fasting lipids ordered today  EKG-  no  Rating Scales- PHQ9 at each visit    4) REFERRALS:    No Referrals needed     5) RTC: 3 Weeks    6) CRISIS NUMBERS:    Provided routinely in AVS.   [facility] [phone] (clinic)    [phone] (after hours)  ONLY if a [facility] PT: [facility] [phone] (clinic), [phone] (after hours)        TREATMENT RISK STATEMENT:  The risks, benefits, alternatives and potential adverse effects have been discussed and are understood by the pt. The pt understands the risks of using street drugs or alcohol. There are no medical contraindications, the pt agrees to treatment with the ability to do so. The pt knows to call the clinic for any problems or to access emergency care if needed.  Medical and substance use concerns are documented above.  Psychotropic drug interaction check was done, including changes made today.    PROVIDER: Andrew Henao MD    Patient not staffed in clinic.  Note will be reviewed and signed by supervisor Dr. Agosto.    Attestation:  I did not see this patient directly. I have reviewed the documentation and I agree with the resident's plan of care.  Additionally, will want to explore whether Cristina has a Rule 79 , and if not, will likely refer her for such - as case management may greatly improve her adherence to treatment and decrease risk of further mood/psychotic episodes.  Zhang Agosto MD

## 2019-01-01 NOTE — PLAN OF CARE
Problem: General Plan of Care (Inpatient Behavioral)  Goal: Team Discussion  Team Plan:   BEHAVIORAL TEAM DISCUSSION    Continued Stay Criteria/Rationale: Pt is clearing and less anxious, appears to be cooperative with medications now.    Plan: continue to assess, monitor and stabilize.  Encouraging pt to attend unit programming.    Participants: Jessica Gaming CNP, Elma Wilson CTC, Adina Lopez OT, Ana Koch RN  Summary/Recommendation: see plan  Medical/Physical: see chart  Progress: less invasive, affect appears to be brightening.           Subjective:      Lonnie Bates is a 7 wk.o. male here with mother. Patient brought in for No chief complaint on file.    All EBM   Takes normally takes 4 oz.   Cut it back since was spitting up.   But still spit up if only takes 2 oz.  Was normal spit up and now projectile  Was having several BMs but didn't have BM for 3 days this week  Grunts but no crying  Feeds q2-3 hrs    History of Present Illness:  HPI    Review of Systems   Constitutional: Negative for activity change, appetite change, crying, decreased responsiveness, fever and irritability.   HENT: Negative for congestion, drooling, ear discharge, mouth sores, rhinorrhea, sneezing and trouble swallowing.    Eyes: Negative for discharge and redness.   Respiratory: Negative for cough, choking and wheezing.    Cardiovascular: Negative for leg swelling, fatigue with feeds and cyanosis.   Gastrointestinal: Positive for vomiting. Negative for abdominal distention, blood in stool, constipation and diarrhea.   Genitourinary: Negative for decreased urine volume and hematuria.   Musculoskeletal: Negative for joint swelling.   Skin: Negative for color change and rash.   Neurological: Negative for seizures.   Hematological: Negative for adenopathy. Does not bruise/bleed easily.       Objective:     Physical Exam   Constitutional: He appears well-developed and well-nourished. No distress.   HENT:   Head: Anterior fontanelle is flat.   Nose: Nose normal. No nasal discharge.   Mouth/Throat: Mucous membranes are moist. Oropharynx is clear. Pharynx is normal.   Eyes: Pupils are equal, round, and reactive to light. Conjunctivae are normal. Right eye exhibits no discharge. Left eye exhibits no discharge.   Neck: Normal range of motion. Neck supple.   Cardiovascular: Normal rate and regular rhythm.   No murmur heard.  Pulmonary/Chest: Effort normal and breath sounds normal. No nasal flaring or stridor. No respiratory distress. He has no wheezes. He has no rhonchi.    Abdominal: Soft. Bowel sounds are normal. He exhibits no distension and no mass. There is no hepatosplenomegaly.   Genitourinary: Uncircumcised.   Musculoskeletal: Normal range of motion.   Lymphadenopathy:     He has no cervical adenopathy.   Neurological: He is alert. He has normal strength. He exhibits normal muscle tone.   Skin: Skin is warm. No rash noted. No cyanosis. No jaundice.   Nursing note and vitals reviewed.      Assessment:   Lonnie was seen today for spitting up.    Diagnoses and all orders for this visit:    Projectile vomiting without nausea  -     US Abdomen Limited; Future          Plan:   Looks great on exam and good weight but projectile vomiting just started over last few days and decrease BM  No s/s acid reflux   wouldl rani to get u/s/   No appointments aval today.   Doesn't look ill enough to send to ER.   Apt made for Monday.   If worsens over weekend then go to ER

## 2019-01-04 ENCOUNTER — OFFICE VISIT (OUTPATIENT)
Dept: PSYCHIATRY | Facility: CLINIC | Age: 19
End: 2019-01-04
Attending: PSYCHIATRY & NEUROLOGY
Payer: COMMERCIAL

## 2019-01-04 VITALS
HEART RATE: 96 BPM | BODY MASS INDEX: 25.18 KG/M2 | DIASTOLIC BLOOD PRESSURE: 69 MMHG | WEIGHT: 156 LBS | SYSTOLIC BLOOD PRESSURE: 95 MMHG

## 2019-01-04 DIAGNOSIS — F31.9 BIPOLAR I DISORDER (H): Primary | ICD-10-CM

## 2019-01-04 PROCEDURE — G0463 HOSPITAL OUTPT CLINIC VISIT: HCPCS | Mod: ZF

## 2019-01-04 RX ORDER — QUETIAPINE FUMARATE 50 MG/1
TABLET, FILM COATED ORAL
Qty: 30 TABLET | Refills: 1 | Status: SHIPPED | OUTPATIENT
Start: 2019-01-04 | End: 2019-01-09

## 2019-01-04 RX ORDER — LITHIUM CARBONATE 450 MG
900 TABLET, EXTENDED RELEASE ORAL AT BEDTIME
Qty: 60 TABLET | Refills: 1 | Status: SHIPPED | OUTPATIENT
Start: 2019-01-04 | End: 2019-09-12

## 2019-01-04 ASSESSMENT — PAIN SCALES - GENERAL: PAINLEVEL: NO PAIN (0)

## 2019-01-04 NOTE — PROGRESS NOTES
"[facility] PSYCHIATRY CLINIC TRANSFER DIAGNOSTIC ASSESSMENT       CARE TEAM:  PCP- No Ref-Primary, Physician      Cristina Oliver is a 18 year old female who prefers the name Klaus & pronouns she, her, hers. Date of initial diagnostic assessment is 3/10/16.  Date of most recent transfer of care assessment is 11/02/18.     Pertinent Background:  This patient first experienced mental health issues in February 2016 and has received treatment for Bipolar I most recent episode manic with history of psychosis.  History details in last diagnostic assessment.  Notably, the patient has been hospitalized several times this year.  The notes describe manic symptoms and psychotic features.  She has a history of non-adherence to medications followed by decompensation and hospitalization.  After her most recent hospitalization in July 2018 she did complete a Day Program.  She is currentlly in school at Health system.         Psych critical item history includes non adherence to medications.     INTERIM HISTORY                                                                                              4, 4   The patient reports good treatment adherence.  History was provided by the patient and father who was a fair historian.  The last visit ended with the following med change: Return of medication to therapeutic doses, as patient was taking less than prescribed.    Since the last visit:     Today when I find Klaus in the lobby she tells me she left her PHQ in the nurse's room, and when we go to retrieve it she cannot remember which room it was in.    On break from school.  Is in last semester at [employer], wants to become a teacher.  Is taking three more classes to get Assoc. Degree, English, chem, and history/political science.  Then applying to the [employer].  Got all A's this semester, but says the classes were easier.  Film history, Setswana, communications.    Getting along okay with parents.  Getting along okay with siblings \"most of the time.\"  " One older sister and three younger brothers.  Youngest brother is 9 years old.      Holiday season went well, more sleeping in for her, and had family from [place] visit and she took her cousin on a tour of [place].      Initially patient tells me she is taking the Seroquel 250mg HS and Lithium 900mg HS as prescribed, however after father arrives and we discuss further, she is more forthcoming and tells me she has not been taking seroquel at all and has only been taking her lithium for the past month.  I explain that due to her multiple hospitalizations and tendency toward felton, I am concerned she will decompensate without the Seroquel - she is agreeable to taking 150mg of Seroquel nightly.    RECENT SYMPTOMS:   DEPRESSION:  reports-appetite changes and weight changes;  DENIES- suicidal ideation without plan, without intent and depressed mood  FELTON/HYPOMANIA:  reports-none;  DENIES- increased energy, decreased sleep need, increased activity, distractibility  and racing thoughts  PSYCHOSIS:  reports-none;  DENIES- auditory hallucinations and visual hallucinations  EATING DISORDER: No    RECENT SUBSTANCE USE:     ALCOHOL- no      TOBACCO- no     CAFFEINE- no  OPIOIDS- no         NARCAN KIT- no        CANNABIS- no            OTHER ILLICIT DRUGS- none      CURRENT SOCIAL HISTORY:  FINANCIAL SUPPORT- family or friend       CHILDREN- no      LIVING SITUATION- Lives with family      SOCIAL/ SPIRITUAL SUPPORT- Family and friends     FEELS SAFE AT HOME- yes     MEDICAL ROS (2,10):  Reports A comprehensive review of systems was performed and is negative other than noted in the HPI.      Denies A comprehensive review of systems was performed and is negative other than noted in the HPI.    SUBSTANCE USE HISTORY                                                                             Past Use- none reported  Treatment- #, most recent- no  Medical Consequences- no  HIV/Hepatitis- N/A  Legal Consequences- no  Past Use-  none reported  PSYCHIATRIC HISTORY     SIB- no  Suicidal Ideation Hx- yes  Suicide Attempt- #- no, most recent- no      Violence/Aggression Hx- no  Psychosis Hx- yes  Psych Hosp- #- yes, multiple, most recent- July 2018   ECT- no    Eating Disorder- no  Outpatient Programs [ DBT, Day TX, Eating Disorder etc]- yes    SOCIAL and FAMILY HISTORY                           patient reported                          1ea, 1ea   Financial Support- documented above  Living Situation/Family/Relationships- documented above;  Children- documented above  Trauma History (self-report)- unknown  Legal- no  Cultural/ Social/ Spiritual Support- Friends and Family  Early History/Education-  Mother: Joy lives with patient.  Father: Americo lives with patient.  Sisters (including ages): Polly (20) lives with patient.  Brothers (including ages): Sam (15), Drew (13), and Cristi (6) live with patient.   Lives in [place].    FAMILY MENTAL HEALTH HX- unknown    PAST PSYCH MED TRIALS   see EMR Problem List: Hx of psychiatric care      Recent Timeline:  - Feb 2017: Hospitalized [facility]                                                - Abilify 30 --> 15 mg                                                - Started Ativan, Depakote, Cogentin  - Mar 2017: Decreased Depakote 1000 --> 750 mg  - Apr 2017: Decreased Ativan 2 --> 1 mg  - Sept/Oct 2017: Ran out of all medications (Abilify 15 mg, Cogentin, Depakote 750 mg, Lithium 900 mg, Ativan 1 mg)   - Nov 2017: Restarted Lithium 900 mg  - Jan 2018: pt self discontinued Lithium  - April 2018: resumed Li  - May 31-June 13 2018 Admission on [facility] 22/20 with acute shaina --> trnsfr to Medicine and discharged 6/21 for r/o TB                                                - Resumed Li 900 mg                                                - Started Seroquel 400 mg QHS & 100 mg TID PRN                                                - Started propranolol 20 mg BID  - June 30-July 10 2018  Admission to Psychiatry with acute adela                                                - Started Abilify 30 mg                                                - Discontinued Seroquel  - Nov 2018 -pt non compliant seroquel, taking 50mg HS rather than 250mg HS--> return to 250 HS        MEDICAL / SURGICAL HISTORY                                   Pregnant or breastfeeding- no      Contraception- Not discussed    Neurologic Hx- no   Patient Active Problem List   Diagnosis     Adela (H)     Bipolar affective disorder, current episode manic with psychotic symptoms (H)     Suicidal ideation     Hx of psychiatric care     Bipolar I disorder, current or most recent episode manic, with psychotic features (H)     Lymphadenopathy     Bipolar 1 disorder (H)       Major Surgery- unknown    ALLERGY                                Patient has no known allergies.  MEDICATIONS                               **See Interim History section above re: non-adherence to rx regimen.**    Current Outpatient Medications   Medication Sig Dispense Refill     Carboxymethylcellulose Sod PF (REFRESH PLUS) 0.5 % SOLN ophthalmic solution Place 1 drop into both eyes 3 times daily as needed for dry eyes (Patient not taking: Reported on 7/11/2018) 1 Bottle 0     lithium (ESKALITH) 450 MG CR tablet Take 2 tablets (900 mg) by mouth At Bedtime 60 tablet 1     QUEtiapine (SEROQUEL) 200 MG tablet Take one tab (200mg) by mouth at bedtime, along with one 50 mg tab, for a total daily dose of 250 mg 30 tablet 1     QUEtiapine (SEROQUEL) 50 MG tablet Take one tab (50 mg) by mouth at bedtime along with one 200 mg tab, for a total dose of 250 mg. 30 tablet 1     triamcinolone (KENALOG) 0.1 % cream Apply topically 2 times daily as needed for irritation (Patient not taking: Reported on 7/11/2018) 80 g 0     VITALS                                                                                                                                  3, 3   BP 95/69   Pulse 96    Wt 70.8 kg (156 lb)   BMI 25.18 kg/m     MENTAL STATUS EXAM                                                                         9, 14 cog gs     Alertness: alert   Appearance: well groomed  Behavior/Demeanor: calm, with fair  eye contact   Speech: normal  Language: no problems  Psychomotor: normal or unremarkable  Mood: description consistent with euthymia  Affect: restricted; was congruent to mood; was congruent to content  Thought Process/Associations: unremarkable  Thought Content:  Reports none;  Denies suicidal ideation without plan; without intent [details in Interim History] and violent ideation without plan; without intent [details in Interim History]  Perception:  Reports none;  Denies auditory hallucinations and visual hallucinations  Insight: limited  Judgment: limited  Cognition: (6) does  appear grossly intact; formal cognitive testing was not done  Gait and Station: unremarkable    LABS and DATA     AIMS:  Will perform at next visit    PHQ9 TODAY = 0  PHQ-9 SCORE 8/2/2018 8/10/2018 11/2/2018   PHQ-9 Total Score 13 2 0     ANTIPSYCHOTIC LABS  [glu, A1C, lipids (roslyn LDL), liver enzymes, WBC, ANEU, Hgb, plts]  q12 mo  Recent Labs   Lab Test 07/11/18  1050 06/18/18  0708 06/16/18  0838 06/14/18  1014   * 84 86 122*     Recent Labs   Lab Test 01/13/17  0936 02/06/16  0805   CHOL 83 82   TRIG 34 34   LDL 36 31   HDL 40* 44*     Recent Labs   Lab Test 07/11/18  1050 06/14/18  1014 06/01/18  0201 04/02/18 2003   AST 14 20 14 20   ALT 17 31 7 11   ALKPHOS 73 66 69 74     Recent Labs   Lab Test 07/11/18  1050 06/18/18  0708 06/17/18  1839 06/16/18  0900  06/11/18  0731 06/06/18  2218   WBC 5.8 6.3 7.3 5.5   < > 6.5 7.9   ANEU 3.2  --  4.4  --   --  3.7 5.5   HGB 11.7 10.4* 11.3* 10.6*   < > 10.7* 11.5*    364 407 325   < > 260 245    < > = values in this interval not displayed.     LITHIUM LABS     [level, renal, SG, TSH, WBC]    q6 mo  Recent Labs   Lab Test 07/01/18  0829 06/06/18  0915  06/01/18  0810 06/01/18  0201   LITHIUM 0.96 1.11 1.29* 1.48*     Recent Labs   Lab Test 07/11/18  1050 06/18/18  0708 06/16/18  0838 06/14/18  1014   CR 0.51* 0.51* 0.49* 0.46*   GFRESTIMATED >90 >90 >90 >90    137 140 136   POTASSIUM 3.7 3.8 3.9 3.9   BHANU 8.6 8.7 9.3 8.8     Recent Labs   Lab Test 06/14/18  0030 04/02/18  2057 03/08/17  1408 02/01/17  0124   SG 1.007 1.022 1.006 1.000*     Recent Labs   Lab Test 07/11/18  1050 06/06/18  2218 06/01/18  0201 04/30/18  1413   TSH 1.46 0.91 4.83* 3.42     Recent Labs   Lab Test 07/11/18  1050 06/18/18  0708 06/17/18  1839 06/16/18  0900  06/11/18  0731 06/06/18  2218   WBC 5.8 6.3 7.3 5.5   < > 6.5 7.9   ANEU 3.2  --  4.4  --   --  3.7 5.5    < > = values in this interval not displayed.       DIAGNOSIS     Bipolar I disorder, most recent episode manic with history of psychotic features, in remission  Non-adherence to psychiatric medication regimen       ASSESSMENT                                                                                                   m2, h3     TODAY:  Cristina Boyd is a 19yo woman of [place] descent with Bipolar I disorder; she has a history of manic episodes with psychotic features, including most recently resulting in inpatient psychiatric treatment 6/2018-7/2018 in the context of non-adherence to treatment.  She has recurrent history of poor adherence to medications with subsequent decompensation and hospitalization.  Today I believe she may be underreporting her symptoms due to PHQ of 0 at last visit and at today's visit she is unable to find her PHQ.  My primary concern is her well being and that she could decompensate without appropriate pharmacologic interventions.        Pt and father say they are aware of the signs of manic episode, such as difficult sleep, faster thoughts, talking faster, taking on multiple projects.  Pt and father agree to call clinic to make earlier appointment/emergent appointment if these symptoms  develop.  Pt or father to contact crisis services should thoughts intents plans of harm to self or others develop.    SUICIDE RISK ASSESSMENT:  Rate SI-desire: 0/5, intent: 0/5.  Cristina Boyd reports no thoughts intents or plans of harm to herself or anyone else.  In addition, she has notable risk factors for self-harm including recent psych inpt stay and multiple mood decompensations.  However, risk is mitigated by no plan or intent, describes a safety plan, h/o seeking help when needed, symptom improvement, future oriented, none to minimal alcohol use , commitment to family, good social support  , Taoist beliefs, stable housing and committed to school.  Based on all available evidence she does not appear to be at imminent risk for self-harm therefore does not meet criteria for a 72-hr hold/  involuntary hospitalization.  However, based on degree of symptoms close psych FU was recommended which the pt did agree to.  Additional steps to minimize risk include: SAFETY PLAN completed see above.  Safety Plan placed in AVS: Yes.        [place] PRESCRIPTION MONITORING PROGRAM [] was not checked today:  not using controlled substances.    PSYCHOTROPIC DRUG INTERACTIONS:   no.  MANAGEMENT:  N/A     PLAN                                                                                                              m2, h3     1) PSYCHOTROPIC MEDICATIONS:  - Continue Lithium CR 900mg HS  - Change Quetiapine to 150mg HS [patient has not been compliant with 250mg dose].    2) THERAPY:    Consider    3) NEXT DUE:    Labs- fasting lipids ordered at last visit, pt has not completed  EKG- no  Rating Scales- PHQ9 at each visit                             - AIMS or DISCUS exam     4) REFERRALS:    No Referrals needed     5) RTC: 3 Weeks    6) CRISIS NUMBERS:    Provided routinely in AVS.   [facility] [phone] (clinic)    [phone] (after hours)  ONLY if a [facility] PT: [facility] [phone] (clinic), [phone]  (after hours)        TREATMENT RISK STATEMENT:  The risks, benefits, alternatives and potential adverse effects have been discussed and are understood by the pt. The pt understands the risks of using street drugs or alcohol. There are no medical contraindications, the pt agrees to treatment with the ability to do so. The pt knows to call the clinic for any problems or to access emergency care if needed.  Medical and substance use concerns are documented above.  Psychotropic drug interaction check was done, including changes made today.    PROVIDER: Andrew Henao MD    Patient not staffed in clinic.  Note will be reviewed and signed by supervisor Dr. Agosto.    Attestation:  I did not see this patient directly. I have reviewed the documentation and I agree with the resident's plan of care.   Zhang Agosto MD

## 2019-01-07 ASSESSMENT — PATIENT HEALTH QUESTIONNAIRE - PHQ9: SUM OF ALL RESPONSES TO PHQ QUESTIONS 1-9: 0

## 2019-01-09 ENCOUNTER — CARE COORDINATION (OUTPATIENT)
Dept: PSYCHIATRY | Facility: CLINIC | Age: 19
End: 2019-01-09

## 2019-01-09 DIAGNOSIS — F31.9 BIPOLAR I DISORDER (H): ICD-10-CM

## 2019-01-09 RX ORDER — QUETIAPINE FUMARATE 50 MG/1
TABLET, FILM COATED ORAL
Qty: 90 TABLET | Refills: 0 | Status: SHIPPED | OUTPATIENT
Start: 2019-01-09 | End: 2019-01-09

## 2019-01-09 RX ORDER — QUETIAPINE FUMARATE 50 MG/1
TABLET, FILM COATED ORAL
Qty: 90 TABLET | Refills: 0 | Status: SHIPPED | OUTPATIENT
Start: 2019-01-09 | End: 2020-01-10

## 2019-01-09 NOTE — PROGRESS NOTES
Andrew Henao MD Snyder, David J, RN             Yes,     Thank you.     Andrew    Previous Messages      ----- Message -----   From: Breezy Gomez, RN   Sent: 1/9/2019   9:24 AM   To: Breezy Gomez RN, MD Dr. Earlene Delatorre:     I received a call from the pharmacy, reporting that the Seroquel order you sent yesterday was for only a 10-day supply (30 tabs at 3 tabs per day).   OK to re-order for 30-day supply?     Pepe         Writer e-prescribed 30-day supply to [facility] ([phone]). Qty 90, refills 0.

## 2019-01-09 NOTE — PROGRESS NOTES
Writer received voice mail message from Pharmacist Yamielt at [facility] ([phone]), who identified that the 1/4 Seroquel order was for a 10-day supply and requested that a 30-day order be sent, if possible.  Routed to provider.

## 2019-09-11 ENCOUNTER — TELEPHONE (OUTPATIENT)
Dept: PSYCHIATRY | Facility: CLINIC | Age: 19
End: 2019-09-11

## 2019-09-11 DIAGNOSIS — F31.9 BIPOLAR 1 DISORDER (H): Primary | ICD-10-CM

## 2019-09-11 DIAGNOSIS — Z51.81 ENCOUNTER FOR THERAPEUTIC DRUG MONITORING: ICD-10-CM

## 2019-09-11 NOTE — TELEPHONE ENCOUNTER
"Brief Psychiatry Telephone Note    Phone call time: 5:17p  Clinic Provider: Adrian    S:    I am assuming care of this patient from Dr. Henao, seeing her in October. She called today and spoke with nursing, stating that after discontinuing her medications in April (Lithium 900 mg at bedtime and Seroquel 150 mg at bedtime), she restarted them in early August. Per nursing, She restarted Lithium 900 mg early August and has been taking it daily since. Reports spasm in left eye and right arm. Shared increase in urination and thirst since restarting the Lithium.\"    I called patient to discuss this further. She did not answer, so I left a voicemail. I instructed her on s/s which would necessitate an ED visit. These include AMS, confusion, numbness/tingling, difficulty moving extremities, NVD. I asked her to return call to clinic to discuss this further.      Adam Campbell MD  PGY3 Psychiatry Resident  P: [phone]        "

## 2019-09-11 NOTE — TELEPHONE ENCOUNTER
Writer received incoming call from patient [phone] requesting to be seen by provider sooner than 10/16/19. Patient was last seen by Dr. Henao on 1/4/19 and was prescribed Lithium 900 mg at bedtime and Seroquel 150 mg at bedtime. Patient reports she had stopped taking her meds from April till about a month ago. She restarted Lithium 900 mg early August and has been taking it daily since. Reports spasm in left eye and right arm. Shared increase in urination and thrust since restarting the Lithium. Denies changes in vision or other side effects. Patient faheem safety concerns at this time. Writer agreed to pass this to provider for further recommendations.

## 2019-09-12 RX ORDER — LITHIUM CARBONATE 450 MG
450 TABLET, EXTENDED RELEASE ORAL AT BEDTIME
Qty: 30 TABLET | Refills: 0 | Status: SHIPPED | OUTPATIENT
Start: 2019-09-12 | End: 2019-12-09

## 2019-09-12 NOTE — TELEPHONE ENCOUNTER
Symptoms (spasm in eye )      Andrew Henao MD  You; Adam Campbell MD 57 minutes ago (12:57 PM)      And also have her get a lithium level before the appointment that we get scheduled for her.     Andrew    Routing comment        Andrew Henao MD  You; Adam Campbell MD 1 hour ago (12:55 PM)      It may be due to her restarting Lithium at 900mg immediately.  She should have restarted with a taper, probably 300 5days, 600 5days, then 900.  I would recommend lowering her dose to 450mg for now and see if things improve, and maybe get her an appointment with whatever resident has the soonest one hour appointment available.  Can we arrange this?     Andrew Haq comment      Writer placed a call to patient at [phone] to relay the above information. Writer encouraged patient to decrease the lithium from 900 mg to 450 mg at bedtime. Also informed patient to complete a lithium level before the appt. Lab orders placed. Patient agreed with the plan and requested a refill for Lithium. Patient agreed to call the clinic in a week with an update. Writer set a reminder to call patient on 9/19/19. Writer unable to schedule a sooner appt with any other resident due to no opening. Patient agreed to be placed on a wait list. Scheduling notified.

## 2019-09-12 NOTE — TELEPHONE ENCOUNTER
Symptoms (spasm in eye )     Andrew Henao MD  You; Adam Campbell MD 10 minutes ago (3:40 PM)      OK, yes to prescribe new dose.  And please have her continue to check in by phone if side effects persist after one week at new dose.       Andrew    Routing comment      Meds refilled per providers approval   Med tab changed to reflect this   Patient notified

## 2019-09-17 NOTE — TELEPHONE ENCOUNTER
Writer placed a call to patient at [phone] to get an update with symptoms post decreasing Lithium. No answer at number provided. LVM, requesting a call back. Clinic number provided.

## 2019-09-19 NOTE — TELEPHONE ENCOUNTER
Writer placed a call to patient at [phone] to get an update post decreasing the Lithium dose. No answer at number provided. LVM, requesting a call back. Clinic number provided.

## 2019-09-25 NOTE — TELEPHONE ENCOUNTER
Writer placed a call to patient at [phone] to get a update post medication changes. No answer at number provided. LVM, requesting a call back. Clinic number provided.

## 2019-10-16 ENCOUNTER — TELEPHONE (OUTPATIENT)
Dept: PSYCHIATRY | Facility: CLINIC | Age: 19
End: 2019-10-16

## 2019-10-16 NOTE — TELEPHONE ENCOUNTER
Brief Psychiatry Telephone Note    Called patient after she missed today's psychiatric appt. No answer, LVM requesting callback to reschedule appt with me. Provided clinic number, asked that she call with any questions or concerns.     Adam Campbell MD  PGY3 Psychiatry Resident

## 2019-11-27 ENCOUNTER — TELEPHONE (OUTPATIENT)
Dept: PSYCHIATRY | Facility: CLINIC | Age: 19
End: 2019-11-27

## 2019-11-27 NOTE — TELEPHONE ENCOUNTER
"Brief Psychiatry Telephone Note    Called patient after she missed today's appt. She states she thought her appt was Friday. \"I'm doing well though.\" Has stopped Seroquel, continues taking Lithium 450 mg daily. Denies side effects. Denies SI, safety concerns. Requests that clinic call her to reschedule with me when able.     - Will have scheduling reach out to patient to reschedule for 60 min MFU with me  - Patient not  currently suicidal or homicidal.  Patient is aware to call 911 or go to the nearest ER if safety concerns/urgent symptoms arise.     Adam Campbell MD  PGY3 Psychiatry Resident        12/9/19 Addendum  Reviewed this note and EHR including 1/4/19 Psy Prog Note by TRACI Henao MD, and telephone notes since then.  She is currently scheduled to meet with Dr. Campbell for psychiatric treatment on 1/10/2020.  I authorized 1 month prescription without additional refill of lithium CR 450mg, #30, sig 1 tab po at bedtime.    If Cristina doesn't present for 1/10/2020 appointment, will need to consider whether we are able to continue prescribing the lithium until she actually presents for an appointment.  We could, with her permission, partner with her family (e.g. father) to increase the likelihood of attending future appointments.    Zhang Agosto MD  Supervising Psychiatrist  "

## 2019-12-06 DIAGNOSIS — F31.9 BIPOLAR 1 DISORDER (H): ICD-10-CM

## 2019-12-09 RX ORDER — LITHIUM CARBONATE 450 MG
450 TABLET, EXTENDED RELEASE ORAL AT BEDTIME
Qty: 30 TABLET | Refills: 0 | Status: SHIPPED | OUTPATIENT
Start: 2019-12-09 | End: 2020-01-10

## 2019-12-09 NOTE — TELEPHONE ENCOUNTER
Please attend 1/10/2020 appointment with Dr. Campbell at [facility].

## 2020-01-05 NOTE — PROGRESS NOTES
[facility]  Psychiatry Clinic  TRANSFER OF CARE DIAGNOSTIC ASSESSMENT     Date of initial diagnostic assessment is 3/10/16.  Date of most recent transfer of care assessment is 1/10/2020    CARE TEAM:    PCP- No Ref-Primary, Physician    Therapist- none    Cristina Oliver is a 19 year old female who prefers the name Klaus & pronouns she, her, hers.    Pertinent Background:  This patient first experienced mental health issues in February 2016 and has received treatment for Bipolar I most recent episode manic with history of psychosis.  History details in last diagnostic assessment.  Notably, the patient has history of multiple hospitalizations with shaina and psychosis, including most recently July 2018. She has a history of difficult adherence to medications followed by decompensation and hospitalization.  After her most recent hospitalization in July 2018 she did complete a Day Program.  Most recently, was school at [employer].          Psych critical item history includes non adherence to medications.     INTERIM HISTORY                                                                                              4, 4   The patient reports good treatment adherence.  History was provided by the patient and father who was a fair historian.  The last visit 1/4/2019 ended with the following med change: quetiapine decreased (pt was taking lower dose).   Since the last visit:   - Called clinic early September to report that since restarting, having spasm in left eye, right arm, as well as increased urination and thirst.  Instructed to decrease lithium to 450 mg and obtain a level after 5 days.  Level not obtained.  - Called patient after missing transfer of care DA appt 11/27/19. Noted she was doing well, had stopped Seroquel, and continued Li at 450 daily. Denied side effects, SI, safety concerns.   - Hasn't been on Seroquel since Sept 2019, maybe longer. Really didn't take it much over the last year  "though, either.  - Has taken Lithium nearly every night for the last 1+ year. She states telephone note from Sept indicating she'd stopped all meds was false, that she'd just stopped Seroquel.  - States her last manic episode was May 2018. Mother agrees. Since that time, mood has been \"pretty normal.\" States she's had a few periods of low mood, anhedonia, low energy, lasting \"about two weeks\", most recently last fall, resolved without intervention.   - Main trigger for shaina (per mom and patient) is poor sleep. Mom states her that ever since patient was young, she's liked to stay up late. Patient states that for the last semester, she has been going to bed btw 2-3a, and usually wakes up around 11a. Mom notes, that when patient doesn't have class, will wake up around noon, but then stay in bed till late afternoon doing work on laptop.   - No delusional thoughts since coming out of hospital. No AVH. No suicide thoughts. No side effects since decreasing Lithium dose.  Feels that her mood has continued to be stable with this change.  - Leaves [facility] in one week to start college at [employer] (finished 2 years of community college this fall, finished associates degree \"Associates in Arts\"). Mom implored her to see psychiatry before she left for college.   - She only had to take one chemistry class last semester. Her plan is to move into the dormitory, possibly with multiple roommates, in the next few days.  She will start classes next week.  Classes are Monday through Friday, 1 PM through 4 PM  Each day.  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    RECENT SYMPTOMS:   Depression:  none  Elevated:  none  Psychosis:  none  Anxiety:  none  Panic Attack:  none  Trauma Related:  none  Dysregulation:  none  Eating Disorder: no   Pertinent Negative Symptoms:  NO suicidal ideation, " self-injurious behavior/urges, violent ideation, aggression, psychosis, hallucinations, delusions, shaina and hypomania    RECENT SUBSTANCE USE:     ALCOHOL- no      TOBACCO- no     CAFFEINE- no  OPIOIDS- no         NARCAN KIT- no        CANNABIS- no            OTHER ILLICIT DRUGS- none      CURRENT SOCIAL HISTORY:  FINANCIAL SUPPORT- family or friend       CHILDREN- no      LIVING SITUATION- Lives with family      SOCIAL/ SPIRITUAL SUPPORT- Family and friends     FEELS SAFE AT HOME- yes     PSYCHIATRIC HISTORY     SIB- no  Suicidal Ideation Hx- yes  Suicide Attempt- #- no, most recent-N/A    Violence/Aggression Hx- no  Psychosis Hx- yes  Psych Hosp- #- yes, multiple, most recent- July 2018   ECT- no    Eating Disorder- no  Outpatient Programs [ DBT, Day TX, Eating Disorder etc]- yes, IOP at [facility] discharge 7/19/19    SUBSTANCE USE HISTORY                                                                             Past Use- none reported  Treatment- #, most recent- no  Medical Consequences- no  HIV/Hepatitis- N/A  Legal Consequences- no  Past Use- none reported    SOCIAL and FAMILY HISTORY                           patient reported                          1ea, 1ea   Financial Support- documented above  Living Situation/Family/Relationships- documented above;  Children- documented above  Trauma History (self-report)- unknown  Legal- no  Cultural/ Social/ Spiritual Support- Friends and Family  Early History/Education- Lives in [place] with mother, Joy, Father, Americo, Sister Polly (20), Brothers Sam (15), Drew (13), and Cristi (6).  Went to college at [employer].    FAMILY MENTAL HEALTH HX- unknown    PAST PSYCH MED TRIALS   see EMR Problem List: Hx of psychiatric care    Recent Timeline:  - Feb 2017: Hospitalized [facility]                                                - Abilify 30 --> 15 mg                                                - Started Ativan, Depakote, Cogentin  - Mar 2017: Decreased  Depakote 1000 --> 750 mg  - Apr 2017: Decreased Ativan 2 --> 1 mg  - Sept/Oct 2017: Ran out of all medications (Abilify 15 mg, Cogentin, Depakote 750 mg, Lithium 900 mg, Ativan 1 mg)   - Nov 2017: Restarted Lithium 900 mg  - Jan 2018: pt self discontinued Lithium  - April 2018: resumed Li  - May 31-June 13 2018 Admission on [facility] with acute adela --> trnsfr to Medicine and discharged 6/21 for r/o TB                                                - Resumed Li 900 mg                                                - Started Seroquel 400 mg QHS & 100 mg TID PRN                                                - Started propranolol 20 mg BID  - June 30-July 10 2018 Admission to Psychiatry with acute adela                                                - Started Abilify 30 mg                                                - Discontinued Seroquel  - Nov 2018 -pt non adherent to seroquel, taking 50mg HS rather than 250mg HS--> return to 250 HS  - Sept 2019 - pt report spasm in left eye, right arm, as well as increased urination and thirst.  Instructed to decrease lithium to 450 mg   - Jan 2020 -continue lithium 450 mg, discontinue Seroquel (patient discontinued many months ago)    MEDICAL / SURGICAL HISTORY                                   Pregnant or breastfeeding- no      Contraception- Not discussed    Neurologic Hx- no   Patient Active Problem List   Diagnosis     Adela (H)     Bipolar affective disorder, current episode manic with psychotic symptoms (H)     Suicidal ideation     Hx of psychiatric care     Bipolar I disorder, current or most recent episode manic, with psychotic features (H)     Lymphadenopathy     Bipolar 1 disorder (H)       Major Surgery- unknown     MEDICAL REVIEW OF SYSTEMS    [2, 10]     A comprehensive review of systems was performed and is negative other than noted in the HPI.    ALLERGY                                Patient has no known allergies.  MEDICATIONS                                  Current Outpatient Medications   Medication Sig Dispense Refill     lithium (ESKALITH CR/LITHOBID) 450 MG CR tablet Take 1 tablet (450 mg) by mouth At Bedtime 30 tablet 3     Carboxymethylcellulose Sod PF (REFRESH PLUS) 0.5 % SOLN ophthalmic solution Place 1 drop into both eyes 3 times daily as needed for dry eyes (Patient not taking: Reported on 7/11/2018) 1 Bottle 0     triamcinolone (KENALOG) 0.1 % cream Apply topically 2 times daily as needed for irritation (Patient not taking: Reported on 7/11/2018) 80 g 0     VITALS                                                                                                                                  3, 3   /75   Pulse 87   Wt 71.3 kg (157 lb 3.2 oz)   BMI 25.37 kg/m     MENTAL STATUS EXAM                                                                         9, 14 cog gs     Alertness: alert   Appearance: well groomed, dressed in weather appropriate clothing, wearing headscarf  Behavior/Demeanor: cooperative, pleasant and calm, with fair  eye contact   Speech: normal  Language: no problems  Psychomotor: normal or unremarkable  Mood: description consistent with euthymia  Affect: restricted; was congruent to mood; was congruent to content  Thought Process/Associations: unremarkable  Thought Content:  Reports none;  Denies suicidal ideation without plan; without intent [details in Interim History] and violent ideation without plan; without intent [details in Interim History]  Perception:  Reports none;  Denies auditory hallucinations and visual hallucinations  Insight: adequate  Judgment: fair  Cognition: (6) does  appear grossly intact; formal cognitive testing was not done  Gait and Station: unremarkable    LABS and DATA     AIMS:  n/a    PHQ9 TODAY = 0  PHQ-9 SCORE 8/10/2018 11/2/2018 1/4/2019   PHQ-9 Total Score 2 0 0     ANTIPSYCHOTIC LABS  [glu, A1C, lipids (roslyn LDL), liver enzymes, WBC, ANEU, Hgb, plts]  q12 mo  Recent Labs   Lab Test  01/10/20  1014 07/11/18  1050 06/18/18  0708 06/16/18  0838   GLC 84 110* 84 86     Recent Labs   Lab Test 01/13/17  0936 02/06/16  0805   CHOL 83 82   TRIG 34 34   LDL 36 31   HDL 40* 44*     Recent Labs   Lab Test 01/10/20  1014 07/11/18  1050 06/14/18  1014 06/01/18  0201   AST 13 14 20 14   ALT 14 17 31 7   ALKPHOS 82 73 66 69     Recent Labs   Lab Test 01/10/20  1014 07/11/18  1050 06/18/18  0708 06/17/18  1839  06/11/18  0731   WBC 6.1 5.8 6.3 7.3   < > 6.5   ANEU 2.8 3.2  --  4.4  --  3.7   HGB 12.0 11.7 10.4* 11.3*   < > 10.7*    310 364 407   < > 260    < > = values in this interval not displayed.     LITHIUM LABS     [level, renal, SG, TSH, WBC]    q6 mo  Recent Labs   Lab Test 01/10/20  1014 07/01/18  0829 06/06/18  0915 06/01/18  0810   LITHIUM <0.20* 0.96 1.11 1.29*     Recent Labs   Lab Test 01/10/20  1014 07/11/18  1050 06/18/18  0708 06/16/18  0838   CR 0.51* 0.51* 0.51* 0.49*   GFRESTIMATED >90 >90 >90 >90    139 137 140   POTASSIUM 4.0 3.7 3.8 3.9   BHANU 8.8 8.6 8.7 9.3     Recent Labs   Lab Test 01/10/20  1020 06/14/18  0030 04/02/18  2057 03/08/17  1408   SG 1.021 1.007 1.022 1.006     Recent Labs   Lab Test 01/10/20  1014 07/11/18  1050 06/06/18  2218 06/01/18  0201   TSH 5.09* 1.46 0.91 4.83*     Recent Labs   Lab Test 01/10/20  1014 07/11/18  1050 06/18/18  0708 06/17/18  1839  06/11/18  0731   WBC 6.1 5.8 6.3 7.3   < > 6.5   ANEU 2.8 3.2  --  4.4  --  3.7    < > = values in this interval not displayed.       DIAGNOSIS     Bipolar I disorder, most recent episode manic with history of psychotic features, in remission  Non-adherence to psychiatric medication regimen     ASSESSMENT                                                                                                   m2, h3     TODAY: Patient presents for transfer of care diagnostic assessment.  She has last been seen in clinic approximately 1 year ago.  Based on history gathered and chart review, she does meet criteria for  bipolar 1 disorder.  Most recent manic episode appears to be last hospitalization, July 2018.  Since that time, she denies manic or hypomanic symptoms.  She endorses occasional periods of low mood and low energy, lasting as long as 2 weeks, and resolving without psychiatric intervention.  This most recently occurred a few months ago while enrolled in community college.  Reports spotty adherence to Seroquel until approximately 4-5 months ago, when she discontinued it completely.  She reports taking 900 mg of lithium daily for the last year until September 2019, when she began having blurry vision, and her dose was reduced to 450 mg.  She feels that her current dose of lithium is beneficial to her as a mood stabilizer.    Today she denies any symptoms of depression, shaina/hypomania, or psychosis.  No acute safety concerns.  She informs me of her plan to transfer to [employer] to complete her college education.  She is moving in the next few days, and will start classes next week.  She is also planning to start a part-time job in February.  I congratulated her on these positive life changes, and discussed concerns/risks associated with multiple life changes and stressors including triggering worsening mental health symptoms.  I recommended she strongly consider starting a part-time job until she completes her first semester at [employer].  She stated she would consider this.  She did state that she would continue to follow with me in clinic for psychiatric care.  She requested to follow-up during the week of spring break, which I feel is reasonable.    Today, she is due for lab work.  I have ordered lithium labs.  I recommended continuing current dose of lithium.  I provided refills to get her through to her next appointment with me.  However, should her level be subtherapeutic, I will reach out to her with recommendation for a dose increase in Lithium.  Given her report of overall mood stability in the last  year and a half, and her personal preference, I was agreeable to discontinue Seroquel, which she has not taken in many months (possibly ~1yr).  Follow-up mid March.      SUICIDE RISK ASSESSMENT:  Rate SI-desire: 0/5, intent: 0/5.  Cristina Boyd reports no thoughts intents or plans of harm to herself or anyone else.  In addition, she has notable risk factors for self-harm including recent psych inpt stay and multiple mood decompensations.  However, risk is mitigated by no plan or intent, describes a safety plan, h/o seeking help when needed, symptom improvement, future oriented, none to minimal alcohol use , commitment to family, good social support  , Congregational beliefs, stable housing and committed to school.  Based on all available evidence she does not appear to be at imminent risk for self-harm therefore does not meet criteria for a 72-hr hold/  involuntary hospitalization.  However, based on degree of symptoms close psych FU was recommended which the pt did agree to.  Additional steps to minimize risk include: SAFETY PLAN completed see above.  Safety Plan placed in AVS: Yes.    [place] PRESCRIPTION MONITORING PROGRAM [] was checked today:  not using controlled substances.    PSYCHOTROPIC DRUG INTERACTIONS: none  MANAGEMENT:  N/A     PLAN                                                                                                              m2, h3     1) PSYCHOTROPIC MEDICATIONS:  - Continue Lithium CR 900mg HS  - Change Quetiapine to 150mg HS [patient has not been compliant with 250mg dose].    2) THERAPY:  No, will explore at next visit    3) NEXT DUE:    Labs- Li labs due, ordered  EKG- PRN  Rating Scales- serial PHQ9, AIMS due, next visit    4) REFERRALS:    No Referrals needed     5) RTC: Mid March 2020    6) CRISIS NUMBERS:    Provided routinely in AVS.   [facility] [phone] (clinic)    [phone] (after hours)  ONLY if a [facility] PT: [facility] [phone] (clinic), [phone] (after  hours)        TREATMENT RISK STATEMENT:  The risks, benefits, alternatives and potential adverse effects have been discussed and are understood by the pt. The pt understands the risks of using street drugs or alcohol. There are no medical contraindications, the pt agrees to treatment with the ability to do so. The pt knows to call the clinic for any problems or to access emergency care if needed.  Medical and substance use concerns are documented above.  Psychotropic drug interaction check was done, including changes made today.    PROVIDER: Adam Campbell MD    Patient staffed in clinic with Dr. Cochran, who will sign the note.  Supervisor is Dr. Agosto.  I saw the patient with the resident, and participated in key portions of the service, including the mental status examination and developing the plan of care. I reviewed key portions of the history with the resident. I agree with the findings and plan as documented in this note.    Amelia Cochran MD

## 2020-01-10 ENCOUNTER — TELEPHONE (OUTPATIENT)
Dept: PSYCHIATRY | Facility: CLINIC | Age: 20
End: 2020-01-10

## 2020-01-10 ENCOUNTER — OFFICE VISIT (OUTPATIENT)
Dept: PSYCHIATRY | Facility: CLINIC | Age: 20
End: 2020-01-10
Attending: PSYCHIATRY & NEUROLOGY
Payer: COMMERCIAL

## 2020-01-10 VITALS
SYSTOLIC BLOOD PRESSURE: 111 MMHG | WEIGHT: 157.2 LBS | HEART RATE: 87 BPM | BODY MASS INDEX: 25.37 KG/M2 | DIASTOLIC BLOOD PRESSURE: 75 MMHG

## 2020-01-10 DIAGNOSIS — F31.9 BIPOLAR 1 DISORDER (H): ICD-10-CM

## 2020-01-10 LAB
ALBUMIN SERPL-MCNC: 3.4 G/DL (ref 3.4–5)
ALBUMIN UR-MCNC: NEGATIVE MG/DL
ALP SERPL-CCNC: 82 U/L (ref 40–150)
ALT SERPL W P-5'-P-CCNC: 14 U/L (ref 0–50)
ANION GAP SERPL CALCULATED.3IONS-SCNC: 7 MMOL/L (ref 3–14)
APPEARANCE UR: CLEAR
AST SERPL W P-5'-P-CCNC: 13 U/L (ref 0–45)
BASOPHILS # BLD AUTO: 0 10E9/L (ref 0–0.2)
BASOPHILS NFR BLD AUTO: 0.2 %
BILIRUB SERPL-MCNC: 0.2 MG/DL (ref 0.2–1.3)
BILIRUB UR QL STRIP: NEGATIVE
BUN SERPL-MCNC: 13 MG/DL (ref 7–30)
CALCIUM SERPL-MCNC: 8.8 MG/DL (ref 8.5–10.1)
CHLORIDE SERPL-SCNC: 106 MMOL/L (ref 94–109)
CO2 SERPL-SCNC: 26 MMOL/L (ref 20–32)
COLOR UR AUTO: YELLOW
CREAT SERPL-MCNC: 0.51 MG/DL (ref 0.52–1.04)
DIFFERENTIAL METHOD BLD: ABNORMAL
EOSINOPHIL # BLD AUTO: 0.1 10E9/L (ref 0–0.7)
EOSINOPHIL NFR BLD AUTO: 1.1 %
ERYTHROCYTE [DISTWIDTH] IN BLOOD BY AUTOMATED COUNT: 15.1 % (ref 10–15)
GFR SERPL CREATININE-BSD FRML MDRD: >90 ML/MIN/{1.73_M2}
GLUCOSE SERPL-MCNC: 84 MG/DL (ref 70–99)
GLUCOSE UR STRIP-MCNC: NEGATIVE MG/DL
HCT VFR BLD AUTO: 37.3 % (ref 35–47)
HGB BLD-MCNC: 12 G/DL (ref 11.7–15.7)
HGB UR QL STRIP: NEGATIVE
IMM GRANULOCYTES # BLD: 0 10E9/L (ref 0–0.4)
IMM GRANULOCYTES NFR BLD: 0.3 %
KETONES UR STRIP-MCNC: NEGATIVE MG/DL
LEUKOCYTE ESTERASE UR QL STRIP: ABNORMAL
LITHIUM SERPL-SCNC: <0.2 MMOL/L (ref 0.6–1.2)
LYMPHOCYTES # BLD AUTO: 2.6 10E9/L (ref 0.8–5.3)
LYMPHOCYTES NFR BLD AUTO: 41.7 %
MCH RBC QN AUTO: 28.3 PG (ref 26.5–33)
MCHC RBC AUTO-ENTMCNC: 32.2 G/DL (ref 31.5–36.5)
MCV RBC AUTO: 88 FL (ref 78–100)
MONOCYTES # BLD AUTO: 0.7 10E9/L (ref 0–1.3)
MONOCYTES NFR BLD AUTO: 11.7 %
MUCOUS THREADS #/AREA URNS LPF: PRESENT /LPF
NEUTROPHILS # BLD AUTO: 2.8 10E9/L (ref 1.6–8.3)
NEUTROPHILS NFR BLD AUTO: 45 %
NITRATE UR QL: NEGATIVE
NRBC # BLD AUTO: 0 10*3/UL
NRBC BLD AUTO-RTO: 0 /100
PH UR STRIP: 5 PH (ref 5–7)
PLATELET # BLD AUTO: 296 10E9/L (ref 150–450)
POTASSIUM SERPL-SCNC: 4 MMOL/L (ref 3.4–5.3)
PROT SERPL-MCNC: 8.1 G/DL (ref 6.8–8.8)
RBC # BLD AUTO: 4.24 10E12/L (ref 3.8–5.2)
RBC #/AREA URNS AUTO: 1 /HPF (ref 0–2)
SODIUM SERPL-SCNC: 139 MMOL/L (ref 133–144)
SOURCE: ABNORMAL
SP GR UR STRIP: 1.02 (ref 1–1.03)
SQUAMOUS #/AREA URNS AUTO: 4 /HPF (ref 0–1)
TSH SERPL DL<=0.005 MIU/L-ACNC: 5.09 MU/L (ref 0.4–4)
UROBILINOGEN UR STRIP-MCNC: NORMAL MG/DL (ref 0–2)
WBC # BLD AUTO: 6.1 10E9/L (ref 4–11)
WBC #/AREA URNS AUTO: 1 /HPF (ref 0–5)

## 2020-01-10 PROCEDURE — 85025 COMPLETE CBC W/AUTO DIFF WBC: CPT | Performed by: PSYCHIATRY & NEUROLOGY

## 2020-01-10 PROCEDURE — G0463 HOSPITAL OUTPT CLINIC VISIT: HCPCS | Mod: ZF

## 2020-01-10 PROCEDURE — 80053 COMPREHEN METABOLIC PANEL: CPT | Performed by: PSYCHIATRY & NEUROLOGY

## 2020-01-10 PROCEDURE — 36415 COLL VENOUS BLD VENIPUNCTURE: CPT | Performed by: PSYCHIATRY & NEUROLOGY

## 2020-01-10 PROCEDURE — 84443 ASSAY THYROID STIM HORMONE: CPT | Performed by: PSYCHIATRY & NEUROLOGY

## 2020-01-10 PROCEDURE — 80178 ASSAY OF LITHIUM: CPT | Performed by: PSYCHIATRY & NEUROLOGY

## 2020-01-10 PROCEDURE — 81001 URINALYSIS AUTO W/SCOPE: CPT | Performed by: PSYCHIATRY & NEUROLOGY

## 2020-01-10 RX ORDER — LITHIUM CARBONATE 450 MG
450 TABLET, EXTENDED RELEASE ORAL AT BEDTIME
Qty: 30 TABLET | Refills: 3 | Status: SHIPPED | OUTPATIENT
Start: 2020-01-10 | End: 2020-03-09

## 2020-01-10 ASSESSMENT — PAIN SCALES - GENERAL: PAINLEVEL: NO PAIN (0)

## 2020-01-10 NOTE — TELEPHONE ENCOUNTER
On 1/10/2020 the patient signed a PHI authorizing Person to Person communication with Sunni Faria/dad and Joy Bravo/mom for scheduling, medical and billing  information.  I sent this document to scanning  and kept a copy in Psychiatry until scanning is complete/confirmed. Mayda Sandoval/MELISSA

## 2020-01-14 ENCOUNTER — TELEPHONE (OUTPATIENT)
Dept: PSYCHIATRY | Facility: CLINIC | Age: 20
End: 2020-01-14

## 2020-01-14 NOTE — TELEPHONE ENCOUNTER
"Brief Psychiatry Telephone Note    Called patient to discuss results of recent lab work, which were notable for:  - Creatinine 0.51 ( similar to past levels)  -TSH 5.09  - Lithium level < 0.20 (indicting poor adherence)    Patient states that she \"may not remember to take the medications every day.\" Discussed importance of medication adherence, given her transition to college within the coming week, adjusting to living with roommates on campus, possible adjustment to starting a job in a few weeks. Discussed risks of sleep disturbance leading to shaina, which patient again states is typically her main trigger for manic episodes. Pt states she understands the importance of good Li adherence, and states she intends to take medication daily. I also recommended that we repeat lithium labs at her next visit on March 9th.     No updated ROIs for parents in chart. Patient states she signed ROIs for both parents at visit last week. She provides verbal permission for me to discuss these results with parents via phone.     Called patient's father, Bienvenido (prefers '[employer]'). Discussed above. He agrees that medication adherence is very important for Klaus to maximize chances of having a successful semester of college. He plans to discuss adherence strategies with pt and wife. He thinks in particular that setting a recurring nighttime alarm on her phone would be useful, since Klaus spends a lot of time on her phone. He agrees to contact our clinic with any questions or concerns over the next few months.     - Repeat Li lab panel at next appt March 9   - Patient not  currently suicidal or homicidal.  Patient is aware to call 911 or go to the nearest ER if safety concerns/urgent symptoms arise.     Adam Campbell MD  PGY3 Psychiatry Resident        "

## 2020-02-28 NOTE — PROGRESS NOTES
DISCHARGE: Patient is being discharged to home. Patient presents with full range affect and states she feels prepared for discharge. Pt denies SI/SIB/HI.  Patient  does not endorse anxiety or depression. No hallucinations stated.  Patient verbalizes understanding of medication regimen and importance of compliance. AVS reviewed with this RN and all belongings were returned to the patient.    Isa Randhawa(PA)

## 2020-03-02 NOTE — PROGRESS NOTES
"     [facility]  Psychiatry Clinic  PROGRESS NOTE     Date of initial diagnostic assessment is 3/10/16.  Date of most recent transfer of care assessment is 1/10/2020    CARE TEAM:    PCP- No Ref-Primary, Physician    Therapist- none    Cristina Boyd is a 20 year old female who prefers the name Klaus & pronouns she, her, hers.    Pertinent Background:  This patient first experienced mental health issues in February 2016 and has received treatment for Bipolar I most recent episode manic with history of psychosis.  History details in last diagnostic assessment.  Notably, the patient has history of multiple hospitalizations with shaina and psychosis, including most recently July 2018. She has a history of difficult adherence to medications followed by decompensation and hospitalization.  After her most recent hospitalization in July 2018 she did complete a Day Program.  Most recently, was school at [employer].      Psych critical item history includes non adherence to medications.     INTERIM HISTORY                                                                                              4, 4   The patient reports good treatment adherence.  History was provided by the patient who was a good historian.  The last visit ended with no changes.   Since the last visit:   - States she is \"Doing well.\" States that last time she saw me in clinic, was feeling a little depressed, but this resolved over the course of the last few months. States she hasn't felt manic at all, \"not since 2018.\"  - She keeps a med alarm on her phone, and mom calls every night to remind her to take her medications.   - On spring break starting today. School going well, grades \"pretty good\". Midterms went well. 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   - Mood feels good. Energy level good, \"I feel less lazy.\" Denies above-typical energy level. Sleeping 7-8 hrs per night. Thinking feels clear, denies " racing thoughts or pressured speech.   - No depression, anhedonia, suicide thoughts. No AVH, paranoid thoughts.   - Thinks she's tolerating her medications well. Denies vision changes, double vision, headaches, NVD,  Increased urination, increased thirst, tremor. Eye twitching from last year resolved, though now worried she has a sty in her L lower eyelid.     RECENT SYMPTOMS:   Depression:  none  Elevated:  none  Psychosis:  none  Anxiety:  none  Panic Attack:  none  Trauma Related:  none  Dysregulation:  none  Eating Disorder: no   Pertinent Negative Symptoms:  NO suicidal ideation, self-injurious behavior/urges, violent ideation, aggression, psychosis, hallucinations, delusions, adela and hypomania    RECENT SUBSTANCE USE:     ALCOHOL- no      TOBACCO- no     CAFFEINE- no  OPIOIDS- no         NARCAN KIT- no        CANNABIS- no            OTHER ILLICIT DRUGS- none      CURRENT SOCIAL HISTORY:  FINANCIAL SUPPORT- family or friend       CHILDREN- no      LIVING SITUATION- Lives with family      SOCIAL/ SPIRITUAL SUPPORT- Family and friends     FEELS SAFE AT HOME- yes     PSYCH and CD Critical Summary Points since July 2019            Dec 2019 - continued Lithium  Mar 2020 - no change, repeat Li labs    PAST MED TRIALS          See last Diagnostic Assessment    PAST PSYCH MED TRIALS   Abilify. Seroquel    Depakote, Lithium    Ativan, propranolol    Cogentin    MEDICAL / SURGICAL HISTORY                                   Pregnant or breastfeeding- no      Contraception- Not discussed    Neurologic Hx- no   Patient Active Problem List   Diagnosis     Adela (H)     Bipolar affective disorder, current episode manic with psychotic symptoms (H)     Suicidal ideation     Hx of psychiatric care     Bipolar I disorder, current or most recent episode manic, with psychotic features (H)     Lymphadenopathy     Bipolar 1 disorder (H)       Major Surgery- unknown     MEDICAL REVIEW OF SYSTEMS    [2, 10]     A comprehensive review  of systems was performed and is negative other than noted in the HPI.    ALLERGY                                Patient has no known allergies.  MEDICATIONS                                 Current Outpatient Medications   Medication Sig Dispense Refill     lithium (ESKALITH CR/LITHOBID) 450 MG CR tablet Take 1 tablet (450 mg) by mouth At Bedtime 30 tablet 3     Carboxymethylcellulose Sod PF (REFRESH PLUS) 0.5 % SOLN ophthalmic solution Place 1 drop into both eyes 3 times daily as needed for dry eyes (Patient not taking: Reported on 7/11/2018) 1 Bottle 0     triamcinolone (KENALOG) 0.1 % cream Apply topically 2 times daily as needed for irritation (Patient not taking: Reported on 7/11/2018) 80 g 0     VITALS                                                                                                                                  3, 3   /70   Pulse 98   Wt 68.9 kg (151 lb 12.8 oz)   BMI 24.50 kg/m     MENTAL STATUS EXAM                                                                         9, 14 cog gs     Alertness: alert   Appearance: well groomed, dressed in weather appropriate clothing, wearing headscarf  Behavior/Demeanor: cooperative, pleasant and calm, with good  eye contact   Speech: normal, more verbose than last visit, but not pressured or rapid, not interruptive  Language: no problems  Psychomotor: normal or unremarkable  Mood: description consistent with euthymia  Affect: not restricted this visit. Brighter; was congruent to mood; was congruent to content  Thought Process/Associations: unremarkable  Thought Content:  Reports none;  Denies suicidal ideation without plan; without intent [details in Interim History] and violent ideation without plan; without intent [details in Interim History]  Perception:  Reports none;  Denies auditory hallucinations and visual hallucinations  Insight: adequate  Judgment: fair  Cognition: (6) does  appear grossly intact; formal cognitive testing was not  done  Gait and Station: unremarkable    LABS and DATA     AIMS:  n/a    PHQ9 TODAY = 0  PHQ-9 SCORE 8/10/2018 11/2/2018 1/4/2019   PHQ-9 Total Score 2 0 0     ANTIPSYCHOTIC LABS  [glu, A1C, lipids (roslyn LDL), liver enzymes, WBC, ANEU, Hgb, plts]  q12 mo  Recent Labs   Lab Test 01/10/20  1014 07/11/18  1050 06/18/18  0708 06/16/18  0838   GLC 84 110* 84 86     Recent Labs   Lab Test 01/13/17  0936 02/06/16  0805   CHOL 83 82   TRIG 34 34   LDL 36 31   HDL 40* 44*     Recent Labs   Lab Test 01/10/20  1014 07/11/18  1050 06/14/18  1014 06/01/18  0201   AST 13 14 20 14   ALT 14 17 31 7   ALKPHOS 82 73 66 69     Recent Labs   Lab Test 01/10/20  1014 07/11/18  1050 06/18/18  0708 06/17/18  1839  06/11/18  0731   WBC 6.1 5.8 6.3 7.3   < > 6.5   ANEU 2.8 3.2  --  4.4  --  3.7   HGB 12.0 11.7 10.4* 11.3*   < > 10.7*    310 364 407   < > 260    < > = values in this interval not displayed.     LITHIUM LABS     [level, renal, SG, TSH, WBC]    q6 mo  Recent Labs   Lab Test 01/10/20  1014 07/01/18  0829 06/06/18  0915 06/01/18  0810   LITHIUM <0.20* 0.96 1.11 1.29*     Recent Labs   Lab Test 01/10/20  1014 07/11/18  1050 06/18/18  0708 06/16/18  0838   CR 0.51* 0.51* 0.51* 0.49*   GFRESTIMATED >90 >90 >90 >90    139 137 140   POTASSIUM 4.0 3.7 3.8 3.9   BHANU 8.8 8.6 8.7 9.3     Recent Labs   Lab Test 01/10/20  1020 06/14/18  0030 04/02/18  2057 03/08/17  1408   SG 1.021 1.007 1.022 1.006     Recent Labs   Lab Test 01/10/20  1014 07/11/18  1050 06/06/18  2218 06/01/18  0201   TSH 5.09* 1.46 0.91 4.83*     Recent Labs   Lab Test 01/10/20  1014 07/11/18  1050 06/18/18  0708 06/17/18  1839  06/11/18  0731   WBC 6.1 5.8 6.3 7.3   < > 6.5   ANEU 2.8 3.2  --  4.4  --  3.7    < > = values in this interval not displayed.       DIAGNOSIS     Bipolar I disorder, most recent episode manic with history of psychotic features, in remission  Non-adherence to psychiatric medication regimen     ASSESSMENT                                                                                                    m2, h3     TODAY: Patient presents for follow-up approximately 2 months after her last visit. She is 20 minutes late for today's appt.  She had labs drawn following last visit which demonstrated a lithium level <0.20, as well as a mild elevation in TSH. I encouraged her to take Lithium nightly following review of those labs. Since that time, patient began college at [employer].  She states that she was feeling depressed at last visit but did not want to say this in front of her mother.  Today she is brighter, more verbose, but not pressured.  She denies symptoms of shaina or depression, and endorses normal energy level, 7 to 8 hours of sleep per night.  She endorses good medication adherence, stating she takes her lithium each night and very rarely misses doses.  She denies side effects from her medications.  She denies substance use.  No evidence of psychotic symptoms. No acute safety concerns.    Patient's affect does appear much less restricted and is brighter today.  She does not appear disorganized, and her thought process is logical and linear, coherent.  The increased brightness of her affect does make me question possibility of a mood elevation, as the transition from living at home to college in a [place] is a high risk period. However she endorses no symptoms of shaina today; it may be that she has instead experienced a return toward a more euthymic state from mild depressive symptoms over the winter months.    I recommended repeating lithium labs today, including a repeat TSH to monitor for resolution of mild TSH elevation, as well as a repeat lithium level to monitor for medication adherence.  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  Provided psychoeducation surrounding risks and early signs of shaina/hypomania, importance of avoiding substances, keeping a  regular sleep schedule, and taking medications daily.  I encouraged her to contact clinic immediately should she experience changes in sleep pattern or other symptoms of mood instability. She stated her understanding.      SUICIDE RISK ASSESSMENT:  Rate SI-desire: 0/5, intent: 0/5.  Cristina Boyd reports no thoughts intents or plans of harm to herself or anyone else.  In addition, she has notable risk factors for self-harm including recent psych inpt stay and multiple mood decompensations.  However, risk is mitigated by no plan or intent, describes a safety plan, h/o seeking help when needed, symptom improvement, future oriented, none to minimal alcohol use , commitment to family, good social support  , Buddhism beliefs, stable housing and committed to school.  Based on all available evidence she does not appear to be at imminent risk for self-harm therefore does not meet criteria for a 72-hr hold/  involuntary hospitalization.  However, based on degree of symptoms close psych FU was recommended which the pt did agree to.  Additional steps to minimize risk include: SAFETY PLAN completed see above.  Safety Plan placed in AVS: Yes.    [place] PRESCRIPTION MONITORING PROGRAM [] was checked today:  not using controlled substances.    PSYCHOTROPIC DRUG INTERACTIONS: none  MANAGEMENT:  N/A     PLAN                                                                                                              m2, h3     1) PSYCHOTROPIC MEDICATIONS:  - Continue Lithium CR 900mg HS    2) THERAPY:  No    3) NEXT DUE:    Labs- repeat Li labs today; AP labs 1/2021  EKG- PRN  Rating Scales- serial PHQ9, AIMS due, next visit    4) REFERRALS:    No Referrals needed     5) RTC: 2-3 mo    6) CRISIS NUMBERS:    Provided routinely in AVS.   [facility] [phone] (clinic)    [phone] (after hours)  ONLY if a [facility] PT: [facility] [phone] (clinic), [phone] (after hours)        TREATMENT RISK STATEMENT:  The  risks, benefits, alternatives and potential adverse effects have been discussed and are understood by the pt. The pt understands the risks of using street drugs or alcohol. There are no medical contraindications, the pt agrees to treatment with the ability to do so. The pt knows to call the clinic for any problems or to access emergency care if needed.  Medical and substance use concerns are documented above.  Psychotropic drug interaction check was done, including changes made today.    PROVIDER: Adam Campbell MD    Patient not staffed in clinic. Supervisor is Dr. Agosto,  who will sign the note.      Attestation:  I did not see Cristina Boyd directly. I have reviewed the documentation and I agree with the resident's plan of care.   Zhang Agosto MD

## 2020-03-09 ENCOUNTER — OFFICE VISIT (OUTPATIENT)
Dept: PSYCHIATRY | Facility: CLINIC | Age: 20
End: 2020-03-09
Attending: PSYCHIATRY & NEUROLOGY
Payer: COMMERCIAL

## 2020-03-09 VITALS
WEIGHT: 151.8 LBS | BODY MASS INDEX: 24.5 KG/M2 | DIASTOLIC BLOOD PRESSURE: 70 MMHG | HEART RATE: 98 BPM | SYSTOLIC BLOOD PRESSURE: 107 MMHG

## 2020-03-09 DIAGNOSIS — F31.9 BIPOLAR 1 DISORDER (H): ICD-10-CM

## 2020-03-09 DIAGNOSIS — Z51.81 ENCOUNTER FOR THERAPEUTIC DRUG MONITORING: Primary | ICD-10-CM

## 2020-03-09 PROCEDURE — G0463 HOSPITAL OUTPT CLINIC VISIT: HCPCS | Mod: ZF

## 2020-03-09 RX ORDER — LITHIUM CARBONATE 450 MG
450 TABLET, EXTENDED RELEASE ORAL AT BEDTIME
Qty: 30 TABLET | Refills: 3 | Status: SHIPPED | OUTPATIENT
Start: 2020-03-09 | End: 2020-05-22

## 2020-03-09 ASSESSMENT — PAIN SCALES - GENERAL: PAINLEVEL: NO PAIN (0)

## 2020-03-09 NOTE — PATIENT INSTRUCTIONS
Thank you for coming to the PSYCHIATRY CLINIC.    Lab Testing:  If you had lab testing today and your results are reassuring or normal they will be mailed to you or sent through [employer] within 7 days.   If the lab tests need quick action we will call you with the results.  The phone number we will call with results is # [phone] (home) . If this is not the best number please call our clinic and change the number.    Medication Refills:  If you need any refills please call your pharmacy and they will contact us. Our fax number for refills is [phone]. Please allow three business for refill processing.   If you need to  your refill at a new pharmacy, please contact the new pharmacy directly. The new pharmacy will help you get your medications transferred.     Scheduling:  If you have any concerns about today's visit or wish to schedule another appointment please call our office during normal business hours [phone] (8-5:00 M-F)    Contact Us:  Please call [phone] during business hours (8-5:00 M-F).  If after clinic hours, or on the weekend, please call  [phone].    Financial Assistance [phone]  [facility] Billing [phone]  Central Billing Office, [facility]: [phone]  [facility] Billing [phone]  Medical Records [phone]      MENTAL HEALTH CRISIS NUMBERS:  [facility]:   [facility] - [phone]   Crisis Residence [facility] - [phone]   Walk-In Counseling [facility] [phone]   COPE 24/7 [facility] Mobile Team for Adults - [[phone]]; Child - [[phone]]        [facility]:   [facility] - [phone]   Walk-in counseling [facility] - [phone]   Walk-in counseling [facility] - [phone]   Crisis Residence [facility] - [phone]   Urgent Care Adult Mental Health:   --Drop-in, 24/7 crisis line, and [place] Mobile Team  [[phone]]    CRISIS TEXT LINE: Text [phone] from anywhere, anytime, any crisis 24/7;    OR SEE www.crisistextline.org     National Suicide Prevention Lifeline: 051-115-KLVU (340-393-5575)    Poison Control Center - [phone]    CHILD: [facility] needs assessment team - [phone]     [facility] Lifeline - 1-442.215.5953; or [facility] Lifeline - 1-510.222.3667    If you have a medical emergency please call 911or go to the nearest ER.                    _____________________________________________    Again thank you for choosing PSYCHIATRY CLINIC and please let us know how we can best partner with you to improve you and your family's health.  You may be receiving a survey regarding this appointment. We would love to have your feedback, both positive and negative. The survey is done by an external company, so your answers are anonymous.

## 2020-03-10 ASSESSMENT — PATIENT HEALTH QUESTIONNAIRE - PHQ9: SUM OF ALL RESPONSES TO PHQ QUESTIONS 1-9: 0

## 2020-05-22 ENCOUNTER — TELEPHONE (OUTPATIENT)
Dept: PSYCHIATRY | Facility: CLINIC | Age: 20
End: 2020-05-22

## 2020-05-22 ENCOUNTER — MEDICAL CORRESPONDENCE (OUTPATIENT)
Dept: HEALTH INFORMATION MANAGEMENT | Facility: CLINIC | Age: 20
End: 2020-05-22

## 2020-05-22 DIAGNOSIS — F31.9 BIPOLAR 1 DISORDER (H): ICD-10-CM

## 2020-05-22 RX ORDER — LITHIUM CARBONATE 300 MG/1
600 TABLET, FILM COATED, EXTENDED RELEASE ORAL AT BEDTIME
Qty: 60 TABLET | Refills: 0 | Status: SHIPPED | OUTPATIENT
Start: 2020-05-22 | End: 2020-06-26

## 2020-05-22 NOTE — Clinical Note
Hi!    Now I might need your help. Can you print this letter and email it to the pt?     [email or web address]    THANK YOU!

## 2020-05-22 NOTE — TELEPHONE ENCOUNTER
"Brief Psychiatry Telephone Note    Patient states \"things are pretty okay, I guess.\" She states \"things are in-between\". Having some mild low mood, states \"with the quarantine, I'm stuck in the house-24-7\", and also feeling stressed out with Ramadan and final's week. Feels sad that she had tried to move to [place] for school but had to move back because of COVID. Her mood has felt irritable and on edge for the past few weeks. She's had more difficulty focusing since fasting started with Ramadan (Last week of April).  Her family thinks she's been talking more than typical, but she doesn't think she has been.  Sleep has been affected by Ramadan predawn meal at 3-4a. She's goes to bed after the predawn meal, and then wakes up btw 12-2p. Thinks she's averaging around 7hrs per 24hr period. Her father notes that that two nights ago, patient didn't go to sleep and instead stayed up all night working on [employer]. Father states that she's seemed more worried about her exams. She has been talking faster and more easily irritated for the last two weeks. Denies substance use.    Klaus denies SI or HI. She denies AVH, delusions, or paranoid thoughts. She denies racing thoughts, pressured speech, euphoric or elevated moods, recklessness or impulsivity, increased goal directed activities, decreased need for sleep, grandiosity or inflated sense of self.  Klaus reports she has been taking 450 mg nightly of lithium every night btw 10p-12a since early April. While in [place] Jan-Mar, was taking Lithium once every few days, maybe even less. She notes that her improved adherence is because \"my parents are making me.\"  She reports some occasional daytime sedation, and denies other side effects. I note that my progress notes in Jan and Mar 2020 incorrectly listed a 900 mg at bedtime dose in the plan section.     She states school is going okay, finished finals in all but one class. Wrote professor for that class and email and was granted a " "1-week extension. She states she was discussing being late on this paper with her sister, and thinks \"I probably exaggerated it with my sister.\" She requests a letter from me explaining that her health conditions have likely impacted her ability to complete coursework on time.     MSE: awake, alert, pleasant thought somewhat defensive at times. Speech was fast paced, occasionally requiring interruption. Thought process was logical and linear, not overtly disorganized. TC negative for psychotic thought content, negative for SI/HI. Insight and judgement were fair.     A/P  Patient appears to be experiencing a mixed mood episode consisting of low and irritable mood, poor focus, dysregulated sleep cycle and possible decreased need for sleep. No SI, HI, safety concerns in discussion with patient and her father. I suspect her mood symptoms are occurring in the context of a significant alteration in her sleep cycle during Ramadan, social stressors including isolation and living at home during COVID19,  stress related to final exams, and a likely subtherapeutic lithium dose. She endorses poor adherence to lithium over the course of the last year, improving to nightly adherence in the last 5 weeks. Patient has been taking a dose of 450 mg at bedtime. Per chart review, her previous levels taken during periods of adherence to 900 mg at bedtime appear to have been ~1.0. She has not had a level obtained at 450 mg. Based on the linear kinetics of low dose lithium, I recommended patient increase Li to 600 mg at bedtime. Based on previous lab data, this is likely to result in a serum level ~0.6-0.7. Scheduled video follow up in 1 week. If this dose change is not sufficient, will consider checking level to make further lithium dose changes vs initiation of SGA (such as retitration of Seroquel).    - Increase Li to 600 mg at bedtime  - Follow up 9:00a via video Wednesday May 27th  - Patient not  currently suicidal or homicidal.  " Patient and father are aware to call 911 or go to the nearest ER if safety concerns/urgent symptoms arise.     Adam Campbell MD  PGY3 Psychiatry Resident

## 2020-05-22 NOTE — LETTER
May 22, 2020      RE: Cristina Boyd  [address]  [place] [address]         To whom it may concern,      Due to a recent exacerbation of symptoms, Ms. Boyd may require extra time for completion of assignments. Please allow additional time for the completion of school-related responsibilities as the spring 2020 academic semester comes to a close.       Sincerely,        Amelia Cochran MD on behalf of Adam Campbell MD (working remotely)

## 2020-05-22 NOTE — TELEPHONE ENCOUNTER
"Received incoming phone call from pt's sister, Polly, who was calling on behalf of pt's mother as .    Over the past two days pt has experienced an increase in symptoms, which sister believes is due to high stress from the end of the school semester. She notes the pt has not slept at all for the last two days and has not been eating. Her speech is rapid and thoughts are scattered. The pt has been attempting to complete school work, but is unable to stay on track which has lead to difficulty with completion of assignments.     Pt has not endorsed SI or HI. She does become easily irritated, but sister denies any physical aggression. Pt does not appear to be responding to internal stimuli. Sister denies evidence of AH/VH, but does note there could be a \"bit\" of paranoia present. Sister denies concerns over medication adherence. To her knowledge the pt has been taking her lithium consistently.     Sister denies any acute safety concerns at this time. The pt is with family members at home, and writer instructed sister/mother to closely monitor pt and bring her into the ED for any acute safety concerns. Sister does not feel that sister is experiencing a full-blown manic episode at this time, but feels that her sister could further decompensate with no intervention.     Writer agreed to pass a long the update to Dr. Campbell. Family is also requesting a letter for school, allowing pt to have an extension on assignment completion.    Also of note, writer asked to speak directly to pt, but mom felt this was not a good idea as the pt was unaware of the phone call and mom thought it could increase pt's irritability.   "

## 2020-05-22 NOTE — TELEPHONE ENCOUNTER
Spoke with Dr. Campbell regarding information below. Writer to notify mom/sister that Dr. Campbell will be reaching out the pt directly this afternoon to further assess and review the need for a medication change.     Returned phone call to mom/sister and relayed the message above. Mom is concerned that the pt may refuse to answer, or, that she may not accurately report her symptoms. Writer validated concerns and reiterated the plan for Dr. Campbell to contact pt around 1:30p. If issues with this plan should arise, they will be addressed as needed.    Sister and mom were instructed to call 911 if any acute safety concerns should develop in the meantime.

## 2020-05-22 NOTE — TELEPHONE ENCOUNTER
See telephone note from Dr. Campbell for details regarding follow-up with pt and plan.    Per Dr. Campbell, writer to provide a letter regarding the pt's request for extension on school assignments due to an exacerbation of symptoms. Will draft letter and ask clinic staff to email the letter to the pt once complete.    Placed phone call to pt and informed her that the prescription for the increased dose of lithium was sent to the [facility], which pt confirms is the correct pharmacy.     Addendum: Letter completed and emailed to pt by OLIMPIA Canas.

## 2020-05-26 NOTE — PROGRESS NOTES
TELEPHONE VISIT  Cristina Boyd is a 20 year old pt. who is being evaluated via a billable telephone visit.      The patient has been notified of the following:    We have found that certain health care needs can be provided without the need for a physical exam. This service lets us provide the care you need with a short phone conversation. If a prescription is necessary we can send it directly to your pharmacy. If lab work is needed we can place an order for that and you can then stop by our lab to have the test done at a later time. Insurers are generally covering virtual visits as they would in-office visits so billing should not be different than normal.  If for some reason you do get billed incorrectly, you should contact the billing office to correct it and that number is in the AVS .    Patient has given verbal consent for a telephone visit?:  Yes   How would the pt like to obtain the AVS?:  declined  AVS SmartPhrase [PsychAVS] has been placed in 'Patient Instructions':  n/a     Start Time:  9:15      End Time:  9:40       [facility]  Psychiatry Clinic  PROGRESS NOTE     Date of initial diagnostic assessment is 3/10/16.  Date of most recent transfer of care assessment is 1/10/2020    CARE TEAM:    PCP- No Ref-Primary, Physician    Therapist- none    Cristina Boyd is a 20 year old female who prefers the name Klaus & pronouns she, her, hers.    Pertinent Background:  This patient first experienced mental health issues in February 2016 and has received treatment for Bipolar I most recent episode manic with history of psychosis.  History details in last diagnostic assessment.  Notably, the patient has history of multiple hospitalizations with shaina and psychosis, including most recently July 2018. She has a history of difficult adherence to medications followed by decompensation and hospitalization.  After her most recent hospitalization in July 2018 she did complete a Day Program.  Most  "recently, was school at [employer].      Psych critical item history includes non adherence to medications.     INTERIM HISTORY                                                                                              4, 4   The patient reports good treatment adherence for last 2 months.  History was provided by the patient who was a good historian.  The last visit ended with no changes.   Since the last visit:   - \"Going good\" since last Friday (see my telephone note 5/22 for details).  - Dad hasn't picked up Li 600 mg prescription from pharmacy, so continues taking 450 mg nightly.  Has not missed any doses.  - Has been feeling okay. Mood has been \"less irritable\".  Denies depression.  Sent letter to teachers, was given permission to have extra time to finish her semester's school work. This has been a major relief for Klaus.   - Sleep pattern remains disrupted. Ramadan ended last weekend. Yesterday, slept \"a lot of the day, and then midnight to 5am\". Thinks she's still getting 7-8hrs. Hopeful she'll be able to get her sleep cycle back on track tonight, plans to go to bed around 10 or 11p.  - Denies excessive daytime energy. Denies racing thoughts. Denies depressed moods. Denies impulsivity or reckless behaviors.   - She states her family \"thinks I've calmed down, and that I need to be more active.\" They've expressed concern about Klaus sleeping in too late even thought Ramadan has ended.   - Denies delusional thoughts, paranoia, or AVH.   - Spoke with Klaus's dad. He states \"she's much better. She got much relief from getting the letter.\" States she's having difficulty readjusting her sleep schedule. She's still awake most the night. He states that she's been going to bed around 9am, and then sleeping until mid-afternoon.   - Both Klaus and her dad would prefer not to start any new medications, and instead to focus on optimizing her Li dose.     RECENT SYMPTOMS:   Depression:  irritability, sleep cycle dysregulation and " possible insomnia, feeling overwhelmed. Endorses improvement in all symptoms in last few days  Elevated:  distractibility , irritability, talking faster, sleep cycle disruption with possible decreased need for sleep; improving in last few days  Psychosis:  none  Anxiety:  excessive worry, improved since getting extension on course work  Panic Attack:  none  Trauma Related:  none  Dysregulation:  none  Eating Disorder: no   Pertinent Negative Symptoms:  NO suicidal ideation, self-injurious behavior/urges, violent ideation, aggression, psychosis, hallucinations, delusions, adela and hypomania    RECENT SUBSTANCE USE:     ALCOHOL- no      TOBACCO- no     CAFFEINE- no  OPIOIDS- no         NARCAN KIT- no        CANNABIS- no            OTHER ILLICIT DRUGS- none      CURRENT SOCIAL HISTORY:  FINANCIAL SUPPORT- family  CHILDREN- no      LIVING SITUATION- Lives with family in [place] [facility]   SOCIAL/ SPIRITUAL SUPPORT- Family and friends     FEELS SAFE AT HOME- yes     PSYCH and CD Critical Summary Points since July 2019            Dec 2019 - continued Lithium  *Please note that the plan section of my progress notes both 1/10 and 3/9 incorrectly stated lithium dose of 900 mg.  Patient's lithium dose was decreased to 450 mg at bedtime by her previous provider in September 2019.*  Mar 2020 - no change, repeat Li labs orderd but not obtained  May 2020 - Increased Li to 600 mg nightly    PAST PSYCH MED TRIALS   Abilify. Seroquel    Depakote, Lithium    Ativan, propranolol    Cogentin    MEDICAL / SURGICAL HISTORY                                   Pregnant or breastfeeding- no      Contraception- Not discussed    Neurologic Hx- no   Patient Active Problem List   Diagnosis     Adela (H)     Bipolar affective disorder, current episode manic with psychotic symptoms (H)     Suicidal ideation     Hx of psychiatric care     Bipolar I disorder, current or most recent episode manic, with psychotic features (H)     Lymphadenopathy     Bipolar 1  disorder (H)       Major Surgery- unknown     MEDICAL REVIEW OF SYSTEMS    [2, 10]     A comprehensive review of systems was performed and is negative other than noted in the HPI.    ALLERGY                                Patient has no known allergies.  MEDICATIONS                                 Current Outpatient Medications   Medication Sig Dispense Refill     Carboxymethylcellulose Sod PF (REFRESH PLUS) 0.5 % SOLN ophthalmic solution Place 1 drop into both eyes 3 times daily as needed for dry eyes (Patient not taking: Reported on 7/11/2018) 1 Bottle 0     lithium (LITHOBID) 300 MG CR tablet Take 2 tablets (600 mg) by mouth At Bedtime 60 tablet 0     triamcinolone (KENALOG) 0.1 % cream Apply topically 2 times daily as needed for irritation (Patient not taking: Reported on 7/11/2018) 80 g 0     VITALS                                                                                                                                  3, 3   There were no vitals taken for this visit.   MENTAL STATUS EXAM                                                                         9, 14 cog gs     Alertness: alert  and oriented  Appearance: Unable to assess, telephone visit  Behavior/Demeanor: cooperative, pleasant and calm  Speech: increased rate but not pressured or rapid, not interruptive  Language: no problems  Psychomotor: Unable to assess, telephone visit  Mood: description consistent with euthymia  Affect: bright with increased intensity but normal mobility, no irritability; was congruent to mood; was congruent to content  Thought Process/Associations: unremarkable  Thought Content:  Reports none;  Denies suicidal ideation without plan; without intent [details in Interim History] and violent ideation without plan; without intent [details in Interim History]  Perception:  Reports none;  Denies auditory hallucinations and visual hallucinations  Insight: adequate  Judgment: fair  Cognition: (6) does  appear grossly  intact; formal cognitive testing was not done  Gait and Station: unremarkable    LABS and DATA     AIMS:  n/a    PHQ9 TODAY = not obtained, telehealth  PHQ-9 SCORE 11/2/2018 1/4/2019 3/9/2020   PHQ-9 Total Score 0 0 0     LITHIUM LABS     [level, renal, SG, TSH, WBC]    q6 mo  Recent Labs   Lab Test 01/10/20  1014 07/01/18  0829 06/06/18  0915 06/01/18  0810   LITHIUM <0.20* 0.96 1.11 1.29*     Recent Labs   Lab Test 01/10/20  1014 07/11/18  1050 06/18/18  0708 06/16/18  0838   CR 0.51* 0.51* 0.51* 0.49*   GFRESTIMATED >90 >90 >90 >90    139 137 140   POTASSIUM 4.0 3.7 3.8 3.9   BHANU 8.8 8.6 8.7 9.3     Recent Labs   Lab Test 01/10/20  1020 06/14/18  0030 04/02/18  2057 03/08/17  1408   SG 1.021 1.007 1.022 1.006     Recent Labs   Lab Test 01/10/20  1014 07/11/18  1050 06/06/18  2218 06/01/18  0201   TSH 5.09* 1.46 0.91 4.83*     Recent Labs   Lab Test 01/10/20  1014 07/11/18  1050 06/18/18  0708 06/17/18  1839  06/11/18  0731   WBC 6.1 5.8 6.3 7.3   < > 6.5   ANEU 2.8 3.2  --  4.4  --  3.7    < > = values in this interval not displayed.       DIAGNOSIS     Bipolar I disorder, current hypomanic vs mixed mood episode without psychotic features  Non-adherence to psychiatric medication regimen     ASSESSMENT                                                                                                   m2, h3     TODAY: Patient presents for follow-up via telephone; she declined my offer to transition to video.  She was last seen by me in clinic on March 9, 2020, at which time she was noted to be bright in affect, more verbose but not pressured and denying any mental health symptoms.  It was recommended that lithium lab work be repeated, and she was continued on lithium  mg at bedtime.  Please note that the plan section of my progress notes both 1/10 and 3/9 incorrectly stated lithium dose of 900 mg.  Patient's lithium dose was decreased to 450 mg at bedtime by her previous provider in September 2019.    I  last spoke with patient on Friday, March 22 after parents called and expressed concern for sleep and appetite changes for 2 days, rapid speech, scattered thoughts and possible paranoia in the context of stress with end of school semester.  During our discussion I noted that patient's speech was elevated in rate, and that she was potentially experiencing an elevated or possibly mixed mood episode consisting of low/irritable moods, poor focus, dysregulated sleep schedule and possible decreased need for sleep in context of Ramadan, and stress with end of school semester.  During that discussion, she endorsed good nightly adherence to lithium 450 mg nightly since early April (when moved in with her parents due to COVID-19) but admitted that prior to this, she had been frequently missing doses of her lithium for at least the past 6 to 8 months.  In review of chart, I noted that patient has previously had lithium levels around 1.0 when taking lithium 900.  Given lithium is relatively linear pharmacokinetics at lower doses, I recommended that she increase her dose to 600 mg nightly for a goal dose of 0.6-0.7.    Today, I spoke with patient and father.  They note that they have not yet picked up the 600 mg dose of lithium and she is still on 450 mg nightly.  She and her father both endorse significantly less stress and irritability since receiving a note requesting extra time to complete courses from our clinic for her professors.  Although Ramadan has ended last weekend, patient continues to struggle with readjusting her sleep cycle.  Patient continues to be awake for most of the nights, and then sleeps during the day.  Father expresses concern for decreased need for sleep, although patient is adamant that she is sleeping 7 to 8 hours/day, and that her sleep cycle will be back to normal by tonight.  She denies any other mental health symptoms including symptoms of shaina, depression, or psychosis.  No acute safety  concerns.    I continue to be concerned that patient is experiencing early stages of an elevated or possibly mixed mood episode.  Dose of lithium was not increased from 450 mg earlier in the year due to concern for possible poor medication adherence, difficulty obtaining repeat lithium levels, and absence of significant mood symptoms.  At this time, I am concerned that stress from school and sleep cycle changes associated with Ramadan may have contributed to her current symptoms.  I recommended patient increase lithium to 600 mg starting tonight, and present to [place] in 5 days for a trough lithium level.  I recommended follow-up with me in 3 weeks.  If lithium level is low, we will likely recommend further titrating the dose.  If therapeutic, could consider addition of SGA such as Seroquel (which patient has tolerated in the past) to further target mood stabilization.  No refills needed today.  Patient and father agreeable with plan as stated above.  Follow-up scheduled for June 19.    SUICIDE RISK ASSESSMENT:  Rate SI-desire: 0/5, intent: 0/5.  Cristina Boyd reports no thoughts intents or plans of harm to herself or anyone else.  In addition, she has notable risk factors for self-harm including recent psych inpt stay and multiple mood decompensations.  However, risk is mitigated by no plan or intent, describes a safety plan, h/o seeking help when needed, symptom improvement, future oriented, none to minimal alcohol use , commitment to family, good social support  , Church beliefs, stable housing and committed to school.  Based on all available evidence she does not appear to be at imminent risk for self-harm therefore does not meet criteria for a 72-hr hold/  involuntary hospitalization.  However, based on degree of symptoms close psych FU was recommended which the pt did agree to.  Additional steps to minimize risk include: SAFETY PLAN completed see above.  Safety Plan placed in AVS: Yes.    [place] PRESCRIPTION  MONITORING PROGRAM [] was checked today:  not using controlled substances.    PSYCHOTROPIC DRUG INTERACTIONS: none  MANAGEMENT:  N/A     PLAN                                                                                                              m2, h3     1) PSYCHOTROPIC MEDICATIONS:  - Increase Lithium CR to 600 mg HS    2) THERAPY:  No    3) NEXT DUE:    Labs- [place] labs ordered, check in 5 days time  EKG- PRN  Rating Scales- serial PHQ9    4) REFERRALS:  no    5) RTC: June 19    6) CRISIS NUMBERS:    Provided routinely in AVS.   [facility] [phone] (clinic)    [phone] (after hours)  ONLY if a [facility] PT: [facility] [phone] (clinic), [phone] (after hours)        TREATMENT RISK STATEMENT:  The risks, benefits, alternatives and potential adverse effects have been discussed and are understood by the pt. The pt understands the risks of using street drugs or alcohol. There are no medical contraindications, the pt agrees to treatment with the ability to do so. The pt knows to call the clinic for any problems or to access emergency care if needed.  Medical and substance use concerns are documented above.  Psychotropic drug interaction check was done, including changes made today.    PROVIDER: Adam Campbell MD    Patient not staffed in clinic. Supervisor is Dr. Agosto,  who will sign the note.      Attestation:  I did not see Cristina Boyd directly. I have reviewed the documentation and I agree with the resident's plan of care.   Zhang Agosto MD

## 2020-05-27 ENCOUNTER — VIRTUAL VISIT (OUTPATIENT)
Dept: PSYCHIATRY | Facility: CLINIC | Age: 20
End: 2020-05-27
Attending: PSYCHIATRY & NEUROLOGY
Payer: COMMERCIAL

## 2020-05-27 ENCOUNTER — TELEPHONE (OUTPATIENT)
Dept: PSYCHIATRY | Facility: CLINIC | Age: 20
End: 2020-05-27

## 2020-05-27 DIAGNOSIS — F31.9 BIPOLAR I DISORDER (H): Primary | ICD-10-CM

## 2020-05-27 NOTE — TELEPHONE ENCOUNTER
On 5/27/2020, at 0849, writer called patient at mobile to confirm Visit Type. Writer unable to make contact with patient. Writer left detailed voice message for callback. [phone], left as call back number. PATRICIA Johnson, EMT

## 2020-06-26 DIAGNOSIS — F31.9 BIPOLAR 1 DISORDER (H): ICD-10-CM

## 2020-06-26 RX ORDER — LITHIUM CARBONATE 300 MG/1
600 TABLET, FILM COATED, EXTENDED RELEASE ORAL AT BEDTIME
Qty: 60 TABLET | Refills: 0 | Status: SHIPPED | OUTPATIENT
Start: 2020-06-26 | End: 2020-08-04

## 2020-06-26 NOTE — TELEPHONE ENCOUNTER
"Last seen: 5/27/20  RTC: \"June 19th\"  Cancel: none  No-show: 6/19/20  Next appt: none     Incoming refill from pharmacy via electronic request     Medication requested: lithium (LITHOBID) 300 MG CR tablet   Directions: Take 2 tablets (600 mg) by mouth At Bedtime   Qty: 60  Last refilled: approximately 5/22/20 per med tab      Routed to provider due to one no show    "

## 2020-07-29 DIAGNOSIS — Z51.81 ENCOUNTER FOR THERAPEUTIC DRUG MONITORING: ICD-10-CM

## 2020-07-29 LAB
ANION GAP SERPL CALCULATED.3IONS-SCNC: 4 MMOL/L (ref 3–14)
BASOPHILS # BLD AUTO: 0.1 10E9/L (ref 0–0.2)
BASOPHILS NFR BLD AUTO: 0.6 %
BUN SERPL-MCNC: 6 MG/DL (ref 7–30)
CALCIUM SERPL-MCNC: 9.2 MG/DL (ref 8.5–10.1)
CHLORIDE SERPL-SCNC: 108 MMOL/L (ref 94–109)
CO2 SERPL-SCNC: 25 MMOL/L (ref 20–32)
CREAT SERPL-MCNC: 0.61 MG/DL (ref 0.52–1.04)
DIFFERENTIAL METHOD BLD: NORMAL
EOSINOPHIL # BLD AUTO: 0.2 10E9/L (ref 0–0.7)
EOSINOPHIL NFR BLD AUTO: 2 %
ERYTHROCYTE [DISTWIDTH] IN BLOOD BY AUTOMATED COUNT: 13.9 % (ref 10–15)
GFR SERPL CREATININE-BSD FRML MDRD: >90 ML/MIN/{1.73_M2}
GLUCOSE SERPL-MCNC: 88 MG/DL (ref 70–99)
HCT VFR BLD AUTO: 37.3 % (ref 35–47)
HGB BLD-MCNC: 12.1 G/DL (ref 11.7–15.7)
IMM GRANULOCYTES # BLD: 0 10E9/L (ref 0–0.4)
IMM GRANULOCYTES NFR BLD: 0.4 %
LITHIUM SERPL-SCNC: 0.72 MMOL/L (ref 0.6–1.2)
LYMPHOCYTES # BLD AUTO: 3.7 10E9/L (ref 0.8–5.3)
LYMPHOCYTES NFR BLD AUTO: 35.5 %
MCH RBC QN AUTO: 29.2 PG (ref 26.5–33)
MCHC RBC AUTO-ENTMCNC: 32.4 G/DL (ref 31.5–36.5)
MCV RBC AUTO: 90 FL (ref 78–100)
MONOCYTES # BLD AUTO: 1.1 10E9/L (ref 0–1.3)
MONOCYTES NFR BLD AUTO: 10.4 %
NEUTROPHILS # BLD AUTO: 5.3 10E9/L (ref 1.6–8.3)
NEUTROPHILS NFR BLD AUTO: 51.1 %
NRBC # BLD AUTO: 0 10*3/UL
NRBC BLD AUTO-RTO: 0 /100
PLATELET # BLD AUTO: 331 10E9/L (ref 150–450)
POTASSIUM SERPL-SCNC: 4.1 MMOL/L (ref 3.4–5.3)
RBC # BLD AUTO: 4.15 10E12/L (ref 3.8–5.2)
SODIUM SERPL-SCNC: 136 MMOL/L (ref 133–144)
T4 FREE SERPL-MCNC: 1.25 NG/DL (ref 0.76–1.46)
TSH SERPL DL<=0.005 MIU/L-ACNC: 9.6 MU/L (ref 0.4–4)
WBC # BLD AUTO: 10.4 10E9/L (ref 4–11)

## 2020-07-29 PROCEDURE — 84443 ASSAY THYROID STIM HORMONE: CPT | Performed by: STUDENT IN AN ORGANIZED HEALTH CARE EDUCATION/TRAINING PROGRAM

## 2020-07-29 PROCEDURE — 84439 ASSAY OF FREE THYROXINE: CPT | Performed by: STUDENT IN AN ORGANIZED HEALTH CARE EDUCATION/TRAINING PROGRAM

## 2020-07-29 PROCEDURE — 36415 COLL VENOUS BLD VENIPUNCTURE: CPT | Performed by: STUDENT IN AN ORGANIZED HEALTH CARE EDUCATION/TRAINING PROGRAM

## 2020-07-29 PROCEDURE — 80048 BASIC METABOLIC PNL TOTAL CA: CPT | Performed by: STUDENT IN AN ORGANIZED HEALTH CARE EDUCATION/TRAINING PROGRAM

## 2020-07-29 PROCEDURE — 85025 COMPLETE CBC W/AUTO DIFF WBC: CPT | Performed by: STUDENT IN AN ORGANIZED HEALTH CARE EDUCATION/TRAINING PROGRAM

## 2020-07-29 PROCEDURE — 80178 ASSAY OF LITHIUM: CPT | Performed by: STUDENT IN AN ORGANIZED HEALTH CARE EDUCATION/TRAINING PROGRAM

## 2020-07-30 NOTE — RESULT ENCOUNTER NOTE
Attempted to call patient x2 to discuss lab results with and screen for symptoms of hypothyroidism however, was unable to reach her and patient does not have individualized voice mailbox set up so message was not left. Will continue to attempt to make intermittent contact to discuss results, though not critical to discuss prior to our next scheduled appointment as she does have reassuring T4. However, given patient experiencing previous thyroid function change when initiating lithium will likely need to consider alternative therapy moving forward. Will discuss with patient at our next visit. Will also discuss potential need for follow-up labs and possible referral to PCP or endocrinology.

## 2020-08-03 DIAGNOSIS — F31.9 BIPOLAR 1 DISORDER (H): ICD-10-CM

## 2020-08-04 RX ORDER — LITHIUM CARBONATE 300 MG/1
600 TABLET, FILM COATED, EXTENDED RELEASE ORAL AT BEDTIME
Qty: 60 TABLET | Refills: 0 | Status: SHIPPED | OUTPATIENT
Start: 2020-08-04 | End: 2020-09-09

## 2020-08-04 NOTE — TELEPHONE ENCOUNTER
Received RF request via refill team    Last seen: 5/27/20  RTC: 6/19/20  Cancel: none  No-show: one - 6/19  Next appt: 8/31/20     Medication requested: lithium ER (LITHOBID) 300 MG CR tablet   Directions: Take 2 tablets (600 mg) by mouth At Bedtime   Qty: 60  Last Rx written 6/26/20 for 30 ds       Plan per 5/27 virtual visit:    - Increase Lithium CR to 600 mg HS       Pended 30 ds and routed to provider to sign off due to no show and not having been seen for transfer of care yet.

## 2020-08-04 NOTE — TELEPHONE ENCOUNTER
Yonas Kramer,    A 1-month refill is fine with me since she's scheduled to be seen within a month.    Thanks!    Tripp

## 2020-08-09 NOTE — PROGRESS NOTES
"  Adult Mental Health Outpatient Group Therapy Progress Note     Client Initial Individualized Goals for Treatment: Cristina reports the reason for referral at this time is shaina. \"I want be enrolled back in college and taking classes and be on top of my game\". \"I want to get a therapist, return to work and college, get a membership to a gym and a swimming pool\"     See Initial Treatment suggestions for the client during the time between Diagnostic Assessment and completion of the Master Individualized Treatment Plan.     Treatment Goals:     See above    Area of Treatment Focus:  Symptom Management, Develop / Improve Independent Living Skills and Develop Socialization / Interpersonal Relationship Skills    Therapeutic Interventions/Treatment Strategies:  Support, Feedback, Safety Assessments, Structured Activity and Problem Solving    Response to Treatment Strategies:  Accepted Feedback, Gave Feedback, Listened, Focused on Goals, Attentive and Accepted Support    Name of Group:  Life Skills: OT Clinic     Description and Outcome:  Pt attended and participated in a structured occupational therapy group where intervention focused on coping through structured hands-on activities to improve function in valued roles, routines, and independent living skills. Client presented to group with a bright affect. Energy level was high. She required mod assist to setup a novel task in session. Needed occasional cues to attend to activity. Socialized easily with peers in session. Client would benefit from additional opportunities to practice and implement content from this session. Client addressed ITP goal number initial goal in this session.    Is this a Weekly Review of the Progress on the Treatment Plan?  No.  " Never

## 2020-08-31 ENCOUNTER — TELEPHONE (OUTPATIENT)
Dept: PSYCHIATRY | Facility: CLINIC | Age: 20
End: 2020-08-31

## 2020-08-31 NOTE — TELEPHONE ENCOUNTER
On 08/31/2020, at 0940, writer called patient at [phone] to confirm Virtual Visit. Writer unable to make contact with patient. Writer left detailed voice message for call back. [phone] left as call back number. Cony Lima MA

## 2020-09-08 DIAGNOSIS — F31.9 BIPOLAR 1 DISORDER (H): ICD-10-CM

## 2020-09-09 RX ORDER — LITHIUM CARBONATE 300 MG/1
600 TABLET, FILM COATED, EXTENDED RELEASE ORAL AT BEDTIME
Qty: 30 TABLET | Refills: 0 | Status: SHIPPED | OUTPATIENT
Start: 2020-09-09 | End: 2020-09-28

## 2020-09-09 NOTE — TELEPHONE ENCOUNTER
Last seen: 5/27/20  RTC: 6/19/20  Cancel: none  No-show: 8/31, 6/19  Next appt: None (Polina)    Incoming refill from [place], [place] - [address]  via electronic request     Medication requested:   Disp  Refills  Start  End  BROOKS    lithium ER (LITHOBID) 300 MG CR tablet  60 tablet  0  8/4/2020   No    Sig - Route: Take 2 tablets (600 mg) by mouth At Bedtime      Writer routed message to scheduling to contact patient for appointment.  Pended 2-week supply, refill routed to provider due to refill protocol (outside RTC window, no-show).          09/09/20 1144  Sign  Polina, Richard Breyen, MD    E-Prescribing Status: Receipt confirmed by pharmacy (9/9/2020 11:44 AM CDT)

## 2020-09-28 DIAGNOSIS — F31.9 BIPOLAR 1 DISORDER (H): ICD-10-CM

## 2020-09-28 NOTE — TELEPHONE ENCOUNTER
eleanor's pt   Received: Today   Message Contents   Ame Campbell [facility] [place]    Phone Number: [phone]               Caller:  Self   Relationship to pt: Self   Medication:   lithium ER (LITHOBID) 300 MG CR tablet   How many days left of med do you have left (if >3 day supply, redirect to pharmacy): 0   Pharmacy and location: [place] [address]   Pending appt date:  10/23/20 at 9:15am   Okay to leave detailed VM:  yes, [phone]      Last seen: 5/27  RTC: 6/19  Cancel: none   No-show: 8/31, 6/19  Next appt: 10/23    Incoming refill from patient via phone     Medication requested: lithium ER (LITHOBID) 300 MG CR tablet   Directions: Take 2 tablets (600 mg) by mouth At Bedtime . Contact clinic at [phone] to schedule appointment for further refills - Oral   Qty: 30  Last refilled: 9/9    Will route to provider for approval

## 2020-09-29 RX ORDER — LITHIUM CARBONATE 300 MG/1
600 TABLET, FILM COATED, EXTENDED RELEASE ORAL AT BEDTIME
Qty: 60 TABLET | Refills: 0 | Status: SHIPPED | OUTPATIENT
Start: 2020-09-29 | End: 2020-11-02

## 2020-10-23 ENCOUNTER — TELEPHONE (OUTPATIENT)
Dept: PSYCHIATRY | Facility: CLINIC | Age: 20
End: 2020-10-23

## 2020-10-23 NOTE — TELEPHONE ENCOUNTER
On 10/23/2020, at 0843, writer called patient at mobile to confirm Virtual Visit. Writer unable to make contact with patient. Writer left detailed voice message for callback. [phone], left as call back number. PATRICIA Johnson, EMT

## 2020-11-02 DIAGNOSIS — F31.9 BIPOLAR 1 DISORDER (H): ICD-10-CM

## 2020-11-02 RX ORDER — LITHIUM CARBONATE 300 MG/1
600 TABLET, FILM COATED, EXTENDED RELEASE ORAL AT BEDTIME
Qty: 60 TABLET | Refills: 0 | Status: SHIPPED | OUTPATIENT
Start: 2020-11-02 | End: 2020-12-04

## 2020-11-02 NOTE — TELEPHONE ENCOUNTER
Received RF request from patient    Last seen: 5/27/20  RTC: 6/19  Cancel: none  No-show: three - 10/23, 8/31, 6/19  Next appt: 12/4/20     Medication requested: lithium ER (LITHOBID) 300 MG CR tablet  Directions: Take 2 tablets (600 mg) by mouth At Bedtime   Qty: 60  Last Rx written 9/29/20 for 30 ds     Plan per 5/27 virtual visit:    - Increase Lithium CR to 600 mg HS     Pended 30 ds and routed to Dr. Fish to sign off due to patient is outside of RTC, has three no shows, and follow up is not until 12/4/20.

## 2020-11-02 NOTE — TELEPHONE ENCOUNTER
[facility] Call Center    Phone Message    May a detailed message be left on voicemail: yes     Reason for Call: Medication Refill Request    Has the patient contacted the pharmacy for the refill? Yes   Name of medication being requested: Lithium  Provider who prescribed the medication:    Pharmacy: [facility]  Date medication is needed: Ran out yesterday         Action Taken: Other: [facility]    Travel Screening: Not Applicable

## 2020-11-02 NOTE — TELEPHONE ENCOUNTER
Called patient to let her know that the refill has been sent to the pharmacy. She expressed appreciation and will head over to pick it up today. Explained the importance of close follow up, especially with a medication such as lithium, and encouraged her to clear her schedule so that she can attend the upcoming appointment on 12/4. She expressed understanding.

## 2020-12-04 ENCOUNTER — TELEPHONE (OUTPATIENT)
Dept: PSYCHIATRY | Facility: CLINIC | Age: 20
End: 2020-12-04

## 2020-12-04 ENCOUNTER — APPOINTMENT (OUTPATIENT)
Dept: INTERPRETER SERVICES | Facility: CLINIC | Age: 20
End: 2020-12-04
Payer: COMMERCIAL

## 2020-12-04 DIAGNOSIS — F31.9 BIPOLAR 1 DISORDER (H): ICD-10-CM

## 2020-12-04 RX ORDER — LITHIUM CARBONATE 300 MG/1
600 TABLET, FILM COATED, EXTENDED RELEASE ORAL AT BEDTIME
Qty: 28 TABLET | Refills: 0 | Status: SHIPPED | OUTPATIENT
Start: 2020-12-04 | End: 2020-12-24

## 2020-12-04 NOTE — TELEPHONE ENCOUNTER
Last seen: 5/21  RTC: 3 weeks   Cancel: none   No-show: 12/4, 10/23, 8/31, 6/19  Next appt: none     Incoming refill from patient via phone     Medication requested: lithium ER (LITHOBID) 300 MG CR tablet  Directions: Take 2 tablets (600 mg) by mouth At Bedtime Refill can only be obtained at appointment. - Oral  Qty: 60  Last refilled: 11/2    Will route to provider for approval

## 2020-12-04 NOTE — TELEPHONE ENCOUNTER
- 2 weeks supply of meds refilled per providers approval   - Med tab changed to reflect this   - Patient notified

## 2020-12-04 NOTE — TELEPHONE ENCOUNTER
"[facility] Call Center    Phone Message    May a detailed message be left on voicemail: yes     Reason for Call: Medication Refill Request    Has the patient contacted the pharmacy for the refill? Yes   Name of medication being requested: Lithium 300mg  Provider who prescribed the medication: Dr. Fish  Pharmacy: [facility]  Date medication is needed: ASAP. Patient is out after today. She expressed frustration that she got no reminder call for the appointment and thought it was at 10:00am. She also expressed frustration about how she has not had an appointment since June, and that Dr. Fish is \"way too busy\"      Action Taken: Message routed to:  Other: P [facility]    Travel Screening: Not Applicable                                                                        "

## 2020-12-04 NOTE — TELEPHONE ENCOUNTER
On December 4, 2020, at 8:15 AM, writer called patient at [phone] to confirm Virtual Visit. Writer unable to make contact with patient. Writer left detailed voice message for call back. [phone] left as call back number. Cony Lima, CMA

## 2020-12-08 ENCOUNTER — TELEPHONE (OUTPATIENT)
Dept: PSYCHIATRY | Facility: CLINIC | Age: 20
End: 2020-12-08

## 2020-12-08 NOTE — TELEPHONE ENCOUNTER
Took a call from patient who wanted to report that she has been having symptoms and is thinking that she could be hypomanic or manic. This has been going on for the past week - she has not slept much, she has been very irritable, and she made a very bad decision. She denies safety concerns. She said that the bad decision was about her schoolwork, potentially related to not having done her finals.    She states that she will need a letter for her school that says that she is bipolar and she has been having symptoms. She says that she usually gets hypomanic towards the end of the semester due to all the stress. She wants the letter to say the aforementioned and she wants some accommodations. She needs this letter by tomorrow at the latest. Explained that she will need to be evaluated, but she said that should not be necessary as there should already be letters on file that can be used.    Attempted to get more information regarding her report of being in a hypomanic/manic state, but patient perseverated on having made a bad choice and now she needs a letter so she can appeal a decision made by the school that's not in her favor. She reiterated that she just wants a letter similar to others that have been written for her in the past requesting accommodations for symptom exacerbation.    Routed urgently to Dr. Fish and Dr. Cochran.

## 2020-12-08 NOTE — TELEPHONE ENCOUNTER
At this point I have not yet seen this patient to establish care and she has had 3 no-show appointments with me; I do not feel comfortable writing a letter for her without having seen or spoken to her. I attempted to call her to discuss what was going on and see if she would be willing to be seen in an urgent clinic slot. However, I was unable to reach her and the phone rang to voicemail after I called her on two separate occassions. Left a message detailing that I would like for her to be seen and evaluated before receiving a letter, left clinic phone number, and informed her that I would call her again tomorrow morning in an attempt to discuss a plan with her.  Thanks,  Edward

## 2020-12-08 NOTE — LETTER
December 11, 2020      Re: Cristina Boyd  [address] [place] [address]         To whom it may concern,    Ms. Cristina Boyd has been a patient at this clinic since 2016. In that time, she has had a few episodes of exacerbation of symptoms of her mental illness which have made it difficult for her to function optimally secondary to impaired thought process. One of the primary symptoms of her condition is impaired judgment. During an episode, people with the same condition cannot think clearly and consider the repercussions of their actions. Some people have done things that they would not have otherwise done had they not been experiencing an exacerbation.    I understand that Ms. Boyd made an error in judgment that has placed her academic integrity in question. This treatment team believes that this error was made in the context of an acute exacerbation in her illness. I would like to ask for deborah in terms of the consequences the University might impose on her for the transgression, and consider the role that her recent (and ongoing) exacerbation has played in her poor judgment.    Sincerely,  Signed electronically by MEIR on 12/14/2020 @ 02:15 PM    Amelia Cochran MD  Medical Director

## 2020-12-08 NOTE — Clinical Note
Hi, all!    Please see letter and edit as appropriate. I didn't want to include her actual diagnosis. Let me know next steps.    Thanks!  VVV

## 2020-12-09 ENCOUNTER — TELEPHONE (OUTPATIENT)
Dept: PSYCHIATRY | Facility: CLINIC | Age: 20
End: 2020-12-09

## 2020-12-09 NOTE — TELEPHONE ENCOUNTER
Writer placed a call to patient to assess for safety. Patient reports feeling hypomanic and shared experiencing mood swings, irritability, feeling impulsive, and difficulty sleeping. Patient denies any safety concerns at this time. Educated her to come into the ED if safety is a concern. She verbalized understanding.  She is willing to discuss the request for letter for school during the appt on 12/11 at 8:30 am. Scheduling notified.     Routed to provider as TOREY

## 2020-12-09 NOTE — TELEPHONE ENCOUNTER
consulted with provider and received the following VORB:   1. Patient will need to be seen for a visit before a letter can be provided   2. If patient is willing to wait till Friday 12/11 for a urgent appt with Dr. Yoder then she can be offered the urgent slot   3. If patient is unable to wait and needs to be assessed she will need to come into the ED   4. If the clinic staff is unable to connect with the patient, please reach out to mom     placed a call to patient to connect and relay the above plan. No answer at number provided. LVM, requesting a call back. Clinic number provided.

## 2020-12-09 NOTE — TELEPHONE ENCOUNTER
Writer took a call from patient who was calling back with same requests from earlier in the day.  She was talking fast and stating that she was having manic symptoms.  She stated that her professor has been unwilling to make accommodations until she received a letter from patient's provider.  Per previous note, Dr. Campbell is allowing for an urgent appointment to be made on Friday morning at 8:30. Patient agreed to take appointment and verbalized understanding that her requests for a letter can not be accommodated until she sees a provider.  Writer asked her to set her alarm right away and patient said she's try her best to make the appointment.

## 2020-12-09 NOTE — TELEPHONE ENCOUNTER
[facility] Call Center    Phone Message    May a detailed message be left on voicemail: yes     Reason for Call: Other: Pt called back to speak with Dr. Fish. Writer had telephone issues and was not able to address the conversation. Pt would like a call this afternoon.      Action Taken: Other: pool    Travel Screening: Not Applicable

## 2020-12-10 NOTE — PROGRESS NOTES
TELEPHONE VISIT  Cristina Boyd is a 20 year old pt. who is being evaluated via a billable telephone visit.      The patient has been notified of the following:    We have found that certain health care needs can be provided without the need for a physical exam. This service lets us provide the care you need with a short phone conversation. If a prescription is necessary we can send it directly to your pharmacy. If lab work is needed we can place an order for that and you can then stop by our lab to have the test done at a later time. Insurers are generally covering virtual visits as they would in-office visits so billing should not be different than normal.  If for some reason you do get billed incorrectly, you should contact the billing office to correct it and that number is in the AVS .    Patient has given verbal consent for a telephone visit?:  Yes   How would the pt like to obtain the AVS?:  Vsnap  AVS SmartPhrase [PsychAVS] has been placed in 'Patient Instructions':  Yes     Start Time:  8:49 AM          End Time:  946           [facility]  Psychiatry Clinic  PROGRESS NOTE     Date of initial diagnostic assessment is 3/10/16.  Date of most recent transfer of care assessment is 1/10/2020    CARE TEAM:    PCP- No Ref-Primary, Physician    Therapist- none    Cristina Boyd is a 20 year old female who prefers the name Klaus & pronouns she, her, hers.    Psych critical item history includes non adherence to medications.     INTERIM HISTORY                                                                                              4, 4   The patient reports good treatment adherence. History was provided by the patient who was a good historian.  Last seen in clinic May 27, 2020, at which time lithium CR was increased to 600 mg at bedtime.   Subsequent lithium level July 29 of 0.72 and elevated TSH of 9.6.    - Transferred to Dr. Fish, July 2020, no shows 6/19, 8/31, 10/23, 12/4   Since  "the last visit:   - Klaus did not answer my calls or log into my Doxy room this am. There is an active ALLI for her father so I called him, and he connected me to Klaus. Klaus states her phone was off  - Klaus reports presenting today because she recently made rash decision she thinks she wouldn't have otherwise made. She was behind on a class during finals week, emailed a student asking for help on an assignment she was behind on, and this student forwarded it to her teacher. The teacher gave her an academic integrity violation, and Klaus is now appealing it. For the appeal, she needs \"a case, to tell them she was not myself and sometimes can have some hypomania at the end of the semester.\"   - Klaus reports symptoms of terrible sleep for the past week, a well as some irritable mood. She reports near perfect compliance to Li, but ran out for two days last week; thinks symptoms started around that time. Adherent since that time. She has had many nights where she can't fall asleep until the early morning. She won't feel tired until \"super late.\" She's had nights where she's only slept 9a until noon. Last night, she slept 6a until now (9am). Was doing work on computer until 1-2a, and then tried to sleep but couldn't. She feels like she has a lot of energy during the day, and then her energy crashes later in the day, \"it comes in waves, I don't think I'm manic, maybe hypomanic.\" She's felt more low and stressed since receiving the letter from school 4 days ago. She feels she's been acting a bit impulsively overall. States her sister told her she didn't seem to be doing well, and that she was worried she was manic. She thinks her thoughts were moving faster and she was talking faster last week, \"until that letter came I had a reality check.\"  - No SI or thoughts of hurting herself.   - I asked to speak with Klaus's parent, and she said she preferred I speak with her sister Polly. She then stated sister was not available, nor were " her parents, to talk with me.    RECENT SYMPTOMS:   Depression:  irritability, sleep cycle dysregulation and possible insomnia, feeling overwhelmed  Elevated: irritability, talking faster, sleep cycle disruption with possible decreased need for sleep  Psychosis:  none  Anxiety:  excessive worry   Panic Attack:  none  Trauma Related:  none  Dysregulation:  none  Eating Disorder: no   Pertinent Negative Symptoms:  NO suicidal ideation, self-injurious behavior/urges, violent ideation, aggression, psychosis, hallucinations, delusions, adela and hypomania    RECENT SUBSTANCE USE:     ALCOHOL- no      TOBACCO- no     CAFFEINE- no  OPIOIDS- no         NARCAN KIT- no        CANNABIS- no            OTHER ILLICIT DRUGS- none      CURRENT SOCIAL HISTORY:  FINANCIAL SUPPORT- family  CHILDREN- no      LIVING SITUATION- Lives with family in [facility] for past few months, was in [facility] until April due to COVID  SOCIAL/ SPIRITUAL SUPPORT- Family and friends     FEELS SAFE AT HOME- yes     PSYCH and CD Critical Summary Points since July 2019            Dec 2019 - continued Lithium  *Please note that the plan section of my progress notes both 1/10 and 3/9 incorrectly stated lithium dose of 900 mg.  Patient's lithium dose was decreased to 450 mg at bedtime by her previous provider in September 2019.*  Mar 2020 - no change, repeat Li labs orderd but not obtained  May 2020 - Increased Li to 600 mg nightly  Dec 2020 - short course Ativan prescribed to normalize sleep    PAST PSYCH MED TRIALS   Abilify. Seroquel    Depakote, Lithium    Ativan, propranolol    Cogentin    MEDICAL / SURGICAL HISTORY                                   Pregnant or breastfeeding- no      Contraception- Not discussed    Neurologic Hx- no   Patient Active Problem List   Diagnosis     Adela (H)     Bipolar affective disorder, current episode manic with psychotic symptoms (H)     Suicidal ideation     Hx of psychiatric care     Bipolar I disorder, current or most  recent episode manic, with psychotic features (H)     Lymphadenopathy     Bipolar 1 disorder (H)       Major Surgery- unknown     MEDICAL REVIEW OF SYSTEMS    [2, 10]     A comprehensive review of systems was performed and is negative other than noted in the HPI.    ALLERGY                                Patient has no known allergies.  MEDICATIONS                                 Current Outpatient Medications   Medication Sig Dispense Refill     lithium ER (LITHOBID) 300 MG CR tablet Take 2 tablets (600 mg) by mouth At Bedtime Refill can only be obtained at appointment. 28 tablet 0     VITALS                                                                                                                                  3, 3   There were no vitals taken for this visit.   MENTAL STATUS EXAM                                                                         9, 14 cog gs     Alertness: alert  and oriented  Appearance: telephone visit  Behavior/Demeanor: cooperative, pleasant and interruptive at times but easily redirected  Speech: occasionally slightly faster than baseline  Language: intact and no problems  Psychomotor: not assessed  Mood: depressed, worried and irritable  Affect: not assessed  Thought Process/Associations: hyperfocused on receiving a clinic letter, however organized and linear  Thought Content:  Reports none;  Denies suicidal ideation, violent ideation and delusions  Perception:  Reports none;  Denies auditory hallucinations and visual hallucinations  Insight: fair  Judgment: fair  Cognition: does  appear grossly intact; formal cognitive testing was not done  Gait and Station: not observed      LABS and DATA     AIMS:  n/a    PHQ9 TODAY = not obtained, telehealth  PHQ-9 SCORE 11/2/2018 1/4/2019 3/9/2020   PHQ-9 Total Score 0 0 0     LITHIUM LABS     [level, renal, SG, TSH, WBC]    q6 mo  Recent Labs   Lab Test 07/29/20  1200 01/10/20  1014 07/01/18  0829 06/06/18  0915   LITHIUM 0.72 <0.20* 0.96  1.11     Recent Labs   Lab Test 07/29/20  1200 01/10/20  1014 07/11/18  1050 06/18/18  0708   CR 0.61 0.51* 0.51* 0.51*   GFRESTIMATED >90 >90 >90 >90    139 139 137   POTASSIUM 4.1 4.0 3.7 3.8   BHANU 9.2 8.8 8.6 8.7     Recent Labs   Lab Test 01/10/20  1020 06/14/18  0030 04/02/18  2057 03/08/17  1408   SG 1.021 1.007 1.022 1.006     Recent Labs   Lab Test 07/29/20  1200 01/10/20  1014 07/11/18  1050 06/06/18  2218   TSH 9.60* 5.09* 1.46 0.91     Recent Labs   Lab Test 07/29/20  1200 01/10/20  1014 07/11/18  1050 06/18/18  0708 06/17/18  1839   WBC 10.4 6.1 5.8 6.3 7.3   ANEU 5.3 2.8 3.2  --  4.4     PSYCHOTROPIC DRUG INTERACTIONS   none  MANAGEMENT:  N/A    Risk Statement For Safety     Cristina Boyd did not appear to be an imminent safety risk to self or others.    DIAGNOSIS     Bipolar I disorder, r/o current hypomanic vs mixed mood episode  Non-adherence to psychiatric medication regimen     ASSESSMENT                                                                                                   m2, h3     TODAY: Klaus reports today for urgent evaluation after she contacted clinic and requested a letter be mailed to her school that she is having mental health symptoms.  Today's visit focused on assessment of acute mental health status.  History was notable for decreased sleep with increased latency and circadian rhythm shift for approximately 7 to 10 days, starting in context of finals and following 2 days of missed lithium dose.  Endorses good adherence since that time.  Mood was irritable, and was having some racing thoughts and increased speech rate per her report, which was noticed by family.  She ultimately made a decision which she now regrets in an attempt to catch up on end of the semester school work which has resulted in an academic integrity violation being given to her on Monday.  It is unclear to me today the extent to which symptoms of possible low-level hypomania versus feeling stressed  and overwhelmed in context of finals may be contributing to today's presentation.  Following consultation with staff attending, we prescribed a low-dose of Ativan, 2-week course, to attempt to normalize sleep structure.  We are requesting that her primary psychiatrist, Dr. Fish, check-in via phone in approximately 2 weeks on Wednesday, December 23 to assess for progress prior to their next scheduled visit on January 8.  Also of note, patient's TSH was elevated at last check in July.  She was counseled as to signs and symptoms of hypothyroidism, she denied the symptoms today, and she agreed to present to the lab today to have these labs repeated.  If TSH remains elevated, would recommend initiation of Synthroid 50 mg daily and referral to PCP or possibly endocrinology.  Patient's questions answered, follow-up as scheduled.    [place] PRESCRIPTION MONITORING PROGRAM [] was checked today:  None     PLAN                                                                                                              m2, h3     1) PSYCHOTROPIC MEDICATIONS:  -Continue lithium  mg nightly  -Start 2-week course of Ativan 1 mg nightly as needed for sleep    2) THERAPY:  No    3) LABS: repeat Li labs ordered    4) RTC: Dr. Fish 1/8/2020; recommend Dr. Fish check in via phone with Klaus Wed Dec 23    5) CRISIS NUMBERS:    Provided routinely in AVS.   [facility] [phone] (clinic)    [phone] (after hours)  ONLY if a [facility] PT: [facility] [phone] (clinic), [phone] (after hours)        TREATMENT RISK STATEMENT:  The risks, benefits, alternatives and potential adverse effects have been discussed and are understood by the pt. The pt understands the risks of using street drugs or alcohol. There are no medical contraindications, the pt agrees to treatment with the ability to do so. The pt knows to call the clinic for any problems or to access emergency care if needed.  Medical and substance use  concerns are documented above.  Psychotropic drug interaction check was done, including changes made today.    PROVIDER: Adam Campbell MD    Patient staffed in clinic with Dr. Hernandez who will sign the note.  Supervisor is Dr. Cochran    TELEHEALTH ATTENDING ATTESTATION  Following the ACGME guidelines on telemedicine and direct supervision due to COVID-19, I was concurrently participating in and/or monitoring the patient care through appropriate telecommunication technology.  I discussed the key portions of the service with the resident, including the mental status examination and developing the plan of care. I reviewed key portions of the history with the resident. I agree with the findings and plan as documented in this note.   Pradeep Hernandez MD

## 2020-12-11 ENCOUNTER — TELEPHONE (OUTPATIENT)
Dept: PSYCHIATRY | Facility: CLINIC | Age: 20
End: 2020-12-11

## 2020-12-11 ENCOUNTER — VIRTUAL VISIT (OUTPATIENT)
Dept: PSYCHIATRY | Facility: CLINIC | Age: 20
End: 2020-12-11
Attending: PSYCHIATRY & NEUROLOGY
Payer: COMMERCIAL

## 2020-12-11 DIAGNOSIS — Z51.81 ENCOUNTER FOR THERAPEUTIC DRUG MONITORING: Primary | ICD-10-CM

## 2020-12-11 DIAGNOSIS — F31.9 BIPOLAR I DISORDER (H): ICD-10-CM

## 2020-12-11 DIAGNOSIS — G47.00 INSOMNIA, UNSPECIFIED TYPE: ICD-10-CM

## 2020-12-11 PROCEDURE — 99214 OFFICE O/P EST MOD 30 MIN: CPT | Mod: 95 | Performed by: STUDENT IN AN ORGANIZED HEALTH CARE EDUCATION/TRAINING PROGRAM

## 2020-12-11 RX ORDER — LORAZEPAM 1 MG/1
1 TABLET ORAL
Qty: 15 TABLET | Refills: 0 | Status: SHIPPED | OUTPATIENT
Start: 2020-12-11 | End: 2021-02-05

## 2020-12-11 NOTE — PATIENT INSTRUCTIONS
Thank you for coming to the [facility] [place] [facility].    Good to see you today, Klaus.  Start Ativan 1 mg at night for sleep as needed.   I have sent this to your pharmacy.  Come to [facility] today at noon to have your lithium labs drawn, and then  your prescription afterwards.  Your next visit with Dr. Fish is scheduled for 01/08/2020.  I have also asked that he check in with you via phone on Wednesday, December 23.  I will communicate your ongoing desire for a school letter with Dr. Cochran today.  Please reach out via phone or TrewCap with any questions or concerns prior to your next appointment.     Take care,  Dr. Campbell    Lab Testing:  If you had lab testing today and your results are reassuring or normal they will be mailed to you or sent through TrewCap within 7 days. If the lab tests need quick action we will call you with the results. The phone number we will call with results is # [phone] (home) . If this is not the best number please call our clinic and change the number.    Medication Refills:  If you need any refills please call your pharmacy and they will contact us. Our fax number for refills is [phone]. Please allow three business for refill processing. If you need to  your refill at a new pharmacy, please contact the new pharmacy directly. The new pharmacy will help you get your medications transferred.     Scheduling:  If you have any concerns about today's visit or wish to schedule another appointment please call our office during normal business hours [phone] (8-5:00 M-F)    Contact Us:  Please call [phone] during business hours (8-5:00 M-F).  If after clinic hours, or on the weekend, please call  [phone].    Financial Assistance [phone]  [facility] Billing [phone]  Central Billing Office, [facility]: [phone]  [facility] Billing [phone]  Medical Records [phone]  [facility] Patient Bill of Rights  [email or web address]       MENTAL HEALTH CRISIS NUMBERS:  For a medical emergency please call  911 or go to the nearest ER.     [facility]:   [facility] -[phone]   Crisis Residence [facility] -[phone]   Walk-In Counseling Center [facility] -[phone]   COPE 24/7 [facility] Mobile Team -[phone] (adults)/[phone] (child)  CHILD: [facility] needs assessment team - [phone]      [facility]:   [facility] - [phone]   Walk-in counseling [facility] - [phone]   Walk-in counseling [facility] - [phone]   Crisis Residence [facility] - [phone]  Urgent Care Adult Mental Tjufkv-[phone] mobile unit/ 24/7 crisis line    National Crisis Numbers:   National Suicide Prevention Lifeline: 6-554-672-TALK (767-186-6991)  Poison Control Center - 1-385.931.4518  Un[employer]et/resources for a list of additional resources (SOS)  Trans Lifeline a hotline for transgender people 8-514-292-4570  The [employer] Project a hotline for LGBT youth 4-743-909-3178  Crisis Text Line: For any crisis 24/7   To: 398463  see www.crisistextline.org  - IF MAKING A CALL FEELS TOO HARD, send a text!         Again thank you for choosing [facility] and please let us know how we can best partner with you to improve you and your family's health.    You may be receiving a survey regarding this appointment. We would love to have your feedback, both positive and negative. The survey is done by an external company, so your answers are anonymous.

## 2020-12-11 NOTE — TELEPHONE ENCOUNTER
On December 11, 2020, at 8:02 AM, writer called patient at [phone] to confirm Virtual Visit. Writer unable to make contact with patient. Writer left detailed voice message for call back. [phone] left as call back number. Cony Lima, CMA

## 2020-12-14 NOTE — TELEPHONE ENCOUNTER
Letter reviewed and signed by Dr. Cochran. Called patient and left VM requesting a return call to discuss how she would like the letter sent to her. Provided clinic number.

## 2020-12-15 NOTE — TELEPHONE ENCOUNTER
Called patient and left VM requesting a return call to discuss how she would like to receive the letter.     Called patient's Mom, Joy (consent on file) to let her know that the letter Klaus needs for school is ready. She said that Klaus is currently sleeping after not being able to sleep for a while, so writer asked her not to wake her up. Requested that she ask Klaus to call to confirm her email address. Mom agreed, and she also requested that a copy of the letter be mailed to their home address.

## 2020-12-17 NOTE — TELEPHONE ENCOUNTER
Patient has been called twice and has not returned either VM. Writer has spoken with her mother and relayed the request to have patient call back to confirm her email or to discuss how she would like the letter handled. At Mom's request, letter has been mailed to the home address on file (confirmed with Mom).     Called and left another VM requesting a return call to discuss how she would like the letter handled. Advised her that a copy has been mailed out to her. Provided clinic number. Will wait for patient to return call.

## 2020-12-21 ENCOUNTER — TELEPHONE (OUTPATIENT)
Dept: PSYCHIATRY | Facility: CLINIC | Age: 20
End: 2020-12-21

## 2020-12-21 NOTE — TELEPHONE ENCOUNTER
On 12/21/2020 a letter drafted 12/11/2020 was emailed to [email or web address].  Negrita Watson CMA

## 2020-12-21 NOTE — TELEPHONE ENCOUNTER
"  -----------------------------------------------------------------------------------------------------------  Brief Psychiatry Phone note  Cristina Boyd MRN# [id number]   Age: 20 year old   Clinic Provider:  YOB: 2000   PCP: No Ref-Primary, Physician          Phone note:   Writer called Cristina Boyd at this number: [phone]  at 3:27 PM to follow-up from her urgent psychiatry appointment on 12/11/20. She reported starting the Ativan 1mg at bedtime this last weekend and has noted that it has made \"me drowsy\" in the morning but otherwise has worked well to help her fall asleep.  Notes previous issues with sleep had been her waking up and going back to sleep. Also not going to sleep until \"really late at night\". Now, takes the Ativan at around 9 PM and then going to sleep about 1 hour later. She will then sleep for about 10 hours. She is still wrapping up her semester but reports that most of her symptoms have been improved since the appointment, that she is \"feeling a lot less stress\", which she believes is a large contributing factor to that feeling. Today, denies other symptoms of shaina or hypomania, including irritability, difficulty with sleep, or poor decision making. Reports she will contact clinic if any of these arise or she has additional questions or concerns.    Patient was alert and conversational. Speech was clear with regular rate and rhythm. Thought process was logical and linear. Thought content was negative for safety concerns. Memory and attention were without gross deficit. Insight and judgment intact.     Patient is not currently suicidal or homicidal. she is aware to call 911 or go to the nearest ER if safety concern or urgent symptoms arise. She will be seen for follow-up by this writer on 1/8/21.    Edward Fish MD  "

## 2020-12-23 DIAGNOSIS — F31.9 BIPOLAR 1 DISORDER (H): ICD-10-CM

## 2020-12-24 RX ORDER — LITHIUM CARBONATE 300 MG/1
600 TABLET, FILM COATED, EXTENDED RELEASE ORAL AT BEDTIME
Qty: 60 TABLET | Refills: 0 | Status: SHIPPED | OUTPATIENT
Start: 2020-12-24 | End: 2021-01-08

## 2020-12-24 NOTE — TELEPHONE ENCOUNTER
Medication requested: lithium ER (LITHOBID) 300 MG CR tablet  Take 2 tablets (600 mg) by mouth At Bedtime Refill can only be obtained at appointment  Last refilled: 12/4/20  Qty: 28/0      Last seen:12/11/20  RTC: 1/8/2021  Cancel: 0  No-show: 0  Next appt: 1/8/2021    Refill decision:    Refilled for 30 days per protocol.

## 2021-01-08 ENCOUNTER — VIRTUAL VISIT (OUTPATIENT)
Dept: PSYCHIATRY | Facility: CLINIC | Age: 21
End: 2021-01-08
Attending: PSYCHIATRY & NEUROLOGY
Payer: COMMERCIAL

## 2021-01-08 DIAGNOSIS — Z92.89 HX OF PSYCHIATRIC CARE: ICD-10-CM

## 2021-01-08 DIAGNOSIS — F31.9 BIPOLAR 1 DISORDER (H): ICD-10-CM

## 2021-01-08 PROCEDURE — 90792 PSYCH DIAG EVAL W/MED SRVCS: CPT | Mod: HN | Performed by: STUDENT IN AN ORGANIZED HEALTH CARE EDUCATION/TRAINING PROGRAM

## 2021-01-08 RX ORDER — LITHIUM CARBONATE 300 MG/1
600 TABLET, FILM COATED, EXTENDED RELEASE ORAL AT BEDTIME
Qty: 60 TABLET | Refills: 0 | Status: SHIPPED | OUTPATIENT
Start: 2021-01-08 | End: 2021-02-05

## 2021-01-08 ASSESSMENT — PAIN SCALES - GENERAL: PAINLEVEL: NO PAIN (0)
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
## 2021-01-08 NOTE — PATIENT INSTRUCTIONS
Thank you for coming to the [facility] [place] [facility].    Lab Testing:  If you had lab testing today and your results are reassuring or normal they will be mailed to you or sent through [employer] within 7 days. If the lab tests need quick action we will call you with the results. The phone number we will call with results is # [phone] (home) . If this is not the best number please call our clinic and change the number.    Medication Refills:  If you need any refills please call your pharmacy and they will contact us. Our fax number for refills is [phone]. Please allow three business for refill processing. If you need to  your refill at a new pharmacy, please contact the new pharmacy directly. The new pharmacy will help you get your medications transferred.     Scheduling:  If you have any concerns about today's visit or wish to schedule another appointment please call our office during normal business hours [phone] (8-5:00 M-F)    Contact Us:  Please call [phone] during business hours (8-5:00 M-F).  If after clinic hours, or on the weekend, please call  [phone].    Financial Assistance [phone]  [facility] Billing [phone]  Central Billing Office, [facility]: [phone]  [facility] Billing [phone]  Medical Records [phone]  [facility] Patient Bill of Rights [email or web address]       MENTAL HEALTH CRISIS NUMBERS:  For a medical emergency please call  911 or go to the nearest ER.     [facility] [place]:   [facility] -[phone]   Crisis Residence [facility] -[phone]   Walk-In Counseling Center [facility] -[phone]   COPE 24/7 [place] Mobile Team -[phone] (adults)/[phone] (child)  CHILD: [facility] needs assessment team - [phone]      [facility]:   [facility] - [phone]   Walk-in counseling [facility]  [facility] - [phone]   Walk-in counseling [facility] - [phone]   Crisis Residence [facility] - [phone]  Urgent Care Adult Mental [facility]-[phone] mobile unit/ 24/7 crisis line    National Crisis Numbers:   National Suicide Prevention Lifeline: 2-682-932-TALK (491-279-8469)  Poison Control Center - 1-943.769.5810  "Consult [employer]"/resources for a list of additional resources (SOS)  Trans Lifeline a hotline for transgender people 5-920-001-9260  The CoPatient Project a hotline for LGBT youth 1-964.428.6292  Crisis Text Line: For any crisis 24/7   To: 109921  see www.crisistextline.org  - IF MAKING A CALL FEELS TOO HARD, send a text!         Again thank you for choosing [facility] MENTAL HEALTH & ADDICTION [facility] and please let us know how we can best partner with you to improve you and your family's health.    You may be receiving a survey regarding this appointment. We would love to have your feedback, both positive and negative. The survey is done by an external company, so your answers are anonymous.

## 2021-01-08 NOTE — PROGRESS NOTES
"VIDEO VISIT  Cristina Boyd is a 20 year old patient who is being evaluated via a billable video visit.      The patient has been notified of following:   \"This video visit will be conducted via a call between you and your physician/provider. We have found that certain health care needs can be provided without the need for an in-person physical exam. This service lets us provide the care you need with a video conversation. If a prescription is necessary we can send it directly to your pharmacy. If lab work is needed we can place an order for that and you can then stop by our lab to have the test done at a later time. Insurers are generally covering virtual visits as they would in-office visits so billing should not be different than normal.  If for some reason you do get billed incorrectly, you should contact the billing office to correct it and that number is in the AVS .    Video Conference to be completed via:  [email or web address]    Patient has given verbal consent for video visit?:  Yes    Patient would prefer that any video invitations be sent by: Send to e-mail at: [email or web address]      How would patient like to obtain AVS?:  Mail a copy    AVS SmartPhrase [PsychAVS] has been placed in 'Patient Instructions':  Yes    "

## 2021-01-08 NOTE — Clinical Note
Yonas Leon,   Looks like her TSH is increasing.  Has she been seen by endocrinology to evaluate if this is independent (autoimmune thyroid d/o are common in women) thyroid condition or side effects of Lithium? Even if it is Lithium related and she needs to stay on it (had many other BAD rx tried), then you can treat it with T4 supplementation.     LZ

## 2021-02-04 NOTE — PROGRESS NOTES
Yes, I have been trying to get her to come in and repeat her TSH since July, she had just restarted the lithium prior to that lab draw, and has a history of elevated TSH following initiating lithium, in the past they have normalized. At the last appointment she reported that she would go in to have it redrawn, but has not. I will see if we can call to remind her this week.   Thanks for following up on this!

## 2021-02-04 NOTE — PATIENT INSTRUCTIONS
Nice to see you today Cristina. As we discussed:   - Go to [facility] labs to get labs done ~12 hours after last lithium dose  - Call [facility] [phone] to schedule therapy intake    Thank you for coming to the [facility] HEALTH & ADDI[facility] [place] [facility].    Lab Testing:  If you had lab testing today and your results are reassuring or normal they will be mailed to you or sent through [employer] within 7 days. If the lab tests need quick action we will call you with the results. The phone number we will call with results is # [phone] (home) . If this is not the best number please call our clinic and change the number.    Medication Refills:  If you need any refills please call your pharmacy and they will contact us. Our fax number for refills is [phone]. Please allow three business for refill processing. If you need to  your refill at a new pharmacy, please contact the new pharmacy directly. The new pharmacy will help you get your medications transferred.     Scheduling:  If you have any concerns about today's visit or wish to schedule another appointment please call our office during normal business hours [phone] (8-5:00 M-F)    Contact Us:  Please call [phone] during business hours (8-5:00 M-F).  If after clinic hours, or on the weekend, please call  [phone].    Financial Assistance [phone]  [facility] Billing [phone]  Central Billing Office, [facility]: [phone]  [facility]illing [phone]  Medical Records [phone]  [facility] Patient Bill of Rights [email or web address]       MENTAL HEALTH CRISIS NUMBERS:  For a medical emergency please call  911 or go to the nearest ER.     [facility]:   [facility] -[phone]   Crisis Residence [facility] -[phone]   Walk-In Counseling Center [facility] -[phone]   COPE 24/7 [facility]  Team -[phone] (adults)/[phone] (child)  CHILD: [facility] needs assessment team - [phone]      [facility]:   [facility] - [phone]   Walk-in counseling [facility] - [phone]   Walk-in counseling [facility] - [phone]   Crisis Residence [facility] Residence - [phone]  Urgent Care Adult [facility]-[phone] mobile unit/ 24/7 crisis line    National Crisis Numbers:   National Suicide Prevention Lifeline: 1-132-863-TALK (276-611-5018)  Poison Control Center - 1-906.513.9983  [employer]/resources for a list of additional resources (SOS)  Trans Lifeline a hotline for transgender people 7-787-871-8287  The [employer] a hotline for LGBT youth 1-554.860.1800  Crisis Text Line: For any crisis 24/7   To: 846800  see www.crisistextline.org  - IF MAKING A CALL FEELS TOO HARD, send a text!         Again thank you for choosing [facility] HEALTH [facility]DI[facility] [place] [facility] and please let us know how we can best partner with you to improve you and your family's health.    You may be receiving a survey regarding this appointment. We would love to have your feedback, both positive and negative. The survey is done by an external company, so your answers are anonymous.

## 2021-02-04 NOTE — PROGRESS NOTES
"TELEPHONE VISIT  Cristina Boyd is a 20 year old pt. who is being evaluated via a billable telephone visit.      The patient has been notified of the following:    We have found that certain health care needs can be provided without the need for a physical exam. This service lets us provide the care you need with a short phone conversation. If a prescription is necessary we can send it directly to your pharmacy. If lab work is needed we can place an order for that and you can then stop by our lab to have the test done at a later time. Insurers are generally covering virtual visits as they would in-office visits so billing should not be different than normal.  If for some reason you do get billed incorrectly, you should contact the billing office to correct it and that number is in the AVS .    Patient has given verbal consent for a telephone visit?:  Yes   How would the pt like to obtain the AVS?:  Vgift  AVS SmartPhrase [PsychAVS] has been placed in 'Patient Instructions':  Yes     Start Time:  2:26 PM        End Time:   3:00 PM        [facility]  Psychiatry Clinic  PSYCHIATRIC PROGRESS NOTE     CARE TEAM:  PCP- No Ref-Primary, Physician     Cristina Boyd is a 20 year old patient who prefers the name \"PRACHI, Klaus, or Cristina rhymes with Arthur\" and uses pronouns she, her, hers.     PERTINENT BACKGROUND                   [most recent eval 08/31/20]     Psych critical item history includes inconsistently taking medications    INTERIM HISTORY                                   [4, 4]   History was provided by the patient who was a good historian. Treatment adherence is adequate.  Since the last visit:   - Did not have labs drawn since last appointment, reports that she \"wants to know what my (thyroid) numbers are\".  - Has been \"doing really well since the last appointment\" but \"every single day I have an appointment I seem to have a really stressful day or maybe that's just because our " "appointments are on Fridays?\"  - Still living at parent's house because can't afford to have her own place right now. Currently sharing a room with her little brother because her sister kicked her out of her room.  - Started school semester 3 weeks ago, has first exam today.  - Got a job last week as an , will still need to take a test before starting, but will be able to set her own hours and it pays well.  - Ran out of Ativan 2 weeks ago. Notes that \"I might need that back\". She reports poor sleep hygiene and does not have a routine. Will have classes with assignments due at midnight and will stay up working on those assignments then stay up later working on \"other assignments.\" She notices that after a hospitalization she sleeps early and is \"a morning person\" for several weeks but then returns to her previous sleeping habits of staying up later and waking up late.   - Has noticed she's \"less emotional\" and \"more grounded\" since last appointment.   - Feels \"normal\" not inappropriately \"happy or sad\"   - Irritability is at baseline.  - Finds it difficult to not procrastinate and to organize her schedule, which includes getting to bed at a consistent time. It will also cause her to stay up late completing due assignments.     RECENT PSYCH ROS:   Depression:  denies  Elevated:  none   Psychosis:  none  Anxiety:  nervous/overwhelmed  Trauma Related:  none  Dysregulation:  none  Eating Disorder: no   Other: no    Adverse Effects:  denies  Pertinent Negative Symptoms: No suicidal ideation, self-injurious behavior/urges, violent ideation, aggression, psychosis, hallucinations, delusions, shaina or hypomania    RECENT SUBSTANCE USE:     Alcohol- none  Tobacco- none  Caffeine- none  Cannabis- none     Opioids- none           Narcan Kit- N/A   Other illicit drugs- none    FAMILY and SOCIAL HISTORY             [1ea, 1ea]       patient reported                    Family Hx:  Cousin with bipolar; Uncle with " "substance use; Grandmother \"with thyroid problems\" and diabetes.    Social Hx:  Financial/ Work- Currently in school at [employer]; was working as  but stopped last summer. Currently, looking for employment. Family will offer financial support when needed.  Partner/ - single  Children- no      Living situation- Lives with family in [place].  Social/ Spiritual Support- family is very supportive  Legal- no    PSYCH and SUBSTANCE USE Critical Summary Points since July 2020 December - Ativan 1mg x2 weeks for sleep given  January - Referral for therapy.    MEDICAL HISTORY and ALLERGY     ALLERGIES: Patient has no known allergies.     Patient Active Problem List   Diagnosis     Adela (H)     Bipolar affective disorder, current episode manic with psychotic symptoms (H)     Suicidal ideation     Hx of psychiatric care     Bipolar I disorder, current or most recent episode manic, with psychotic features (H)     Lymphadenopathy     Bipolar 1 disorder (H)         MEDICAL REVIEW OF SYSTEMS         [2, 10]   Contraception- none    A comprehensive review of systems was performed and is negative other than noted in the HPI.    MEDICATIONS        Current Outpatient Medications   Medication Sig Dispense Refill     lithium ER (LITHOBID) 300 MG CR tablet Take 2 tablets (600 mg) by mouth At Bedtime 60 tablet 0     LORazepam (ATIVAN) 1 MG tablet Take 1 tablet (1 mg) by mouth nightly as needed for anxiety 15 tablet 0     VITALS                                                                [3, 3]   There were no vitals taken for this visit.   MENTAL STATUS EXAM                       [9, 14 cog gs]     Alertness: alert  and oriented  Appearance: N/A (phone visit)  Behavior/Demeanor: cooperative, with N/A (phone visit) eye contact   Speech: regular rate and rhythm  Language: intact  Psychomotor: N/A (phone visit)  Mood: description consistent with euthymia  Affect: N/A (phone visit); congruent to: mood- yes, " content- yes  Thought Process/Associations: unremarkable  Thought Content:  Reports none;  Denies suicidal & violent ideation and delusions  Perception:  Reports none;  Denies auditory hallucinations and visual hallucinations  Insight: adequate  Judgment: good  Cognition: (6) oriented: time, person, and place  attention span: intact  concentration: intact  recent memory: intact  remote memory: intact  fund of knowledge: appropriate  Gait and Station: N/A (telehealth)    LABS and DATA     PHQ9 TODAY = not preformed due to telehealth visit  PHQ 11/2/2018 1/4/2019 3/9/2020   PHQ-9 Total Score 0 0 0   Q9: Thoughts of better off dead/self-harm past 2 weeks Not at all Not at all Not at all   F/U: Thoughts of suicide or self-harm - - -   F/U: Self harm-plan - - -   F/U: Self-harm action - - -   F/U: Safety concerns - - -       Recent Labs   Lab Test 07/29/20  1200 01/10/20  1014 07/11/18  1050   CR 0.61 0.51* 0.51*   GFRESTIMATED >90 >90 >90     Recent Labs   Lab Test 01/10/20  1014 07/11/18  1050 06/14/18  1014   AST 13 14 20   ALT 14 17 31   ALKPHOS 82 73 66     LITHIUM LABS     [level, renal, SG, TSH, WBC]    q6 mo  Recent Labs   Lab Test 07/29/20  1200 01/10/20  1014 07/01/18  0829 06/06/18  0915   LITHIUM 0.72 <0.20* 0.96 1.11     Recent Labs   Lab Test 07/29/20  1200 01/10/20  1014 07/11/18  1050 06/18/18  0708   CR 0.61 0.51* 0.51* 0.51*   GFRESTIMATED >90 >90 >90 >90    139 139 137   POTASSIUM 4.1 4.0 3.7 3.8   BHANU 9.2 8.8 8.6 8.7     Recent Labs   Lab Test 01/10/20  1020 06/14/18  0030 04/02/18  2057 03/08/17  1408   SG 1.021 1.007 1.022 1.006     Recent Labs   Lab Test 07/29/20  1200 01/10/20  1014 07/11/18  1050 06/06/18  2218   TSH 9.60* 5.09* 1.46 0.91     Recent Labs   Lab Test 07/29/20  1200 01/10/20  1014 07/11/18  1050 06/18/18  0708 06/17/18  1839   WBC 10.4 6.1 5.8 6.3 7.3   ANEU 5.3 2.8 3.2  --  4.4         PSYCHOTROPIC DRUG INTERACTIONS     none    MANAGEMENT:  N/A    RISK STATEMENT for SAFETY  "    Cristina Boyd did not appear to be an imminent safety risk to self or others.     DIAGNOSES                                           [m2, h3]    Bipolar I disorder, most recent episode hypomanic vs mixed without psychotic features.    ASSESSMENT                                        [m2, h3]        Today, patient reports interim mood stability and denies symptoms of shaina and depression. Currently feels \"normal\". She does report ongoing challenges with regulating her sleep. However, it seems that most of these are related to her irregular sleep schedule, some nights will stay up until 2-3 AM working on homework for assignments she has procrastinated. Discussed keeping a routine for her sleep being very important for her. Also identified organization and procrastination as significant factors contributing to her staying up late to complete assignments. Previously referred to therapy, which she believes would be helpful but has not called back to set up appointment, phone number provided today. Finally, reviewed with her the importance of her lithium monitoring labs, particularly thyroid function, given family and individual history. She expressed understanding and reports that she will \"tell my parents because then they will make me do it.\" Refills provided.    [place] was checked today:  Will be checked next visit.    PLAN                                                                  1) Meds  - Continue Lithium CR to 600 mg at bedtime     2) Therapy-  Referral placed 1/21 - Gave patient phone number to follow-up today     3) Next Due   Labs- [place] labs due 8/2021, Thyroid labs to be repeated.  EKG- as needed.  Rating scales- PHQ9    4) Referrals  Therapy- [facility]     5) RTC: 1 month    6) Crisis Numbers:   Provided routinely in AVS     After hours:  [phone]      TREATMENT RISK STATEMENT:  The risks, benefits, alternatives and potential adverse effects have been discussed and are understood by " the pt. The pt understands the risks of using street drugs or alcohol. There are no medical contraindications, the pt agrees to treatment with the ability to do so. The pt knows to call the clinic for any problems or to access emergency care if needed.  Medical and substance use concerns are documented above.  Psychotropic drug interaction check was done, including changes made today.    PROVIDER: Edward Fish MD    Patient not staffed in clinic.  Supervisor is Dr. Haq who will sign the note.  I did not see this patient directly. I have reviewed the documentation and I agree with the resident's plan of care.     Mary Grace Haq MD

## 2021-02-05 ENCOUNTER — TELEPHONE (OUTPATIENT)
Dept: PSYCHIATRY | Facility: CLINIC | Age: 21
End: 2021-02-05

## 2021-02-05 ENCOUNTER — VIRTUAL VISIT (OUTPATIENT)
Dept: PSYCHIATRY | Facility: CLINIC | Age: 21
End: 2021-02-05
Attending: PSYCHIATRY & NEUROLOGY
Payer: COMMERCIAL

## 2021-02-05 DIAGNOSIS — F31.9 BIPOLAR 1 DISORDER (H): ICD-10-CM

## 2021-02-05 PROBLEM — R45.851 SUICIDAL IDEATION: Status: RESOLVED | Noted: 2017-01-13 | Resolved: 2021-02-05

## 2021-02-05 PROCEDURE — 99214 OFFICE O/P EST MOD 30 MIN: CPT | Mod: HN | Performed by: STUDENT IN AN ORGANIZED HEALTH CARE EDUCATION/TRAINING PROGRAM

## 2021-02-05 RX ORDER — LITHIUM CARBONATE 300 MG/1
600 TABLET, FILM COATED, EXTENDED RELEASE ORAL AT BEDTIME
Qty: 60 TABLET | Refills: 1 | Status: SHIPPED | OUTPATIENT
Start: 2021-02-05 | End: 2021-03-22

## 2021-02-05 NOTE — TELEPHONE ENCOUNTER
On February 5, 2021, at 1:20 PM, writer called patient at [phone] to confirm Virtual Visit. Writer unable to make contact with patient. Writer left detailed voice message for call back. [phone] left as call back number. Cony Lima, [facility]

## 2021-02-09 ENCOUNTER — TELEPHONE (OUTPATIENT)
Dept: PSYCHIATRY | Facility: CLINIC | Age: 21
End: 2021-02-09

## 2021-02-09 NOTE — TELEPHONE ENCOUNTER
LVM reminding patient to get her labs drawn this week. Invited her to call back if she has any questions.

## 2021-02-09 NOTE — TELEPHONE ENCOUNTER
----- Message from Richard Breyen Coffin, MD sent at 2/9/2021  7:45 AM CST -----  Would you have time this week to call this patient and remind her to go to a [facility] lab to have her labs drawn?  Thanks,  Edward

## 2021-02-10 NOTE — TELEPHONE ENCOUNTER
Thank you, I'll let you know if I see her labs show up this week or if we need to call again. Most likely she will require a couple calls.  Thanks,  Edward

## 2021-02-15 NOTE — TELEPHONE ENCOUNTER
Called patient again to remind her to come to a [facility] location for lab work and to either schedule at an outside location or walk-in at [facility] lab.  Reminded her to get the blood drawn 12 hours after her last dose of lithium.

## 2021-02-16 NOTE — TELEPHONE ENCOUNTER
LVM reminding patient to get her labs drawn this week.  Invited her to call back with any questions regarding medications.

## 2021-02-22 ENCOUNTER — TELEPHONE (OUTPATIENT)
Dept: PSYCHIATRY | Facility: CLINIC | Age: 21
End: 2021-02-22

## 2021-02-22 NOTE — TELEPHONE ENCOUNTER
On 2/22/2021, at 1257, writer called patient at mobile to confirm Virtual Visit. Writer unable to make contact with patient. Writer left detailed voice message for callback. [phone], left as call back number. PATRICIA Johnson, EMT

## 2021-03-03 ENCOUNTER — TELEPHONE (OUTPATIENT)
Dept: PSYCHIATRY | Facility: CLINIC | Age: 21
End: 2021-03-03

## 2021-03-03 NOTE — TELEPHONE ENCOUNTER
Last seen: 2/5/21  RTC: 1 month  Cancel: none  No-show: 2/22/21  Next appt: none     Incoming refill from [facility] pharmacy via fax     Medication requested: LORazepam (ATIVAN) 1 MG tablet   Directions: Take 1 tablet (1 mg) by mouth nightly as needed for anxiety  Qty: 15  Last refilled: 12/11/20 per [place]      Writer reviewed pt's chart and Ativan is not currently listed on med tab or mentioned in last two office visit notes by Dr. Fish. Although, it appears Dr. Campbell ordered a 2 week supply on 12/11/20 for sleep concerns.    Writer called pt to see if she is wanting to restart the medication. No answer. LVM requesting a c/b to discuss further.

## 2021-03-04 NOTE — TELEPHONE ENCOUNTER
We discussed this at previous appointment. If she's having difficulty sleeping I would like to see her for an appointment.  If you are able to get a hold of her could you please remind her to get lithium labs drawn?    Thanks,  Edward

## 2021-03-22 ENCOUNTER — TELEPHONE (OUTPATIENT)
Dept: PSYCHIATRY | Facility: CLINIC | Age: 21
End: 2021-03-22

## 2021-03-22 DIAGNOSIS — F31.9 BIPOLAR 1 DISORDER (H): ICD-10-CM

## 2021-03-22 RX ORDER — LITHIUM CARBONATE 300 MG/1
600 TABLET, FILM COATED, EXTENDED RELEASE ORAL AT BEDTIME
Qty: 6 TABLET | Refills: 0 | Status: SHIPPED | OUTPATIENT
Start: 2021-03-22 | End: 2021-07-09

## 2021-03-22 NOTE — TELEPHONE ENCOUNTER
Sent 6 tabs to [facility] in [place] for patient['s emergency supply.     Called patient to let her know that the Rx for 6 tabs has been sent to the pharmacy. She expressed appreciation.     Called pharmacy and the pharmacist was able to find a coupon to get the price down to $4.86 (down from $11.00). He said insurance does not want to cover as it is an early refill request. He will get that filled for patient now.    Routed to Dr. Fish for FYI.

## 2021-03-22 NOTE — TELEPHONE ENCOUNTER
[facility] Call Center    Phone Message    May a detailed message be left on voicemail: yes     Reason for Call: Medication Refill Request    Has the patient contacted the pharmacy for the refill? Yes   Name of medication being requested: lithium  Provider who prescribed the medication: Edward Fish MD  Pharmacy: [facility] [address] [place] [address]  Date medication is needed: Today    Patient stated she left her medication at her parent's house and needs at least 6 tablets sent to the pharmacy today because her parents will be coming with the rest of her tablets in a few days. Patient said she will call back before 5pm to see if the medication has been sent to the pharmacy.      Action Taken: Message routed to:  Other: Nurse pool    Travel Screening: N/A

## 2021-04-01 ENCOUNTER — TELEPHONE (OUTPATIENT)
Dept: PSYCHIATRY | Facility: CLINIC | Age: 21
End: 2021-04-01

## 2021-04-01 NOTE — TELEPHONE ENCOUNTER
On 4/1/2021, at 0825, writer called patient at mobile to confirm Virtual Visit. Writer unable to make contact with patient. Writer left detailed voice message for callback. [phone], left as call back number. PATRICIA Johnson, EMT

## 2021-04-27 ENCOUNTER — TELEPHONE (OUTPATIENT)
Dept: PSYCHIATRY | Facility: CLINIC | Age: 21
End: 2021-04-27

## 2021-04-27 NOTE — TELEPHONE ENCOUNTER
Last virtual visit: 2/5/21  No show: 2/22/21, 4/1/21    Called patient to get more information regarding her urgent request. She would like to schedule an appointment as she has missed the last couple and is due for follow up. Scheduled her for May 11 at 8:15 am. She would also like to be added to the cancellation list.    Reminded her that there are lab orders for her. She agreed to attend to them prior to the appointment.    She is requesting a letter for school that advocates for her to be given an extension in order to alleviate stress and avoid decompensation. She reports that it is the last couple of weeks for the semester and she needs a doctor note explaining her condition - that she usually gets hospitalized after the semester ends because of the stress. She would like this letter emailed to her ASAP at [email or web address].    Drafted letter and routed to Dr. Fish.

## 2021-04-27 NOTE — Clinical Note
Edward Branham!    Please see letter  -  1. Are you okay with signing off?  2. Please edit     Thanks!  VV

## 2021-04-27 NOTE — TELEPHONE ENCOUNTER
Coffin, Richard Breyen, MD Valena, Victoria, RN             I'm okay with the letter. Signed it if you could send it to her and let her know that she does need to complete her lithium labs and be seen for a visit.   Thanks,   Edward

## 2021-04-27 NOTE — LETTER
April 27, 2021      Re: Cristina Boyd  [address] [place] [address]         To whom it may concern,      Cristina Boyd, date of birth March 4, 2000, has been a patient at the psychiatry clinic since February 2016. She typically experiences an exacerbation in symptoms under stressful conditions which has led to hospitalization in the past. Please allow additional time for the completion of school-related responsibilities as the spring 2021 academic semester comes to a close.       Sincerely,        Edward Fish MD

## 2021-04-27 NOTE — TELEPHONE ENCOUNTER
----- Message from Nia Alcantara RN sent at 4/27/2021  9:38 AM CDT -----  Contact: [phone]    ----- Message -----  From: Nicolette Drummond  Sent: 4/27/2021   9:33 AM CDT  To: [facility] Psychiatry [facility]    She has questions regarding medications and refills that she stated was urgent. This was all the information given.    Thanks!

## 2021-04-28 NOTE — TELEPHONE ENCOUNTER
Karen Watson, Nia Allen RN   Caller: Unspecified (Yesterday,  9:48 AM)             Letter sent to the email provided   Negrita :)

## 2021-05-11 ENCOUNTER — TELEPHONE (OUTPATIENT)
Dept: PSYCHIATRY | Facility: CLINIC | Age: 21
End: 2021-05-11

## 2021-05-11 NOTE — TELEPHONE ENCOUNTER
On May 11, 2021, at 7:46 AM, writer called patient at [phone] to confirm Virtual Visit. Writer unable to make contact with patient. Writer left detailed voice message for call back. [phone] left as call back number. Cony Lima, [facility]

## 2021-07-09 DIAGNOSIS — F31.9 BIPOLAR 1 DISORDER (H): ICD-10-CM

## 2021-07-09 DIAGNOSIS — Z51.81 ENCOUNTER FOR THERAPEUTIC DRUG MONITORING: ICD-10-CM

## 2021-07-09 LAB
ALBUMIN SERPL-MCNC: 3.5 G/DL (ref 3.4–5)
ALP SERPL-CCNC: 81 U/L (ref 40–150)
ALT SERPL W P-5'-P-CCNC: 20 U/L (ref 0–50)
ANION GAP SERPL CALCULATED.3IONS-SCNC: 5 MMOL/L (ref 3–14)
AST SERPL W P-5'-P-CCNC: 20 U/L (ref 0–45)
BASOPHILS # BLD AUTO: 0 10E9/L (ref 0–0.2)
BASOPHILS NFR BLD AUTO: 0.3 %
BILIRUB SERPL-MCNC: 0.3 MG/DL (ref 0.2–1.3)
BUN SERPL-MCNC: 9 MG/DL (ref 7–30)
CALCIUM SERPL-MCNC: 9.2 MG/DL (ref 8.5–10.1)
CHLORIDE SERPL-SCNC: 105 MMOL/L (ref 94–109)
CO2 SERPL-SCNC: 25 MMOL/L (ref 20–32)
CREAT SERPL-MCNC: 0.58 MG/DL (ref 0.52–1.04)
DIFFERENTIAL METHOD BLD: NORMAL
EOSINOPHIL # BLD AUTO: 0.2 10E9/L (ref 0–0.7)
EOSINOPHIL NFR BLD AUTO: 2.1 %
ERYTHROCYTE [DISTWIDTH] IN BLOOD BY AUTOMATED COUNT: 14.8 % (ref 10–15)
GFR SERPL CREATININE-BSD FRML MDRD: >90 ML/MIN/{1.73_M2}
GLUCOSE SERPL-MCNC: 112 MG/DL (ref 70–99)
HCT VFR BLD AUTO: 36.9 % (ref 35–47)
HGB BLD-MCNC: 11.7 G/DL (ref 11.7–15.7)
IMM GRANULOCYTES # BLD: 0 10E9/L (ref 0–0.4)
IMM GRANULOCYTES NFR BLD: 0.3 %
LITHIUM SERPL-SCNC: 0.88 MMOL/L (ref 0.6–1.2)
LYMPHOCYTES # BLD AUTO: 2.8 10E9/L (ref 0.8–5.3)
LYMPHOCYTES NFR BLD AUTO: 30.4 %
MCH RBC QN AUTO: 28.1 PG (ref 26.5–33)
MCHC RBC AUTO-ENTMCNC: 31.7 G/DL (ref 31.5–36.5)
MCV RBC AUTO: 89 FL (ref 78–100)
MONOCYTES # BLD AUTO: 0.7 10E9/L (ref 0–1.3)
MONOCYTES NFR BLD AUTO: 7.3 %
NEUTROPHILS # BLD AUTO: 5.5 10E9/L (ref 1.6–8.3)
NEUTROPHILS NFR BLD AUTO: 59.6 %
NRBC # BLD AUTO: 0 10*3/UL
NRBC BLD AUTO-RTO: 0 /100
PLATELET # BLD AUTO: 316 10E9/L (ref 150–450)
POTASSIUM SERPL-SCNC: 3.9 MMOL/L (ref 3.4–5.3)
PROT SERPL-MCNC: 8.2 G/DL (ref 6.8–8.8)
RBC # BLD AUTO: 4.17 10E12/L (ref 3.8–5.2)
SODIUM SERPL-SCNC: 135 MMOL/L (ref 133–144)
TSH SERPL DL<=0.005 MIU/L-ACNC: 3.55 MU/L (ref 0.4–4)
WBC # BLD AUTO: 9.2 10E9/L (ref 4–11)

## 2021-07-09 PROCEDURE — 80053 COMPREHEN METABOLIC PANEL: CPT | Performed by: STUDENT IN AN ORGANIZED HEALTH CARE EDUCATION/TRAINING PROGRAM

## 2021-07-09 PROCEDURE — 84443 ASSAY THYROID STIM HORMONE: CPT | Performed by: STUDENT IN AN ORGANIZED HEALTH CARE EDUCATION/TRAINING PROGRAM

## 2021-07-09 PROCEDURE — 80178 ASSAY OF LITHIUM: CPT | Performed by: STUDENT IN AN ORGANIZED HEALTH CARE EDUCATION/TRAINING PROGRAM

## 2021-07-09 PROCEDURE — 36415 COLL VENOUS BLD VENIPUNCTURE: CPT | Performed by: STUDENT IN AN ORGANIZED HEALTH CARE EDUCATION/TRAINING PROGRAM

## 2021-07-09 PROCEDURE — 85025 COMPLETE CBC W/AUTO DIFF WBC: CPT | Performed by: STUDENT IN AN ORGANIZED HEALTH CARE EDUCATION/TRAINING PROGRAM

## 2021-07-09 RX ORDER — LITHIUM CARBONATE 300 MG/1
600 TABLET, FILM COATED, EXTENDED RELEASE ORAL AT BEDTIME
Qty: 60 TABLET | Refills: 0 | Status: SHIPPED | OUTPATIENT
Start: 2021-07-09 | End: 2021-08-02

## 2021-07-09 NOTE — TELEPHONE ENCOUNTER
Called patient and left VM letting her know that the refill has been sent to the pharmacy. Invited her to call back if she has any questions. Provided clinic number.

## 2021-07-09 NOTE — TELEPHONE ENCOUNTER
Received RF request from patient and pharmacy     Last seen: 2/5/21  RTC: 1 month  Cancel: none  No-show: three - 2/22, 4/1, 5/11  Next appt: 8/2/21     Medication requested: lithium ER (LITHOBID) 300 MG CR tablet  Directions: Take 2 tablets (600 mg) by mouth At Bedtime   Qty: 60  Last Rx written 2/5/21 for 30 ds with 1 rf, 3/22/21 for 6 tabs emergency supply       Plan per 2/5 virtual visit:    - Continue Lithium CR to 600 mg at bedtime      Per outside meds tab, 30 ds dispensed on 5/27/21, 3/2, 1/28.    Called patient to find out how long she has been out of the medication. She reports that she had some extra and she sometimes misses a dose here and there so she has only been out since last night. She would like to get a refill urgently.    Pended 30 ds and routed to Dr. Hart to review and sign off.

## 2021-07-09 NOTE — TELEPHONE ENCOUNTER
----- Message from Nia Alcantara RN sent at 7/9/2021 12:34 PM CDT -----  Regarding: FW: [place] pt  Contact: [phone]    ----- Message -----  From: Ame Campbell  Sent: 7/9/2021  12:26 PM CDT  To: [facility] Psychiatry [facility]  Subject: [place] pt                                      Caller:  Self  Relationship to pt: Self  Medication:   lithium ER (LITHOBID) 300 MG CR tablet  How many days left of med do you have left (if >3 day supply, redirect to pharmacy): 0  Pharmacy and location: [place] [address]  Pending appt date:  08/02 @ 8am w/Hailey  Okay to leave detailed VM:  yes, [phone]      ## Pt also asked for confirmation call once refill goes through [phone]

## 2021-07-30 NOTE — PROGRESS NOTES
"   [facility]  Psychiatry Clinic  TRANSFER of CARE DIAGNOSTIC ASSESSMENT     Video- Visit Details  Type of service:  video visit for mental health treatment.  Time of service:    Date:  08/02/2021    Video Start Time:  8:05am  Video End Time:  9:05am    Reason for video visit: COVID-19  Originating Site (patient location):  Patient's home  Distant Site (provider location):  [facility] psychiatry clinic  Mode of Communication:  Video Conference via [facility] TEAM:  PCP- Physician No Ref-Primary, Psychotherapist-none    Cristina HARVEY Oliver is a 21 year old who prefers the name Venita and uses pronouns she, her.      DIAGNOSIS   Bipolar I disorder, current episode mixed without psychotic features     ASSESSMENT   Venita is a 22yo who was seen today for transfer of care evaluation. Reports that after she completed her lithium labs in early July, she discontinue lithium to \"try going without,\" and re-started just a few days ago due to downturn in mood. Lithium level therapeutic at that time. Does have history of self-discontinuing medications with re-emergence of symptoms, and today had evidence of a mixed mood episode--mood overall irritable, anxious, and low/depressed, paired with symptoms of hypomania (decreased need for sleep, increase in psychomotor agitation, distractibility, mildly pressured and tangential speech on MSE) without marked impairment in social or occupational functioning. No evidence of psychosis nor substance use. Discussed with Venita the importance of continued & consistent lithium use, which she agreed with today. Additionally, agreeable to short course of seroquel 100mg at bedtime to help improve sleep and manage mood. No safety concerns today.     [place]MP review was not needed today.     PLAN                                                                                                                1) Meds-  - Continue/resume Lithium  mg at bedtime   - " "start seroquel 100mg at bedtime for insomnia 2/2 hypomanic symptoms    2) Psychotherapy- number given for [facility]. Venita reports enjoying working with a previous therapist, Keyana Escobar, through [facility] that she liked (around 2018), and plans to determine whether she is still working there. If not, plans to establish with a different therapist    3) Next due-  Labs- lithium labs completed 7/2021 (TSH, lithium level, CBC, CMP). Lithium level-0.88. Next due 1/2022 (will add on urine specific gravity at that time)   *note: does have history of subclinical hypothyroidism*  EKG- PRN (2/2016: qtc 404ms)  Rating scales- PHQ9    4) Referrals- number given for [facility]    5) Dispo- 10:30am August 26th via video       PERTINENT BACKGROUND                           [most recent eval 07/30/21]   This patient first experienced mental health issues in February 2016 and has received treatment for Bipolar I most recent episode manic with history of psychosis.  Notably, the patient has history of multiple hospitalizations with shaina and psychosis, including most recently July 2018. She has a history of difficult adherence to medications followed by decompensation and hospitalization.  After her most recent hospitalization in July 2018 she did complete a Day Program.      Psych pertinent item history includes inconsistently taking medications     SUBJECTIVE     -been really busy past couple months. School, [employer] in March (3rd semester). Came back to live with family after school ended, middle of may. Done either this fall or spring. Has been feeling a lot of \"grief\" regarding not being able to finish on time. Have only had 2 months of \"regular school\" (in-person classes) at [employer]. New semester starting in Sept  -mom went to [facility] in April, she's coming back today. Working hard to clean up the house before she gets home. Somewhat fearful/embarrassed that she hasn't gotten her drivers license (permit?) while " "mom was away, which she told her she would  -decided to stop lithium after labs x2 weeks just to \"try it out.\" Felt low energy. Have been back on for couple days.   -since stopping lithium has noticed: anxiety much higher (feeling heart beating fast, lots of worry about upcoming school year)  -no therapist currently, would really like to get connected with one. Liked previous therapist through [facility]  - \"100%\" has had trouble with sleep. Ever since stopped lithium, has taken \"forever\" to fall asleep. 5-6hrs a night at most; at baseline 8+ hrs.   -no SI  -crying more than usual. \"emotion are weird right now\"  -hard to do anything or focus on anything except for watching netflix. Can't focus on drivers exam content  -agreed that lower mood, worsening anxiety likely due to stopping lithium. Plans to continue to take as prescribed. Also wondering if we could prescribe something to help with sleep (as this has been helpful in the past). Agreeable to short course of seroquel at bedtime      RECENT PSYCH ROS:   Depression:  depressed mood, insomnia, poor concentration /memory, excessive crying and mood dysregulation  Elevated:  decreased sleep need, distractibility , racing thoughts and mood dysregulation  Psychosis:  none  Anxiety:  excessive worry and nervous/overwhelmed  Trauma Related:  none  Sleep: dysregulation  Other: N/A    Adverse Effects: none  Pertinent Negative Symptoms: No suicidal ideation, self-injurious behavior/urges or psychosis  Recent Substance Use:     None reported     FAMILY and SOCIAL HISTORY                                 pt reported     Family Hx: Cousin with bipolar; Uncle with substance use; Grandmother \"with thyroid problems\" and diabetes.       Social Hx:  Financial/ Work- Currently in school at [employer]. Not currently employed, worked as an  in the past. Family will offer financial support when needed.  Partner/ - single  Children- no      Living situation- Lives with " "family  Social/ Spiritual Support- family is very supportive    Feels Safe at Home- yes   Legal- None     Trauma History (self-report)- no (does reports having a difficult time while living in [place] as most of the rest of her family living there were boys and felt isolated).  Early History/Education-  Grew up in [place] \"but also grew up in [place]\" (lived in [place] for 3 years between 14-17) with mother, Joy, Father, Americo, Oldest sibling. Has 4 younger siblings (Polly, Sam, Drew, Cristi (6)).  Went to college at [employer] before transfer to [employer].      PSYCHIATRIC HISTORY     SIB- no  Suicidal Ideation Hx- yes, in \"moments of rage\" will \"type in my phone\". Never reached planning or intent  Suicide Attempt- none   Violence/Aggression Hx- no  Psychosis Hx- yes, paranoia (most recently during 2018)  Eating Disorder Hx- no  Other- Caught typhoid while in [facility] and recalls that after that is when she first started experiencing depression, but wasn't sure what was going on. She then spent some time with her cousin \"in a nicer city\" and felt better over the summer (age 14). Her first manic episode was in the winter of her senior year of high school, \"a few months before I turned 16.\" Was last hospitalized in 2018 and in 2019 was the first winter that she did not experience an episode of shaina.     Psych Hosp- multiple, most recent- July 2018   Commitment- no  ECT- no  Outpatient Programs -  IOP at [facility] discharge 7/19/19  Other - no     PAST MED TRIALS        Medication  Dose (mg) Effect  Dates of Use   aripiprazole 30   2017, 2018    quetiapine 400mg +   \"eye twitch\" 2018             Depakote 1000   2017   lithium 900   2017 - present             benztropine     2017             propranolol 20 BID   2018   lorazepam 1 Effective for sleep regulation (2 wk course) 2017-  2020   For additional details about use of various medications see Problem list Hx of psychiatric care     SUBSTANCE USE " HISTORY     Past Use- none reported  Treatment- none   Medical Consequences- no  HIV/Hepatitis- no  Legal Consequences- no  Other- no     MEDICAL HISTORY and ALLERGY     ALLERGIES: Patient has no known allergies.    Patient Active Problem List   Diagnosis     Adela (H)     Bipolar affective disorder, current episode manic with psychotic symptoms (H)     Hx of psychiatric care     Bipolar I disorder, current or most recent episode manic, with psychotic features (H)     Lymphadenopathy     Bipolar 1 disorder (H)        MEDICAL REVIEW OF SYSTEMS   Contraception- abstinence    A comprehensive review of systems was performed and is negative other than noted in the HPI.     MEDICATIONS     Current Outpatient Medications   Medication Sig Dispense Refill     lithium ER (LITHOBID) 300 MG CR tablet Take 2 tablets (600 mg) by mouth At Bedtime 60 tablet 0      VITALS   There were no vitals taken for this visit.    MENTAL STATUS EXAM     Alertness: alert  and oriented  Appearance: disheveled  Behavior/Demeanor: cooperative and pleasant, with fair  eye contact   Speech: increased rate and mildly pressured  Language: intact  Psychomotor: normal or unremarkable  Mood: anxious  Affect: full range; congruent to: mood- yes, content- yes  Thought Process/Associations: tangential  Thought Content:  Reports none;  Denies suicidal & violent ideation and delusions  Perception:  Reports none;  Denies hallucinations  Insight: fair  Judgment: fair  Cognition: does  appear grossly intact; formal cognitive testing was not done  Gait and Station: N/A (telehealth)     LABS and DATA     PHQ9 TODAY = not completed today  PHQ 11/2/2018 1/4/2019 3/9/2020   PHQ-9 Total Score 0 0 0   Q9: Thoughts of better off dead/self-harm past 2 weeks Not at all Not at all Not at all   F/U: Thoughts of suicide or self-harm - - -   F/U: Self harm-plan - - -   F/U: Self-harm action - - -   F/U: Safety concerns - - -       Recent Labs   Lab Test 07/09/21  0175  20  1200 01/10/20  1014   CR 0.58 0.61 0.51*   GFRESTIMATED >90 >90 >90     Recent Labs   Lab Test 21  1159 01/10/20  1014 18  1050   AST 20 13 14   ALT 20 14 17   ALKPHOS 81 82 73       EC2016: qtc 404ms     PSYCHOTROPIC DRUG INTERACTIONS     none    MANAGEMENT:  N/A     RISK STATEMENT for SAFETY     Cristina Boyd did not appear to be an imminent safety risk to self or others.    TREATMENT RISK STATEMENT: The risks, benefits, alternatives and potential adverse effects have been discussed and are understood by the pt. The pt understands the risks of using street drugs or alcohol. There are no medical contraindications, the pt agrees to treatment with the ability to do so. The pt knows to call the clinic for any problems or to access emergency care if needed.  Medical and substance use concerns are documented above.  Psychotropic drug interaction check was done, including changes made today.    PROVIDER: Hallie Hart MD    Patient staffed in clinic with Dr. Melchor who will sign the note.  Supervisor is Dr. Haq.      TELEHEALTH ATTENDING ATTESTATION  Following the ACGME guidelines on telemedicine and direct supervision due to COVID-19, I was concurrently participating in and/or monitoring the patient care through appropriate telecommunication technology.  I discussed the key portions of the service with the resident, including the mental status examination and developing the plan of care. I reviewed key portions of the history with the resident. I agree with the findings and plan as documented in this note.   Breezy Melchor MD

## 2021-08-02 ENCOUNTER — VIRTUAL VISIT (OUTPATIENT)
Dept: PSYCHIATRY | Facility: CLINIC | Age: 21
End: 2021-08-02
Attending: PSYCHIATRY & NEUROLOGY
Payer: COMMERCIAL

## 2021-08-02 DIAGNOSIS — F31.9 BIPOLAR 1 DISORDER (H): ICD-10-CM

## 2021-08-02 PROCEDURE — 99215 OFFICE O/P EST HI 40 MIN: CPT | Mod: 95 | Performed by: STUDENT IN AN ORGANIZED HEALTH CARE EDUCATION/TRAINING PROGRAM

## 2021-08-02 RX ORDER — QUETIAPINE FUMARATE 100 MG/1
100 TABLET, FILM COATED ORAL AT BEDTIME
Qty: 30 TABLET | Refills: 0 | Status: SHIPPED | OUTPATIENT
Start: 2021-08-02 | End: 2021-09-16

## 2021-08-02 RX ORDER — LITHIUM CARBONATE 300 MG/1
600 TABLET, FILM COATED, EXTENDED RELEASE ORAL AT BEDTIME
Qty: 60 TABLET | Refills: 0 | Status: SHIPPED | OUTPATIENT
Start: 2021-08-02 | End: 2021-09-16

## 2021-08-26 ENCOUNTER — TELEPHONE (OUTPATIENT)
Dept: PSYCHIATRY | Facility: CLINIC | Age: 21
End: 2021-08-26

## 2021-08-26 NOTE — TELEPHONE ENCOUNTER
On August 26, 2021 at 10:18 AM writer called patient at [phone] to confirm Virtual Visit. Writer unable to make contact with patient. No voice mail left due to call back. Writer  Cony Lima CMA

## 2021-08-26 NOTE — TELEPHONE ENCOUNTER
On August 26, 2021, at 9:38 AM, writer called patient at mobile to confirm Virtual Visit. Writer unable to make contact with patient. Writer left detailed voice message for call back. [phone] left as call back number. Tyler Johnson, EMT

## 2021-08-26 NOTE — TELEPHONE ENCOUNTER
On August 26, 2021, at 1:07 PM, writer called patient at mobile to confirm Virtual Visit. Writer unable to make contact with patient. Writer left detailed voice message for call back. [phone] left as call back number. Tyler oJhnson, EMT

## 2021-08-30 ENCOUNTER — TELEPHONE (OUTPATIENT)
Dept: PSYCHIATRY | Facility: CLINIC | Age: 21
End: 2021-08-30

## 2021-08-30 ENCOUNTER — DOCUMENTATION ONLY (OUTPATIENT)
Dept: PSYCHIATRY | Facility: CLINIC | Age: 21
End: 2021-08-30

## 2021-08-30 NOTE — TELEPHONE ENCOUNTER
On 8/30/2021, at 9:25, writer called patient at 6+12-+3+00-+[phone]+ to confirm Virtual Visit. Writer unable to make contact with patient. Writer left detailed voice message for call back. [phone] left as call back number.  Negrita Watson, CMA

## 2021-08-30 NOTE — TELEPHONE ENCOUNTER
Called patient at [phone]. The first call was picked up but the call was disconnected after writer asked to speak with Venita.     Called again about 15 minutes later and the call went to . Left message asking for a return call to reschedule appointment, check in on how she is doing, and to find out how much medication she has left. Provided clinic number.

## 2021-08-30 NOTE — TELEPHONE ENCOUNTER
----- Message from Hallie Hart MD sent at 8/30/2021  9:49 AM CDT -----  Drew Kramer,  Could you reach out to Venita sometime today? She was supposed to meet with me last Thursday, got rescheduled to today, and didn't answer phone or show up on [place]. I would just have scheduling call her, but at our last visit she had some s/s of hypomania, so I'd prefer for you to check in & see if there are any safety concerns & if there are barriers to her making appointments.     She is also due for a refill of medications soon, but sometimes she doesn't take them. If you could ask if she's been taking meds consistently that would be great. Then I can send refills to get her to next appt.    Thanks, let me know if you have questions about that or anything,  Hallie

## 2021-08-30 NOTE — PROGRESS NOTES
Called Venita x2 today for appointment, left VM for callback to clinic. No acute safety concerns from previous visit, but did have s/s of hypomania that she will need refills of lithium and potential seroquel for in the next few days.      Will ask nurse partner to contact Jamirsarita later today to reach out, determine if there are barriers to attending appointments & if she has been taking her medication. Missed appointment end of last week.     ~Hallie Hart MD  PGY-3

## 2021-09-03 ENCOUNTER — TELEPHONE (OUTPATIENT)
Dept: PSYCHIATRY | Facility: CLINIC | Age: 21
End: 2021-09-03

## 2021-09-03 DIAGNOSIS — F31.9 BIPOLAR 1 DISORDER (H): Primary | ICD-10-CM

## 2021-09-03 RX ORDER — QUETIAPINE FUMARATE 100 MG/1
100 TABLET, FILM COATED ORAL AT BEDTIME
Qty: 30 TABLET | Refills: 0 | Status: SHIPPED | OUTPATIENT
Start: 2021-09-03 | End: 2022-03-23

## 2021-09-03 RX ORDER — LITHIUM CARBONATE 300 MG/1
600 TABLET, FILM COATED, EXTENDED RELEASE ORAL AT BEDTIME
Qty: 60 TABLET | Refills: 1 | Status: SHIPPED | OUTPATIENT
Start: 2021-09-03 | End: 2021-09-16

## 2021-09-03 NOTE — TELEPHONE ENCOUNTER
[facility] Call Center    Phone Message    May a detailed message be left on voicemail: yes     Reason for Call: Medication Refill Request    Has the patient contacted the pharmacy for the refill? Yes   Name of medication being requested: lithium and quetiapine  Provider who prescribed the medication: Hallie Hart MD  Pharmacy:  [facility] [address]  Date medication is needed: Today - pt is out of both meds      Action Taken: Message routed to:  Other: OLIMPIA Canas    Travel Screening: Not Applicable

## 2021-09-03 NOTE — TELEPHONE ENCOUNTER
Called patient to let her know that the prescriptions have been sent to the pharmacy. She said her parents missed the part where she told them that the medications will be at [facility], and they are on their way to her in [place]. She called [facility] in [place] and they were able to transfer the Rx but will not be able to fill it until tomorrow. She said she will be okay as she has a half tablet of Seroquel 100 mg that she can take, and she will make sure to  the meds tomorrow.     Routed to Dr. Hart for FYI.

## 2021-09-15 NOTE — PROGRESS NOTES
"Video- Visit Details  Type of service:  video visit for medication management  Time of service:    Date:  09/16/2021    Video Start Time:  8:27 AM        Video End Time:  9:10 AM    Reason for video visit:  Patient unable to travel due to Covid-19  Originating Site (patient location):  [facility] [place]   Location- Patient's home  Distant Site (provider location):  [facility]  Mode of Communication:  Video Conference via AmWell  Consent:  Patient has given verbal consent for video visit?: Yes        [facility]  Psychiatry Clinic  PSYCHIATRY PROGRESS NOTE      CARE TEAM:  PCP- Physician No Ref-Primary, Psychotherapist-none    Cristina WES Boyd is a 21 year old who prefers the name Klaus and uses pronouns she, her.      DIAGNOSIS   Bipolar I disorder, current episode mixed without psychotic features     ASSESSMENT   Klaus presents for follow-up visit today. She reports interim resolution of mixed episode since restarting lithium and initiating quetiapine. She has been taking less quetiapine than prescribed due to feeling \"too sedated\" on the higher dose. In addition, she has not been taking lithium the last two weeks due to some issues getting the medication filled (financial and guilt/embarrassment). She recognizes the lithium as being very important for her overall stability as she has not done well the last several times she has discontinued this medication. She plans to  lithium prior to going home on Friday. She requested sooner follow-up with Dr. Hart, which I believe is appropriate to monitor for any emerging symptoms, none today, of depression or shaina since being off lithium. Ideally, she will be back on the medication at that time. Discussed social work referral for possible financial assistance, which she declined today. No safety concerns.     [place] review was not needed today.     PLAN                                                                                     " "                           1) Meds-  - Restart Lithium CR 600mg at bedtime   - Continue quetiapine 50mg at bedtime for insomnia    2) Psychotherapy- number previously given for [facility].    3) Next due-  Labs- lithium labs due 1/2022   EKG- PRN (2/2016: qtc 404ms)  Rating scales- PHQ9    4) Referrals- number given for [facility]    5) Dispo- Follow-up with Dr. Hart in 3 weeks     PERTINENT BACKGROUND                           [most recent eval 07/30/21]   This patient first experienced mental health issues in February 2016 and has received treatment for Bipolar I most recent episode manic with history of psychosis.  Notably, the patient has history of multiple hospitalizations with shaina and psychosis, including most recently July 2018. She has a history of difficult adherence to medications followed by decompensation and hospitalization.  After her most recent hospitalization in July 2018 she did complete a Day Program.      Psych pertinent item history includes inconsistently taking medications     SUBJECTIVE   Since last appointment:  -Started on quetiapine 100mg at bedtime and restarted on lithium due to some increase in hypomanic symptoms following stopping her lithium.  -Initially, requested to reschedule with Dr. Hart \"because it's confusing\" to continue changing providers. Ultimately, was agreeable to seeing myself, Dr. Fish. Reported during visit that she feels like she has a \"good relationship\" with all providers in clinic, but does not like to continually change providers.   -Moved to [place], which is the reason she missed last appointment.  -Started school, mix of in-person and virtual. Has been \"not great\" because \"technical issues.\" \"Right now is a stressful time.\"  -Two weeks ago family came and had an argument that \"put me in a really bad place.\" The last week has been \"better\", as she's had more time since the fight with her family and tomorrow is going back to see her family for " "the weekend.  -Reviewed her medications she reports she is \"had some issues getting her medications.\"  Specifically, ran out of lithium and when she went to the pharmacy \"ended up picking up the wrong medication,\" refilled Seroquel instead.  So over the last 2 weeks has not been taking lithium because she has not been able to go back to the pharmacy and finances are currently very tight, as she has been struggling to afford food. She does have family who are supportive and has been eating with her friends who have been giving her food. She believes that family would help her out afford medications and food but does not want to ask.   -She does not want to talk to her family about money for lithium also because she feels guilty and embarrassed about them believing she was already taking the medication.  -Continues to have enough Seroquel because she has been taking quetiapine 50mg because the 100mg tablet made her \"too fatigued.\" Has helped her sleep longer and better, \"I don't wake up which I like.\"  -Was \"a little concerned\" that her glucose was elevated and she doesn't think she ate or drank anything that day. Does not currently have a relationship established with PCP and would like to establish a relationship with someone.  -Continues to have some low energy, which she previously attributed to lithium, and is realizing she has that even when not taking the medication.     RECENT PSYCH ROS:   Depression:  poor concentration /memory and overwhelmed  Elevated: Denies increased energy, decreased sleep need, increased activity, pressured speech and excessive spending  Psychosis:  none  Anxiety:  excessive worry and nervous/overwhelmed  Trauma Related:  none  Sleep: dysregulation  Other: N/A    Adverse Effects: none  Pertinent Negative Symptoms: No suicidal ideation, self-injurious behavior/urges or psychosis  Recent Substance Use:     None reported     FAMILY and SOCIAL HISTORY                                 pt " "reported     Family Hx: Cousin with bipolar; Uncle with substance use; Grandmother \"with thyroid problems\" and diabetes.     Social Hx:  Financial/ Work- Currently in school at [employer]. Not currently employed, worked as an  in the past. Family will offer financial support when needed.  Partner/ - single  Children- no      Living situation- Lives with family  Social/ Spiritual Support- family is very supportive    PSYCH and SUBSTANCE USE Critical Summary Points since July 2021 8/2/21 - patient transfer, restarted lithium 600mg at bedtime and started quetiapine 100mg at bedtime due to concern for mixed symptoms  8/26/21 - patient no-show for follow-up visit.  9/16/21 - no medication changes, restart lithium (patient ran out for 2 weeks).     MEDICAL HISTORY and ALLERGY     ALLERGIES: Patient has no known allergies.    Patient Active Problem List   Diagnosis     Adela (H)     Bipolar affective disorder, current episode manic with psychotic symptoms (H)     Hx of psychiatric care     Bipolar I disorder, current or most recent episode manic, with psychotic features (H)     Lymphadenopathy     Bipolar 1 disorder (H)        MEDICAL REVIEW OF SYSTEMS   Contraception- abstinence    A comprehensive review of systems was performed and is negative other than noted in the HPI.     MEDICATIONS     Current Outpatient Medications   Medication Sig Dispense Refill     lithium ER (LITHOBID) 300 MG CR tablet Take 2 tablets (600 mg) by mouth At Bedtime 60 tablet 1     lithium ER (LITHOBID) 300 MG CR tablet Take 2 tablets (600 mg) by mouth At Bedtime 60 tablet 0     QUEtiapine (SEROQUEL) 100 MG tablet Take 1 tablet (100 mg) by mouth At Bedtime 30 tablet 0     QUEtiapine (SEROQUEL) 100 MG tablet Take 1 tablet (100 mg) by mouth At Bedtime 30 tablet 0      VITALS   There were no vitals taken for this visit.    MENTAL STATUS EXAM     Alertness: alert  and oriented  Appearance: disheveled  Behavior/Demeanor: " cooperative and pleasant, with fair  eye contact   Speech: increased rate and mildly pressured  Language: intact  Psychomotor: normal or unremarkable  Mood: anxious  Affect: full range; congruent to: mood- yes, content- yes  Thought Process/Associations: tangential  Thought Content:  Reports none;  Denies suicidal & violent ideation and delusions  Perception:  Reports none;  Denies hallucinations  Insight: fair  Judgment: fair  Cognition: does  appear grossly intact; formal cognitive testing was not done  Gait and Station: N/A (telehealth)     LABS and DATA     PHQ9 TODAY = not completed today  PHQ 2018 2019 3/9/2020   PHQ-9 Total Score 0 0 0   Q9: Thoughts of better off dead/self-harm past 2 weeks Not at all Not at all Not at all   F/U: Thoughts of suicide or self-harm - - -   F/U: Self harm-plan - - -   F/U: Self-harm action - - -   F/U: Safety concerns - - -     Recent Labs   Lab Test 21  1159 01/10/20  1014   AST 20 13   ALT 20 14   ALKPHOS 81 82     Recent Labs   Lab Test 21  1159 20  1200 01/10/20  1014   LITHIUM 0.88 0.72 <0.20*     Recent Labs   Lab Test 21  1159 20  1200   CR 0.58 0.61   GFRESTIMATED >90 >90    136   POTASSIUM 3.9 4.1   BHANU 9.2 9.2     Recent Labs   Lab Test 01/10/20  1020 18  0030   SG 1.021 1.007     Recent Labs   Lab Test 21  1159 20  1200   TSH 3.55 9.60*     Recent Labs   Lab Test 21  1159 20  1200   * 88     Recent Labs   Lab Test 17  0936 16  0805   CHOL 83 82   TRIG 34 34   LDL 36 31   HDL 40* 44*     Recent Labs   Lab Test 21  1159 20  1200   WBC 9.2 10.4   ANEU 5.5 5.3   HGB 11.7 12.1    331     EC2016: qtc 404ms     PSYCHOTROPIC DRUG INTERACTIONS     none    MANAGEMENT:  N/A     RISK STATEMENT for SAFETY     Cristina Boyd did not appear to be an imminent safety risk to self or others.    TREATMENT RISK STATEMENT: The risks, benefits, alternatives and potential  adverse effects have been discussed and are understood by the pt. The pt understands the risks of using street drugs or alcohol. There are no medical contraindications, the pt agrees to treatment with the ability to do so. The pt knows to call the clinic for any problems or to access emergency care if needed.  Medical and substance use concerns are documented above.  Psychotropic drug interaction check was done, including changes made today.    PROVIDER: Edward Fish MD    Patient not staffed in clinic.  Note will be reviewed and signed by supervisor Dr. Cochran.

## 2021-09-16 ENCOUNTER — VIRTUAL VISIT (OUTPATIENT)
Dept: PSYCHIATRY | Facility: CLINIC | Age: 21
End: 2021-09-16
Attending: PSYCHIATRY & NEUROLOGY
Payer: COMMERCIAL

## 2021-09-16 DIAGNOSIS — F31.9 BIPOLAR 1 DISORDER (H): ICD-10-CM

## 2021-09-16 PROCEDURE — 99214 OFFICE O/P EST MOD 30 MIN: CPT | Mod: 95 | Performed by: STUDENT IN AN ORGANIZED HEALTH CARE EDUCATION/TRAINING PROGRAM

## 2021-09-16 RX ORDER — LITHIUM CARBONATE 300 MG/1
600 TABLET, FILM COATED, EXTENDED RELEASE ORAL AT BEDTIME
Qty: 60 TABLET | Refills: 1 | Status: SHIPPED | OUTPATIENT
Start: 2021-09-16 | End: 2021-10-07

## 2021-09-16 ASSESSMENT — PAIN SCALES - GENERAL: PAINLEVEL: NO PAIN (0)

## 2021-09-16 NOTE — PROGRESS NOTES
"VIDEO VISIT  Cristina Boyd is a 21 year old patient who is being evaluated via a billable video visit.      The patient has been notified of following:   \"This video visit will be conducted via a call between you and your physician/provider. We have found that certain health care needs can be provided without the need for an in-person physical exam. This service lets us provide the care you need with a video conversation. If a prescription is necessary we can send it directly to your pharmacy. If lab work is needed we can place an order for that and you can then stop by our lab to have the test done at a later time. Insurers are generally covering virtual visits as they would in-office visits so billing should not be different than normal.  If for some reason you do get billed incorrectly, you should contact the billing office to correct it and that number is in the AVS .    Video Conference to be completed via:  Andrew    Patient has given verbal consent for video visit?:  Yes    Patient would prefer that any video invitations be sent by: Send to e-mail at: [email or web address]      How would patient like to obtain AVS?:  Rebecca    AVS SmartPhrase [PsychAVS] has been placed in 'Patient Instructions':  Yes    "

## 2021-09-16 NOTE — PATIENT INSTRUCTIONS
**For crisis resources, please see the information at the end of this document**     Patient Education      Thank you for coming to the [facility] [place] [facility].    Lab Testing:  If you had lab testing today and your results are reassuring or normal they will be mailed to you or sent through [facility] within 7 days. If the lab tests need quick action we will call you with the results. The phone number we will call with results is # [phone] (home) . If this is not the best number please call our clinic and change the number.    Medication Refills:  If you need any refills please call your pharmacy and they will contact us. Our fax number for refills is [phone]. Please allow three business for refill processing. If you need to  your refill at a new pharmacy, please contact the new pharmacy directly. The new pharmacy will help you get your medications transferred.     Scheduling:  If you have any concerns about today's visit or wish to schedule another appointment please call our office during normal business hours [phone] (8-5:00 M-F)    Contact Us:  Please call [phone] during business hours (8-5:00 M-F).  If after clinic hours, or on the weekend, please call  [phone].    Financial Assistance [phone]  [facility] Billing [phone]  Central Billing Office, [facility]: [phone]  [facility] Billing [phone]  Medical Records [phone]  [facility] Patient Bill of Rights [email or web address]       MENTAL HEALTH CRISIS NUMBERS:  For a medical emergency please call  911 or go to the nearest ER.     [facility]:   [facility] -[phone]   Crisis Residence [facility] -[phone]   Walk-In Counseling Center [facility] -[phone]   COPE 24/7 [place] Mobile Team -[phone] (adults)/[phone] (child)  CHILD: [facility] needs assessment  team - [phone]      [facility]:   [facility] - [phone]   Walk-in counseling [facility] - [phone]   Walk-in counseling [facility] - [phone]   Crisis Residence [facility] - [phone]  Urgent Care Adult Mental [facility]-[phone] mobile unit/ 24/7 crisis line    National Crisis Numbers:   National Suicide Prevention Lifeline: 5-991-067-TALK (941-621-1392)  Poison Control Center - 7-225-086-3119  GreenPal/resources for a list of additional resources (SOS)  Trans Lifeline a hotline for transgender people 1-393.285.6840  The [employer] Project a hotline for LGBT youth 8-919-211-9539  Crisis Text Line: For any crisis 24/7   To: 755124  see www.crisistextline.org  - IF MAKING A CALL FEELS TOO HARD, send a text!         Again thank you for choosing [facility] and please let us know how we can best partner with you to improve you and your family's health.    You may be receiving a survey regarding this appointment. We would love to have your feedback, both positive and negative. The survey is done by an external company, so your answers are anonymous.

## 2021-09-25 ENCOUNTER — HEALTH MAINTENANCE LETTER (OUTPATIENT)
Age: 21
End: 2021-09-25

## 2021-10-07 ENCOUNTER — TELEPHONE (OUTPATIENT)
Dept: PSYCHIATRY | Facility: CLINIC | Age: 21
End: 2021-10-07

## 2021-10-07 DIAGNOSIS — F31.9 BIPOLAR 1 DISORDER (H): ICD-10-CM

## 2021-10-07 RX ORDER — LITHIUM CARBONATE 300 MG/1
600 TABLET, FILM COATED, EXTENDED RELEASE ORAL AT BEDTIME
Qty: 60 TABLET | Refills: 1 | Status: SHIPPED | OUTPATIENT
Start: 2021-10-07 | End: 2021-11-18

## 2021-10-07 RX ORDER — QUETIAPINE FUMARATE 50 MG/1
50 TABLET, FILM COATED ORAL
Qty: 30 TABLET | Refills: 1 | Status: SHIPPED | OUTPATIENT
Start: 2021-10-07 | End: 2022-09-21

## 2021-10-07 NOTE — TELEPHONE ENCOUNTER
On 10/7/21, at 820, writer called patient at [phone] to confirm Virtual Visit. Writer unable to make contact with patient. Writer left detailed voice message for call back. [phone] left as call back number. Also an email for Andrew was sent to [email or web address].  [facility]

## 2021-10-07 NOTE — TELEPHONE ENCOUNTER
Called Klaus x2 for scheduled appointment, left voicemail. No show to virtual appointment.     Seen ~3 weeks ago by covering resident Dr Fish, at which time she did not have s/s of shaina nor depression. No evidence of safety concerns.    Plan:  -will send in refills for lithium + seroquel to [facility]   -will contact scheduling to have patient reschedule with this writer    Hallie Hart MD  PGY-3

## 2021-11-04 ENCOUNTER — TELEPHONE (OUTPATIENT)
Dept: PSYCHIATRY | Facility: CLINIC | Age: 21
End: 2021-11-04

## 2021-11-04 NOTE — TELEPHONE ENCOUNTER
On November 4, 2021, at 7:47 AM, writer called patient at mobile to confirm Virtual Visit. Writer unable to make contact with patient. Writer left detailed voice message for call back. [phone] left as call back number. A link to the video visit was sent to the patient's email address and mobile phone number. Lemuel Glasgow, EMT

## 2021-11-17 NOTE — PROGRESS NOTES
[facility]  Psychiatry Clinic  Progress Note     Video- Visit Details  Type of service:  video visit for mental health treatment.  Time of service:    Date:  11/18/21    Video Start Time:  9:30am  Video End Time: 10:00am    Reason for video visit: COVID-19  Originating Site (patient location):  Patient's home  Distant Site (provider location):  [facility] psychiatry clinic  Mode of Communication:  Video Conference via [facility] TEAM:  PCP- Physician No Ref-Primary, Psychotherapist-none    Cristina WES Boyd is a 21 year old who prefers the name Klaus and uses pronouns she, her.      DIAGNOSIS   Bipolar I disorder, MRE current episode mixed without psychotic features     ASSESSMENT   Venita is a 22yo who was seen today for follow-up visit. Since last visit (with Dr Fish while this writer was out), mood has remained euthymic, feels that lithium has been helpful for mood stability. Has self-discontinued seroquel, due to sleep normalizing, not needing it any longer. No safety concerns today, continue current medications. Did recommend that Klaus reach out to [facility] to reconnect with previous therapist to support her in managing stress of school, transition from school to workplace, which she plans to do. No safety concerns, refills provided.     [place]MP review was not needed today.     PLAN                                                                                                                1) Meds-  - Continue Lithium  mg at bedtime   - continue seroquel 25-50mg PRN for insomnia, hypomania sx (not taking currently, but has this available as needed)     2) Psychotherapy- number given for [facility]. Venita reports enjoying working with a previous therapist, Keyana Escobar, through [facility] that she liked (around 2018), and plans to determine whether she is still working there. If not, plans to establish with a different therapist     3) Next due-  Labs- lithium labs  "completed 7/2021 (TSH, lithium level, CBC, CMP). Lithium level-0.88. Next due 1/2022 (will add on urine specific gravity at that time)   *note: does have history of subclinical hypothyroidism*  EKG- PRN (2/2016: qtc 404ms)  Rating scales- PHQ9    4) Referrals- number given for [facility] at previous visit, Klaus plans to call to make appointment    5) Dispo- 12/9 @ 9:00am     PERTINENT BACKGROUND                           [most recent eval 07/30/21]   This patient first experienced mental health issues in February 2016 and has received treatment for Bipolar I most recent episode manic with history of psychosis.  Notably, the patient has history of multiple hospitalizations with shaina and psychosis, including most recently July 2018. She has a history of difficult adherence to medications followed by decompensation and hospitalization.  After her most recent hospitalization in July 2018 she did complete a Day Program.      Psych pertinent item history includes inconsistently taking medications     SUBJECTIVE     Since last visit:  -move to school ([employer]), but then moved back home (because all classes ended up having an online option, cheaper to live at home)  -recently went without lithium for about a week due to not having time to  from pharmacy, restarted on Tuesday. Feels that lithium is a good mood stabilizing medication for her, denies side effects  -seroquel-haven't used in a \"while,\" helpful for getting to sleep when \"manic\" this summer, but not feeling manic anymore, so haven't been using it. Had weird dreams with seroquel  -mood \"pretty even\"  -sleeping well, \"like 9 hours\" a night  -in last semester of school, has history of developing shaina when more stressed at school, requested letter like she's had in the past to allow for extensions so as to minimize stress  -thinking about going to grad school, likely graduating from undergrad after this semester    RECENT PSYCH ROS: " "  Depression:  poor concentration /memory and mood dysregulation  Elevated:  distractibility   Psychosis:  none  Anxiety:  excessive worry and nervous/overwhelmed  Trauma Related:  none  Sleep: dysregulation  Other: N/A    Adverse Effects: none  Pertinent Negative Symptoms: No suicidal ideation, self-injurious behavior/urges or psychosis  Recent Substance Use:     None reported     FAMILY and SOCIAL HISTORY                                 pt reported     Family Hx: Cousin with bipolar; Uncle with substance use; Grandmother \"with thyroid problems\" and diabetes.     Social Hx:  Financial/ Work- Currently in school at [employer]. Not currently employed, worked as an  in the past. Family will offer financial support when needed.  Partner/ - single  Children- no      Living situation- Lives with family  Social/ Spiritual Support- family is very supportive    Feels Safe at Home- yes   Legal- None     Trauma History (self-report)- no (does reports having a difficult time while living in [place] as most of the rest of her family living there were boys and felt isolated).  Early History/Education-  Grew up in [place] \"but also grew up in [place]\" (lived in [place] for 3 years between 14-17) with mother, Joy, Father, Americo, Oldest sibling. Has 4 younger siblings (Polly, Sam, Drew, Cristi (6)).  Went to college at [employer] before transfer to [employer].    PSYCH and SUBSTANCE USE Critical Summary Points since July 2021 8/2/21: patient transfer, restarted lithium 600mg at bedtime and started quetiapine 100mg at bedtime due to concern for mixed symptoms  8/26/21: no show  9/16/21: no medication changes (other than pt has been taking 50mg quetiapine, not 100mg), restart lithium (patient ran out for 2 weeks)  Oct 2021: no show  Nov 4 2021: no show  Nov 18 2021: no changes     MEDICAL HISTORY and ALLERGY     ALLERGIES: Patient has no known allergies.    Patient Active Problem List "   Diagnosis     Adela (H)     Bipolar affective disorder, current episode manic with psychotic symptoms (H)     Hx of psychiatric care     Bipolar I disorder, current or most recent episode manic, with psychotic features (H)     Lymphadenopathy     Bipolar 1 disorder (H)        MEDICAL REVIEW OF SYSTEMS   Contraception- abstinence    A comprehensive review of systems was performed and is negative other than noted in the HPI.     MEDICATIONS     Current Outpatient Medications   Medication Sig Dispense Refill     lithium ER (LITHOBID) 300 MG CR tablet Take 2 tablets (600 mg) by mouth At Bedtime 60 tablet 1     QUEtiapine (SEROQUEL) 100 MG tablet Take 1 tablet (100 mg) by mouth At Bedtime 30 tablet 0     QUEtiapine (SEROQUEL) 50 MG tablet Take 1 tablet (50 mg) by mouth nightly as needed (insomnia, manic symptoms) 30 tablet 1      VITALS   There were no vitals taken for this visit.    MENTAL STATUS EXAM     Alertness: alert  and oriented  Appearance: adequately groomed  Behavior/Demeanor: cooperative and pleasant, with fair  eye contact   Speech: increased rate and mildly pressured  Language: intact  Psychomotor: normal or unremarkable  Mood: description consistent with euthymia  Affect: full range; congruent to: mood- yes, content- yes  Thought Process/Associations: tangential  Thought Content:  Reports none;  Denies suicidal & violent ideation and delusions  Perception:  Reports none;  Denies hallucinations  Insight: fair  Judgment: fair  Cognition: does  appear grossly intact; formal cognitive testing was not done  Gait and Station: N/A (telehealth)     LABS and DATA     PHQ9 TODAY = not completed today  PHQ 11/2/2018 1/4/2019 3/9/2020   PHQ-9 Total Score 0 0 0   Q9: Thoughts of better off dead/self-harm past 2 weeks Not at all Not at all Not at all   F/U: Thoughts of suicide or self-harm - - -   F/U: Self harm-plan - - -   F/U: Self-harm action - - -   F/U: Safety concerns - - -       Recent Labs   Lab Test  21  1159 20  1200 01/10/20  1014   CR 0.58 0.61 0.51*   GFRESTIMATED >90 >90 >90     Recent Labs   Lab Test 21  1159 01/10/20  1014 18  1050   AST 20 13 14   ALT 20 14 17   ALKPHOS 81 82 73       EC2016: qtc 404ms     PSYCHOTROPIC DRUG INTERACTIONS     none    MANAGEMENT:  N/A     RISK STATEMENT for SAFETY     Cristina Boyd did not appear to be an imminent safety risk to self or others.    TREATMENT RISK STATEMENT: The risks, benefits, alternatives and potential adverse effects have been discussed and are understood by the pt. The pt understands the risks of using street drugs or alcohol. There are no medical contraindications, the pt agrees to treatment with the ability to do so. The pt knows to call the clinic for any problems or to access emergency care if needed.  Medical and substance use concerns are documented above.  Psychotropic drug interaction check was done, including changes made today.    PROVIDER: Hallie Hart MD    Patient not staffed in clinic.  Note will be reviewed and signed by supervisor Dr. Haq.    I did not see this patient directly. I have reviewed the documentation and I agree with the resident's plan of care.     Mary Grace Haq MD

## 2021-11-18 ENCOUNTER — VIRTUAL VISIT (OUTPATIENT)
Dept: PSYCHIATRY | Facility: CLINIC | Age: 21
End: 2021-11-18
Attending: PSYCHIATRY & NEUROLOGY
Payer: COMMERCIAL

## 2021-11-18 ENCOUNTER — TELEPHONE (OUTPATIENT)
Dept: PSYCHIATRY | Facility: CLINIC | Age: 21
End: 2021-11-18
Payer: COMMERCIAL

## 2021-11-18 DIAGNOSIS — F31.9 BIPOLAR 1 DISORDER (H): ICD-10-CM

## 2021-11-18 PROCEDURE — 99214 OFFICE O/P EST MOD 30 MIN: CPT | Mod: 95 | Performed by: STUDENT IN AN ORGANIZED HEALTH CARE EDUCATION/TRAINING PROGRAM

## 2021-11-18 RX ORDER — LITHIUM CARBONATE 300 MG/1
300 TABLET, FILM COATED, EXTENDED RELEASE ORAL 2 TIMES DAILY
Status: CANCELLED | OUTPATIENT
Start: 2021-11-18

## 2021-11-18 RX ORDER — LITHIUM CARBONATE 300 MG/1
600 TABLET, FILM COATED, EXTENDED RELEASE ORAL AT BEDTIME
Qty: 60 TABLET | Refills: 1 | Status: SHIPPED | OUTPATIENT
Start: 2021-11-18 | End: 2021-12-22

## 2021-11-18 ASSESSMENT — PAIN SCALES - GENERAL: PAINLEVEL: NO PAIN (0)

## 2021-11-18 NOTE — PROGRESS NOTES
"VIDEO VISIT  Cristina Boyd is a 21 year old patient that has consented to receive services via billable video visit.      The patient has been notified of following:   \"This video visit will be conducted via a call between you and your physician/provider. We have found that certain health care needs can be provided without the need for an in-person physical exam. This service lets us provide the care you need with a video conversation. If a prescription is necessary we can send it directly to your pharmacy. If lab work is needed we can place an order for that and you can then stop by our lab to have the test done at a later time. Insurers are generally covering virtual visits as they would in-office visits so billing should not be different than normal.  If for some reason you do get billed incorrectly, you should contact the billing office to correct it and that number is in the AVS .    Patient will join video visit via:  [employer] (Patient / guardian confirmed to join via [employer])    If patient attempts to join the video via [employer] at appointment start time, but is unable to, they would prefer that the provider send them a video invitation via:   Send to preferred e-mail: [email or web address]      How would patient like to obtain AVS?:  MyChart    "

## 2021-11-18 NOTE — TELEPHONE ENCOUNTER
On November 18, 2021, at 8:30 AM, writer called patient at mobile to confirm Virtual Visit. Writer unable to make contact with patient. Writer left detailed voice message for call back. [phone] left as call back number. Lemuel Glasgow, EMT

## 2021-11-18 NOTE — LETTER
November 18, 2021       Re: Cristina Boyd  [address] [facility] [address]         To whom it may concern,    Cristina Boyd, date of birth March 4, 2000, has been a patient at the psychiatry clinic since February 2016. She typically experiences an exacerbation in symptoms under stressful conditions which has led to hospitalization in the past. Please allow additional time for the completion of school-related responsibilities as the fall 2021 academic semester comes to a close.         Sincerely,      Hallie Hart MD

## 2021-11-18 NOTE — PATIENT INSTRUCTIONS
**For crisis resources, please see the information at the end of this document**     Patient Education      Thank you for coming to the [facility] MENTAL HEALTH & ADDICTION [place] CLINIC.    Lab Testing:  If you had lab testing today and your results are reassuring or normal they will be mailed to you or sent through [employer] within 7 days. If the lab tests need quick action we will call you with the results. The phone number we will call with results is # [phone] (home) . If this is not the best number please call our clinic and change the number.    Medication Refills:  If you need any refills please call your pharmacy and they will contact us. Our fax number for refills is [phone]. Please allow three business for refill processing. If you need to  your refill at a new pharmacy, please contact the new pharmacy directly. The new pharmacy will help you get your medications transferred.     Scheduling:  If you have any concerns about today's visit or wish to schedule another appointment please call our office during normal business hours [phone] (8-5:00 M-F)    Contact Us:  Please call [phone] during business hours (8-5:00 M-F).  If after clinic hours, or on the weekend, please call  [phone].    Financial Assistance [phone]  [facility] Billing [phone]  Central Billing Office, [facility]: [phone]  [facility] Billing [phone]  Medical Records [phone]  [facility] Patient Bill of Rights [email or web address]       MENTAL HEALTH CRISIS NUMBERS:  For a medical emergency please call  911 or go to the nearest ER.     [facility]:   [facility] -[phone]   Crisis Residence [facility] Residence -[phone]   Walk-In Counseling Center [facility] -[phone]   COPE 24/7 [place] Mobile Team -[phone] (adults)/[phone] (child)  CHILD: [facility] needs assessment  team - [phone]      [facility]:   [facility] - [phone]   Walk-in counseling [facility] - [phone]   Walk-in counseling [facility] - [phone]   Crisis Residence [facility] Residence - [phone]  Urgent Care Adult Mental [place]-[phone] mobile unit/ 24/7 crisis line    National Crisis Numbers:   National Suicide Prevention Lifeline: 4-562-408-TALK (306-094-0625)  Poison Control Center - 5-284-477-0517  [employer]/resources for a list of additional resources (SOS)  Trans Lifeline a hotline for transgender people 1-260.264.9798  The Alpesh Project a hotline for LGBT youth 1-690-987-7012  Crisis Text Line: For any crisis 24/7   To: 087478  see www.crisistextline.org  - IF MAKING A CALL FEELS TOO HARD, send a text!         Again thank you for choosing [facility] MENTAL HEALTH & ADDICTION [facility] and please let us know how we can best partner with you to improve you and your family's health.    You may be receiving a survey regarding this appointment. We would love to have your feedback, both positive and negative. The survey is done by an external company, so your answers are anonymous.

## 2021-12-09 ENCOUNTER — TELEPHONE (OUTPATIENT)
Dept: PSYCHIATRY | Facility: CLINIC | Age: 21
End: 2021-12-09
Payer: COMMERCIAL

## 2021-12-09 NOTE — TELEPHONE ENCOUNTER
On December 9, 2021, at 8:16 AM, writer called patient at mobile to confirm Virtual Visit. Writer unable to make contact with patient. Writer left detailed voice message for call back. [phone] left as call back number. BETY Torres    On December 9, 2021, at 8:45 AM, writer called patient at mobile to confirm Virtual Visit. Writer unable to make contact with patient. A link to the video visit was sent to the patient's email address and mobile phone number. Lemuel Glasgow, EMT

## 2021-12-22 ENCOUNTER — VIRTUAL VISIT (OUTPATIENT)
Dept: PSYCHIATRY | Facility: CLINIC | Age: 21
End: 2021-12-22
Attending: PSYCHIATRY & NEUROLOGY
Payer: COMMERCIAL

## 2021-12-22 DIAGNOSIS — F31.9 BIPOLAR 1 DISORDER (H): ICD-10-CM

## 2021-12-22 DIAGNOSIS — Z51.81 ENCOUNTER FOR THERAPEUTIC DRUG MONITORING: Primary | ICD-10-CM

## 2021-12-22 PROCEDURE — 99214 OFFICE O/P EST MOD 30 MIN: CPT | Mod: 95 | Performed by: STUDENT IN AN ORGANIZED HEALTH CARE EDUCATION/TRAINING PROGRAM

## 2021-12-22 RX ORDER — LITHIUM CARBONATE 300 MG/1
600 TABLET, FILM COATED, EXTENDED RELEASE ORAL AT BEDTIME
Qty: 60 TABLET | Refills: 1 | Status: SHIPPED | OUTPATIENT
Start: 2021-12-22 | End: 2022-02-24

## 2021-12-22 ASSESSMENT — PATIENT HEALTH QUESTIONNAIRE - PHQ9
SUM OF ALL RESPONSES TO PHQ QUESTIONS 1-9: 4
SUM OF ALL RESPONSES TO PHQ QUESTIONS 1-9: 4
10. IF YOU CHECKED OFF ANY PROBLEMS, HOW DIFFICULT HAVE THESE PROBLEMS MADE IT FOR YOU TO DO YOUR WORK, TAKE CARE OF THINGS AT HOME, OR GET ALONG WITH OTHER PEOPLE: SOMEWHAT DIFFICULT

## 2021-12-22 ASSESSMENT — PAIN SCALES - GENERAL: PAINLEVEL: NO PAIN (0)

## 2021-12-22 NOTE — PROGRESS NOTES
"Answers for HPI/ROS submitted by the patient on 12/22/2021  If you checked off any problems, how difficult have these problems made it for you to do your work, take care of things at home, or get along with other people?: Somewhat difficult  PHQ9 TOTAL SCORE: 4    VIDEO VISIT  Cristina Boyd is a 21 year old patient that has consented to receive services via billable video visit.      The patient has been notified of following:   \"This video visit will be conducted via a call between you and your physician/provider. We have found that certain health care needs can be provided without the need for an in-person physical exam. This service lets us provide the care you need with a video conversation. If a prescription is necessary we can send it directly to your pharmacy. If lab work is needed we can place an order for that and you can then stop by our lab to have the test done at a later time. Insurers are generally covering virtual visits as they would in-office visits so billing should not be different than normal.  If for some reason you do get billed incorrectly, you should contact the billing office to correct it and that number is in the AVS .    Patient will join video visit via:  [employer] (Patient / guardian confirmed to join via [employer])    If patient attempts to join the video via [employer] at appointment start time, but is unable to, they would prefer that the provider send them a video invitation via:   Send to preferred e-mail: [email or web address]      How would patient like to obtain AVS?:  MyChart     "

## 2021-12-22 NOTE — PATIENT INSTRUCTIONS
I ordered lithium labs (thyroid, etc.) for you to get drawn at your convenience. I also added vit D to check this level. Ideally get these labs drawn about 12 hours after you take your nighttime lithium    Follow-up visit Jan 18th @ 8:30am    Please call [facility] counseling if interested       **For crisis resources, please see the information at the end of this document**   Patient Education    Thank you for coming to the [facility] MENTAL HEALTH & ADDICTION [place] [facility].    Lab Testing:  If you had lab testing today and your results are reassuring or normal they will be mailed to you or sent through [employer] within 7 days. If the lab tests need quick action we will call you with the results. The phone number we will call with results is # [phone] (home) . If this is not the best number please call our clinic and change the number.    Medication Refills:  If you need any refills please call your pharmacy and they will contact us. Our fax number for refills is [phone]. Please allow three business for refill processing. If you need to  your refill at a new pharmacy, please contact the new pharmacy directly. The new pharmacy will help you get your medications transferred.     Scheduling:  If you have any concerns about today's visit or wish to schedule another appointment please call our office during normal business hours [phone] (8-5:00 M-F)    Contact Us:  Please call [phone] during business hours (8-5:00 M-F).  If after clinic hours, or on the weekend, please call  [phone].    Financial Assistance [phone]  [facility] Billing [phone]  Central Billing Office, [facility]: [phone]  [facility] Billing [phone]  Medical Records [phone]  [facility] Patient Bill of Rights [email or web address]       MENTAL HEALTH CRISIS NUMBERS:  For a medical emergency please call  911 or go to the nearest ER.      [facility]:   [facility] -[phone]   Crisis Residence [facility] -[phone]   Walk-In Counseling Center [facility] -[phone]   COPE 24/7 [place] Mobile Team -[phone] (adults)/[phone] (child)  CHILD: [facility] needs assessment team - [phone]      [facility]:   [facility] - [phone]   Walk-in counseling [facility] - [phone]   Walk-in counseling [facility] - [phone]   Crisis Residence [facility] Residence - [phone]  Urgent Care Adult Mental [facility]-[phone] mobile unit/ 24/7 crisis line    National Crisis Numbers:   National Suicide Prevention Lifeline: 6-584-128-TALK (008-124-3790)  Poison Control Center - 1-240.547.3409  [employer]/resources for a list of additional resources (SOS)  Trans Lifeline a hotline for transgender people 1-504-330-0791  The Alpesh Project a hotline for LGBT youth 1-329.784.6156  Crisis Text Line: For any crisis 24/7   To: 815402  see www.crisistextline.org  - IF MAKING A CALL FEELS TOO HARD, send a text!         Again thank you for choosing [facility] MENTAL HEALTH & ADDICTION [facility] and please let us know how we can best partner with you to improve you and your family's health.    You may be receiving a survey regarding this appointment. We would love to have your feedback, both positive and negative. The survey is done by an external company, so your answers are anonymous.

## 2021-12-22 NOTE — PROGRESS NOTES
"   [facility]  Psychiatry Clinic  Progress Note     VIDEO VISIT  Cristina Boyd is a 21 year old patient who is being evaluated via a billable video visit.      The patient has been notified of following:   \"We have found that certain health care needs can be provided without the need for an in-person physical exam. This service lets us provide the care you need with a video conversation. If a prescription is necessary we can send it directly to your pharmacy. If lab work is needed we can place an order for that and you can then stop by our lab to have the test done at a later time. Insurers are generally covering virtual visits as they would in-office visits so billing should not be different than normal.  If for some reason you do get billed incorrectly, you should contact the billing office to correct it and that number is in the AVS .    Patient has given verbal consent for video visit?: Yes   How would you like to obtain your AVS?: FlukleS SmartPhrase [PsychAVS] has been placed in 'Patient Instructions': Yes      Video- Visit Details  Type of service:  video visit for medication management  Time of service:    Date:  12/22/2021    Video Start Time:  3:35pm    Video End Time:  4:05pm    Reason for video visit:  Patient convenience   Originating Site (patient location):  [facility]- [place]   Location- Patient's home  Distant Site (provider location):  [facility]  Mode of Communication:  Video Conference via AmWell  Consent:  Patient has given verbal consent for video visit?: Yes       CARE TEAM:  PCP- Physician No Ref-Primary, Psychotherapist-none    Cristina Boyd is a 21 year old who prefers the name Klaus and uses pronouns she, her.      DIAGNOSIS   Bipolar I disorder, MRE episode mixed without psychotic features, currently euthymic     ASSESSMENT   Venita is a 22yo who was seen today for follow-up visit. Since last visit, mood has remained euthymic, feels that lithium has " been helpful for mood stability. Did share that she did have concerns with lithium earlier this summer (which is why she discontinued for a period of time leading to hypomanic sx) due to Patito hoyt stating that it is essentially a bad medication, but was reassured by family and today by this writer that for Klaus, it is in fact a helpful and appropriate medication for her, which she agrees with. Has self-discontinued seroquel, due to sleep normalizing, not needing it any longer. No safety concerns today, continue current medications.  No safety concerns, refills provided.     MNPMP review was not needed today.     PLAN                                                                                                                1) Meds-  - Continue Lithium  mg at bedtime   - continue seroquel 25-50mg PRN for insomnia, hypomania sx (not taking currently, but has this available as needed)     2) Psychotherapy- number given for [facility] at previous visit. Venita reports enjoying working with a previous therapist, Keyana Escobar, through [facility] that she liked (around 2018), and plans to determine whether she is still working there. If not, plans to establish with a different therapist     3) Next due-  Labs- lithium labs completed 7/2021 (TSH, lithium level, CBC, CMP). Lithium level-0.88. Next due 1/2022, ordered today (12/22)   *note: does have history of subclinical hypothyroidism*  EKG- PRN (2/2016: qtc 404ms)  Rating scales- PHQ9    4) Referrals- none    5) Dispo- 1/18 @ 8:30am     PERTINENT BACKGROUND                           [most recent eval 07/30/21]   This patient first experienced mental health issues in February 2016 and has received treatment for Bipolar I most recent episode manic with history of psychosis.  Notably, the patient has history of multiple hospitalizations with shaina and psychosis, including most recently July 2018. She has a history of difficult adherence to medications followed by decompensation  "and hospitalization.  After her most recent hospitalization in July 2018 she did complete a Day Program.      Psych pertinent item history includes inconsistently taking medications     SUBJECTIVE     Since last visit:  -right now in finals week, a little bit behind, but going OK, grateful for letter from this writer encouraging extensions as needed  -doing so much better now than last visit, not as stressed out  -mood euthymic  -no hypomanic sx, no decreased need for sleep (which is key sx for her)  -not needing seroquel, no trouble with onset of sleep  -lithium-taking it, has bedtime routine where she always takes it, so she remembers  -shared somewhat sheepishly that the reason that she stopped lithium earlier this summer is that she was concerned that she was going to turn into a zombie taking it, as this is what tone hoyt shared about lithium  -usually winter get really low mood-wise, but hasn't happened yet  -related to above, agreeable to adding vit D to lithium labs today  -didn't go to therapy appt with [facility], so they got cancelled, plans to call them to reschedule initial appt    RECENT PSYCH ROS:   Depression:  poor concentration /memory and mood dysregulation  Elevated:  distractibility   Psychosis:  none  Anxiety:  excessive worry and nervous/overwhelmed  Trauma Related:  none  Sleep: dysregulation  Other: N/A    Adverse Effects: none  Pertinent Negative Symptoms: No suicidal ideation, self-injurious behavior/urges or psychosis  Recent Substance Use:     None reported     FAMILY and SOCIAL HISTORY                                 pt reported     Family Hx: Cousin with bipolar; Uncle with substance use; Grandmother \"with thyroid problems\" and diabetes.     Social Hx:  Financial/ Work- Currently in school at [employer]. Not currently employed, worked as an  in the past. Family will offer financial support when needed.  Partner/ - single  Children- no      Living situation- Lives " "with family  Social/ Spiritual Support- family is very supportive    Feels Safe at Home- yes   Legal- None     Trauma History (self-report)- no (does reports having a difficult time while living in [place] as most of the rest of her family living there were boys and felt isolated).  Early History/Education-  Grew up in [place] \"but also grew up in [place]\" (lived in [place] for 3 years between 14-17) with mother, Joy, Father, Americo, Oldest sibling. Has 4 younger siblings (Polly, Sam, Drew, Cristi (6)).  Went to college at [employer] before transfer to [employer].    PSYCH and SUBSTANCE USE Critical Summary Points since July 2021 8/2/21: patient transfer, restarted lithium 600mg at bedtime and started quetiapine 100mg at bedtime due to concern for mixed symptoms  8/26/21: no show  9/16/21: no medication changes (other than pt has been taking 50mg quetiapine, not 100mg), restart lithium (patient ran out for 2 weeks)  Oct 2021: no show  Nov 4 2021: no show  Nov 18 2021: no changes  Dec 9 2021: no show  Dec 22 2021: no changes     MEDICAL HISTORY and ALLERGY     ALLERGIES: Patient has no known allergies.    Patient Active Problem List   Diagnosis     Adela (H)     Bipolar affective disorder, current episode manic with psychotic symptoms (H)     Hx of psychiatric care     Bipolar I disorder, current or most recent episode manic, with psychotic features (H)     Lymphadenopathy     Bipolar 1 disorder (H)        MEDICAL REVIEW OF SYSTEMS   Contraception- abstinence    A comprehensive review of systems was performed and is negative other than noted in the HPI.     MEDICATIONS     Current Outpatient Medications   Medication Sig Dispense Refill     lithium ER (LITHOBID) 300 MG CR tablet Take 2 tablets (600 mg) by mouth At Bedtime 60 tablet 1     QUEtiapine (SEROQUEL) 100 MG tablet Take 1 tablet (100 mg) by mouth At Bedtime (Patient not taking: Reported on 11/18/2021) 30 tablet 0     QUEtiapine (SEROQUEL) 50 " MG tablet Take 1 tablet (50 mg) by mouth nightly as needed (insomnia, manic symptoms) (Patient not taking: Reported on 2021) 30 tablet 1      VITALS   There were no vitals taken for this visit.    MENTAL STATUS EXAM     Alertness: alert  and oriented  Appearance: adequately groomed  Behavior/Demeanor: cooperative and pleasant, with fair  eye contact   Speech: increased rate and mildly pressured (~baseline)  Language: intact  Psychomotor: normal or unremarkable  Mood: description consistent with euthymia  Affect: full range; congruent to: mood- yes, content- yes  Thought Process/Associations: tangential at times  Thought Content:  Reports none;  Denies suicidal & violent ideation and delusions  Perception:  Reports none;  Denies hallucinations  Insight: fair  Judgment: fair  Cognition: does  appear grossly intact; formal cognitive testing was not done  Gait and Station: N/A (telehealth)     LABS and DATA     PHQ9 TODAY = 4  PHQ 2019 3/9/2020 2021   PHQ-9 Total Score 0 0 4   Q9: Thoughts of better off dead/self-harm past 2 weeks Not at all Not at all Not at all   F/U: Thoughts of suicide or self-harm - - -   F/U: Self harm-plan - - -   F/U: Self-harm action - - -   F/U: Safety concerns - - -       Recent Labs   Lab Test 21  1159 20  1200 01/10/20  1014   CR 0.58 0.61 0.51*   GFRESTIMATED >90 >90 >90     Recent Labs   Lab Test 21  1159 01/10/20  1014 18  1050   AST 20 13 14   ALT 20 14 17   ALKPHOS 81 82 73       EC2016: qtc 404ms     PSYCHOTROPIC DRUG INTERACTIONS     none    MANAGEMENT:  N/A     RISK STATEMENT for SAFETY     Cristina Boyd did not appear to be an imminent safety risk to self or others.    TREATMENT RISK STATEMENT: The risks, benefits, alternatives and potential adverse effects have been discussed and are understood by the pt. The pt understands the risks of using street drugs or alcohol. There are no medical contraindications, the pt agrees to treatment  with the ability to do so. The pt knows to call the clinic for any problems or to access emergency care if needed.  Medical and substance use concerns are documented above.  Psychotropic drug interaction check was done, including changes made today.    PROVIDER: Hallie Hart MD    Patient not staffed in clinic.  Note will be reviewed and signed by supervisor Dr. Haq.

## 2021-12-23 ASSESSMENT — PATIENT HEALTH QUESTIONNAIRE - PHQ9: SUM OF ALL RESPONSES TO PHQ QUESTIONS 1-9: 4

## 2022-01-07 ENCOUNTER — LAB (OUTPATIENT)
Dept: LAB | Facility: CLINIC | Age: 22
End: 2022-01-07
Payer: COMMERCIAL

## 2022-01-07 DIAGNOSIS — F31.9 BIPOLAR 1 DISORDER (H): ICD-10-CM

## 2022-01-07 DIAGNOSIS — Z51.81 ENCOUNTER FOR THERAPEUTIC DRUG MONITORING: ICD-10-CM

## 2022-01-07 LAB
ALBUMIN UR-MCNC: NEGATIVE MG/DL
ANION GAP SERPL CALCULATED.3IONS-SCNC: 5 MMOL/L (ref 3–14)
APPEARANCE UR: CLEAR
BILIRUB UR QL STRIP: NEGATIVE
BUN SERPL-MCNC: 8 MG/DL (ref 7–30)
CALCIUM SERPL-MCNC: 9.4 MG/DL (ref 8.5–10.1)
CHLORIDE BLD-SCNC: 109 MMOL/L (ref 94–109)
CO2 SERPL-SCNC: 24 MMOL/L (ref 20–32)
COLOR UR AUTO: ABNORMAL
CREAT SERPL-MCNC: 0.57 MG/DL (ref 0.52–1.04)
DEPRECATED CALCIDIOL+CALCIFEROL SERPL-MC: 19 UG/L (ref 20–75)
GFR SERPL CREATININE-BSD FRML MDRD: >90 ML/MIN/1.73M2
GLUCOSE BLD-MCNC: 86 MG/DL (ref 70–99)
GLUCOSE UR STRIP-MCNC: NEGATIVE MG/DL
HGB UR QL STRIP: NEGATIVE
KETONES UR STRIP-MCNC: NEGATIVE MG/DL
LEUKOCYTE ESTERASE UR QL STRIP: ABNORMAL
LITHIUM SERPL-SCNC: 0.9 MMOL/L
NITRATE UR QL: NEGATIVE
PH UR STRIP: 7 [PH] (ref 5–7)
POTASSIUM BLD-SCNC: 4.2 MMOL/L (ref 3.4–5.3)
RBC URINE: 2 /HPF
SODIUM SERPL-SCNC: 138 MMOL/L (ref 133–144)
SP GR UR STRIP: 1.01 (ref 1–1.03)
SQUAMOUS EPITHELIAL: 5 /HPF
TRANSITIONAL EPI: <1 /HPF
TSH SERPL DL<=0.005 MIU/L-ACNC: 3.71 MU/L (ref 0.4–4)
UROBILINOGEN UR STRIP-MCNC: NORMAL MG/DL
WBC URINE: 3 /HPF

## 2022-01-07 PROCEDURE — 84443 ASSAY THYROID STIM HORMONE: CPT

## 2022-01-07 PROCEDURE — 36415 COLL VENOUS BLD VENIPUNCTURE: CPT

## 2022-01-07 PROCEDURE — 81001 URINALYSIS AUTO W/SCOPE: CPT

## 2022-01-07 PROCEDURE — 82435 ASSAY OF BLOOD CHLORIDE: CPT

## 2022-01-07 PROCEDURE — 82306 VITAMIN D 25 HYDROXY: CPT

## 2022-01-07 PROCEDURE — 80178 ASSAY OF LITHIUM: CPT

## 2022-01-18 ENCOUNTER — TELEPHONE (OUTPATIENT)
Dept: PSYCHIATRY | Facility: CLINIC | Age: 22
End: 2022-01-18
Payer: COMMERCIAL

## 2022-01-18 NOTE — TELEPHONE ENCOUNTER
On January 18, 2022, at 7:54 AM, writer called patient at mobile to confirm Virtual Visit. Writer unable to make contact with patient. Writer left detailed voice message for call back. [phone] left as call back number. Lemuel Glasgow, EMT

## 2022-02-23 ENCOUNTER — TELEPHONE (OUTPATIENT)
Dept: PSYCHIATRY | Facility: CLINIC | Age: 22
End: 2022-02-23
Payer: COMMERCIAL

## 2022-02-24 DIAGNOSIS — F31.9 BIPOLAR 1 DISORDER (H): ICD-10-CM

## 2022-02-24 RX ORDER — LITHIUM CARBONATE 300 MG/1
600 TABLET, FILM COATED, EXTENDED RELEASE ORAL AT BEDTIME
Qty: 60 TABLET | Refills: 0 | Status: SHIPPED | OUTPATIENT
Start: 2022-02-24 | End: 2022-03-17

## 2022-02-24 NOTE — TELEPHONE ENCOUNTER
[facility] Call Center    Phone Message    May a detailed message be left on voicemail: yes     Reason for Call: Medication Refill Request    Has the patient contacted the pharmacy for the refill? Yes   Name of medication being requested: lithium  Provider who prescribed the medication: nikita  Pharmacy:      [id number] - [place], [place] - [address]      Date medication is needed: pt reports she is out of lithium      Action Taken: Message routed to:  Other: nursing pool    Travel Screening: Not Applicable

## 2022-02-24 NOTE — TELEPHONE ENCOUNTER
Received RF request from patient    Last seen: 12/22/21  RTC: 1/18/22  Cancel: one - 1/18/22  No-show: none  Next appt: 3/08/22     Medication requested: lithium ER (LITHOBID) 300 MG CR tablet  Directions: Take 2 tablets (600 mg) by mouth At Bedtime   Qty: 60  Last Rx written 12/22/21 for 30 ds with 1 rf         Plan per 12/22/21 virtual visit:     - Continue Lithium  mg at bedtime   - continue seroquel 25-50mg PRN for insomnia, hypomania sx (not taking currently, but has this available as needed)     Per outside meds tab, 30 ds of lithium dispensed on 1/07/22 and 12/20/21.     Called patient to find out how long she has been out of lithium. She said that she took her last dose last night so she is only going to be out if she does not get her prescription sent RIGHT NOW (emphasis made by patient).     She emphasized that it needs to be sent RIGHT NOW because she is already at [employer] and it would be convenient for her to go to [facility] NOW. Patient historically calls at the end of the day to demand that a refill for lithium be sent to the pharmacy [facility]. Reminded her that there is a 3 business day turnaround for refills and it would be helpful to request refills before she runs out.     Pended a 30 ds and routed to Dr. Hart for review and signature.

## 2022-03-17 DIAGNOSIS — F31.9 BIPOLAR 1 DISORDER (H): ICD-10-CM

## 2022-03-17 RX ORDER — LITHIUM CARBONATE 300 MG/1
600 TABLET, FILM COATED, EXTENDED RELEASE ORAL AT BEDTIME
Qty: 60 TABLET | Refills: 0 | Status: SHIPPED | OUTPATIENT
Start: 2022-03-17 | End: 2022-03-23

## 2022-03-17 NOTE — TELEPHONE ENCOUNTER
Medication requested: lithium ER (LITHOBID) 300 MG CR tablet  Last refilled: 2/24/22  Qty: 60/0       Last seen: 12/22/21  RTC: 1/18/22  Cancel: 2 - 1/18/22, 3/8/22  No-show: 0  Next appt: None    Refill decision:   Refill pended and routed to the provider for review/determination due to cancel x 2, no upcoming appt. Scheduling has been notified to contact the pt for appointment.

## 2022-03-22 NOTE — PROGRESS NOTES
[facility]  Psychiatry Clinic  Progress Note     Video- Visit Details  Type of service:  video visit for medication management  Time of service:    Date: 3/23/22    Video Start Time: 2:00pm    Video End Time: 2:30pm    Reason for video visit:  Patient convenience   Originating Site (patient location):  [facility]- [place]   Location- Patient's home  Distant Site (provider location):  [facility]  Mode of Communication:  Video Conference via AmWell  Consent:  Patient has given verbal consent for video visit?: Yes       CARE TEAM:  PCP- Physician No Ref-Primary, Psychotherapist-none    Cristina WES Boyd is a 22 year old who prefers the name Klaus and uses pronouns she, her.      DIAGNOSIS   Bipolar I disorder, MRE episode mixed without psychotic features, currently euthymic, with seasonal component (tends to become more depressed in winter)     ASSESSMENT   Venita is a 23yo who was seen today for follow-up visit. Since last visit, mood has overall remained euthymic even in the context of school stressors, feels that lithium has been helpful for mood stability. Much of today's visit included psycho-education re: bipolar disorder, lithium, best ways to optimize and maintain stable mood, including maintaining regular sleep schedule and avoiding alcohol + cannabis use (Klaus endorses very rare use, but had lengthy discussion about each of these substance's potential effects on mood, anxiety, psychosis, as well as interactions with lithium). Overall this writer feels that Klaus's insight into illness, coping skills, focus on health has improved with age, and has corresponded with better adherence to medication and more stable mood. Plan to continue current medications, no safety concerns, no side effects.     Future considerations:  -consider lightbox starting in the fall for mood    MNPMP review was not needed today.     PLAN                                                                            "                                     1) Meds-  - Continue Lithium  mg at bedtime   - continue seroquel 25-50mg PRN for insomnia, hypomania sx (very rare use)    -start taking vitamin D3 2000IU daily (OTC)  -melatonin 2.5mg at bedtime PRN (OTC)    2) Psychotherapy- number given for [facility] at previous visit. Venita reports enjoying working with a previous therapist, Keyana Escobar, through [facility] that she liked (around 2018), and plans to determine whether she is still working there. If not, plans to establish with a different therapist     3) Next due-  Labs- lithium labs completed 1/2022, next due 7/2022   *note: does have history of subclinical hypothyroidism*   EKG- PRN (2/2016: qtc 404ms)  Rating scales- PHQ9    4) Referrals- none    5) Dispo- April 19th @ 10:30am     PERTINENT BACKGROUND                           [most recent eval 07/30/21]   This patient first experienced mental health issues in February 2016 and has received treatment for Bipolar I most recent episode manic with history of psychosis.  Notably, the patient has history of multiple hospitalizations with shaina and psychosis, including most recently July 2018. She has a history of difficult adherence to medications followed by decompensation and hospitalization.  After her most recent hospitalization in July 2018 she did complete a Day Program.      Psych pertinent item history includes inconsistently taking medications     SUBJECTIVE     Since last visit:  -I've been ok, school is still a roller coaster, in last semester, midterms recently  -birthday was OK, celebrated afterward bc of midterms/spring break. Realizing that she's more an \"adult\" now that she's 22, feels like she should have it \"more together\" by now, which is some ways is stressful, but also a good push for her to complete school this semester, etc.  -sleep? \"pretty good,\" sometimes stay up late with school, but no decreased need for sleep. Took seroquel once over past few months to help " "with sleep, took 1/8 of a tablet, slept for like 14hrs  -mood? \"rollercoaster\", high anxiety and some irritability, no clear shaina nor depression, feel euthymic now. Variable in the context of school stressors, not remembering lithium all the time  -asked various questions about ideal lithium level, effect of alcohol/cannabis use on mood (initially asked this \"out of curiosity,\" then shared that she did have one drink around her birthday and it made her feel a little depressed, didn't like the taste. Similarly had one hit of cannabis many months ago, didn't have any effect)    RECENT PSYCH ROS:   Depression:  poor concentration /memory and mood dysregulation  Elevated:  distractibility   Psychosis:  none  Anxiety:  excessive worry and nervous/overwhelmed  Trauma Related:  none  Sleep: dysregulation  Other: N/A    Adverse Effects: none  Pertinent Negative Symptoms: No suicidal ideation, self-injurious behavior/urges or psychosis  Recent Substance Use:     Alcohol- tried alcohol once around her birthday, didn't like taste or the way it made her feel (slight decrease to mood)  -cannabis- shared that she smoked once this past summer, none since, didn't have any effect on her mood (good or bad)     FAMILY and SOCIAL HISTORY                                 pt reported     Family Hx: Cousin with bipolar; Uncle with substance use; Grandmother \"with thyroid problems\" and diabetes.     Social Hx:  Financial/ Work- Currently in school at [employer]. Not currently employed, worked as an  in the past. Family will offer financial support when needed.  Partner/ - single  Children- no      Living situation- Lives with family  Social/ Spiritual Support- family is very supportive    Feels Safe at Home- yes   Legal- None     Trauma History (self-report)- no (does reports having a difficult time while living in [place] as most of the rest of her family living there were boys and felt isolated).  Early " "History/Education-  Grew up in [place] \"but also grew up in [place]\" (lived in [place] for 3 years between 14-17) with mother, Joy, Father, Americo, Oldest sibling. Has 4 younger siblings (Polly, Sam, Drew, Cristi).  Went to college at [employer] before transfer to [employer].    PSYCH and SUBSTANCE USE Critical Summary Points since July 2021 8/2/21: patient transfer, restarted lithium 600mg at bedtime and started quetiapine 100mg at bedtime due to concern for mixed symptoms  8/26/21: no show  9/16/21: no medication changes (other than pt has been taking 50mg quetiapine, not 100mg), restart lithium (patient ran out for 2 weeks)  Oct 2021: no show  Nov 4 2021: no show  Nov 18 2021: no changes  Dec 9 2021: no show  Dec 22 2021: no changes  Jan 18 2022: no show  March 8 2022: no show  March 23 2022: no changes     MEDICAL HISTORY and ALLERGY     ALLERGIES: Patient has no known allergies.    Patient Active Problem List   Diagnosis     Adela (H)     Bipolar affective disorder, current episode manic with psychotic symptoms (H)     Hx of psychiatric care     Bipolar I disorder, current or most recent episode manic, with psychotic features (H)     Lymphadenopathy     Bipolar 1 disorder (H)        MEDICAL REVIEW OF SYSTEMS   Contraception- abstinence    A comprehensive review of systems was performed and is negative other than noted in the HPI.     MEDICATIONS     Current Outpatient Medications   Medication Sig Dispense Refill     lithium ER (LITHOBID) 300 MG CR tablet Take 2 tablets (600 mg) by mouth At Bedtime MUST BE SEEN FOR REFILLS. 60 tablet 0     QUEtiapine (SEROQUEL) 100 MG tablet Take 1 tablet (100 mg) by mouth At Bedtime (Patient not taking: Reported on 11/18/2021) 30 tablet 0     QUEtiapine (SEROQUEL) 50 MG tablet Take 1 tablet (50 mg) by mouth nightly as needed (insomnia, manic symptoms) (Patient not taking: Reported on 11/18/2021) 30 tablet 1      VITALS   There were no vitals taken for this " visit.    MENTAL STATUS EXAM     Alertness: alert  and oriented  Appearance: adequately groomed  Behavior/Demeanor: cooperative and pleasant, with fair  eye contact   Speech: increased rate and mildly pressured (~baseline)  Language: intact  Psychomotor: normal or unremarkable  Mood: description consistent with euthymia  Affect: full range; congruent to: mood- yes, content- yes  Thought Process/Associations: tangential at times  Thought Content:  Reports none;  Denies suicidal & violent ideation and delusions  Perception:  Reports none;  Denies hallucinations  Insight: fair  Judgment: fair  Cognition: does  appear grossly intact; formal cognitive testing was not done  Gait and Station: N/A (telehealth)     LABS and DATA     PHQ9 TODAY = not completed today  PHQ 3/9/2020 2021 2022   PHQ-9 Total Score 0 4 0   Q9: Thoughts of better off dead/self-harm past 2 weeks Not at all Not at all Not at all   F/U: Thoughts of suicide or self-harm - - -   F/U: Self harm-plan - - -   F/U: Self-harm action - - -   F/U: Safety concerns - - -       Recent Labs   Lab Test 22  1202 21  1159 20  1200   CR 0.57 0.58 0.61   GFRESTIMATED >90 >90 >90     Recent Labs   Lab Test 21  1159 01/10/20  1014 18  1050   AST 20 13 14   ALT 20 14 17   ALKPHOS 81 82 73       EC2016: qtc 404ms     PSYCHOTROPIC DRUG INTERACTIONS     none    MANAGEMENT:  N/A     RISK STATEMENT for SAFETY     Cristina Boyd did not appear to be an imminent safety risk to self or others.    TREATMENT RISK STATEMENT: The risks, benefits, alternatives and potential adverse effects have been discussed and are understood by the pt. The pt understands the risks of using street drugs or alcohol. There are no medical contraindications, the pt agrees to treatment with the ability to do so. The pt knows to call the clinic for any problems or to access emergency care if needed.  Medical and substance use concerns are documented above.   Psychotropic drug interaction check was done, including changes made today.    PROVIDER: Hallie Hart MD    Patient not staffed in clinic.  Note will be reviewed and signed by supervisor Dr. Haq.

## 2022-03-23 ENCOUNTER — VIRTUAL VISIT (OUTPATIENT)
Dept: PSYCHIATRY | Facility: CLINIC | Age: 22
End: 2022-03-23
Attending: PSYCHIATRY & NEUROLOGY
Payer: COMMERCIAL

## 2022-03-23 DIAGNOSIS — F31.9 BIPOLAR 1 DISORDER (H): ICD-10-CM

## 2022-03-23 PROCEDURE — 99214 OFFICE O/P EST MOD 30 MIN: CPT | Mod: 95 | Performed by: STUDENT IN AN ORGANIZED HEALTH CARE EDUCATION/TRAINING PROGRAM

## 2022-03-23 RX ORDER — LITHIUM CARBONATE 300 MG/1
600 TABLET, FILM COATED, EXTENDED RELEASE ORAL AT BEDTIME
Qty: 60 TABLET | Refills: 2 | Status: SHIPPED | OUTPATIENT
Start: 2022-03-23 | End: 2022-10-04

## 2022-03-23 NOTE — PATIENT INSTRUCTIONS
Nutrition: [email or web address]    **For crisis resources, please see the information at the end of this document**   Patient Education    Thank you for coming to the [facility] MENTAL HEALTH & ADDICTION [place] CLIN[facility].    Lab Testing:  If you had lab testing today and your results are reassuring or normal they will be mailed to you or sent through Pigit within 7 days. If the lab tests need quick action we will call you with the results. The phone number we will call with results is # [phone]. If this is not the best number please call our clinic and change the number.     Medication Refills:  If you need any refills please call your pharmacy and they will contact us. Our fax number for refills is [phone]. Please allow three business days for refill processing.   If you need to change to a different pharmacy, please contact the new pharmacy directly. The new pharmacy will help you get your medications transferred.     Contact Us:  Please call [phone] during business hours (8-5:00 M-F).  If you have medication related questions after clinic hours, or on the weekend, please call [phone].    Financial Assistance [phone]  Medical Records [phone]       MENTAL HEALTH CRISIS RESOURCES:  For a emergency help, please call 911 or go to the nearest Emergency Department.     Emergency Walk-In Options:   EmPATH Unit @ [facility] ([place]): [phone] - Specialized mental health emergency area designed to be calming  [facility] ([place]): [phone]  [facility] ([place]): [phone]  [facility]): [phone]    County Crisis Information:   [place]: [phone]  [place]: [phone]  [place] ([place]) - Adult: [phone]     Child: [phone]  [place] - Adult: [phone]     Child: [phone]  [place]: [phone]  List of all [facility] resources:    https://[place].gov/dhs/people-we-serve/adults/health-care/mental-health/resources/crisis-contacts.jsp    National Crisis Information:   Crisis Text Line: Text  [place]  to 208935  National Suicide Prevention Lifeline: 5-043-719-TALK (1-764.623.6652)       For online chat options, visit https://suicidepreventionlifeline.org/chat/  Poison Control Center: 1-961-063-3822  Trans Lifeline: 5-048-300-9081 - Hotline for transgender people of all ages  The Alpesh Project: 3-414-722-8843 - Hotline for LGBT youth     For Non-Emergency Support:   Fast Tracker: Mental Health & Substance Use Disorder Resources -   https://www.SeaWell Networksn.org/

## 2022-03-23 NOTE — PROGRESS NOTES
Cristina Boyd is a 22 year old who has consented to receive services via billable video visit.      Pt will join video visit via: SafetyCulture  If there are problems joining the visit, send backup video invite via: Text to preferred phone: [phone]      Originating Location (patient location): Patient's home  Distant Location (provider location): [facility] MENTAL HEALTH & ADDICTION [place] [facility]    Will anyone else be joining the video visit? No    How would you prefer to obtain AVS?: Rebecca
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
## 2022-04-19 ENCOUNTER — VIRTUAL VISIT (OUTPATIENT)
Dept: PSYCHIATRY | Facility: CLINIC | Age: 22
End: 2022-04-19
Attending: PSYCHIATRY & NEUROLOGY
Payer: COMMERCIAL

## 2022-04-19 DIAGNOSIS — F41.9 ANXIETY: ICD-10-CM

## 2022-04-19 DIAGNOSIS — F31.9 BIPOLAR 1 DISORDER (H): Primary | ICD-10-CM

## 2022-04-19 PROCEDURE — 99214 OFFICE O/P EST MOD 30 MIN: CPT | Mod: HN | Performed by: STUDENT IN AN ORGANIZED HEALTH CARE EDUCATION/TRAINING PROGRAM

## 2022-04-19 NOTE — PATIENT INSTRUCTIONS
**For crisis resources, please see the information at the end of this document**   Patient Education    Thank you for coming to the [facility] MENTAL HEALTH & ADDICTION [place] CLIN[facility].    Lab Testing:  If you had lab testing today and your results are reassuring or normal they will be mailed to you or sent through BangTango within 7 days. If the lab tests need quick action we will call you with the results. The phone number we will call with results is # [phone]. If this is not the best number please call our clinic and change the number.     Medication Refills:  If you need any refills please call your pharmacy and they will contact us. Our fax number for refills is [phone]. Please allow three business days for refill processing.   If you need to change to a different pharmacy, please contact the new pharmacy directly. The new pharmacy will help you get your medications transferred.     Contact Us:  Please call [phone] during business hours (8-5:00 M-F).  If you have medication related questions after clinic hours, or on the weekend, please call [phone].    Financial Assistance [phone]  Medical Records [phone]       MENTAL HEALTH CRISIS RESOURCES:  For a emergency help, please call 911 or go to the nearest Emergency Department.     Emergency Walk-In Options:   EmPATH Unit @ [facility] ([place]): [phone] - Specialized mental health emergency area designed to be calming  [facility] ([place]): [phone]  [facility] ([place]): [phone]  [facility]): [phone]    County Crisis Information:   [employer]: [phone]  [place]: [phone]  [place] ([place]) - Adult: [phone]     Child: [phone]  [place] - Adult: [phone]     Child: [phone]  [place]: [phone]  List of all [facility] resources:    https://[place].gov/dhs/people-we-serve/adults/health-care/mental-health/resources/crisis-contacts.jsp    National Crisis Information:   Crisis Text Line: Text  [place]  to 586159  National Suicide Prevention Lifeline: 2-541-768-TALK (1-168.313.1863)       For online chat options, visit https://suicidepreventionlifeline.org/chat/  Poison Control Center: 4-435-451-8654  Trans Lifeline: 5-170-505-6600 - Hotline for transgender people of all ages  The Alpesh Project: 9-249-626-8576 - Hotline for LGBT youth     For Non-Emergency Support:   Fast Tracker: Mental Health & Substance Use Disorder Resources -   https://www.[employer] Schooln.org/

## 2022-08-08 ENCOUNTER — VIRTUAL VISIT (OUTPATIENT)
Dept: PSYCHIATRY | Facility: CLINIC | Age: 22
End: 2022-08-08
Attending: PSYCHIATRY & NEUROLOGY
Payer: COMMERCIAL

## 2022-08-08 DIAGNOSIS — Z53.9 ERRONEOUS ENCOUNTER--DISREGARD: Primary | ICD-10-CM

## 2022-08-08 ASSESSMENT — PATIENT HEALTH QUESTIONNAIRE - PHQ9
SUM OF ALL RESPONSES TO PHQ QUESTIONS 1-9: 6
10. IF YOU CHECKED OFF ANY PROBLEMS, HOW DIFFICULT HAVE THESE PROBLEMS MADE IT FOR YOU TO DO YOUR WORK, TAKE CARE OF THINGS AT HOME, OR GET ALONG WITH OTHER PEOPLE: SOMEWHAT DIFFICULT
SUM OF ALL RESPONSES TO PHQ QUESTIONS 1-9: 6

## 2022-08-08 NOTE — PATIENT INSTRUCTIONS
**For crisis resources, please see the information at the end of this document**   Patient Education    Thank you for coming to the [facility] MENTAL HEALTH & ADDICTION [place] CLIN[facility].     Lab Testing:  If you had lab testing today and your results are reassuring or normal they will be mailed to you or sent through UpMo within 7 days. If the lab tests need quick action we will call you with the results. The phone number we will call with results is # [phone]. If this is not the best number please call our clinic and change the number.     Medication Refills:  If you need any refills please call your pharmacy and they will contact us. Our fax number for refills is [phone].   Three business days of notice are needed for general medication refill requests.   Five business days of notice are needed for controlled substance refill requests.   If you need to change to a different pharmacy, please contact the new pharmacy directly. The new pharmacy will help you get your medications transferred.     Contact Us:  Please call [phone] during business hours (8-5:00 M-F).   If you have medication related questions after clinic hours, or on the weekend, please call [phone].     Financial Assistance [phone]   Medical Records [phone]       MENTAL HEALTH CRISIS RESOURCES:  For a emergency help, please call 911 or go to the nearest Emergency Department.     Emergency Walk-In Options:   EmPATH Unit @ [facility] ([place]): [phone] - Specialized mental health emergency area designed to be calming  [facility] ([place]): [phone]  [facility] ([place]): [phone]  [facility]): [phone]    [facility] Crisis Information:   [place]: [phone]  [place]: [phone]  [place] ([place]) - Adult: [phone]     Child: [phone]  [place] - Adult: [phone]     Child: [phone]  [place]:  [phone]  List of all [facility] resources:   https://[place].gov/dhs/people-we-serve/adults/health-care/mental-health/resources/crisis-contacts.jsp    National Crisis Information:   Crisis Text [place]: Text  [place]  to 397540  Suicide & Crisis Lifeline: 988  National Suicide Prevention Lifeline: 4-696-820-TALK (1-258.302.7575)       For online chat options, visit https://suicidepreventionlifeline.org/chat/  Poison Control Center: 7-117-124-6553  Trans Lifeline: 5-879-405-4390 - Hotline for transgender people of all ages  The Alpesh Project: 4-543-760-1650 - Hotline for LGBT youth     For Non-Emergency Support:   Fast Tracker: Mental Health & Substance Use Disorder Resources -   https://www.Gamer GuidesckRealien.org/

## 2022-08-23 ENCOUNTER — E-VISIT (OUTPATIENT)
Dept: URGENT CARE | Facility: URGENT CARE | Age: 22
End: 2022-08-23
Payer: COMMERCIAL

## 2022-08-23 DIAGNOSIS — R12 HEARTBURN: Primary | ICD-10-CM

## 2022-08-23 PROCEDURE — 99421 OL DIG E/M SVC 5-10 MIN: CPT | Performed by: PHYSICIAN ASSISTANT

## 2022-08-25 NOTE — PATIENT INSTRUCTIONS
Thank you for choosing us for your care. I have placed an order for a prescription so that you can start treatment. View your full visit summary for details by clicking on the link below. Your pharmacist will able to address any questions you may have about the medication.      If you're not feeling better within 2 weeks please schedule an appointment.  You can schedule an appointment right here in Health system, or call [phone]  If the visit is for the same symptoms as your eVisit, we'll refund the cost of your eVisit if seen within seven days.      Lifestyle Changes for Controlling GERD  When you have GERD, stomach acid feels as if it s backing up toward your mouth. Making lifestyle changes can often improve your symptoms. This is true if you take medicine to control your GERD or not. Talk with your healthcare provider about the following suggestions. They may help you get relief from your symptoms.  Raise your head    Reflux is more likely to happen when you re lying down flat. That's because stomach fluid can flow backward more easily. Raising the head of your bed 4 to 6 inches can help. To do this:    Slide blocks or books under the legs at the head of your bed. Or put a wedge under the mattress. Many [employer] can make a wedge for you. The wedge should go from your waist to the top of your head.    Don t just prop your head up on a few pillows. This increases pressure on your stomach. It can make GERD worse.  Watch your eating habits  Certain foods may increase the acid in your stomach. Or they may relax the lower esophageal sphincter. This makes GERD more likely. It s best to avoid the following if they cause you symptoms:    Coffee, tea, and carbonated drinks (with and without caffeine)    Fatty, fried, or spicy food    Mint, chocolate, onions, tomatoes, and citrus    Peppermint    Any other foods that seem to irritate your stomach or cause you pain  Relieve the pressure  Tips include the  following:    Eat smaller meals, even if you have to eat more often.    Don t lie down right after you eat. Wait a few hours for your stomach to empty.    Don't wear tight belts or tight-fitting clothes.    Lose any extra weight.  Tobacco and alcohol  Don't smoke tobacco or drink alcohol. They can make GERD symptoms worse.  "[employer] last reviewed this educational content on 6/1/2019 2000-2021 [employer]. All rights reserved. This information is not intended as a substitute for professional medical care. Always follow your healthcare professional's instructions.          Medicines for GERD  Gastroesophageal reflux disease (GERD) can be treated with medicine. This may be done with a medicine you can buy over the counter. Or with a medicine that your healthcare provider has to prescribe. In some cases, both types may be used. Your provider will tell you what is best for your symptoms.   Antacids  Antacids work to weaken the acid in your stomach. They can give you quick relief. You can buy many of them with no prescription. Antacids can be high in sodium. This may be a problem if you have high blood pressure. Some antacids also have aluminum. This should be avoided if you have long-term (chronic) kidney disease. So check with your provider first. Take antacids only when you need to, as advised by your provider.   Side effects: Constipation, diarrhea. If you take too much medicine, it can cause calcium to build up.    H-2 blockers  These cause the stomach to make less acid. They are often used on demand as symptoms occur. And they are used daily to keep symptoms away. Your provider may prescribe them if antacids don t work for you. You can buy some of them over the counter. These come in a lower dosage.   Side effects: Confusion in older adults.   Proton-pump inhibitors  These also cause the stomach to make less acid. They reduce stomach acid more than H-2 blockers. They may be used for a short time, or  longer to treat certain conditions. You can buy some of them over the counter. Or your provider may prescribe them. They help control GERD symptoms.   Side effects: Belly pain, diarrhea, upset stomach. Possible other side effects linked to long-term use and high doses.   Prokinetics  These medicines affect the movement of the digestive tract. They may be advised if your stomach is emptying too slowly. But in most cases they are not advised for treating GERD.   Side effects:Tiredness, depression, anxiety, problems with physical movement, belly cramps, constipation, diarrhea, a jittery feeling.   Medicines to stay away from  Don t take aspirin without your healthcare provider s approval. And don t take a nonsteroidal anti-inflammatory drug (NSAID), such as ibuprofen. These reduce the protective lining of your stomach. This can lead to more GERD symptoms. Check with your provider or pharmacist before taking a new medicine.   [employer] last reviewed this educational content on 6/1/2019 2000-2021 [employer]. All rights reserved. This information is not intended as a substitute for professional medical care. Always follow your healthcare professional's instructions.        Avoid eating before sleep, and avoid alcohol and caffeine while symptoms are ongoing.

## 2022-09-19 ENCOUNTER — TELEPHONE (OUTPATIENT)
Dept: PSYCHIATRY | Facility: CLINIC | Age: 22
End: 2022-09-19

## 2022-09-19 NOTE — TELEPHONE ENCOUNTER
Patient called to report that she is experiencing low mood, poor sleep, increased irritability.  She rates depression 7-8 out of 10 (10 worst).  Patient denies any safety concerns - denies SI/SIB/AH/VH    Patient stopped taking lithium in April and stopped Seroquel earlier in the year.    Patient wondering if she can re-start medications.  (uses [facility])    Appt scheduled for 10/14 with Dr. Yanes.  Patient has no showed previous appointments and canceled today's appt with Dr. Yanes.  Patient states that she has been having issues making appointments work around her work schedule.     Sent to Dr. Yanes for review

## 2022-09-21 ENCOUNTER — VIRTUAL VISIT (OUTPATIENT)
Dept: PSYCHIATRY | Facility: CLINIC | Age: 22
End: 2022-09-21
Attending: PSYCHIATRY & NEUROLOGY
Payer: COMMERCIAL

## 2022-09-21 DIAGNOSIS — Z51.81 ENCOUNTER FOR THERAPEUTIC DRUG MONITORING: Primary | ICD-10-CM

## 2022-09-21 DIAGNOSIS — F31.9 BIPOLAR 1 DISORDER (H): ICD-10-CM

## 2022-09-21 PROCEDURE — 99214 OFFICE O/P EST MOD 30 MIN: CPT | Mod: 95 | Performed by: STUDENT IN AN ORGANIZED HEALTH CARE EDUCATION/TRAINING PROGRAM

## 2022-09-21 RX ORDER — QUETIAPINE FUMARATE 50 MG/1
TABLET, FILM COATED ORAL
Qty: 30 TABLET | Refills: 1 | Status: SHIPPED | OUTPATIENT
Start: 2022-09-21 | End: 2022-11-10

## 2022-09-21 ASSESSMENT — PATIENT HEALTH QUESTIONNAIRE - PHQ9
10. IF YOU CHECKED OFF ANY PROBLEMS, HOW DIFFICULT HAVE THESE PROBLEMS MADE IT FOR YOU TO DO YOUR WORK, TAKE CARE OF THINGS AT HOME, OR GET ALONG WITH OTHER PEOPLE: VERY DIFFICULT
SUM OF ALL RESPONSES TO PHQ QUESTIONS 1-9: 9
SUM OF ALL RESPONSES TO PHQ QUESTIONS 1-9: 9

## 2022-09-21 NOTE — PROGRESS NOTES
Cristina Boyd is a 22 year old who has consented to receive services via billable video visit.      Pt will join video visit via: [employer] ([employer]  If there are problems joining the visit, send backup video invite via: Text to preferred phone: [phone]      Originating Location (patient location): Patient's home  Distant Location (provider location): [facility]    Will anyone else be joining the video visit? No     Video- Visit Details  Type of service:  video visit for medication management  Time of service:    Video Start Time:  3:51       Video End Time:  4:20  Reason for video visit:  Patient has requested telehealth visit  Originating Site (patient location):  [facility]   Location- Patient's home  Distant Site (provider location):  [facility]  Mode of Communication:  Video Conference via [employer])       [facility]  Psychiatry Clinic  MEDICAL PROGRESS NOTE     CARE TEAM:  PCP- Physician No Ref-Primary    Psychotherapist- None       This person is a 22 year old who uses the name Klaus and pronouns she, her, hers.      DIAGNOSIS     Bipolar I disorder, MRE episode mixed without psychotic features, currently euthymic, with seasonal component (tends to become more depressed in winter)  R/o ADHD     ASSESSMENT      Patient scheduled for 60 minute transfer of care appointment today. She was, unfortunately, 40 minutes late for her appointment. Thus, this writer was unable to complete a diagnostic assessment. Focus of today's visit was recent low mood coupled with insomnia.    Klaus reports that she stopped taking lithium, quetiapine in April due to feeling that she didn't need medications any longer. She denies having any symptoms of shaina over the summer. However, she is now concerned about low mood and insomnia x6 days. Given that patient was previously prescribed Seroquel as needed for sleep and shaina, felt appropriate to restart  "this medication today. Additionally, will obtain labs in anticipation of restarting lithium.     No acute safety concerns.     [place] review was not needed today.     PLAN                                                                                                                1) Meds-  - START seroquel 25-50mg PRN for insomnia, hypomania sx    2) Psychotherapy-   Per chart review, number given for [facility] at previous visit. Venita reports enjoying working with a previous therapist, Keyana Escobar, through [facility] that she liked (around 2018), and plans to determine whether she is still working there. If not, plans to establish with a different therapist    3) Next due-  Labs- lithium labs completed 1/2022, next due 7/2022                *note: does have history of subclinical hypothyroidism*   EKG- PRN (2/2016: qtc 404ms)  Rating scales- PHQ9    4) Referrals-  none    5) Dispo- October 14 at 12:50     PERTINENT BACKGROUND                                                    [most recent eval 08/2/21]     The following section contains information copied from previous note by Dr. Hart on 7/30/21.    This patient first experienced mental health issues in February 2016 and has received treatment for Bipolar I most recent episode manic with history of psychosis.  Notably, the patient has history of multiple hospitalizations with shaina and psychosis, including most recently July 2018. She has a history of difficult adherence to medications followed by decompensation and hospitalization.  After her most recent hospitalization in July 2018 she did complete a Day Program.     Pertinent Items Include: psychosis , shaina  and psych hosp  inconsistently taking medications     SUBJECTIVE     - has not been taking lithium or Seroquel since April. Felt like she didn't need it. Ran out of lithium and didn't seek refills.  - no manic sx. since last hospitalized (2018).   - is starting to feel \"depressed\". Describes low mood x 6 days.  - had a " "\"full meltdown last week.\" describes irritabilty, angered easily.  - couldn't sleep over the weekend. Feels this was due to anxiety, not adela.   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    - recently has had \"stomach issues\" and \"issues eating.\" describes throwing up, difficulty swallowing (she believes this is due to acid reflux). Symptoms have improved. She does not report weight loss  - she denies SI, HI, SIB, psychosis, delusions.    Recent Psych Symptoms:   Depression:  depressed mood, insomnia, appetite changes and mood dysregulation  Elevated:  none  Psychosis:  none  Anxiety:  none reported  Trauma Related:  none  Sleep: insomnia  Other: no    Recent Substance Use:   None reported    Pertinent Negatives: No suicidal ideation, self-injury, psychosis, delusions, adela and harmful substance use  Adverse Effects: none reported     MEDICAL HISTORY and ALLERGY     ALLERGIES: Patient has no known allergies.    Patient Active Problem List   Diagnosis     Adela (H)     Bipolar affective disorder, current episode manic with psychotic symptoms (H)     Hx of psychiatric care     Bipolar I disorder, current or most recent episode manic, with psychotic features (H)     Lymphadenopathy     Bipolar 1 disorder (H)        MEDICAL REVIEW OF SYSTEMS   Contraception-  Unknown   Pregnant- Unknown    A comprehensive review of systems was performed and is negative other than noted in the HPI.     MEDICATIONS     Current Outpatient Medications   Medication Sig Dispense Refill     QUEtiapine (SEROQUEL) 50 MG tablet Take 25-50 mg at bedtime as needed for sleep 30 tablet 1     lithium ER (LITHOBID) 300 MG CR tablet Take 2 tablets (600 mg) by mouth At Bedtime 60 tablet 2      VITALS   There were no vitals taken for this visit.    MENTAL STATUS EXAM     Alertness: alert  and oriented  Appearance: adequately groomed  Behavior/Demeanor: cooperative and pleasant, with fair  eye contact   Speech: increased rate and mildly " pressured (this is baseline per chart)  Language: intact  Psychomotor: normal or unremarkable  Mood: depressed  Affect: restricted; congruent to: mood- yes, content- yes  Thought Process/Associations: tangential  Thought Content:  Reports none;  Denies suicidal & violent ideation and delusions  Perception:  Reports none;  Denies hallucinations  Insight: fair  Judgment: fair  Cognition: does  appear grossly intact; formal cognitive testing was not done  Gait and Station: N/A (telehealth)     LABS and DATA     PHQ 1/18/2022 8/8/2022 9/21/2022   PHQ-9 Total Score 0 6 9   Q9: Thoughts of better off dead/self-harm past 2 weeks Not at all Several days Several days   F/U: Thoughts of suicide or self-harm - No Yes   F/U: Self harm-plan - - Yes   F/U: Self-harm action - - No   F/U: Safety concerns - No No       Recent Labs   Lab Test 01/07/22  1202 07/09/21  1159   CR 0.57 0.58   GFRESTIMATED >90 >90     Recent Labs   Lab Test 07/09/21  1159 01/10/20  1014   AST 20 13   ALT 20 14   ALKPHOS 81 82       ECG 2/8/2016 QTc = 404ms     PSYCHOTROPIC DRUG INTERACTIONS                                                       PSYCHCLINICDDI   none     MANAGEMENT:  Monitoring for adverse effects and routine labs     RISK STATEMENT for SAFETY     Cristina did not appear to be an imminent safety risk to self or others.    TREATMENT RISK STATEMENT: The risks, benefits, alternatives and potential adverse effects have been discussed and are understood by the pt. The pt understands the risks of using street drugs or alcohol. There are no medical contraindications, the pt agrees to treatment with the ability to do so. The pt knows to call the clinic for any problems or to access emergency care if needed.  Medical and substance use concerns are documented above.  Psychotropic drug interaction check was done, including changes made today.     PROVIDER: Hannah Yanes MD    Patient staffed in clinic with Dr. Driscoll who will sign the note.   Supervisor is Dr. Agosto.

## 2022-09-21 NOTE — PROGRESS NOTES
"Video- Visit Details  Type of service:  video visit for diagnostic assessment  Time of service:    Video Start Time:  12:50       Video End Time:  1:40  Reason for video visit:  Patient has requested telehealth visit  Originating Site (patient location):  [facility] [place]   Location- Patient's home  Distant Site (provider location):  [facility]  Mode of Communication:  Video Conference via [employer]       [facility]  Psychiatry Clinic  TRANSFER of CARE DIAGNOSTIC ASSESSMENT     CARE TEAM:  PCP- Physician No Ref-Primary    Psychotherapist- None       This person is a 22 year old who uses the name Klaus and pronouns she, her, hers.      DIAGNOSIS     Bipolar I disorder, current episode depressed, without psychotic features, with seasonal component (tends to become more depressed in winter)  R/o ADHD     ASSESSMENT     Klaus is a 23yo who is seen today for transfer of care and diagnostic assessment. She describes worsening mood symptoms (including sleep dysruption, appetite changes, self-isolating, and neglecting self-care) for ~2 weeks. This is in the context of non-adherence to lithium (discontinued in April due to not thinking she needed it). She has been hospitalized in the past in the setting of lithium nonadherence, last in 2018. Today, she reports that she was doing well until she developed the \"stomach flu\" 2 weeks ago. This resulted in an unintentional 15 pound weight loss and depressed mood. She was evaluated in the ED yesterday for mood symptoms and suicidal ideation. SI was passive. Lithium 600 mg at bedtime was started in the ED. She was deemed safe to return home and agreed to partial hospitalization.     Klaus denies SI, HI, and SIB at present. She was not interested in inmpatient psychiatric treatment, and did not meet criteria for involuntary hospitalization.  She does not want to start individual therapy but is open to a partial hospitalization program. Referral placed. No " medication changes.      Future considerations:  -consider lightbox starting in the fall for mood    [place] review was not needed today.     PLAN                                                                                                                1) Meds-  - continue Lithium  mg at bedtime   - continue Seroquel 25-50mg PRN for insomnia, hypomania sx (taking most nights)     -continue vitamin D3 2000IU daily (OTC)  -melatonin 2.5mg at bedtime PRN (OTC)    2) Psychotherapy- Offered. Patient not interested at this time.   number given for [facility] at previous visit. Klaus reports enjoying working with a previous therapist, Keyana Escobar, through [facility] (around 2018)    3) Next due-  Labs- lithium labs completed 7/2022               *note: does have history of subclinical hypothyroidism*   EKG- PRN (2/2016: qtc 404ms)  Rating scales- PHQ9    4) Referrals-  Specialty Program- (PHP vs day treatment)    5) Dispo- 2 weeks     PERTINENT BACKGROUND                                                    [most recent eval 08/29/22]   The following section contains information copied from previous note by Dr. Hart on 7/30/21 and has been reviewed, edited, and verified by the patient.     This patient first experienced mental health issues in February 2016 and has received treatment for Bipolar I most recent episode manic with history of psychosis.  Notably, the patient has history of multiple hospitalizations with shaina and psychosis, including most recently July 2018. She has a history of difficult adherence to medications followed by decompensation and hospitalization.  After her most recent hospitalization in July 2018 she did complete a Day Program.     Pertinent Items Include: shaina  inconsistently taking medications     SUBJECTIVE     - hasn't been doing well  - after last appointment felt better for a couple days. Then got the stomach flu. Hasn't felt well since. Lost ~15lbs. Has been stuck in a low mood.   - describes  "feeling down, negative, isolating to her room for ~1 week, not eating or taking care of herself. Has been unable to work at all this week. Taking a sick leave for ~10 days.   - has never had a low mood like this before.   - went to the ED yesterday   - reviewed that she agreed to PHP. She does not remember this.   - states memory is poor recently.  - \"everything\" is stressful. Fear that something bad will happen. Declines to elaborate. \"I don't want to talk about it, honestly... I just like to keep things to myself.\"  - denies suicidal thoughts. Does not have a desire to die, but states \"I don't have a will to live.\"  Denies thoughts about harming others.   - sleep is \"all over the place.\" Seroquel helps her fall asleep. She does sometimes wake up in a panic and attributes this to Seroquel. Is getting at least a couple hours of sleep each night.   - feels like she is \"a little paranoid.\" Denies hallucinations.     Recent Social History: see below    Recent Psych Symptoms:   Depression:  depressed mood, anhedonia, low energy, insomnia, appetite changes, weight changes, poor concentration /memory, excessive guilt, feeling hopeless and overwhelmed  Elevated:  none  Psychosis:  none  Anxiety:  feeling fearful and nervous/overwhelmed  Trauma Related:  none  Sleep: dysregulation  Other: no    Recent Substance Use: none reported    Pertinent Negatives: No suicidal or violent ideation, self-injury, psychosis, shaina and harmful substance use  Adverse Effects: none reported     FAMILY and SOCIAL HISTORY                                 pt reported     Family Hx: Cousin with bipolar; Uncle with substance use; Grandmother \"with thyroid problems\" and diabetes.     Social Hx:  Financial/ Work- 7th grade . She received her bachelors degree from [employer]  Partner/ - single  Children- no      Living situation- She currently lives with her family in [place]. She lives with her mom, dad, sister, " "and three brothers.  Social/ Spiritual Support- family is very supportive     Feels Safe at Home- yes   Legal- None     Trauma History (self-report)- no (does reports having a difficult time while living in [place] as most of the rest of her family living there were boys and felt isolated).  Early History/Education-  Grew up in [place] \"but also grew up in [place]\" (lived in [place] for 3 years between 12-17) with mother, Joy, Father, Americo, Oldest sibling. Has 4 younger siblings (Polly, Sam, Drew, Cristi).  Went to college at [employer] before transfer to [employer].       PAST PSYCHIATRIC HISTORY     SIB- no  Suicide Attempt [#, most recent]- No   Suicidal Ideation Hx- patient denies  Violence/Aggression Hx- No   Psychosis Hx- Yes: paranoia (most recently during 2018)   Eating Disorder Hx- No   Other- Caught typhoid while in [place] and recalls that after that is when she first started experiencing depression, but wasn't sure what was going on. She then spent some time with her cousin \"in a nicer city\" and felt better over the summer (age 14). Her first manic episode was in the winter of her senior year of high school, \"a few months before I turned 16.\" Was last hospitalized in 2018 and in 2019 was the first winter that she did not experience an episode of shaina.  Psych Hosp [#, most recent]- Yes: multiple. Most recent July 2018   Commitment- No   TMS/ECT- No   Outpatient Programs - Yes: IOP at [facility] discharge 7/19/19   Other - none     PAST MED TRIALS     Per chart:  Medication  Dose (mg) Effect  Dates of Use   aripiprazole 30   2017, 2018    quetiapine 400mg +   \"eye twitch\" 2018             Depakote 1000   2017   lithium 900   2017 - present             benztropine     2017             propranolol 20 BID   2018   lorazepam 1 Effective for sleep regulation (2 wk course) 2017-  2020   For additional details about use of various medications see Problem list Hx of psychiatric care       PAST " SUBSTANCE USE HISTORY     Past Use- none reported    Treatment- #, most recent- No   Medical Consequences- No   Legal Consequences- No   Other- none     MEDICAL HISTORY and ALLERGY     ALLERGIES: Patient has no known allergies.    Patient Active Problem List   Diagnosis     Adela (H)     Bipolar affective disorder, current episode manic with psychotic symptoms (H)     Hx of psychiatric care     Bipolar I disorder, current or most recent episode manic, with psychotic features (H)     Lymphadenopathy     Bipolar 1 disorder (H)        MEDICAL REVIEW OF SYSTEMS   Contraception- deferred   Pregnant- deferred  A comprehensive review of systems was performed and is negative other than noted in the HPI.     MEDICATIONS     Current Outpatient Medications   Medication Sig Dispense Refill     lithium ER (LITHOBID) 300 MG CR tablet Take 2 tablets (600 mg) by mouth At Bedtime 60 tablet 0     omeprazole (PRILOSEC) 20 MG DR capsule Take 1 capsule (20 mg) by mouth daily 30 capsule 0     ondansetron (ZOFRAN ODT) 4 MG ODT tab Take 1 tablet (4 mg) by mouth every 8 hours as needed for nausea or vomiting 15 tablet 0     QUEtiapine (SEROQUEL) 50 MG tablet Take 25-50 mg at bedtime as needed for sleep 30 tablet 1      VITALS   LMP 09/14/2022 (Approximate)     MENTAL STATUS EXAM     Alertness: alert  and oriented  Appearance: casually groomed, wearing a sweatshirt with phelps up  Behavior/Demeanor: cooperative and pleasant, with fair  eye contact   Speech: increased rate and mildly pressured (this is baseline per chart)  Language: intact  Psychomotor: normal or unremarkable  Mood: depressed  Affect: blunted; congruent to: mood- yes, content- yes  Thought Process/Associations: tangential  Thought Content:  Reports none;  Denies suicidal & violent ideation and delusions  Perception:  Reports none;  Denies hallucinations  Insight: fair  Judgment: fair  Cognition: does  appear grossly intact; formal cognitive testing was not done  Gait and  Station: N/A (telehealth)     LABS and DATA     PHQ 9/21/2022 10/14/2022 10/14/2022   PHQ-9 Total Score 9 27 27   Q9: Thoughts of better off dead/self-harm past 2 weeks Several days Nearly every day Nearly every day   F/U: Thoughts of suicide or self-harm Yes Yes Yes   F/U: Self harm-plan Yes Yes Yes   F/U: Self-harm action No No No   F/U: Safety concerns No No No         Recent Labs   Lab Test 01/07/22  1202 07/09/21  1159 07/29/20  1200   LITHIUM 0.9 0.88 0.72     Recent Labs   Lab Test 10/11/22  1153 09/28/22  1931   CR 0.48* 0.45*   GFRESTIMATED >90 >90    134*   POTASSIUM 3.8 3.7   BHANU 10.2* 9.5     Recent Labs   Lab Test 10/11/22  1146 01/07/22  1245   SG 1.020 1.009     Recent Labs   Lab Test 01/07/22  1202 07/09/21  1159   TSH 3.71 3.55     Recent Labs   Lab Test 10/11/22  1153 09/28/22  1931   GLC 84 111*     Recent Labs   Lab Test 01/13/17  0936 02/06/16  0805   CHOL 83 82   TRIG 34 34   LDL 36 31   HDL 40* 44*     Recent Labs   Lab Test 10/11/22  1153 09/28/22  1931   AST 15 24   ALT 16 12   ALKPHOS 84 74     Recent Labs   Lab Test 10/11/22  1153 09/28/22  1931 07/09/21  1159 07/29/20  1200   WBC 9.6 8.1 9.2 10.4   ANEU  --   --  5.5 5.3   HGB 12.7 11.7 11.7 12.1   * 261 316 331       ECG 2/8/2016 QTc = 404ms     PSYCHOTROPIC DRUG INTERACTIONS                                                       PSYCHCLINICDDI   none     MANAGEMENT:  Monitoring for adverse effects     RISK STATEMENT for SAFETY     Cristina did not appear to be an imminent safety risk to self or others.    TREATMENT RISK STATEMENT: The risks, benefits, alternatives and potential adverse effects have been discussed and are understood by the pt. The pt understands the risks of using street drugs or alcohol. There are no medical contraindications, the pt agrees to treatment with the ability to do so. The pt knows to call the clinic for any problems or to access emergency care if needed.  Medical and substance use concerns are  documented above.  Psychotropic drug interaction check was done, including changes made today.     PROVIDER: Hannah Yanes MD    Patient not staffed in clinic.  Note will be reviewed and signed by supervisor Dr. Agosto.

## 2022-09-28 ENCOUNTER — OFFICE VISIT (OUTPATIENT)
Dept: URGENT CARE | Facility: URGENT CARE | Age: 22
End: 2022-09-28
Payer: COMMERCIAL

## 2022-09-28 ENCOUNTER — HOSPITAL ENCOUNTER (EMERGENCY)
Facility: HOSPITAL | Age: 22
Discharge: HOME | End: 2022-09-28
Attending: EMERGENCY MEDICINE | Admitting: EMERGENCY MEDICINE
Payer: COMMERCIAL

## 2022-09-28 VITALS
HEIGHT: 66 IN | TEMPERATURE: 98.3 F | SYSTOLIC BLOOD PRESSURE: 101 MMHG | OXYGEN SATURATION: 100 % | DIASTOLIC BLOOD PRESSURE: 55 MMHG | HEART RATE: 87 BPM | BODY MASS INDEX: 24.5 KG/M2

## 2022-09-28 VITALS
HEIGHT: 66 IN | HEART RATE: 75 BPM | BODY MASS INDEX: 23.46 KG/M2 | RESPIRATION RATE: 20 BRPM | WEIGHT: 146 LBS | OXYGEN SATURATION: 100 % | SYSTOLIC BLOOD PRESSURE: 100 MMHG | TEMPERATURE: 97.4 F | DIASTOLIC BLOOD PRESSURE: 61 MMHG

## 2022-09-28 DIAGNOSIS — R11.10 ACUTE VOMITING: Primary | ICD-10-CM

## 2022-09-28 DIAGNOSIS — K21.9 GASTROESOPHAGEAL REFLUX DISEASE, UNSPECIFIED WHETHER ESOPHAGITIS PRESENT: ICD-10-CM

## 2022-09-28 DIAGNOSIS — R11.10 ACUTE VOMITING: ICD-10-CM

## 2022-09-28 DIAGNOSIS — K21.00 GASTROESOPHAGEAL REFLUX DISEASE WITH ESOPHAGITIS WITHOUT HEMORRHAGE: ICD-10-CM

## 2022-09-28 DIAGNOSIS — K52.9 GASTROENTERITIS: ICD-10-CM

## 2022-09-28 LAB
ALBUMIN SERPL BCG-MCNC: 4.3 G/DL (ref 3.5–5.2)
ALP SERPL-CCNC: 74 U/L (ref 35–104)
ALT SERPL W P-5'-P-CCNC: 12 U/L (ref 10–35)
AMPHETAMINES UR QL SCN: NORMAL
ANION GAP SERPL CALCULATED.3IONS-SCNC: 18 MMOL/L (ref 7–15)
AST SERPL W P-5'-P-CCNC: 24 U/L (ref 10–35)
BARBITURATES UR QL SCN: NORMAL
BASOPHILS # BLD AUTO: 0 10E3/UL (ref 0–0.2)
BASOPHILS NFR BLD AUTO: 0 %
BENZODIAZ UR QL SCN: NORMAL
BILIRUB DIRECT SERPL-MCNC: <0.2 MG/DL (ref 0–0.3)
BILIRUB SERPL-MCNC: 0.2 MG/DL
BUN SERPL-MCNC: 13.3 MG/DL (ref 6–20)
BZE UR QL SCN: NORMAL
CALCIUM SERPL-MCNC: 9.5 MG/DL (ref 8.6–10)
CANNABINOIDS UR QL SCN: NORMAL
CHLORIDE SERPL-SCNC: 98 MMOL/L (ref 98–107)
CREAT SERPL-MCNC: 0.45 MG/DL (ref 0.51–0.95)
DEPRECATED HCO3 PLAS-SCNC: 18 MMOL/L (ref 22–29)
EOSINOPHIL # BLD AUTO: 0 10E3/UL (ref 0–0.7)
EOSINOPHIL NFR BLD AUTO: 0 %
ERYTHROCYTE [DISTWIDTH] IN BLOOD BY AUTOMATED COUNT: 13.2 % (ref 10–15)
GFR SERPL CREATININE-BSD FRML MDRD: >90 ML/MIN/1.73M2
GLUCOSE SERPL-MCNC: 111 MG/DL (ref 70–99)
HCG UR QL: NEGATIVE
HCT VFR BLD AUTO: 35.3 % (ref 35–47)
HGB BLD-MCNC: 11.7 G/DL (ref 11.7–15.7)
HOLD SPECIMEN: NORMAL
HOLD SPECIMEN: NORMAL
IMM GRANULOCYTES # BLD: 0 10E3/UL
IMM GRANULOCYTES NFR BLD: 0 %
LIPASE SERPL-CCNC: 11 U/L (ref 13–60)
LYMPHOCYTES # BLD AUTO: 0.8 10E3/UL (ref 0.8–5.3)
LYMPHOCYTES NFR BLD AUTO: 10 %
MAGNESIUM SERPL-MCNC: 1.9 MG/DL (ref 1.7–2.3)
MCH RBC QN AUTO: 28.7 PG (ref 26.5–33)
MCHC RBC AUTO-ENTMCNC: 33.1 G/DL (ref 31.5–36.5)
MCV RBC AUTO: 87 FL (ref 78–100)
MONOCYTES # BLD AUTO: 0.3 10E3/UL (ref 0–1.3)
MONOCYTES NFR BLD AUTO: 4 %
NEUTROPHILS # BLD AUTO: 7 10E3/UL (ref 1.6–8.3)
NEUTROPHILS NFR BLD AUTO: 86 %
NRBC # BLD AUTO: 0 10E3/UL
NRBC BLD AUTO-RTO: 0 /100
OPIATES UR QL SCN: NORMAL
PCP QUAL URINE (ROCHE): NORMAL
PLATELET # BLD AUTO: 261 10E3/UL (ref 150–450)
POTASSIUM SERPL-SCNC: 3.7 MMOL/L (ref 3.4–5.3)
PROT SERPL-MCNC: 8.1 G/DL (ref 6.4–8.3)
RBC # BLD AUTO: 4.07 10E6/UL (ref 3.8–5.2)
SODIUM SERPL-SCNC: 134 MMOL/L (ref 136–145)
WBC # BLD AUTO: 8.1 10E3/UL (ref 4–11)

## 2022-09-28 PROCEDURE — 99284 EMERGENCY DEPT VISIT MOD MDM: CPT | Mod: 25

## 2022-09-28 PROCEDURE — 36415 COLL VENOUS BLD VENIPUNCTURE: CPT | Performed by: FAMILY MEDICINE

## 2022-09-28 PROCEDURE — 258N000003 HC RX IP 258 OP 636: Performed by: FAMILY MEDICINE

## 2022-09-28 PROCEDURE — 85025 COMPLETE CBC W/AUTO DIFF WBC: CPT | Performed by: FAMILY MEDICINE

## 2022-09-28 PROCEDURE — 80307 DRUG TEST PRSMV CHEM ANLYZR: CPT | Performed by: FAMILY MEDICINE

## 2022-09-28 PROCEDURE — 250N000011 HC RX IP 250 OP 636: Performed by: EMERGENCY MEDICINE

## 2022-09-28 PROCEDURE — 250N000011 HC RX IP 250 OP 636: Performed by: FAMILY MEDICINE

## 2022-09-28 PROCEDURE — 96361 HYDRATE IV INFUSION ADD-ON: CPT

## 2022-09-28 PROCEDURE — 99204 OFFICE O/P NEW MOD 45 MIN: CPT | Performed by: PHYSICIAN ASSISTANT

## 2022-09-28 PROCEDURE — 96375 TX/PRO/DX INJ NEW DRUG ADDON: CPT

## 2022-09-28 PROCEDURE — 83690 ASSAY OF LIPASE: CPT | Performed by: FAMILY MEDICINE

## 2022-09-28 PROCEDURE — 96374 THER/PROPH/DIAG INJ IV PUSH: CPT

## 2022-09-28 PROCEDURE — 83735 ASSAY OF MAGNESIUM: CPT | Performed by: FAMILY MEDICINE

## 2022-09-28 PROCEDURE — 250N000009 HC RX 250: Performed by: EMERGENCY MEDICINE

## 2022-09-28 PROCEDURE — 80053 COMPREHEN METABOLIC PANEL: CPT | Performed by: FAMILY MEDICINE

## 2022-09-28 PROCEDURE — 258N000003 HC RX IP 258 OP 636: Performed by: EMERGENCY MEDICINE

## 2022-09-28 PROCEDURE — 82248 BILIRUBIN DIRECT: CPT | Performed by: FAMILY MEDICINE

## 2022-09-28 PROCEDURE — 81025 URINE PREGNANCY TEST: CPT | Performed by: FAMILY MEDICINE

## 2022-09-28 PROCEDURE — 250N000013 HC RX MED GY IP 250 OP 250 PS 637: Performed by: EMERGENCY MEDICINE

## 2022-09-28 RX ORDER — METOCLOPRAMIDE HYDROCHLORIDE 5 MG/ML
10 INJECTION INTRAMUSCULAR; INTRAVENOUS ONCE
Status: COMPLETED | OUTPATIENT
Start: 2022-09-28 | End: 2022-09-28

## 2022-09-28 RX ORDER — ONDANSETRON 4 MG/1
4 TABLET, ORALLY DISINTEGRATING ORAL ONCE
Status: COMPLETED | OUTPATIENT
Start: 2022-09-28 | End: 2022-09-28

## 2022-09-28 RX ORDER — ONDANSETRON 2 MG/ML
4 INJECTION INTRAMUSCULAR; INTRAVENOUS ONCE
Status: COMPLETED | OUTPATIENT
Start: 2022-09-28 | End: 2022-09-28

## 2022-09-28 RX ORDER — ONDANSETRON 4 MG/1
4 TABLET, ORALLY DISINTEGRATING ORAL EVERY 8 HOURS PRN
Qty: 15 TABLET | Refills: 0 | Status: SHIPPED | OUTPATIENT
Start: 2022-09-28 | End: 2022-11-10

## 2022-09-28 RX ORDER — ONDANSETRON 4 MG/1
4 TABLET, ORALLY DISINTEGRATING ORAL EVERY 8 HOURS PRN
Qty: 15 TABLET | Refills: 0 | Status: SHIPPED | OUTPATIENT
Start: 2022-09-28 | End: 2022-09-28

## 2022-09-28 RX ORDER — DIPHENHYDRAMINE HYDROCHLORIDE 50 MG/ML
25 INJECTION INTRAMUSCULAR; INTRAVENOUS ONCE
Status: COMPLETED | OUTPATIENT
Start: 2022-09-28 | End: 2022-09-28

## 2022-09-28 RX ADMIN — ONDANSETRON 4 MG: 2 INJECTION INTRAMUSCULAR; INTRAVENOUS at 19:37

## 2022-09-28 RX ADMIN — SODIUM CHLORIDE 500 ML: 9 INJECTION, SOLUTION INTRAVENOUS at 21:38

## 2022-09-28 RX ADMIN — METOCLOPRAMIDE 10 MG: 5 INJECTION, SOLUTION INTRAMUSCULAR; INTRAVENOUS at 21:41

## 2022-09-28 RX ADMIN — SODIUM CHLORIDE 1000 ML: 9 INJECTION, SOLUTION INTRAVENOUS at 19:37

## 2022-09-28 RX ADMIN — FAMOTIDINE 20 MG: 10 INJECTION, SOLUTION INTRAVENOUS at 19:39

## 2022-09-28 RX ADMIN — DIPHENHYDRAMINE HYDROCHLORIDE 25 MG: 50 INJECTION, SOLUTION INTRAMUSCULAR; INTRAVENOUS at 21:40

## 2022-09-28 RX ADMIN — ALUMINUM HYDROXIDE, MAGNESIUM HYDROXIDE, AND DIMETHICONE 30 ML: 200; 20; 200 SUSPENSION ORAL at 22:36

## 2022-09-28 RX ADMIN — ONDANSETRON 4 MG: 4 TABLET, ORALLY DISINTEGRATING ORAL at 14:12

## 2022-09-28 ASSESSMENT — ENCOUNTER SYMPTOMS
DIARRHEA: 0
DIARRHEA: 0
CONSTIPATION: 0
VOMITING: 1
FEVER: 0
RHINORRHEA: 0
ABDOMINAL PAIN: 1
FEVER: 0
NAUSEA: 1
VOMITING: 1
DYSURIA: 0
SORE THROAT: 0
ABDOMINAL PAIN: 1
NAUSEA: 1
BLOOD IN STOOL: 0
BLOOD IN STOOL: 0
SHORTNESS OF BREATH: 0
COUGH: 0

## 2022-09-28 ASSESSMENT — ACTIVITIES OF DAILY LIVING (ADL): ADLS_ACUITY_SCORE: 35

## 2022-09-28 NOTE — PROGRESS NOTES
zofran  Assessment & Plan:        ICD-10-CM    1. Acute vomiting  R11.10 ondansetron (ZOFRAN ODT) ODT tab 4 mg     ondansetron (ZOFRAN ODT) 4 MG ODT tab   2. Gastroesophageal reflux disease with esophagitis without hemorrhage  K21.00 omeprazole (PRILOSEC) 20 MG DR capsule     lidocaine (viscous) (XYLOCAINE) 2 % 15 mL, alum & mag hydroxide-simethicone (MAALOX) 15 mL GI Cocktail         Plan/Clinical Decision Making:    Patient with acute vomiting that started several hours ago. Has had 3 episodes, vomiting during visit, was given Zofran, not much help during visit.   Patient with epigastric pain and GERD.  Has hd off and on GERD.  Had previous evisit and prescribed Prilosec, but didn't get filled.   GI cocktail given, but gagged when trying to take.   Patient with suspected viral illness with GERD.   Clear fluids, Zofran and start Prilosec.   If not improving and persistent vomiting, recommend going to ED for further treatment.     At the end of the encounter, I discussed results, diagnosis, medications. Discussed red flags for immediate return to clinic/ER, as well as indications for follow up if no improvement. Patient understood and agreed to plan. Patient was stable for discharge.        Colleen Gudino PA-C on 9/28/2022 at 2:07 PM      32 minutes spent on the date of the encounter doing chart review, history and exam, documentation and further activities per the note      Subjective:     HPI:    Cristina is a 22 year old female who presents to clinic today for the following health issues:  Chief Complaint   Patient presents with     Urgent Care     Pt has been having chain in upper chest about 2 hours ago, vomiting started about an hour ago      HPI    Patient complains of heartburn, epigastric pain and several episodes of vomiting today. NO diarrhea. Has upper abdominal pain.   No known exposure to anything.   Has GERD, had evisit and decided  Not to take medicine, now having flare up of symptoms with acute  "vomiting.   NO fever or recent illness.     History obtained from the patient.    Review of Systems   Constitutional: Negative for fever.   HENT: Negative for congestion, rhinorrhea and sore throat.    Respiratory: Negative for cough and shortness of breath.    Cardiovascular: Negative for chest pain.   Gastrointestinal: Positive for abdominal pain, nausea and vomiting. Negative for blood in stool, constipation and diarrhea.   Genitourinary: Negative for dysuria, pelvic pain and vaginal pain.         Patient Active Problem List   Diagnosis     Adela (H)     Bipolar affective disorder, current episode manic with psychotic symptoms (H)     Hx of psychiatric care     Bipolar I disorder, current or most recent episode manic, with psychotic features (H)     Lymphadenopathy     Bipolar 1 disorder (H)        Past Medical History:   Diagnosis Date     Bipolar disorder (H)      Depressive disorder        Social History     Tobacco Use     Smoking status: Never Smoker     Smokeless tobacco: Never Used   Substance Use Topics     Alcohol use: No             Objective:     Vitals:    09/28/22 1346   BP: 101/55   BP Location: Left arm   Patient Position: Sitting   Cuff Size: Adult Regular   Pulse: 87   Temp: 98.3  F (36.8  C)   TempSrc: Oral   SpO2: 100%   Height: 1.676 m (5' 6\")         Physical Exam   EXAM:   Pleasant, alert, appropriate appearance. Patient appears unwell.   Head Exam: Normocephalic, atraumatic.  Eye Exam:  PERRLA, EOMI, non icteric/injection.    Ear Exam: TMs grey without bulging. Normal canals.  Normal pinna.  Nose Exam: Normal external nose.    OroPharynx Exam:  Moist mucous membranes. No erythema, pharynx without exudate or hypertrophy.  Neck/Thyroid Exam:  No LAD.  Goiter  Chest/Respiratory Exam: CTAB.  Cardiovascular Exam: RRR. No murmur or rubs.  ABD: soft, epigastric and LUQ tenderness,  normal bowel sounds, no rebound/guarding.  No masses/organomegaly.      Results:  No results found for any visits on " 09/28/22.

## 2022-09-28 NOTE — ED TRIAGE NOTES
amb to triage.  Pt states vomiting starting about 1100 today.  Went to urgent care but sent here for further care.  Denies pain.      Triage Assessment     Row Name 09/28/22 [id number]       Triage Assessment (Adult)    Airway WDL WDL       Respiratory WDL    Respiratory WDL WDL       Skin Circulation/Temperature WDL    Skin Circulation/Temperature WDL WDL       Cardiac WDL    Cardiac WDL WDL       Peripheral/Neurovascular WDL    Peripheral Neurovascular WDL WDL       Cognitive/Neuro/Behavioral WDL    Cognitive/Neuro/Behavioral WDL WDL

## 2022-09-29 NOTE — DISCHARGE INSTRUCTIONS
Please follow-up with your primary clinic on Monday for recheck of your labs, especially if your symptoms have not completely resolved.    Continue your acid blocking medication daily.  Take 30 minutes prior to breakfast.    Your Zofran prescription has been refilled.  Continue taking 4 mg every 8 hours as needed for nausea/vomiting.    Follow-up with gastroenterology regarding H. pylori testing and reflex testing.

## 2022-09-29 NOTE — ED PROVIDER NOTES
EMERGENCY DEPARTMENT ENCOUNTER      NAME: Cristina Boyd  AGE: 22 year old female  YOB: 2000  MRN: [id number]  EVALUATION DATE & TIME: 2022  9:19 PM    PCP: No Ref-Primary, Physician    ED PROVIDER: Jillian Jackson M.D.      Chief Complaint   Patient presents with     Vomiting         FINAL IMPRESSION:  1. Gastroenteritis        MEDICAL DECISION MAKIN:30 PM I met with the patient, obtained history, performed an initial exam, and discussed options and plan for diagnostics and treatment here in the ED.   10:56 PM Rechecked and updated the patient. We discussed the plan for discharge and the patient is agreeable. Reviewed supportive cares, symptomatic treatment, outpatient follow up, and reasons to return to the Emergency Department. Patient to be discharged by ED RN.     Pertinent Labs & Imaging studies reviewed. (See chart for details)     Cristina Boyd is a 22 year old female who presents with nausea and vomiting.  Feels she ate something bad.  Vital signs are normal.  Her abdominal exam shows mild tenderness but no significant guarding, rebound or masses.  She is otherwise healthy with only mental health diagnosis.  Differential diagnosis includes gastritis, ulcer, viral gastroenteritis, foodborne illness, pancreatitis, biliary colic or other intra-abdominal infection.  She will get IV fluids, antiemetics and pain medications as well as PPI.  No immediate indication for imaging.    She has very minor electrolyte abnormalities but no evidence for anemia, intra-abdominal infection, or drugs of abuse.  She was feeling better after antiemetics, antacids and fluids.  Mother is concerned that she has H. pylori or is developing an ulcer from chronic reflux.  I will refill her omeprazole and refer her to gastroenterology for endoscopy and H. pylori testing.  We discussed warning signs and indications to return to the emergency department.  They understand these and all discharge  instructions.         MEDICATIONS GIVEN IN THE EMERGENCY:  Medications   famotidine (PEPCID) injection 20 mg (20 mg Intravenous Given 9/28/22 1939)   ondansetron (ZOFRAN) injection 4 mg (4 mg Intravenous Given 9/28/22 1937)   0.9% sodium chloride BOLUS (0 mLs Intravenous Stopped 9/28/22 2106)   metoclopramide (REGLAN) injection 10 mg (10 mg Intravenous Given 9/28/22 2141)   diphenhydrAMINE (BENADRYL) injection 25 mg (25 mg Intravenous Given 9/28/22 2140)   0.9% sodium chloride BOLUS (0 mLs Intravenous Stopped 9/28/22 2237)   lidocaine (viscous) (XYLOCAINE) 2 % 15 mL, alum & mag hydroxide-simethicone (MAALOX) 15 mL GI Cocktail (30 mLs Oral Given 9/28/22 2236)         =================================================================    HPI    Patient information was obtained from: Patient    Use of : Use of : N/A       Cristina WES Boyd is a 22 year old female with a history of acid reflux and bipolar disorder who presents with nausea and vomiting.    Per chart review, the patient was seen at urgent care prior to ED evaluation presenting with acute vomiting with epigastric pain. She was given GI cocktail, Zofran, and Prilosec. Patient did not have significant improvement at urgent care. Advised that if symptoms continue to persist she should present to the ED for evaluation.    The patient reports she developed nausea and vomiting around 1100 this morning. She thinks the nausea and vomiting are due to eating undercooked chicken yesterday. Prior to ED arrival she went to urgent care where she was given Zofran and Prilosec. After leaving urgent care she continued to have nausea and vomiting so she came to the ED for evaluation.    She reports having 7 episode of non-bloody emesis, no coffee-ground appearance. She also endorses generalized abdominal pain. She has not eaten anything since early this morning.    She denies fever, diarrhea, bloody or black stool, and any other complaints at this time. She  "denies any chance of pregnancy.    REVIEW OF SYSTEMS   Review of Systems   Constitutional: Negative for fever.   Gastrointestinal: Positive for abdominal pain, nausea and vomiting. Negative for blood in stool and diarrhea.        - bloody or coffee-ground emesis   All other systems reviewed and are negative.       PAST MEDICAL HISTORY:  Past Medical History:   Diagnosis Date     Bipolar disorder (H)      Depressive disorder        PAST SURGICAL HISTORY:  Past Surgical History:   Procedure Laterality Date     BRONCHOSCOPY (RIGID OR FLEXIBLE), DIAGNOSTIC N/A 6/16/2018    Procedure: COMBINED BRONCHOSCOPY (RIGID OR FLEXIBLE), LAVAGE;;  Surgeon: Manjeet Holland MD;  Location:  GI       CURRENT MEDICATIONS:    No current facility-administered medications for this encounter.    Current Outpatient Medications:      lithium ER (LITHOBID) 300 MG CR tablet, Take 2 tablets (600 mg) by mouth At Bedtime (Patient not taking: Reported on 9/28/2022), Disp: 60 tablet, Rfl: 2     omeprazole (PRILOSEC) 20 MG DR capsule, Take 1 capsule (20 mg) by mouth daily, Disp: 30 capsule, Rfl: 0     ondansetron (ZOFRAN ODT) 4 MG ODT tab, Take 1 tablet (4 mg) by mouth every 8 hours as needed for nausea or vomiting, Disp: 15 tablet, Rfl: 0     QUEtiapine (SEROQUEL) 50 MG tablet, Take 25-50 mg at bedtime as needed for sleep, Disp: 30 tablet, Rfl: 1    ALLERGIES:  No Known Allergies    FAMILY HISTORY:  Family History   Problem Relation Age of Onset     Depression Other      Depression Maternal Uncle        SOCIAL HISTORY:   Social History     Tobacco Use     Smoking status: Never Smoker     Smokeless tobacco: Never Used   Substance Use Topics     Alcohol use: No     Drug use: No     Comment: x1 marijuana, in October, 2016        PHYSICAL EXAM:    Vitals: /61   Pulse 75   Temp 97.4  F (36.3  C) (Temporal)   Resp 20   Ht 1.676 m (5' 6\")   Wt 66.2 kg (146 lb)   LMP 09/14/2022 (Approximate)   SpO2 100%   BMI 23.57 kg/m     General:. Alert " and interactive, comfortable appearing.  HENT: Oropharynx without erythema or exudates. MMM.  TMs clear bilaterally.  Eyes: Pupils mid-sized and equally reactive.   Neck: Full AROM.  No midline tenderness to palpation.  Cardiovascular: Regular rate and rhythm. Peripheral pulses 2+ bilaterally.  Chest/Pulmonary: Normal work of breathing. Lung sounds clear and equal throughout, no wheezes or crackles. No chest wall tenderness or deformities.  Abdomen: Soft, nondistended. Nontender without guarding or rebound.  Back/Spine: No CVA or midline tenderness.  Extremities: Normal ROM of all major joints. No lower extremity edema.   Skin: Warm and dry. Normal skin color.   Neuro: Speech clear. CNs grossly intact. Moves all extremities appropriately. Strength and sensation grossly intact to all extremities.   Psych: Normal affect/mood, cooperative, memory appropriate. Rapid speech, somewhat difficult to redirect.     LAB:  All pertinent labs reviewed and interpreted.  Labs Ordered and Resulted from Time of ED Arrival to Time of ED Departure   BASIC METABOLIC PANEL - Abnormal       Result Value    Sodium 134 (*)     Potassium 3.7      Chloride 98      Carbon Dioxide (CO2) 18 (*)     Anion Gap 18 (*)     Urea Nitrogen 13.3      Creatinine 0.45 (*)     Calcium 9.5      Glucose 111 (*)     GFR Estimate >90     LIPASE - Abnormal    Lipase 11 (*)    MAGNESIUM - Normal    Magnesium 1.9     HEPATIC FUNCTION PANEL - Normal    Protein Total 8.1      Albumin 4.3      Bilirubin Total 0.2      Alkaline Phosphatase 74      AST 24      ALT 12      Bilirubin Direct <0.20     HCG QUALITATIVE URINE - Normal    hCG Urine Qualitative Negative     DRUG ABUSE SCREEN 77 WITH REFLEX TO CONFIRMATORY - Normal    Amphetamines Urine Screen Negative      Barbituates Urine Screen Negative      Benzodiazepine Urine Screen Negative      Cannabinoids Urine Screen Negative      Cocaine Urine Screen Negative      Opiates Urine Screen Negative      PCP Urine  Screen Negative     CBC WITH PLATELETS AND DIFFERENTIAL    WBC Count 8.1      RBC Count 4.07      Hemoglobin 11.7      Hematocrit 35.3      MCV 87      MCH 28.7      MCHC 33.1      RDW 13.2      Platelet Count 261      % Neutrophils 86      % Lymphocytes 10      % Monocytes 4      % Eosinophils 0      % Basophils 0      % Immature Granulocytes 0      NRBCs per 100 WBC 0      Absolute Neutrophils 7.0      Absolute Lymphocytes 0.8      Absolute Monocytes 0.3      Absolute Eosinophils 0.0      Absolute Basophils 0.0      Absolute Immature Granulocytes 0.0      Absolute NRBCs 0.0           I, Hunter Kearney, am serving as a scribe to document services personally performed by Dr. Jillian Jackson  based on my observation and the provider's statements to me. IJillian MD attest that Hunter Kearney is acting in a scribe capacity, has observed my performance of the services and has documented them in accordance with my direction.      Jillian Jackson M.D.  Emergency Medicine  [facility] EMERGENCY DEPARTMENT  [facility] [place] [address] [phone]  Dept: [phone]           Jillian Jackson MD  09/29/22 0024

## 2022-09-30 ENCOUNTER — TELEPHONE (OUTPATIENT)
Dept: PSYCHIATRY | Facility: CLINIC | Age: 22
End: 2022-09-30

## 2022-09-30 NOTE — TELEPHONE ENCOUNTER
[facility]  Progress Note - Wilner Ovalles 1967, 62 y o  female MRN: [id number]  Unit/Bed#: S -01 Encounter: [id number]  Primary Care Provider: Radha Wells MD   Date and time admitted to hospital: 8/15/2021  6:25 PM    Epigastric abdominal tenderness  Assessment & Plan  +n/v, +intermittent self reported "coffee ground" emesis x2 weeks (not today), denies melena/bloody stools  H/o cholecystectomy  Lipase nl  LFTs nl  Hb 15 5 (in setting of n/v I suspect may be slightly elevated due to hemoconcentration)  2/2 PUD vs Gastritis vs MSK vs less likely choledocholithiasis / biliary sludge  Unlikely pancreatitis given normal lipase  Plan:  · Diet advanced  · IV fluids were bolused and d/c  · Fecal occult blood  · Given no coffee-ground emesis over last couple days, d/c Xarelto which he takes for AFib if stable restart 08/17  IV Protonix 40 mg b i d  For now  If fecal occult blood positive and increasing suspicion of upper GI bleed/episode of coffee-ground emesis while in hospital will start IV Protonix infusion and consult GI   · CBC in am, monitor Hb  Paroxysmal A-fib [facility])  Assessment & Plan  Follows with cardiology as above  Xarelto held 08/16, amiodarone 200 mg daily, metoprolol succinate 100 mg 1 5 tablets b i d  Currently rhythm irregular, rate controlled  Plan:  · Continue home medications  · Monitor rate  Monitor blood pressure  Chronic combined systolic and diastolic congestive heart failure [facility])  Assessment & Plan  Wt Readings from Last 3 Encounters:   08/15/21 92 1 kg (203 lb)   08/13/21 92 2 kg (203 lb 4 oz)   07/25/21 98 7 kg (217 lb 9 5 oz)     On Lasix 20 mg daily  Follows with cardiology Dr Cameron Alaniz and Dr Abdirizak [facility])  No signs of volume overload at this time  Dry weight as of currently as  203 lb  Plan:  Hold Lasix given DANIELA  Is getting IV fluids at maintenance  Monitor volume status, daily I&O, daily weights    If [facility]    Phone Message    May a detailed message be left on voicemail: yes     Reason for Call: Other: PT looking to speak with RN about recent ER visit, medications perscribed and current symptoms she's experiencing.     Action Taken: Message routed to:  Other: P PSYCHIATRY NURSE-P    Travel Screening: Not Applicable                                                                       evidence of volume overload give Lasix  Restart Lasix once creatinine at baseline  Elevated troponin  Assessment & Plan  Seems to always have some degree of troponin elevation  Today troponin 0 13 though does lower than prior  No new EKG changes    Plan:  · Telemetry for now  · Following troponin  If not increasing will discontinue telemetry  If increasing will consider consulting Cardiology  Nicotine dependence  Assessment & Plan  Smokes approximately 4-5 cigarettes daily  Plan:  7 mg nicotine patch  Tobacco cessation education  Acute renal failure superimposed on stage 3 chronic kidney disease [facility])  Assessment & Plan  Lab Results   Component Value Date    EGFR 29 08/16/2021    EGFR 28 08/15/2021    EGFR 34 05/26/2021    CREATININE 1 88 (H) 08/16/2021    CREATININE 1 98 (H) 08/15/2021    CREATININE 1 66 (H) 05/26/2021     Follows with Nephrology - Dr Toyin Mascorro and Kennedy Arteaga  Baseline creatinine 1 5-1 7  On admission creatinine is 1 98, last creatinine in 5/2021 was 1 66  Plan:  · Hold Lasix and Entresto  Will restart once creatinine at baseline  · Will use hydralazine 5 mg p r n  for SBP > 160   · IV fluid at maintenance  · Trend creatinine    * Flank pain  Assessment & Plan  This is a 22-year-old female with worsening LUQ abd pain and left-sided flank pain over past 2 weeks  Urine microscopy = + leukocytes, + moderate bacteria  No RBCs  CT abdomen = left-sided perinephric stranding  Given 1 dose of Rocephin in ED    2/2 pyelonephritis vs MSK vs unlikely nephrolithiasis/urolithiasis vs PUD (LUQ abd pain) vs combination of aforementioned     Plan:  · Continue Rocephin, narrow per Urine C&S   · Monitor temperature, heart rate, blood pressure  · Tylenol p r n  The patient states this has not helped  · Pain regimen:  Oxy 5 mg Q 4 p r n  For moderate pain, Oxy 10 mg Q 4 p r n  For severe pain, Dilaudid 0 5 mg for breakthrough    · Monitor I&Os, daily weights, volume status  VTE Pharmacologic Prophylaxis:   VTE Score: 4 Xarelto withheld today    Mechanical VTE Prophylaxis in Place: Yes    Patient Centered Rounds: With IM team    Discussions with Specialists or Other Care Team Provider: Dr Rachel Hood    Education and Discussions with Family / Patient: Updated spouse at bedside in the by phone    Current Length of Stay: 1 day(s)    Current Patient Status: Inpatient     Discharge Plan / Estimated Discharge Date: Unknown at present    Code Status: Level 1 - Full Code      Subjective: The patient was seen at the bedside this morning where she reported some lower abdominal and back discomfort on the left-hand side  Objective:     Vitals:   Temp (24hrs), Av °F (36 7 °C), Min:97 8 °F (36 6 °C), Max:98 2 °F (36 8 °C)    Temp:  [97 8 °F (36 6 °C)-98 2 °F (36 8 °C)] 97 8 °F (36 6 °C)  HR:  [] 68  Resp:  [12-22] 16  BP: (101-179)/(66-99) 128/90  SpO2:  [89 %-99 %] 94 %  Body mass index is 31 33 kg/m²  Input and Output Summary (last 24 hours): Intake/Output Summary (Last 24 hours) at 2021 1352  Last data filed at 2021 0150  Gross per 24 hour   Intake 500 ml   Output 300 ml   Net 200 ml       Physical Exam:     Physical Exam  Constitutional:       General: She is not in acute distress  HENT:      Head: Atraumatic  Cardiovascular:      Rate and Rhythm: Normal rate and regular rhythm  Heart sounds: No murmur heard  Pulmonary:      Effort: Pulmonary effort is normal       Breath sounds: No wheezing or rales  Abdominal:      Tenderness: There is abdominal tenderness (LLQ)  There is left CVA tenderness  Musculoskeletal:      Right lower leg: No edema  Left lower leg: No edema  Skin:     General: Skin is warm and dry  Neurological:      General: No focal deficit present  Mental Status: She is alert     Psychiatric:         Mood and Affect: Mood normal         Additional Data:     Labs:  Results from last 7 days   Lab Units 08/16/21  0540   WBC Thousand/uL 6 14   HEMOGLOBIN g/dL 14 0   HEMATOCRIT % 44 0   PLATELETS Thousands/uL 110*   NEUTROS PCT % 59   LYMPHS PCT % 31   MONOS PCT % 7   EOS PCT % 2     Results from last 7 days   Lab Units 08/16/21  0540   SODIUM mmol/L 140   POTASSIUM mmol/L 3 9   CHLORIDE mmol/L 106   CO2 mmol/L 26   BUN mg/dL 22   CREATININE mg/dL 1 88*   ANION GAP mmol/L 8   CALCIUM mg/dL 9 0   ALBUMIN g/dL 3 3*   TOTAL BILIRUBIN mg/dL 0 47   ALK PHOS U/L 57   ALT U/L 16   AST U/L 23   GLUCOSE RANDOM mg/dL 97                       Imaging: Reviewed radiology reports from this admission including: abdominal/pelvic CT scan    Recent Cultures (last 7 days):           Lines/Drains:  Invasive Devices     Peripheral Intravenous Line            Peripheral IV 08/15/21 Left Wrist <1 day                Telemetry:        Last 24 Hours Medication List:   Current Facility-Administered Medications   Medication Dose Route Frequency Provider Last Rate    acetaminophen  650 mg Oral Q4H PRN Meng Waggoner MD      albuterol  2 puff Inhalation Q6H PRN Meng Waggoner MD      aluminum-magnesium hydroxide-simethicone  30 mL Oral Once Shirin Cheung MD      amiodarone  200 mg Oral Daily With Breakfast Meng Waggoner MD      cefTRIAXone  1,000 mg Intravenous Q24H Meng Waggoner MD      HYDROmorphone  0 5 mg Intravenous Q1H PRN Meng Waggoner MD      metoprolol succinate  150 mg Oral BID Meng Oliva MD      nicotine  1 patch Transdermal Daily Meng Waggoner MD      nystatin   Topical BID Meng Waggoner MD      ondansetron  4 mg Intravenous Q6H PRN Meng Waggoner MD      oxyCODONE  10 mg Oral Q4H PRN Meng Waggoner MD      oxyCODONE  5 mg Oral Q4H PRN Meng Waggoner MD      pantoprazole  40 mg Intravenous Q12H [address] Meng Oliva MD      sodium chloride  75 mL/hr Intravenous Continuous Rocio Ba MD 75 mL/hr (08/16/21 0541)        Today, Patient Was Seen By: Radha Lopez MD    ** Please Note: This note has been constructed using a voice recognition system   **

## 2022-09-30 NOTE — TELEPHONE ENCOUNTER
Patient called the clinic to see if Dr. Yanes would order a test for H Pylori for her.  Patient went to urgent care and ED yesterday for GI distress, for which she was treated.  Patient reports the symptoms have improved.  It was mentioned to her yesterday that she may have H Pylori.  Patient does not have primary care established.  Writer informed patient that this type of testing is something that should be performed through a medical provider.  Writer suggested that patient contact the clinic where she went to Urgent Care yesterday and explain her situation.  Patient stated an understanding.

## 2022-10-03 ENCOUNTER — VIRTUAL VISIT (OUTPATIENT)
Dept: FAMILY MEDICINE | Facility: CLINIC | Age: 22
End: 2022-10-03
Payer: COMMERCIAL

## 2022-10-03 DIAGNOSIS — R14.0 ABDOMINAL BLOATING: Primary | ICD-10-CM

## 2022-10-03 PROCEDURE — 99213 OFFICE O/P EST LOW 20 MIN: CPT | Mod: 95 | Performed by: FAMILY MEDICINE

## 2022-10-03 NOTE — PROGRESS NOTES
Cristina is a 22 year old who is being evaluated via a billable video visit.      How would you like to obtain your AVS? MyChart  If the video visit is dropped, the invitation should be resent by: Text to cell phone: [phone]  Will anyone else be joining your video visit? No        Assessment & Plan     Abdominal bloating  Patient was seen in the ER about a week ago for nausea ,vomiting and abdominal pain  Her work up was of low yield  Patient states that the nausea and vomiting has resolved  She is still having a lot of abdominal bloating.  Patient says she was asked to follow up with a primary care physician to get tested for H-pylori  H-pylori ordered. Patient will be notified of results.  - Helicobacter pylori Antigen Stool; Future    :[id number]}     FUTURE APPOINTMENTS:       - Follow-up visit in one week or sooner as needed.    Return in about 4 weeks (around 10/31/2022) for Follow up.    Fer Eugene MD  [facility]    Subjective   Cristina is a 22 year old, presenting for the following health issues:  Heartburn      HPI     GERD/Heartburn  Onset/Duration: 6 days  Description: bloating, muscle aches, slight fever, chills, constipation  Intensity: moderate  Progression of Symptoms: improving  Accompanying Signs & Symptoms:  Does it feel like food gets stuck or trouble swallowing: No  Nausea: YES  Vomiting (bloody?): YES  Abdominal Pain: YES  Black-Tarry stools: No  Bloody stools: No  History:  Previous similar episodes: No  Previous ulcers: No  Precipitating factors:   Caffeine use: YES  Alcohol use: No  NSAID/Aspirin use: No  Tobacco use: No  Worse with no particular food or drink.  Alleviating factors:   Therapies tried and outcome:             Lifestyle changes: None            Medications: Omeprazole (Prilosec), Advil, suppository         Review of Systems   Constitutional: Negative.    HENT: Negative.    Eyes: Negative.    Respiratory: Negative.    Cardiovascular: Negative.     Gastrointestinal: Positive for abdominal pain.        Bloating   Endocrine: Negative.    Breasts:  negative.    Genitourinary: Negative.    Musculoskeletal: Negative.    Skin: Negative.    Allergic/Immunologic: Negative.    Neurological: Negative.    Hematological: Negative.    Psychiatric/Behavioral: Negative.             Objective           Vitals:  No vitals were obtained today due to virtual visit.    Physical Exam   GENERAL: Healthy, alert and no distress  EYES: Eyes grossly normal to inspection.  No discharge or erythema, or obvious scleral/conjunctival abnormalities.  RESP: No audible wheeze, cough, or visible cyanosis.  No visible retractions or increased work of breathing.    SKIN: Visible skin clear. No significant rash, abnormal pigmentation or lesions.  NEURO: Cranial nerves grossly intact.  Mentation and speech appropriate for age.  PSYCH: Mentation appears normal, affect normal/bright, judgement and insight intact, normal speech and appearance well-groomed.            Video-Visit Details    Video Start Time: 4:59 PM    Type of service:  Video Visit    Video End Time:5:10 PM    Originating Location (pt. Location): Home    Distant Location (provider location):  [facility]     Platform used for Video Visit: Direct Access Software

## 2022-10-04 ENCOUNTER — LAB (OUTPATIENT)
Dept: LAB | Facility: CLINIC | Age: 22
End: 2022-10-04
Payer: COMMERCIAL

## 2022-10-04 DIAGNOSIS — R14.0 ABDOMINAL BLOATING: ICD-10-CM

## 2022-10-04 PROCEDURE — 87338 HPYLORI STOOL AG IA: CPT

## 2022-10-04 ASSESSMENT — ENCOUNTER SYMPTOMS
CONSTITUTIONAL NEGATIVE: 1
PSYCHIATRIC NEGATIVE: 1
ENDOCRINE NEGATIVE: 1
CARDIOVASCULAR NEGATIVE: 1
HEMATOLOGIC/LYMPHATIC NEGATIVE: 1
NEUROLOGICAL NEGATIVE: 1
ROS GI COMMENTS: BLOATING
ABDOMINAL PAIN: 1
EYES NEGATIVE: 1
MUSCULOSKELETAL NEGATIVE: 1
RESPIRATORY NEGATIVE: 1
ALLERGIC/IMMUNOLOGIC NEGATIVE: 1

## 2022-10-04 NOTE — LETTER
October 12, 2022      Cristina Boyd  [address] [place] [address]        Dear ,    We are writing to inform you of your test results.    Your h-pylori test was negative.    Resulted Orders   Helicobacter pylori Antigen Stool   Result Value Ref Range    Helicobacter pylori Antigen Stool Negative Negative      Comment:      Negative for Helicobacter pylori antigen by enzyme immunoassay. A negative result indicates the absence of H. pylori antigen or that the level of antigen is below the level of detection.       If you have any questions or concerns, please call the clinic at the number listed above.       Sincerely,      Fer Eugene MD           -most likely ATN vs prerenal BENJI  -will give IVF and trend BMP  -if Cr does not downtrend, will obtain urine lytes -most likely ATN vs prerenal BENJI  -will give IVF and trend BMP  -will obtain urine lytes if Cr does not downtrend with fluids.     Hyperkalemia:  repeat K non hemolyzed was 5.4. No EKG changes. Administered insulin + dextrose. F/u BMP. -most likely ATN vs prerenal BENJI  -will give IVF and trend BMP  -will obtain urine lytes if Cr does not downtrend with fluids.     Hyperkalemia:  repeat K non hemolyzed was 5.4. No EKG changes. Administered insulin + dextrose. F/u BMP.    Update: low urine output overnight, 50 cc/ contact nephrology

## 2022-10-06 LAB — H PYLORI AG STL QL IA: NEGATIVE

## 2022-10-11 ENCOUNTER — E-VISIT (OUTPATIENT)
Dept: FAMILY MEDICINE | Facility: CLINIC | Age: 22
End: 2022-10-11

## 2022-10-11 ENCOUNTER — HOSPITAL ENCOUNTER (EMERGENCY)
Facility: CLINIC | Age: 22
Discharge: HOME | End: 2022-10-11
Attending: FAMILY MEDICINE | Admitting: FAMILY MEDICINE
Payer: COMMERCIAL

## 2022-10-11 VITALS
SYSTOLIC BLOOD PRESSURE: 107 MMHG | DIASTOLIC BLOOD PRESSURE: 74 MMHG | WEIGHT: 140 LBS | RESPIRATION RATE: 16 BRPM | TEMPERATURE: 98.6 F | BODY MASS INDEX: 22.6 KG/M2 | HEART RATE: 114 BPM | OXYGEN SATURATION: 97 %

## 2022-10-11 DIAGNOSIS — F31.32 BIPOLAR AFFECTIVE DISORDER, CURRENTLY DEPRESSED, MODERATE (H): ICD-10-CM

## 2022-10-11 DIAGNOSIS — Z53.9 ERRONEOUS ENCOUNTER--DISREGARD: Primary | ICD-10-CM

## 2022-10-11 LAB
ALBUMIN SERPL-MCNC: 3.3 G/DL (ref 3.4–5)
ALBUMIN UR-MCNC: NEGATIVE MG/DL
ALP SERPL-CCNC: 84 U/L (ref 40–150)
ALT SERPL W P-5'-P-CCNC: 16 U/L (ref 0–50)
AMPHETAMINES UR QL SCN: NORMAL
ANION GAP SERPL CALCULATED.3IONS-SCNC: 9 MMOL/L (ref 3–14)
APPEARANCE UR: ABNORMAL
AST SERPL W P-5'-P-CCNC: 15 U/L (ref 0–45)
BARBITURATES UR QL: NORMAL
BASOPHILS # BLD AUTO: 0 10E3/UL (ref 0–0.2)
BASOPHILS NFR BLD AUTO: 0 %
BENZODIAZ UR QL: NORMAL
BILIRUB SERPL-MCNC: 0.3 MG/DL (ref 0.2–1.3)
BILIRUB UR QL STRIP: ABNORMAL
BUN SERPL-MCNC: 13 MG/DL (ref 7–30)
CALCIUM SERPL-MCNC: 10.2 MG/DL (ref 8.5–10.1)
CANNABINOIDS UR QL SCN: NORMAL
CHLORIDE BLD-SCNC: 104 MMOL/L (ref 94–109)
CO2 SERPL-SCNC: 23 MMOL/L (ref 20–32)
COCAINE UR QL: NORMAL
COLOR UR AUTO: ABNORMAL
CREAT SERPL-MCNC: 0.48 MG/DL (ref 0.52–1.04)
EOSINOPHIL # BLD AUTO: 0 10E3/UL (ref 0–0.7)
EOSINOPHIL NFR BLD AUTO: 0 %
ERYTHROCYTE [DISTWIDTH] IN BLOOD BY AUTOMATED COUNT: 13 % (ref 10–15)
GFR SERPL CREATININE-BSD FRML MDRD: >90 ML/MIN/1.73M2
GLUCOSE BLD-MCNC: 84 MG/DL (ref 70–99)
GLUCOSE UR STRIP-MCNC: NEGATIVE MG/DL
HCG UR QL: NEGATIVE
HCT VFR BLD AUTO: 38.6 % (ref 35–47)
HGB BLD-MCNC: 12.7 G/DL (ref 11.7–15.7)
HGB UR QL STRIP: NEGATIVE
HYALINE CASTS: 3 /LPF
IMM GRANULOCYTES # BLD: 0 10E3/UL
IMM GRANULOCYTES NFR BLD: 0 %
KETONES UR STRIP-MCNC: 80 MG/DL
LEUKOCYTE ESTERASE UR QL STRIP: ABNORMAL
LYMPHOCYTES # BLD AUTO: 2.1 10E3/UL (ref 0.8–5.3)
LYMPHOCYTES NFR BLD AUTO: 21 %
MCH RBC QN AUTO: 28.1 PG (ref 26.5–33)
MCHC RBC AUTO-ENTMCNC: 32.9 G/DL (ref 31.5–36.5)
MCV RBC AUTO: 85 FL (ref 78–100)
MONOCYTES # BLD AUTO: 0.9 10E3/UL (ref 0–1.3)
MONOCYTES NFR BLD AUTO: 9 %
MUCOUS THREADS #/AREA URNS LPF: PRESENT /LPF
NEUTROPHILS # BLD AUTO: 6.6 10E3/UL (ref 1.6–8.3)
NEUTROPHILS NFR BLD AUTO: 70 %
NITRATE UR QL: NEGATIVE
NRBC # BLD AUTO: 0 10E3/UL
NRBC BLD AUTO-RTO: 0 /100
OPIATES UR QL SCN: NORMAL
PH UR STRIP: 5.5 [PH] (ref 5–7)
PLATELET # BLD AUTO: 483 10E3/UL (ref 150–450)
POTASSIUM BLD-SCNC: 3.8 MMOL/L (ref 3.4–5.3)
PROT SERPL-MCNC: 9.1 G/DL (ref 6.8–8.8)
RBC # BLD AUTO: 4.52 10E6/UL (ref 3.8–5.2)
RBC URINE: 1 /HPF
SARS-COV-2 RNA RESP QL NAA+PROBE: NEGATIVE
SODIUM SERPL-SCNC: 136 MMOL/L (ref 133–144)
SP GR UR STRIP: 1.02 (ref 1–1.03)
SQUAMOUS EPITHELIAL: 3 /HPF
TRANSITIONAL EPI: <1 /HPF
UROBILINOGEN UR STRIP-MCNC: 0.2 MG/DL
WBC # BLD AUTO: 9.6 10E3/UL (ref 4–11)
WBC URINE: 6 /HPF

## 2022-10-11 PROCEDURE — 85025 COMPLETE CBC W/AUTO DIFF WBC: CPT | Performed by: FAMILY MEDICINE

## 2022-10-11 PROCEDURE — 80053 COMPREHEN METABOLIC PANEL: CPT | Performed by: FAMILY MEDICINE

## 2022-10-11 PROCEDURE — 81001 URINALYSIS AUTO W/SCOPE: CPT | Performed by: FAMILY MEDICINE

## 2022-10-11 PROCEDURE — U0005 INFEC AGEN DETEC AMPLI PROBE: HCPCS | Performed by: FAMILY MEDICINE

## 2022-10-11 PROCEDURE — 80307 DRUG TEST PRSMV CHEM ANLYZR: CPT | Performed by: FAMILY MEDICINE

## 2022-10-11 PROCEDURE — 90791 PSYCH DIAGNOSTIC EVALUATION: CPT

## 2022-10-11 PROCEDURE — 82040 ASSAY OF SERUM ALBUMIN: CPT | Performed by: FAMILY MEDICINE

## 2022-10-11 PROCEDURE — 36415 COLL VENOUS BLD VENIPUNCTURE: CPT | Performed by: FAMILY MEDICINE

## 2022-10-11 PROCEDURE — 99284 EMERGENCY DEPT VISIT MOD MDM: CPT | Performed by: FAMILY MEDICINE

## 2022-10-11 PROCEDURE — 81025 URINE PREGNANCY TEST: CPT | Performed by: FAMILY MEDICINE

## 2022-10-11 PROCEDURE — 99285 EMERGENCY DEPT VISIT HI MDM: CPT | Mod: 25 | Performed by: FAMILY MEDICINE

## 2022-10-11 PROCEDURE — C9803 HOPD COVID-19 SPEC COLLECT: HCPCS | Performed by: FAMILY MEDICINE

## 2022-10-11 RX ORDER — LITHIUM CARBONATE 300 MG/1
600 TABLET, FILM COATED, EXTENDED RELEASE ORAL AT BEDTIME
Qty: 60 TABLET | Refills: 0 | Status: SHIPPED | OUTPATIENT
Start: 2022-10-11 | End: 2022-10-24

## 2022-10-11 ASSESSMENT — ACTIVITIES OF DAILY LIVING (ADL): ADLS_ACUITY_SCORE: 35

## 2022-10-11 NOTE — DISCHARGE INSTRUCTIONS
Mental Health Appointments    A diagnostic assessment has been scheduled for the [facility] for Friday 10/14/22 at 1:30 PM.  This appointment will be conducted via VIDEO.  If you need to reschedule or cancel this appointment, please call [facility] at [phone]. If you must cancel, please call at least 24 hours prior to your scheduled appointment.     Aftercare Plan  If I am feeling unsafe or I am in a crisis, I will:   Contact my established care providers   Call the National Suicide Prevention Lifeline: 988  Go to the nearest emergency room   Call 911     Warning signs that I or other people might notice when a crisis is developing for me: Decreased Sleep, loss of appetite, staying in bed, neglecting self care     Things I am able to do on my own to cope or help me feel better: Go for a walk, listen to music, read, watch a show or movie     Things that I am able to do with others to cope or help me better: Go for a walk, talk on the phone with, go shopping     Things I can use or do for distraction: Music, TV, Movies, Reading, Walking     Changes I can make to support my mental health and wellness: Attend scheduled PHP intake, establish a bedtime routine    People in my life that I can ask for help: Sister, Dad, Friends     Your [facility] has a mental health crisis team you can call 24/7: [facility]  [phone]    Other things that are important when I'm in crisis: Try to ask for support and not be guarded, be honest about how you are feeling, seek support as soon as soon as you are experiencing symptoms of depression     Additional resources and information: Grounding Techniques:  Try to notice where you are, your surroundings including the people, the sounds like the TV or radio.  Concentrate on your breathing. Take a deep cleansing breath from your diaphragm. Count the breaths as you exhale. Make sure you breath slowly.  Hold something that you find  "comforting, for some it may be a stuffed animal or a blanket. Notice how it feels in your hands. Is it hard or soft?  During a non-crisis time make a list of positive affirmations. Print them out and keep them handy for times of intense anxiety. At those times, read them aloud.  Try the Shotlst game:  Name 5 things you can see in the room with you  Name 4 things you can feel ( chair on my back  or  feet on floor )   Name 3 things you can hear right now ( people talking  or  tv )   Name 2 things you can smell right now (or, 2 things you like the smell of)   Name 1 good thing about yourself  Create A Safe Place  Image a safe place -- it can be a real or imaginary place:   What do you see -- especially colors?   What sounds do you hear?   What sensations do you feel?   What smells do you smell?   What people or animals would you want in your safe place?   Imagine a protective bubble, wall or boundary around your safe place.   Imagine a door or gate with a guard at your safe place.   Image a lock and key to your safe place and only you can unlock it.  You can draw or make a collage that represents your safe place.   Choose a souvenir of your safe place -- a color, an object, a song.   Keep your image of your safe place so you can come back to it when you need to.       Crisis Lines  Crisis Text Line  Text 255636  You will be connected with a trained live crisis counselor to provide support.    Por espanol, texto  FIDELIA a 494087 o texto a 442-AYUDAME en WhatsApp    [employer] (LGBTQ Youth Crisis Line)  0.904.411.1231  text START to 599-214      Community Resources  Fast Tracker  Linking people to mental health and substance use disorder resources  fasttrackermn.org     [place] Mental Health Warm Line  Peer to peer support  Monday thru Saturday, 12 pm to 10 pm  754.661.6357 or 6.055.395.9889  Text \"Support\" to 11261    [employer] [place] [employer]ness (DONAL)  793.769.5668 or " 1.888.DONAL.HELPS      Mental Health Apps  My3  https://myBioVentrixpp.org/    VirtualHopeBox  https://Neozone/apps/virtual-hope-box/      Additional Information  Today you were seen by a licensed mental health professional through Triage and Transition services, [facility] (P)  for a crisis assessment in the Emergency Department at [facility].  It is recommended that you follow up with your established providers (psychiatrist, mental health therapist, and/or primary care doctor - as relevant) as soon as possible. Coordinators from [facility] will be calling you in the next 24-48 hours to ensure that you have the resources you need.  You can also contact [facility] coordinators directly at [phone]. You may have been scheduled for or offered an appointment with a mental health provider. [facility] maintains an extensive network of licensed behavioral health providers to connect patients with the services they need.  We do not charge providers a fee to participate in our referral network.  We match patients with providers based on a patient's specific needs, insurance coverage, and location.  Our first effort will be to refer you to a provider within your care system, and will utilize providers outside your care system as needed.

## 2022-10-11 NOTE — ED PROVIDER NOTES
[facility] EMERGENCY DEPARTMENT ([facility])    10/11/22     hallway [address]  History     Chief Complaint   Patient presents with     Psychiatric Evaluation     Pt presents for  angel. States she has been having trouble sleeping for the past week. Decreased food intake- per report, lost 15lbs in past two weeks. Endorsing depression, and wishes she were dead. Denies SI plan or intent.      The history is provided by the patient and medical records.     Cristina Boyd is a 22 year old female with history of bipolar 1 disorder who was brought in the ED by her sister for psychiatric evaluation.  Epic records reviewed, she has had prior psychiatric hospitalizations in setting of lithium nonadherence, no hospitalizations since 2018.  She has been stable and following with psychiatry through [facility] Dr. Hannah Yanes. Patient seen by DEC , please see her note for further details. Patient has had increased depression that started to get severe about 2 weeks ago. She states that she had a stomach bug and ever since then she has felt depressed. She has a history of bipolar 1 disorder; the last time she had depression this severe was in 2018, was followed by manic episode. Patient has worsening depression and passive suicidal ideation.  No suicidal plan or intent, just wishes that she were dead.  She reports having difficulty sleeping for the past week, decreased appetite with unintentional 15 pound weight loss over the past 2 weeks. Hasn't been eating or drinking according to patient and family. Patient was on lithium but stopped it in April because she didn't feel it was effective for her. Her psychiatrist is aware, recommended she start it again. She has been taking Seroquel due to difficulty staying asleep and waking with racing thoughts.  Does not have therapist, not interested in a therapist because it didn't work for her when she tried it in the past. She is open to day treatment. No  suicidal or homicidal ideation, no self-injurious behavior. No previous suicide attempts.     Past Medical History  Past Medical History:   Diagnosis Date     Bipolar disorder (H)      Depressive disorder      Past Surgical History:   Procedure Laterality Date     BRONCHOSCOPY (RIGID OR FLEXIBLE), DIAGNOSTIC N/A 6/16/2018    Procedure: COMBINED BRONCHOSCOPY (RIGID OR FLEXIBLE), LAVAGE;;  Surgeon: Manjeet Holland MD;  Location:  GI     lithium ER (LITHOBID) 300 MG CR tablet  omeprazole (PRILOSEC) 20 MG DR capsule  ondansetron (ZOFRAN ODT) 4 MG ODT tab  QUEtiapine (SEROQUEL) 50 MG tablet      No Known Allergies  Family History  Family History   Problem Relation Age of Onset     Depression Other      Depression Maternal Uncle      Social History   Social History     Tobacco Use     Smoking status: Never     Smokeless tobacco: Never   Vaping Use     Vaping Use: Never used   Substance Use Topics     Alcohol use: No     Drug use: No     Comment: x1 marijuana, in October, 2016      Past medical history, past surgical history, medications, allergies, family history, and social history were reviewed with the patient. No additional pertinent items.       Review of Systems  A complete review of systems was performed with pertinent positives and negatives noted in the HPI, and all other systems negative.    Physical Exam   BP: 107/74  Pulse: 114  Temp: 98.6  F (37  C)  Resp: 16  Weight: 63.5 kg (140 lb)  SpO2: 97 %  Physical Exam  Constitutional:       General: She is not in acute distress.     Appearance: She is not diaphoretic.   HENT:      Head: Atraumatic.      Mouth/Throat:      Pharynx: No oropharyngeal exudate.   Eyes:      General: No scleral icterus.     Pupils: Pupils are equal, round, and reactive to light.   Cardiovascular:      Heart sounds: Normal heart sounds.   Pulmonary:      Effort: No respiratory distress.      Breath sounds: Normal breath sounds.   Abdominal:      General: Bowel sounds are normal.       Palpations: Abdomen is soft.      Tenderness: There is no abdominal tenderness.   Musculoskeletal:         General: No tenderness.   Skin:     General: Skin is warm.      Findings: No rash.   Neurological:      General: No focal deficit present.      Mental Status: She is oriented to person, place, and time.      Sensory: No sensory deficit.      Motor: No weakness.      Coordination: Coordination normal.   Psychiatric:         Mood and Affect: Mood is anxious and depressed.         Speech: Speech normal.         Behavior: Behavior is cooperative.         Thought Content: Thought content does not include homicidal or suicidal ideation.         ED Course      Procedures    The medical record was reviewed and interpreted.   She was seen and evaluated by the  please refer to their documentation in the note section of the epic chart dated 10/11/2022      Medications - No data to display     Assessments & Plan (with Medical Decision Making)       I have reviewed the nursing notes. I have reviewed the findings, diagnosis, plan and need for follow up with the patient.    Discharge Medication List as of 10/11/2022 12:47 PM      START taking these medications    Details   lithium ER (LITHOBID) 300 MG CR tablet Take 2 tablets (600 mg) by mouth At Bedtime, Disp-60 tablet, R-0, E-Prescribe         Patient with bipolar affective disorder at this time will be started back on lithium and has agreed to start a day treatment program will continue with outpatient psychiatry.    Final diagnoses:   Bipolar affective disorder, currently depressed, moderate (H)     IBea, am serving as a trained medical scribe to document services personally performed by Medhat Eduardo MD based on the provider's statements to me on October 11, 2022.  This document has been checked and approved by the attending provider.    I, Medhat Eduardo MD, was physically present and have reviewed and verified the accuracy of  this note documented by Bea Maxwell, medical scribe.      Medhat Eduardo MD       [facility] EMERGENCY DEPARTMENT  10/11/2022     Medhat Eduardo MD  10/11/22 [id number]

## 2022-10-11 NOTE — ED TRIAGE NOTES
Pt presents for  eval. States she has been having trouble sleeping for the past week. Decreased food intake- per report, lost 15lbs in past two weeks. Endorsing depression, and wishes she were dead. Denies SI plan or intent.      Triage Assessment     Row Name 10/11/22 0922       Triage Assessment (Adult)    Airway WDL WDL       Respiratory WDL    Respiratory WDL WDL       Skin Circulation/Temperature WDL    Skin Circulation/Temperature WDL WDL       Cardiac WDL    Cardiac WDL X;rhythm    Pulse Rate & Regularity tachycardic       Peripheral/Neurovascular WDL    Peripheral Neurovascular WDL WDL       Cognitive/Neuro/Behavioral WDL    Cognitive/Neuro/Behavioral WDL mood/behavior    Mood/Behavior hypoactive (quiet, withdrawn)

## 2022-10-11 NOTE — RESULT ENCOUNTER NOTE
Please inform patient that test result was within normal parameters.   Thank you.     Fer Eugene M.D.

## 2022-10-11 NOTE — CONSULTS
Diagnostic Evaluation Consultation  Crisis Assessment    Patient was assessed: In Person  Patient location: [facility] Emergency Department  Was a release of information signed: Yes. Providers included on the release: Hannah Yanes      Referral Data and Chief Complaint  Cristina is a 22 year old, who uses she/her pronouns, and presents to the ED with family/friends. Patient is referred to the ED by family/friends. Patient is presenting to the ED for the following concerns: Increased Depression Symptoms.      Informed Consent and Assessment Methods     Patient is her own guardian. Writer met with patient and explained the crisis assessment process, including applicable information disclosures and limits to confidentiality, assessed understanding of the process, and obtained consent to proceed with the assessment. Patient was observed to be able to participate in the assessment as evidenced by walking to consultation room, verbally agreeing to assessment. Assessment methods included conducting a formal interview with patient, review of medical records, collaboration with medical staff, and obtaining relevant collateral information from family and community providers when available..     Over the course of this crisis assessment provided reassurance, offered validation, engaged patient in problem solving and disposition planning and facilitated family communication. Patient's response to interventions was positive and voluntary.     Summary of Patient Situation     Cristina presented today to the emergency department at the recommendation of her sister. Two weeks ago Cristina got a stomach virus and after being in bed for a few days feeling sick, she started to feel depressed. Cristina shared she has been staying in her room not doing anything, missed the last couple of days of work, has trouble staying asleep, and does not feel like eating or drinking anything. Her family shared that she has lost 15 pounds in the last  "two weeks as a result. She shared that she feels like she is too exhausted to talk with anyone and has started to neglect her self care. Cristina stated that she wants \"wishes she was dead so her depression would end\" but denies being actively suicidal with plan or intent. She has diagnosed Bipolar I and stopped taking her lithium in Spring 2022. Her psychiatrist recommended restarting it at her last appointment on 9/21/2022.    The last time Cristina felt this way was in 2018 and a manic episode followed depression. She was hospitalized as a result.     Brief Psychosocial History     Cristina is a 7th grade . She received her bachelors degree from [employer]. She has not been able to attend work the last two days due to impaired functioning from feeling depressed. She currently lives with her family in [place]. She lives with her mom, dad, sister, and three brothers.    Cristina shared that she can ask her sister for support but feels guarded when talking to friends or other family members about her mental health. She also sees a psychiatrist and started with a new provider, Hannah Yanes, a couple of months ago. When she is feeling better, Cristina likes to go for walks, go shopping, listen to music, and go out to eat with friends or family.    Cristina did not report any financial or legal concerns.    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     Significant Clinical History     Cristina has been diagnosed with Bipolar I. She shared that she last experiencing this deep of depression in 2018, which was followed by an episode of shaina. She was hospitalized on an inpatient mental health floor as a result. She denies feeling manic since the hospitalization. Cristina stopped taking her lithium in Spring 2022. She stated that she did not feel like she needed it. Her psychiatrist has recommended going back on it.     Cristina stated that she feels episodes of depression every " once and while but nothing to this extent. She can tell she is feeling depressed when she starts to self-isolate, wants to sleep all day, has trouble staying asleep, and does not want to engage in conversation. Cristina knows her depression is worse than normal because she has started to miss work and has been neglecting her self care.      Cristina said she was in Day Treatment following her hospital stay in 2018. She does not have a therapist and is not interested in individual outpatient therapy at this time.     She denies a history of trauma and abuse.     Collateral Information  The following information was received from Kate and Lawrence whose relationship to the patient is Sister and Father. Information was obtained in person. Their phone number is [phone] and they last had contact with patient on today, 10/11/2022.    What happened today: Kate recommended that Cristina come to the ED today because of depression symptoms that are getting worse. Kate and Lawrence shared that for the last two weeks Cristina has been sitting in the dark in her room, will not have a conversation, will not eat, and has started to miss work.    What is different about patient's functioning: Kate shared that Cristina is typically very social and likes to engage in conversation. Over the last couple of week Cristina has start to self-isolate and will not engage with anyone in conversation. If asked a question, it takes her longer to respond than what is typical. Cristina has not been eating or drinking and she has lost 15 pounds in the last two weeks. Both Kate and Lawrence agreed that Cristina has not presented this way since 2018 when her depression was followed by shaina.    Concern about alcohol/drug use: No    What do you think the patient needs: To restart lithium, a more intensive program to prevent a manic episode    Has patient made comments about wanting to kill themselves/others:  Yes she has shared that she wants to die but denies  having a plan or intent. They do not think Cristina would hurt other people. She only makes comments about wanting to hurt others when she is experiencing shaina.     If d/c is recommended, can they take part in safety/aftercare planning: Yes they can keep Cristina safe if the correct resources are given for dischage. They agreed as long as she is restarted on lithium that she should be safe at home    Other information: Kate and Lawrence asked for a letter to give to Cristina's employer. This was shared with the attending, Dr. Eduardo, and he agreed to provide one.       Risk Assessment  ESS-6  1.a. Over the past 2 weeks, have you had thoughts of killing yourself? Yes passive thoughts about wanting to die. But not actively wanting to take life  1.b. Have you ever attempted to kill yourself and, if yes, when did this last happen? No   2. Recent or current suicide plan? No   3. Recent or current intent to act on ideation? No  4. Lifetime psychiatric hospitalization? Yes  5. Pattern of excessive substance use? No  6. Current irritability, agitation, or aggression? No  Scoring note: BOTH 1a and 1b must be yes for it to score 1 point, if both are not yes it is zero. All others are 1 point per number. If all questions 1a/1b - 6 are no, risk is negligible. If one of 1a/1b is yes, then risk is mild. If either question 2 or 3, but not both, is yes, then risk is automatically moderate regardless of total score. If both 2 and 3 are yes, risk is automatically high regardless of total score.      Score: 1, moderate risk      Does the patient have access to lethal means? Yes - describe Everyday common means. Denies guns being in the house.     Does the patient engage in non-suicidal self-injurious behavior (NSSI/SIB)? no     Does the patient have thoughts of harming others? No     Is the patient engaging in sexually inappropriate behavior?  no        Current Substance Abuse     Is there recent substance abuse? no     Was a urine  drug screen or blood alcohol level obtained: Yes results are negative       Mental Status Exam     Affect: Flat   Appearance: Appropriate    Attention Span/Concentration: Attentive  Eye Contact: Avoidant   Fund of Knowledge: Appropriate    Language /Speech Content: Fluent   Language /Speech Volume: Soft    Language /Speech Rate/Productions: Slow    Recent Memory: Intact   Remote Memory: Intact   Mood: Apathetic and Depressed    Orientation to Person: Yes    Orientation to Place: Yes   Orientation to Time of Day: Yes    Orientation to Date: Yes    Situation (Do they understand why they are here?): Yes    Psychomotor Behavior: Normal    Thought Content: Clear   Thought Form: Intact      History of commitment: No       Medication    Psychotropic medications: Yes. Pt is currently taking Seroquel. Medication compliant: No: Will sometimes take two doses if she feels her prescribed does is not working. Recent medication changes: Yes Starting taking medication 2 weeks ago  Medication changes made in the last two weeks: Yes: Started Seroquel    Cristina will be started back on lithium as a result of today's visit       Current Care Team    Primary Care Provider: No  Psychiatrist: Yes. Name: Hannah Yanes. Location: [facility]. Date of last visit: 9/21/2022. Frequency: Unknown. Perceived helpfulness: Positive.  Therapist: No  : No     CTSS or ARMHS: No  ACT Team: No  Other: No      Diagnosis    296.52 Bipolar I Disorder Current or Most Recent Episode Depressed, Moderate       Clinical Summary and Substantiation of Recommendations    Cristina has been experiencing increased depression symptoms for the previous two weeks. These symptoms include restless sleep, no appetite, self-isolating, neglecting self-care, and missing work. She denies SI, HI, and SIB. She does not want to start individual therapy but is open to a partial hospitalization program in order to stabilize on medications. She will begin  taking lithium again as the attending physician prescribes. Her family believes she can be safe in the home with the medication change and outpatient treatment plan.    Disposition    Recommended disposition: Programmatic Care: Partial Hospitlization Program       Reviewed case and recommendations with attending provider. Attending Name: Dr. Eduardo       Attending concurs with disposition: Yes       Patient concurs with disposition: Yes       Guardian concurs with disposition: NA      Final disposition: Programmatic care: Partial Hospitlization Program.       Outpatient Details (if applicable):   Aftercare plan and appointments placed in the AVS and provided to patient: Yes. Given to patient by ED Staff    Was lethal means counseling provided as a part of aftercare planning? No       Assessment Details    Patient interview started at: 11:00 a.m. and completed at: 11:40 a.m.     Total duration spent on the patient case in minutes: .75 hrs      CPT code(s) utilized: 77735 - Psychotherapy for Crisis - 60 (30-74*) min       Colleen Crane, Clinical Intern, Supervisor MARCELINA Hinojosa, [facility], [facility]  DEC - Triage & Transition Services  Callback: [phone]          Mental Health Appointments     A diagnostic assessment has been scheduled for the [facility] Adult Mental Health Program for Friday 10/14/22 at 1:30 PM.  This appointment will be conducted via VIDEO.  If you need to reschedule or cancel this appointment, please call Behavioral Access at [phone]. If you must cancel, please call at least 24 hours prior to your scheduled appointment.      Aftercare Plan  If I am feeling unsafe or I am in a crisis, I will:   Contact my established care providers   Call the National Suicide Prevention Lifeline: 988  Go to the nearest emergency room   Call 911      Warning signs that I or other people might notice when a crisis is developing for me: Decreased Sleep, loss of appetite, staying in bed, neglecting  self care      Things I am able to do on my own to cope or help me feel better: Go for a walk, listen to music, read, watch a show or movie      Things that I am able to do with others to cope or help me better: Go for a walk, talk on the phone with, go shopping      Things I can use or do for distraction: Music, TV, Movies, Reading, Walking      Changes I can make to support my mental health and wellness: Attend scheduled PHP intake, establish a bedtime routine     People in my life that I can ask for help: Sister, Dad, Friends      Your [facility] has a mental health crisis team you can call 24/7: [facility] Crisis  [phone]     Other things that are important when I'm in crisis: Try to ask for support and not be guarded, be honest about how you are feeling, seek support as soon as soon as you are experiencing symptoms of depression      Additional resources and information: Grounding Techniques:    Try to notice where you are, your surroundings including the people, the sounds like the TV or radio.    Concentrate on your breathing. Take a deep cleansing breath from your diaphragm. Count the breaths as you exhale. Make sure you breath slowly.    Hold something that you find comforting, for some it may be a stuffed animal or a blanket. Notice how it feels in your hands. Is it hard or soft?    During a non-crisis time make a list of positive affirmations. Print them out and keep them handy for times of intense anxiety. At those times, read them aloud.  Try the [employer] game:    Name 5 things you can see in the room with you    Name 4 things you can feel ( chair on my back  or  feet on floor )     Name 3 things you can hear right now ( people talking  or  tv )     Name 2 things you can smell right now (or, 2 things you like the smell of)     Name 1 good thing about yourself  Create A Safe Place    Image a safe place -- it can be a real or imaginary place:     What do you see -- especially colors?     What sounds do  "you hear?     What sensations do you feel?     What smells do you smell?     What people or animals would you want in your safe place?     Imagine a protective bubble, wall or boundary around your safe place.     Imagine a door or gate with a guard at your safe place.     Image a lock and key to your safe place and only you can unlock it.    You can draw or make a collage that represents your safe place.     Choose a souvenir of your safe place -- a color, an object, a song.     Keep your image of your safe place so you can come back to it when you need to.         Crisis Lines  Crisis Text Line  Text 069096  You will be connected with a trained live crisis counselor to provide support.     Por priyanka, texto  FIDELIA a 084621 o texto a 442-AYUDAME en WhatsApp     The Alpesh Project (LGBTQ Youth Crisis Line)  4.322.334.3685  text START to 628-118        Community Resources  Fast Tracker  Linking people to mental health and substance use disorder resources  BitMethod.TNC      [place] Mental Health Warm Line  Peer to peer support  Monday thru Saturday, 12 pm to 10 pm  519.100.6961 or 1.202.484.1316  Text \"Support\" to 64927     National [place] on Mental Illness (DNOAL)  984.589.9321 or 1.888.DONAL.HELPS        Mental Health Apps  My3  https://myTrueMotion Spinepp.org/     VirtualHopeBox  https://Myows.org/apps/virtual-hope-box/        Additional Information  Today you were seen by a licensed mental health professional through Triage and Transition services, Behavioral Healthcare Providers ([facility])  for a crisis assessment in the Emergency Department at [facility].  It is recommended that you follow up with your established providers (psychiatrist, mental health therapist, and/or primary care doctor - as relevant) as soon as possible. Coordinators from [facility] will be calling you in the next 24-48 hours to ensure that you have the resources you need.  You can also contact P coordinators directly at " [phone]. You may have been scheduled for or offered an appointment with a mental health provider. [facility] maintains an extensive network of licensed behavioral health providers to connect patients with the services they need.  We do not charge providers a fee to participate in our referral network.  We match patients with providers based on a patient's specific needs, insurance coverage, and location.  Our first effort will be to refer you to a provider within your care system, and will utilize providers outside your care system as needed.

## 2022-10-11 NOTE — LETTER
October 11, 2022      To Whom It May Concern:      Cristina Boyd was seen in our Emergency Department today, 10/11/22.  I expect her condition to improve over the next 6-7 days.  She may return to work/school when improved.    Sincerely,        Medhat Eduardo MD

## 2022-10-14 ENCOUNTER — VIRTUAL VISIT (OUTPATIENT)
Dept: PSYCHIATRY | Facility: CLINIC | Age: 22
End: 2022-10-14
Attending: PSYCHIATRY & NEUROLOGY
Payer: COMMERCIAL

## 2022-10-14 DIAGNOSIS — F31.9 BIPOLAR 1 DISORDER (H): Primary | ICD-10-CM

## 2022-10-14 PROCEDURE — 90792 PSYCH DIAG EVAL W/MED SRVCS: CPT | Mod: 95 | Performed by: STUDENT IN AN ORGANIZED HEALTH CARE EDUCATION/TRAINING PROGRAM

## 2022-10-14 ASSESSMENT — PATIENT HEALTH QUESTIONNAIRE - PHQ9
SUM OF ALL RESPONSES TO PHQ QUESTIONS 1-9: 27
SUM OF ALL RESPONSES TO PHQ QUESTIONS 1-9: 27
10. IF YOU CHECKED OFF ANY PROBLEMS, HOW DIFFICULT HAVE THESE PROBLEMS MADE IT FOR YOU TO DO YOUR WORK, TAKE CARE OF THINGS AT HOME, OR GET ALONG WITH OTHER PEOPLE: EXTREMELY DIFFICULT

## 2022-10-14 NOTE — PATIENT INSTRUCTIONS
**For crisis resources, please see the information at the end of this document**   Patient Education    Thank you for coming to the [facility] [place] [facility].     Lab Testing:  If you had lab testing today and your results are reassuring or normal they will be mailed to you or sent through CTQuan within 7 days. If the lab tests need quick action we will call you with the results. The phone number we will call with results is # [phone]. If this is not the best number please call our clinic and change the number.     Medication Refills:  If you need any refills please call your pharmacy and they will contact us. Our fax number for refills is [phone].   Three business days of notice are needed for general medication refill requests.   Five business days of notice are needed for controlled substance refill requests.   If you need to change to a different pharmacy, please contact the new pharmacy directly. The new pharmacy will help you get your medications transferred.     Contact Us:  Please call [phone] during business hours (8-5:00 M-F).   If you have medication related questions after clinic hours, or on the weekend, please call [phone].     Financial Assistance [phone]   Medical Records [phone]       MENTAL HEALTH CRISIS RESOURCES:  For a emergency help, please call 911 or go to the nearest Emergency Department.     Emergency Walk-In Options:   Em[facility] Unit @ [facility] ([place]): [phone] - Specialized mental health emergency area designed to be calming  [facility] ([place]): [phone]  [facility] ([place]): [phone]  [facility]): [phone]    [facility] Crisis Information:   [place]: [phone]  [place]: [phone]  [place] ([place]) - Adult: [phone]     Child: [phone]  [place] - Adult: [phone]     Child: [phone]  [place]:  [phone]  List of all [facility] resources:   https://[place].gov/dhs/people-we-serve/adults/health-care/mental-health/resources/crisis-contacts.jsp    National Crisis Information:   Crisis Text Line: Text  [place]  to 270842  Suicide & Crisis Lifeline: 988  National Suicide Prevention Lifeline: 6-115-568-TALK (1-293.483.8198)       For online chat options, visit https://suicidepreventionlifeline.org/chat/  Poison Control Center: 2-753-292-5680  Trans Lifeline: 8-859-381-0386 - Hotline for transgender people of all ages  The Alpesh Project: 2-351-493-9552 - Hotline for LGBT youth     For Non-Emergency Support:   Fast Tracker: Mental Health & Substance Use Disorder Resources -   https://www.Lightspeed GenomicsckNational Fuel Solutionsn.org/

## 2022-10-14 NOTE — PROGRESS NOTES
Cristina Boyd is a 22 year old who is being evaluated via a billable video visit.      Pt will join video visit via: Change.org  If there are problems joining the visit, send backup video invite via: Text to preferred phone: [phone]    Reason for telehealth visit: Patient has requested telehealth visit    Originating location (patient location): Patient's home    Will anyone else be joining the visit? No

## 2022-10-21 ENCOUNTER — HOSPITAL ENCOUNTER (OUTPATIENT)
Dept: BEHAVIORAL HEALTH | Facility: CLINIC | Age: 22
Discharge: HOME | End: 2022-10-21
Attending: FAMILY MEDICINE
Payer: COMMERCIAL

## 2022-10-21 DIAGNOSIS — F31.9 BIPOLAR 1 DISORDER (H): ICD-10-CM

## 2022-10-21 PROCEDURE — 999N000216 HC STATISTIC ADULT CD FACE TO FACE-NO CHRG: Mod: TEL,95 | Performed by: COUNSELOR

## 2022-10-21 ASSESSMENT — COLUMBIA-SUICIDE SEVERITY RATING SCALE - C-SSRS
6. HAVE YOU EVER DONE ANYTHING, STARTED TO DO ANYTHING, OR PREPARED TO DO ANYTHING TO END YOUR LIFE?: NO
6. HAVE YOU EVER DONE ANYTHING, STARTED TO DO ANYTHING, OR PREPARED TO DO ANYTHING TO END YOUR LIFE?: NO
ATTEMPT LIFETIME: NO
TOTAL  NUMBER OF INTERRUPTED ATTEMPTS LIFETIME: NO
2. HAVE YOU ACTUALLY HAD ANY THOUGHTS OF KILLING YOURSELF?: NO
2. HAVE YOU ACTUALLY HAD ANY THOUGHTS OF KILLING YOURSELF?: NO
TOTAL  NUMBER OF INTERRUPTED ATTEMPTS SINCE LAST CONTACT: NO
1. HAVE YOU WISHED YOU WERE DEAD OR WISHED YOU COULD GO TO SLEEP AND NOT WAKE UP?: YES
TOTAL  NUMBER OF ABORTED OR SELF INTERRUPTED ATTEMPTS LIFETIME: NO
1. SINCE LAST CONTACT, HAVE YOU WISHED YOU WERE DEAD OR WISHED YOU COULD GO TO SLEEP AND NOT WAKE UP?: NO
1. IN THE PAST MONTH, HAVE YOU WISHED YOU WERE DEAD OR WISHED YOU COULD GO TO SLEEP AND NOT WAKE UP?: NO
ATTEMPT SINCE LAST CONTACT: NO
SUICIDE, SINCE LAST CONTACT: NO
TOTAL  NUMBER OF ABORTED OR SELF INTERRUPTED ATTEMPTS SINCE LAST CONTACT: NO

## 2022-10-21 ASSESSMENT — ANXIETY QUESTIONNAIRES
7. FEELING AFRAID AS IF SOMETHING AWFUL MIGHT HAPPEN: SEVERAL DAYS
3. WORRYING TOO MUCH ABOUT DIFFERENT THINGS: SEVERAL DAYS
6. BECOMING EASILY ANNOYED OR IRRITABLE: SEVERAL DAYS
5. BEING SO RESTLESS THAT IT IS HARD TO SIT STILL: SEVERAL DAYS
2. NOT BEING ABLE TO STOP OR CONTROL WORRYING: SEVERAL DAYS
GAD7 TOTAL SCORE: 7
1. FEELING NERVOUS, ANXIOUS, OR ON EDGE: SEVERAL DAYS
GAD7 TOTAL SCORE: 7
4. TROUBLE RELAXING: SEVERAL DAYS

## 2022-10-21 ASSESSMENT — PATIENT HEALTH QUESTIONNAIRE - PHQ9: SUM OF ALL RESPONSES TO PHQ QUESTIONS 1-9: 9

## 2022-10-21 NOTE — PATIENT INSTRUCTIONS
Thank you for choosing us for your care. Based on the information provided, I believe you need to be seen immediately.  Please schedule clinic appointment. as soon as possible.     You will not be charged for this eVisit.      Thank you for choosing us for your care. I think an in-clinic visit would be best next steps based on your symptoms. Please schedule a clinic appointment; you won t be charged for this eVisit.      You can schedule an appointment right here in [place], or call [phone]

## 2022-10-21 NOTE — PROGRESS NOTES
[facility]    ADULT Mental Health Assessment Appointment Note    PATIENT'S NAME:  Cristina Boyd    PREFERRED NAME: Cristina  MRN: [id number]  YOB: 2000  Address:  [place] [address]  PREFERRED PHONE: [phone]  May we leave a referral related message: Yes  EMAIL: [email or web address]  DATE OF SERVICE: 10/21/22  START TIME: 1130  END TIME: 1145  SERVICE MODALITY:  Video Visit:      Provider verified identity through the following two step process.  Patient provided:  Patient  and Patient address    Telemedicine Visit: The patient's condition can be safely assessed and treated via synchronous audio and visual telemedicine encounter.      Reason for Telemedicine Visit: Patient has requested telehealth visit    Originating Site (Patient Location): Patient's home    Distant Site (Provider Location): Provider Remote Setting- Home Office    Consent:  The patient/guardian has verbally consented to: the potential risks and benefits of telemedicine (video visit) versus in person care; bill my insurance or make self-payment for services provided; and responsibility for payment of non-covered services.     Patient would like the video invitation sent by:  My Chart    Mode of Communication:  Video Conference via [employer]    As the provider I attest to compliance with applicable laws and regulations related to telemedicine.    Identifying Information:  Patient is a 22 year old, female.  Patient was referred for an assessment by emergency room.  Patient attended the session alone. Patient identified their preferred language to be English. Patient reported they ruiz not need the assistance of an  or other support involved in therapy.     The patient s risk to self and others was assessed in the risks and follow up section of this document.    Chief Complaint:  The reason for seeking services at this time is: The reason for seeking  "services at this time is: \"Bipolar symptoms\".  The problem(s) began 09/16/22.    Patient has attempted to resolve these concerns in the past through Medication management, individual therapy, day treatment.    Mental Health: Review of Symptoms per patient report:  Patient denies any current mental health symptoms, reportedly was experiencing worsening depressive symptoms last week that resulted in a emergency room visit. Patient was discharged that same day with recommendations for partial hospitalization, history sent a message of Josefina have any updates on [place]      But has since restarted medication and feels like she improved enough to not need programmatic care or any new outpatient mental health services. Patient is currently followed by psychiatry with [facility] psychiatry Department.  Patient states she was able to complete her tasks and work. Patient denies thoughts of suicide.    History of bipolar 1 disorder. The last time she experienced a episode of depression was in 2018 followed by an episode of shaina and at that time she was hospitalized.  She denies any manic episodes since her last hospitalization.  Records indicate that she stopped taking her lithium in the spring of this year stating that she did not feel like she needed it.      Substance Use:  Do you  have a history of alcohol or illicit drug use?   CAGE-AID:   CAGE-AID Total Score 1/30/2017 10/21/2022   Total Score 0 0   Total Score MyChart - 0 (A total score of 2 or greater is considered clinically significant)     Based on the negative CAGE score and clinical interview there are not indications of drug or alcohol abuse. A problematic pattern of alcohol/drug use leading to clinically significant impairment or distress, as manifested by at least two of the following, occurring within a 12-month period: (1) Substance is often taken in larger amounts or over a longer period than was intended. (2) There is persistent desire or " unsuccessful efforts to cut down or control use of the substance.  (3) A great deal of time is spent in activities necessary to obtain the substance, use the substance, or recover from its effects. (4) Craving, or a strong desire or urge to use the substance. (5) Recurrent use of the substance resulting in a failure to fulfill major role obligations at work, school, or home. (6) Continued use of the substance despite having persistent or recurrent social or interpersonal problems caused or exacerbated by the effects of its use. (7)  Important social, occupational, or recreational activities are given up or reduced because of the substance. (8) Recurrent use of the substance in which it is physically hazardous. (9) Use of the substance is continued despite knowledge of having a persistent or recurrent physical or psychological problem that is likely to have been cause or exacerbated by the substance.(10) Tolerance:  either a need for markedly increased amounts of the substance to achieve the desired effect or a markedly diminished effect with continued use of the dame amount of the substance.  The patient does not currently identity positively with any of the 11 DSM-5 criteria for a diagnostic impression of having a substance use disorder. If problematic use begins/returns, patient should be seen for an updated CATRACHO assessment to determine if they meet criteria for any level of CATRACHO programming. There are not recommendations for structured treatment or community support programming at present. Diagnostic assessment for substance use disorder completed.     Significant Losses / Trauma / Abuse / Neglect Issues:   Patient did not serve in the .     There are indications or report of significant loss, trauma, abuse or neglect issues related to: There are no indications and client denies any losses, trauma, abuse, or neglect concerns. Concerns for possible neglect are not present.      Medical History:  Patient  reports the following medication history:   Past Medical History:   Diagnosis Date     Bipolar disorder (H)      Depressive disorder      Cristina Boyd reports taking the following medications:  Current Outpatient Medications   Medication     lithium ER (LITHOBID) 300 MG CR tablet     omeprazole (PRILOSEC) 20 MG DR capsule     ondansetron (ZOFRAN ODT) 4 MG ODT tab     QUEtiapine (SEROQUEL) 50 MG tablet     No current facility-administered medications for this encounter.     Current Mental Status Exam:   Appearance:  Not assessed telephone appointment  Eye Contact:  Not assessed telephone appointment  Psychomotor:  Not assessed telephone appointment  Attitude / Demeanor: Cooperative  Friendly Pleasant  Speech Rate:  Normal/ Responsive  Volume:  Normal  volume  Language:  intact  Mood:   Normal  Affect:   Appropriate    Thought Content: Clear   Thought Process: Goal Directed  Logical     Associations:  No loosening of associations  Insight:   Fair  Judgment:  Intact   Orientation:  All  Attention:  Good    Rating Scales:  PHQ9:    PHQ-9 SCORE 10/14/2022 10/14/2022 10/21/2022   PHQ-9 Total Score MyChart - 27 (Severe depression) -   PHQ-9 Total Score 27 27 9   ;    GAD7:    REGINO-7 SCORE 7/23/2018 8/10/2018 10/21/2022   Total Score 7 0 7     Safety Assessment:   Current Safety Concerns:[place] Suicide Severity Rating Scale (Lifetime/Recent)  [place] Suicide Severity Rating (Lifetime/Recent) 7/7/2018 7/8/2018 7/8/2018 7/9/2018 7/9/2018 7/10/2018 10/21/2022   Wish to be Dead (Lifetime) - - - - - - -   Non-Specific Active Suicidal Thoughts (Lifetime) - - - - - - -   Most Severe Ideation Rating (Lifetime) - - - - - - -   Most Severe Ideation Description (Lifetime) - - - - - - -   Frequency (Lifetime) - - - - - - -   Duration (Lifetime) - - - - - - -   Controllability (Lifetime) - - - - - - -   Protective Factors  (Lifetime) - - - - - - -   Reasons for Ideation (Lifetime) - - - - - - -   Do you have guns available to you? -  - - - - No -   RETIRED: 1. Wish to be Dead (Recent) No No No No No No -   RETIRED: Wish to be Dead Description (Recent) - - - - - - -   RETIRED: 2. Non-Specific Active Suicidal Thoughts (Recent) No No No No No No -   Non-Specific Active Suicidal Thought Description (Recent) - - - - - - -   3. Active Suicidal Ideation with any Methods (Not Plan) Without Intent to Act (Lifetime) - - - - - - -   RETIRED: 3. Active Suicidal Ideation with any Methods (Not Plan) Without Intent to Act (Recent) - - - - - No -   RETIRED: Active Suicidal Ideation with any Methods (Not Plan) Description (Recent) - - - - - - -   RETIRE: 4. Active Suicidal Ideation with Some Intent to Act, Without Specific Plan (Lifetime) - - - - - - -   4. Active Suicidal Ideation with Some Intent to Act, Without Specific Plan (Recent) - - - - - No -   Active Suicidal Ideation with Some Intent to Act, Without Specific Plan Description (Recent) - - - - - - -   RETIRE: 5. Active Suicidal Ideation with Specific Plan and Intent (Lifetime) - - - - - - -   RETIRED: 5. Active Suicidal Ideation with Specific Plan and Intent (Recent) - - - - - No -   RETIRED: Active Suicidal Ideation with Specific Plan and Intent Description (Recent) - - - - - - -   Most Severe Ideation Rating (Past Month) - - - - - - -   Most Severe Ideation Description (Past Month) - - - - - - -   Frequency (Past Month) - - - - - - -   Duration (Past Month) - - - - - - -   Controllability (Past Month) - - - - - - -   Protective Factors (Past Month) - - - - - - -   Reasons for Ideation (Past Month) - - - - - - -   Actual Attempt (Lifetime) - - - - - - -   Actual Attempt (Past 3 Months) - - - - - - -   Has subject engaged in non-suicidal self-injurious behavior? (Lifetime) - - - - - - -   1. Wish to be Dead (Lifetime) - - - - - - 1   Wish to be Dead Description (Lifetime) - - - - - - Passive, denies intent or plan   1. Wish to be Dead (Past 1 Month) - - - - - - 0   2. Non-Specific Active Suicidal  "Thoughts (Lifetime) - - - - - - 0   Actual Attempt (Lifetime) - - - - - - 0   Has subject engaged in non-suicidal self-injurious behavior? (Lifetime) - - - - - - 0   Interrupted Attempts (Lifetime) - - - - - - 0   Aborted or Self-Interrupted Attempt (Lifetime) - - - - - - 0   Preparatory Acts or Behavior (Lifetime) - - - - - - 0   Calculated C-SSRS Risk Score (Lifetime/Recent) - - - - - - No Risk Indicated     [place] Suicide Severity Rating Scale (Short Version)  [place] Suicide Severity Rating (Short Version) 9/28/2022 10/11/2022 10/21/2022   Over the past 2 weeks have you felt down, depressed, or hopeless? no yes -   Over the past 2 weeks have you had thoughts of killing yourself? no refused -   Comments - \"I dont know\" -   Have you ever attempted to kill yourself? no no -   Interventions - Monitored via video;DEC consulted -   1. Wish to be Dead (Since Last Contact) - - 0   2. Non-Specific Active Suicidal Thoughts (Since Last Contact) - - 0   Actual Attempt (Since Last Contact) - - 0   Has subject engaged in non-suicidal self-injurious behavior? (Since Last Contact) - - 0   Interrupted Attempts (Since Last Contact) - - 0   Aborted or Self-Interrupted Attempt (Since Last Contact) - - 0   Preparatory Acts or Behavior (Since Last Contact) - - 0   Suicide (Since Last Contact) - - 0   Calculated C-SSRS Risk Score (Since Last Contact) - - No Risk Indicated     Per chart review:  Patient denies current homicidal ideation and behaviors.  Patient denies current self-injurious ideation and behaviors.    Patient denied risk behaviors associated with substance use.  Patient denies any high risk behaviors associated with mental health symptoms.  Patient reports the following current concerns for their personal safety: None.     Functional Status:  PROMIS 10-Global Health (all questions and answers displayed):   PROMIS 10 12/22/2021 8/8/2022 10/21/2022   In general, would you say your health is: Good Good Good   In general, " would you say your quality of life is: Good Good Good   In general, how would you rate your physical health? Good Good Fair   In general, how would you rate your mental health, including your mood and your ability to think? Fair Fair Good   In general, how would you rate your satisfaction with your social activities and relationships? Fair Poor Good   In general, please rate how well you carry out your usual social activities and roles Good Fair Good   To what extent are you able to carry out your everyday physical activities such as walking, climbing stairs, carrying groceries, or moving a chair? Completely Mostly Moderately   How often have you been bothered by emotional problems such as feeling anxious, depressed or irritable? Rarely Sometimes Sometimes   How would you rate your fatigue on average? Moderate Severe Moderate   How would you rate your pain on average?   0 = No Pain  to  10 = Worst Imaginable Pain 0 2 4   In general, would you say your health is: 3 3 3   In general, would you say your quality of life is: 3 3 3   In general, how would you rate your physical health? 3 3 2   In general, how would you rate your mental health, including your mood and your ability to think? 2 2 3   In general, how would you rate your satisfaction with your social activities and relationships? 2 1 3   In general, please rate how well you carry out your usual social activities and roles. (This includes activities at home, at work and in your community, and responsibilities as a parent, child, spouse, employee, friend, etc.) 3 2 3   To what extent are you able to carry out your everyday physical activities such as walking, climbing stairs, carrying groceries, or moving a chair? 5 4 3   In the past 7 days, how often have you been bothered by emotional problems such as feeling anxious, depressed, or irritable? 2 3 3   In the past 7 days, how would you rate your fatigue on average? 3 4 3   In the past 7 days, how would you rate  your pain on average, where 0 means no pain, and 10 means worst imaginable pain? 0 2 4   Global Mental Health Score 11 9 12   Global Physical Health Score 16 13 11   PROMIS TOTAL - SUBSCORES 27 22 23   Some recent data might be hidden     Clinical Impressions:  Bipolar I Manic Episode  A. A distinct period of abnormally and persistently elevated, expansive, or irritable mood, lasting at least 1 week (or any duration if hospitalization is necessary).   B. During the period of mood disturbance, three (or more) of the following symptoms (four if the mood is only irritable) have persisted and have been present to a significant degree:   - inflated self-esteem or grandiosity    - decreased need for sleep (e.g., feels rested after only 3 hours of sleep)    - more talkative than usual or pressure to keep talking    - flight of ideas or subjectivie experience that thoughts are racing   - distractibility   - increase in goal-directed activity   - excessive involvement in pleasurable activities that have a high potential for painful consequences, such as spending money or sexual indiscretion.  C. The mood disturbance is sufficiently severe to cause marked impairment in social or occupational functioning or to necessitate hospitalization to prevent harm to self or others, or there are severe psychotic features  D. The symptoms are not attributable to the physiologicial effects of a substance or to another medical condition  E. The episode is sufficiently severe enough to cause marked impairment in social or occupational functioning or to necessitate hospitalization to prevent harm to self or others, or there are psychotic features  F. The symptoms are not due to the direct physiological effects of a substance (eg, a drug of abuse, a medication, or other treatment) or a general medical condition (eg, hyperthyroidism).    Therapeutic Interventions Provided: {Assessment and intervention included meeting with patient, and review  of EPIC notes. Psychotherapy techniques utilized include risk and safety assessment, establishing rapport, active and empathic listening, and validation of feelings and experiences.     Recommendations/Plan: The patient's acute suicide risk was determined to be low due to the following factors: Denial of suicidal thoughts, no history of suicide attempts. Patient is not currently under the influence of alcohol or illicit substances, denies experiencing command hallucinations, and no immediate access to firearms. The patient's acute risk could be higher if noncompliant with their treatment plan, medications, follow-up appointments or using illicit substances or alcohol. Protective factors include: dedication to family or friends;living with other people;commitment to well being;healthy fear of risky behaviors or pain.    Referrals:   None. Patient declined need for full diagnostic assessment, does not feel like she need programmatic care at this time. Writer offered to complete full DA for future, however, patient declined. Writer sent aftercare plan from emergency room to patient via [employer] with community resources.  Encouraged patient to schedule again if symptoms worsen.    Joann Martinez MA, LPCC, LADC  October 21, 2022  Licensed Psychotherapist  [facility]

## 2022-10-23 NOTE — PROGRESS NOTES
Phone- Visit Details  Type of service:  Phone visit for medication management  Time of service:    Video Start Time:  2:30        Video End Time:  2:50  Reason for video visit:  Patient has requested telehealth visit  Originating Site (patient location):  [facility]- [place]   Location- Place of employment  Distant Site (provider location):  [facility]  Mode of Communication:  Video Conference via Other: telephone       [facility]  Psychiatry Clinic  MEDICAL PROGRESS NOTE     CARE TEAM:  PCP- Physician No Ref-Primary    Psychotherapist- None       Cristina is a 22 year old who uses the name Cristina and pronouns she, her, hers.      DIAGNOSIS     Bipolar I disorder, current episode depressed, without psychotic features, with seasonal component (tends to become more depressed in winter)  R/o ADHD     ASSESSMENT     Klaus is a 23yo who is seen today for follow-up. Overall, she is doing well. She was last seen in clinic ~1 week ago. At that time she describes worsening mood symptoms (including sleep dysruption, appetite changes, self-isolating, and neglecting self-care) in the context of non-adherence to lithium. Today she reports complete resolution of depressive symptoms x1 week since resuming therapy with lithium. Sleep is improved and appetite has returned. She has also returned to work. No current symptoms of shaina. No acute safety concerns.     Future considerations:  -consider lightbox starting in the fall for mood    [place] review was not needed today.     PLAN                                                                                                                1) Meds-  - continue Lithium  mg at bedtime   - continue Seroquel 25-50mg PRN for insomnia, hypomania sx     -continue vitamin D3 2000IU daily (OTC)  -melatonin 2.5mg at bedtime PRN (OTC)    2) Psychotherapy- Offered. Patient not interested at this time.   number given for [facility] at previous visit. Klaus reports  "enjoying working with a previous therapist, Keyana Escobar, through [facility] (around 2018)    3) Next due-  Labs- lithium level ordered today               *note: does have history of subclinical hypothyroidism*   EKG- PRN (2/2016: qtc 404ms)  Rating scales- PHQ9    4) Referrals-  none    5) Dispo- RTC 4 weeks    PERTINENT ITEM HISTORY                                                                                          [most recent eval 10/14/22]     The following section contains information copied from previous note by Dr. Hart on 7/30/21 and has been reviewed, edited, and verified by the patient.      This patient first experienced mental health issues in February 2016 and has received treatment for Bipolar I most recent episode manic with history of psychosis.  Notably, the patient has history of multiple hospitalizations with shaina and psychosis, including most recently July 2018. She has a history of difficult adherence to medications followed by decompensation and hospitalization.  After her most recent hospitalization in July 2018 she did complete a Day Program.    Pertinent Items Include: shaina, inconsistently taking medications    SUBJECTIVE     Since the time of the last visit:   - feeling a lot better. Feels she was \"100% better\" on Monday (one week ago)  - wondering if lithium is helping.   - went back to work 1 week ago.  - is eating again. Appetite has improved.   - Seroquel was helpful for sleep. Has since stopped using it because is too sedating. No issues with sleep now.    - no symptoms at all for 1 week (referring to depressed mood, low energy, insomnia, anhedonia, decreased appetitie).   - no anxiety   - no medication side effects     Recent Psych Symptoms:   Depression:  none reported  Anxiety:  none reported  Trauma Related:  none  Elevated:  none  Psychosis:  none    Recent Substance Use:  None reported     Pertinent Negatives: No suicidal or violent ideation, self-injury, psychosis, shaina and " harmful substance use  Adverse Effects: none reported     MEDICAL HISTORY and ALLERGY     ALLERGIES: Patient has no known allergies.    Patient Active Problem List   Diagnosis     Adela (H)     Bipolar affective disorder, current episode manic with psychotic symptoms (H)     Hx of psychiatric care     Bipolar I disorder, current or most recent episode manic, with psychotic features (H)     Lymphadenopathy     Bipolar 1 disorder (H)        MEDICAL REVIEW OF SYSTEMS   Contraception- deferred  Pregnant- deferred  A comprehensive review of systems was performed and is negative other than noted in the HPI.     MEDICATIONS     Current Outpatient Medications   Medication Sig Dispense Refill     lithium ER (LITHOBID) 300 MG CR tablet Take 2 tablets (600 mg) by mouth At Bedtime 60 tablet 0     omeprazole (PRILOSEC) 20 MG DR capsule Take 1 capsule (20 mg) by mouth daily 30 capsule 0     ondansetron (ZOFRAN ODT) 4 MG ODT tab Take 1 tablet (4 mg) by mouth every 8 hours as needed for nausea or vomiting 15 tablet 0     QUEtiapine (SEROQUEL) 50 MG tablet Take 25-50 mg at bedtime as needed for sleep 30 tablet 1      VITALS   LMP 09/14/2022 (Approximate)     MENTAL STATUS EXAM     Alertness: alert  and oriented  Appearance: N/A (phone visit)  Behavior/Demeanor: cooperative, pleasant and calm, with N/A (phone visit) eye contact   Speech: normal and regular rate and rhythm  Language: intact  Psychomotor: N/A (phone visit)  Mood: description consistent with euthymia  Affect: full range and appropriate; congruent to: mood- yes, content- yes  Thought Process/Associations: unremarkable  Thought Content:  Reports none;  Denies suicidal & violent ideation and delusions  Perception:  Reports none;  Denies hallucinations  Insight: fair  Judgment: fair  Cognition: does  appear grossly intact; formal cognitive testing was not done  Gait and Station: N/A ([facility])     LABS and DATA     PHQ 10/14/2022 10/14/2022 10/21/2022   PHQ-9 Total Score  27 27 9   Q9: Thoughts of better off dead/self-harm past 2 weeks Nearly every day Nearly every day Several days   F/U: Thoughts of suicide or self-harm Yes Yes -   F/U: Self harm-plan Yes Yes -   F/U: Self-harm action No No -   F/U: Safety concerns No No -     Recent Labs   Lab Test 10/11/22  1153 09/28/22  1931   GLC 84 111*     Recent Labs   Lab Test 01/13/17  0936 02/06/16  0805   CHOL 83 82   TRIG 34 34   LDL 36 31   HDL 40* 44*     Recent Labs   Lab Test 10/11/22  1153 09/28/22  1931   AST 15 24   ALT 16 12   ALKPHOS 84 74     Recent Labs   Lab Test 10/11/22  1153 09/28/22  1931 07/09/21  1159 07/29/20  1200   WBC 9.6 8.1 9.2 10.4   ANEU  --   --  5.5 5.3   HGB 12.7 11.7 11.7 12.1   * 261 316 331      Recent Labs   Lab Test 01/07/22  1202 07/09/21  1159 07/29/20  1200   LITHIUM 0.9 0.88 0.72     Recent Labs   Lab Test 10/11/22  1153 09/28/22  1931   CR 0.48* 0.45*   GFRESTIMATED >90 >90    134*   POTASSIUM 3.8 3.7   BHANU 10.2* 9.5     Recent Labs   Lab Test 10/11/22  1146 01/07/22  1245   SG 1.020 1.009     Recent Labs   Lab Test 01/07/22  1202 07/09/21  1159   TSH 3.71 3.55     ECG 2/8/2016 QTc = 404ms     PSYCHOTROPIC DRUG INTERACTIONS                                       PSYCHCLINICDDI   none    MANAGEMENT:  Monitoring for adverse effects     RISK STATEMENT for SAFETY     Cristina did not appear to be an imminent safety risk to self or others.    TREATMENT RISK STATEMENT: The risks, benefits, alternatives and potential adverse effects have been discussed and are understood by the pt. The pt understands the risks of using street drugs or alcohol. There are no medical contraindications, the pt agrees to treatment with the ability to do so. The pt knows to call the clinic for any problems or to access emergency care if needed.  Medical and substance use concerns are documented above.  Psychotropic drug interaction check was done, including changes made today.     PROVIDER: Hannah Yanes,  MD    Patient not staffed in clinic.  Note will be reviewed and signed by supervisor Dr. Agosto.

## 2022-10-24 ENCOUNTER — VIRTUAL VISIT (OUTPATIENT)
Dept: PSYCHIATRY | Facility: CLINIC | Age: 22
End: 2022-10-24
Attending: PSYCHIATRY & NEUROLOGY
Payer: COMMERCIAL

## 2022-10-24 DIAGNOSIS — F31.9 BIPOLAR 1 DISORDER (H): Primary | ICD-10-CM

## 2022-10-24 PROCEDURE — 99214 OFFICE O/P EST MOD 30 MIN: CPT | Mod: HN | Performed by: STUDENT IN AN ORGANIZED HEALTH CARE EDUCATION/TRAINING PROGRAM

## 2022-10-24 RX ORDER — LITHIUM CARBONATE 300 MG/1
600 TABLET, FILM COATED, EXTENDED RELEASE ORAL AT BEDTIME
Qty: 60 TABLET | Refills: 0 | Status: ON HOLD | OUTPATIENT
Start: 2022-10-24 | End: 2023-03-10

## 2022-10-24 NOTE — PATIENT INSTRUCTIONS
**For crisis resources, please see the information at the end of this document**   Patient Education    Thank you for coming to the [facility].     Lab Testing:  If you had lab testing today and your results are reassuring or normal they will be mailed to you or sent through [employer] within 7 days. If the lab tests need quick action we will call you with the results. The phone number we will call with results is # [phone]. If this is not the best number please call our clinic and change the number.     Medication Refills:  If you need any refills please call your pharmacy and they will contact us. Our fax number for refills is [phone].   Three business days of notice are needed for general medication refill requests.   Five business days of notice are needed for controlled substance refill requests.   If you need to change to a different pharmacy, please contact the new pharmacy directly. The new pharmacy will help you get your medications transferred.     Contact Us:  Please call [phone] during business hours (8-5:00 M-F).   If you have medication related questions after clinic hours, or on the weekend, please call [phone].     Financial Assistance [phone]   Medical Records [phone]       MENTAL HEALTH CRISIS RESOURCES:  For a emergency help, please call 911 or go to the nearest Emergency Department.     Emergency Walk-In Options:   EmPATH Unit @ [facility] ([place]): [phone] - Specialized mental health emergency area designed to be calming  [facility] ([place]): [phone]  [facility] ([place]): [phone]  [facility]): [phone]    [facility] Crisis Information:   [place]: [phone]  [place]: [phone]  [place] ([place]) - Adult: [phone]     Child: [phone]  [place] - Adult: [phone]     Child: [phone]  [place]:  [phone]  List of all [facility] resources:   https://[place].gov/dhs/people-we-serve/adults/health-care/mental-health/resources/crisis-contacts.jsp    National Crisis Information:   Crisis Text Line: Text  [place]  to 580932  Suicide & Crisis Lifeline: 988  National Suicide Prevention Lifeline: 4-936-319-TALK (1-276.713.3453)       For online chat options, visit https://suicidepreventionlifeline.org/chat/  Poison Control Center: 2-976-068-3905  Trans Lifeline: 5-779-503-0989 - Hotline for transgender people of all ages  [employer]: 8-228-265-9798 - Hotline for LGBT youth     For Non-Emergency Support:   Fast Tracker: Mental Health & Substance Use Disorder Resources -   https://www.[employer].org/

## 2022-10-24 NOTE — PROGRESS NOTES
Cristina Boyd is a 22 year old who is being evaluated via a billable video visit.      Pt will join video visit via: [employer]  If there are problems joining the visit, send backup video invite via: Text to preferred phone: [phone]    Reason for telehealth visit: Patient has requested telehealth visit    Originating location (patient location): Patient's place of employment    Will anyone else be joining the visit? No

## 2022-10-25 ENCOUNTER — PATIENT OUTREACH (OUTPATIENT)
Dept: CARE COORDINATION | Facility: CLINIC | Age: 22
End: 2022-10-25

## 2022-10-25 NOTE — PROGRESS NOTES
Clinic Care Coordination Contact  [facility]/Voicemail       Clinical Data: Care Coordinator Outreach  Outreach attempted x 1.  Left message on patient's voicemail with call back information and requested return call.  Plan: Care Coordinator will try to reach patient again in 3-5 business days.    Charis Copeland RN  Clinical Product Navigator   Ambulatory Care Coordination  [phone]

## 2022-11-02 NOTE — PROGRESS NOTES
Clinic Care Coordination Contact  [facility]/Voicemail       Clinical Data: Care Coordinator Outreach  Outreach attempted x 2.  Left message on patient's voicemail with call back information and requested return call.  Plan: Care Coordinator will send care coordination introduction letter with care coordinator contact information and explanation of care coordination services via [employer]. Care Coordinator will do no further outreaches at this time.    Charis Copeland RN  Clinical Product Navigator   Ambulatory Care Coordination  [phone]

## 2022-11-10 ENCOUNTER — OFFICE VISIT (OUTPATIENT)
Dept: FAMILY MEDICINE | Facility: CLINIC | Age: 22
End: 2022-11-10
Payer: COMMERCIAL

## 2022-11-10 VITALS
BODY MASS INDEX: 23.11 KG/M2 | SYSTOLIC BLOOD PRESSURE: 105 MMHG | DIASTOLIC BLOOD PRESSURE: 73 MMHG | TEMPERATURE: 98.7 F | HEART RATE: 97 BPM | HEIGHT: 66 IN | OXYGEN SATURATION: 100 % | WEIGHT: 143.8 LBS | RESPIRATION RATE: 20 BRPM

## 2022-11-10 DIAGNOSIS — Z11.59 NEED FOR HEPATITIS C SCREENING TEST: ICD-10-CM

## 2022-11-10 DIAGNOSIS — R11.0 NAUSEA: ICD-10-CM

## 2022-11-10 DIAGNOSIS — F31.9 BIPOLAR 1 DISORDER (H): ICD-10-CM

## 2022-11-10 DIAGNOSIS — Z00.00 ENCOUNTER FOR PREVENTIVE HEALTH EXAMINATION: Primary | ICD-10-CM

## 2022-11-10 DIAGNOSIS — R12 HEARTBURN: ICD-10-CM

## 2022-11-10 DIAGNOSIS — R59.1 LYMPHADENOPATHY: ICD-10-CM

## 2022-11-10 DIAGNOSIS — M62.81 GENERALIZED MUSCLE WEAKNESS: ICD-10-CM

## 2022-11-10 LAB
ALBUMIN UR-MCNC: ABNORMAL MG/DL
APPEARANCE UR: ABNORMAL
BACTERIA #/AREA URNS HPF: ABNORMAL /HPF
BASOPHILS # BLD AUTO: 0 10E3/UL (ref 0–0.2)
BASOPHILS NFR BLD AUTO: 0 %
BILIRUB UR QL STRIP: NEGATIVE
COLOR UR AUTO: ABNORMAL
EOSINOPHIL # BLD AUTO: 0.5 10E3/UL (ref 0–0.7)
EOSINOPHIL NFR BLD AUTO: 8 %
ERYTHROCYTE [DISTWIDTH] IN BLOOD BY AUTOMATED COUNT: 14.9 % (ref 10–15)
GLUCOSE UR STRIP-MCNC: NEGATIVE MG/DL
HBA1C MFR BLD: 5.8 % (ref 0–5.6)
HCT VFR BLD AUTO: 36.7 % (ref 35–47)
HGB BLD-MCNC: 11.9 G/DL (ref 11.7–15.7)
HGB UR QL STRIP: NEGATIVE
KETONES UR STRIP-MCNC: ABNORMAL MG/DL
LEUKOCYTE ESTERASE UR QL STRIP: ABNORMAL
LYMPHOCYTES # BLD AUTO: 2.7 10E3/UL (ref 0.8–5.3)
LYMPHOCYTES NFR BLD AUTO: 43 %
MCH RBC QN AUTO: 27.9 PG (ref 26.5–33)
MCHC RBC AUTO-ENTMCNC: 32.4 G/DL (ref 31.5–36.5)
MCV RBC AUTO: 86 FL (ref 78–100)
MONOCYTES # BLD AUTO: 0.7 10E3/UL (ref 0–1.3)
MONOCYTES NFR BLD AUTO: 12 %
MUCOUS THREADS #/AREA URNS LPF: PRESENT /LPF
NEUTROPHILS # BLD AUTO: 2.4 10E3/UL (ref 1.6–8.3)
NEUTROPHILS NFR BLD AUTO: 38 %
NITRATE UR QL: NEGATIVE
PH UR STRIP: 6 [PH] (ref 5–7)
PLATELET # BLD AUTO: 277 10E3/UL (ref 150–450)
RBC # BLD AUTO: 4.26 10E6/UL (ref 3.8–5.2)
RBC #/AREA URNS AUTO: ABNORMAL /HPF
SP GR UR STRIP: 1.02 (ref 1–1.03)
SQUAMOUS #/AREA URNS AUTO: ABNORMAL /LPF
UROBILINOGEN UR STRIP-ACNC: 1 E.U./DL
WBC # BLD AUTO: 6.3 10E3/UL (ref 4–11)
WBC #/AREA URNS AUTO: ABNORMAL /HPF

## 2022-11-10 PROCEDURE — 86140 C-REACTIVE PROTEIN: CPT

## 2022-11-10 PROCEDURE — 36415 COLL VENOUS BLD VENIPUNCTURE: CPT

## 2022-11-10 PROCEDURE — 86038 ANTINUCLEAR ANTIBODIES: CPT

## 2022-11-10 PROCEDURE — 99395 PREV VISIT EST AGE 18-39: CPT | Mod: 25

## 2022-11-10 PROCEDURE — 90471 IMMUNIZATION ADMIN: CPT

## 2022-11-10 PROCEDURE — 99214 OFFICE O/P EST MOD 30 MIN: CPT | Mod: 25

## 2022-11-10 PROCEDURE — 86803 HEPATITIS C AB TEST: CPT

## 2022-11-10 PROCEDURE — 83036 HEMOGLOBIN GLYCOSYLATED A1C: CPT

## 2022-11-10 PROCEDURE — 90715 TDAP VACCINE 7 YRS/> IM: CPT

## 2022-11-10 PROCEDURE — 86364 TISS TRNSGLTMNASE EA IG CLAS: CPT

## 2022-11-10 PROCEDURE — 86431 RHEUMATOID FACTOR QUANT: CPT

## 2022-11-10 PROCEDURE — 85025 COMPLETE CBC W/AUTO DIFF WBC: CPT

## 2022-11-10 PROCEDURE — 84439 ASSAY OF FREE THYROXINE: CPT

## 2022-11-10 PROCEDURE — 90472 IMMUNIZATION ADMIN EACH ADD: CPT

## 2022-11-10 PROCEDURE — 82306 VITAMIN D 25 HYDROXY: CPT

## 2022-11-10 PROCEDURE — 90651 9VHPV VACCINE 2/3 DOSE IM: CPT

## 2022-11-10 PROCEDURE — 81001 URINALYSIS AUTO W/SCOPE: CPT

## 2022-11-10 PROCEDURE — 83550 IRON BINDING TEST: CPT

## 2022-11-10 PROCEDURE — 84443 ASSAY THYROID STIM HORMONE: CPT

## 2022-11-10 PROCEDURE — 80178 ASSAY OF LITHIUM: CPT

## 2022-11-10 PROCEDURE — 82607 VITAMIN B-12: CPT

## 2022-11-10 PROCEDURE — 90686 IIV4 VACC NO PRSV 0.5 ML IM: CPT

## 2022-11-10 PROCEDURE — 83540 ASSAY OF IRON: CPT

## 2022-11-10 ASSESSMENT — ENCOUNTER SYMPTOMS
EYE PAIN: 0
HEMATOCHEZIA: 0
SHORTNESS OF BREATH: 0
COUGH: 1
WEAKNESS: 0
CONSTIPATION: 0
SORE THROAT: 1
NAUSEA: 1
FREQUENCY: 1
BREAST MASS: 0
ABDOMINAL PAIN: 0
HEADACHES: 0
MYALGIAS: 1
HEARTBURN: 0
DIARRHEA: 0
CHILLS: 0
FEVER: 0
DYSURIA: 0
DIZZINESS: 0
PARESTHESIAS: 0
PALPITATIONS: 0
JOINT SWELLING: 0
NERVOUS/ANXIOUS: 0
ARTHRALGIAS: 0
HEMATURIA: 0

## 2022-11-10 NOTE — PROGRESS NOTES
SUBJECTIVE:   CC: Cristina is an 22 year old who presents for preventive health visit.       Patient has been advised of split billing requirements and indicates understanding: Yes  Healthy Habits:     Getting at least 3 servings of Calcium per day:  NO    Bi-annual eye exam:  NO    Dental care twice a year:  NO    Sleep apnea or symptoms of sleep apnea:  None    Diet:  Regular (no restrictions)    Frequency of exercise:  2-3 days/week    Duration of exercise:  Less than 15 minutes    Taking medications regularly:  Yes    Medication side effects:  None    PHQ-2 Total Score: 2    Additional concerns today:  Yes  , very active during the day.   Establish care.     Chronic conditions addressed:  Bipolar 1 disorder - [facility] e-vists. Stable on Lithium. Does have urinary frequency from lithium and thirsty often.     Heartburn - on and off, takes OTC antacid with relief. Endorses poor diet with a lot of fatty and spicy foods.     History of abnormal lab results, history of lymphadenopathy of neck/thyroid enlargement, had full workup with biopsy in 2018 that were unremarkable.     Chronic painful/heavy periods. First 2 days are heavy with very painful cramps then improves. Usually has regular cycle.     Preventative screenings discussed:  Diabetes risks: + family history (grandma).  Thyroid disease: + family history (grandma),   Cervical cancer: Declines pap.   Breast cancer/Ovarian cancer: none   Other: none    Risk factors:   Non-smoker, no alcohol or drug use.   No dietary restrictions.    Wt Readings from Last 5 Encounters:   11/10/22 65.2 kg (143 lb 12.8 oz)   10/11/22 63.5 kg (140 lb)   09/28/22 66.2 kg (146 lb)   03/09/20 68.9 kg (151 lb 12.8 oz)   01/10/20 71.3 kg (157 lb 3.2 oz)       Health concerns/changes addressed:  Fatigue - reports muscle weakness in lower extremities, especially in winter.   + dry, brittle hair and hair loss.   + nausea and heartburn.  + prior  elevated TSH with normal T4 in 2020.  No blood in stool. No fevers. No urinary symptoms.    Prior workup: ER visit 9/28 and again 10/11 for depresion related to ongoing abdominal pain. Vomiting has resolved since those visits.   H.Pylori (negative).    Today's PHQ-2 Score:   PHQ-2 ( 1999 Pf[employer]) 11/10/2022   Q1: Little interest or pleasure in doing things 1   Q2: Feeling down, depressed or hopeless 1   PHQ-2 Score 2   PHQ-2 Total Score (12-17 Years)- Positive if 3 or more points; Administer PHQ-A if positive -   Q1: Little interest or pleasure in doing things Several days   Q2: Feeling down, depressed or hopeless Several days   PHQ-2 Score 2       Abuse: Current or Past (Physical, Sexual or Emotional) - No  Do you feel safe in your environment? Yes    Have you ever done Advance Care Planning? (For example, a Health Directive, POLST, or a discussion with a medical provider or your loved ones about your wishes): Yes, patient states has an Advance Care Planning document and will bring a copy to the clinic.    Social History     Tobacco Use     Smoking status: Never     Smokeless tobacco: Never   Substance Use Topics     Alcohol use: No     If you drink alcohol do you typically have >3 drinks per day or >7 drinks per week? Not applicable    Alcohol Use 11/10/2022   Prescreen: >3 drinks/day or >7 drinks/week? Not Applicable   Prescreen: >3 drinks/day or >7 drinks/week? -       Reviewed orders with patient.  Reviewed health maintenance and updated orders accordingly - Yes  Lab work is in process  Labs reviewed in EPIC  BP Readings from Last 3 Encounters:   11/10/22 105/73   10/11/22 107/74   09/28/22 100/61    Wt Readings from Last 3 Encounters:   11/10/22 65.2 kg (143 lb 12.8 oz)   10/11/22 63.5 kg (140 lb)   09/28/22 66.2 kg (146 lb)                  Patient Active Problem List   Diagnosis     Adela (H)     Bipolar affective disorder, current episode manic with psychotic symptoms (H)     Hx of psychiatric care      Bipolar I disorder, current or most recent episode manic, with psychotic features (H)     Lymphadenopathy     Bipolar 1 disorder (H)     Past Surgical History:   Procedure Laterality Date     BRONCHOSCOPY (RIGID OR FLEXIBLE), DIAGNOSTIC N/A 6/16/2018    Procedure: COMBINED BRONCHOSCOPY (RIGID OR FLEXIBLE), LAVAGE;;  Surgeon: Manjeet Holland MD;  Location:  GI       Social History     Tobacco Use     Smoking status: Never     Smokeless tobacco: Never   Substance Use Topics     Alcohol use: No     Family History   Problem Relation Age of Onset     Depression Other      Depression Maternal Uncle          Current Outpatient Medications   Medication Sig Dispense Refill     lithium ER (LITHOBID) 300 MG CR tablet Take 2 tablets (600 mg) by mouth At Bedtime 60 tablet 0     omeprazole (PRILOSEC) 20 MG DR capsule Take 1 capsule (20 mg) by mouth daily (Patient not taking: Reported on 10/24/2022) 30 capsule 0     ondansetron (ZOFRAN ODT) 4 MG ODT tab Take 1 tablet (4 mg) by mouth every 8 hours as needed for nausea or vomiting (Patient not taking: Reported on 10/24/2022) 15 tablet 0     QUEtiapine (SEROQUEL) 50 MG tablet Take 25-50 mg at bedtime as needed for sleep (Patient not taking: Reported on 10/24/2022) 30 tablet 1     No Known Allergies  Recent Labs   Lab Test 10/11/22  1153 09/28/22  1931 01/07/22  1202 01/07/22  1202 07/09/21  1159 07/29/20  1200 01/20/17  0825 01/13/17  0936 03/23/16  0940 02/06/16  0805   LDL  --   --   --   --   --   --   --  36  --  31   HDL  --   --   --   --   --   --   --  40*  --  44*   TRIG  --   --   --   --   --   --   --  34  --  34   ALT 16 12  --   --  20  --    < > 17   < > 20   CR 0.48* 0.45*   < > 0.57 0.58 0.61   < > 0.53   < > 0.68   GFRESTIMATED >90 >90   < > >90 >90 >90   < > >90  Non  GFR Calc     < > >90  Non  GFR Calc     GFRESTBLACK  --   --   --   --  >90 >90   < > >90  African American GFR Calc     < > >90   GFR Calc    "  POTASSIUM 3.8 3.7  --  4.2 3.9 4.1   < > 3.6   < > 3.5   TSH  --   --   --  3.71 3.55 9.60*   < > 4.96*  --  2.32    < > = values in this interval not displayed.        Breast Cancer Screening:        History of abnormal Pap smear: NO - age 21-29 PAP every 3 years recommended  Patient refused.      Reviewed and updated as needed this visit by clinical staff   Tobacco  Allergies  Meds            Reviewed and updated as needed this visit by Provider                     Review of Systems   Constitutional: Negative for chills and fever.   HENT: Positive for sore throat. Negative for congestion, ear pain and hearing loss.    Eyes: Negative for pain and visual disturbance.   Respiratory: Positive for cough. Negative for shortness of breath.    Cardiovascular: Positive for peripheral edema. Negative for chest pain and palpitations.   Gastrointestinal: Positive for nausea. Negative for abdominal pain, constipation, diarrhea, heartburn and hematochezia.   Breasts:  Negative for tenderness, breast mass and discharge.   Genitourinary: Positive for frequency. Negative for dysuria, genital sores, hematuria, pelvic pain, urgency, vaginal bleeding and vaginal discharge.   Musculoskeletal: Positive for myalgias. Negative for arthralgias and joint swelling.   Skin: Negative for rash.   Neurological: Negative for dizziness, weakness, headaches and paresthesias.   Psychiatric/Behavioral: Negative for mood changes. The patient is not nervous/anxious.      Frequency from lithium.  Myalgia - whole body weakness     OBJECTIVE:   /73 (BP Location: Right arm, Patient Position: Chair, Cuff Size: Adult Regular)   Pulse 97   Temp 98.7  F (37.1  C) (Oral)   Resp 20   Ht 1.668 m (5' 5.67\")   Wt 65.2 kg (143 lb 12.8 oz)   LMP 11/06/2022   SpO2 100%   BMI 23.44 kg/m    Physical Exam  GENERAL: healthy, alert and no distress  EYES: Eyes grossly normal to inspection, PERRL and conjunctivae and sclerae normal  NECK: no adenopathy, " no asymmetry, masses, or scars and thyroid normal to palpation  RESP: lungs clear to auscultation - no rales, rhonchi or wheezes  CV: regular rate and rhythm, normal S1 S2, no S3 or S4, no murmur, click or rub, no peripheral edema and peripheral pulses strongl  MS: no gross musculoskeletal defects noted, no edema  SKIN: no suspicious lesions or rashes  NEURO: Normal orientation, strength and tone, mentation intact.  PSYCH: mentation appears hyperactive, speech is rapid and changing topic of conversation rapidly.  LYMPH: POSITIVE for edema/enlarged tissue surrounding thyroid (baseline per pt). No other cervical, supraclavicular, axillary, or inguinal adenopathy    Diagnostic Test Results:  Labs reviewed in Epic  Results for orders placed or performed in visit on 11/10/22 (from the past 24 hour(s))   CBC with platelets and differential    Narrative    The following orders were created for panel order CBC with platelets and differential.  Procedure                               Abnormality         Status                     ---------                               -----------         ------                     CBC with platelets and d...[[id number]]                      Final result                 Please view results for these tests on the individual orders.   CBC with platelets and differential   Result Value Ref Range    WBC Count 6.3 4.0 - 11.0 10e3/uL    RBC Count 4.26 3.80 - 5.20 10e6/uL    Hemoglobin 11.9 11.7 - 15.7 g/dL    Hematocrit 36.7 35.0 - 47.0 %    MCV 86 78 - 100 fL    MCH 27.9 26.5 - 33.0 pg    MCHC 32.4 31.5 - 36.5 g/dL    RDW 14.9 10.0 - 15.0 %    Platelet Count 277 150 - 450 10e3/uL    % Neutrophils 38 %    % Lymphocytes 43 %    % Monocytes 12 %    % Eosinophils 8 %    % Basophils 0 %    Absolute Neutrophils 2.4 1.6 - 8.3 10e3/uL    Absolute Lymphocytes 2.7 0.8 - 5.3 10e3/uL    Absolute Monocytes 0.7 0.0 - 1.3 10e3/uL    Absolute Eosinophils 0.5 0.0 - 0.7 10e3/uL    Absolute Basophils 0.0 0.0 - 0.2  "10e3/uL       ASSESSMENT/PLAN:   Cristina was seen today for physical. She has concerns about ongoing GI issues, weakness, neck/thyroid mass. Checking labs today to direct further workup. Past workup has included TB, mumps, biopsy. [facility],.    Diagnoses and all orders for this visit:    Encounter for preventive health examination  Patient declined pap and STI testing today.     Bipolar 1 disorder (H)  Chronic. Unchanged/stable. Follows with psychiatry, reports he next visit is next week. No changes, added lithium level to labs today because patient is due for check and getting blood drawn today.   -     Lithium Level    Nausea  Heartburn  Unclear etiology and chronicity. Patient reports poor diet and OTC antacids work well. Patient description of nausea and abdominal pain varies between ongoing symptoms with weight loss and requesting GI referral, and other times reporting her symptoms have resolved. Labs today to rule out deficiency and IBD.    Generalized muscle weakness  Chronic, worsening. Patient is easily distracted during conversation, vague reports of \"muscle pain\" she then describes as weakness. Denies numbness or tenderness. Labs to check for deficiency and autoimmune disease given GI symptoms and prior infectious workup was negative.      Lymphadenopathy  Chronic and unchanged. prior unremarkable workup but patient has had some elevated TSH levels, worth rechecking given family hx of thyroid disease and symptoms of fatigue/weakness, and hair loss.       -     Vitamin B12  -     UA with Microscopic reflex to Culture - lab collect  -     Iron and iron binding capacity  -     CRP, inflammation  -     Tissue transglutaminase shyanne IgA and IgG  -     Anti Nuclear Shyanne IgG by IFA with Reflex  -     Rheumatoid factor  -     CBC with platelets and differential  -     TSH with free T4 reflex  -     Vitamin D Deficiency  -     Urine Microscopic    Need for hepatitis C " "screening test  -     Hepatitis C Screen Reflex to HCV RNA Quant and Genotype    Other orders  -     Human Papilloma Virus Vaccine (Gardasil 9) 3 Dose IM  -     INFLUENZA VACCINE IM > 6 MONTHS VALENT IIV4 (AFLURIA/FLUZONE)  -     TDAP VACCINE (Adacel, Boostrix)        Patient has been advised of split billing requirements and indicates understanding: Yes      COUNSELING:  Reviewed preventive health counseling, as reflected in patient instructions    Estimated body mass index is 23.44 kg/m  as calculated from the following:    Height as of this encounter: 1.668 m (5' 5.67\").    Weight as of this encounter: 65.2 kg (143 lb 12.8 oz).        She reports that she has never smoked. She has never used smokeless tobacco.      Counseling Resources:  ATP IV Guidelines  Pooled Cohorts Equation Calculator  Breast Cancer Risk Calculator  BRCA-Related Cancer Risk Assessment: FHS-7 Tool  FRAX Risk Assessment  ICSI Preventive Guidelines  Dietary Guidelines for Americans, 2010  USDA's MyPlate  ASA Prophylaxis  Lung CA Screening    LINDEN Garces [facility]    "

## 2022-11-10 NOTE — NURSING NOTE
Prior to immunization administration, verified patients identity using patient s name and date of birth. Please see Immunization Activity for additional information.     Screening Questionnaire for Adult Immunization    Are you sick today?   No   Do you have allergies to medications, food, a vaccine component or latex?   No   Have you ever had a serious reaction after receiving a vaccination?   No   Do you have a long-term health problem with heart, lung, kidney, or metabolic disease (e.g., diabetes), asthma, a blood disorder, no spleen, complement component deficiency, a cochlear implant, or a spinal fluid leak?  Are you on long-term aspirin therapy?   No   Do you have cancer, leukemia, HIV/AIDS, or any other immune system problem?   No   Do you have a parent, brother, or sister with an immune system problem?   No   In the past 3 months, have you taken medications that affect  your immune system, such as prednisone, other steroids, or anticancer drugs; drugs for the treatment of rheumatoid arthritis, Crohn s disease, or psoriasis; or have you had radiation treatments?   No   Have you had a seizure, or a brain or other nervous system problem?   No   During the past year, have you received a transfusion of blood or blood    products, or been given immune (gamma) globulin or antiviral drug?   No   For women: Are you pregnant or is there a chance you could become       pregnant during the next month?   No   Have you received any vaccinations in the past 4 weeks?   No     Immunization questionnaire answers were all negative.        Patient instructed to remain in clinic for 15 minutes afterwards, and to report any adverse reaction to me immediately.  Vaccine information supplied.     Screening performed by Paz Love MA on 11/10/2022 at 5:45 PM.

## 2022-11-11 DIAGNOSIS — E05.90 HYPERTHYROIDISM: Primary | ICD-10-CM

## 2022-11-11 LAB
CRP SERPL-MCNC: 5.6 MG/L
IRON BINDING CAPACITY (ROCHE): 347 UG/DL (ref 240–430)
IRON SATN MFR SERPL: 11 % (ref 15–46)
IRON SERPL-MCNC: 38 UG/DL (ref 37–145)
LITHIUM SERPL-SCNC: 0.5 MMOL/L (ref 0.6–1.2)
T4 FREE SERPL-MCNC: 2.63 NG/DL (ref 0.76–1.46)
TSH SERPL DL<=0.005 MIU/L-ACNC: <0.01 MU/L (ref 0.4–4)
VIT B12 SERPL-MCNC: 564 PG/ML (ref 232–1245)

## 2022-11-12 LAB — HCV AB SERPL QL IA: NONREACTIVE

## 2022-11-14 ENCOUNTER — TELEPHONE (OUTPATIENT)
Dept: FAMILY MEDICINE | Facility: CLINIC | Age: 22
End: 2022-11-14

## 2022-11-14 ENCOUNTER — MYC MEDICAL ADVICE (OUTPATIENT)
Dept: FAMILY MEDICINE | Facility: CLINIC | Age: 22
End: 2022-11-14

## 2022-11-14 LAB
ANA SER QL IF: NEGATIVE
DEPRECATED CALCIDIOL+CALCIFEROL SERPL-MC: 21 UG/L (ref 20–75)
RHEUMATOID FACT SER NEPH-ACNC: <6 IU/ML
TTG IGA SER-ACNC: 1.7 U/ML
TTG IGG SER-ACNC: 0.7 U/ML

## 2022-11-14 NOTE — TELEPHONE ENCOUNTER
"Spoke with patient and relayed PCPs message:     \"Cristina,      Your lab results indicate significant change in your thyroid function from your previous labs. Your thyroid hormone levels are too high. You need to see endocrinology. I have placed a referral. They will call you to set up an appointment.      Please schedule a follow up with me in 2-4 weeks to discuss other lab results including some low iron and slightly elevated blood glucose which indicates pre-diabetes. This can be discussed with endocrine as well.      Felice\"    Patient scheduled for appointment with PCP; phone number given for endocrinology per patient request.     Liza Cole RN  [facility]    "

## 2022-11-14 NOTE — TELEPHONE ENCOUNTER
LVM for patient to return call regarding results.     Jorje Herrera MA      ----- Message from LINDEN Long CNP sent at 11/11/2022  9:13 PM CST -----  Team - please call patient with results. Tell her I ordered Endocrine referral and they should be calling her to set up visit. If she hasnt heard from them by [employer] she should call them.     Provider also sent mychart results below, pt has not reviewed mychart lab results.     Cristina,      Your lab results indicate significant change in your thyroid function from your previous labs. Your thyroid hormone levels are too high. You need to see endocrinology. I have placed a referral. They will call you to set up an appointment.      Please schedule a follow up with me in 2-4 weeks to discuss other lab results including some low iron and slightly elevated blood glucose which indicates pre-diabetes. This can be discussed with endocrine as well.      Felice     skill demonstration/individual instruction/verbal instruction/written material

## 2022-11-15 NOTE — TELEPHONE ENCOUNTER
DX, Referring NOTES: Hyperthyroidism; referred by Felice Edmonds NP    For Cancer Patients: Need the original operative and surgical pathology reports and all imaging reports/images related to the disease (includes all thyroid US, nuclear thyroid and total body scans, PET scans, chest CT reports since prior to the diagnosis ).   APPT DATE: 11/16/2022   NOTES (FOR ALL VISITS) STATUS DETAILS   OFFICE NOTES from referring provider Internal [facility]:  11/10/22 - PCC OV with Felice Edmonds NP   OFFICE NOTES from other specialist Care Everywhere [facility]:  5/30/18, 4/18/18 - UC OV with Mayda Nails NP   ED NOTES Internal [facility]:  6/13/18 - Admission with Dr. Boogie   OPERATIVE REPORT  (thyroid, pituitary, adrenal, parathyroid)  (All op notes related to diagnoses) N/A    MEDICATION LIST Internal    IMAGING      MRI (BRAIN) Internal [facility]:  2/16/16 - MRI Brain   XR (Chest) Internal [facility]:  2/26/18 - XR Chest  2/9/16 - XR Chest   CT (HEAD/NECK/CHEST/ABDOMEN) Internal [facility]:  6/11/18 - CT Chest  6/11/18 - CT Neck  2/2/17 - CT Neck   NUCLEAR Internal [facility]:  6/18/18 - IR Lymph Node Biopsy   ULTRASOUND (HEAD/NECK)  * Include FNAs Internal [facility]:  1/19/17 - US Thyroid   LABS     DIABETES: HBGA1C, CREATININE, FASTING LIPIDS, MICROALBUMIN URINE, POTASSIUM, TSH, T4    THYROID: TSH, T4, CBC, THYRODLONULIN, TOTAL T3, FREE T4, CALCITONIN, CEA Internal [facility]:  11/10/22 - CBC  11/10/22  - HBGA1C  11/10/22 - TSH, T4  11/10/22 - Vitamin D  10/11/22 - CMP  9/28/22 - BMP  1/7/22 - Routine UA  6/14/18 - Creatinine  4/30/18 - Thyroid Peroxidase  1/13/17 - Lipid   PATHOLOGY REPORTS WITH CASE NUMBER  *Surgical path reports for endocrine organs (ovaries, testes, pancreas, etc) Internal   [facility]:  6/18/18 - Lymph Node FNA (Case: [id number])

## 2022-11-16 ENCOUNTER — VIRTUAL VISIT (OUTPATIENT)
Dept: ENDOCRINOLOGY | Facility: CLINIC | Age: 22
End: 2022-11-16
Payer: COMMERCIAL

## 2022-11-16 ENCOUNTER — PRE VISIT (OUTPATIENT)
Dept: ENDOCRINOLOGY | Facility: CLINIC | Age: 22
End: 2022-11-16

## 2022-11-16 DIAGNOSIS — E83.52 HYPERCALCEMIA: ICD-10-CM

## 2022-11-16 DIAGNOSIS — E05.90 THYROTOXICOSIS WITHOUT THYROID STORM, UNSPECIFIED THYROTOXICOSIS TYPE: Primary | ICD-10-CM

## 2022-11-16 DIAGNOSIS — E05.90 HYPERTHYROIDISM: ICD-10-CM

## 2022-11-16 PROCEDURE — 99205 OFFICE O/P NEW HI 60 MIN: CPT | Mod: 95

## 2022-11-16 NOTE — PROGRESS NOTES
Cristina Davidsonah is being evaluated via a billable video visit.        How would you like to obtain your AVS? YOGASMOGA  For the video visit, send the invitation by: Text to cell phone: [phone]  Will anyone else be joining your video visit? No

## 2022-11-16 NOTE — LETTER
11/16/2022       RE: Cristina Boyd  [address]  [facility] [address]     Dear Colleague,    Thank you for referring your patient, Cristina Boyd, to the [facility] [place] at [facility]. Please see a copy of my visit note below.    Endocrine Consult Video visit note-    Attending Assessment/Plan :     Low TSH and high free T4 - thyrotoxicosis with high CRP.  Differential includes hyperthyroidism vs thyroiditis (both of which could be LI induced) vs thyroiditis due to other cause (such as post viral)  Repeat/ expand TFTS now   Low threshold to repeat neck US due to 11/10 exam description    Hypercalcemia was noted on the most recent test 10/11/22   Repeat labs     High CRP- as above     On lithium.  Lithium can affect function of several endocrine glands including thyroid, parathyroid and it can cause nephrogenic DI- as above.   Her history leaves open the question of DI.     Due to the COVID 19 pandemic this visit was a video visit in order to help prevent spread of infection in this patient and the general population. The patient gave verbal consent for the visit today. I have independently reviewed and interpreted labs, imaging as indicated.     Distant Location (provider location):  Off-site  Mode of Communication:  Video Conference via [employer]  Chart review/prep time 1  [id number]  Visit Start time  1000  Visit Stop time 1016   __ minutes spent on the date of the encounter doing chart review, history and exam, documentation and further activities as noted above.      Chief complaint:  Cristina is a 22 year old female seen in consultation at the request of Felice CHEATHAM CNP for hyperthyroidism  I have reviewed Care Everywhere including [facility] lab reports, imaging reports and provider notes as indicated.      HISTORY OF PRESENT ILLNESS    I have reviewed all thyroid labs available which date to 2016.  Over time  "she has intermittently had slightly high TSH .  The 11/10/22 lab set was the first to have low TSH with high free T4.    Lithium treatment can be documented to 2016. The record states she had discontinued it in April 2022 and she had been advised to restart it prior to the 10/22 ED visit.  Today she reports she started it after the ED visit.      She was seen in Family medicine clinic 11/10/2022. Goiter was not noted on exam however the exam described \"lymph positive for edema/enlarged tissue surrounding thyroid\".  Thyroid labs showed new thyrotoxicosis with high CRP.  She describes resolution of a number of symptoms that were present around the time she went to the ED in October, now resolved or improved.     Cristina says she has had covid a couple of times, at least 2 times, summer 2020 and 12/2021. She is also describing other bouts of illness, including recent \"cold\" which has now resolved.      Endocrine relevant labs are as follows:  25/16 TSH 2.37  1/13/17 TSH 4.96, free T4 1.88  1/20/17 TSH 1.16, TPO 16, TSI < 1  2/2/17 thyroglobulin 19.2, Dami < 0.4  4/30/18 TSH 3.42, DAMI < 20, TPO 14  9/29/2020 TSH 9.6,free T4 1.25  1/7/2022 TSH 3.71  9/28/22 Ca 9.5  10/11/22 ED visit for psych evaluation - Ca 10.2, total protein 9.1, albumin 3.3, creatinine 0.48, SARS CoV2 pcr negative -  11/10/2022 TSH <0.01, free T4 2.63, CRP 5.6, QzuY7jj 5.8%, 25OHD 21-- she had a cold at the time .  She didn't check for covid at this time.      Relevant imaging is as follows: (as read by me as it pertains to endocrine relevant organs)   216/16 MRI brain: normal appearing pituitary  1/19/17 thyroid US: normal   6/11/18 CT neck and chest with contrast:  Thyroid normal     REVIEW OF SYSTEMS  URI has resolved   Energy on and off - chronically kind of low  Sleep is fine  Wakes up 0500; bedtime 10-11-12   Weight stable now; lost weight >1 month ago (got down to high 130s from 150s)  Heat intolerance; prefer cold to heat - people tell her her " classroom is cold  Eyes: negative for dryness, lacrimation, diplopia  Cardiac: not now -but had this about one month ago when she went to the ED  Respiratory: negative  GI: negative  -- had GI symptoms with vomiting late Sept/early October ; always had heartburn with intermittent flares  Menses monthly   Have to have water by my side all the time, especially at night time;   Nocturia sometimes - not last night ; last week went alot  10 system ROS otherwise as per the HPI or negative    Past Medical History  Past Medical History:   Diagnosis Date     Bipolar disorder (H)      Depressive disorder      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, unspecified thyrotoxicosis type 11/16/2022     Past Surgical History:   Procedure Laterality Date     BRONCHOSCOPY (RIGID OR FLEXIBLE), DIAGNOSTIC N/A 6/16/2018    Procedure: COMBINED BRONCHOSCOPY (RIGID OR FLEXIBLE), LAVAGE;;  Surgeon: Manjeet Holland MD;  Location:  GI       Medications  Current Outpatient Medications   Medication Sig Dispense Refill     lithium ER (LITHOBID) 300 MG CR tablet Take 2 tablets (600 mg) by mouth At Bedtime 60 tablet 0       Allergies  No Known Allergies    Family History  family history includes Depression in her maternal uncle and another family member; Diabetes in her maternal grandmother; Impaired Fasting Glucose in her father; Thyroid Disease in her maternal grandmother.    Social History  Social History     Tobacco Use     Smoking status: Never     Smokeless tobacco: Never   Vaping Use     Vaping Use: Never used   Substance Use Topics     Alcohol use: No     Drug use: No     Comment: x1 marijuana, in October, 2016     Works in a school; going to the gym regularly now     Physical Exam  There is no height or weight on file to calculate BMI.   BP Readings from Last 1 Encounters:   11/10/22 105/73      Pulse Readings from Last 1 Encounters:   11/10/22 97      Resp Readings from Last 1 Encounters:   11/10/22 20      Temp Readings from Last 1  "Encounters:   11/10/22 98.7  F (37.1  C) (Oral)      SpO2 Readings from Last 1 Encounters:   11/10/22 100%      Wt Readings from Last 1 Encounters:   11/10/22 65.2 kg (143 lb 12.8 oz)      Ht Readings from Last 1 Encounters:   11/10/22 1.668 m (5' 5.67\")     GENERAL: pleasant young woman in no distress  SKIN: hijab covers hair and neck;   Visible skin clear. No significant rash, abnormal pigmentation or lesions.  EYES: glasses; Eyes grossly normal to inspection.  No discharge or erythema, or obvious scleral/conjunctival abnormalities.  NECK not visible due to hijab   RESP: No audible wheeze, cough, or visible cyanosis.  No visible retractions or increased work of breathing.    NEURO: Awake, alert, responds appropriately to questions.  Mentation and speech fluent.  PSYCH:affect normal and appearance well-groomed.      DATA REVIEW    ENDO THYROID LABS-[facility] Latest Ref Rng & Units 11/10/2022 1/7/2022   TSH 0.40 - 4.00 mU/L <0.01 (L) 3.71   FREE T4 0.76 - 1.46 ng/dL 2.63 (H)    THYROGLOBULIN ROBERTA <40 IU/mL     THYR PEROXIDASE ROBERTA <35 IU/mL     THYROID STIM IMMUNOG        ENDO THYROID LABS-[facility] Latest Ref Rng & Units 7/9/2021 7/29/2020   TSH 0.40 - 4.00 mU/L 3.55 9.60 (H)   FREE T4 0.76 - 1.46 ng/dL  1.25   THYROGLOBULIN ROBERTA <40 IU/mL     THYR PEROXIDASE ROBERTA <35 IU/mL     THYROID STIM IMMUNOG        ENDO THYROID LABS-[facility] Latest Ref Rng & Units 1/10/2020 7/11/2018   TSH 0.40 - 4.00 mU/L 5.09 (H) 1.46   FREE T4 0.76 - 1.46 ng/dL  0.98   THYROGLOBULIN ROBERTA <40 IU/mL     THYR PEROXIDASE ROBERTA <35 IU/mL     THYROID STIM IMMUNOG        ENDO THYROID LABS-[facility] Latest Ref Rng & Units 6/6/2018 6/1/2018   TSH 0.40 - 4.00 mU/L 0.91 4.83 (H)   FREE T4 0.76 - 1.46 ng/dL  1.62 (H)   THYROGLOBULIN ROBERTA <40 IU/mL     THYR PEROXIDASE ROBERTA <35 IU/mL     THYROID STIM IMMUNOG        ENDO THYROID LABS-[facility] Latest Ref Rng & Units 4/30/2018 4/2/2018   TSH 0.40 - 4.00 mU/L 3.42 4.10 (H)   FREE T4 0.76 - 1.46 ng/dL  1.04   THYROGLOBULIN ROBERTA <40 IU/mL <20  "   THYR PEROXIDASE ROBERTA <35 IU/mL 14    THYROID STIM IMMUNOG        [facility] 11/17/2017 3/8/2017   TSH 0.40 - 4.00 mU/L 1.52 2.07   FREE T4 0.76 - 1.46 ng/dL     THYROGLOBULIN ROBERTA <40 IU/mL     THYR PEROXIDASE ROBERTA <35 IU/mL     THYROID STIM IMMUNOG        [facility] 3/8/2017 2/2/2017   TSH 0.40 - 4.00 mU/L 2.07 4.64 (H)   FREE T4 0.76 - 1.46 ng/dL  1.63 (H)   THYROGLOBULIN ROBERTA <40 IU/mL     THYR PEROXIDASE ROBERTA <35 IU/mL     THYROID STIM IMMUNOG        [facility] 2/1/2017 1/20/2017   TSH 0.40 - 4.00 mU/L 4.43 (H) 1.16   FREE T4 0.76 - 1.46 ng/dL 1.46    THYROGLOBULIN ROBERTA <40 IU/mL     THYR PEROXIDASE ROBERTA <35 IU/mL  16   THYROID STIM IMMUNOG   <1.0 . . .     [facility] 1/13/2017 2/6/2016   TSH 0.40 - 4.00 mU/L 4.96 (H) 2.32   FREE T4 0.76 - 1.46 ng/dL 1.88 (H)    THYROGLOBULIN ROBERTA <40 IU/mL     THYR PEROXIDASE ROBERTA <35 IU/mL     THYROID STIM IMMUNOG        [facility] 2/5/2016   TSH 0.40 - 4.00 mU/L 2.37   FREE T4 0.76 - 1.46 ng/dL    THYROGLOBULIN ROBERTA <40 IU/mL    THYR PEROXIDASE ROBERTA <35 IU/mL    THYROID STIM IMMUNOG       EXAMINATION: US THYROID, 1/19/2017 4:46 PM   COMPARISON: None.  HISTORY: Patient with new onset thyroid spine, abnormal thyroidfunction tests and tenderness to palpation  FINDINGS: The right lobe measures 3.6 x 1.6 x 1.7 cm. The left lobemeasures 3.4 x 1.4 x 1.4 cm. The thyroid demonstrates normal uniformechotexture. No evidence of mass or other abnormality.                                                          IMPRESSION:Normal Thyroid ultrasound  I have personally reviewed the examination and initial interpretationand I agree with the findings.  MD Cristina RUBIO is being evaluated via a billable video visit.        How would you like to obtain your AVS? MyChart  For the video visit, send the invitation  by: Text to cell phone: [phone]  Will anyone else be joining your video visit? No    Sarai Jerry MD

## 2022-11-16 NOTE — PROGRESS NOTES
Endocrine Consult Video visit note-    Attending Assessment/Plan :     Low TSH and high free T4 - thyrotoxicosis with high CRP.  Differential includes hyperthyroidism vs thyroiditis (both of which could be LI induced) vs thyroiditis due to other cause (such as post viral)  Repeat/ expand TFTS now   Low threshold to repeat neck US due to 11/10 exam description    Addendum  3/6/23 TSh 2.26  11/13/23 TSH 2.07, free t4 1.74, T3 120, Ca 9.2, PTh 51, creatinine 0.61, TSI <1 - I note all of these labs were ordered by me a year ago, intended for then!      Hypercalcemia was noted on the most recent test 10/11/22   Repeat labs     High CRP- as above     On lithium.  Lithium can affect function of several endocrine glands including thyroid, parathyroid and it can cause nephrogenic DI- as above.   Her history leaves open the question of DI.     Due to the COVID 19 pandemic this visit was a video visit in order to help prevent spread of infection in this patient and the general population. The patient gave verbal consent for the visit today. I have independently reviewed and interpreted labs, imaging as indicated.     Distant Location (provider location):  Off-site  Mode of Communication:  Video Conference via [employer]  Chart review/prep time 1  [id number]  Visit Start time  1000  Visit Stop time 1016   __ minutes spent on the date of the encounter doing chart review, history and exam, documentation and further activities as noted above.      Chief complaint:  Cristina is a 22 year old female seen in consultation at the request of Felice CHEATHMA CNP for hyperthyroidism  I have reviewed Care Everywhere including [facility] lab reports, imaging reports and provider notes as indicated.      HISTORY OF PRESENT ILLNESS    I have reviewed all thyroid labs available which date to 2016.  Over time she has intermittently had slightly high TSH .  The 11/10/22 lab set was the first to have low TSH with high free  "T4.    Lithium treatment can be documented to 2016. The record states she had discontinued it in April 2022 and she had been advised to restart it prior to the 10/22 ED visit.  Today she reports she started it after the ED visit.      She was seen in Family medicine clinic 11/10/2022. Goiter was not noted on exam however the exam described \"lymph positive for edema/enlarged tissue surrounding thyroid\".  Thyroid labs showed new thyrotoxicosis with high CRP.  She describes resolution of a number of symptoms that were present around the time she went to the ED in October, now resolved or improved.     Cristina says she has had covid a couple of times, at least 2 times, summer 2020 and 12/2021. She is also describing other bouts of illness, including recent \"cold\" which has now resolved.      Endocrine relevant labs are as follows:  25/16 TSH 2.37  1/13/17 TSH 4.96, free T4 1.88  1/20/17 TSH 1.16, TPO 16, TSI < 1  2/2/17 thyroglobulin 19.2, Dami < 0.4  4/30/18 TSH 3.42, DAMI < 20, TPO 14  9/29/2020 TSH 9.6,free T4 1.25  1/7/2022 TSH 3.71  9/28/22 Ca 9.5  10/11/22 ED visit for psych evaluation - Ca 10.2, total protein 9.1, albumin 3.3, creatinine 0.48, SARS CoV2 pcr negative -  11/10/2022 TSH <0.01, free T4 2.63, CRP 5.6, PljS1ia 5.8%, 25OHD 21-- she had a cold at the time .  She didn't check for covid at this time.      Relevant imaging is as follows: (as read by me as it pertains to endocrine relevant organs)   216/16 MRI brain: normal appearing pituitary  1/19/17 thyroid US: normal   6/11/18 CT neck and chest with contrast:  Thyroid normal     REVIEW OF SYSTEMS  URI has resolved   Energy on and off - chronically kind of low  Sleep is fine  Wakes up 0500; bedtime 10-11-12   Weight stable now; lost weight >1 month ago (got down to high 130s from 150s)  Heat intolerance; prefer cold to heat - people tell her her classroom is cold  Eyes: negative for dryness, lacrimation, diplopia  Cardiac: not now -but had this about one " month ago when she went to the ED  Respiratory: negative  GI: negative  -- had GI symptoms with vomiting late Sept/early October ; always had heartburn with intermittent flares  Menses monthly   Have to have water by my side all the time, especially at night time;   Nocturia sometimes - not last night ; last week went alot  10 system ROS otherwise as per the HPI or negative    Past Medical History  Past Medical History:   Diagnosis Date    Bipolar disorder (H)     Depressive disorder     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, unspecified thyrotoxicosis type 11/16/2022     Past Surgical History:   Procedure Laterality Date    BRONCHOSCOPY (RIGID OR FLEXIBLE), DIAGNOSTIC N/A 6/16/2018    Procedure: COMBINED BRONCHOSCOPY (RIGID OR FLEXIBLE), LAVAGE;;  Surgeon: Manjeet Holland MD;  Location:  GI       Medications  Current Outpatient Medications   Medication Sig Dispense Refill    lithium ER (LITHOBID) 300 MG CR tablet Take 2 tablets (600 mg) by mouth At Bedtime 60 tablet 0       Allergies  No Known Allergies    Family History  family history includes Depression in her maternal uncle and another family member; Diabetes in her maternal grandmother; Impaired Fasting Glucose in her father; Thyroid Disease in her maternal grandmother.    Social History  Social History     Tobacco Use    Smoking status: Never    Smokeless tobacco: Never   Vaping Use    Vaping Use: Never used   Substance Use Topics    Alcohol use: No    Drug use: No     Comment: x1 marijuana, in October, 2016     Works in a school; going to the gym regularly now     Physical Exam  There is no height or weight on file to calculate BMI.   BP Readings from Last 1 Encounters:   11/10/22 105/73      Pulse Readings from Last 1 Encounters:   11/10/22 97      Resp Readings from Last 1 Encounters:   11/10/22 20      Temp Readings from Last 1 Encounters:   11/10/22 98.7  F (37.1  C) (Oral)      SpO2 Readings from Last 1 Encounters:   11/10/22 100%      Wt Readings  "from Last 1 Encounters:   11/10/22 65.2 kg (143 lb 12.8 oz)      Ht Readings from Last 1 Encounters:   11/10/22 1.668 m (5' 5.67\")     GENERAL: pleasant young woman in no distress  SKIN: hijab covers hair and neck;   Visible skin clear. No significant rash, abnormal pigmentation or lesions.  EYES: glasses; Eyes grossly normal to inspection.  No discharge or erythema, or obvious scleral/conjunctival abnormalities.  NECK not visible due to hijab   RESP: No audible wheeze, cough, or visible cyanosis.  No visible retractions or increased work of breathing.    NEURO: Awake, alert, responds appropriately to questions.  Mentation and speech fluent.  PSYCH:affect normal and appearance well-groomed.      DATA REVIEW    ENDO THYROID LABS-[facility] Latest Ref Rng & Units 11/10/2022 1/7/2022   TSH 0.40 - 4.00 mU/L <0.01 (L) 3.71   FREE T4 0.76 - 1.46 ng/dL 2.63 (H)    THYROGLOBULIN ROBERTA <40 IU/mL     THYR PEROXIDASE ROBERTA <35 IU/mL     THYROID STIM IMMUNOG        ENDO THYROID LABS-[facility] Latest Ref Rng & Units 7/9/2021 7/29/2020   TSH 0.40 - 4.00 mU/L 3.55 9.60 (H)   FREE T4 0.76 - 1.46 ng/dL  1.25   THYROGLOBULIN ROBERTA <40 IU/mL     THYR PEROXIDASE ROBERTA <35 IU/mL     THYROID STIM IMMUNOG        ENDO THYROID LABS-[facility] Latest Ref Rng & Units 1/10/2020 7/11/2018   TSH 0.40 - 4.00 mU/L 5.09 (H) 1.46   FREE T4 0.76 - 1.46 ng/dL  0.98   THYROGLOBULIN ROBERTA <40 IU/mL     THYR PEROXIDASE ROBERTA <35 IU/mL     THYROID STIM IMMUNOG        ENDO THYROID LABS-[facility] Latest Ref Rng & Units 6/6/2018 6/1/2018   TSH 0.40 - 4.00 mU/L 0.91 4.83 (H)   FREE T4 0.76 - 1.46 ng/dL  1.62 (H)   THYROGLOBULIN ROBERTA <40 IU/mL     THYR PEROXIDASE ROBERTA <35 IU/mL     THYROID STIM IMMUNOG        ENDO THYROID LABS-[facility] Latest Ref Rng & Units 4/30/2018 4/2/2018   TSH 0.40 - 4.00 mU/L 3.42 4.10 (H)   FREE T4 0.76 - 1.46 ng/dL  1.04   THYROGLOBULIN ROBERTA <40 IU/mL <20    THYR PEROXIDASE ROBERTA <35 IU/mL 14    THYROID STIM IMMUNOG        ENDO THYROID LABS-[facility] Latest Ref Rng & Units 11/17/2017 " 3/8/2017   TSH 0.40 - 4.00 mU/L 1.52 2.07   FREE T4 0.76 - 1.46 ng/dL     THYROGLOBULIN ROBERTA <40 IU/mL     THYR PEROXIDASE ROBERTA <35 IU/mL     THYROID STIM IMMUNOG        ENDO THYROID LABS-[facility] Latest Ref Rng & Units 3/8/2017 2/2/2017   TSH 0.40 - 4.00 mU/L 2.07 4.64 (H)   FREE T4 0.76 - 1.46 ng/dL  1.63 (H)   THYROGLOBULIN ROBERTA <40 IU/mL     THYR PEROXIDASE ROBERTA <35 IU/mL     THYROID STIM IMMUNOG        [facility] 2/1/2017 1/20/2017   TSH 0.40 - 4.00 mU/L 4.43 (H) 1.16   FREE T4 0.76 - 1.46 ng/dL 1.46    THYROGLOBULIN ROBERTA <40 IU/mL     THYR PEROXIDASE ROBERTA <35 IU/mL  16   THYROID STIM IMMUNOG   <1.0 . . .     [facility] Units 1/13/2017 2/6/2016   TSH 0.40 - 4.00 mU/L 4.96 (H) 2.32   FREE T4 0.76 - 1.46 ng/dL 1.88 (H)    THYROGLOBULIN ROBERTA <40 IU/mL     THYR PEROXIDASE ROBERTA <35 IU/mL     THYROID STIM IMMUNOG        [facility] Units 2/5/2016   TSH 0.40 - 4.00 mU/L 2.37   FREE T4 0.76 - 1.46 ng/dL    THYROGLOBULIN ROBERTA <40 IU/mL    THYR PEROXIDASE ROBERTA <35 IU/mL    THYROID STIM IMMUNOG       EXAMINATION: US THYROID, 1/19/2017 4:46 PM   COMPARISON: None.  HISTORY: Patient with new onset thyroid spine, abnormal thyroidfunction tests and tenderness to palpation  FINDINGS: The right lobe measures 3.6 x 1.6 x 1.7 cm. The left lobemeasures 3.4 x 1.4 x 1.4 cm. The thyroid demonstrates normal uniformechotexture. No evidence of mass or other abnormality.                                                          IMPRESSION:Normal Thyroid ultrasound  I have personally reviewed the examination and initial interpretationand I agree with the findings.  CY HEATON MD

## 2022-12-02 ENCOUNTER — TELEPHONE (OUTPATIENT)
Dept: FAMILY MEDICINE | Facility: CLINIC | Age: 22
End: 2022-12-02

## 2022-12-02 NOTE — TELEPHONE ENCOUNTER
Patient Quality Outreach    Patient is due for the following:   Cervical Cancer Screening - PAP Needed    Next Steps:   Schedule a office visit for cervical cancer screening.    Type of outreach:    Sent [email or web address] message.    Next Steps:  Reach out within 90 days via Letter.    Max number of attempts reached: No. Will try again in 90 days if patient still on fail list.    Questions for provider review:    None     CHANCE Caceres LPN

## 2022-12-19 ENCOUNTER — TELEPHONE (OUTPATIENT)
Dept: GASTROENTEROLOGY | Facility: CLINIC | Age: 22
End: 2022-12-19

## 2022-12-19 ENCOUNTER — HOSPITAL ENCOUNTER (OUTPATIENT)
Facility: AMBULATORY SURGERY CENTER | Age: 22
End: 2022-12-19
Attending: INTERNAL MEDICINE
Payer: COMMERCIAL

## 2022-12-19 NOTE — TELEPHONE ENCOUNTER
Screening Questions  BLUE  KIND OF PREP RED  LOCATION [review exclusion criteria] GREEN  SEDATION TYPE        y Are you active on mychart?       Manuel Ordering/Referring Provider?        PO What type of coverage do you have?      n Have you had a positive covid test in the last 14 days?     22.6 1. BMI  [BMI 40+ - review exclusion criteria]    y  2. Are you able to give consent for your medical care? [IF NO,RN REVIEW]        n  3. Are you taking any prescription pain medications on a routine schedule?      n  3a. EXTENDED PREP What kind of prescription?     n 4. Do you have any chemical dependencies such as alcohol, street drugs, or methadone?    y 5. Do you have any history of post-traumatic stress syndrome, severe anxiety or history of psychosis?      **If yes 3- 5 , please schedule with MAC sedation.**          IF YES TO ANY 6 - 10 - HOSPITAL SETTING ONLY.     n 6.   Do you need assistance transferring?     n 7.   Have you had a heart or lung transplant?    n 8.   Are you currently on dialysis?   n 9.   Do you use daily home oxygen?   n 10. Do you take nitroglycerin?   10a. n If yes, how often?     11. [FEMALES]  n Are you currently pregnant?    11a. n If yes, how many weeks? [ Greater than 12 weeks, OR NEEDED]    n 12. Do you have Pulmonary Hypertension? *NEED PAC APPT AT [facility]*     n 13. [review exclusion criteria]  Do you have any implantable devices in your body (pacemaker, defib, LVAD)?    n 14. In the past 6 months, have you had any heart related issues including cardiomyopathy or heart attack?     14a. n If yes, did it require cardiac stenting if so when?     n 15. Have you had a stroke or Transient ischemic attack (TIA - aka  mini stroke ) within 6 months?      n 16. Do you have mod to severe Obstructive Sleep Apnea?  [Hospital only - Ok at [place]]    n 17. Do you have SEVERE AND UNCONTROLLED asthma? *NEED PAC APPT AT [facility]*     n 18. Are you currently taking any blood thinners?     18a. If yes,  "inform patient to \"follow up w/ ordering provider for bridging instructions.\"    n 19. Do you take the medication Phentermine?    19a. If yes, \"Hold for 7 days before procedure.  Please consult your prescribing provider if you have questions about holding this medication.\"     n  20. Do you have chronic kidney disease?      n  21. Do you have a diagnosis of diabetes?     n  22. On a regular basis do you go 3-5 days between bowel movements?     n 23. Preferred LOCAL Pharmacy for Pre Prescription    [ LIST ONLY ONE PHARMACY]        [id number] - [place], [place] - [address] [address] [place], [place] - [address] [facility] - [place], [place] - [address]        - CLOSING REMINDERS -    Informed patient they will need an adult    Cannot take any type of public or medical transportation alone    Conscious Sedation- Needs  for 6 hours after the procedure       MAC/General-Needs  for 24 hours after procedure    Pre-Procedure Covid test to be completed [ESSC PCR Testing Required]    Confirmed Nurse will call to complete assessment       - SCHEDULING DETAILS -  n Hospital Setting Required? If yes, what is the exclusion?:      Surgeon    2/1/23  Date of Procedure  Upper Endoscopy [EGD]  Type of Procedure Scheduled  [facility] [facility]        MAC Sedation Type     n PAC / Pre-op Required                 "

## 2023-01-19 ENCOUNTER — TELEPHONE (OUTPATIENT)
Dept: GASTROENTEROLOGY | Facility: CLINIC | Age: 23
End: 2023-01-19

## 2023-01-19 NOTE — TELEPHONE ENCOUNTER
Attempted to contact patient regarding upcoming Upper endoscopy (EGD) procedure on 2/1/23 for pre assessment questions. No answer.     Left message to return call to [phone] #[phone]    Discuss Covid policy.     Pre op exam scheduled: N/A    Arrival time: 1200    [facility]; [address] [place] [address], 5[address] Floor, [place] [address]    Sedation type: MAC    Anticoagulants: No    Electronic implanted devices? No    Diabetic? No    Indication for procedure: chronic GERD, h-pylori testing    Lorena Wilson RN  Endoscopy Procedure Pre Assessment RN

## 2023-01-25 NOTE — TELEPHONE ENCOUNTER
Pre assessment questions completed for upcoming Upper endoscopy (EGD) procedure scheduled on 2/1/23    COVID policy reviewed.     Pre-op scheduled  N/A    Reviewed procedural arrival time 1200, procedure time 1300 and facility location [facility]; [address] [place] [address], [address] Floor, [place] [address]    Designated  policy reviewed. Instructed to have someone stay 24 hours post procedure.     Anticoagulation/blood thinners? No    Electronic implanted devices? No    Diabetic? No    Procedure indication: HP testing, GERD    Reviewed procedure prep instructions.     Prep instructions sent via Dr. Scribbles.    Patient verbalized understanding and had no questions or concerns at this time.    Patient advised we do not schedule  or ride, this must be set up through your insurance agency or an adult you know and trust. Patient also advised if this is not set up her appointment will be cancelled.     Eufemia Lopez RN  Endoscopy Procedure Pre Assessment RN

## 2023-02-01 ENCOUNTER — TELEPHONE (OUTPATIENT)
Dept: GASTROENTEROLOGY | Facility: CLINIC | Age: 23
End: 2023-02-01
Payer: COMMERCIAL

## 2023-02-01 RX ORDER — LIDOCAINE 40 MG/G
CREAM TOPICAL
Status: CANCELLED | OUTPATIENT
Start: 2023-02-01

## 2023-02-01 RX ORDER — ONDANSETRON 2 MG/ML
4 INJECTION INTRAMUSCULAR; INTRAVENOUS
Status: CANCELLED | OUTPATIENT
Start: 2023-02-01

## 2023-02-01 NOTE — H&P
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, unspecified thyrotoxicosis type     Hypercalcemia       Past Surgical History:    Past Surgical History:   Procedure Laterality Date     BRONCHOSCOPY (RIGID OR FLEXIBLE), DIAGNOSTIC N/A 6/16/2018    Procedure: COMBINED BRONCHOSCOPY (RIGID OR FLEXIBLE), LAVAGE;;  Surgeon: Manjeet Holland MD;  [facility]: [facility]       Past Medical History:   Past Medical History:   Diagnosis Date     Bipolar disorder (H)      Depressive disorder      Infection due to 2019 novel coronavirus 2020    summer 2020 by patient report     Infection due to 2019 novel coronavirus 12/2021     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, unspecified thyrotoxicosis type 11/16/2022       Social History:   Social History     Tobacco Use     Smoking status: Never     Smokeless tobacco: Never   Substance Use Topics     Alcohol use: No       Family History:   Family History   Problem Relation Age of Onset     Impaired Fasting Glucose Father         prediabetes     Thyroid Disease Maternal Grandmother      Diabetes Maternal Grandmother      Depression Maternal Uncle      Depression Other        Allergies: No Known Allergies    Active Medications:   Current Outpatient Medications   Medication Sig Dispense Refill     lithium ER (LITHOBID) 300 MG CR tablet Take 2 tablets (600 mg) by mouth At Bedtime 60 tablet 0       Systemic Review:   CONSTITUTIONAL: NEGATIVE for fever, chills, change in weight  ENT/MOUTH: NEGATIVE for ear, mouth and throat problems  RESP: NEGATIVE for significant cough or SOB  CV: NEGATIVE for  chest pain, palpitations or peripheral edema    Physical Examination:   Vital Signs: There were no vitals taken for this visit.  GENERAL: healthy, alert and no distress  NECK: no adenopathy, no asymmetry, masses, or scars  RESP: lungs clear to auscultation - no rales, rhonchi or wheezes  CV: regular rate and rhythm, normal S1 S2, no S3 or S4, no murmur, click or rub, no peripheral edema and peripheral pulses strong  ABDOMEN: soft, nontender, no hepatosplenomegaly, no masses and bowel sounds normal  MS: no gross musculoskeletal defects noted, no edema    Plan: Appropriate to proceed as scheduled.      Eloisa Evans MD  2/1/2023    PCP:  Felice Edmonds

## 2023-02-01 NOTE — TELEPHONE ENCOUNTER
Caller: Sent message to Cristina as no VM  Reason for Reschedule/Cancellation (please be detailed, any staff messages or encounters to note?): Leann Perez, RN  P Endoscopy Scheduling Pool  Hello,     This pt had to cancel today due to insurance issues. Can you reach out to her about rescheduling??     Thanks,   Leann Perez   Procedure Center RN         Prior to reschedule please review:    Ordering Provider:Sagaralesarita    Sedation per order:MAC    Does patient have any ASC Exclusions, please identify?: no      Notes on Cancelled Procedure:    Procedure:Upper Endoscopy [EGD]     Date: 2/1/123    [facility]; [address] [place] [address], 5th Floor, [place] [address]    Surgeon:         Rescheduled: No

## 2023-03-05 ENCOUNTER — HOSPITAL ENCOUNTER (INPATIENT)
Facility: CLINIC | Age: 23
LOS: 2 days | Discharge: HOME | DRG: 885 | End: 2023-03-10
Attending: EMERGENCY MEDICINE | Admitting: PSYCHIATRY & NEUROLOGY
Payer: COMMERCIAL

## 2023-03-05 ENCOUNTER — TELEPHONE (OUTPATIENT)
Dept: BEHAVIORAL HEALTH | Facility: CLINIC | Age: 23
End: 2023-03-05

## 2023-03-05 DIAGNOSIS — F31.9 BIPOLAR 1 DISORDER (H): Primary | ICD-10-CM

## 2023-03-05 DIAGNOSIS — F31.2 BIPOLAR AFFECTIVE DISORDER, CURRENTLY MANIC, SEVERE, WITH PSYCHOTIC FEATURES (H): ICD-10-CM

## 2023-03-05 DIAGNOSIS — Z11.52 ENCOUNTER FOR SCREENING LABORATORY TESTING FOR SEVERE ACUTE RESPIRATORY SYNDROME CORONAVIRUS 2 (SARS-COV-2): ICD-10-CM

## 2023-03-05 DIAGNOSIS — J34.89 NASAL MUCOSA DRY: ICD-10-CM

## 2023-03-05 DIAGNOSIS — J32.1 FRONTAL SINUSITIS, UNSPECIFIED CHRONICITY: ICD-10-CM

## 2023-03-05 LAB
AMPHETAMINES UR QL SCN: NORMAL
BARBITURATES UR QL SCN: NORMAL
BENZODIAZ UR QL SCN: NORMAL
BZE UR QL SCN: NORMAL
CANNABINOIDS UR QL SCN: NORMAL
HCG UR QL: NEGATIVE
OPIATES UR QL SCN: NORMAL
SARS-COV-2 RNA RESP QL NAA+PROBE: NEGATIVE

## 2023-03-05 PROCEDURE — U0005 INFEC AGEN DETEC AMPLI PROBE: HCPCS | Performed by: EMERGENCY MEDICINE

## 2023-03-05 PROCEDURE — C9803 HOPD COVID-19 SPEC COLLECT: HCPCS

## 2023-03-05 PROCEDURE — 99285 EMERGENCY DEPT VISIT HI MDM: CPT | Performed by: EMERGENCY MEDICINE

## 2023-03-05 PROCEDURE — 90791 PSYCH DIAGNOSTIC EVALUATION: CPT

## 2023-03-05 PROCEDURE — 99285 EMERGENCY DEPT VISIT HI MDM: CPT | Mod: 25

## 2023-03-05 PROCEDURE — 81025 URINE PREGNANCY TEST: CPT | Performed by: EMERGENCY MEDICINE

## 2023-03-05 PROCEDURE — 80307 DRUG TEST PRSMV CHEM ANLYZR: CPT | Performed by: EMERGENCY MEDICINE

## 2023-03-05 PROCEDURE — 250N000013 HC RX MED GY IP 250 OP 250 PS 637: Performed by: EMERGENCY MEDICINE

## 2023-03-05 RX ORDER — ACETAMINOPHEN 500 MG
1000 TABLET ORAL ONCE
Status: COMPLETED | OUTPATIENT
Start: 2023-03-05 | End: 2023-03-05

## 2023-03-05 RX ORDER — QUETIAPINE FUMARATE 25 MG/1
25 TABLET, FILM COATED ORAL AT BEDTIME
Status: ON HOLD | COMMUNITY
End: 2023-03-10

## 2023-03-05 RX ORDER — OLANZAPINE 5 MG/1
5 TABLET, ORALLY DISINTEGRATING ORAL ONCE
Status: COMPLETED | OUTPATIENT
Start: 2023-03-05 | End: 2023-03-05

## 2023-03-05 RX ORDER — LANOLIN ALCOHOL/MO/W.PET/CERES
3 CREAM (GRAM) TOPICAL
Status: DISCONTINUED | OUTPATIENT
Start: 2023-03-05 | End: 2023-03-06 | Stop reason: ALTCHOICE

## 2023-03-05 RX ADMIN — OLANZAPINE 5 MG: 5 TABLET, ORALLY DISINTEGRATING ORAL at 19:21

## 2023-03-05 RX ADMIN — MELATONIN TAB 3 MG 3 MG: 3 TAB at 22:17

## 2023-03-05 RX ADMIN — OLANZAPINE 5 MG: 5 TABLET, ORALLY DISINTEGRATING ORAL at 22:17

## 2023-03-05 ASSESSMENT — ACTIVITIES OF DAILY LIVING (ADL)
ADLS_ACUITY_SCORE: 35

## 2023-03-05 ASSESSMENT — COLUMBIA-SUICIDE SEVERITY RATING SCALE - C-SSRS
1. IN THE PAST MONTH, HAVE YOU WISHED YOU WERE DEAD OR WISHED YOU COULD GO TO SLEEP AND NOT WAKE UP?: YES
REASONS FOR IDEATION LIFETIME: COMPLETELY TO END OR STOP THE PAIN (YOU COULDN'T GO ON LIVING WITH THE PAIN OR HOW YOU WERE FEELING)
2. HAVE YOU ACTUALLY HAD ANY THOUGHTS OF KILLING YOURSELF?: YES
REASONS FOR IDEATION PAST MONTH: COMPLETELY TO END OR STOP THE PAIN (YOU COULDN'T GO ON LIVING WITH THE PAIN OR HOW YOU WERE FEELING)
4. HAVE YOU HAD THESE THOUGHTS AND HAD SOME INTENTION OF ACTING ON THEM?: YES
3. HAVE YOU BEEN THINKING ABOUT HOW YOU MIGHT KILL YOURSELF?: YES
TOTAL  NUMBER OF ABORTED OR SELF INTERRUPTED ATTEMPTS LIFETIME: NO
4. HAVE YOU HAD THESE THOUGHTS AND HAD SOME INTENTION OF ACTING ON THEM?: YES
ATTEMPT LIFETIME: NO
5. HAVE YOU STARTED TO WORK OUT OR WORKED OUT THE DETAILS OF HOW TO KILL YOURSELF? DO YOU INTEND TO CARRY OUT THIS PLAN?: YES
5. HAVE YOU STARTED TO WORK OUT OR WORKED OUT THE DETAILS OF HOW TO KILL YOURSELF? DO YOU INTEND TO CARRY OUT THIS PLAN?: YES
6. HAVE YOU EVER DONE ANYTHING, STARTED TO DO ANYTHING, OR PREPARED TO DO ANYTHING TO END YOUR LIFE?: NO
TOTAL  NUMBER OF INTERRUPTED ATTEMPTS LIFETIME: NO
2. HAVE YOU ACTUALLY HAD ANY THOUGHTS OF KILLING YOURSELF?: YES
1. HAVE YOU WISHED YOU WERE DEAD OR WISHED YOU COULD GO TO SLEEP AND NOT WAKE UP?: YES

## 2023-03-05 NOTE — ED TRIAGE NOTES
Triage Assessment     Row Name 03/05/23 1728       Triage Assessment (Adult)    Airway WDL WDL       Respiratory WDL    Respiratory WDL WDL       Skin Circulation/Temperature WDL    Skin Circulation/Temperature WDL WDL

## 2023-03-05 NOTE — ED PROVIDER NOTES
"     [facility] EMERGENCY DEPARTMENT ([facility])  3/05/23    History     Chief Complaint   Patient presents with     Stress     \"I have a lot going on. Feeling stressed out.\"     Suicidal     The history is provided by the patient and a parent.     Cristina Boyd is a 23 year old female who has a history of depressive disorder and bipolar 1 disorder presents to the ED with feeling stressed and manic. Patient presents with her parents today. Patient reports that she feels stressed and tired. She states that she feels a little safe now at the ED. She states that she has been stressed for a long time, but it is now at its peak. She states she feels it has been at its highest since January where it was not so much, and February was all the time. She states that she feels like work is the cause, with the workload as well as school. She states that she feels overwhelmed. She states that she has not had stress this high before. She states that she feels like she is full blown manic. She states that she was on medication for her history of bipolar disorder and the last time she used it was in November. She states that she took lithium as well as Seroquel at night to sleep. She states that there were no other medications. She states that for the last couple of days, she is over thinking and having difficulty sleeping. She also notes that she feels like she is having hallucinations or imagining things. She states that she also feels a little bit of paranoia.  She denies chest pain or trouble breathing. She states that she has really bad headaches on the side of her head. She denies recent substance use, but states that she has history of it 5 years ago.    Per additional history from crisis , patient has history of bipolar 1 disorder. She was brought in by her parents. She describes her concerns as just having a lot of stress and feels like she cannot deal with anything. She has been off of lithium since " November. She states that she is not taking other medications. She states that for the last couple of days, she has not been able to sleep for more than an hour. She has been wandering around the community and walking a lot. She states she has racing thoughts and difficulty concentrating. She states that she feels like her body is over energized. She states that she has visual hallucinations, and feels like she sees something but does not know what it is. She states that she does endorse suicidal ideations, but is very guarded with her thoughts and starts crying. She states she does not really want to talk about it. She states that she does not follow up with providers. Parents state that she displays symptoms that are similar to the time of her diagnosis in 2018. She states that she is not sure if she wants to take medications to help with her anxiety. She states that she is willing to stay.     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, unspecified thyrotoxicosis type 11/16/2022     Past Surgical History:   Procedure Laterality Date     BRONCHOSCOPY (RIGID OR FLEXIBLE), DIAGNOSTIC N/A 6/16/2018    Procedure: COMBINED BRONCHOSCOPY (RIGID OR FLEXIBLE), LAVAGE;;  Surgeon: Manjeet Holland MD;  Location:  GI     lithium ER (LITHOBID) 300 MG CR tablet  QUEtiapine (SEROQUEL) 25 MG tablet      No Known Allergies  Family History  Family History   Problem Relation Age of Onset     Impaired Fasting Glucose Father         prediabetes     Thyroid Disease Maternal Grandmother      Diabetes Maternal Grandmother      Depression Maternal Uncle      Depression Other      Social History   Social History     Tobacco Use     Smoking status: Never     Smokeless tobacco: Never   Vaping Use  "    Vaping Use: Never used   Substance Use Topics     Alcohol use: No     Drug use: No     Comment: x1 marijuana, in October, 2016             Physical Exam   BP: 118/79  Pulse: 101  Temp: 98.3  F (36.8  C)  Resp: 16  Height: 167.6 cm (5' 6\")  Weight: 63.3 kg (139 lb 9.6 oz)  SpO2: 100 %  Physical Exam  Vitals and nursing note reviewed.   Constitutional:       General: She is not in acute distress.     Appearance: Normal appearance. She is not diaphoretic.   HENT:      Head: Atraumatic.      Mouth/Throat:      Pharynx: No oropharyngeal exudate.   Eyes:      General: No scleral icterus.     Pupils: Pupils are equal, round, and reactive to light.   Cardiovascular:      Rate and Rhythm: Normal rate and regular rhythm.      Heart sounds: Normal heart sounds.   Pulmonary:      Effort: No respiratory distress.      Breath sounds: Normal breath sounds.   Abdominal:      General: Bowel sounds are normal.      Palpations: Abdomen is soft.      Tenderness: There is no abdominal tenderness.   Musculoskeletal:         General: No tenderness.   Skin:     General: Skin is warm.      Findings: No rash.   Neurological:      Mental Status: She is alert.           ED Course, Procedures, & Data     5:50 PM  The patient was seen and examined by Nupur Alex in Room ED12.     Procedures     Mental Health Risk Assessment      PSS-3    Date and Time Over the past 2 weeks have you felt down, depressed, or hopeless? Over the past 2 weeks have you had thoughts of killing yourself? Have you ever attempted to kill yourself? When did this last happen? User   03/05/23 1728 yes yes yes more than 6 months ago TB      C-SSRS ([place])    Date and Time Q1 Wished to be Dead (Past Month) Q2 Suicidal Thoughts (Past Month) Q3 Suicidal Thought Method Q4 Suicidal Intent without Specific Plan Q5 Suicide Intent with Specific Plan Q6 Suicide Behavior (Lifetime) Within the Past 3 Months? RETIRED: Level of Risk per Screen Screening Not Complete User "   03/05/23 1728 yes yes yes no no yes -- -- -- TB              Suicide assessment completed by mental health (D.E.C., LCSW, etc.)       Results for orders placed or performed during the hospital encounter of 03/05/23   Urine Drugs of Abuse Screen     Status: None (In process)    Narrative    The following orders were created for panel order Urine Drugs of Abuse Screen.  Procedure                               Abnormality         Status                     ---------                               -----------         ------                     Drug abuse screen 1 urin...[[id number]]                      In process                   Please view results for these tests on the individual orders.     Medications - No data to display  Labs Ordered and Resulted from Time of ED Arrival to Time of ED Departure - No data to display  No orders to display          Critical care was not performed.     Medical Decision Making  The patient's presentation was of high complexity (a chronic illness severe exacerbation, progression, or side effect of treatment).    The patient's evaluation involved:  ordering and/or review of 3+ test(s) in this encounter (see separate area of note for details)  discussion of management or test interpretation with another health professional (see separate area of note for details)    The patient's management necessitated moderate risk (prescription drug management including medications given in the ED) and high risk (a decision regarding hospitalization).      Assessment & Plan      23 year old female who has a history of depressive disorder and bipolar 1 disorder presents to the ED with feeling stressed and manic.  Vital signs stable and afebrile at triage including normal pulse ox at 100% on room air.  Patient seen and coordination with behavioral crisis , please refer to their note for further details.  Agree with formulation patient would benefit from inpatient status for psychiatric  stabilization and restarting her medication.  She is given Zyprexa 5 mg ODT twice daily as a temporizing measure.  Mental health order order set initiated including psychiatric consult.    I have reviewed the nursing notes. I have reviewed the findings, diagnosis, plan and need for follow up with the patient.    New Prescriptions    No medications on file       Final diagnoses:   Bipolar affective disorder, currently manic, severe, with psychotic features (H)   ITYLER, am serving as a trained medical scribe to document services personally performed by Nupur Alex MD, based on the provider's statements to me.      INupur MD, was physically present and have reviewed and verified the accuracy of this note documented by TYLER MICHEL.         Nupur Alex MD  [facility] EMERGENCY DEPARTMENT  3/5/2023     Nupur Alex MD  03/06/23 0107

## 2023-03-06 ENCOUNTER — TELEPHONE (OUTPATIENT)
Dept: BEHAVIORAL HEALTH | Facility: CLINIC | Age: 23
End: 2023-03-06
Payer: COMMERCIAL

## 2023-03-06 LAB
ALBUMIN SERPL BCG-MCNC: 3.9 G/DL (ref 3.5–5.2)
ALP SERPL-CCNC: 74 U/L (ref 35–104)
ALT SERPL W P-5'-P-CCNC: 11 U/L (ref 10–35)
ANION GAP SERPL CALCULATED.3IONS-SCNC: 11 MMOL/L (ref 7–15)
AST SERPL W P-5'-P-CCNC: 21 U/L (ref 10–35)
BASOPHILS # BLD AUTO: 0 10E3/UL (ref 0–0.2)
BASOPHILS NFR BLD AUTO: 1 %
BILIRUB SERPL-MCNC: 0.4 MG/DL
BUN SERPL-MCNC: 7.3 MG/DL (ref 6–20)
CALCIUM SERPL-MCNC: 9.3 MG/DL (ref 8.6–10)
CHLORIDE SERPL-SCNC: 106 MMOL/L (ref 98–107)
CREAT SERPL-MCNC: 0.59 MG/DL (ref 0.51–0.95)
DEPRECATED HCO3 PLAS-SCNC: 22 MMOL/L (ref 22–29)
EOSINOPHIL # BLD AUTO: 0.1 10E3/UL (ref 0–0.7)
EOSINOPHIL NFR BLD AUTO: 2 %
ERYTHROCYTE [DISTWIDTH] IN BLOOD BY AUTOMATED COUNT: 15.7 % (ref 10–15)
GFR SERPL CREATININE-BSD FRML MDRD: >90 ML/MIN/1.73M2
GLUCOSE SERPL-MCNC: 84 MG/DL (ref 70–99)
HCT VFR BLD AUTO: 34.1 % (ref 35–47)
HGB BLD-MCNC: 11.1 G/DL (ref 11.7–15.7)
IMM GRANULOCYTES # BLD: 0 10E3/UL
IMM GRANULOCYTES NFR BLD: 0 %
LYMPHOCYTES # BLD AUTO: 2.3 10E3/UL (ref 0.8–5.3)
LYMPHOCYTES NFR BLD AUTO: 42 %
MCH RBC QN AUTO: 27.8 PG (ref 26.5–33)
MCHC RBC AUTO-ENTMCNC: 32.6 G/DL (ref 31.5–36.5)
MCV RBC AUTO: 86 FL (ref 78–100)
MONOCYTES # BLD AUTO: 0.7 10E3/UL (ref 0–1.3)
MONOCYTES NFR BLD AUTO: 12 %
NEUTROPHILS # BLD AUTO: 2.4 10E3/UL (ref 1.6–8.3)
NEUTROPHILS NFR BLD AUTO: 43 %
NRBC # BLD AUTO: 0 10E3/UL
NRBC BLD AUTO-RTO: 0 /100
PLATELET # BLD AUTO: 283 10E3/UL (ref 150–450)
POTASSIUM SERPL-SCNC: 3.8 MMOL/L (ref 3.4–5.3)
PROT SERPL-MCNC: 7.1 G/DL (ref 6.4–8.3)
RBC # BLD AUTO: 3.99 10E6/UL (ref 3.8–5.2)
SODIUM SERPL-SCNC: 139 MMOL/L (ref 136–145)
TSH SERPL DL<=0.005 MIU/L-ACNC: 2.26 UIU/ML (ref 0.3–4.2)
WBC # BLD AUTO: 5.5 10E3/UL (ref 4–11)

## 2023-03-06 PROCEDURE — 99223 1ST HOSP IP/OBS HIGH 75: CPT

## 2023-03-06 PROCEDURE — 84450 TRANSFERASE (AST) (SGOT): CPT

## 2023-03-06 PROCEDURE — 85025 COMPLETE CBC W/AUTO DIFF WBC: CPT

## 2023-03-06 PROCEDURE — 250N000013 HC RX MED GY IP 250 OP 250 PS 637: Performed by: EMERGENCY MEDICINE

## 2023-03-06 PROCEDURE — 36415 COLL VENOUS BLD VENIPUNCTURE: CPT

## 2023-03-06 PROCEDURE — 250N000013 HC RX MED GY IP 250 OP 250 PS 637

## 2023-03-06 PROCEDURE — 84443 ASSAY THYROID STIM HORMONE: CPT | Performed by: FAMILY MEDICINE

## 2023-03-06 RX ORDER — OLANZAPINE 10 MG/1
10 TABLET, ORALLY DISINTEGRATING ORAL AT BEDTIME
Status: DISCONTINUED | OUTPATIENT
Start: 2023-03-06 | End: 2023-03-10

## 2023-03-06 RX ORDER — LITHIUM CARBONATE 600 MG/1
600 CAPSULE ORAL AT BEDTIME
Status: DISCONTINUED | OUTPATIENT
Start: 2023-03-06 | End: 2023-03-08

## 2023-03-06 RX ORDER — OLANZAPINE 5 MG/1
5 TABLET, ORALLY DISINTEGRATING ORAL 2 TIMES DAILY
Status: DISCONTINUED | OUTPATIENT
Start: 2023-03-06 | End: 2023-03-06

## 2023-03-06 RX ORDER — ACETAMINOPHEN 325 MG/1
650 TABLET ORAL EVERY 4 HOURS PRN
Status: DISCONTINUED | OUTPATIENT
Start: 2023-03-06 | End: 2023-03-10 | Stop reason: HOSPADM

## 2023-03-06 RX ORDER — OLANZAPINE 5 MG/1
5 TABLET, ORALLY DISINTEGRATING ORAL AT BEDTIME
Status: DISCONTINUED | OUTPATIENT
Start: 2023-03-06 | End: 2023-03-06

## 2023-03-06 RX ADMIN — OLANZAPINE 10 MG: 10 TABLET, ORALLY DISINTEGRATING ORAL at 21:17

## 2023-03-06 RX ADMIN — ACETAMINOPHEN 325MG 650 MG: 325 TABLET ORAL at 04:46

## 2023-03-06 RX ADMIN — ACETAMINOPHEN 325MG 650 MG: 325 TABLET ORAL at 16:39

## 2023-03-06 RX ADMIN — LITHIUM CARBONATE 600 MG: 600 CAPSULE ORAL at 21:17

## 2023-03-06 RX ADMIN — OLANZAPINE 5 MG: 5 TABLET, ORALLY DISINTEGRATING ORAL at 09:07

## 2023-03-06 ASSESSMENT — ACTIVITIES OF DAILY LIVING (ADL)
ADLS_ACUITY_SCORE: 35

## 2023-03-06 NOTE — ED NOTES
Patient woke up at 0415 after a log sleep hours  and complained of a headache rating it at 6/20. Writer called and got an order for 650 mg Tylenol which as administered to patient. Patient went back to bed. Staff will continue to Monitor.

## 2023-03-06 NOTE — TELEPHONE ENCOUNTER
S: [facility] , DEC  Michelle calling at 6:43pm  about a 23 year old/Female presenting with psychosis.        B: Pt arrived via Family. Presenting problem, stressors: Pt reports in the last two weeks her symptoms have increased, symptoms including: racing thoughts, VH: she can't describe what she sees, decreased appetite and energy. Pt has been off her medications since November. Additionally, she reports SI and has been thinking of multiple ways to kill herself but will not discuss this.     Pt affect in ED: Flat  Pt Dx: Bipolar Disorder  Previous [facility] hx? Yes: June 2018    Pt endorses SI with multiple plans that she won't discuss.   Hx of suicide attempt? No  Pt denies SIB  Pt denies HI   Pt endorses visual hallucination .     Hx of aggression/violence, sexual offences, legal concerns, or Epic care plan? describe: No  Current concerns for aggression this visit? No  Does pt have a history of Civil Commitment? No  Is Pt their own guardian? Yes    Pt is prescribed medication. Is patient medication compliant? No  Pt denies OP services   CD concerns: None  Acute or chronic medical concerns: No  Does Pt present with specific needs, assistive devices, or exclusionary criteria? None      Pt is ambulatory  Pt is able to perform ADLs independently      A: Pt to be reviewed for [facility] admission. Pt is Voluntary  Preferred placement: [facility]    COVID:In process  Utox: Negative   CMP: N/A  CBC: N/A  HCG: Negative    R: Patient cleared and ready for behavioral bed placement: Yes  Pt placed on IP worklist? Yes

## 2023-03-06 NOTE — ED NOTES
Patient has been calm and cooperative this shift. She denies feelings of SI, HI and no hallucinations. She was medication compliant.

## 2023-03-06 NOTE — TELEPHONE ENCOUNTER
Updated Bed Search @ 10:50 PM  Per chart review, intake can look in the [facility] for placement     [facility] has 0 appropriate beds available. Phone: [phone]  [facility] posting 0 available beds. Phone: [phone]  [facility] posting 0 available beds. Phone: [phone]  [facility] posting 0 available beds. Phone: [phone]  [facility] posting 0 available beds. Phone: [phone]  [facility] posting 0 available beds. Phone:[phone]  [facility] posting 0 available beds. Phone: [phone]. Negative covid required.     Pt remains on work list pending appropriate bed placement.

## 2023-03-06 NOTE — PHARMACY-ADMISSION MEDICATION HISTORY
Admission Medication History Completed by Pharmacy    See [facility] Admission Navigator for allergy information, preferred outpatient pharmacy, prior to admission medications and immunization status.     Medication History Sources:     Patient interview.    Fill history of outside medications and dispense report.      Changes made to PTA medication list (reason):    Added: None    Deleted: None    Changed: Updated the time and date of last dose taken reported by patient.      Additional Information:    Patient stopped both Seroquel and lithium back in November of 2022 because she felt better and does not think she needs medication anymore.    She is interested in resuming both medication at discharge.    Prior to Admission medications    Medication Sig Last Dose Taking? Auth Provider Long Term End Date   lithium ER (LITHOBID) 300 MG CR tablet Take 2 tablets (600 mg) by mouth At Bedtime 11/1/2022 Yes Zhang Agosto MD Yes    QUEtiapine (SEROQUEL) 25 MG tablet Take 25 mg by mouth At Bedtime 11/1/2022 Yes Reported, Patient No        Date completed: 03/06/23    Medication history completed by: Jessica Gooden [facility]

## 2023-03-06 NOTE — ED NOTES
"Triage & Transition Services, Extended Care     Cristina Boyd  March 6, 2023    Cristina is followed related to Long wait time for admission: 21+ hours in ER. Please see initial DEC Crisis Assessment completed for complete assessment information. Medical record is reviewed. While patient is in the ED, care team is working towards Learn and Demonstrate at [employer] Skill Focused on Crisis Stabilization.     Patient was engaged throughout interaction. Her speech is pressured for duration of interaction. She reports she is getting \"better\" and is happy to be restarting medication. States she went off of in it in November as \"I felt I was doing fine\" and is displaying insight into how medication is important for her menta health. States she slept better last night than she has in a while and she feels this was helpful as well. Denies SI and HI. Denies hallucinations, however did endorse visual hallucinations with psychiatry provider. She is hoping to discharge tomorrow as she states \"I will be good by then\". Is open to revisiting this conversation tomorrow to assess for appropriateness of this plan. She plans on following up wit her psychiatry provider and has sent a message to her via [employer]. She is not interested in writer assisting with therapy follow-up and states she will talk with her outpatient provider regarding this.    Plan:  Inpatient Mental Health: Continues to endorse visual hallucinations, have pressured speech, recently restarted medication and would benefit from time to get to a therapeutic level. Inpatient hospitalization would provide benefit for additional stabilization. However, Patient does not appear holdable should she ask to leave. She is open to follow-up with her outpatient psychiatric provider. She is reports she will request to leave tomorrow. As of now, she remains on the inpatient work list and Extended Care will reassess tomorrow.     Plan for Care reviewed with Assigned Medical Provider? " Yes. Provider, Dr. Eduardo, response: agreeable.    Extended Care will follow and meet with patient/family/care team as able or requested.     Corinne Romitti, [facility], Extended Care   [phone]

## 2023-03-06 NOTE — ED NOTES
Patient took evening medications and is in bed sleeping. No respiratory distress noted at this time.  Will continue to monitor.

## 2023-03-06 NOTE — PLAN OF CARE
Cristina Boyd  March 5, 2023  Plan of Care Hand-off Note     Patient Care Path: Inpatient Mental Health    Plan for Care:   Pt presents with increased mental health symptoms of shaina and depression. She is not sleeping. She has decreased appetite, racing thoughts and suicidal ideation with plan that she will not discuss.  She has been off medications for 4 months and has not been following up with any providers.    Critical Safety Issues: No     Overview:    This patient has additional special visitor precautions: No    Legal Status: Voluntaryt    Psychiatry Consult:  Psychiatry Consult not requested because plan is for admit     Updated RN and Attending Provider regarding plan of care.    Aline Lockwood, LICSW

## 2023-03-06 NOTE — CONSULTS
Diagnostic Evaluation Consultation  Crisis Assessment    Patient was assessed: I-Pad  Patient location: [facility] ED   Was a release of information signed: No. Reason: no current providers       Referral Data and Chief Complaint  Pt is a 23 year old, who uses she/her pronouns, and presents to the ED with family/friends. Patient is referred to the ED by self. Patient is presenting to the ED for the following concerns: stress. Being off medications.      Informed Consent and Assessment Methods  Patient is her own guardian. Writer met with patient and explained the crisis assessment process, including applicable information disclosures and limits to confidentiality, assessed understanding of the process, and obtained consent to proceed with the assessment. Patient was observed to be able to participate in the assessment as evidenced by engaged in assessment . Assessment methods included conducting a formal interview with patient, review of medical records, collaboration with medical staff, and obtaining relevant collateral information from family and community providers when available..     Over the course of this crisis assessment provided reassurance and offered validation.      Summary of Patient Situation  Pt brought to the ED by her parents. They are present in the ED but stepped out of room at her request for assessment.   She endorses having a lot of stress recently. She stopped taking her Lithium in 11/2022. In the last month she has been experiencing more reactions to stress. Her job is stressful and she says she is not going back there ever and wants a note from the ED.  She is having difficulty sleeping.  She endorses racing thoughts, feeling like her body is over energized, visual hallucinations. She thinks she sees something but is not sure what it is.  She endorses having intrusive suicidal thoughts. She has thought of ways she could end her life but will not discuss. This subject seems to cause much distress  and she begins to cry, wring her hands through her head and says she does not want to talk about it.      Brief Psychosocial History   [place].  Her family is originally from [place].  She was in [place] for 3 years and then moved [place] the [place] of [place].  She resides with her mother, father and several siblings.  She is a practicing [employer].   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   She does not have any legal problems.     Significant Clinical History  Pt with history of Bipolar Disorder.  She has been hospitalized several times for mental health, [facility] 6/2018, 1/2017. She is currently not following with medication manager or therapist.       Collateral Information  Spoke to dad and mom present in the ED.  Has not slept more than 1 hour in the last 3 days. She has also been more anxious and talkative in the last 2 weeks.  She will leave the house and walk for miles.  She has not been taking her medications. She is displaying the same 'sickness she had last time she came here.'     Risk Assessment  [place] Suicide Severity Rating Scale Full Clinical Version:  Suicidal Ideation  1. Wish to be Dead (Lifetime): Yes  1. Wish to be Dead (Past 1 Month): Yes  2. Non-Specific Active Suicidal Thoughts (Lifetime): Yes  2. Non-Specific Active Suicidal Thoughts (Past 1 Month): Yes  3. Active Suicidal Ideation with any Methods (Not Plan) Without Intent to Act (Lifetime): Yes  3. Active Suicidal Ideation with any Methods (Not Plan) Without Intent to Act (Past 1 Month): Yes  4. Active Suicidal Ideation with Some Intent to Act, Without Specific Plan (Lifetime): Yes  4. Active Suicidal Ideation with Some Intent to Act, Without Specific Plan (Past 1 Month): Yes  5. Active Suicidal Ideation with Specific Plan and Intent (Lifetime): Yes  5. Active Suicidal Ideation with Specific Plan and Intent (Past 1 Month): Yes  Intensity of Ideation  Most Severe Ideation  Rating (Lifetime): 5  Most Severe Ideation Rating (Past 1 Month): 5  Frequency (Lifetime): Less than once a week  Frequency (Past 1 Month): Daily or almost daily  Duration (Lifetime): Fleeting, few seconds or minutes  Duration (Past 1 Month): 1-4 hours/a lot of time  Controllability (Lifetime): Easily able to control thoughts  Controllability (Past 1 Month): Can control thoughts with some difficulty  Deterrents (Lifetime): Deterrents definitely stopped you from attempting suicide  Deterrents (Past 1 Month): Deterrents probably stopped you  Reasons for Ideation (Lifetime): Completely to end or stop the pain (You couldn't go on living with the pain or how you were feeling)  Reasons for Ideation (Past 1 Month): Completely to end or stop the pain (You couldn't go on living with the pain or how you were feeling)  Suicidal Behavior  Actual Attempt (Lifetime): No  Has subject engaged in non-suicidal self-injurious behavior? (Lifetime): No  Interrupted Attempts (Lifetime): No  Aborted or Self-Interrupted Attempt (Lifetime): No  Preparatory Acts or Behavior (Lifetime): No  C-SSRS Risk (Lifetime/Recent)  Calculated C-SSRS Risk Score (Lifetime/Recent): High Risk     Validity of evaluation is not impacted by presenting factors during interview. Pt is guarded but appears to be a reliable historian.  Comments regarding subjective versus objective responses to Lyman tool:   Environmental or Psychosocial Events: helplessness/hopelessness  Chronic Risk Factors: history of psychiatric hospitalization, chronic and ongoing sleep difficulties and serious, persistent mental illness   Warning Signs: hopelessness and anxiety, agitation, unable to sleep, sleeping all the time  Protective Factors: strong bond to family unit, community support, or employment, responsibilities and duties to others, including pets and children, lives in a responsibly safe and stable environment, help seeking and cultural, spiritual , or Caodaism beliefs  associated with meaning and value in life  Interpretation of Risk Scoring, Risk Mitigation Interventions and Safety Plan:           Does the patient have thoughts of harming others? No     Is the patient engaging in sexually inappropriate behavior?  no        Current Substance Abuse  Is there recent substance abuse? no     Was a urine drug screen or blood alcohol level obtained: No       Mental Status Exam   Affect: Constricted   Appearance: Appropriate    Attention Span/Concentration: Attentive  Eye Contact: Engaged   Fund of Knowledge: Appropriate    Language /Speech Content: Fluent   Language /Speech Volume: Normal    Language /Speech Rate/Productions: Normal    Recent Memory: Intact   Remote Memory: Intact   Mood: Normal and Sad    Orientation to Person: Yes    Orientation to Place: Yes   Orientation to Time of Day: Yes    Orientation to Date: Yes    Situation (Do they understand why they are here?): Yes    Psychomotor Behavior: Normal    Thought Content: Suicidal   Thought Form: Intact      History of commitment: No     Medication  Psychotropic medications: yes  Medication changes made in the last two weeks: No       Current Care Team  Primary Care Provider: No  Psychiatrist: No  Therapist: No  : No  CTSS or ARMHS: No  ACT Team: No    Diagnosis  F31.9 Bipolar Disorder     Clinical Summary and Substantiation of Recommendations    Pt presents with increased mental health symptoms of shaina and depression. She is not sleeping. She has decreased appetite, racing thoughts and suicidal ideation with plan that she will not discuss.  She has been off medications for 4 months and has not been following up with any providers. Admission is recommended for safety and stabilization.  Admission to Inpatient Level of Care is indicated due to:    1. Patient risk of severity of behavioral health disorder is appropriate to proposed level of care as indicated by:    Imminent Risk of Harm: Current plan for suicide or  serious harm to self is present  And/or:  Behavioral health disorder is present and appropriate for inpatient care with both of the following:     Severe psychiatric, behavioral or other comorbid conditions are appropriate for management at inpatient mental health as indicated by at least one of the following:   o Adela and Depressive symptoms    Severe dysfunction in daily living is present as indicated by at least one of the following:   o Extreme deterioration in social interactions    2. Inpatient mental health services are necessary to meet patient needs and at least one of the following:  Specific condition related to admission diagnosis is present and judged likely to further improve at proposed level of care    3. Situation and expectations are appropriate for inpatient care, as indicated by one of the following:   Voluntary treatment at lower level of care is not feasible    Disposition  Recommended disposition: Inpatient Mental Health       Reviewed case and recommendations with attending provider. Attending Name: Dr. Alex        Attending concurs with disposition: Yes       Patient concurs with disposition: Yes       Guardian concurs with disposition: NA      Final disposition: Inpatient mental health .     Inpatient Details (if applicable):   Is patient admitted voluntarily:Yes      Patient aware of potential for transfer if there is not appropriate placement? Yes       Patient is willing to travel outside of the [facility] for placement? No      Behavioral Intake Notified? Yes: Date: 3/5@645p. Birdie     Assessment Details  Patient interview started at: 555p and completed at: 625p.     Total duration spent on the patient case in minutes: .50 hrs      CPT code(s) utilized: 24583 - Psychotherapy for Crisis - 60 (30-74*) min       MARCELINA Vallecillo, LICSW, Psychotherapist  [facility] - Triage & Transition Services  Callback: [phone]

## 2023-03-06 NOTE — TELEPHONE ENCOUNTER
0038 Bed Search Update:    [facility] at 2353 Website updated to reflect there are no beds available.  [facility]Website updated 3/5 at 2119 to reflect there are no beds available.  [facility]-0004 unit reporting they are at capacity.  Check back in AM.  [facility]-Website updated 3/6 at 0001 to reflect there are no beds available.    [facility]-Committed pts only.  Long wait list    Remains on wait list.

## 2023-03-06 NOTE — TELEPHONE ENCOUNTER
R:  No appropriate [facility] beds available.    [facility] Access Inpatient Bed Call Log 3/6/2023 @2:45 PM    Intake has called facilities that have not updated their bed status within the last 12 hours.     Adults:      [facility] is posting 0 beds.      [facility] is posting 0 [phone]      [facility] is posting 0 beds. [phone]     [facility] is posting 0 beds. [phone] 8:23a Per Pavithra @ [facility] is posting 0 beds. [phone]     [facility] is posting 0 beds. [phone]     [facility] is posting 0 beds. [phone]     [facility] is posting 0 beds. [phone]     [facility] is posting 0 beds. [phone]       Pt remains on [facility] work list pending appropriate bed availability.

## 2023-03-06 NOTE — CONSULTS
"  Cristina Boyd MRN# [id number]   Age: 23 year old YOB: 2000   Date of Admission to ED: 3/5/2023    In person visit Details:     Patient was assessed and interviewed face-to-face in person with this writer giuseppe. Patient was observed to be able to participate in the assessment as evidenced by verbal consent. Assessment methods included conducting a formal interview with patient, review of medical records, collaboration with medical staff, and obtaining relevant collateral information from family and community providers when available.        Reason for Consult:     Psychiatry was consulted for med management by ED provider to restart the lithium     Patient was alert and oriented x4 pleasant and cooperative during assessment and interview.  Patient denied any suicidal ideation or homicidal ideation or self injury behavior.  Patient denied any auditory hallucination but she endorsed visual hallucinations she continues to see people.  Patient said she stopped taking her medication lithium and Seroquel in November 2022.  As she said \" since then my mental health continues to decline I cannot sleep or act properly, I need help with my mental health my shaian is getting out of control.\"  Patient says she used to take 600 mg of lithium at bedtime and Seroquel as needed, she stopped taking both medication at the same time in November 2022.  Currently patient is on Zyprexa 5 mg twice daily started in the emergency room patient says she likes the medications she has been resting well in the emergency room.  We will switch Zyprexa 5 mg to 10 mg at bedtime to prevent sedation during days, and restart lithium 600 mg at bedtime.    Patient is voluntary for inpatient mental health unit, but she stated she does not want to stay in the emergency room after Wednesday, March 8, 2023.  Patient said \" I am going to travel to [place] and visit my family member in [place].\"  The writer to confirm the plan with her mother " "Joy who said that the patient is going to go to [place] soon.      Currently patient is not imminent danger to herself or to community she is not holdable.  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     Writer discussed both with patient and mother benefit and side effect of Zyprexa as follow  -Antipsychotics: the patient was informed of possible adverse effects including, but not limited to: akathisia, extrapyramidal symptoms, dystonia, drowsiness, and long term concerns for tardive dyskinesia, weight gain, insulin resistance, dyslipidemia, and cognitive slowing. The patient expressed understanding. Alternatives to this medication were also discussed with the patient, including risks of not taking medications, and the patient was agreeable to the above choice.    Writer called and spoke to patient mother Joy over the phone, the mother said \" she was not sleeping for the last 2 to 3 weeks, she continued to walk in the cold by herself, I am afraid [place].\"  The mother ask if she can stay 2 to 3 days inpatient mental health unit.  The writer told the mother currently patient is voluntary for inpatient mental health unit, patient denied suicidal ideation homicidal ideation, she has been resting and cooperating while in the emergency room we cannot force her to stay because she is not imminent danger to herself or to the community.    I have reviewed the nursing notes. I have reviewed the findings, diagnosis, plan and need for follow up with the patient.         HPI:     Per ED provider note  Dr. Rusty Boyd is a 23 year old female who has a history of depressive disorder and bipolar 1 disorder presents to the ED with feeling stressed and manic. Patient presents with her parents today. " Patient reports that she feels stressed and tired. She states that she feels a little safe now at the ED. She states that she has been stressed for a long time, but it is now at its peak. She states she feels it has been at its highest since January where it was not so much, and February was all the time. She states that she feels like work is the cause, with the workload as well as school. She states that she feels overwhelmed. She states that she has not had stress this high before. She states that she feels like she is full blown manic. She states that she was on medication for her history of bipolar disorder and the last time she used it was in November. She states that she took lithium as well as Seroquel at night to sleep. She states that there were no other medications. She states that for the last couple of days, she is over thinking and having difficulty sleeping. She also notes that she feels like she is having hallucinations or imagining things. She states that she also feels a little bit of paranoia.  She denies chest pain or trouble breathing. She states that she has really bad headaches on the side of her head. She denies recent substance use, but states that she has history of it 5 years ago.     Per additional history from crisis , patient has history of bipolar 1 disorder. She was brought in by her parents. She describes her concerns as just having a lot of stress and feels like she cannot deal with anything. She has been off of lithium since November. She states that she is not taking other medications. She states that for the last couple of days, she has not been able to sleep for more than an hour. She has been wandering around the community and walking a lot. She states she has racing thoughts and difficulty concentrating. She states that she feels like her body is over energized. She states that she has visual hallucinations, and feels like she sees something but does not know what it  is. She states that she does endorse suicidal ideations, but is very guarded with her thoughts and starts crying. She states she does not really want to talk about it. She states that she does not follow up with providers. Parents state that she displays symptoms that are similar to the time of her diagnosis in 2018. She states that she is not sure if she wants to take medications to help with her anxiety. She states that she is willing to stay.              Pt has not required locked seclusion or restraints in the past 24 hours to maintain safety, please refer to RN documentation for further details.  Substance use does not appear to be playing a contributing role in the patient's presentation.  Brief Therapeutic Intervention(s):   Provided active listening, unconditional positive regard, and validation. Engaged in cognitive restructuring/ reframing, looked at common cognitive distortions and challenged negative thoughts. Engaged in guided discovery, explored patient's perspectives and helped expand them through socratic dialogue. Provided positive reinforcement for progress towards goals, gains in knowledge, and application of skills previously taught.  Engaged in social skills training. Explored and identified early warning signs to anger.        Past Psychiatric History:     She has been hospitalized several times for mental health, [facility] 6/2018, 1/2017. She is currently not following with medication manager or therapist.             Substance Use and History:   None         Past Medical History:   PAST MEDICAL HISTORY:   Past Medical History:   Diagnosis Date     Bipolar disorder (H)      Depressive disorder      Infection due to 2019 novel coronavirus 2020    summer 2020 by patient report     Infection due to 2019 novel coronavirus 12/2021     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, unspecified thyrotoxicosis type 11/16/2022       PAST SURGICAL HISTORY:   Past Surgical History:   Procedure Laterality Date      BRONCHOSCOPY (RIGID OR FLEXIBLE), DIAGNOSTIC N/A 6/16/2018    Procedure: COMBINED BRONCHOSCOPY (RIGID OR FLEXIBLE), LAVAGE;;  Surgeon: Manjeet Holland MD;  Location:  GI               Allergies:   No Known Allergies          Medications:     I have reviewed this patient's current medications  Current Facility-Administered Medications   Medication     acetaminophen (TYLENOL) tablet 650 mg     lithium (ESKALITH) capsule 600 mg     melatonin tablet 5 mg     OLANZapine zydis (zyPREXA) ODT half-tab 2.5 mg     OLANZapine zydis (zyPREXA) ODT tab 10 mg     Current Outpatient Medications   Medication Sig     lithium ER (LITHOBID) 300 MG CR tablet Take 2 tablets (600 mg) by mouth At Bedtime     QUEtiapine (SEROQUEL) 25 MG tablet Take 25 mg by mouth At Bedtime              Family History:   FAMILY HISTORY:   Family History   Problem Relation Age of Onset     Impaired Fasting Glucose Father         prediabetes     Thyroid Disease Maternal Grandmother      Diabetes Maternal Grandmother      Depression Maternal Uncle      Depression Other            Social History:   SOCIAL HISTORY:   Social History     Tobacco Use     Smoking status: Never     Smokeless tobacco: Never   Substance Use Topics     Alcohol use: No            PTA Medications:   (Not in a hospital admission)         Allergies:   No Known Allergies       Labs:     Recent Results (from the past 48 hour(s))   HCG qualitative urine    Collection Time: 03/05/23  5:40 PM   Result Value Ref Range    hCG Urine Qualitative Negative Negative   Drug abuse screen 1 urine (ED)    Collection Time: 03/05/23  5:40 PM   Result Value Ref Range    Amphetamines Urine Screen Negative Screen Negative    Barbituates Urine Screen Negative Screen Negative    Benzodiazepine Urine Screen Negative Screen Negative    Cannabinoids Urine Screen Negative Screen Negative    Cocaine Urine Screen Negative Screen Negative    Opiates Urine Screen Negative Screen Negative   Asymptomatic COVID-19 Virus  "(Coronavirus) by PCR Nasopharyngeal    Collection Time: 03/05/23  6:35 PM    Specimen: Nasopharyngeal; Swab   Result Value Ref Range    SARS CoV2 PCR Negative Negative          Physical and Psychiatric Examination:     /73   Pulse 96   Temp 98.1  F (36.7  C) (Oral)   Resp 16   Ht 1.676 m (5' 6\")   Wt 63.3 kg (139 lb 9.6 oz)   LMP 03/05/2023   SpO2 99%   BMI 22.53 kg/m    Weight is 139 lbs 9.6 oz  Body mass index is 22.53 kg/m .    Mental Status Exam:  Appearance: awake, alert  Attitude:  cooperative  Eye Contact:  good  Mood:  good  Affect:  appropriate and in normal range  Speech:  clear, coherent  Language: fluent and intact in English  Psychomotor, Gait, Musculoskeletal:  no evidence of tardive dyskinesia, dystonia, or tics  Thought Process:  logical and linear  Associations:  no loose associations  Thought Content:  no evidence of suicidal ideation or homicidal ideation  Insight:  fair  Judgement:  fair  Oriented to:  time, person, and place  Attention Span and Concentration:  fair  Recent and Remote Memory:  fair  Fund of Knowledge:  appropriate         Diagnoses:   Bipolar affective disorder, currently manic, severe, with psychotic features (H)         Recommendations:   1. Continue to recommend inpatient psychiatric hospitalizations for further stabilization   2.  Start Zyprexa 10 mg at bedtime, lithium 600 mg at bedtime, recheck lithium level 5 to 6 days after initiation  3.  Zyprexa 2.5 mg as needed  4.  Consult psychiatry as needed   treatment per ED team  - Start   - Discussion with    - Consulted with [facility] Corinne LM, ED physician Dr. Medhat Larry,  regarding this case.    Please call [facility] at [phone] if you have follow-up questions or wish to place another consult.  Claudio Nicole, Psychiatric Nurse practitioner    Attestation:  Time with:  Patient/Mother  45 minutes  Treatment Team: 20 Minutes  Chart Review: 20 minutes    Total time spent was 85minutes. Over 50% of times was " spent counseling and coordination of care.    I, Claudio Nicole, CNP, APRN, Psychiatric Nurse Practitioner have personally performed an examination of this patient.  I have edited the note to reflect all relevant changes.  I have discussed this patient with the care team March 6, 2023.  I have reviewed all vitals and laboratory findings.    Disclaimer: This note consists of symbols derived from keyboarding,

## 2023-03-06 NOTE — ED NOTES
Triage & Transition Services, Extended Care    Client Name: Critsina Boyd    Date: March 6, 2023  Service Type:  Group Therapy  Session Start Time:  11am    Session End Time: 11:30am  Session Length: 30 minutes  Site Location: [facility]  Attendees: Patient and other group members  Facilitator: RUTHY Shultz     Topic:   Coping strategies     Intervention:    Group process: support, challenge, affirm, psycho-education.     Response:  Patient did participate in group. Behavior in group was engaged and appeared to have pressured speech. Patient shared  Movies as one of her coping skills.       RUTHY Shultz

## 2023-03-06 NOTE — ED NOTES
Patient arrived unit at 2010 and was introduced to her room.  Vital signs table. Patient answered a few assessment questions and then stated that she was tired and wanted to rest patient stated that she feels suicidal but does not want to talk about it. Patient told writer she came in  the hospital because she is stressed, exhausted, anxiety, having manic episodes, depressed and  lack of sleep.  Patient did not want to talk any further and is resting at this time. Staff will continue to  monitor.

## 2023-03-07 ENCOUNTER — TELEPHONE (OUTPATIENT)
Dept: BEHAVIORAL HEALTH | Facility: CLINIC | Age: 23
End: 2023-03-07
Payer: COMMERCIAL

## 2023-03-07 PROCEDURE — 250N000013 HC RX MED GY IP 250 OP 250 PS 637: Performed by: EMERGENCY MEDICINE

## 2023-03-07 PROCEDURE — 250N000013 HC RX MED GY IP 250 OP 250 PS 637

## 2023-03-07 RX ADMIN — OLANZAPINE 10 MG: 10 TABLET, ORALLY DISINTEGRATING ORAL at 21:25

## 2023-03-07 RX ADMIN — ACETAMINOPHEN 325MG 650 MG: 325 TABLET ORAL at 11:38

## 2023-03-07 RX ADMIN — OLANZAPINE 2.5 MG: 5 TABLET, ORALLY DISINTEGRATING ORAL at 03:12

## 2023-03-07 RX ADMIN — LITHIUM CARBONATE 600 MG: 600 CAPSULE ORAL at 21:20

## 2023-03-07 RX ADMIN — Medication 5 MG: at 21:32

## 2023-03-07 RX ADMIN — ACETAMINOPHEN 325MG 650 MG: 325 TABLET ORAL at 23:21

## 2023-03-07 ASSESSMENT — ACTIVITIES OF DAILY LIVING (ADL)
ADLS_ACUITY_SCORE: 35

## 2023-03-07 NOTE — ED NOTES
Client Name: Cristina hurtado                     Date: March 7, 2023  Service Type:  Group Therapy  Session Start Time:  1130am                         Session End Time:    12pm   Session Length: 30 minutes  Site Location: [facility]  Attendees: Patient and other group members  Facilitator:  Corinne Romitti LICSW     Topic:   Mindfulness      Intervention:    Group process: support, psycho education, development of coping skills, meditatation.     Response:  Patient did participate in group. Behavior in group appropriate, left half-way through group

## 2023-03-07 NOTE — ED NOTES
"Pt slept approximately 1.5 -2 hours throughout the night. Pt has remained cooperative and calm with staff although states she is unable to sleep. Pt stated she experienced sleep paralysis last night when she had awoken and \"that scared me.\" Pt has paced in her room and the milieu listening to head phones. Pt has been redirectable and not intrusive. Will continue to monitor.   "

## 2023-03-07 NOTE — ED NOTES
Patient was up and about at the star of the shift. Patient requested to to be seen by a psychiatrist. Writer updated Dr. johnson of patients request. Patient took a nap for about an hour. Patient is awake now. Calm and cooperative. Staff will continue to monitor.

## 2023-03-07 NOTE — PROGRESS NOTES
Patient was calm and cooperative during the shift. She denied all psych symptoms including SI/HI. Patient contracted for safety. Patient rated pain 3/10 for headache. She was given tylenol 650 mg at 1138 and it was effective. She ate 100 percent of her breakfast and lunch. She attended group and participated. Vitals are stable. Will continue to monitor for behaviors.

## 2023-03-07 NOTE — ED NOTES
Report received from the previous shift. Assumed care of patient sleeping. Breathing with ease and no s/s of acute distress. Will continue to monitor.

## 2023-03-07 NOTE — TELEPHONE ENCOUNTER
No appropriate beds are currently available within the  system. Bed search update (metro) @ 12AM:       [facility]: @ cap per website  [facility]: @ cap per website  [facility]: @ cap per website - Per Alejandra @ 23:49, they do not have beds available tonight but possibly tomorrow  [facility]: @ cap per website  [facility]: @ cap per website  [facility]: @ cap per website  [facility]: @ cap per website    Pt remains on work list until appropriate placement is available

## 2023-03-07 NOTE — ED NOTES
Patient spent the evening out in the lounge and had a shower after dinner. Patient currently denies SI/HI and hallucinations. VSS; med compliant. Patient has been calm, pleasant, and cooperative.

## 2023-03-07 NOTE — TELEPHONE ENCOUNTER
Updated Bed Search @ 7:44 PM  Per chart review, intake can look in the metro for placement     [facility] has 0 appropriate beds available. Phone: [phone]  [facility] posting 0 available beds. Phone: [phone]  [facility] posting 0 available beds. Phone: [phone]  [facility] posting 0 available beds. Phone: [phone]  [facility] posting 0 available beds. Phone: [phone]  [facility] posting 0 available beds. Phone:[phone]  [facility] posting 0 available beds. Phone: [phone]. Negative covid required.   [facility] posting 0 available beds. Phone: [phone]       Pt remains on work list pending appropriate bed placement.

## 2023-03-07 NOTE — ED NOTES
Triage & Transition Services, Extended Care    Client Name: Cristina Boyd    Date: March 7, 2023  Service Type:  Group Therapy  Session Start Time:  5:30P    Session End Time: 5:50P  Session Length: 20min  Site Location: [facility]  Attendees: Patient and other group members  Facilitator: Lori Caldera     Topic:   Passions and Purpose    Intervention:    Group process: support, challenge, affirm, psycho-education.     Response:  Patient did participate in group. Behavior in group was appropriate. Patient shared areas of interests.       Lori Caldera

## 2023-03-07 NOTE — ED NOTES
Report received from previous shift. Assumed care of patient resting on her bed in her room with the lights off. Pt with no s/s of acute distress and breathing with ease.

## 2023-03-07 NOTE — ED PROVIDER NOTES
[facility] ED Mental Health Handoff Note:       Brief HPI:  This is a 23 year old female signed out to me.  See initial ED Provider note for full details of the presentation. Interval history is pertinent for patient seen by psychiatry has been stabilizing started on lithium and Zyprexa is hoping for possible discharge as early as tomorrow or Wednesday..    Home meds reviewed and ordered/administered: Yes    Medically stable for inpatient mental health admission: Yes.    Evaluated by mental health: Yes. The recommendation is for inpatient mental health treatment. Bed search in process    Safety concerns: At the time I received sign out, there were no safety concerns.    Hold Status:  Active Orders   N/A           Exam:   Patient Vitals for the past 24 hrs:   BP Temp Temp src Pulse Resp SpO2   03/06/23 2122 113/76 100  F (37.8  C) Oral (!) 128 -- 99 %   03/06/23 0800 117/73 98.1  F (36.7  C) Oral 96 16 99 %           ED Course:    Medications   melatonin tablet 5 mg (has no administration in time range)   acetaminophen (TYLENOL) tablet 650 mg (650 mg Oral $Given 3/6/23 1639)   OLANZapine zydis (zyPREXA) ODT tab 10 mg (10 mg Oral $Given 3/6/23 2117)   OLANZapine zydis (zyPREXA) ODT half-tab 2.5 mg (has no administration in time range)   lithium (ESKALITH) capsule 600 mg (600 mg Oral $Given 3/6/23 2117)   OLANZapine zydis (zyPREXA) ODT tab 5 mg (5 mg Oral $Given 3/5/23 1921)   OLANZapine zydis (zyPREXA) ODT tab 5 mg (5 mg Oral $Given 3/5/23 2217)   acetaminophen (TYLENOL) tablet 1,000 mg (1,000 mg Oral Not Given 3/5/23 2221)            There were no significant events during my shift.    Patient was signed out to the oncoming provider, Dr. Orellana      Impression:    ICD-10-CM    1. Bipolar affective disorder, currently manic, severe, with psychotic features (H)  F31.2           Plan:    1. Awaiting inpatient mental health admission/transfer.  However patient seems to be stabilizing and will be reevaluated  "tomorrow by extended care with possible discharge at this time if patient insists on leaving would be reevaluated for the possibility of a hold versus discharge.      RESULTS:   Results for orders placed or performed during the hospital encounter of 03/05/23 (from the past 24 hour(s))   Psychiatry IP Consult: med mgmt, lithium restart?; Consultant may enter orders: Yes; Requesting provider? Attending physician     Status: None ()    Collection Time: 03/06/23  1:02 AM    Claudio Padgett APRN CNP     3/6/2023 12:20 PM    Crisitna Boyd MRN# [id number]   Age: 23 year old YOB: 2000   Date of Admission to ED: 3/5/2023    In person visit Details:     Patient was assessed and interviewed face-to-face in person with   this writer giuseppe. Patient was observed to be able to participate in   the assessment as evidenced by verbal consent. Assessment methods   included conducting a formal interview with patient, review of   medical records, collaboration with medical staff, and obtaining   relevant collateral information from family and community   providers when available.        Reason for Consult:     Psychiatry was consulted for med management by ED provider to   restart the lithium     Patient was alert and oriented x4 pleasant and cooperative during   assessment and interview.  Patient denied any suicidal ideation   or homicidal ideation or self injury behavior.  Patient denied   any auditory hallucination but she endorsed visual hallucinations   she continues to see people.  Patient said she stopped taking her   medication lithium and Seroquel in November 2022.  As she said \"   since then my mental health continues to decline I cannot sleep   or act properly, I need help with my mental health my shaina is   getting out of control.\"  Patient says she used to take 600 mg of   lithium at bedtime and Seroquel as needed, she stopped taking   both medication at the same time in November 2022.  Currently " "  patient is on Zyprexa 5 mg twice daily started in the emergency   room patient says she likes the medications she has been resting   well in the emergency room.  We will switch Zyprexa 5 mg to 10 mg   at bedtime to prevent sedation during days, and restart lithium   600 mg at bedtime.    Patient is voluntary for inpatient mental health unit, but she   stated she does not want to stay in the emergency room after   Wednesday, March 8, 2023.  Patient said \" I am going to travel to   [place] and visit my family member in [place].\"  The writer   to confirm the plan with her mother Joy who said that the   patient is going to go to [place] soon.      Currently patient is not imminent danger to herself or to   community she is not holdable.  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     Writer discussed both with patient and mother benefit and side   effect of Zyprexa as follow  -Antipsychotics: the patient was informed of possible adverse   effects including, but not limited to: akathisia, extrapyramidal   symptoms, dystonia, drowsiness, and long term concerns for   tardive dyskinesia, weight gain, insulin resistance,   dyslipidemia, and cognitive slowing. The patient expressed   understanding. Alternatives to this medication were also   discussed with the patient, including risks of not taking   medications, and the patient was agreeable to the above choice.    Writer called and spoke to patient mother Joy over the phone,   the mother said \" she was not sleeping for the last 2 to 3 weeks,   she continued to walk in the cold by herself, I am afraid she [place]   [place].\"  The mother ask if she can stay 2 to   3 days inpatient mental health unit.  The writer told the mother   currently patient is " voluntary for inpatient mental health unit,   patient denied suicidal ideation homicidal ideation, she has been   resting and cooperating while in the emergency room we cannot   force her to stay because she is not imminent danger to herself   or to the community.    I have reviewed the nursing notes. I have reviewed the findings,   diagnosis, plan and need for follow up with the patient.         HPI:     Per ED provider note  Dr. Rusty HARVEY Oliver is a 23 year old   female who has a history of depressive disorder and bipolar 1   disorder presents to the ED with feeling stressed and manic.   Patient presents with her parents today. Patient reports that she   feels stressed and tired. She states that she feels a little safe   now at the ED. She states that she has been stressed for a long   time, but it is now at its peak. She states she feels it has been   at its highest since January where it was not so much, and   February was all the time. She states that she feels like work is   the cause, with the workload as well as school. She states that   she feels overwhelmed. She states that she has not had stress   this high before. She states that she feels like she is full   blown manic. She states that she was on medication for her   history of bipolar disorder and the last time she used it was in   November. She states that she took lithium as well as Seroquel at   night to sleep. She states that there were no other medications.   She states that for the last couple of days, she is over thinking   and having difficulty sleeping. She also notes that she feels   like she is having hallucinations or imagining things. She states   that she also feels a little bit of paranoia.  She denies chest   pain or trouble breathing. She states that she has really bad   headaches on the side of her head. She denies recent substance   use, but states that she has history of it 5 years ago.     Per additional history from crisis  , patient has history   of bipolar 1 disorder. She was brought in by her parents. She   describes her concerns as just having a lot of stress and feels   like she cannot deal with anything. She has been off of lithium   since November. She states that she is not taking other   medications. She states that for the last couple of days, she has   not been able to sleep for more than an hour. She has been   wandering around the community and walking a lot. She states she   has racing thoughts and difficulty concentrating. She states that   she feels like her body is over energized. She states that she   has visual hallucinations, and feels like she sees something but   does not know what it is. She states that she does endorse   suicidal ideations, but is very guarded with her thoughts and   starts crying. She states she does not really want to talk about   it. She states that she does not follow up with providers.   Parents state that she displays symptoms that are similar to the   time of her diagnosis in 2018. She states that she is not sure if   she wants to take medications to help with her anxiety. She   states that she is willing to stay.              Pt has not required locked seclusion or restraints in the past   24 hours to maintain safety, please refer to RN documentation for   further details.  Substance use does not appear to be playing a contributing role   in the patient's presentation.  Brief Therapeutic Intervention(s):   Provided active listening, unconditional positive regard, and   validation. Engaged in cognitive restructuring/ reframing, looked   at common cognitive distortions and challenged negative thoughts.   Engaged in guided discovery, explored patient's perspectives and   helped expand them through socratic dialogue. Provided positive   reinforcement for progress towards goals, gains in knowledge, and   application of skills previously taught.  Engaged in social   skills training.  Explored and identified early warning signs to   anger.        Past Psychiatric History:     She has been hospitalized several times for mental health, [facility]   6/2018, 1/2017. She is currently not following with medication   manager or therapist.             Substance Use and History:   None         Past Medical History:   PAST MEDICAL HISTORY:   Past Medical History:   Diagnosis Date     Bipolar disorder (H)      Depressive disorder      Infection due to 2019 novel coronavirus 2020    summer 2020 by patient report     Infection due to 2019 novel coronavirus 12/2021     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, unspecified   thyrotoxicosis type 11/16/2022       PAST SURGICAL HISTORY:   Past Surgical History:   Procedure Laterality Date     BRONCHOSCOPY (RIGID OR FLEXIBLE), DIAGNOSTIC N/A 6/16/2018    Procedure: COMBINED BRONCHOSCOPY (RIGID OR FLEXIBLE), LAVAGE;;    Surgeon: Manjeet Holland MD;  Location:  GI               Allergies:   No Known Allergies          Medications:     I have reviewed this patient's current medications  Current Facility-Administered Medications   Medication     acetaminophen (TYLENOL) tablet 650 mg     lithium (ESKALITH) capsule 600 mg     melatonin tablet 5 mg     OLANZapine zydis (zyPREXA) ODT half-tab 2.5 mg     OLANZapine zydis (zyPREXA) ODT tab 10 mg     Current Outpatient Medications   Medication Sig     lithium ER (LITHOBID) 300 MG CR tablet Take 2 tablets (600 mg)   by mouth At Bedtime     QUEtiapine (SEROQUEL) 25 MG tablet Take 25 mg by mouth At   Bedtime              Family History:   FAMILY HISTORY:   Family History   Problem Relation Age of Onset     Impaired Fasting Glucose Father         prediabetes     Thyroid Disease Maternal Grandmother      Diabetes Maternal Grandmother      Depression Maternal Uncle      Depression Other            Social History:   SOCIAL HISTORY:   Social History     Tobacco Use     Smoking status: Never     Smokeless tobacco: Never   Substance Use  "Topics     Alcohol use: No            PTA Medications:   (Not in a hospital admission)         Allergies:   No Known Allergies       Labs:     Recent Results (from the past 48 hour(s))   HCG qualitative urine    Collection Time: 03/05/23  5:40 PM   Result Value Ref Range    hCG Urine Qualitative Negative Negative   Drug abuse screen 1 urine (ED)    Collection Time: 03/05/23  5:40 PM   Result Value Ref Range    Amphetamines Urine Screen Negative Screen Negative    Barbituates Urine Screen Negative Screen Negative    Benzodiazepine Urine Screen Negative Screen Negative    Cannabinoids Urine Screen Negative Screen Negative    Cocaine Urine Screen Negative Screen Negative    Opiates Urine Screen Negative Screen Negative   Asymptomatic COVID-19 Virus (Coronavirus) by PCR Nasopharyngeal    Collection Time: 03/05/23  6:35 PM    Specimen: Nasopharyngeal; Swab   Result Value Ref Range    SARS CoV2 PCR Negative Negative          Physical and Psychiatric Examination:     /73   Pulse 96   Temp 98.1  F (36.7  C) (Oral)   Resp 16     Ht 1.676 m (5' 6\")   Wt 63.3 kg (139 lb 9.6 oz)   LMP   03/05/2023   SpO2 99%   BMI 22.53 kg/m    Weight is 139 lbs 9.6 oz  Body mass index is 22.53 kg/m .    Mental Status Exam:  Appearance: awake, alert  Attitude:  cooperative  Eye Contact:  good  Mood:  good  Affect:  appropriate and in normal range  Speech:  clear, coherent  Language: fluent and intact in English  Psychomotor, Gait, Musculoskeletal:  no evidence of tardive   dyskinesia, dystonia, or tics  Thought Process:  logical and linear  Associations:  no loose associations  Thought Content:  no evidence of suicidal ideation or homicidal   ideation  Insight:  fair  Judgement:  fair  Oriented to:  time, person, and place  Attention Span and Concentration:  fair  Recent and Remote Memory:  fair  Fund of Knowledge:  appropriate         Diagnoses:   Bipolar affective disorder, currently manic, severe, with   psychotic features " (H)         Recommendations:   1. Continue to recommend inpatient psychiatric hospitalizations   for further stabilization   2.  Start Zyprexa 10 mg at bedtime, lithium 600 mg at bedtime,   recheck lithium level 5 to 6 days after initiation  3.  Zyprexa 2.5 mg as needed  4.  Consult psychiatry as needed   treatment per ED team  - Start   - Discussion with    - Consulted with [facility] Corinne LM, ED physician Dr. Medhat Larry,  regarding this case.    Please call [facility]/DEC at [phone] if you have follow-up   questions or wish to place another consult.  Claudio Nicole, Psychiatric Nurse practitioner    Attestation:  Time with:  Patient/Mother  45 minutes  Treatment Team: 20 Minutes  Chart Review: 20 minutes    Total time spent was 85minutes. Over 50% of times was spent   counseling and coordination of care.    I, Claudio Nicole, CNP, APRN, Psychiatric Nurse Practitioner have   personally performed an examination of this patient.  I have   edited the note to reflect all relevant changes.  I have   discussed this patient with the care team 6/20/2023.  I have   reviewed all vitals and laboratory findings.    Disclaimer: This note consists of symbols derived from   keyboarding,         CBC with Platelets & Differential     Status: Abnormal    Collection Time: 03/06/23 12:29 PM    Narrative    The following orders were created for panel order CBC with Platelets & Differential.  Procedure                               Abnormality         Status                     ---------                               -----------         ------                     CBC with platelets and d...[[id number]]  Abnormal            Final result                 Please view results for these tests on the individual orders.   Comprehensive metabolic panel     Status: Normal    Collection Time: 03/06/23 12:29 PM   Result Value Ref Range    Sodium 139 136 - 145 mmol/L    Potassium 3.8 3.4 - 5.3 mmol/L    Chloride 106 98 - 107 mmol/L    Carbon Dioxide  (CO2) 22 22 - 29 mmol/L    Anion Gap 11 7 - 15 mmol/L    Urea Nitrogen 7.3 6.0 - 20.0 mg/dL    Creatinine 0.59 0.51 - 0.95 mg/dL    Calcium 9.3 8.6 - 10.0 mg/dL    Glucose 84 70 - 99 mg/dL    Alkaline Phosphatase 74 35 - 104 U/L    AST 21 10 - 35 U/L    ALT 11 10 - 35 U/L    Protein Total 7.1 6.4 - 8.3 g/dL    Albumin 3.9 3.5 - 5.2 g/dL    Bilirubin Total 0.4 <=1.2 mg/dL    GFR Estimate >90 >60 mL/min/1.73m2   CBC with platelets and differential     Status: Abnormal    Collection Time: 03/06/23 12:29 PM   Result Value Ref Range    WBC Count 5.5 4.0 - 11.0 10e3/uL    RBC Count 3.99 3.80 - 5.20 10e6/uL    Hemoglobin 11.1 (L) 11.7 - 15.7 g/dL    Hematocrit 34.1 (L) 35.0 - 47.0 %    MCV 86 78 - 100 fL    MCH 27.8 26.5 - 33.0 pg    MCHC 32.6 31.5 - 36.5 g/dL    RDW 15.7 (H) 10.0 - 15.0 %    Platelet Count 283 150 - 450 10e3/uL    % Neutrophils 43 %    % Lymphocytes 42 %    % Monocytes 12 %    % Eosinophils 2 %    % Basophils 1 %    % Immature Granulocytes 0 %    NRBCs per 100 WBC 0 <1 /100    Absolute Neutrophils 2.4 1.6 - 8.3 10e3/uL    Absolute Lymphocytes 2.3 0.8 - 5.3 10e3/uL    Absolute Monocytes 0.7 0.0 - 1.3 10e3/uL    Absolute Eosinophils 0.1 0.0 - 0.7 10e3/uL    Absolute Basophils 0.0 0.0 - 0.2 10e3/uL    Absolute Immature Granulocytes 0.0 <=0.4 10e3/uL    Absolute NRBCs 0.0 10e3/uL   TSH with free T4 reflex     Status: Normal    Collection Time: 03/06/23 12:29 PM   Result Value Ref Range    TSH 2.26 0.30 - 4.20 uIU/mL             MD Jayce Castillo Eric Girard, MD  03/06/23 7802

## 2023-03-08 ENCOUNTER — TELEPHONE (OUTPATIENT)
Dept: EMERGENCY MEDICINE | Facility: CLINIC | Age: 23
End: 2023-03-08
Payer: COMMERCIAL

## 2023-03-08 ENCOUNTER — TELEPHONE (OUTPATIENT)
Dept: BEHAVIORAL HEALTH | Facility: CLINIC | Age: 23
End: 2023-03-08
Payer: COMMERCIAL

## 2023-03-08 LAB
CHOLEST SERPL-MCNC: 100 MG/DL
HBA1C MFR BLD: 5.6 %
HDLC SERPL-MCNC: 43 MG/DL
LDLC SERPL CALC-MCNC: 44 MG/DL
NONHDLC SERPL-MCNC: 57 MG/DL
TRIGL SERPL-MCNC: 66 MG/DL

## 2023-03-08 PROCEDURE — 124N000002 HC R&B MH UMMC

## 2023-03-08 PROCEDURE — 90853 GROUP PSYCHOTHERAPY: CPT

## 2023-03-08 PROCEDURE — 250N000013 HC RX MED GY IP 250 OP 250 PS 637

## 2023-03-08 PROCEDURE — 250N000013 HC RX MED GY IP 250 OP 250 PS 637: Performed by: CLINICAL NURSE SPECIALIST

## 2023-03-08 PROCEDURE — 83036 HEMOGLOBIN GLYCOSYLATED A1C: CPT | Performed by: CLINICAL NURSE SPECIALIST

## 2023-03-08 PROCEDURE — 36415 COLL VENOUS BLD VENIPUNCTURE: CPT | Performed by: CLINICAL NURSE SPECIALIST

## 2023-03-08 PROCEDURE — 80061 LIPID PANEL: CPT | Performed by: CLINICAL NURSE SPECIALIST

## 2023-03-08 RX ORDER — HYDROXYZINE HYDROCHLORIDE 25 MG/1
25-50 TABLET, FILM COATED ORAL EVERY 4 HOURS PRN
Status: DISCONTINUED | OUTPATIENT
Start: 2023-03-08 | End: 2023-03-10 | Stop reason: HOSPADM

## 2023-03-08 RX ORDER — AMOXICILLIN 250 MG
1 CAPSULE ORAL 2 TIMES DAILY PRN
Status: DISCONTINUED | OUTPATIENT
Start: 2023-03-08 | End: 2023-03-10 | Stop reason: HOSPADM

## 2023-03-08 RX ORDER — OLANZAPINE 5 MG/1
5-10 TABLET ORAL 3 TIMES DAILY PRN
Status: DISCONTINUED | OUTPATIENT
Start: 2023-03-08 | End: 2023-03-09

## 2023-03-08 RX ORDER — LANOLIN ALCOHOL/MO/W.PET/CERES
3 CREAM (GRAM) TOPICAL
Status: DISCONTINUED | OUTPATIENT
Start: 2023-03-08 | End: 2023-03-10 | Stop reason: HOSPADM

## 2023-03-08 RX ORDER — MAGNESIUM HYDROXIDE/ALUMINUM HYDROXICE/SIMETHICONE 120; 1200; 1200 MG/30ML; MG/30ML; MG/30ML
30 SUSPENSION ORAL EVERY 4 HOURS PRN
Status: DISCONTINUED | OUTPATIENT
Start: 2023-03-08 | End: 2023-03-10 | Stop reason: HOSPADM

## 2023-03-08 RX ORDER — ACETAMINOPHEN 325 MG/1
650 TABLET ORAL EVERY 4 HOURS PRN
Status: DISCONTINUED | OUTPATIENT
Start: 2023-03-08 | End: 2023-03-08

## 2023-03-08 RX ORDER — LITHIUM CARBONATE 300 MG/1
600 TABLET, FILM COATED, EXTENDED RELEASE ORAL AT BEDTIME
Status: DISCONTINUED | OUTPATIENT
Start: 2023-03-08 | End: 2023-03-10 | Stop reason: HOSPADM

## 2023-03-08 RX ORDER — OLANZAPINE 10 MG/2ML
10 INJECTION, POWDER, FOR SOLUTION INTRAMUSCULAR 3 TIMES DAILY PRN
Status: DISCONTINUED | OUTPATIENT
Start: 2023-03-08 | End: 2023-03-09

## 2023-03-08 RX ORDER — OLANZAPINE 10 MG/1
10 TABLET, ORALLY DISINTEGRATING ORAL AT BEDTIME
Status: DISCONTINUED | OUTPATIENT
Start: 2023-03-08 | End: 2023-03-08

## 2023-03-08 RX ADMIN — LITHIUM CARBONATE 600 MG: 300 TABLET, EXTENDED RELEASE ORAL at 20:21

## 2023-03-08 RX ADMIN — OLANZAPINE 10 MG: 10 TABLET, ORALLY DISINTEGRATING ORAL at 20:20

## 2023-03-08 ASSESSMENT — COLUMBIA-SUICIDE SEVERITY RATING SCALE - C-SSRS
TOTAL  NUMBER OF ABORTED OR SELF INTERRUPTED ATTEMPTS SINCE LAST CONTACT: NO
1. SINCE LAST CONTACT, HAVE YOU WISHED YOU WERE DEAD OR WISHED YOU COULD GO TO SLEEP AND NOT WAKE UP?: NO
ATTEMPT SINCE LAST CONTACT: NO
TOTAL  NUMBER OF INTERRUPTED ATTEMPTS SINCE LAST CONTACT: NO
2. HAVE YOU ACTUALLY HAD ANY THOUGHTS OF KILLING YOURSELF?: NO
6. HAVE YOU EVER DONE ANYTHING, STARTED TO DO ANYTHING, OR PREPARED TO DO ANYTHING TO END YOUR LIFE?: NO
SUICIDE, SINCE LAST CONTACT: NO

## 2023-03-08 ASSESSMENT — ACTIVITIES OF DAILY LIVING (ADL)
DRESSING/BATHING_DIFFICULTY: NO
ADLS_ACUITY_SCORE: 35
ADLS_ACUITY_SCORE: 20
DIFFICULTY_EATING/SWALLOWING: NO
CONCENTRATING,_REMEMBERING_OR_MAKING_DECISIONS_DIFFICULTY: NO
ADLS_ACUITY_SCORE: 35
DOING_ERRANDS_INDEPENDENTLY_DIFFICULTY: NO
ADLS_ACUITY_SCORE: 20
ADLS_ACUITY_SCORE: 20
ADLS_ACUITY_SCORE: 35
ADLS_ACUITY_SCORE: 35
FALL_HISTORY_WITHIN_LAST_SIX_MONTHS: NO
WEAR_GLASSES_OR_BLIND: YES
ADLS_ACUITY_SCORE: 35
CHANGE_IN_FUNCTIONAL_STATUS_SINCE_ONSET_OF_CURRENT_ILLNESS/INJURY: NO
WALKING_OR_CLIMBING_STAIRS_DIFFICULTY: NO
ADLS_ACUITY_SCORE: 35
TOILETING_ISSUES: NO
ADLS_ACUITY_SCORE: 35

## 2023-03-08 NOTE — ED PROVIDER NOTES
[facility] ED Mental Health Handoff Note:       Brief HPI:  This is a 23 year old female signed out to me by the previous ED provider.  See initial ED Provider note for full details of the presentation. Interval history is pertinent for Extended Care DEC involvement due to ED boarding. Patient is starting to feel she does not need admission as she wants to travel to [place]. Sister was able to convince her to stay.    Home meds reviewed and ordered/administered: Yes    Medically stable for inpatient mental health admission: Yes.    Evaluated by mental health: Yes. The recommendation is for inpatient mental health treatment. Bed secured and awaiting admission/transfer to .    Safety concerns: At the time I received sign out, there were no safety concerns.    Hold Status:  Active Orders   N/A       Exam:   Patient Vitals for the past 24 hrs:   BP Temp Temp src Pulse Resp SpO2   03/08/23 0830 103/72 97.7  F (36.5  C) Oral 82 16 100 %   03/07/23 1757 99/64 97.3  F (36.3  C) Oral 102 18 99 %       ED Course:    Medications   melatonin tablet 5 mg (5 mg Oral $Given 3/7/23 2132)   acetaminophen (TYLENOL) tablet 650 mg (650 mg Oral $Given 3/7/23 2321)   OLANZapine zydis (zyPREXA) ODT tab 10 mg (10 mg Oral $Given 3/7/23 2125)   OLANZapine zydis (zyPREXA) ODT half-tab 2.5 mg (2.5 mg Oral $Given 3/7/23 0312)   lithium (ESKALITH) capsule 600 mg (600 mg Oral $Given 3/7/23 2120)   OLANZapine zydis (zyPREXA) ODT tab 5 mg (5 mg Oral $Given 3/5/23 1921)   OLANZapine zydis (zyPREXA) ODT tab 5 mg (5 mg Oral $Given 3/5/23 2217)   acetaminophen (TYLENOL) tablet 1,000 mg (1,000 mg Oral Not Given 3/5/23 2221)            There were no significant events during my shift.      Impression:    ICD-10-CM    1. Bipolar affective disorder, currently manic, severe, with psychotic features (H)  F31.2           Plan:    1. Awaiting inpatient mental health admission/transfer.      RESULTS:   No results found for this visit on 03/05/23  (from the past 24 hour(s)).          MD Filiberto Medina Dung Hoang, MD  03/08/23 9439

## 2023-03-08 NOTE — ED NOTES
"Patient describes mood as \"Good\".  Alert and oriented x 4. Vital signs stable. Denies SI/HI/SIB. Denies auditory and visual hallucinations. No pain verbalized at this time. Patient had family visit her.  Patient is calm and cooperative and is off the unit now for a bath. Staff will continue to monitor for safety.  "

## 2023-03-08 NOTE — ED NOTES
"A&Ox4, VSS, pt reports doing well feeling like she is \"almost\" back to baseline. Pt reports slight racing thoughts. Pt denies suicidal ideation\, denies homicidal ideation, and denies hallucinations. Pt is willing to be admitted to the inpatient unit voluntarily.  "

## 2023-03-08 NOTE — TELEPHONE ENCOUNTER
Writer has fwd medication management referral only to TC RN pool as next level of care set and replied to referral source. Will wait to hear if referral is appropriate or inappropriate.     Chika Mcdaniel  3/8/23  12:17pm

## 2023-03-08 NOTE — DISCHARGE INSTRUCTIONS
Behavioral Discharge Planning and Instructions    Summary: You were admitted on 3/5/2023  due to Manic Symptomology.  You were treated by Debra Naegele APRN and discharged on 3/10/23 from 4A to Home    Main Diagnosis:   1.  Bipolar 1 disorder, current episode manic.  2.  Headache    Health Care Follow-up:   Psychiatry Appointment: Thursday, March 21 at 10:30 am via video  Provider: Dr. Engelson  Call: [phone]  If you need to reschedule or change your appointment type please call the number above.     Therapy appointment: Wednesday, March 15 at 11:00 am via video  Provider: Mya Sosa & [employer]  Phone: [phone], Fax: [phone]  You will receive a link for the appointment via email on the day of the appointment    Attend all scheduled appointments with your outpatient providers. Call at least 24 hours in advance if you need to reschedule an appointment to ensure continued access to your outpatient providers.     Major Treatments, Procedures and Findings:  You were provided with: a psychiatric assessment, assessed for medical stability, medication evaluation and/or management, group therapy, and milieu management    Symptoms to Report: feeling more aggressive, increased confusion, losing more sleep, mood getting worse, or thoughts of suicide    Early warning signs can include: increased depression or anxiety sleep disturbances increased thoughts or behaviors of suicide or self-harm  increased unusual thinking, such as paranoia or hearing voices    Safety and Wellness:  Take all medicines as directed.  Make no changes unless your doctor suggests them.   Follow treatment recommendations.  Refrain from alcohol and non-prescribed drugs.  If there is a concern for safety, call [phone].    Resources:   Crisis Intervention: 416.857.3609 or 688-885-1926 (TTY: 177.417.3305).  Call anytime for help.  National [employer] on Mental Illness (www.mn.norberto.org): 443.584.7927 or 376-762-3284.  Suicide Awareness Voices of  "Education (SAVE) (www.save.org): 597-303-VYVI (7283)  National Suicide Prevention Line (www.mentalhealthmn.org): 069-477-PBFH (4283)  [facility] Crisis Response 676 932-0523  Text 4 Life: txt \"LIFE\" to 72664 for immediate support and crisis intervention    General Medication Instructions:   See your medication sheet(s) for instructions.   Take all medicines as directed.  Make no changes unless your doctor suggests them.   Go to all your doctor visits.  Be sure to have all your required lab tests. This way, your medicines can be refilled on time.  Do not use any drugs not prescribed by your doctor.  Avoid alcohol.    Advance Directives:   Scanned document on file with [facility]? No scanned doc  Is document scanned? Pt states no documents  Honoring Choices Your Rights Handout: Informed and given  Was more information offered? Pt declined    The Treatment team has appreciated the opportunity to work with you. If you have any questions or concerns about your recent admission, you can contact the unit which can receive your call 24 hours a day, 7 days a week. They will be able to get in touch with a Provider if needed. The unit number is [phone].  "

## 2023-03-08 NOTE — ED NOTES
Patient was awake at 0315 requesting to have an ipad or [employer] portable DVD.  Patient di not seem to be in distress nor appear anxious. Nurse offered an option for patient to read as this will help her fall asleep. Writer also writer offered patient a snack and some fluids. Patient is sitting in bed in her room watching television now. Staff will continue to monitor. .

## 2023-03-08 NOTE — ED NOTES
"Triage & Transition Services, Extended Care     Therapy Progress Note    Patient: Cristina goes by \"Cristina,\" uses she/her pronouns  Date of Service: March 8, 2023  Site of Service: [facility]  Patient was seen in-person.     Presenting problem:   Cristina is followed related to Long wait time for admission: 71+ hours in the ED. Please see initial DEC/[facility] Crisis Assessment completed by Aline Lockwood on 03/05 for complete assessment information. Notable concerns include shaina..     Individuals Present: Cristina & Corinne Romitti, LICSW    Session start: 11:00, 12:35  Session end: 11:15, 12:50  Session duration in minutes: 30  Session number: 1  Anticipated number of sessions or this episode of care: 1  CPT utilized: 46718 - Psychotherapy (with patient) - 30 (16-37*) min    Current Presentation:   Patient was open to meeting with writer. She was initially requesting discharge as she feels \"I am no longer manic'. She does displays some insight into events leading to presentation but continues to endorse something are \"lies'. Reports she was sleeping prior to admission and that she was only walking in the cold for a brief period of time and she felt somewhat \"tricked\" into coming into the hospital. States \"I am an adult and I want to leave\". Discuss the recommendation is for her to stay. She became agreeable to this when her sister came to visit and writer met with her and sister together. Sister is a strong support and is a psychiatric nurse and was able to support Patient is seeing how Inpatient would be beneficial for her.     Mental Status Exam:   Appearance: awake, alert  Attitude: cooperative  Eye Contact: fair  Mood: better  Affect: flat  Speech: pressured speech  Psychomotor Behavior: no evidence of tardive dyskinesia, dystonia, or tics  Thought Process:  logical  Associations: no loose associations  Thought Content: no evidence of suicidal ideation or homicidal ideation  Insight: limited  Judgement: limited  Oriented " to: time, person, and place  Attention Span and Concentration: intact  Recent and Remote Memory: intact    Diagnosis:   F31.9 Bipolar Disorder     Therapeutic Intervention(s):   Provided active listening, unconditional positive regard, and validation. Engaged in guided discovery, explored patient's perspectives and helped expand them through socratic dialogue.    Treatment Objective(s) Addressed:   The focus of this session was on rapport building.     Progress Towards Goals:   Patient reports stable symptoms. Patient is not making progress towards treatment goals as evidenced by continuing to have pressured speech, disrupted sleep, still lacks full insight.     General Recommendations:   Continue to monitor for harm. Consider: Verbally state expectations , Be firm but gentle when redirecting and Listen in a neutral, non-judgmental way. Offer reassurance    Plan:   Inpatient Mental Health: Will be transferring to       Plan for Care reviewed with Assigned Medical Provider? Yes. Provider, Dr. Dumont, response: agreeable     Corinne Romitti, [facility]   Licensed Mental Health Professional (LMHP), [facility]  [phone]

## 2023-03-08 NOTE — PROGRESS NOTES
03/08/23 2640   Patient Belongings   Did you bring any home meds/supplements to the hospital?  No   Patient Belongings remains with patient;sent to security per site process   Patient Belongings Remaining with Patient other (see comments)   Patient Belongings Put in Hospital Secure Location (Security or Locker, etc.) cell phone/electronics;clothing;plastic bag;shoes;Sabianist item;other (see comments)   Belongings Search Yes   Clothing Search Yes   Second Staff Danelle SIDHU     PtAshley did not bring WALLET OR MEDICATIONS  With Patient:  - vogue magazine  - don't let your emotions ruin your life for teens (book)  - composition book  - rewire your brain (book)  - attached (book)  Locker:  - black fuzzy socks  - white fuzzy blanket  - black and blue hair bonnet  - pink underwear  - gray hospital socks  - black hospital mask  - pink traditional dress  - black fuzzy sweater  - black sweat pants with string  - brown moleskin boots with blue socks  - blue sleeping mask  - aquaphor healing anointment  - lavender iphone with clear case  - airpods with green case  - holy quran with green bookmark   - cetaphil lotion  - black scarf  - black sneakers  - deodorant (hospital)  - sheets of paper  A               Admission:  I am responsible for any personal items that are not sent to the safe or pharmacy.  [facility] is not responsible for loss, theft or damage of any property in my possession.    Signature:  _________________________________ Date: _______  Time: _____                                              Staff Signature:  ____________________________ Date: ________  Time: _____      2nd Staff person, if patient is unable/unwilling to sign:    Signature: ________________________________ Date: ________  Time: _____     Discharge:  [facility] has returned all of my personal belongings:    Signature: _________________________________ Date: ________  Time: _____                                          Staff Signature:   ____________________________ Date: ________  Time: _____

## 2023-03-08 NOTE — TELEPHONE ENCOUNTER
Writer spoke w/ pt in the [facility] and scheduled TC appointments: Therapy: 3/17/2023 at 12:30pm and med management: 3/13/2023 at 12:30pm. Writer completed tracker and sent intake questionnaire via Owned it.     Savannah Mixonangel BROWN   3.8.2023  1512    ----- Message from Jd Urena RN sent at 3/8/2023  2:23 PM CST -----  Regarding: FW: Med Management referral   Mental Health &Addiction (MH&A)Transition Clinic (TC):     Provides Patient Support While Waiting to Access Programmatic and Outpatient MH&A Care and Provides Select Crisis Assessment Services     NURSING Referral Review  _________________________________________    This RN has reviewed this Medication Management referral to the Transition Clinic and deemed the referral    Appropriate Patient is currently inpatient with patient anticipating discharge on 3/8/23.    Inappropriate   Consulting   Based on the following criteria:    Pt has a psychiatric provider (or pending plan) in place for future prescribing: Yes:   Date: 3/21/2023  Time: 10:30 AM  Visit Type: ADULT PSYCHIATRY RETURN   Provider: Hannah Yanes MD   Department: [facility] PSYCHIATRY          Timeframe until pt's scheduled psychiatry appointment is less than 6 months: Yes: 13 days      Pt takes psychiatric medications: Yes:   lithium ER (LITHOBID) 300 MG CR tablet Take 2 tablets (600 mg) by mouth At Bedtime  QUEtiapine (SEROQUEL) 25 MG tablet Take 25 mg by mouth At Bedtime    Hospital Medications    lithium (ESKALITH) capsule 600 mg 600 mg, Oral, AT BEDTIME  melatonin tablet 5 mg 5 mg, Oral, AT BEDTIME PRN  OLANZapine zydis (zyPREXA) ODT half-tab 2.5 mg 2.5 mg, Oral, 3 TIMES DAILY PRN,   OLANZapine zydis (zyPREXA) ODT tab 10 mg 10 mg, Oral, AT BEDTIME     Pt's goals seem to align with this temporary service: Yes: Transition Clinic to bridge psychiatric care and psychiatric medication management until next Level of Care.        Any additional pertinent information regarding this referral: Patient is  "currently inpatient at ED. Per ED HPI on 3/5/23. Patient was alert and oriented x4 pleasant and cooperative during assessment and interview.  Patient denied any suicidal ideation or homicidal ideation or self injury behavior.  Patient denied any auditory hallucination but she endorsed visual hallucinations she continues to see people.  Patient said she stopped taking her medication lithium and Seroquel in November 2022.  As she said \" since then my mental health continues to decline I cannot sleep or act properly, I need help with my mental health my shaina is getting out of control.\"  Patient says she used to take 600 mg of lithium at bedtime and Seroquel as needed, she stopped taking both medication at the same time in November 2022.  Currently patient is on Zyprexa 5 mg twice daily started in the emergency room patient says she likes the medications she has been resting well in the emergency room.  We will switch Zyprexa 5 mg to 10 mg at bedtime to prevent sedation during days, and restart lithium 600 mg at bedtime. Patient is voluntary for inpatient mental health unit, but she stated she does not want to stay in the emergency room after Wednesday, March 8, 2023.  Patient said \" I am going to travel to [place] and visit [place] family [place] in [place].\"  The writer to confirm the plan with her mother Joy who said that the patient is going to go to [place] soon.     Initial contact w/ patient/parent: LESLEY RN called and reached voicemail at [phone]. LESLEY RN explained transition clinic model and gave patient number to call and schedule appointment prior to next LOC in 13 days.      Additional Scheduling Instructions for Transition Clinic Coordinator:   TC Coordinators:  This is a Medication management and Therapy Referral.        Please call the patient at [phone]. Please schedule a NewPerson appointment with TC Provider Juani Burns as soon as possible or as indicated by the patient.      LESLEY RN, " informed this (patient) as to the purpose and benefit of the TC.      The Transition Clinic is a Temporary Service that helps to bridge the time to your next appointment.  It is not intended to be a long-term service and you are expected to attend your scheduled appointment with your next provider.      Patient/Parent/ Facility Staff Member verbalized understanding. RN left .    If you need support between appointments, please call [phone] and let them know you're seen by Transition Clinic Psychiatry.  You may also send a [employer] message to reach us.        RN Signature Jd Urena RN on 3/8/2023 at 2:05 PM        FW: Med Management referral  Received: Today  Arleth Mcdaniel Transition Clinic Homar Montalvo, medication only referral, here is next level of care for patient. Next Level of Care Patient Will Be Transitioned To: Next Level of Care Patient Will Be Transitioned To: Med management   Provider(s) JAYCOB Yanes   Location [facility]   Date/Time 3/21 at 10:30 AM                 Previous Messages       ----- Message -----   From: Robyn Ryan   Sent: 3/8/2023  12:03 PM CST   To: Transition Clinic   Subject: Med Management referral                           Transition Clinic Ref[place]   [place]         Please Check Type of Referral Requested:       __x__THERAPY: The Transition clinic is able to schedule patients without current medical insurance; these patient will be referred to our Social Work Care Coordinator for Medical Insurance              Assistance. We are open for referral for psychotherapy. Patient is referred from:  Extended Care       __x__MEDICATION:  Referrals for Medication are ONLY accepted from the following areas (select): Emergency Department/Urgent Care - HIGH PRIORITY                                       Suboxone and Opioid Management Referrals are automatically denied. TC Psychiatry cannot see patient without active medical insurance.   TC  Psychiatry cannot accept patient with next level of care scheduled with PCP       GUARDIAN: If your patient is not their own Guardian, please provide the following:     Guardian Name:   Guardian Contact Information (Phone & Email) :   Guardian Address:     FOSTER CARE PROVIDER: If your patient lives at a Licensed Foster Care, please provide the following:     Foster Provider:   Foster Provider Contact Information (Phone & Email):   Foster Provider Address:         Referring Provider Contact Name: Corinne Romitti; Phone Number: [phone]     Reason for Transition Clinic Referral: wait for follow up     Next Level of Care Patient Will Be Transitioned To: Med management   Provider(s) JAYCOB Yanes   [facility]   Date/Time 3/21 at 10:30 AM     What Would Be Helpful from the Transition Clinic: offer some therapy and check meds      Needs: NO     Does Patient Have Access to Technology: yes     Patient E-mail Address: [email or web address]     Current Patient Phone Number: [phone];     Clinician Gender Preference (if applicable): YES: female preferred     Patient location preference: [place]       Feel free to call patient this afternoon to connect about scheduling appts. Thanks! -Robyn                          ----- Message -----  From: Arleth Mcdaniel  Sent: 3/8/2023  12:16 PM CST  To: Transition Clinic Rn Christiano  Subject: FW: Med Management referral                      Hello, medication only referral, here is next level of care for patient. Next Level of Care Patient Will Be Transitioned To: Next Level of Care Patient Will Be Transitioned To: Med management  Provider(s) JAYCOB Yanes  [facility] Psych[facility]ry [facility]  Date/Time 3/21 at 10:30 AM         ----- Message -----  From: Robyn Ryan  Sent: 3/8/2023  12:03 PM CST  To: Transition Clinic  Subject: Med Management referral                          Transition Clinic Referral   [place]          Please Check Type of Referral Requested:       __x__THERAPY: The Transition clinic is able to schedule patients without current medical insurance; these patient will be referred to our Social Work Care Coordinator for Medical Insurance              Assistance. We are open for referral for psychotherapy. Patient is referred from:  Extended Care      __x__MEDICATION:  Referrals for Medication are ONLY accepted from the following areas (select): Emergency Department/Urgent Care - HIGH PRIORITY                                       Suboxone and Opioid Management Referrals are automatically denied. TC Psychiatry cannot see patient without active medical insurance.   TC Psychiatry cannot accept patient with next level of care scheduled with PCP      GUARDIAN: If your patient is not their own Guardian, please provide the following:    Guardian Name:  Guardian Contact Information (Phone & Email) :  Guardian Address:     FOSTER CARE PROVIDER: If your patient lives at a Licensed Foster Care, please provide the following:    Foster Provider:  Foster Provider Contact Information (Phone & Email):  Foster Provider Address:         Referring Provider Contact Name: Corinne Romitti; Phone Number: [phone]    Reason for Transition Clinic Referral: wait for follow up    Next Level of Care Patient Will Be Transitioned To: Med management  Provider(s) JAYCOB Yanes  Location [facility] Psychiatry Clinic  Date/Time 3/21 at 10:30 AM    What Would Be Helpful from the Transition Clinic: offer some therapy and check meds     Needs: NO    Does Patient Have Access to Technology: yes    Patient E-mail Address: [email or web address]    Current Patient Phone Number: [phone];     Clinician Gender Preference (if applicable): YES: female preferred    Patient location preference: [place]      Feel free to call patient this afternoon to connect about scheduling appts. Thanks! -Robyn

## 2023-03-08 NOTE — TELEPHONE ENCOUNTER
No appropriate beds are currently available within the  system. Bed search update (metro) @ 2AM:      [facility]: @ cap per website  [facility]: @ cap per website  [facility]: @ cap per website  [facility]: @ cap per website  [facility]: @ cap per website  [facility]: @ cap per website  [facility]: @ cap per website    Pt remains on work list until appropriate placement is available

## 2023-03-08 NOTE — PROGRESS NOTES
Patient was calm and cooperative during the shift. She denied pain and other psych symptoms. Patient contracted for safety. Patient vitals signs were stable. She had family visited her today and it went well. Patient stated she had bought a plane ticket prior coming to the hospital. She was demanding to leave today, because her her   flight is scheduled  tomorrow morning. Writer explain to patient the need to be inpatient due her been unstable at this time. She requested for her sister to come and talk to her and help her cancel her flight and reschedule.  Her sister came  to visit, and reassured her to stay inpatient that will be a great help for her. Patient agreed with her sister. She will be discharged to 4th floor room 4A. No major concerns during the shift. Will continue to monitor for behaviors.

## 2023-03-08 NOTE — TELEPHONE ENCOUNTER
[place]  Access Inpatient Bed Call Log 2/23/2023  @5:00PM     Adults:    [facility] at capacity  [facility] is posting 0 beds.    [facility] is posting 0 beds. [facility] covid.     [facility] is posting 0 beds.  [facility] are posting 0 beds.     [facility] has 0 beds.Extensive wait List for committed patients.    [facility] is posting 0 bed.         Pt remains on waitlist pending bed availability

## 2023-03-08 NOTE — TELEPHONE ENCOUNTER
Mental Health &Addiction (MH&A)Transition Clinic (TC):     Provides Patient Support While Waiting to Access Programmatic and Outpatient MH&A Care and Provides Select Crisis Assessment Services     NURSING Referral Review  _________________________________________    This RN has reviewed this Medication Management referral to the Transition Clinic and deemed the referral   [x] Appropriate Patient is currently inpatient with patient anticipating discharge on 3/8/23.   [] Inappropriate   []Consulting   Based on the following criteria:    Pt has a psychiatric provider (or pending plan) in place for future prescribing: Yes:   Date: 3/21/2023  Time: 10:30 AM  Visit Type: ADULT PSYCHIATRY RETURN   Provider: Hannah Yanes MD   Department: [facility] PSYCHIATRY          Timeframe until pt's scheduled psychiatry appointment is less than 6 months: Yes: 13 days      Pt takes psychiatric medications: Yes:   lithium ER (LITHOBID) 300 MG CR tablet Take 2 tablets (600 mg) by mouth At Bedtime  QUEtiapine (SEROQUEL) 25 MG tablet Take 25 mg by mouth At Bedtime    Hospital Medications    lithium (ESKALITH) capsule 600 mg 600 mg, Oral, AT BEDTIME  melatonin tablet 5 mg 5 mg, Oral, AT BEDTIME PRN  OLANZapine zydis (zyPREXA) ODT half-tab 2.5 mg 2.5 mg, Oral, 3 TIMES DAILY PRN,   OLANZapine zydis (zyPREXA) ODT tab 10 mg 10 mg, Oral, AT BEDTIME     Pt's goals seem to align with this temporary service: Yes: Transition Clinic to bridge psychiatric care and psychiatric medication management until next Level of Care.        Any additional pertinent information regarding this referral: Patient is currently inpatient at ED. Per ED HPI on 3/5/23. Patient was alert and oriented x4 pleasant and cooperative during assessment and interview.  Patient denied any suicidal ideation or homicidal ideation or self injury behavior.  Patient denied any auditory hallucination but she endorsed visual hallucinations she continues to see people.  Patient said she  "stopped taking her medication lithium and Seroquel in November 2022.  As she said \" since then my mental health continues to decline I cannot sleep or act properly, I need help with my mental health my shaina is getting out of control.\"  Patient says she used to take 600 mg of lithium at bedtime and Seroquel as needed, she stopped taking both medication at the same time in November 2022.  Currently patient is on Zyprexa 5 mg twice daily started in the emergency room patient says she likes the medications she has been resting well in the emergency room.  We will switch Zyprexa 5 mg to 10 mg at bedtime to prevent sedation during days, and restart lithium 600 mg at bedtime. Patient is voluntary for inpatient mental health unit, but she stated she does not want to stay in the emergency room after Wednesday, March 8, 2023.  Patient said \" I am going to travel to [place] and visit [place] family member in [place].\"  The writer to confirm the plan with her mother Joy who said that the patient is going to go to [place] soon.     Initial contact w/ patient/parent: TC RN called and reached voicemail at [phone]. TC RN explained transition clinic model and gave patient number to call and schedule appointment prior to next LOC in 13 days.      Additional Scheduling Instructions for Transition Clinic Coordinator:   TC Coordinators:  This is a Medication management and Therapy Referral.        Please call the patient at [phone]. Please schedule a NewPerson appointment with TC Provider Juani Burns as soon as possible or as indicated by the patient.      TC RN, informed this (patient) as to the purpose and benefit of the TC.      The Transition Clinic is a Temporary Service that helps to bridge the time to your next appointment.  It is not intended to be a long-term service and you are expected to attend your scheduled appointment with your next provider.      Patient/Parent/ Facility Staff Member verbalized " understanding. RN left .    If you need support between appointments, please call [phone] and let them know you're seen by Transition Clinic Psychiatry.  You may also send a Encover message to reach us.        RN Signature Jd Urena RN on 3/8/2023 at 2:05 PM        FW: Med Management referral  Received: Today  [facility] Homar Montalvo, medication only referral, here is next level of care for patient. Next Level of Care Patient Will Be Transitioned To: Next Level of Care Patient Will Be Transitioned To: Med management   Provider(s) JAYCOB Yanes   Location [facility]   Date/Time 3/21 at 10:30 AM                   Previous Messages       ----- Message -----   From: Robyn Ryan   Sent: 3/8/2023  12:03 PM CST   To: Transition Clinic   Subject: Med Management referral                           Transition Clinic Ref[place]         Please Check Type of Referral Requested:       __x__THERAPY: The Transition clinic is able to schedule patients without current medical insurance; these patient will be referred to our Social Work Care Coordinator for Medical Insurance              Assistance. We are open for referral for psychotherapy. Patient is referred from:  Extended Care       __x__MEDICATION:  Referrals for Medication are ONLY accepted from the following areas (select): Emergency Department/Urgent Care - HIGH PRIORITY                                       Suboxone and Opioid Management Referrals are automatically denied. TC Psychiatry cannot see patient without active medical insurance.   TC Psychiatry cannot accept patient with next level of care scheduled with PCP       GUARDIAN: If your patient is not their own Guardian, please provide the following:     Guardian Name:   Guardian Contact Information (Phone & Email) :   Guardian Address:     FOSTER CARE PROVIDER: If your patient lives at a Licensed Foster Care, please provide the following:      Foster Provider:   Foster Provider Contact Information (Phone & Email):   Foster Provider Address:         Referring Provider Contact Name: Corinne Romitti; Phone Number: [phone]     Reason for Transition Clinic Referral: wait for follow up     Next Level of Care Patient Will Be Transitioned To: Med management   Provider(s) JAYCOB Yanes   Location [facility]   Date/Time 3/21 at 10:30 AM     What Would Be Helpful from the Transition Clinic: offer some therapy and check meds      Needs: NO     Does Patient Have Access to Technology: yes     Patient E-mail Address: [email or web address]     Current Patient Phone Number: [phone];     Clinician Gender Preference (if applicable): YES: female preferred     Patient location preference: [place]       Feel free to call patient this afternoon to connect about scheduling appts. Thanks! -Robyn

## 2023-03-08 NOTE — ED NOTES
Triage & Transition Services, Extended Care    Client Name: Cristina Boyd    Date: March 8, 2023  Service Type:  Group Therapy  Session Start Time:  1135am    Session End Time: 1200pm  Session Length: 25 min  Site Location: [facility]  Attendees: Patient and other group members  Facilitator: MARIKA Simon     Topic:   Challenging negative thoughts    Intervention:    Group process: support, challenge, affirm, psycho-education.     Response:  Patient did participate in group. Behavior in group was engaged. Patient shared about feeling like she is trapped here in the [facility], however was able to identify a way to challenge that thought.       MARIKA Carranza

## 2023-03-08 NOTE — ED NOTES
Patient jennifer to sleep in the really part of the morning but  is awake now and walking around the unit.. No concerns at this time. Staff will continue to monitor.

## 2023-03-08 NOTE — TELEPHONE ENCOUNTER
[facility] Access Inpatient Bed Call Log 3/8/2023 7:46 AM    Facilities that have not updated websites in the last 12 hours have been called.       Adults:    [facility] is posting 0 beds.  Negative covid.    [facility] is posting 0 beds. Negative covid.    [facility] is posting 0 beds. 72HH preferred. - 7:46am Per Lilo, open for low acuity both M and F.     [facility] is posting 0 beds.     [facility] is posting 0 beds. Negative covid.    [facility] is posting 0 beds.     [facility] is posting 0 beds. Negative covid. *Low acuity*       [facility] is posting 0 beds. Mixed unit 12+. Low acuity only. Negative covid. - 7:48am Per Julia, they are able to review.    [facility] is positing 0 beds. No aggression.  Negative covid.     [facility] is posting 0 beds.  Negative covid.     [facility] is posting 0 beds. Low acuity only.  Negative covid.     [facility] is posting 0 beds. Negative covid.    [facility] is posting 0 beds. Low acuity. Negative covid.     [facility] is posting 0 beds. 72 HH hold preferred. Low acuity Only. 7:51am Per Leela they are capped but may have a d/c later.     [facility] is posting 1 beds. Low acuity. Negative covid.    [facility] is posting 0 beds. Negative covid. Vol only, No Hx of aggression, violence, or assault. No sexual offenders. No 72 HH holds. 7:52am Per Hallie, they will have d/c after 3pm.         [facility] is posting 6 beds. Negative covid. (Must have the cognitive ability to do programming. No aggressive or violent behavior or recent HX in the last 2 yrs. MH must be primary.)      [facility] is posting 0 beds. Negative Covid. Low acuity only. Violence and aggression capped.      [facility] is posting 0 beds. Low acuity, Negative Covid. - 7:53am Per Pavithra, they are capped.      [facility] is posting 0 beds. Covid Negative. Vol only. Combined adolescent and adult  unit. No aggressive or violent behavior. No registered sex offenders. - 7:54am Per Jack, they will have a d/c this afternoon, call then.     [facility] is posting 8 beds. No covid test required. Per policy, Intake does not present pt s on a 72HH to [facility].      [facility] is posting 0 beds.  Negative covid. (No lines, drains, or tubes (oxygen, CPAP, IV, etc.) - 7:56am Per Bessie, they are capped, but may have d/c s around noon.     R: Patient cleared and ready for behavioral bed placement: Yes    There are no appropriate beds available at this time.    9:59am Intake paged provider Naegele.     12:08pm intake received a call from provider Naegele. This pt has been accepted for admission to [facility]).     12:26pm Intake called [facility] and provided disposition to charge nurse. Nurse report: 4:30pm after discharge.     12:28pm Intake called [facility] and provided placement information to the pt's nurse.

## 2023-03-08 NOTE — ED NOTES
Triage & Transition Services, Extended Care     Cristina Boyd  March 7, 2023    Cristina is followed related to Boarding Status. Please see initial DEC Crisis Assessment completed for complete assessment information. Medical record is reviewed. While patient is in the ED, care team is working towards Learn and Demonstrate at Least One Skill Focused on Crisis Stabilization.     There are not significant status changes.     Writer attempted to meet with patient, but patient did not want to engage. Patient allowed writer to check in and ask how they were doing, and patient reported that they were doing the same but okay overall.     Plan:  Inpatient Mental Health: Patient was recommended for inpatient mental health due to visual hallucinations, minimal sleep, and need for stabilization on medications.     Plan for Care reviewed with Assigned Medical Provider? Yes. Provider, CESAR Honeycutt, response: acknowledged    Extended Care will follow and meet with patient/family/care team as able or requested.     Leah Ortiz, [facility], Extended Care   [phone]

## 2023-03-08 NOTE — PROGRESS NOTES
Pt is 23 years old female who presented from [facility] via wheelchair with shaina in the context of medication non-compliant. Pt is on voluntary status. Pt appeared clean and well-kept, calm and pleasant without any physical distress. Speech was coherent and logical without any signs of psychosis. Pt reported to writer that she recently started having racing thoughts, being tired and experiencing lack of sleep. She continued to report that she stopped taking her medications sometimes in November because she was lazy to go pick them up from the pharmacy. She denied any current mental health symptoms stating that the only thing that she is experiencing is fatigue. She denied any physical pain too. She was cooperative throughout the admission process with good eye contact but somewhat restless. Admission process was completed, and pt was oriented to the unit. All questions and concerns were addressed per scope of practice. Pt settled and adjusted in the unit well and was observed social with peers and staff. She attended evening group therapy. She took a shower, ate well and was medication compliant. No behavior concerns noted or reported. Will continue following the initiated POC.

## 2023-03-09 LAB
C PNEUM DNA SPEC QL NAA+PROBE: NOT DETECTED
ERYTHROCYTE [DISTWIDTH] IN BLOOD BY AUTOMATED COUNT: 15.9 % (ref 10–15)
FLUAV H1 2009 PAND RNA SPEC QL NAA+PROBE: NOT DETECTED
FLUAV H1 RNA SPEC QL NAA+PROBE: NOT DETECTED
FLUAV H3 RNA SPEC QL NAA+PROBE: NOT DETECTED
FLUAV RNA SPEC QL NAA+PROBE: NOT DETECTED
FLUBV RNA SPEC QL NAA+PROBE: NOT DETECTED
GROUP A STREP BY PCR: NOT DETECTED
HADV DNA SPEC QL NAA+PROBE: NOT DETECTED
HCOV PNL SPEC NAA+PROBE: NOT DETECTED
HCT VFR BLD AUTO: 36.1 % (ref 35–47)
HGB BLD-MCNC: 11.6 G/DL (ref 11.7–15.7)
HMPV RNA SPEC QL NAA+PROBE: NOT DETECTED
HPIV1 RNA SPEC QL NAA+PROBE: NOT DETECTED
HPIV2 RNA SPEC QL NAA+PROBE: NOT DETECTED
HPIV3 RNA SPEC QL NAA+PROBE: NOT DETECTED
HPIV4 RNA SPEC QL NAA+PROBE: NOT DETECTED
M PNEUMO DNA SPEC QL NAA+PROBE: NOT DETECTED
MCH RBC QN AUTO: 27.6 PG (ref 26.5–33)
MCHC RBC AUTO-ENTMCNC: 32.1 G/DL (ref 31.5–36.5)
MCV RBC AUTO: 86 FL (ref 78–100)
MONOCYTES NFR BLD AUTO: NEGATIVE %
PLATELET # BLD AUTO: 312 10E3/UL (ref 150–450)
RBC # BLD AUTO: 4.2 10E6/UL (ref 3.8–5.2)
RSV RNA SPEC QL NAA+PROBE: NOT DETECTED
RSV RNA SPEC QL NAA+PROBE: NOT DETECTED
RV+EV RNA SPEC QL NAA+PROBE: NOT DETECTED
SARS-COV-2 RNA RESP QL NAA+PROBE: NEGATIVE
WBC # BLD AUTO: 5.6 10E3/UL (ref 4–11)

## 2023-03-09 PROCEDURE — G0177 OPPS/PHP; TRAIN & EDUC SERV: HCPCS

## 2023-03-09 PROCEDURE — 99222 1ST HOSP IP/OBS MODERATE 55: CPT | Mod: AI | Performed by: CLINICAL NURSE SPECIALIST

## 2023-03-09 PROCEDURE — 250N000013 HC RX MED GY IP 250 OP 250 PS 637: Performed by: EMERGENCY MEDICINE

## 2023-03-09 PROCEDURE — 36415 COLL VENOUS BLD VENIPUNCTURE: CPT | Performed by: PHYSICIAN ASSISTANT

## 2023-03-09 PROCEDURE — 124N000002 HC R&B MH UMMC

## 2023-03-09 PROCEDURE — 86308 HETEROPHILE ANTIBODY SCREEN: CPT | Performed by: PHYSICIAN ASSISTANT

## 2023-03-09 PROCEDURE — 87486 CHLMYD PNEUM DNA AMP PROBE: CPT | Performed by: PHYSICIAN ASSISTANT

## 2023-03-09 PROCEDURE — 87651 STREP A DNA AMP PROBE: CPT | Performed by: PHYSICIAN ASSISTANT

## 2023-03-09 PROCEDURE — 250N000013 HC RX MED GY IP 250 OP 250 PS 637

## 2023-03-09 PROCEDURE — 250N000013 HC RX MED GY IP 250 OP 250 PS 637: Performed by: CLINICAL NURSE SPECIALIST

## 2023-03-09 PROCEDURE — 250N000013 HC RX MED GY IP 250 OP 250 PS 637: Performed by: PHYSICIAN ASSISTANT

## 2023-03-09 PROCEDURE — U0005 INFEC AGEN DETEC AMPLI PROBE: HCPCS | Performed by: PHYSICIAN ASSISTANT

## 2023-03-09 PROCEDURE — 85027 COMPLETE CBC AUTOMATED: CPT | Performed by: PHYSICIAN ASSISTANT

## 2023-03-09 RX ORDER — OLANZAPINE 2.5 MG/1
2.5-5 TABLET, FILM COATED ORAL 3 TIMES DAILY PRN
Status: DISCONTINUED | OUTPATIENT
Start: 2023-03-09 | End: 2023-03-09

## 2023-03-09 RX ORDER — AMOXICILLIN 875 MG
875 TABLET ORAL EVERY 12 HOURS SCHEDULED
Status: DISCONTINUED | OUTPATIENT
Start: 2023-03-09 | End: 2023-03-10 | Stop reason: HOSPADM

## 2023-03-09 RX ORDER — OLANZAPINE 10 MG/2ML
10 INJECTION, POWDER, FOR SOLUTION INTRAMUSCULAR 3 TIMES DAILY PRN
Status: DISCONTINUED | OUTPATIENT
Start: 2023-03-09 | End: 2023-03-09

## 2023-03-09 RX ORDER — OLANZAPINE 10 MG/2ML
5 INJECTION, POWDER, FOR SOLUTION INTRAMUSCULAR 3 TIMES DAILY PRN
Status: DISCONTINUED | OUTPATIENT
Start: 2023-03-09 | End: 2023-03-10 | Stop reason: HOSPADM

## 2023-03-09 RX ORDER — OLANZAPINE 2.5 MG/1
2.5-5 TABLET, FILM COATED ORAL 3 TIMES DAILY PRN
Status: DISCONTINUED | OUTPATIENT
Start: 2023-03-09 | End: 2023-03-10 | Stop reason: HOSPADM

## 2023-03-09 RX ORDER — QUETIAPINE FUMARATE 25 MG/1
25 TABLET, FILM COATED ORAL
Status: DISCONTINUED | OUTPATIENT
Start: 2023-03-09 | End: 2023-03-10 | Stop reason: HOSPADM

## 2023-03-09 RX ADMIN — HYDROXYZINE HYDROCHLORIDE 50 MG: 25 TABLET, FILM COATED ORAL at 22:46

## 2023-03-09 RX ADMIN — ACETAMINOPHEN 325MG 650 MG: 325 TABLET ORAL at 08:05

## 2023-03-09 RX ADMIN — QUETIAPINE FUMARATE 25 MG: 25 TABLET ORAL at 21:20

## 2023-03-09 RX ADMIN — AMOXICILLIN 875 MG: 875 TABLET, COATED ORAL at 19:42

## 2023-03-09 RX ADMIN — OLANZAPINE 10 MG: 10 TABLET, ORALLY DISINTEGRATING ORAL at 21:17

## 2023-03-09 RX ADMIN — LITHIUM CARBONATE 600 MG: 300 TABLET, EXTENDED RELEASE ORAL at 21:17

## 2023-03-09 ASSESSMENT — ACTIVITIES OF DAILY LIVING (ADL)
ADLS_ACUITY_SCORE: 20
ADLS_ACUITY_SCORE: 20
LAUNDRY: WITH SUPERVISION
ORAL_HYGIENE: INDEPENDENT
ADLS_ACUITY_SCORE: 20
HYGIENE/GROOMING: SHOWER;INDEPENDENT
ADLS_ACUITY_SCORE: 20
DRESS: INDEPENDENT

## 2023-03-09 NOTE — PROGRESS NOTES
"   03/08/23 2200   Group Therapy Session   Group Attendance attended group session   Time Session Began 2030   Time Session Ended 2115   Total Time (minutes) 45   Total # Attendees 5   Group Type psychotherapeutic   Group Topic Covered cognitive therapy techniques   Group Session Detail DBT house   Patient Response/Contribution cooperative with task;discussed personal experience with topic     She said she was a bit anxious as she is not discharging as she wished. 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  "

## 2023-03-09 NOTE — PLAN OF CARE
03/09/23 1609   Group Therapy Session   Group Attendance attended group session   Time Session Began 1415   Time Session Ended 1505   Total Time (minutes) 45   Total # Attendees 8   Group Type life skill;psychoeducation   Group Topic Covered coping skills/lifestyle management;problem-solving;relapse prevention;community integration;emotions/expression;structured socialization;balanced lifestyle   Patient Participation Detail Mental health management group designed to promote insight, self-reflection, self-esteem and goal-setting. Pt Response: Pt was selectively engaged. She was able to follow and complete the 2-part activity, though some restlessness noted. During down time of group, pt exhibited signs of fatigue. When prompted, she contributed reflections to the discussion and appeared comfortable sharing goals and ideas with group members.

## 2023-03-09 NOTE — ED NOTES
Triage & Transition Services, Extended Care      Client Name: Cristina Boyd  Date: March 8, 2023  Service Type:  Group Therapy  Site Location: [facility]  Facilitator: Krystal Clay     Topic:   Art Group: Origami     Intervention:    Patient was in her room and writer asked pt if she would like to participate in art group.     Response:  Patient declined participating in group and did not participate in group.     Krystal Clay  Extended Care Coordinator

## 2023-03-09 NOTE — PROGRESS NOTES
SPIRITUAL HEALTH SERVICES  SPIRITUAL ASSESSMENT Progress Note  [facility] ([facility]) 4A YOUNG ADULT INPT MH       REFERRAL SOURCE: Self initiated  visit, Anabaptist specific.     DEMOGRAPHIC: Pt Cristina Boyd identifies as Anabaptist and is of [place] descent.        ILLNESS CIRCUMSTANCE: I introduced myself to Cristina as the Lead Anabaptist  and oriented her to [facility].  Assessed emotional/spiritual needs and resources while offering reflective conversation, which integrated elements of illness and family narratives.     Cristina shared that she has been doing prayers/incantations upon herself for spiritual healing. She inquired of which chapters of the Quran were for this. I informed her of those and provided additional prayers she may use.     Cristina also stated that she works at [employer] in [place] as an . She expressed some concern in missing her flight today to visit her grandmother in [place]. Cristina was overall pleasant and calm affect and demeanor throughout our encounter.     Cristina also requested a hijab, as she is observant of wearing it outside the hospital and it is unpleasant for her to be without it.     SPIRITUAL INTERVENTIONS: Cristina requested Islamic incantations/prayer at bedside. We prayed together at her request. I also provided Cristina with an Islamic prayer booklet for Anabaptist patients and a hijab left with unit staff.     PLAN: I will follow up with Cristina for the duration of her stay. [facility] is available to Cristina per request.     Teofilo Layton  Lead Anabaptist   Pager [phone]    [facility] remains available 24/7 for emergent requests/referrals, either by having the switchboard page the on-call  or by entering an ASAP/STAT consult in Epic (this will also page the on-call ).

## 2023-03-09 NOTE — PROGRESS NOTES
Pt appeared to have slept for 6 hours.  Breathing is even and unlabored.  No medical/safety concerns this shift.  NO prn administered.  Pt remain on suicide precautions.

## 2023-03-09 NOTE — PLAN OF CARE
Occupational Therapy Group Discussion     03/09/23 5528   Group Therapy Session   Group Attendance attended group session   Time Session Began 1015   Time Session Ended 1100   Total Time (minutes) 20   Total # Attendees 7   Group Type life skill;psychoeducation   Group Topic Covered coping skills/lifestyle management;self-care activities   Group Session Detail General Health and Coping Skills: group discussion and education on self-compassion   Patient Participation Detail Pt participated in a group discussion on self-compassion. Pt contributed thoughts and personal experiences with the group, occasionally at length, about positivity. A peer enthusiastically agreed with pt about how people's mindsets and barriers keeps themselves from seeing the positives. Pt left group after 15-20 minutes (no charge).

## 2023-03-09 NOTE — PLAN OF CARE
Initial Psychosocial Assessment    I have reviewed the chart, met with the patient, and developed Care Plan.  Information for assessment was obtained from: chart and patient    Presenting Problem:  Patient is a 23-year-old female admitted for shaina and visual hallucinations. She endorses a lot of stress in her life. She slept one hour in past three days. She will leave the house and walk for miles. She stopped taking her medications in November.    History of Mental Health and Chemical Dependency:   Patient has a history of Bipolar Disorder. She has a history of three previous hospitalization at [facility] in 2016, 2017 and 2018. Patient has no history of substance abuse.    Family Description (Constellation, Family Psychiatric History):  Patient was [place] in the [place]. Her family is originally from [place]. She is the second of four siblings. Patient reports there is no mental illness in her immediate family but some in distant family members.    Significant Life Events (Illness, Abuse, Trauma, Death)  None    Living Situation:  Patient lives with her parents and several siblings.    Educational Background:  College graduate. She is taking master's level classes at [employer]    Occupational History:  Patient is a     Financial Status:  Self-supporting    Legal Issues:  None    Ethnic/Cultural Issues:  Bolivian    Spiritual Orientation:  Practicing Sikhism     Service History:  None    Social Functioning (organization, interests):  Patient has supportive friends and family. She enjoys shopping, movies, taking naps and working out at the gym.    Current Treatment Providers are:  No providers    Social Service Assessment/Plan:  Patient was pleasant and cooperative during the interview. She reported she stopped taking her medications because she felt good without them for several months and didn't refill them. She would like [facility]. She wants a  referral for a female therapist. She prefers virtual appointments.

## 2023-03-09 NOTE — PLAN OF CARE
Problem: Suicidal Behavior  Goal: Suicidal Behavior is Absent or Managed  Outcome: Progressing   Goal Outcome Evaluation:    Plan of Care Reviewed With: patient        Pt has no medications scheduled for this morning. She rated headache pain 5/10- Tylenol given, within an hour pain 5 /10. Pt explained that her headache was upper anterior and posterior. She states it is worse of right side of her head near her eye and radiates to right shoulder. There is redness in her right eye. Pt denied anxiety, depression, SI, HI, and hallucinations.     Pt showered this morning, ate breakfast,  visited pt, used phone to contact employer, attended morning OT groups, used telephone, and visible in hallway and her room.    Pt had a medicine consult this morning. Symptomatic Covid test- negative, respiratory pcr and throat panel pcr tests collected and sent to lab. Lab collected mononucleosis- negative, and CBC before lunch. Pt has order for nutrition consult.

## 2023-03-09 NOTE — PLAN OF CARE
Problem: Suicidal Behavior  Goal: Suicidal Behavior is Absent or Managed  Outcome: Progressing  Flowsheets (Taken 3/9/2023 1643)  Mutually Determined Action Steps (Suicidal Behavior Absent/Managed): verbalizes safety check rationale  Intervention: Provide Immediate and Ongoing Protective Physical Environment  Flowsheets (Taken 3/9/2023 1643)  Safe Transition Promotion:    access to lethal means addressed    personal safety plan developed    protective factors promoted   Goal Outcome Evaluation:    Plan of Care Reviewed With: patient        Patient is visible in the milieu,likes to socialize with staff and selected peers,denies SI,SIB,hallucinations denies anxiety and depression.  Patient was seen by medicine today for nasal congestion, sore throat, concern for sinus infection. Labs done.   Patient has new order to start amoxicillin 875 mg every 12 hrs x 7 days.  Started 8pm today, last day 3/16/23.  Patient ate dinner. Dad came to visit and brought some snacks.  Patient requested to check her phone, supervised by staff while she used  her phone to check her emails from employer and to get phone numbers.  Patient remained calm and cooperative.  Took a shower before bedtime.  Took scheduled medications and requested for prn Seroquel for sleep.  Patient came out of her room reported feeling anxious, requested for prn meds, prn hydroxyzine given, pt went back to bed.  Will continue to monitor.

## 2023-03-09 NOTE — CONSULTS
"[facility]  Consult Note - Hospitalist Service  Date of Admission:  3/5/2023  Consult Requested by: Debra Naegele APRN   Reason for Consult: \"Sinus infection\"    Assessment & Plan   Cristina Boyd is a 23 year old female admitted on 3/5/2023. She has a history of bipolar 1 disorder and depressive disorder who is admitted to station 4A for stress and shaina. Medicine was consulted today for nasal congestion, sore throat, concern for sinus infection.     URI   Sinus congestion   Headache   Sinus infection  Patient reports about one month hx of right sided facial pain, \"on and off\" and sore throat, with associated eye pain and teeth pain. Patient reports nasal congestion, with no rhinorrhea. Reports trying Nyquil and Dayquil with minimal improvement. Reports pain is similar to prior sinus infection which she had about one year ago. Reports intermittent chills. COVID19 negative 3/5/23. On exam, patient with TTP of right frontal and maxillary sinuses, minimal erythema or oropharynx with no edema or exudates, no appreciated cervical lymphadenopathy.   -Since admission patient has been hemodynamically stable and afebrile  -WBC wnl   -Respiratory viral panel pending   -Mono screen negative   -Strep A screen negative   -Repeat COVID19 PCR negative     Plan:   -Nasal saline PRN for nasal congestion   -Throat lozenges   -Tylenol PRN for headache   -Ordered Amoxicillin 875 mg BID X 7 days for sinus infection     ADDENDUM:   RVP negative. Continue with management as above.     Depression   Bipolar 1 disorder   -Management per psychiatry     Medicine will follow up on RVP, if negative will sign off. Please call with any additional questions or concerns.     RVP negative, medicine will sign off.     Clinically Significant Risk Factors Present on Admission                               JOVANI Lopez  Hospitalist Service  Securely message with [employer] (more info)  Text page via " "[facility] Paging/Directory   ______________________________________________________________________    Chief Complaint   \"Sinus infection.\"     History is obtained from the patient.     History of Present Illness   Cristina Boyd is a 23 year old female who has a history of bipolar 1 disorder and depressive disorder who is admitted to station 4A for stress and shaina. Medicine was consulted today for nasal congestion, sore throat, concern for sinus infection.  Patient reports about 1 month history of right-sided facial pain extending to right ear area with associated eye pain, teeth pain on the right side.  Patient notes \"on and off\" sore throat for the last month.  Reports nasal congestion, with no rhinorrhea in the last several days.  She reports associated right-sided headache with symptoms.  She notes intermittent chills.  Prior to admission she was taking NyQuil and DayQuil with minimal improvement.  She denies any cough or sputum production.  Tolerating a diet well.  Denies any abdominal pain, trouble with bowel movements or urination.  Denies any chest pain or dyspnea.  Patient reports she is concerned she has a sinus infection and would like antibiotics.  She notes history of a sinus infection about 1 year ago.  Denies any recent sick contacts.      Past Medical History    Past Medical History:   Diagnosis Date     Bipolar disorder (H)      Depressive disorder      Infection due to 2019 novel coronavirus 2020    summer 2020 by patient report     Infection due to 2019 novel coronavirus 12/2021     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, unspecified thyrotoxicosis type 11/16/2022       Past Surgical History   Past Surgical History:   Procedure Laterality Date     BRONCHOSCOPY (RIGID OR FLEXIBLE), DIAGNOSTIC N/A 6/16/2018    Procedure: COMBINED BRONCHOSCOPY (RIGID OR FLEXIBLE), LAVAGE;;  Surgeon: Manjeet Holland MD;  Location:  GI       Medications   Medications Prior to Admission   Medication Sig Dispense Refill " "Last Dose     lithium ER (LITHOBID) 300 MG CR tablet Take 2 tablets (600 mg) by mouth At Bedtime 60 tablet 0 11/1/2022     QUEtiapine (SEROQUEL) 25 MG tablet Take 25 mg by mouth At Bedtime   11/1/2022          Social History   I have reviewed this patient's social history and updated it with pertinent information if needed.  Social History     Tobacco Use     Smoking status: Never     Smokeless tobacco: Never   Vaping Use     Vaping Use: Never used   Substance Use Topics     Alcohol use: No     Drug use: No     Comment: x1 marijuana, in October, 2016        Physical Exam   Blood pressure 113/82, pulse 79, temperature 97.3  F (36.3  C), temperature source Temporal, resp. rate 18, height 1.676 m (5' 6\"), weight 63.5 kg (140 lb), last menstrual period 03/05/2023, SpO2 100 %, not currently breastfeeding.  GENERAL: Alert and oriented x 3. NAD.   HEENT: Anicteric sclera. PERRL. Mucous membranes moist and without lesions. Oropharynx with mild erythema, no edema or exudates. No appreciated cervical lymphadenopathy.   CV: RRR. S1, S2. No murmurs appreciated.   RESPIRATORY: Effort normal on RA. Lungs CTAB with no wheezing, rales, rhonchi.   NEUROLOGICAL: No focal deficits.   EXTREMITIES: No peripheral edema.   SKIN: No jaundice. No rashes.         Medical Decision Making     45 MINUTES SPENT BY ME on the date of service doing chart review, history, exam, documentation & further activities per the note.      Data     I have personally reviewed the following data over the past 24 hrs:    5.6  \   11.6 (L)   / 312     N/A N/A N/A /  N/A   N/A N/A N/A \       TSH: N/A T4: N/A A1C: 5.6         "

## 2023-03-09 NOTE — H&P
Admitted: 03/05/2023    CHIEF COMPLAINT:  Evaluation for suicidal ideation and shaina.    IDENTIFYING INFORMATION:  Cristina Boyd is a 23-year-old single -American female presenting with a history of bipolar 1 disorder, current episode manic, with suicidal ideation.    HISTORY OF PRESENT ILLNESS:  Cristina Boyd is a 23-year-old -American female presenting with a history of bipolar 1 disorder, current episode manic, with suicidal thinking.  The patient reports that she has been under a lot of stress lately.  The patient states she works as a  at [employer].  The patient states she is very driven.  The patient states she puts everything on the back burner and puts her job first.  The patient reports because she has been so busy, she did not renew her prescription for lithium.  The patient states her stress, over thinking, racing thoughts and decreased focus became worse.  The patient states eventually she was not eating or sleeping.  The patient presents with somewhat pressured speech.    The patient was restarted on lithium 600 mg in the ED, along with Zyprexa 10 mg.  The patient states she has been feeling better since restarting her medications.  Provider also discussed Seroquel for sleep.    Goals for this hospitalization is stabilization with medications and finding resources for patient.    PSYCHIATRIC REVIEW OF SYSTEMS:  The patient states that she continues to feel stressed.  She over thinks.  She has racing thoughts, decreased focus.  Her speech is somewhat pressured.  The patient reports she is eating better now, but prior to hospitalization, was not eating at all.  The patient denies any symptoms of depression.  Denies any suicidal thinking.  The patient states she is very anxious, stressed, worried about work.  The patient denies any symptoms of PTSD, eating disorder or OCD.  The patient appears to be hypomanic with somewhat pressured speech, racing thoughts.  Sleep and  "appetite have normalized.  The patient denies any auditory or visual hallucinations.  The patient denies any homicidal thinking.    PSYCHIATRIC HISTORY:  The patient reports she was diagnosed with bipolar 1 disorder in 2016.  The patient reports hospitalizations in 2016, 2017 and 2018 for bipolar disorder.  The patient states, \"I was really proud that I did not need to come back to the hospital until 2023.\"  The patient has history of being treated with lithium, Seroquel, and Zyprexa with good outcomes.  The patient denies any prior suicide attempts.    PAST MEDICAL HISTORY:  History of thyrotoxicosis without thyroid storm, unspecified thyrotoxicosis type.  Review TSH, which is 2.26, within normal limits.  Reviewed admission labs, hemoglobin 11.1, low; hematocrit 34.1, low; RDW 15.7, elevated.  COVID screen negative.    ALLERGIES:  NO KNOWN ALLERGIES.    SUBSTANCE ABUSE HISTORY:  U-tox is negative.  The patient denies abusing any substances.    FAMILY HISTORY:  Maternal uncle endorses depression.  The patient does not know of any other mental health issues.  The patient denies any trauma history.    SOCIAL HISTORY:  The patient was born in the .  Family is from [place].  The patient lived in [place] for 3 years, back to the  in 2015.  The patient lives with parents and siblings.  She is a practicing [employer].  The patient is an  at [employer].  This is her first \"real job,\" started in 08/2022.    MEDICAL REVIEW OF SYSTEMS:    CONSTITUTION:  The patient is not in acute distress.  Appetite normal.  HEENT:  The patient reports sore throat and headache and congestion.  Eyes:  The patient reports right eye redness in sclera.    CARDIOVASCULAR:  The patient denies any chest pain.  PULMONARY:  The patient denies any respiratory distress.    ABDOMINAL:  The patient denies any abdominal pain.  MUSCULOSKELETAL:  The patient denies any muscle pain.  SKIN:  The patient does not have any lacerations or " "rashes.    NEUROLOGICAL:  The patient reports headache on right side of head.    PHYSICAL EXAMINATION:    VITAL SIGNS:  Blood pressure 118/79, pulse 101, temp 98.3 Fahrenheit, respirations 16, height 5 feet 6 inches, weight 139 pounds 9.6 ounces, SpO2 100%.  Reviewed documentation for physical examination completed by Nupur Alex MD dated 03/05/2023.  No changes noted.    MENTAL STATUS EXAMINATION:  The patient appears her stated age.  She is dressed in scrubs.  She has adequate hygiene.  The patient was cooperative in meeting with provider in the interview room.  She was calm and cooperative throughout the interview.  The patient's speech was somewhat pressured.  She was spontaneous.  She reports her mood as \"good.\"  Affect full range.  Thought process is linear, logical.  She is demonstrating no loosening in associations.  Thought content did not display any evidence of psychosis.  She does not endorse passive suicidal thoughts.  She denies any active intent.  She denies homicidal thinking.  She is demonstrating adequate insight and judgment.  Cognition appears intact to interviewing, including orientation to person, place, time, and situation, use of language and fund of knowledge.  Recent and remote memory are grossly intact.  Muscle strength, tone, and gait within normal limits by observation.    ASSESSMENT:  1.  Bipolar 1 disorder, current episode manic.  2.  Headache.    PLAN:  1.  The patient has been admitted to [facility] on a voluntary basis.  2.  Discussed medications with the patient.  Patient will continue on lithium 600 mg to address mood instability.  The patient will continue with Zyprexa 10 mg.  Also to address mood instability, patient may use Seroquel 25 mg p.r.n. for sleep.  The patient will not have trazodone available.  The patient may use melatonin.  The patient can use hydroxyzine for anxiety.  Provider discussed risks, benefits, and side effects of medication with the patient.  3.  " Psychosocial treatments to be addressed with CTC.  4.  Estimated length of stay is 3-5 days.    Debra A. Naegele, APRN, CNS    Provider spent 60 minutes reviewing records and labs, documentation and meeting with patient.         D: 2023   T: 2023   MT: KYLEE    Name:     SALLY KAPLAN  MRN:      [id number]        Account:     [id number]   :      2000           Admitted:    2023       Document: [id number]

## 2023-03-10 ENCOUNTER — TELEPHONE (OUTPATIENT)
Dept: BEHAVIORAL HEALTH | Facility: CLINIC | Age: 23
End: 2023-03-10
Payer: COMMERCIAL

## 2023-03-10 VITALS
HEART RATE: 82 BPM | SYSTOLIC BLOOD PRESSURE: 114 MMHG | TEMPERATURE: 97.7 F | HEIGHT: 66 IN | RESPIRATION RATE: 18 BRPM | WEIGHT: 140 LBS | DIASTOLIC BLOOD PRESSURE: 78 MMHG | BODY MASS INDEX: 22.5 KG/M2 | OXYGEN SATURATION: 98 %

## 2023-03-10 LAB — LITHIUM SERPL-SCNC: 0.5 MMOL/L (ref 0.6–1.2)

## 2023-03-10 PROCEDURE — 36415 COLL VENOUS BLD VENIPUNCTURE: CPT | Performed by: CLINICAL NURSE SPECIALIST

## 2023-03-10 PROCEDURE — 80178 ASSAY OF LITHIUM: CPT | Performed by: CLINICAL NURSE SPECIALIST

## 2023-03-10 PROCEDURE — 99239 HOSP IP/OBS DSCHRG MGMT >30: CPT | Performed by: CLINICAL NURSE SPECIALIST

## 2023-03-10 PROCEDURE — G0177 OPPS/PHP; TRAIN & EDUC SERV: HCPCS

## 2023-03-10 PROCEDURE — 250N000013 HC RX MED GY IP 250 OP 250 PS 637: Performed by: PHYSICIAN ASSISTANT

## 2023-03-10 RX ORDER — OLANZAPINE 10 MG/1
10 TABLET ORAL AT BEDTIME
Qty: 30 TABLET | Refills: 1 | Status: SHIPPED | OUTPATIENT
Start: 2023-03-10 | End: 2023-05-09

## 2023-03-10 RX ORDER — OLANZAPINE 5 MG/1
5 TABLET ORAL AT BEDTIME
Status: DISCONTINUED | OUTPATIENT
Start: 2023-03-10 | End: 2023-03-10

## 2023-03-10 RX ORDER — LITHIUM CARBONATE 300 MG/1
600 TABLET, FILM COATED, EXTENDED RELEASE ORAL AT BEDTIME
Qty: 30 TABLET | Refills: 1 | Status: SHIPPED | OUTPATIENT
Start: 2023-03-10 | End: 2023-05-09

## 2023-03-10 RX ORDER — AMOXICILLIN 875 MG
875 TABLET ORAL EVERY 12 HOURS
Qty: 12 TABLET | Refills: 0 | Status: SHIPPED | OUTPATIENT
Start: 2023-03-10 | End: 2023-09-29

## 2023-03-10 RX ORDER — QUETIAPINE FUMARATE 25 MG/1
25 TABLET, FILM COATED ORAL
Qty: 30 TABLET | Refills: 1 | Status: SHIPPED | OUTPATIENT
Start: 2023-03-10 | End: 2023-05-09

## 2023-03-10 RX ORDER — OLANZAPINE 10 MG/1
10 TABLET ORAL AT BEDTIME
Status: DISCONTINUED | OUTPATIENT
Start: 2023-03-10 | End: 2023-03-10 | Stop reason: HOSPADM

## 2023-03-10 RX ADMIN — AMOXICILLIN 875 MG: 875 TABLET, COATED ORAL at 08:40

## 2023-03-10 ASSESSMENT — ACTIVITIES OF DAILY LIVING (ADL)
LAUNDRY: WITH SUPERVISION
ADLS_ACUITY_SCORE: 20
ADLS_ACUITY_SCORE: 20
DRESS: INDEPENDENT
ADLS_ACUITY_SCORE: 20
HYGIENE/GROOMING: INDEPENDENT
ADLS_ACUITY_SCORE: 20
ORAL_HYGIENE: INDEPENDENT
ADLS_ACUITY_SCORE: 20

## 2023-03-10 NOTE — TELEPHONE ENCOUNTER
cancelled patients tc therapy appointment as requested by provider for 03/17/2023 over my chart and sent my chart message as patient impatient. Tracker completed.    Salma Scott  03/10/2023  927

## 2023-03-10 NOTE — PROGRESS NOTES
Pt appeared to have slept for 5.5 hours.  Breathing is even and unlabored.  No medical/safet concerns this shift.  No prn administered.  Pt remain on suicide precautions.  Continues on atb for sinus infection.  Temp 97.9  No medication side effect reported or noted.  Will continue to monitor.

## 2023-03-10 NOTE — PLAN OF CARE
"Occupational Therapy      03/10/23 1200   Group Therapy Session   Group Attendance attended group session   Time Session Began 1015   Time Session Ended 1200   Total Time (minutes) 65   Total # Attendees 8-10   Group Type task skill   Group Topic Covered cognitive activities;balanced lifestyle;coping skills/lifestyle management;leisure exploration/use of leisure time;structured socialization;problem-solving   Group Session Detail OT: Education on healthy activity engagement with creative hands on endeavor (OT clinic) to increase concentration, focus, attention to task/detail, decision making, problem solving, frustration tolerance, task follow through, coping with stress, healthy leisure engagement, creative expression, and social engagement   Patient Response/Contribution cooperative with task;other (see comments)  (pleasant; engaged; restless)   Patient Participation Detail Pt reported during check-in that \"being able to sleep\" is going well for her and she is looking forward to \"going to [place] to look at [employer]\" immediately after discharge. Pt sat among peers to complete selected creative hands on endeavor and engaged in reciprocal social interactions with peers. Pt worked in a neat and tidy manner to complete project and cleaned-up supplies and workspace. Pt appeared restless as she was observed to leave and re-renter group multiple times. Pt reported she looking forward to her upcoming discharge this afternoon and feels she has a solid support system and good discharge plan.            "

## 2023-03-10 NOTE — PLAN OF CARE
"  Problem: Plan of Care - These are the overarching goals to be used throughout the patient stay.    Goal: Absence of Hospital-Acquired Illness or Injury  Outcome: Met  Intervention: Prevent Skin Injury  Recent Flowsheet Documentation  Taken 3/10/2023 0900 by Nely Edwards RN  Body Position: position changed independently   Goal Outcome Evaluation:    Plan of Care Reviewed With: patient      Pt discharged home at 1415. Dad picked her up. Discharge instructions were reviewed by writer. Pt verbalized  understanding discharge orders. Stated that she will remain safe in the community, will take all his meds as ordered, and that she will follow up with aftercare appointments. Told writer that she will set an alarm on her phone reminding her  on when to take her meds. Pr stated that she has a good support system at home and that her sister is a psych nurse. Pt denied anxiety, depression., SI, HI, and hallucinations. Pt took all her personal belongings with her.     /78   Pulse 82   Temp 97.7  F (36.5  C) (Temporal)   Resp 18   Ht 1.676 m (5' 6\")   Wt 63.5 kg (140 lb)   LMP 03/05/2023   SpO2 98%   BMI 22.60 kg/m      "

## 2023-03-10 NOTE — PLAN OF CARE
Assessment/Intervention/Current Symtoms and Care Coordination  The patient's care was discussed with the treatment team and chart notes were reviewed.  Patient will discharge today. Appointments were scheduled and AVS completed. Patient requested hospital admission letters for work and school which were provided.     Discharge Plan or Goal  Home with therapy and psychiatry appointments     Barriers to Discharge   None    Referral Status  Psychiatry and therapy referrals made with appointments    Legal Status    Voluntary

## 2023-03-10 NOTE — DISCHARGE SUMMARY
Psychiatric Discharge Summary    Cristina Boyd MRN# [id number]   Age: 23 year old YOB: 2000     Date of Admission:  3/5/2023  Date of Discharge:  3/10/2023  Admitting Physician:  Kings Nixon MD  Discharge Physician:  Debra A. Naegele, APRN CNS (Contact: [phone])         Event Leading to Hospitalization:    Cristina Boyd is a 23-year-old -American female presenting with a history of bipolar 1 disorder, current episode manic, with suicidal thinking.  The patient reports that she has been under a lot of stress lately.  The patient states she works as a  at [employer].  The patient states she is very driven.  The patient states she puts everything on the back burner and puts her job first.  The patient reports because she has been so busy, she did not renew her prescription for lithium.  The patient states her stress, over thinking, racing thoughts and decreased focus became worse.  The patient states eventually she was not eating or sleeping.  The patient presents with somewhat pressured speech.     The patient was restarted on lithium 600 mg in the ED, along with Zyprexa 10 mg.  The patient states she has been feeling better since restarting her medications.  Provider also discussed Seroquel for sleep.     Goals for this hospitalization is stabilization with medications and finding resources for patient.       See Admission note by Naegele, Debra Ann, APRN CNS   on 3/9/2023 for additional details.          DIagnoses:   1.  Bipolar 1 disorder, current episode manic.  2.  Headache.          Labs:     Results for orders placed or performed during the hospital encounter of 03/05/23   HCG qualitative urine     Status: Normal   Result Value Ref Range    hCG Urine Qualitative Negative Negative   Asymptomatic COVID-19 Virus (Coronavirus) by PCR Nasopharyngeal     Status: Normal    Specimen: Nasopharyngeal; Swab   Result Value Ref Range    SARS CoV2 PCR Negative  Negative    Narrative    Testing was performed using the Xpert Xpress SARS-CoV-2 Assay on the Cepheid Gene-Xpert Instrument Systems. Additional information about this Emergency Use Authorization (EUA) assay can be found via the Lab Guide. This test should be ordered for the detection of SARS-CoV-2 in individuals who meet SARS-CoV-2 clinical and/or epidemiological criteria as well as from individuals without symptoms or other reasons to suspect COVID-19. Test performance for asymptomatic patients has only been established in anterior nasal swab specimens. This test is for in vitro diagnostic use under the FDA EUA for laboratories certified under CLIA to perform high complexity testing. This test has not been FDA cleared or approved. A negative result does not rule out the presence of PCR inhibitors in the specimen or target RNA concentration below the limit of detection for the assay. The possibility of a false negative should be considered if the patient's recent exposure or clinical presentation suggests COVID-19. This test was validated by the [facility]. This laboratory is certified under the Clinical Laboratory Improvement Amendments (CLIA) as qualified to perform high complexity laboratory testing.     Drug abuse screen 1 urine (ED)     Status: Normal   Result Value Ref Range    Amphetamines Urine Screen Negative Screen Negative    Barbituates Urine Screen Negative Screen Negative    Benzodiazepine Urine Screen Negative Screen Negative    Cannabinoids Urine Screen Negative Screen Negative    Cocaine Urine Screen Negative Screen Negative    Opiates Urine Screen Negative Screen Negative   Comprehensive metabolic panel     Status: Normal   Result Value Ref Range    Sodium 139 136 - 145 mmol/L    Potassium 3.8 3.4 - 5.3 mmol/L    Chloride 106 98 - 107 mmol/L    Carbon Dioxide (CO2) 22 22 - 29 mmol/L    Anion Gap 11 7 - 15 mmol/L    Urea Nitrogen 7.3 6.0 - 20.0 mg/dL     Creatinine 0.59 0.51 - 0.95 mg/dL    Calcium 9.3 8.6 - 10.0 mg/dL    Glucose 84 70 - 99 mg/dL    Alkaline Phosphatase 74 35 - 104 U/L    AST 21 10 - 35 U/L    ALT 11 10 - 35 U/L    Protein Total 7.1 6.4 - 8.3 g/dL    Albumin 3.9 3.5 - 5.2 g/dL    Bilirubin Total 0.4 <=1.2 mg/dL    GFR Estimate >90 >60 mL/min/1.73m2   CBC with platelets and differential     Status: Abnormal   Result Value Ref Range    WBC Count 5.5 4.0 - 11.0 10e3/uL    RBC Count 3.99 3.80 - 5.20 10e6/uL    Hemoglobin 11.1 (L) 11.7 - 15.7 g/dL    Hematocrit 34.1 (L) 35.0 - 47.0 %    MCV 86 78 - 100 fL    MCH 27.8 26.5 - 33.0 pg    MCHC 32.6 31.5 - 36.5 g/dL    RDW 15.7 (H) 10.0 - 15.0 %    Platelet Count 283 150 - 450 10e3/uL    % Neutrophils 43 %    % Lymphocytes 42 %    % Monocytes 12 %    % Eosinophils 2 %    % Basophils 1 %    % Immature Granulocytes 0 %    NRBCs per 100 WBC 0 <1 /100    Absolute Neutrophils 2.4 1.6 - 8.3 10e3/uL    Absolute Lymphocytes 2.3 0.8 - 5.3 10e3/uL    Absolute Monocytes 0.7 0.0 - 1.3 10e3/uL    Absolute Eosinophils 0.1 0.0 - 0.7 10e3/uL    Absolute Basophils 0.0 0.0 - 0.2 10e3/uL    Absolute Immature Granulocytes 0.0 <=0.4 10e3/uL    Absolute NRBCs 0.0 10e3/uL   TSH with free T4 reflex     Status: Normal   Result Value Ref Range    TSH 2.26 0.30 - 4.20 uIU/mL   Hemoglobin A1c     Status: Normal   Result Value Ref Range    Hemoglobin A1C 5.6 <5.7 %   Lipid panel reflex to direct LDL     Status: Abnormal   Result Value Ref Range    Cholesterol 100 <200 mg/dL    Triglycerides 66 <150 mg/dL    Direct Measure HDL 43 (L) >=50 mg/dL    LDL Cholesterol Calculated 44 <=100 mg/dL    Non HDL Cholesterol 57 <130 mg/dL    Narrative    Cholesterol  Desirable:  <200 mg/dL    Triglycerides  Normal:  Less than 150 mg/dL  Borderline High:  150-199 mg/dL  High:  200-499 mg/dL  Very High:  Greater than or equal to 500 mg/dL    Direct Measure HDL  Female:  Greater than or equal to 50 mg/dL   Male:  Greater than or equal to 40 mg/dL    LDL  Cholesterol  Desirable:  <100mg/dL  Above Desirable:  100-129 mg/dL   Borderline High:  130-159 mg/dL   High:  160-189 mg/dL   Very High:  >= 190 mg/dL    Non HDL Cholesterol  Desirable:  130 mg/dL  Above Desirable:  130-159 mg/dL  Borderline High:  160-189 mg/dL  High:  190-219 mg/dL  Very High:  Greater than or equal to 220 mg/dL   CBC with platelets     Status: Abnormal   Result Value Ref Range    WBC Count 5.6 4.0 - 11.0 10e3/uL    RBC Count 4.20 3.80 - 5.20 10e6/uL    Hemoglobin 11.6 (L) 11.7 - 15.7 g/dL    Hematocrit 36.1 35.0 - 47.0 %    MCV 86 78 - 100 fL    MCH 27.6 26.5 - 33.0 pg    MCHC 32.1 31.5 - 36.5 g/dL    RDW 15.9 (H) 10.0 - 15.0 %    Platelet Count 312 150 - 450 10e3/uL   Symptomatic COVID-19 Virus (Coronavirus) by PCR Nasopharyngeal     Status: Normal    Specimen: Nasopharyngeal; Swab   Result Value Ref Range    SARS CoV2 PCR Negative Negative    Narrative    Testing was performed using the Xpert Xpress SARS-CoV-2 Assay on the Cepheid Gene-Xpert Instrument Systems. Additional information about this Emergency Use Authorization (EUA) assay can be found via the Lab Guide. This test should be ordered for the detection of SARS-CoV-2 in individuals who meet SARS-CoV-2 clinical and/or epidemiological criteria as well as from individuals without symptoms or other reasons to suspect COVID-19. Test performance for asymptomatic patients has only been established in anterior nasal swab specimens. This test is for in vitro diagnostic use under the FDA EUA for laboratories certified under CLIA to perform high complexity testing. This test has not been FDA cleared or approved. A negative result does not rule out the presence of PCR inhibitors in the specimen or target RNA concentration below the limit of detection for the assay. The possibility of a false negative should be considered if the patient's recent exposure or clinical presentation suggests COVID-19. This test was validated by the [facility]  [facility]. This laboratory is certified under the Clinical Laboratory Improvement Amendments (CLIA) as qualified to perform high complexity laboratory testing.     Mononucleosis screen     Status: Normal   Result Value Ref Range    Mononucleosis Screen Negative Negative   Respiratory Panel PCR     Status: Normal    Specimen: Nasopharyngeal; Swab   Result Value Ref Range    Adenovirus Not Detected Not Detected    Coronavirus Not Detected Not Detected    Human Metapneumovirus Not Detected Not Detected    Human Rhin/Enterovirus Not Detected Not Detected    Influenza A Not Detected Not Detected    Influenza A, H1 Not Detected Not Detected    Influenza A 2009 H1N1 Not Detected Not Detected    Influenza A, H3 Not Detected Not Detected    Influenza B Not Detected Not Detected    Parainfluenza Virus 1 Not Detected Not Detected    Parainfluenza Virus 2 Not Detected Not Detected    Parainfluenza Virus 3 Not Detected Not Detected    Parainfluenza Virus 4 Not Detected Not Detected    Respiratory Syncytial Virus A Not Detected Not Detected    Respiratory Syncytial Virus B Not Detected Not Detected    Chlamydia Pneumoniae Not Detected Not Detected    Mycoplasma Pneumoniae Not Detected Not Detected    Narrative    The ePlex Respiratory Panel is a qualitative nucleic acid, multiplex, in vitro diagnostic test for the simultaneous detection and identification of multiple respiratory viral and bacterial nucleic acids in nasopharyngeal swabs collected in viral transport media from individual exhibiting signs and symptoms of respiratory infection. The assay has received FDA approval for the testing of nasopharyngeal (NP) swabs only. The [facility] has validated the performance characteristics for bronchial alveolar lavage specimens. This test is used for clinical purposes and should not be regarded as investigational or for research. This laboratory is certified  under the Clinical Laboratory Improvement Amendments of 1988 (CLIA-88) as qualified to perform high complexity clinical laboratory testing.    Group A Streptococcus PCR Throat Swab     Status: Normal    Specimen: Throat; Swab   Result Value Ref Range    Group A strep by PCR Not Detected Not Detected    Narrative    The Xpert Xpress Strep A test, performed on the [employer]ens[employer] Systems, is a rapid, qualitative in vitro diagnostic test for the detection of Streptococcus pyogenes (Group A ß-hemolytic Streptococcus, Strep A) in throat swab specimens from patients with signs and symptoms of pharyngitis. The Xpert Xpress Strep A test can be used as an aid in the diagnosis of Group A Streptococcal pharyngitis. The assay is not intended to monitor treatment for Group A Streptococcus infections. The Xpert Xpress Strep A test utilizes an automated real-time polymerase chain reaction (PCR) to detect Streptococcus pyogenes DNA.   Urine Drugs of Abuse Screen     Status: Normal    Narrative    The following orders were created for panel order Urine Drugs of Abuse Screen.  Procedure                               Abnormality         Status                     ---------                               -----------         ------                     Drug abuse screen 1 urin...[[id number]]  Normal              Final result                 Please view results for these tests on the individual orders.   CBC with Platelets & Differential     Status: Abnormal    Narrative    The following orders were created for panel order CBC with Platelets & Differential.  Procedure                               Abnormality         Status                     ---------                               -----------         ------                     CBC with platelets and d...[[id number]]  Abnormal            Final result                 Please view results for these tests on the individual orders.            Consults:   Consultation during this admission  "received from internal medicine: evaluate sinus infection    Annette Chavis PA  Physician Assistant  Medicine  Consults     Addendum  Date of Service:  3/9/2023 10:26 AM  Creation Time:  3/9/2023 10:20 AM  Consult Orders   Internal Medicine Adult IP Consult for [facility] on 4[facility]: Pt thinks she has a sinus infection, congestion. chronic headache, all on right side. sore throat; Consultant may enter orders: Yes; Requesting provider? Hospitalist (if different from ... [[id number]] ordered by Naegele, Debra Ann, APRN CNS at 03/09/23 1013          Addendum        Expand All Collapse All  [facility]  Consult Note - Hospitalist Service  Date of Admission:  3/5/2023  Consult Requested by: Debra Naegele APRN   Reason for Consult: \"Sinus infection\"        Assessment & Plan     Cristina Boyd is a 23 year old female admitted on 3/5/2023. She has a history of bipolar 1 disorder and depressive disorder who is admitted to station 4A for stress and shaina. Medicine was consulted today for nasal congestion, sore throat, concern for sinus infection.      URI   Sinus congestion   Headache   Sinus infection  Patient reports about one month hx of right sided facial pain, \"on and off\" and sore throat, with associated eye pain and teeth pain. Patient reports nasal congestion, with no rhinorrhea. Reports trying Nyquil and Dayquil with minimal improvement. Reports pain is similar to prior sinus infection which she had about one year ago. Reports intermittent chills. COVID19 negative 3/5/23. On exam, patient with TTP of right frontal and maxillary sinuses, minimal erythema or oropharynx with no edema or exudates, no appreciated cervical lymphadenopathy.   -Since admission patient has been hemodynamically stable and afebrile  -WBC wnl   -Respiratory viral panel pending   -Mono screen negative   -Strep A screen negative   -Repeat COVID19 PCR negative      Plan:   -Nasal saline PRN for nasal " "congestion   -Throat lozenges   -Tylenol PRN for headache   -Ordered Amoxicillin 875 mg BID X 7 days for sinus infection      ADDENDUM:   RVP negative. Continue with management as above.      Depression   Bipolar 1 disorder   -Management per psychiatry      Medicine will follow up on RVP, if negative will sign off. Please call with any additional questions or concerns.      RVP negative, medicine will sign off.            Clinically Significant Risk Factors Present on Admission                                       JOVANI Lopez  Hospitalist Service  Securely message with [employer] (more info)  Text page via [facility] Paging/Directory   ______________________________________________________________________        Chief Complaint      \"Sinus infection.\"      History is obtained from the patient.            History of Present Illness     Cristina Boyd is a 23 year old female who has a history of bipolar 1 disorder and depressive disorder who is admitted to station 4A for stress and shaina. Medicine was consulted today for nasal congestion, sore throat, concern for sinus infection.  Patient reports about 1 month history of right-sided facial pain extending to right ear area with associated eye pain, teeth pain on the right side.  Patient notes \"on and off\" sore throat for the last month.  Reports nasal congestion, with no rhinorrhea in the last several days.  She reports associated right-sided headache with symptoms.  She notes intermittent chills.  Prior to admission she was taking NyQuil and DayQuil with minimal improvement.  She denies any cough or sputum production.  Tolerating a diet well.  Denies any abdominal pain, trouble with bowel movements or urination.  Denies any chest pain or dyspnea.  Patient reports she is concerned she has a sinus infection and would like antibiotics.  She notes history of a sinus infection about 1 year ago.  Denies any recent sick contacts.              Past Medical History          " "  Past Medical History:   Diagnosis Date     Bipolar disorder (H)       Depressive disorder       Infection due to 2019 novel coronavirus 2020     summer 2020 by patient report     Infection due to 2019 novel coronavirus 12/2021     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, unspecified thyrotoxicosis type 11/16/2022               Past Surgical History            Past Surgical History:   Procedure Laterality Date     BRONCHOSCOPY (RIGID OR FLEXIBLE), DIAGNOSTIC N/A 6/16/2018     Procedure: COMBINED BRONCHOSCOPY (RIGID OR FLEXIBLE), LAVAGE;;  Surgeon: Manjeet Holland MD;  Location:  GI               Medications      Prescriptions Prior to Admission           Medications Prior to Admission   Medication Sig Dispense Refill Last Dose     lithium ER (LITHOBID) 300 MG CR tablet Take 2 tablets (600 mg) by mouth At Bedtime 60 tablet 0 11/1/2022     QUEtiapine (SEROQUEL) 25 MG tablet Take 25 mg by mouth At Bedtime     11/1/2022                     Social History      I have reviewed this patient's social history and updated it with pertinent information if needed.  Social History            Tobacco Use     Smoking status: Never     Smokeless tobacco: Never   Vaping Use     Vaping Use: Never used   Substance Use Topics     Alcohol use: No     Drug use: No       Comment: x1 marijuana, in October, 2016            Physical Exam     Blood pressure 113/82, pulse 79, temperature 97.3  F (36.3  C), temperature source Temporal, resp. rate 18, height 1.676 m (5' 6\"), weight 63.5 kg (140 lb), last menstrual period 03/05/2023, SpO2 100 %, not currently breastfeeding.  GENERAL: Alert and oriented x 3. NAD.   HEENT: Anicteric sclera. PERRL. Mucous membranes moist and without lesions. Oropharynx with mild erythema, no edema or exudates. No appreciated cervical lymphadenopathy.   CV: RRR. S1, S2. No murmurs appreciated.   RESPIRATORY: Effort normal on RA. Lungs CTAB with no wheezing, rales, rhonchi.   NEUROLOGICAL: No focal deficits. "   EXTREMITIES: No peripheral edema.   SKIN: No jaundice. No rashes.                  Medical Decision Making         45 MINUTES SPENT BY ME on the date of service doing chart review, history, exam, documentation & further activities per the note.             Data         I have personally reviewed the following data over the past 24 hrs:     5.6  \   11.6 (L)   / 312      N/A N/A N/A /  N/A   N/A N/A N/A \         TSH: N/A T4: N/A A1C: 5.6                            Revision History                             Hospital Course:   Cristina Boyd was admitted to Station 4A with attending Kings Nixon MD as a voluntary patient. The patient was placed under status 15 (15 minute checks) to ensure patient safety.       Patient was started on lithium 600 mg to address manic symptoms. Patient was started on Zyprexa 10 mg at bedtime for mood instability and racing thoughts. Patient can use Seroquel 25 mg for insomnia. Provider discussed risks, benefits and side effects of medications with patient.     Reviewed admission labs, hemoglobin 11.1, low; hematocrit 34.1, low; RDW 15.7, elevated.  COVID screen negative. Lithium level on discharge was 0.5. Patient had taken 4 doses of lithium.      Please refer to consult note from medicine to address sinus infection.     Cristina Boyd did participate in groups and was visible in the milieu.     The patient's symptoms of shaina and suicidal ideation improved. Patient is demonstrating a stabilized mood. Sleep and appetite have improved. Patient denies racing thoughts. Her thinking is clear. Patient reports improved mood and denies suicidal ideaiton    Cristina Boyd was released to home into the care of her father. At the time of discharge Cristina Boyd was determined to not be a danger to herself or others.          Discharge Medications:     Current Discharge Medication List      START taking these medications    Details   amoxicillin (AMOXIL) 875 MG tablet Take 1 tablet  (875 mg) by mouth every 12 hours  Qty: 12 tablet, Refills: 0    Associated Diagnoses: Frontal sinusitis, unspecified chronicity      OLANZapine (ZYPREXA) 10 MG tablet Take 1 tablet (10 mg) by mouth At Bedtime  Qty: 30 tablet, Refills: 1    Associated Diagnoses: Bipolar 1 disorder (H)      sodium chloride (OCEAN) 0.65 % nasal spray Spray 1 spray into both nostrils every hour as needed for congestion  Qty: 15 mL, Refills: 0    Associated Diagnoses: Nasal mucosa dry         CONTINUE these medications which have CHANGED    Details   lithium ER (LITHOBID) 300 MG CR tablet Take 2 tablets (600 mg) by mouth At Bedtime  Qty: 30 tablet, Refills: 1    Associated Diagnoses: Bipolar 1 disorder (H)      QUEtiapine (SEROQUEL) 25 MG tablet Take 1 tablet (25 mg) by mouth nightly as needed (sleep)  Qty: 30 tablet, Refills: 1    Associated Diagnoses: Bipolar 1 disorder (H)                  Psychiatric Examination:   Appearance:  awake, alert and adequately groomed  Attitude:  cooperative  Eye Contact:  good  Mood:  better  Affect:  appropriate and in normal range  Speech:  clear, coherent  Psychomotor Behavior:  no evidence of tardive dyskinesia, dystonia, or tics  Thought Process:  linear and goal oriented  Associations:  no loose associations  Thought Content:  no evidence of suicidal ideation or homicidal ideation, no auditory hallucinations present and no visual hallucinations present  Insight:  good  Judgment:  intact  Oriented to:  time, person, and place  Attention Span and Concentration:  intact  Recent and Remote Memory:  intact  Language: Able to name objects, Able to repeat phrases and Able to read and write  Fund of Knowledge: appropriate  Muscle Strength and Tone: normal  Gait and Station: Normal         Discharge Plan:       Further instructions from your care team       Behavioral Discharge Planning and Instructions    Summary: You were admitted on 3/5/2023  due to Manic Symptomology.  You were treated by Debra Naegele  LINDEN and discharged on 3/10/23 from 4A to Home    Main Diagnosis:   1.  Bipolar 1 disorder, current episode manic.  2.  Headache    Health Care Follow-up:   Psychiatry Appointment: Thursday, March 21 at 10:30 am via video  Provider: Dr. Engelson  Call: [phone]  If you need to reschedule or change your appointment type please call the number above.     Therapy appointment: Wednesday, March 15 at 11:00 am via video  Provider: [facility]  Phone: [phone], Fax: [phone]  You will receive a link for the appointment via email on the day of the appointment    Attend all scheduled appointments with your outpatient providers. Call at least 24 hours in advance if you need to reschedule an appointment to ensure continued access to your outpatient providers.     Major Treatments, Procedures and Findings:  You were provided with: a psychiatric assessment, assessed for medical stability, medication evaluation and/or management, group therapy, and milieu management    Symptoms to Report: feeling more aggressive, increased confusion, losing more sleep, mood getting worse, or thoughts of suicide    Early warning signs can include: increased depression or anxiety sleep disturbances increased thoughts or behaviors of suicide or self-harm  increased unusual thinking, such as paranoia or hearing voices    Safety and Wellness:  Take all medicines as directed.  Make no changes unless your doctor suggests them.   Follow treatment recommendations.  Refrain from alcohol and non-prescribed drugs.  If there is a concern for safety, call 171.    Resources:   Crisis Intervention: 486.920.5772 or 851-211-2773 (TTY: 852.720.7070).  Call anytime for help.  National [place] [employer] Mental Illness (www.mn.[place]berto.org): 721.224.9404 or 410-051-1612.  Suicide Awareness Voices of Education (SAVE) (www.save.org): 891-911-UAWV (5672)  National Suicide Prevention Line (www.mentalhealthmn.org): 719-606-XCNG (3773)  [facility]  "Response 013 686-4489  Text 4 Life: txt \"LIFE\" to 69596 for immediate support and crisis intervention    General Medication Instructions:   See your medication sheet(s) for instructions.   Take all medicines as directed.  Make no changes unless your doctor suggests them.   Go to all your doctor visits.  Be sure to have all your required lab tests. This way, your medicines can be refilled on time.  Do not use any drugs not prescribed by your doctor.  Avoid alcohol.    Advance Directives:   Scanned document on file with [employer]? No scanned doc  Is document scanned? Pt states no documents  Honoring Choices Your Rights Handout: Informed and given  Was more information offered? Pt declined    The Treatment team has appreciated the opportunity to work with you. If you have any questions or concerns about your recent admission, you can contact the unit which can receive your call 24 hours a day, 7 days a week. They will be able to get in touch with a Provider if needed. The unit number is [phone].        Attestation:  The patient has been seen and evaluated by me,  Debra A. Naegele, LINDEN CNS on 3/10/2023  Discharge summary time > 30 minutes  "

## 2023-03-13 ENCOUNTER — TELEPHONE (OUTPATIENT)
Dept: PSYCHIATRY | Facility: CLINIC | Age: 23
End: 2023-03-13
Payer: COMMERCIAL

## 2023-03-13 ENCOUNTER — VIRTUAL VISIT (OUTPATIENT)
Dept: BEHAVIORAL HEALTH | Facility: CLINIC | Age: 23
End: 2023-03-13
Payer: COMMERCIAL

## 2023-03-13 ENCOUNTER — PATIENT OUTREACH (OUTPATIENT)
Dept: CARE COORDINATION | Facility: CLINIC | Age: 23
End: 2023-03-13
Payer: COMMERCIAL

## 2023-03-13 DIAGNOSIS — F31.9 BIPOLAR I DISORDER (H): Primary | ICD-10-CM

## 2023-03-13 PROCEDURE — 99204 OFFICE O/P NEW MOD 45 MIN: CPT | Mod: VID | Performed by: NURSE PRACTITIONER

## 2023-03-13 ASSESSMENT — ANXIETY QUESTIONNAIRES
IF YOU CHECKED OFF ANY PROBLEMS ON THIS QUESTIONNAIRE, HOW DIFFICULT HAVE THESE PROBLEMS MADE IT FOR YOU TO DO YOUR WORK, TAKE CARE OF THINGS AT HOME, OR GET ALONG WITH OTHER PEOPLE: NOT DIFFICULT AT ALL
4. TROUBLE RELAXING: NOT AT ALL
GAD7 TOTAL SCORE: 0
8. IF YOU CHECKED OFF ANY PROBLEMS, HOW DIFFICULT HAVE THESE MADE IT FOR YOU TO DO YOUR WORK, TAKE CARE OF THINGS AT HOME, OR GET ALONG WITH OTHER PEOPLE?: NOT DIFFICULT AT ALL
6. BECOMING EASILY ANNOYED OR IRRITABLE: NOT AT ALL
2. NOT BEING ABLE TO STOP OR CONTROL WORRYING: NOT AT ALL
5. BEING SO RESTLESS THAT IT IS HARD TO SIT STILL: NOT AT ALL
1. FEELING NERVOUS, ANXIOUS, OR ON EDGE: NOT AT ALL
GAD7 TOTAL SCORE: 0
7. FEELING AFRAID AS IF SOMETHING AWFUL MIGHT HAPPEN: NOT AT ALL
7. FEELING AFRAID AS IF SOMETHING AWFUL MIGHT HAPPEN: NOT AT ALL
GAD7 TOTAL SCORE: 0
3. WORRYING TOO MUCH ABOUT DIFFERENT THINGS: NOT AT ALL

## 2023-03-13 ASSESSMENT — PATIENT HEALTH QUESTIONNAIRE - PHQ9
SUM OF ALL RESPONSES TO PHQ QUESTIONS 1-9: 0
10. IF YOU CHECKED OFF ANY PROBLEMS, HOW DIFFICULT HAVE THESE PROBLEMS MADE IT FOR YOU TO DO YOUR WORK, TAKE CARE OF THINGS AT HOME, OR GET ALONG WITH OTHER PEOPLE: NOT DIFFICULT AT ALL
SUM OF ALL RESPONSES TO PHQ QUESTIONS 1-9: 0

## 2023-03-13 NOTE — PROGRESS NOTES
" This video/telephone visit will be conducted virtually between you and your provider. We have found that certain health care needs can be provided without the need for an in-person physical exam. This service lets us provide the care you need with a video /telephone conversation. If a prescription is necessary we can send it directly to your pharmacy. If lab work is needed we can place an order for that and you can then stop by our lab to have the test done at a later time.\"   Just as we bill insurance for in-person visits, we also bill insurance for video/telephone visits. If you have questions about your insurance coverage, we recommend that you speak with your insurance company.      Patient/Parent has given verbal consent for video/Telephone visit? Yes     Patient would like the video visit invitation sent by: [employer] if connection issue: [phone]    Patient verified allergies, medications and pharmacy via phone. Patient states they are ready for visit.      Mental Health &Addiction (MH&A)Transition Clinic (TC):     Provides Patient Support While Waiting to Access Programmatic and Outpatient MH&A Care and Provides Select Crisis Assessment Services     INTAKE  ____________________________________________________    \"The Transition Clinic is a temporary psychiatry service that helps to bridge the time to your next appointment. It is not intended to be a long-term service and you are expected to attend your scheduled appointment with your next provider.\"  [x] Patient/Parent verbalized understanding    If you need support between appointments, please call [phone] and let them know you're seen by [facility]. You may also send a [employer] message to reach us.    General-     Most pressing MH needs at this time: Check in        Any physical health conditions or diagnoses we should be aware of or that are impacting you: NA       Medications-     Injectable medications currently prescribed: No "   If yes, do you need an appointment for the next injection:     Any Controlled Substances that you are prescribed: None       Primary care provider: LINDEN Garces CNP        REGINO-7 scores:    REGINO-7 SCORE 10/21/2022 3/13/2023   Total Score - 0 (minimal anxiety)   Total Score 7 0     PHQ-9 scores:   PHQ-9 SCORE 10/21/2022 3/13/2023   PHQ-9 Total Score MyChart - 0   PHQ-9 Total Score 9 0   Answers for HPI/ROS submitted by the patient on 3/13/2023  If you checked off any problems, how difficult have these problems made it for you to do your work, take care of things at home, or get along with other people?: Not difficult at all  PHQ9 TOTAL SCORE: 0  REGINO 7 TOTAL SCORE: 0            Anything the provider should be aware of for today's appointment:  Checking in between providers.     New (awaiting) Mental health provider or next programming:  Hannah Yanes MD @ [facility]      Date of scheduled apt: 3/21/2023 @ 10:30 AM         Mya Li MA on March 13, 2023 at 12:17 PM

## 2023-03-13 NOTE — Clinical Note
1.  FYI  2. When I was previously at [facility] they had an internal protocol/guideline change a person could still do a telehealth appointment if it was related to traveling versus re-location.   Not sure if [facility] has something similar, I thought they have to still physically be in the state of [place], so might want to reach out and change the 3/21/2023 appointment?  3. Thanks for your coordination in care.  Juani

## 2023-03-13 NOTE — TELEPHONE ENCOUNTER
"Writer contacted patient for post discharge TCM review.  Patient declined to participate - she had a lengthy TCM interview with the transition clinic today (see Epic).  Patient was also in a public place and was not able to talk.    She denies any safety issues and states that she is \"in a good place.\"  Patient states she received her medications at discharge.    TCM note mentions that patient will be in [place] 3/15 - 3/22. Patient's appointment with Dr. Yanes is scheduled for 3/21 at 10:30, virtual.  Writer sent patient a P[employer]Link message indicating that the appointment cannot take place outside of [place].  Patient asked to call the clinic number to reschedule.                     "

## 2023-03-13 NOTE — PROGRESS NOTES
[facility]      Mental Health & Addiction Service Line    Transition Clinic: Psychiatry Note  Medication Management              Charts/documentation reviewed within EMR:  -3/5/2023 x2    -2018          VISIT INFORMATION    Date:  2023     Number:  -Initial     Referral source:  -Inpatient psychiatry      Patient Identifying Information:  Legal name: Cristina Boyd  Preferred name: Cristina  : 2000  Preferred pronouns: She/her      Participants:   -Patient  -Provider          Telehealth visit details:  Type of service:  Video  Patient location:  At home, Off site  Provider Location:  [facility] Health & Addiction Service Line  Platform utilized:  Threadflip    Start time:   12:27 pm  End time:    12:46 pm    HPI      -Bipolar I  -Recent iph due to adela + suicidal ideations  -Discharge 3/13/2023      -------------------    -Will be going out to [place] from 3/15 to 3/22/2023        PSYCHIATRIC ROS    Sleep:   -Really well since discharge  -In bed by 9 pm and up by 5:30 am ... have to be to work by 6:30 am        Appetite/Weight Changes:   -Denies unintentional loss or gain > 10 lbs in the past 4 weeks    ------------------    -Prior to the hospitalization was doing intermittent fasting  -Now appetite is coming back  -Sort of feeling hungry all the time      Energy Levels:   -It's pretty good  -But I felt really heavy when at the gym  -Have only gained 2 or 3 lbs since the hospitalization but it seemed more challenging to go up stairs  -Trying to take the stairs at work versus the elevator as a way to be active/get steps in  -Have to work out in the middle of the afternoon or early evening so it doesn't disrupt sleep patterns        Depression/Anxiety:   -Denies feeling down, anhedonia, low mood or suicidal ideations since above discharge        Adela/Hypomania:   -Multiple inpatient psychiatric hospitalizations related to adela episodes with psychotic features + symptom profile        Suicidal ideations:   -Denies at this time      SIB:  -Denies hx or current engagement of self harm       Side effects:  -Increased appetite on Zyprexa + Seroquel         MENTAL HEALTH HISTORY    Suicide attempts:  -None reported       Inpatient psychiatric hospitalizations:  -2016 to 2023 = 7x  -Most recent: [facility], [facility] 3/5 to 3/10/2023  -No hx of commitments       ECT:  -None reported         Medication Trials:    Other anxiolytics:  -Hydroxyzine 25 mg every 6 hours as needed for anxiety    Mood stabilizers:  -Depakote 750 mg    Antipsychotics:  -Abilify 30 mg    Benzodiazepines:  -Lorazepam            SUBSTANCE USE    Prior use:  -Denies any history of alcohol, recreational, or illicit substance use resulting in: legal issues, c/d programming, or withdrawal symptoms        Current use:    Alcohol:   -None reported       Recreational Drugs:   -None reported       Medical Marijuana:  -None reported       Cigarettes per day:   -None reported       Chewing tobacco:   -None reported       Vaping:    -None reported       Caffeine intake:    -2 cups of coffee            SOCIAL HISTORY  -Was reviewed within the EMR per: 3/9/2023 encounter by D. Naegele APRN-CNP          MEDICAL HISTORY    Current:  -The problem list was reviewed prior to the appointment  -The patient denies any concerning physical and or medical symptoms during the interviewing process      Developmental:   -Mother had normal pregnancy: Yes  -Met age appropriate milestones: Yes  -Participated in special education classes and or had an IEP: No  -Hx of autism spectrum disorder, learning disability, and or other cognitive disorder: No      Neurological:  -Denies any hx of: seizures, concussions, or TBI        MEDICATIONS      Current Outpatient Medications:      amoxicillin (AMOXIL) 875 MG tablet, Take 1 tablet (875 mg) by mouth every 12 hours, Disp: 12 tablet, Rfl: 0     lithium ER (LITHOBID) 300 MG CR tablet, Take 2 tablets (600 mg) by mouth At  Bedtime, Disp: 30 tablet, Rfl: 1     OLANZapine (ZYPREXA) 10 MG tablet, Take 1 tablet (10 mg) by mouth At Bedtime, Disp: 30 tablet, Rfl: 1     QUEtiapine (SEROQUEL) 25 MG tablet, Take 1 tablet (25 mg) by mouth nightly as needed (sleep), Disp: 30 tablet, Rfl: 1     sodium chloride (OCEAN) 0.65 % nasal spray, Spray 1 spray into both nostrils every hour as needed for congestion, Disp: 15 mL, Rfl: 0          If a controlled substance has been prescribed during the appointment:    -The [place] Prescription Monitoring Program has been reviewed and there are no current concerns with: diversionary activity, early refill requests, and or obtaining the medication from multiple providers.          VITALS    BP Readings from Last 3 Encounters:   03/10/23 114/78   11/10/22 105/73   10/11/22 107/74       Pulse Readings from Last 3 Encounters:   03/10/23 82   11/10/22 97   10/11/22 114       Wt Readings from Last 3 Encounters:   03/09/23 63.5 kg (140 lb)   11/10/22 65.2 kg (143 lb 12.8 oz)   10/11/22 63.5 kg (140 lb)         LABS    The following have been reviewed prior to or during the appointment:  -3/5 to 3/10/2023          SCALES      PHQ 10/14/2022 10/14/2022 10/21/2022   PHQ-9 Total Score 27 27 9   Q9: Thoughts of better off dead/self-harm past 2 weeks Nearly every day Nearly every day Several days   F/U: Thoughts of suicide or self-harm Yes Yes -   F/U: Self harm-plan Yes Yes -   F/U: Self-harm action No No -   F/U: Safety concerns No No -        REGION-7 SCORE 7/23/2018 8/10/2018 10/21/2022   Total Score 7 0 7        Answers for HPI/ROS submitted by the patient on 3/13/2023  If you checked off any problems, how difficult have these problems made it for you to do your work, take care of things at home, or get along with other people?: Not difficult at all  PHQ9 TOTAL SCORE: 0  REGINO 7 TOTAL SCORE: 0        MENTAL STATUS EXAMINATION    Appearance: Adequately Groomed, Attire Appropriate for the Season  General Behavior:   Cooperative, Direct Eye Contact  Speech: Fluent, Mildly rapid + pressured  Musculoskeletal:    -Gait not observed during t.h. visit  -No facial tics/tremors observed   -Motor coordination is grossly intact   Mood: It's better  Affect: Appropriate to Content of Speech and Circumstances  Attention: Mildly tangential + redirectable  Orientation:  Person, Place, Time, Situation  Thought Associations:  Intact  Thought Content: Reality based   Thought Processes: Organized, Normal to fast rate  Memory: No overt impairment; no screenings or formal testing performed  Language: Intact  Judgement: Fair to good  Insight: Fair         ASSESSMENT/CLINICAL IMPRESSIONS    Summary:    Cristina Boyd is a 22 y/o female with a history of: Bipolar I with psychotic features.    Recently underwent an inpatient psychiatric hospitalization at [facility] from 3/5 to 3/10/2023 related to worsening of shaina symptoms + suicidal ideations in the context of workplace stressors and medication non-adherence.    Since discharge, Cristina comments sleep patterns are starting to re-stabilize, as is mood.   She does note some increase in appetite (most likely from the antipsychotics), however she is taking steps to work out/exercise on a regular basis.    Speech continues to be mildly pressured/fast and at times thought processes are a bit scattered/tangential, however she does not demonstrate loose associations nor does Cristina exhibit any overt s/s of psychosis such as: delusions, hallucinations/responding to internal stimuli, or paranoia.    Currently denies suicidal ideations and is forward thinking/future oriented.        DSM-V and or working diagnosis:    1. Bipolar I      SAFETY EVALUATION:  Suicidal ideations:  -denies  Homicidal ideations:  -denies  Risk factors:  -hx of impulsivity in the context of shaina episodes + psychotic symptoms  -single  Protective and mitigating factors:  -family  -engaged in outpatient mental health services  -no prior  attempts  Risk assessment:  -low to medium      SAFETY PLAN:  -Has numbers of at least 1 family member + 1 friend in personal contact list  -Knows clinic number(s) + operation of regular business hours   -Reviewed to utilize 988 or text [place] to [phone] for mental health crisis  -Discussed to call 911 and or return to the nearest Emergency Department if unable to maintain safety of self or others (due to severity of suicidal and or homicidal ideations)          TREATMENT PLAN      Medications:    Continue:  Lithium ER/Lithobid 600 mg (2 tabs) at bedtime    Olanzapine/Zyprexa 10 mg at bedtime    Quetiapine/Seroquel 25 mg at bedtime as needed for sleep      Labs:  -None Obtained        Therapy and or Non-pharmacological modalities:  -Individual therapy as needed + psychtropics        Return to Clinic or Referrals:  -You do not need to return to the Transition Clinic for medication management  -Please make sure to keep the appointment for longitudinal outpatient psychiatry services    Provider: Dr. JAYCOB Yanes MD  Clinic/Location:   [facility]  [place] [id number]   [address] [place] [address], [address]  Phone: [phone]  Date/Time/Visit Type: 3/21/2023 @ 10:30 am, via video          Total time:  45 minutes per:    -Review of EMR   -Appointment time  -Documentation           Juani SIDDIQUI  [facility]          ----------------------------------------------------------------------------------------------------------------------        TREATMENT RISK STATEMENT    The risks, benefits, alternatives, and potential adverse effects have been explained and are understood by the patient.  The patient agrees to the treatment plan with their ability to do so.      The patient knows to call the clinic: [phone]  for any problems or concerns until the next psychiatry visit, regardless if it is within or outside of the [facility] system.     If unable to reach clinic staff (via  phone call or medical messaging) during the normal business hours: 8:00 am to 4:30 pm then it is recommended accessing the nearest: emergency department, urgent care facility, or utilizing local (varies based on county of residence) and national crisis #'s or text messaging services for immediate assistance.          ----------------------------------------------------------------------------------------------------------------------        If applicable the following has been discussed with the patient, parent/guardian, and or attending family member during the appointment:    1. Risks of polypharmacy and possible drug interactions with current medication list + common OTC products, herbs, and supplements.    Moving forward, it is suggested to intermittently check-in with a clinic or retail pharmacist whenever new medications or OTC/h/s are consumed.    2. Risks of polypharmacy and possible drug + drug interactions with current medication list.  Moving forward, it is suggested to intermittently check-in with a clinic or retail pharmacist whenever new prescription medications are added to your treatment regimen.     3. Recommendation to adhere to CDC guidelines as it relates alcohol consumption.  If taking benzodiazepines, you should abstain from alcohol intake due to   increased risks of CNS and respiratory depression, as well as psychomotor impairment.            4.  If possible, it is recommended to avoid concurrent use of prescribed:  opioids  +  benzodiazepines due to increased risks of CNS and respiratory depression,              as well as the increased risk of overdose.          5.  Recommendation to minimize and or abstain from THC use (unless you are prescribed medical marijuana).          6.  Recommendation to abstain from illicit substances including but not limited to the following: heroin, street fentanyl, cocaine, methamphetamines, and bath salts.          7.  Recommendation to abstain from  tobacco/smoking, alcohol, THC, and all illicit substances if trying to become or are pregnant.           8.  Do not take opioids, stimulants, and or other prescription medications unless they are specifically prescribed for you.          9.  Black Box Warnings associated with the prescribed psychotropic(s).         10. Potential adverse effects of antipsychotics including but not limited to the following: weight gain, metabolic syndrome, EPS/Tardive Dyskinesias, and              Neuroleptic Malignant Syndrome.         11.  Potential CV and neurological adverse effects of stimulants including but not limited to the following:  sudden death, MI, stroke, HTN, cardiomyopathy   (long term use) as well as seizures.

## 2023-03-13 NOTE — PATIENT INSTRUCTIONS
-You do not need to return to the Transition Clinic for medication management  -Please make sure to keep the appointment for longitudinal outpatient psychiatry services    Provider: Dr. JAYCOB Yanes MD  Clinic/Location:   [facility]  [place] [id number], [address]  Phone: [phone]  Date/Time/Visit Type: 3/21/2023 @ 10:30 am, via video    --------------------------    Continue:  Lithium ER/Lithobid 600 mg (2 tabs) at bedtime    Olanzapine/Zyprexa 10 mg at bedtime    Quetiapine/Seroquel 25 mg at bedtime as needed for sleep

## 2023-03-13 NOTE — PROGRESS NOTES
[facility]    Background: Transitional Care Management program identified per system criteria and reviewed by [facility] team for possible outreach.    Assessment: Upon chart review, [facility] Team member will not proceed with patient outreach related to this episode of Transitional Care Management program due to reason below:    Patient has a follow up appointment with an appropriate provider today for hospital discharge    Plan: Transitional Care Management episode addressed appropriately per reason noted above.      THAIS Alonzo  [facility], [facility]    *Connected Care Resource Team does NOT follow patient ongoing. Referrals are identified based on internal discharge reports and the outreach is to ensure patient has an understanding of their discharge instructions.

## 2023-03-14 NOTE — PROGRESS NOTES
MH&A TC   NURSING Post-Appointment Chart-check:    Correct pharmacy verified with patient and updated in chart? [x] yes []no    Medications ordered this visit were e-scribed.  Verified by order class [] yes  [x] no    List Medications:    Medication changes or discontinuations were communicated to patient's pharmacy: [] yes  [x] no    UA collected [] yes  [] no  [x] n/a-virtual     Future appointment was made: [] yes  [] no  [x] n/a  Dr. JAYCOB Yanes MD  Clinic/Location:   [facility]  [facility]   3/21/2023 @ 10:30 am, via video  Dictation completed at time of chart check: [x] yes  [] no    I have checked the documentation for today s encounters and the above information has been reviewed and completed.      Mya Li MA on March 14, 2023 at 9:29 AM

## 2023-03-14 NOTE — TELEPHONE ENCOUNTER
Writer called the patient today and LVM to call the clinic back and reschedule her appt on 3/21, as [place]. Will watch for rescheduled appt.    Adina Altamirano RN on 3/14/2023 at 9:54 AM

## 2023-03-16 NOTE — TELEPHONE ENCOUNTER
Writer left additional VM reminding client to reschedule 3/21 appt if she will be out of state. Will notify scheduling team that client may call back to request a new appt day/time.    Adina Altamirano RN on 3/16/2023 at 10:47 AM

## 2023-03-29 ENCOUNTER — MYC MEDICAL ADVICE (OUTPATIENT)
Dept: GASTROENTEROLOGY | Facility: CLINIC | Age: 23
End: 2023-03-29
Payer: COMMERCIAL

## 2023-03-31 NOTE — TELEPHONE ENCOUNTER
Left message for patient regarding upcoming HBT. Sent instructions through [employer] as well.

## 2023-04-06 ENCOUNTER — PATIENT OUTREACH (OUTPATIENT)
Dept: GASTROENTEROLOGY | Facility: CLINIC | Age: 23
End: 2023-04-06
Payer: COMMERCIAL

## 2023-04-06 NOTE — TELEPHONE ENCOUNTER
Left message for pt. From notes looks like provider ordered breath test for h. Pylori, however order came through as Lactose intolerance test. Breath test orders came through from a recent ED visit and not through a primary or gastro provider.

## 2023-04-20 NOTE — TELEPHONE ENCOUNTER
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, estrogen receptor positive (CMD)  Staging form: Breast, AJCC 8th Edition  - Clinical stage from 2/25/2022: Stage IA (cT1c, cN0, cM0, G1, ER+, VT-, HER2-) - Signed by Ashlyn Lane MD on 3/9/2022  - Pathologic stage from 4/11/2022: Stage IA (pT1c, pN0(sn), cM0, G1, ER+, VT-, HER2-) - Signed by Ashlyn Lane MD on 4/12/2022 02/2022 New right breast mass noted, bx confirms IDC    03/2022 - Couldn't tolerate breast MRI, PET shows uptake in right 6th rib, low likelihood for metastatic disease    4/22/22 - Right lumpectomy, SLNB, negative margins 0/5 LN    6/8/22-6/29/22 - Completes XRY, 42 Gy in 16 fractions    8/2022 - Start Arimidex and reclast - Dr. Foreman    12/7/22 - survivorship completed    Family history:  Prostate cancer, Father  Mother (70's) Breast cancer  PCousin, Breast cancer  Negative for ovarian and pancreatic cancers    Chief complaint: 6 month follow up    History of Present Illness  Ms. Wilson is a 72 year old woman who returns for routine surveillance s/p above diagnosis and treatment. No new breast concerns. Tolerating arimidex with no SE.    B/l dx imaging today and right breast u/s - not formally resulted but discussed with Dr. Muniz, expected post-op changes, scar tissue, fat necrosis.    She denies new breast symptoms, breast lumps, and denies nipple discharge. She denies skin changes. She denies lymphadenopathy. She denies unintentional weight loss.  She denies bone pain.  She denies SOB. Mood, weight, appetite and energy are stable. She has not started new medications. No new cancers in the her family.        Visit Vitals  BP (!) 152/80 (BP Location: LUE - Left upper extremity, Patient Position: Sitting, Cuff Size:  Writer received additional refill request for Cristina's Ativan. Called pt again. Still no answer. Left additional message asking that she call clinic to schedule f/up appt. Also encouraged her to complete her Lithium labs.    Writer responded to [facility] pharmacy's fax with note that pt needs appt before provider will refill medication. Faxed to the pharmacy at [phone].   Regular)   Pulse 68   Temp 97.5 °F (36.4 °C) (Skin)   Resp 18   Ht 5' 4\" (1.626 m)   Wt 95 kg (209 lb 7 oz)   BMI 35.95 kg/m²       Past Medical History:   Diagnosis Date   • Breast cancer (CMD) 2022    Right breast INVASIVE CARCINOMA   • Disuse osteoporosis    • Essential (primary) hypertension    • Fracture     broken pinky   • Osteopenia    • Personal history of radiation therapy    ,   Past Surgical History:   Procedure Laterality Date   • Appendectomy     • Back surgery     • Back surgery     • Biopsy of breast, needle core Right 2022    Ultrasound biopsy, INVASIVE CARCINOMA   • Breast lumpectomy Right 2022    Right Seed-localized partial mastectomy (Lumpectomy   • Colonoscopy diagnostic  2021   • Laparoscopy, cholecystectomy  2021   • Removal gallbladder  2021   • [place]keye tooth extraction         Family History   Problem Relation Age of Onset   • Diabetes Mother    • Heart disease Mother    • Hyperlipidemia Mother    • Hypertension Mother    • Cancer, Breast Mother 75   • Cancer, Prostate Father    • Glaucoma Father    • Osteoporosis Father    • Hypertension Sister    • Patient is unaware of any medical problems Brother    • Early death Brother    • Heart disease Brother         heart    • Cancer, Breast Paternal Cousin    • Cancer, Breast Paternal Cousin    • Cancer, Breast Paternal Cousin    ,   Social History     Socioeconomic History   • Marital status:      Spouse name: Not on file   • Number of children: Not on file   • Years of education: Not on file   • Highest education level: Not on file   Occupational History   • Not on file   Tobacco Use   • Smoking status: Former     Current packs/day: 0.00     Types: Cigarettes     Quit date: 1968     Years since quittin.3   • Smokeless tobacco: Never   • Tobacco comments:     Social    Vaping Use   • Vaping status: never used   Substance and Sexual Activity   • Alcohol use: Never   • Drug use: Never   •  Sexual activity: Not on file   Other Topics Concern   • Not on file   Social History Narrative   • Not on file     Social Determinants of Health     Financial Resource Strain: Not on file   Food Insecurity: Not on file   Transportation Needs: Not on file   Physical Activity: Not on file   Stress: Not on file   Social Connections: Not on file   Intimate Partner Violence: Not At Risk (4/11/2022)    Intimate Partner Violence    • Social Determinants: Intimate Partner Violence Past Fear: No    • Social Determinants: Intimate Partner Violence Current Fear: No         ALLERGIES:  Patient has no known allergies.  Current Medications:   Current Outpatient Medications   Medication Sig Dispense Refill   • amLODIPine (NORVASC) 2.5 MG tablet Take 1 tablet by mouth daily. 30 tablet 1   • anastrozole (ARIMIDEX) 1 MG tablet Take 1 tablet by mouth daily. Do not start until after completion of radiation therapy. 30 tablet 3   • Calcium Carbonate Antacid (CALCIUM CARBONATE PO) Take 600 mg by mouth daily.     • traMADol (ULTRAM) 50 MG tablet Take 1 tablet by mouth every 6 hours as needed for Pain. 15 tablet 0   • ketoconazole (NIZORAL) 2 % cream Rub gently into affected area once daily. 30 g 1   • pantoprazole (PROTONIX) 20 MG tablet Take 1 tablet by mouth daily. 30 tablet 11   • nystatin (MYCOSTATIN) 816946 UNIT/GM powder Apply topically 3 times daily. As needed during hot weather. 30 g 0   • DISPENSE Take 1 Dose by mouth daily. ACF immune support liquid     • cholecalciferol (VITAMIN D) 25 mcg (1,000 units) tablet Take 2,000 IU by mouth every other day alternating with 5,000 IU every other day       No current facility-administered medications for this visit.         Physical Exam:  CONSTITUTIONAL: Well-nourished, well-developed, appears stated age and in no stress  NEURO: The patient is sitting on exam table, alert, oriented x 3, CN II-XII grossly intact, moves all extremities well. Her affect is appropriate.  PSYCH: Patient  cooperative and has appropriate affect  HEENT: The neck is supple. There are no palpable masses.  The trachea is in the midline. Clear conjunctiva, non-icteric.  CV: No significant lower extremity edema or cyanosis  PULMONARY:Exam reveals non-labored, normal rate breathing  MSK: Bilateral upper and lower extremities are without deformity, cyanosis or edema.  SKIN: The skin and examined area appears normal.  There are no suspicious appearing rashes or lesions.    Patient was examined in the upright and supine position.   RIGHT BREAST: Breasts are symmetrical. Nipple/areola appears normal. No nipple discharge. Some minimal skin changes related to radiation. Curvilinear incision at 12:00. No dominant masses or significant focal nodularity, normal surgical changes expected. There is no skin dimpling with movement of the pectoralis. No palpable axillary LN.  LEFT BREAST: Breasts are symmetrical. Nipple/areola appears normal. No nipple discharge. No skin changes. No dominant masses or significant focal nodularity. There is no skin dimpling with movement of the pectoralis. No palpable axillary LN.    LYMPH NODES: No palpable b/l cervical or supraclavicular LN.    Imaging:  Discussed with Dr. Muniz    Assessment/Plan:  72 year old female with 04/2022 right breast ILC, g1, +/-/-    -No new breast issues today  -Imaging ultimately negative, repeat right dx in mmg to evaluate stability of scar tissue at [facility] site  -Reviewed the risk of breast cancer, signs and symptoms to watch for, intermittent self exam and of lifestyle efforts (regular exercise, healthy BMI, avoid EtOH etc) she can pursue to reduce risk of future breast cancer events.   -RTC in 6 months with right diagnostic mammogram    Ashlyn Lane MD

## 2023-05-03 ENCOUNTER — MYC MEDICAL ADVICE (OUTPATIENT)
Dept: PSYCHIATRY | Facility: CLINIC | Age: 23
End: 2023-05-03
Payer: COMMERCIAL

## 2023-05-03 NOTE — LETTER
2023       RE: Cristina Boyd  [address] [place] [address]  : 2000       To Whom it May Concern,    Cristina Boyd is currently under my care at the [facility] in [place] for the treatment of a medical condition. It is my understanding that she has fallen behind in her classes this semester. Today, I am requesting an accommodation in the form of extending her due date/s for assignments and testing by two weeks.     Unfortunately, Cristina experienced an exacerbation of her mental health symptoms starting in early . Her symptoms were quite severe and required hospitalization from 3/5/23 - 3/10/23. She is still recovering from this episode and is struggling to manage her symptoms. I believe allowing Cristina extra time to complete her assignments and tests will help her to be successful academically and mentally.    If you have any further questions regarding the above information, please call the clinic at [phone].      Sincerely,        Hannah Yanes MD

## 2023-05-03 NOTE — TELEPHONE ENCOUNTER
"Received return phone call from Cristina. She reports being hospitalized for the first time in many years for shaina. After her discharge, she went to [place] for spring break, then returned home and observed Ramadan. All of this put her behind in school. Now, she's feeling quite depressed and overwhelmed. She said all she wants to do is sleep and is lacking motivation and energy to do her schoolwork. She is requesting an accommodation letter for her school requesting an extension of two weeks to complete assignments and tests. Once complete and signed, she asked that it's sent to her via [employer].     Writer agreed to reach out to Dr. Yanes with this request. At this time, Cristina denies SI/SIB or any safety concerns. She just reiterated that she's feeling \"very low.\" Writer was able to reschedule the client for a sooner appt (5/9). Message sent to scheduling to update this.    Adina Altamirano RN on 5/3/2023 at 1:05 PM    "

## 2023-05-03 NOTE — TELEPHONE ENCOUNTER
Placed a phone call to Cristina to check in and discuss her message further. She answered but asked to call this writer back in 30 mins. Will forward her request to the provider in the meantime.    Adina Altamirano RN on 5/3/2023 at 11:47 AM

## 2023-05-04 ENCOUNTER — TELEPHONE (OUTPATIENT)
Dept: PSYCHIATRY | Facility: CLINIC | Age: 23
End: 2023-05-04
Payer: COMMERCIAL

## 2023-05-04 NOTE — TELEPHONE ENCOUNTER
Letter printed and signed by Dr. Yanes. This was emailed to the client at the address in the previous note. Client notified by phone.    Adina Altamirano RN on 5/4/2023 at 2:43 PM

## 2023-05-04 NOTE — TELEPHONE ENCOUNTER
Connected with Dr. Yanes who agreed to sign the drafted letter. This was printed and signed by Dr. Yanes. Writer emailed it to the client at the email address in today's telephone encounter.    Adina Altamirano RN on 5/4/2023 at 2:16 PM

## 2023-05-04 NOTE — TELEPHONE ENCOUNTER
[facility] Call Center    Phone Message    May a detailed message be left on voicemail: yes     Reason for Call: Form or Letter   Type or form/letter needing completion: Letter for work/school  Provider: Dr. Yanes  Date form needed: ASAP, pt calling for status update says she needs it for today  Once completed: Fax form to: [email or web address]      Action Taken: Message routed to:  Other: P PSYCHIATRY NURSE-[facility]    Travel Screening: Not Applicable
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
## 2023-05-09 ENCOUNTER — VIRTUAL VISIT (OUTPATIENT)
Dept: PSYCHIATRY | Facility: CLINIC | Age: 23
End: 2023-05-09
Attending: PSYCHIATRY & NEUROLOGY
Payer: COMMERCIAL

## 2023-05-09 DIAGNOSIS — F31.9 BIPOLAR 1 DISORDER (H): ICD-10-CM

## 2023-05-09 PROCEDURE — G0463 HOSPITAL OUTPT CLINIC VISIT: HCPCS | Mod: PN,GT | Performed by: STUDENT IN AN ORGANIZED HEALTH CARE EDUCATION/TRAINING PROGRAM

## 2023-05-09 PROCEDURE — 99214 OFFICE O/P EST MOD 30 MIN: CPT | Mod: HN | Performed by: STUDENT IN AN ORGANIZED HEALTH CARE EDUCATION/TRAINING PROGRAM

## 2023-05-09 RX ORDER — LITHIUM CARBONATE 300 MG/1
600 TABLET, FILM COATED, EXTENDED RELEASE ORAL AT BEDTIME
Qty: 30 TABLET | Refills: 2 | Status: SHIPPED | OUTPATIENT
Start: 2023-05-09 | End: 2023-09-29

## 2023-05-09 RX ORDER — QUETIAPINE FUMARATE 25 MG/1
25 TABLET, FILM COATED ORAL
Qty: 30 TABLET | Refills: 2 | Status: SHIPPED | OUTPATIENT
Start: 2023-05-09 | End: 2023-09-29

## 2023-05-09 ASSESSMENT — PATIENT HEALTH QUESTIONNAIRE - PHQ9
SUM OF ALL RESPONSES TO PHQ QUESTIONS 1-9: 8
SUM OF ALL RESPONSES TO PHQ QUESTIONS 1-9: 8
10. IF YOU CHECKED OFF ANY PROBLEMS, HOW DIFFICULT HAVE THESE PROBLEMS MADE IT FOR YOU TO DO YOUR WORK, TAKE CARE OF THINGS AT HOME, OR GET ALONG WITH OTHER PEOPLE: SOMEWHAT DIFFICULT

## 2023-05-09 NOTE — NURSING NOTE
Is the patient currently in the state of [place]? YES    Visit mode:VIDEO    If the visit is dropped, the patient can be reconnected by: VIDEO VISIT: Text to cell phone: [phone]    Will anyone else be joining the visit? NO      How would you like to obtain your AVS? MyChart    Are changes needed to the allergy or medication list? NO    Reason for visit: Video Visit      Emiliana ROSALES

## 2023-05-09 NOTE — PATIENT INSTRUCTIONS
**For crisis resources, please see the information at the end of this document**   Patient Education    Thank you for coming to the [facility] [place] [facility].     Lab Testing:  If you had lab testing today and your results are reassuring or normal they will be mailed to you or sent through getbetter! within 7 days. If the lab tests need quick action we will call you with the results. The phone number we will call with results is # [phone]. If this is not the best number please call our clinic and change the number.     Medication Refills:  If you need any refills please call your pharmacy and they will contact us. Our fax number for refills is [phone].   Three business days of notice are needed for general medication refill requests.   Five business days of notice are needed for controlled substance refill requests.   If you need to change to a different pharmacy, please contact the new pharmacy directly. The new pharmacy will help you get your medications transferred.     Contact Us:  Please call [phone] during business hours (8-5:00 M-F).   If you have medication related questions after clinic hours, or on the weekend, please call [phone].     Financial Assistance [phone]   Medical Records [phone]       MENTAL HEALTH CRISIS RESOURCES:  For a emergency help, please call 911 or go to the nearest Emergency Department.     Emergency Walk-In Options:   Em[facility] Unit @ [facility] ([place]): [phone] - Specialized mental health emergency area designed to be calming  [facility] ([place]): [phone]  [facility] ([place]): [phone]  [facility]): [phone]    [facility] Crisis Information:   [place]: [phone]  [place]: [phone]  [place] ([place]) - Adult: [phone]     Child: [phone]  [place] - Adult: [phone]     Child: [phone]  [place]:  [phone]  List of all [facility] resources:   https://[place].gov/dhs/people-we-serve/adults/health-care/mental-health/resources/crisis-contacts.jsp    National Crisis Information:   Crisis Text Line: Text  [place]  to 558743  Suicide & Crisis Lifeline: 988  National Suicide Prevention Lifeline: 6-504-517-TALK (1-254.948.5416)       For online chat options, visit https://suicidepreventionlifeline.org/chat/  Poison Control Center: 6-154-120-5001  Trans Lifeline: 9-748-593-0165 - Hotline for transgender people of all ages  The [employer]: 9-622-415-1859 - Hotline for LGBT youth     For Non-Emergency Support:   Fast Tracker: Mental Health & Substance Use Disorder Resources -   https://www.[employer].org/

## 2023-06-13 NOTE — TELEPHONE ENCOUNTER
Provider E-Visit time total (minutes): 2    Not sure what patients request is. I have never seen the patient.   If she is having mental health crisis or needs medication adjustments I suggest immediately being seen at urgent care or ER.   No indicators present

## 2023-09-28 ENCOUNTER — VIRTUAL VISIT (OUTPATIENT)
Dept: FAMILY MEDICINE | Facility: CLINIC | Age: 23
End: 2023-09-28
Payer: COMMERCIAL

## 2023-09-28 DIAGNOSIS — R59.1 LYMPHADENOPATHY: Primary | ICD-10-CM

## 2023-09-28 DIAGNOSIS — M79.10 MYALGIA: ICD-10-CM

## 2023-09-28 PROCEDURE — 99214 OFFICE O/P EST MOD 30 MIN: CPT | Mod: VID | Performed by: FAMILY MEDICINE

## 2023-09-28 NOTE — PROGRESS NOTES
Cristina is a 23 year old who is being evaluated via a billable video visit.      How would you like to obtain your AVS? MyChart  If the video visit is dropped, the invitation should be resent by: Text to cell phone: [phone]  Will anyone else be joining your video visit? No          Assessment & Plan       ICD-10-CM    1. Lymphadenopathy  R59.1 Symptomatic Influenza A/B, RSV, & SARS-CoV2 PCR (COVID-19) Nasopharyngeal     Streptococcus A Rapid Screen w/Reflex to PCR     CMV antibody IgM     EBV Capsid Antibody IgM     CBC with platelets and differential     Comprehensive metabolic panel (BMP + Alb, Alk Phos, ALT, AST, Total. Bili, TP)     HIV Antigen Antibody Combo     Lyme Disease Total Abs Bld with Reflex to Confirm CLIA     PRIMARY CARE FOLLOW-UP SCHEDULING      2. Myalgia  M79.10 Symptomatic Influenza A/B, RSV, & SARS-CoV2 PCR (COVID-19) Nasopharyngeal     Streptococcus A Rapid Screen w/Reflex to PCR     PRIMARY CARE FOLLOW-UP SCHEDULING            Review of external notes as documented elsewhere in note         Patient Instructions   .labappointment    Abraham Szymanski MD  [facility] CHIO Evans is a 23 year old, presenting for the following health issues:  Patient Request (Discuss swollen lymph nodes right side)      9/28/2023     4:25 PM   Additional Questions   Roomed by Juani   Accompanied by none         9/28/2023     4:25 PM   Patient Reported Additional Medications   Patient reports taking the following new medications none       HPI     Concern - Swollen lymph nodes  Onset: 1 week  Description: Right side swollen lymph nodes  Intensity: moderate  Progression of Symptoms:  same  Accompanying Signs & Symptoms:   Previous history of similar problem: Happened years ago   Precipitating factors:        Worsened by:   Alleviating factors:        Improved by:   Therapies tried and outcome:  none       Right side 1 week  Runny nose    Review of Systems   Constitutional,  HEENT, cardiovascular, pulmonary, gi and gu systems are negative, except as otherwise noted.      Objective           Vitals:  No vitals were obtained today due to virtual visit.    Physical Exam   GENERAL: Healthy, alert and no distress  EYES: Eyes grossly normal to inspection.  No discharge or erythema, or obvious scleral/conjunctival abnormalities.  RESP: No audible wheeze, cough, or visible cyanosis.  No visible retractions or increased work of breathing.    SKIN: Visible skin clear. No significant rash, abnormal pigmentation or lesions.  NEURO: Cranial nerves grossly intact.  Mentation and speech appropriate for age.  PSYCH: Mentation appears normal, affect normal/bright, judgement and insight intact, normal speech and appearance well-groomed.                Video-Visit Details    Type of service:  Video Visit     Originating Location (pt. Location): Home    Distant Location (provider location):  On-site  Platform used for Video Visit: Marcello

## 2023-09-28 NOTE — COMMUNITY RESOURCES LIST (PATIENT PREFERRED LANGUAGE)
09/28/2023   [facility]  N/A  Ashley johnson stephanie fulton ama gregory pulido , mikemallika maisha león  ama fide franco.  Phone: [phone]   Email: N/A   Address: [address] [place] [address]   Hours: N/A        Luc mcneill  ama qimahnazo ciciban  1  Kaniisadda Katooliga [facility] [place]: 7.43 miles      [facility]of ahaan   [address] [place] [address]  Luuqad: Ingiriis  Saacadaha: Isniinta - Arbacada 9:30 AM - 12:00 PM , Isniinta - Arbacada 1:00 PM - 2:00 PM Appt. Joni Peng: Singh   Phone: [phone] Email: info@[email or web address].[email or web address] Website: [email or web address]://[email or web address]/     2  Basilica ee [facility] [place]: 7.76 miles      [facility] ahaan   [address] [place] [address]  Luuqad: Ingiriis  Saacadaha: Talaado 9:30 AM - 11:30 AM , Khamiista 9:30 AM - 11:30 AM  Mariamyada: Singh   Phone: [phone] Email: info@[email or web address] Website: [email or web address]/     Luc elizondo  3  Sewa - Fayoqabka Qoyska Hindida Aasiya ([facility]: 3.59 miles      [facility]   [address] [place] [address]  Luuqad: Amharic, Carabi, Faaris, Gujarati, Rajesh, Ingiriis, Mohawk, Belarusian, Hebrew, Urduu  Saacadaha: Isniinta - Arbacada 9:00 AM - 5:00 PM , Khamiista 12:00 PM - 6:00 PM , Jimce 9:00 AM - 5:00 PM , Axad 10:30 AM - 2:00 PM Appt. Joni Peng: Singh   Phone: [phone] Email: info@[employer] Website: https://www.[employer].org/     4  Natalie Chilango [facility]: 5.99 miles      [facility]   [address] [facility] [address] [place] [address]  Luuqad: Maximo Toussaint: Teresa Shaikh Furan [facility]  Ginette: Phuc Schmitz   Phone: [phone] Email: [email or web address] Website: [email or web address]/[email or web address]/          Elliott goyal & Shahla Terrell  Judy   911  Nidhi Mcmillan   311  Ciarra Chacon   [phone]  [employer] ka jazmine augustin   [phone] (TALK)  Steven Back   [phone] (4-A-Child)  Steven starks Galgibran   [phone] (HOPE)  [employer]   [phone] (RUNAWAY)  Steven Farooq   [phone]  Berlin Arroyo   [phone] (HELP)

## 2023-09-29 ENCOUNTER — HOSPITAL ENCOUNTER (EMERGENCY)
Facility: CLINIC | Age: 23
Discharge: HOME | DRG: 866 | End: 2023-09-30
Attending: EMERGENCY MEDICINE | Admitting: EMERGENCY MEDICINE
Payer: COMMERCIAL

## 2023-09-29 ENCOUNTER — TELEPHONE (OUTPATIENT)
Dept: FAMILY MEDICINE | Facility: CLINIC | Age: 23
End: 2023-09-29

## 2023-09-29 ENCOUNTER — LAB (OUTPATIENT)
Dept: LAB | Facility: CLINIC | Age: 23
End: 2023-09-29
Attending: FAMILY MEDICINE
Payer: COMMERCIAL

## 2023-09-29 DIAGNOSIS — M79.10 MYALGIA: ICD-10-CM

## 2023-09-29 DIAGNOSIS — R59.1 LYMPHADENOPATHY: ICD-10-CM

## 2023-09-29 DIAGNOSIS — B34.9 VIRAL SYNDROME: ICD-10-CM

## 2023-09-29 DIAGNOSIS — F31.9 BIPOLAR 1 DISORDER (H): ICD-10-CM

## 2023-09-29 PROBLEM — R53.1 WEAKNESS: Status: ACTIVE | Noted: 2023-09-29

## 2023-09-29 PROBLEM — E87.1 HYPONATREMIA: Status: ACTIVE | Noted: 2023-09-29

## 2023-09-29 LAB
ALBUMIN SERPL BCG-MCNC: 3.2 G/DL (ref 3.5–5.2)
ALP SERPL-CCNC: 67 U/L (ref 35–104)
ALT SERPL W P-5'-P-CCNC: 17 U/L (ref 0–50)
ANION GAP SERPL CALCULATED.3IONS-SCNC: 11 MMOL/L (ref 7–15)
AST SERPL W P-5'-P-CCNC: 30 U/L (ref 0–45)
BASOPHILS # BLD AUTO: 0 10E3/UL (ref 0–0.2)
BASOPHILS NFR BLD AUTO: 1 %
BILIRUB SERPL-MCNC: <0.2 MG/DL
BUN SERPL-MCNC: 6.7 MG/DL (ref 6–20)
CALCIUM SERPL-MCNC: 7.3 MG/DL (ref 8.6–10)
CHLORIDE SERPL-SCNC: 90 MMOL/L (ref 98–107)
CREAT SERPL-MCNC: 0.51 MG/DL (ref 0.51–0.95)
DEPRECATED HCO3 PLAS-SCNC: 18 MMOL/L (ref 22–29)
DEPRECATED S PYO AG THROAT QL EIA: NEGATIVE
EGFRCR SERPLBLD CKD-EPI 2021: >90 ML/MIN/1.73M2
EOSINOPHIL # BLD AUTO: 0 10E3/UL (ref 0–0.7)
EOSINOPHIL NFR BLD AUTO: 1 %
ERYTHROCYTE [DISTWIDTH] IN BLOOD BY AUTOMATED COUNT: 12.8 % (ref 10–15)
FLUAV RNA SPEC QL NAA+PROBE: NEGATIVE
FLUBV RNA RESP QL NAA+PROBE: NEGATIVE
GLUCOSE SERPL-MCNC: 91 MG/DL (ref 70–99)
GROUP A STREP BY PCR: NOT DETECTED
HCT VFR BLD AUTO: 34.9 % (ref 35–47)
HGB BLD-MCNC: 11.6 G/DL (ref 11.7–15.7)
IMM GRANULOCYTES # BLD: 0 10E3/UL
IMM GRANULOCYTES NFR BLD: 0 %
LYMPHOCYTES # BLD AUTO: 1.2 10E3/UL (ref 0.8–5.3)
LYMPHOCYTES NFR BLD AUTO: 47 %
MCH RBC QN AUTO: 29.9 PG (ref 26.5–33)
MCHC RBC AUTO-ENTMCNC: 33.2 G/DL (ref 31.5–36.5)
MCV RBC AUTO: 90 FL (ref 78–100)
MONOCYTES # BLD AUTO: 0.4 10E3/UL (ref 0–1.3)
MONOCYTES NFR BLD AUTO: 16 %
NEUTROPHILS # BLD AUTO: 0.9 10E3/UL (ref 1.6–8.3)
NEUTROPHILS NFR BLD AUTO: 35 %
NRBC # BLD AUTO: 0 10E3/UL
NRBC BLD AUTO-RTO: 0 /100
PLATELET # BLD AUTO: 209 10E3/UL (ref 150–450)
POTASSIUM SERPL-SCNC: 3.4 MMOL/L (ref 3.4–5.3)
PROT SERPL-MCNC: 6.3 G/DL (ref 6.4–8.3)
RBC # BLD AUTO: 3.88 10E6/UL (ref 3.8–5.2)
RSV RNA SPEC NAA+PROBE: NEGATIVE
SARS-COV-2 RNA RESP QL NAA+PROBE: NEGATIVE
SODIUM SERPL-SCNC: 119 MMOL/L (ref 135–145)
WBC # BLD AUTO: 2.6 10E3/UL (ref 4–11)

## 2023-09-29 PROCEDURE — 120N000002 HC R&B MED SURG/OB UMMC

## 2023-09-29 PROCEDURE — 81001 URINALYSIS AUTO W/SCOPE: CPT | Performed by: EMERGENCY MEDICINE

## 2023-09-29 PROCEDURE — 36415 COLL VENOUS BLD VENIPUNCTURE: CPT

## 2023-09-29 PROCEDURE — 87637 SARSCOV2&INF A&B&RSV AMP PRB: CPT | Performed by: FAMILY MEDICINE

## 2023-09-29 PROCEDURE — 87389 HIV-1 AG W/HIV-1&-2 AB AG IA: CPT

## 2023-09-29 PROCEDURE — 87651 STREP A DNA AMP PROBE: CPT

## 2023-09-29 PROCEDURE — 250N000013 HC RX MED GY IP 250 OP 250 PS 637: Performed by: EMERGENCY MEDICINE

## 2023-09-29 PROCEDURE — 86617 LYME DISEASE ANTIBODY: CPT | Mod: 59

## 2023-09-29 PROCEDURE — 84300 ASSAY OF URINE SODIUM: CPT | Performed by: EMERGENCY MEDICINE

## 2023-09-29 PROCEDURE — 99283 EMERGENCY DEPT VISIT LOW MDM: CPT | Performed by: EMERGENCY MEDICINE

## 2023-09-29 PROCEDURE — 86618 LYME DISEASE ANTIBODY: CPT

## 2023-09-29 PROCEDURE — 86645 CMV ANTIBODY IGM: CPT

## 2023-09-29 PROCEDURE — 86665 EPSTEIN-BARR CAPSID VCA: CPT

## 2023-09-29 PROCEDURE — 83935 ASSAY OF URINE OSMOLALITY: CPT | Performed by: EMERGENCY MEDICINE

## 2023-09-29 PROCEDURE — 85025 COMPLETE CBC W/AUTO DIFF WBC: CPT

## 2023-09-29 PROCEDURE — 36415 COLL VENOUS BLD VENIPUNCTURE: CPT | Performed by: EMERGENCY MEDICINE

## 2023-09-29 PROCEDURE — 82570 ASSAY OF URINE CREATININE: CPT | Performed by: EMERGENCY MEDICINE

## 2023-09-29 PROCEDURE — 258N000003 HC RX IP 258 OP 636: Performed by: EMERGENCY MEDICINE

## 2023-09-29 PROCEDURE — 80053 COMPREHEN METABOLIC PANEL: CPT

## 2023-09-29 RX ORDER — SODIUM CHLORIDE 9 MG/ML
INJECTION, SOLUTION INTRAVENOUS CONTINUOUS
Status: DISCONTINUED | OUTPATIENT
Start: 2023-09-29 | End: 2023-09-30 | Stop reason: HOSPADM

## 2023-09-29 RX ORDER — IBUPROFEN 600 MG/1
600 TABLET, FILM COATED ORAL ONCE
Status: COMPLETED | OUTPATIENT
Start: 2023-09-29 | End: 2023-09-29

## 2023-09-29 RX ORDER — LITHIUM CARBONATE 300 MG/1
600 TABLET, FILM COATED, EXTENDED RELEASE ORAL AT BEDTIME
Qty: 30 TABLET | Refills: 2 | Status: SHIPPED | OUTPATIENT
Start: 2023-09-29 | End: 2023-10-02

## 2023-09-29 RX ORDER — QUETIAPINE FUMARATE 25 MG/1
25 TABLET, FILM COATED ORAL
Qty: 30 TABLET | Refills: 0 | Status: SHIPPED | OUTPATIENT
Start: 2023-09-29 | End: 2023-12-05

## 2023-09-29 RX ADMIN — SODIUM CHLORIDE: 9 INJECTION, SOLUTION INTRAVENOUS at 22:48

## 2023-09-29 RX ADMIN — IBUPROFEN 600 MG: 600 TABLET, FILM COATED ORAL at 23:53

## 2023-09-29 ASSESSMENT — VISUAL ACUITY: OU: 1

## 2023-09-29 ASSESSMENT — ACTIVITIES OF DAILY LIVING (ADL): ADLS_ACUITY_SCORE: 35

## 2023-09-29 NOTE — TELEPHONE ENCOUNTER
[facility] Call Center    Phone Message    May a detailed message be left on voicemail: yes     Reason for Call: Medication Refill Request    Has the patient contacted the pharmacy for the refill? Yes   Name of medication being requested: Lithium 300mg, Seroquel 25 mg  Provider who prescribed the medication: Dr. Gottlieb  Pharmacy: [facility] [address] [address]   Date medication is needed: ASAP pt stated that she needs the medication today and if the nurses have any questions they can call her     Action Taken: Other: nurse pool    Travel Screening: Not Applicable

## 2023-09-29 NOTE — TELEPHONE ENCOUNTER
Writer called pharmacy to inquire about last fill on patient's lithium. They stated it was last picked up on 5/9/2023 for a quantity of 30.     Writer called patient who states she has been off all of her medications since July. Patient denies any safety concerns symptoms of shaina, but stated she would like to get back on her medications. Patient's speech is clear and coherent with regular rate and rhythm. Patient states she lives with her family, so they would monitor for any increase in symptoms. She states she kept putting off reaching out as she was having problems with her insurance and had a very large medical bill after her hospitalization.     Writer discussed presenting to the ED or [facility] if she did have any immediate concerns and patient verbalized understanding.     Patient understanding that prescriptions may not be sent in today due to timing of call and need to consult with provider as she has been off her medications.     Last seen: 05/09/2023  RTC: 8 weeks   Cancel: 05/18/2023  No-show: None  Next appt: 12/07/2023     Incoming refill from Patient via phone    Medication requested:   Pending Prescriptions:                       Disp   Refills    QUEtiapine (SEROQUEL) 25 MG tablet        30 tab*0            Sig: Take 1 tablet (25 mg) by mouth nightly as needed           (sleep)    lithium ER (LITHOBID) 300 MG CR tablet    30 tab*0            Sig: Take 2 tablets (600 mg) by mouth At Bedtime      From chart note:   - continue Lithium  mg at bedtime   - continue Seroquel 25-50mg PRN for insomnia, hypomania sx       Medication sent to provider for review.

## 2023-09-30 VITALS
OXYGEN SATURATION: 100 % | RESPIRATION RATE: 16 BRPM | SYSTOLIC BLOOD PRESSURE: 100 MMHG | DIASTOLIC BLOOD PRESSURE: 70 MMHG | HEART RATE: 100 BPM | TEMPERATURE: 98.7 F

## 2023-09-30 LAB
ALBUMIN UR-MCNC: NEGATIVE MG/DL
ANION GAP SERPL CALCULATED.3IONS-SCNC: 11 MMOL/L (ref 7–15)
APPEARANCE UR: CLEAR
BILIRUB UR QL STRIP: NEGATIVE
BUN SERPL-MCNC: 7.1 MG/DL (ref 6–20)
CALCIUM SERPL-MCNC: 9.2 MG/DL (ref 8.6–10)
CHLORIDE SERPL-SCNC: 103 MMOL/L (ref 98–107)
COLOR UR AUTO: ABNORMAL
CREAT SERPL-MCNC: 0.54 MG/DL (ref 0.51–0.95)
CREAT UR-MCNC: 125 MG/DL
DEPRECATED HCO3 PLAS-SCNC: 24 MMOL/L (ref 22–29)
EGFRCR SERPLBLD CKD-EPI 2021: >90 ML/MIN/1.73M2
GLUCOSE SERPL-MCNC: 81 MG/DL (ref 70–99)
GLUCOSE UR STRIP-MCNC: NEGATIVE MG/DL
HGB UR QL STRIP: ABNORMAL
KETONES UR STRIP-MCNC: NEGATIVE MG/DL
LEUKOCYTE ESTERASE UR QL STRIP: NEGATIVE
MUCOUS THREADS #/AREA URNS LPF: PRESENT /LPF
NITRATE UR QL: NEGATIVE
OSMOLALITY UR: 600 MMOL/KG (ref 100–1200)
PH UR STRIP: 6 [PH] (ref 5–7)
POTASSIUM SERPL-SCNC: 3.9 MMOL/L (ref 3.4–5.3)
RBC URINE: 3 /HPF
SODIUM SERPL-SCNC: 138 MMOL/L (ref 135–145)
SODIUM UR-SCNC: 144 MMOL/L
SP GR UR STRIP: 1.02 (ref 1–1.03)
UROBILINOGEN UR STRIP-MCNC: NORMAL MG/DL
WBC URINE: <1 /HPF

## 2023-09-30 NOTE — TELEPHONE ENCOUNTER
Call back from patient asking if she could stay home and drink some Gatorade. Advised she should follow the doctor's order d/t low sodium and Gatorade might not be enough. Patient says she will go to the ED now. She is close to [facility] so may go there.      Beatrice Love RN, BSN  Triage Nurse Advisor

## 2023-09-30 NOTE — ED PROVIDER NOTES
ED PROVIDER NOTE  September 29, 2023  History     Chief Complaint   Patient presents with    Abnormal Labs     HPI  Cristina Boyd is a 23 year old female who is referred today to the emergency department from her primary care physician for admission to treat hyponatremia.  Of note patient reports approximate 1 week of progressive generalized fatigue and weakness.  Patient also reports some associated myalgias and lymphadenopathy or anterior cervical chain.  For this the patient was worked up by PCP undergoing viral evaluation and screening laboratory studies.  Today the patient was called noting fairly significant hyponatremia at 119.  Patient reports prior history of very mild hyponatremia in the past.  She has previously been on lithium which she is no longer on at this time.  She reports no other specific symptoms such as seizures, change in vision, focal neurologic deficit.  She reports no GI loss such as nausea, vomiting, diarrhea.  She denies dysuria, urinary frequency.  No high fevers or whole-body shaking chills.  Patient reports no excessive water intake.      Past Medical History  Past Medical History:   Diagnosis Date    Bipolar disorder (H)     Depressive disorder     Infection due to 2019 novel coronavirus 2020    summer 2020 by patient report    Infection due to 2019 novel coronavirus 12/2021    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, unspecified thyrotoxicosis type 11/16/2022     Past Surgical History:   Procedure Laterality Date    BRONCHOSCOPY (RIGID OR FLEXIBLE), DIAGNOSTIC N/A 6/16/2018    Procedure: COMBINED BRONCHOSCOPY (RIGID OR FLEXIBLE), LAVAGE;;  Surgeon: Manjeet Holland MD;  Location:  GI     lithium ER (LITHOBID) 300 MG CR tablet  QUEtiapine (SEROQUEL) 25 MG tablet      No Known Allergies  Family History  Family History   Problem Relation Age of Onset    Impaired Fasting Glucose Father         prediabetes    Thyroid Disease Maternal Grandmother     Diabetes Maternal Grandmother      Depression Maternal Uncle     Depression Other      Social History   Social History     Tobacco Use    Smoking status: Never    Smokeless tobacco: Never   Vaping Use    Vaping Use: Never used   Substance Use Topics    Alcohol use: No    Drug use: No     Comment: x1 marijuana, in October, 2016         A medically appropriate review of systems was performed with pertinent positives and negatives noted in the HPI, and all other systems negative.      Physical Exam   BP: 112/71  Pulse: 87  Temp: 98  F (36.7  C)  Resp: 18  SpO2: 100 %      Physical Exam  Vitals and nursing note reviewed.   Constitutional:       General: She is not in acute distress.     Appearance: Normal appearance. She is normal weight. She is not ill-appearing.   HENT:      Head: Normocephalic and atraumatic.      Right Ear: External ear normal.      Left Ear: External ear normal.      Nose: Nose normal. No congestion or rhinorrhea.      Mouth/Throat:      Lips: Pink. No lesions.      Mouth: Mucous membranes are moist. No injury or oral lesions.      Pharynx: Oropharynx is clear. No pharyngeal swelling.   Eyes:      General: Lids are normal. Vision grossly intact. Gaze aligned appropriately.      Extraocular Movements: Extraocular movements intact.      Conjunctiva/sclera: Conjunctivae normal.      Pupils: Pupils are equal, round, and reactive to light.   Cardiovascular:      Rate and Rhythm: Normal rate and regular rhythm.      Pulses: Normal pulses.      Heart sounds: Normal heart sounds. No murmur heard.     No friction rub.   Pulmonary:      Effort: Pulmonary effort is normal. No respiratory distress.      Breath sounds: Normal breath sounds. No stridor. No wheezing, rhonchi or rales.   Chest:      Chest wall: No tenderness.   Musculoskeletal:         General: Normal range of motion.      Cervical back: Full passive range of motion without pain and normal range of motion. No rigidity.   Skin:     General: Skin is warm.      Capillary Refill:  Capillary refill takes less than 2 seconds.   Neurological:      General: No focal deficit present.      Mental Status: She is alert and oriented to person, place, and time.      Cranial Nerves: No cranial nerve deficit.      Motor: No weakness.   Psychiatric:         Mood and Affect: Mood normal.         Behavior: Behavior normal.         ED Course        Procedures         Results for orders placed or performed during the hospital encounter of 09/29/23 (from the past 24 hour(s))   Basic metabolic panel   Result Value Ref Range    Sodium 138 135 - 145 mmol/L    Potassium 3.9 3.4 - 5.3 mmol/L    Chloride 103 98 - 107 mmol/L    Carbon Dioxide (CO2) 24 22 - 29 mmol/L    Anion Gap 11 7 - 15 mmol/L    Urea Nitrogen 7.1 6.0 - 20.0 mg/dL    Creatinine 0.54 0.51 - 0.95 mg/dL    GFR Estimate >90 >60 mL/min/1.73m2    Calcium 9.2 8.6 - 10.0 mg/dL    Glucose 81 70 - 99 mg/dL   Osmolality urine   Result Value Ref Range    Osmolality Urine 600 100 - 1,200 mmol/kg    Narrative    Reference Ranges depend on patient's hydration status and renal function.   Neonates:  mmol/kg   2 years and older, random specimens: 100-1200 mmol/kg; Greater than 850 mmol/kg after 12 hour fluid restriction  Urine/serum osmolality ratio: 2 years and older: 1.0-3.0; 3.0-4.7 after 12 hour fluid restriction     Medications   sodium chloride 0.9% infusion ( Intravenous $New Bag 9/29/23 7273)   ibuprofen (ADVIL/MOTRIN) tablet 600 mg (600 mg Oral $Given 9/29/23 2791)          Assessments & Plan (with Medical Decision Making)     Cristina Boyd is a 23 year old female who is referred today to the emergency department from her primary care physician for admission to treat hyponatremia.  I did review patient's laboratory studies before evaluating her.  Of note patient has been fairly in the normal range for sodium.  However her previous sodium was approximate 6 months ago in the normal range at that time.  Fairly significant hyponatremia noted at this  time.  Etiology unclear.  No significant GI loss.  No obvious medication contributing to this.  Patient reports no significant free water intake.  I do not appreciate clinically obvious any infectious source such as pneumonia.  No clinical evidence of elevated ICP.  This is fairly low and the patient has had some progressive weakness, which has been very mild.  She is still ambulatory, eating and drinking.  I believe she would benefit from slow repletion of sodium over the course of the night, following up electrolytes likely requiring a 1 day stay.  At this time I believe she is appropriate for floor level of care.  No evidence of seizures or depressed mentation.  Vitally stable at this time.  We did ultimately repeat laboratory studies to ensure patient truly hyponatremic as she has not been this low in the past would be somewhat unusual in a relatively healthy female.  Sodium normal on her values.  I suspect this is a true value and she had false laboratory studies drawn earlier.  Believe she may return to home.  Constellation of symptoms favoring viral etiology.  She may continue to follow with her primary care physician.  Or certainly happy to reevaluate at any time should you have change, progression or worsening symptoms.    I have reviewed the nursing notes.    I have reviewed the findings, diagnosis, plan and need for follow up with the patient.    New Prescriptions    No medications on file       Final diagnoses:   Viral syndrome       ANKIT JAVED MD    9/29/2023   [facility] EMERGENCY DEPARTMENT     Ankit Javed MD  09/29/23 2229       Ankit Javed MD  09/30/23 0019

## 2023-09-30 NOTE — ED TRIAGE NOTES
Referred from pcp for hyponatremia of 119  Denies N/V/D, cardiac sx  Not currently taking lithium  Oriented x4       Triage Assessment       Row Name 09/29/23 2212       Triage Assessment (Adult)    Airway WDL WDL       Respiratory WDL    Respiratory WDL WDL       Skin Circulation/Temperature WDL    Skin Circulation/Temperature WDL WDL       Cardiac WDL    Cardiac WDL WDL       Peripheral/Neurovascular WDL    Peripheral Neurovascular WDL WDL       Cognitive/Neuro/Behavioral WDL    Cognitive/Neuro/Behavioral WDL WDL

## 2023-09-30 NOTE — DISCHARGE INSTRUCTIONS
I am sorry that you did have a long night in the emergency department.  We did send confirmation laboratory studies as your labs drawn by her primary care physician did not fit with your typical baseline laboratory studies these do demonstrate your sodium is normal.  You do not require treatment for this.  I did review your other laboratory studies.  Your symptoms are most consistent with a viral infection.  Please continue to follow with your primary care physician should you have any other concerns we are certainly happy to reevaluate you emergently at any time.

## 2023-09-30 NOTE — PROGRESS NOTES
Received a call from lab related to abnormal sodium. The value was 119. Called the patient and assess symptoms of which she had no chest pain shortness of breath or dizziness. Given the level of the lab patient was advised to go to the emergency department. She stated she will think about it and discuss with family member and then follow-up if appropriate.  Frida Stallings MD

## 2023-10-02 LAB
B BURGDOR IGG SERPL QL IA: 0.09 INDEX
B BURGDOR IGG SERPL QL IA: NONREACTIVE
B BURGDOR IGG+IGM SER QL: 1
B BURGDOR IGM SERPL QL IA: 0.9 INDEX
B BURGDOR IGM SERPL QL IA: ABNORMAL
CMV IGM SERPL IA-ACNC: <8 AU/ML
CMV IGM SERPL IA-ACNC: NEGATIVE
EBV VCA IGM SER IA-ACNC: <10 U/ML
EBV VCA IGM SER IA-ACNC: NORMAL
HIV 1+2 AB+HIV1 P24 AG SERPL QL IA: NONREACTIVE

## 2023-10-02 RX ORDER — LITHIUM CARBONATE 300 MG/1
600 TABLET, FILM COATED, EXTENDED RELEASE ORAL AT BEDTIME
Qty: 60 TABLET | Refills: 2 | Status: SHIPPED | OUTPATIENT
Start: 2023-10-02 | End: 2023-12-05

## 2023-10-02 NOTE — TELEPHONE ENCOUNTER
Writer noticed lithium was inadvertently sent for 15 d/s with 2 refills. Writer resent this for full 30 d/s with 2 refills. Called the patient and notified her of this. Agreed to also call the pharmacy and make sure these can be filled for the patient today, so she can restart her psychiatric medications. Patient shared her insurance issues are resolved now. She would like to see Dr. Gottlieb sooner than 12/7 if possible. Writer informed her she has been placed on the cancellation list if something opens up sooner. She voiced understanding.    Today, the patient denies any major concerns with mood or psychiatric symptoms. She has been dealing with a viral illness for approximately 10 days now, and is still not feeling well due to the associated symptoms. Patient will be following up with her primary care provider. Patient's speech remains clear and coherent today with a regular rate and rhythm.     Adina Altamirano RN on 10/2/2023 at 9:24 AM

## 2023-10-02 NOTE — TELEPHONE ENCOUNTER
Pharmacy confirmed they received prescriptions for both Lithium 30 d/s and PRN Seroquel. These can be filled today and should be ready for the patient this afternoon.    Adina Altamirano RN on 10/2/2023 at 9:29 AM

## 2023-11-09 ENCOUNTER — TELEPHONE (OUTPATIENT)
Dept: PSYCHIATRY | Facility: CLINIC | Age: 23
End: 2023-11-09
Payer: COMMERCIAL

## 2023-11-09 NOTE — TELEPHONE ENCOUNTER
Writer attempted to call pharmacy. On hold for long time. Will call patient.    Writer called and left message to patient that there's refill on file. They can call pharmacy.    Rebeca Rogers on 11/9/2023 at 2:43 PM

## 2023-11-09 NOTE — TELEPHONE ENCOUNTER
[facility] Call Center    Phone Message    May a detailed message be left on voicemail: yes     Reason for Call: Medication Refill Request    Has the patient contacted the pharmacy for the refill? Yes   Name of medication being requested: Lithium 300 mg  Provider who prescribed the medication: Dr. Gottlieb  Pharmacy: [facility] [address] [address] [address]   Date medication is needed: we rescheduled [facility] appt with Dr. Gottlieb he will not be in on her original appt she would like her meds refilled because she will see him not until Jan 2024     Action Taken: Other: nurse pool    Travel Screening: Not Applicable

## 2023-11-10 NOTE — TELEPHONE ENCOUNTER
Writer followed up with patient this morning. Writer asked if she have gotten her medications and if it's Lithium that she is requesting refills. Patient states that she did called for Lithium refills, but she is in [place] and will be back Monday. Patient asked writer to call back Monday, writer informed patient that there's refill on file and is she does not need it now she can pick it up when she's back. Writer was still talking to patient and ask further questions, but patient hung up on writer .    If any questions patient can call back on Monday when she's back in [place]    No actions required at this time .    Rebeca Rogers on 11/10/2023 at 8:48 AM

## 2023-11-13 ENCOUNTER — LAB (OUTPATIENT)
Dept: LAB | Facility: CLINIC | Age: 23
End: 2023-11-13
Payer: COMMERCIAL

## 2023-11-13 ENCOUNTER — TELEPHONE (OUTPATIENT)
Dept: PSYCHIATRY | Facility: CLINIC | Age: 23
End: 2023-11-13
Payer: COMMERCIAL

## 2023-11-13 DIAGNOSIS — E05.90 THYROTOXICOSIS WITHOUT THYROID STORM, UNSPECIFIED THYROTOXICOSIS TYPE: ICD-10-CM

## 2023-11-13 LAB
CALCIUM SERPL-MCNC: 9.2 MG/DL (ref 8.6–10)
CREAT SERPL-MCNC: 0.61 MG/DL (ref 0.51–0.95)
EGFRCR SERPLBLD CKD-EPI 2021: >90 ML/MIN/1.73M2
PHOSPHATE SERPL-MCNC: 3.3 MG/DL (ref 2.5–4.5)
PTH-INTACT SERPL-MCNC: 51 PG/ML (ref 15–65)
T3 SERPL-MCNC: 120 NG/DL (ref 85–202)
T4 FREE SERPL-MCNC: 1.74 NG/DL (ref 0.9–1.7)
TSH SERPL DL<=0.005 MIU/L-ACNC: 2.07 UIU/ML (ref 0.3–4.2)

## 2023-11-13 PROCEDURE — 82565 ASSAY OF CREATININE: CPT

## 2023-11-13 PROCEDURE — 84443 ASSAY THYROID STIM HORMONE: CPT

## 2023-11-13 PROCEDURE — 36415 COLL VENOUS BLD VENIPUNCTURE: CPT

## 2023-11-13 PROCEDURE — 84445 ASSAY OF TSI GLOBULIN: CPT

## 2023-11-13 PROCEDURE — 82310 ASSAY OF CALCIUM: CPT

## 2023-11-13 PROCEDURE — 83970 ASSAY OF PARATHORMONE: CPT

## 2023-11-13 PROCEDURE — 84480 ASSAY TRIIODOTHYRONINE (T3): CPT

## 2023-11-13 PROCEDURE — 84100 ASSAY OF PHOSPHORUS: CPT

## 2023-11-13 PROCEDURE — 84439 ASSAY OF FREE THYROXINE: CPT

## 2023-11-13 NOTE — TELEPHONE ENCOUNTER
Last seen: 5/9/23 (Farzana)  RTC: 8 weeks  Cancel: 12/7/23  No-show: none  Next appt: 1/4/24 (Bull)     Medication requested: QUEtiapine (SEROQUEL) 25 MG tablet   Directions: Take 1 tablet (25 mg) by mouth nightly as needed (sleep)   Qty: 30  Last refilled: 11/13/23 per outside med rec     Called [facility] pharmacy and confirmed she picked up a 30 d/s today. Request was sent proactively.     Writer placed a phone call to Cristina to complete a quick check-in and confirm how often she's using the PRN Seroquel. No answer. LVM requesting a c/b or response via [employer]. Message sent to patient in separate encounter.    Adina Altamirano RN on 11/13/2023 at 10:36 AM

## 2023-11-13 NOTE — TELEPHONE ENCOUNTER
[facility] Call Center    Phone Message    May a detailed message be left on voicemail: yes     Reason for Call: Medication Refill Request    Has the patient contacted the pharmacy for the refill? Yes   Name of medication being requested: QUEtiapine 25mg  Provider who prescribed the medication: Dr. Gottlieb  Pharmacy: [facility] [address] [address] [address]   Date medication is needed: 11/13/23      Patient came by the clinic requesting a refill of their prescription of Quetiapine 25mg. The patient is completely out of their current prescription.    Action Taken: Message routed to:  Other: [facility] psychiatry nurse pool    Travel Screening: Not Applicable

## 2023-11-15 LAB — TSI SER-ACNC: <1 TSI INDEX

## 2023-11-21 ASSESSMENT — PATIENT HEALTH QUESTIONNAIRE - PHQ9
SUM OF ALL RESPONSES TO PHQ QUESTIONS 1-9: 3
SUM OF ALL RESPONSES TO PHQ QUESTIONS 1-9: 3
10. IF YOU CHECKED OFF ANY PROBLEMS, HOW DIFFICULT HAVE THESE PROBLEMS MADE IT FOR YOU TO DO YOUR WORK, TAKE CARE OF THINGS AT HOME, OR GET ALONG WITH OTHER PEOPLE: SOMEWHAT DIFFICULT

## 2023-11-21 ASSESSMENT — ANXIETY QUESTIONNAIRES
5. BEING SO RESTLESS THAT IT IS HARD TO SIT STILL: SEVERAL DAYS
6. BECOMING EASILY ANNOYED OR IRRITABLE: SEVERAL DAYS
7. FEELING AFRAID AS IF SOMETHING AWFUL MIGHT HAPPEN: SEVERAL DAYS
3. WORRYING TOO MUCH ABOUT DIFFERENT THINGS: SEVERAL DAYS
GAD7 TOTAL SCORE: 6
GAD7 TOTAL SCORE: 6
2. NOT BEING ABLE TO STOP OR CONTROL WORRYING: NOT AT ALL
1. FEELING NERVOUS, ANXIOUS, OR ON EDGE: SEVERAL DAYS
IF YOU CHECKED OFF ANY PROBLEMS ON THIS QUESTIONNAIRE, HOW DIFFICULT HAVE THESE PROBLEMS MADE IT FOR YOU TO DO YOUR WORK, TAKE CARE OF THINGS AT HOME, OR GET ALONG WITH OTHER PEOPLE: SOMEWHAT DIFFICULT
4. TROUBLE RELAXING: SEVERAL DAYS

## 2023-11-21 NOTE — COMMUNITY RESOURCES LIST (PATIENT PREFERRED LANGUAGE)
11/21/2023   [facility]  N/A  Gusmanashley billy elizabeth rogeriskgiuseppe christianoeyrasarabjit ama damianiyanikita reyyekady harperasarabjit , mikemallika maisha franco aasaasiga  ama maamgeoffrey reyyemiripaulo.  Phone: [phone]   Email: N/A   Address: [address] [place] [address]   Hours: N/A        Cuntada iyo Nafaqada       Qolka cuntada  1  Xarunta Islaamka [facility] Qalfoof cuntada Fogaanta: 0.3 miles      Qaadista   [address] [place] [address]  Luuqad: Carabi, Ingiriis, Georgian, Soomaali, Urduu  Saacadaha: Axad 11:00 AM - 1:30 PM  Kharashyada: Sixtoaash   Phone: [phone] Email: [facility].org Website: http://www.isUniversity of Tennessee Medical Center.org     2  Kaniisadda Luteraanka [facility] HospitalAshley Cordova Fogaanta: 7.75 miles      Qof ahaan   [address] [place] [address]  Luuqad: Ingiriis  Saacadaha: Talaado 10:00 AM - 11:30 AM , Arbacada 5:00 PM - 6:30 PM , Jimce 10:00 AM - 11:30 AM  Kharashyada: Magenta ComputacÃƒÂ­onaash   Phone: [phone] Email: info@[facility].org Website: http://www.[facility].org/     Antolin britt SNAP  3  Sewa - Fayoqabka Qoyska Hindida Aasiya (AIFW) Fogaanta: 1.25 miles      Qof ahaan, Telefoon/Virtual   [address] [place] [address]  Luuqad: Frisian, Carabi, Faaris, Gujarati, Rajesh, Ingiriis, Japanese, Georgian, Icelandic, Urduu  Saacadaha: Isniinta - Arbacada 9:00 AM - 5:00 PM , Khamiista 12:00 PM - 6:00 PM , Jimce 9:00 AM - 5:00 PM , Axad 10:30 AM - 2:00 PM Appt. Joni Peng: Singh   Phone: [phone] Email: info@[employer].org Website: https://www.[employer].org/     4  Waaxda Adilson [facility] Sanjana Fogaanta: 3.59 miles      Telefoon/Virtual   [address] [place] [address]  Luuqad: Maximo Toussaint: Teresa Vasquez 8:15 AM - 4:00 PM  Ginette: Singh   Phone: [phone] Email: paperwork@co.elvis.mn. Website: http://www.[facility].us/193/Economic-Assistance     Seble mcneill   5  Nicole Joseph  ee [facility] iyo Kalluunka - Cashada Bulshada Fogaanta: 0.66 miles      Qof ahaan   [address] [place] [address]  Luuqad: Ingiriis  Saacadaha: Arbacada 5:15 PM - 6:00 PM  Kharashyada: Bilaash   Phone: [phone] Email: info@[facility].[employer] Website: http://www.[facility].[employer]/     6  [facility] Miiska Qoyska Fogaanta: 0.66 miles      Qaadista   [address] [facility] [place] [address]  Luuqad: Ingiriis  Saacadaha: Sabti 11:30 AM - 12:30 PM  Kharashyada: Bilaash   Phone: [phone] Email: saul@[facility] Website: http://www.[facility].[employer]/          Shawandaidka       Gaadiid bilaash ah ama qiimo jaban  7  Kaniisadda Katooliga ee [place] Fogaanta: 5.99 miles      Qof ahaan   [address] [place] [address]  Luuqad: Ingiriis  Saacadaha: Isniinta - Arbacada 9:30 AM - 12:00 PM , Isniinta - Arbacada 1:00 PM - 2:00 PM Appt. Joni Yadavarashyada: Bilaash   Phone: [phone] Email: info@saintolaf.[employer] Website: http://www.saintolaf.org/     8  Basilica ee [facility] [place] - Gaadiid bilaash ah ama qiimo jaban Fogaanta: 7.43 miles      Qof ahaan   [address] [place] [address]  Luuqad: Ingiriis  Saacadaha: Talaado 9:30 AM - 11:30 AM , Khamiista 9:30 AM - 11:30 AM  Kharashyada: Bilaash   Phone: [phone] Email: info@[employer] Website: http://www.[facility].org/     Gaakathryn mo caafimaadka  9  [facility] - Fayoqabka QCity Hospitalk[facility]angel Johnny (AIF) Fogaanta: 3.59 miles      [place] [address] [place] [address]  Luuqad: Spanish, Carabi, Faaris, Gujarati, Rajesh, Ingiriis, Mongolian, Mohawk, Icelandic, Urduu  Saacadaha: Isniinta - Arbacada 9:00 AM - 5:00 PM , Khamiista 12:00 PM - 6:00 PM , Jimce 9:00 AM - 5:00 PM , Axad 10:30 AM - 2:00 PM Appt. Joni Peng: Singh   Phone: [phone] [phone] Email: info@Airstonea-aifw.org Website: https://www.sewa-aifw.org/     10  Candi Anderson [facility] ashley Carrillo [place] Fogaanta: 3.81  elvie      of [facility]   [address] [place] [address]  Luuqad: Maximo Toussaint: Teresa Vasquez [address] [facility]  Ginette: Phuc Schmitz   Phone: [phone] Email: sophia@[employer] Website: https://www.Hachimenroppi/midmetro/          Elliott goyal & Shahla Kim   911  Samiaegkate Mcmillan   311  Elvistorjensen Sunmichael   [phone]  Lifeline  jazmine augustin   [phone] (TALK)  Steven goyal ee Jaelyn Lirta   [phone] (4-A-Child)  Steven starks Galmada   [phone] (HOPE)  National Runaway Safeline   [phone] (RUNAWAY)  Steven Farooq   [phone]  Berlin Arroyo   [phone] (HELP)

## 2023-11-21 NOTE — COMMUNITY RESOURCES LIST (ENGLISH)
11/21/2023   [facility]  N/A  For questions about this resource list or additional care needs, please contact your primary care clinic or care manager.  Phone: [phone]   Email: N/A   Address: [address] [place] [address]   Hours: N/A        Food and Nutrition       Food pantry  1  [facility] - Food Shelf Distance: 0.3 miles      Pickup   [address] [place] [address]  Language: Nepali, English, Turkmen, Cypriot, Macedonian  Hours: Sun 11:00 AM - 1:30 PM  Fees: Free   Phone: (974) [phone] Email: icm@[facility].org Website: http://www.[facility].org     2  [facility] Distance: 0.66 miles      In-Person   [address] [place] [address]  Language: English  Hours: Tue 10:00 AM - 11:30 AM , Wed 5:00 PM - 6:30 PM , Fri 10:00 AM - 11:30 AM  Fees: Free   Phone: (884) 614-6028 Email: info@[facility].org Website: http://www.[facility].org/     SNAP application assistance  3  [facility] -   Family Wellness (AIFW) Distance: 3.59 miles      In-Person, Phone/Virtual   [address] [place] [address]  Language: Nepali, Korean, English, Gujarati, Rajesh, Portuguese, Turkmen, Nepali, Urdu, Macedonian  Hours: Mon - Wed 9:00 AM - 5:00 PM , Thu 12:00 PM - 6:00 PM , Fri 9:00 AM - 5:00 PM , Sun 10:30 AM - 2:00 PM Appt. Only  Fees: Free   Phone: (395) 914-2081 Email: info@sewa-aifw.org Website: https://www.sewa-aifw.org/     4  [facility] Economic [facility] Distance: 3.81 miles      Phone/Virtual   [address] [address]  Language: English  Hours: Mon - Fri 8:15 AM - 4:00 PM  Fees: Free   Phone: (519) 611-6881 Email: paperwork@co.[place].mn. Website: http://www.[facility]./193/Economic-Assistance     Soup kitchen or free meals  5  [facility] Community Supper Distance: 0.66 miles      In-Person   [address] [place] [address]  Language: English  Hours: Wed 5:15  PM - 6:00 PM  Fees: Free   Phone: (195) 862-1134 Email: info@hospitals.[facility] Website: http://www.hospitals.org/     6  [facility] - Family Table Meal Distance: 1.25 miles      Pickup   [address] [place] [address]  Language: English  Hours: Sat 11:30 AM - 12:30 PM  Fees: Free   Phone: (831) 754-1301 Email: saul@[facility] Website: http://www.[facility]/          Transportation       Free or low-cost transportation  7  [facility] Distance: 7.43 miles      In-Person   [address] [place] [address]  Language: English  Hours: Mon - Wed 9:30 AM - 12:00 PM , Mon - Wed 1:00 PM - 2:00 PM Appt. Only  Fees: Free   Phone: (667) 638-3752 Email: info@[facility] Website: http://www.[facility] CenterHybrigenics/     8  [facility] - Bus Passes - Free or low-cost transportation Distance: 7.75 miles      In-Person   [address] [place] [address]  Language: English  Hours: Tue 9:30 AM - 11:30 AM , Thu 9:30 AM - 11:30 AM  Fees: Free   Phone: (728) 239-3359 Email: info@moses.[employer] Website: http://www.[employer]/     Transportation to medical appointments  9  [facility]   Family Wellness (AIFW) Distance: 3.59 miles      In-Person   [address] [place] [address]  Language: Occitan, Kinyarwanda, English, Gujarati, Rajesh, Croatian, Romansh, Luxembourgish, Amharic, Swedish  Hours: Mon - Wed 9:00 AM - 5:00 PM , Thu 12:00 PM - 6:00 PM , Fri 9:00 AM - 5:00 PM , Sun 10:30 AM - 2:00 PM Appt. Only  Fees: Free   Phone: (913) 255-9991 Email: info@[facility]-aifw.org Website: https://www.[facility]-aifw.org/     10  [facility] Distance: 5.99 miles      In-Person   [address] [place] [address]  Language: English  Hours: Mon - Sun Open 24 Hours  Fees: Insurance, Self Pay   Phone: (344) 605-3619 Email: sophia@[employer] Website: https://www.[employer]/midmetro/          Important Numbers & Websites        Emergency Services   911  [facility] Services   [facility]  Poison Control   (560) 918-4577  Suicide Prevention Lifeline   (528) 517-4520 (TALK)  Child Abuse Hotline   (239) 999-4838 (4-A-Child)  Sexual Assault Hotline   (449) 557-4921 (HOPE)  National Runaway Safeline   (707) 708-7639 (RUNAWAY)  All-Options Talkline   (838) 574-9954  Substance Abuse Referral   (446) 538-4587 (HELP)

## 2023-11-22 ENCOUNTER — VIRTUAL VISIT (OUTPATIENT)
Dept: FAMILY MEDICINE | Facility: CLINIC | Age: 23
End: 2023-11-22
Payer: COMMERCIAL

## 2023-11-22 ENCOUNTER — TELEPHONE (OUTPATIENT)
Dept: PSYCHIATRY | Facility: CLINIC | Age: 23
End: 2023-11-22

## 2023-11-22 DIAGNOSIS — F31.9 BIPOLAR 1 DISORDER (H): Primary | ICD-10-CM

## 2023-11-22 PROCEDURE — 99207 PR NO BILLABLE SERVICE THIS VISIT: CPT

## 2023-11-22 NOTE — TELEPHONE ENCOUNTER
[facility]    Phone Message    May a detailed message be left on voicemail: yes     Reason for Call: Other: pt was returning a nurse call she stated that they nurse can try to give her a call back      Action Taken: Other: nurse pool    Travel Screening: Not Applicable

## 2023-11-22 NOTE — PROGRESS NOTES
Cristina is a 23 year old who is being evaluated via a billable telephone visit.      What phone number would you like to be contacted at? [phone]  How would you like to obtain your AVS? Rebecca    Distant Location (provider location):  On-site    Assessment & Plan       Patient thought she was scheduling with provider who ordered her recent labs - I think she means her endocrine team?     Chart review shows RN from mental health clinic trying to connect w pt regarding medications. Telephone encounter 11/13    Advised I would cancel this appointment, pt should contact endocrine provider, contact mental health clinic, and schedule in-person appt with me for annual visit.   We did not review any of her labs.    I advised pt to check her VM and call mental health clinic.     I did review her medications incase unable to connect w pt:  Lithium - daily (says 2 tabs at 9pm).  Seroquel - took initially for about 1 week, then stopped as instructed, only taking prn, pt notes she has NOT taken any prn doses of seroquel.       Current Outpatient Medications   Medication Instructions    lithium ER (LITHOBID) 600 mg, Oral, AT BEDTIME    QUEtiapine (SEROQUEL) 25 mg, Oral, AT BEDTIME PRN        Has been sleeping a lot, more than she'd like, 5 naps/day. improving since stopping seroquel. Hasn't been able to go back to work due to sleepiness.     Lives with family.       LINDEN Garces [facility]   Cristina is a 23 year old, presenting for the following health issues:   Follow Up        11/22/2023     9:29 AM   Additional Questions   Roomed by Carley       History of Present Illness       Reason for visit:  Mental Health follow up    She eats 2-3 servings of fruits and vegetables daily.She consumes 0 sweetened beverage(s) daily.She exercises with enough effort to increase her heart rate 10 to 19 minutes per day.  She exercises with enough effort to increase her heart rate  4 days per week.   She is taking medications regularly.       Depression and Anxiety Follow-Up  How are you doing with your depression since your last visit? No change  How are you doing with your anxiety since your last visit?  No change  Are you having other symptoms that might be associated with depression or anxiety? No  Have you had a significant life event? No   Do you have any concerns with your use of alcohol or other drugs? No      3/13/2023    12:03 PM 5/9/2023     9:43 AM 11/21/2023     1:41 PM   PHQ   PHQ-9 Total Score 0 8 3   Q9: Thoughts of better off dead/self-harm past 2 weeks Not at all Not at all Not at all         10/21/2022    11:00 AM 3/13/2023    12:04 PM 11/21/2023     1:42 PM   REGINO-7 SCORE   Total Score  0 (minimal anxiety) 6 (mild anxiety)   Total Score 7 0 6         11/21/2023     1:41 PM   Last PHQ-9   1.  Little interest or pleasure in doing things 0   2.  Feeling down, depressed, or hopeless 0   3.  Trouble falling or staying asleep, or sleeping too much 1   4.  Feeling tired or having little energy 0   5.  Poor appetite or overeating 1   6.  Feeling bad about yourself 0   7.  Trouble concentrating 0   8.  Moving slowly or restless 1   Q9: Thoughts of better off dead/self-harm past 2 weeks 0   PHQ-9 Total Score 3         11/21/2023     1:42 PM   REGINO-7    1. Feeling nervous, anxious, or on edge 1   2. Not being able to stop or control worrying 0   3. Worrying too much about different things 1   4. Trouble relaxing 1   5. Being so restless that it is hard to sit still 1   6. Becoming easily annoyed or irritable 1   7. Feeling afraid, as if something awful might happen 1   REGINO-7 Total Score 6   If you checked any problems, how difficult have they made it for you to do your work, take care of things at home, or get along with other people? Somewhat difficult       Suicide Assessment Five-step Evaluation and Treatment (SAFE-T)          Review of Systems         Objective           Vitals:  No  vitals were obtained today due to virtual visit.    Physical Exam   healthy, alert, and no distress  PSYCH: Alert and oriented times 3; coherent speech, normal   rate and volume, able to articulate logical thoughts, able   to abstract reason, no tangential thoughts, no hallucinations   or delusions  Her affect is normal and hyperactive, rapid speech.  RESP: No cough, no audible wheezing, able to talk in full sentences  Remainder of exam unable to be completed due to telephone visits                Phone call duration: 15 minutes

## 2023-11-22 NOTE — TELEPHONE ENCOUNTER
Writer returned patient's call as it appears CESAR Holden had called patient to check in on how often patient was taking PRN Seroquel.     Patient states she was using it pretty frequently up until a week ago, stating it was making her too tired. She states if she needs refills, she will contact the clinic. Patient reports overall she is doing well. She does inquire about whether she could have her transfer appointment prior to 1/4, but it does not appear provider has availability before then. Patient denies any questions or concerns at this time, but is accepting of being added to the cancellation waitlist.     Patient will contact the clinic if she has any questions or concerns prior to her appointment.

## 2023-12-02 ENCOUNTER — TELEPHONE (OUTPATIENT)
Dept: BEHAVIORAL HEALTH | Facility: CLINIC | Age: 23
End: 2023-12-02

## 2023-12-02 ENCOUNTER — HOSPITAL ENCOUNTER (EMERGENCY)
Facility: CLINIC | Age: 23
Discharge: HOME | End: 2023-12-05
Attending: FAMILY MEDICINE | Admitting: FAMILY MEDICINE
Payer: COMMERCIAL

## 2023-12-02 DIAGNOSIS — F31.2 SEVERE MANIC BIPOLAR 1 DISORDER WITH PSYCHOTIC BEHAVIOR (H): ICD-10-CM

## 2023-12-02 DIAGNOSIS — F31.9 BIPOLAR 1 DISORDER (H): ICD-10-CM

## 2023-12-02 LAB
ALBUMIN SERPL BCG-MCNC: 4.3 G/DL (ref 3.5–5.2)
ALP SERPL-CCNC: 96 U/L (ref 40–150)
ALT SERPL W P-5'-P-CCNC: 21 U/L (ref 0–50)
AMPHETAMINES UR QL SCN: NORMAL
ANION GAP SERPL CALCULATED.3IONS-SCNC: 8 MMOL/L (ref 7–15)
AST SERPL W P-5'-P-CCNC: 26 U/L (ref 0–45)
BARBITURATES UR QL SCN: NORMAL
BASOPHILS # BLD AUTO: 0 10E3/UL (ref 0–0.2)
BASOPHILS NFR BLD AUTO: 1 %
BENZODIAZ UR QL SCN: NORMAL
BILIRUB SERPL-MCNC: 0.2 MG/DL
BUN SERPL-MCNC: 10 MG/DL (ref 6–20)
BZE UR QL SCN: NORMAL
CALCIUM SERPL-MCNC: 9.2 MG/DL (ref 8.6–10)
CANNABINOIDS UR QL SCN: NORMAL
CHLORIDE SERPL-SCNC: 102 MMOL/L (ref 98–107)
CREAT SERPL-MCNC: 0.65 MG/DL (ref 0.51–0.95)
DEPRECATED HCO3 PLAS-SCNC: 26 MMOL/L (ref 22–29)
EGFRCR SERPLBLD CKD-EPI 2021: >90 ML/MIN/1.73M2
EOSINOPHIL # BLD AUTO: 0.2 10E3/UL (ref 0–0.7)
EOSINOPHIL NFR BLD AUTO: 2 %
ERYTHROCYTE [DISTWIDTH] IN BLOOD BY AUTOMATED COUNT: 15.8 % (ref 10–15)
FENTANYL UR QL: NORMAL
GLUCOSE SERPL-MCNC: 105 MG/DL (ref 70–99)
HCG UR QL: NEGATIVE
HCT VFR BLD AUTO: 36.5 % (ref 35–47)
HGB BLD-MCNC: 11.9 G/DL (ref 11.7–15.7)
IMM GRANULOCYTES # BLD: 0 10E3/UL
IMM GRANULOCYTES NFR BLD: 0 %
LITHIUM SERPL-SCNC: 0.37 MMOL/L (ref 0.6–1.2)
LYMPHOCYTES # BLD AUTO: 2.8 10E3/UL (ref 0.8–5.3)
LYMPHOCYTES NFR BLD AUTO: 40 %
MCH RBC QN AUTO: 29.4 PG (ref 26.5–33)
MCHC RBC AUTO-ENTMCNC: 32.6 G/DL (ref 31.5–36.5)
MCV RBC AUTO: 90 FL (ref 78–100)
MONOCYTES # BLD AUTO: 0.7 10E3/UL (ref 0–1.3)
MONOCYTES NFR BLD AUTO: 9 %
NEUTROPHILS # BLD AUTO: 3.4 10E3/UL (ref 1.6–8.3)
NEUTROPHILS NFR BLD AUTO: 48 %
NRBC # BLD AUTO: 0 10E3/UL
NRBC BLD AUTO-RTO: 0 /100
OPIATES UR QL SCN: NORMAL
PCP QUAL URINE (ROCHE): NORMAL
PLATELET # BLD AUTO: 357 10E3/UL (ref 150–450)
POTASSIUM SERPL-SCNC: 4.2 MMOL/L (ref 3.4–5.3)
PROT SERPL-MCNC: 8.4 G/DL (ref 6.4–8.3)
RBC # BLD AUTO: 4.05 10E6/UL (ref 3.8–5.2)
SODIUM SERPL-SCNC: 136 MMOL/L (ref 135–145)
WBC # BLD AUTO: 7.1 10E3/UL (ref 4–11)

## 2023-12-02 PROCEDURE — 99285 EMERGENCY DEPT VISIT HI MDM: CPT | Performed by: FAMILY MEDICINE

## 2023-12-02 PROCEDURE — 84439 ASSAY OF FREE THYROXINE: CPT

## 2023-12-02 PROCEDURE — 81025 URINE PREGNANCY TEST: CPT | Performed by: FAMILY MEDICINE

## 2023-12-02 PROCEDURE — 84443 ASSAY THYROID STIM HORMONE: CPT

## 2023-12-02 PROCEDURE — 80178 ASSAY OF LITHIUM: CPT | Performed by: FAMILY MEDICINE

## 2023-12-02 PROCEDURE — 85025 COMPLETE CBC W/AUTO DIFF WBC: CPT | Performed by: FAMILY MEDICINE

## 2023-12-02 PROCEDURE — 80053 COMPREHEN METABOLIC PANEL: CPT | Performed by: FAMILY MEDICINE

## 2023-12-02 PROCEDURE — 36415 COLL VENOUS BLD VENIPUNCTURE: CPT | Performed by: FAMILY MEDICINE

## 2023-12-02 PROCEDURE — 250N000013 HC RX MED GY IP 250 OP 250 PS 637: Performed by: FAMILY MEDICINE

## 2023-12-02 PROCEDURE — 80307 DRUG TEST PRSMV CHEM ANLYZR: CPT | Performed by: FAMILY MEDICINE

## 2023-12-02 RX ORDER — LITHIUM CARBONATE 300 MG/1
600 TABLET, FILM COATED, EXTENDED RELEASE ORAL AT BEDTIME
Status: DISCONTINUED | OUTPATIENT
Start: 2023-12-02 | End: 2023-12-05 | Stop reason: HOSPADM

## 2023-12-02 RX ORDER — LORAZEPAM 1 MG/1
1 TABLET ORAL ONCE
Status: COMPLETED | OUTPATIENT
Start: 2023-12-02 | End: 2023-12-02

## 2023-12-02 RX ORDER — OLANZAPINE 10 MG/1
10 TABLET, ORALLY DISINTEGRATING ORAL ONCE
Status: COMPLETED | OUTPATIENT
Start: 2023-12-02 | End: 2023-12-02

## 2023-12-02 RX ORDER — QUETIAPINE FUMARATE 25 MG/1
25 TABLET, FILM COATED ORAL
Status: DISCONTINUED | OUTPATIENT
Start: 2023-12-02 | End: 2023-12-05 | Stop reason: HOSPADM

## 2023-12-02 RX ADMIN — LORAZEPAM 1 MG: 1 TABLET ORAL at 16:46

## 2023-12-02 RX ADMIN — OLANZAPINE 10 MG: 10 TABLET, ORALLY DISINTEGRATING ORAL at 08:09

## 2023-12-02 RX ADMIN — LITHIUM CARBONATE 600 MG: 300 TABLET, EXTENDED RELEASE ORAL at 21:24

## 2023-12-02 RX ADMIN — LORAZEPAM 1 MG: 1 TABLET ORAL at 13:22

## 2023-12-02 RX ADMIN — OLANZAPINE 10 MG: 10 TABLET, ORALLY DISINTEGRATING ORAL at 13:10

## 2023-12-02 ASSESSMENT — ACTIVITIES OF DAILY LIVING (ADL)
ADLS_ACUITY_SCORE: 35

## 2023-12-02 NOTE — ED NOTES
Patient reports that she feel asleep for a short time and thinks zyprexa has calmed her racing thoughts. Ambulating and communicating needs appropriately.

## 2023-12-02 NOTE — ED TRIAGE NOTES
Patient states she is having a manic episode, hasn't sleep for days and is paranoid. States she is taking her prescribed meds, but they do not seem to be working. Started Seroquel about a week ago, but states was manic before starting this new med. States last had a manic episode in March, was hospitalized for it. Patient presents with sister, very calm and cooperative. Denies SI/HI.      Also taking Zyprexa 10mg as needed, last took 5mg yesterday evening.  States zyprexa usually works for her, but dose may not be strong enough.

## 2023-12-02 NOTE — TELEPHONE ENCOUNTER
S: [facility] LILLY Min  Reese  calling at 12:15 PM about a 23 year old/Female presenting with Bipolar 1 with shaina, paranoia and lack of sleep.     B: Pt arrived via Family. Presenting problem, stressors: Pt is reporting lack of sleep and currently not medication compliant. Pt shaina had started back in Oct 2023.    Pt affect in ED: Cooperative , Hyperverbal, Labile, and Pleasant  Pt Dx: Major Depressive Disorder and Bipolar DisorderW/Psychotic Features.  Previous IPMH hx? Yes: March 2018 and March 2023 at [facility] for same presentation.   Pt denies SI   Hx of suicide attempt? No  Pt denies SIB  Pt denies HI   Pt denies hallucinations .   Pt RARS Score: 2    Hx of aggression/violence, sexual offenses, legal concerns, Epic care plan? describe: No  Current concerns for aggression this visit? No  Does pt have a history of Civil Commitment? No  Is Pt their own guardian? Yes    Pt is prescribed medication. Is patient medication compliant? No  Pt endorses OP services: PCP  CD concerns: None  Acute or chronic medical concerns: None  Does Pt present with specific needs, assistive devices, or exclusionary criteria? None      Pt is ambulatory  Pt is able to perform ADLs independently      A: Pt to be reviewed for IP admission. Pt is Voluntary  Preferred placement: [facility] ONLY and [facility]    COVID Symptoms: No  If yes, COVID test required   Utox: Negative   CMP: Ordered, not yet collected   CBC: Ordered, not yet collected   HCG: Negative    R: Patient cleared and ready for behavioral bed placement: Yes  Pt placed on IP worklist? Yes    Does Patient need a Transfer Center request created? No, Pt is located within [facility] ED, [facility] ED, or [place] [facility]     R: [facility] Access Inpatient Bed Call Log 12/2/23 @7:10 am:   Intake has called facilities that have not updated the bed status within the last 12 hours. (Statewide)                             [facility] is posting 0 beds.             [facility] is posting 0 beds. [phone].     [facility] is posting 2 beds. Negative covid required. 12/2 Per call at 11:44 am to Saadia no beds available in [place] locations only currently [place] and [place].                                   [facility] is posting 0 beds. Neg covid. No high school or Luli-psych. [phone]; per call at 7:11 am to Tiera, they have low acuity beds. 12/2 Per call 11:57 am to Leila currently no beds available.    [facility] is posting 0 beds. [phone]   [facility] is posting 0 beds. [phone].  12/2 Per call at 11:54 am to Earl no bed availability.  [facility] is posting 3 beds for Young Adults ages 18-26. Negative covid. [phone].    [facility] is posting 1 bed.   12/2 Per call at 11:44 am to Saadia no beds available in [place] locations only currently [place] and [place].                    [facility] is posting 2 beds. [phone] 12/2 Per call at 11:44 am to Saadia no beds available in [place] locations only currently [place] and [place].    Patient remains on the work list pending appropriate bed availability.

## 2023-12-02 NOTE — ED PROVIDER NOTES
ED Provider Note  [facility]      History     Chief Complaint   Patient presents with    Psychiatric Evaluation     HPI  Cristina Boyd is a 23 year old female who has a history of bipolar 1 disorder and shaina.  Recently struggling with increased manic symptoms.  Has not slept for multiple days, is up pacing, had medications including Seroquel and lithium increased but is not finding any benefit from these or Zyprexa.  Her behavior and thought processes have been very erratic.  Sister finds it more more difficult for her to manage at home and so they came for further evaluation.  She is requesting Zyprexa and Ativan.    Past Medical History  Past Medical History:   Diagnosis Date    Bipolar disorder (H)     Depressive disorder     Infection due to 2019 novel coronavirus 2020    summer 2020 by patient report    Infection due to 2019 novel coronavirus 12/2021    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, unspecified thyrotoxicosis type 11/16/2022     Past Surgical History:   Procedure Laterality Date    BRONCHOSCOPY (RIGID OR FLEXIBLE), DIAGNOSTIC N/A 6/16/2018    Procedure: COMBINED BRONCHOSCOPY (RIGID OR FLEXIBLE), LAVAGE;;  Surgeon: Manjeet Holland MD;  [facility]: [facility]     lithium ER (LITHOBID) 300 MG CR tablet  QUEtiapine (SEROQUEL) 25 MG tablet      No Known Allergies  Family History  Family History   Problem Relation Age of Onset    Impaired Fasting Glucose Father         prediabetes    Thyroid Disease Maternal Grandmother     Diabetes Maternal Grandmother     Depression Maternal Uncle     Depression Other      Social History   Social History     Tobacco Use    Smoking status: Never    Smokeless tobacco: Never   Vaping Use    Vaping Use: Never used   Substance Use Topics    Alcohol use: No    Drug use: No     Comment: x1 marijuana, in October, 2016         A medically appropriate review of systems was performed with pertinent positives and negatives noted in the HPI, and all other  "systems negative.    Physical Exam   BP: (!) 122/92  Pulse: 69  Temp: 98.2  F (36.8  C)  Resp: 20  Height: 167.6 cm (5' 6\")  Weight: 63.8 kg (140 lb 9.6 oz)  SpO2: 98 %  Physical Exam  Vitals and nursing note reviewed.   Constitutional:       General: She is not in acute distress.     Appearance: Normal appearance. She is not diaphoretic.   HENT:      Head: Atraumatic.      Mouth/Throat:      Mouth: Mucous membranes are moist.   Eyes:      General: No scleral icterus.     Conjunctiva/sclera: Conjunctivae normal.   Cardiovascular:      Rate and Rhythm: Normal rate.      Heart sounds: Normal heart sounds.   Pulmonary:      Effort: No respiratory distress.      Breath sounds: Normal breath sounds.   Abdominal:      General: Abdomen is flat.   Musculoskeletal:      Cervical back: Neck supple.   Skin:     General: Skin is warm.      Findings: No rash.   Neurological:      General: No focal deficit present.      Mental Status: She is alert and oriented to person, place, and time.   Psychiatric:         Attention and Perception: Attention normal.         Mood and Affect: Mood is anxious.         Speech: Speech is rapid and pressured and tangential.         Behavior: Behavior is hyperactive.         Thought Content: Thought content is delusional.         Cognition and Memory: Cognition normal.         Judgment: Judgment is impulsive.           ED Course, Procedures, & Data      Procedures                     Results for orders placed or performed during the hospital encounter of 12/02/23   HCG qualitative urine (UPT)     Status: Normal   Result Value Ref Range    hCG Urine Qualitative Negative Negative   Urine Drug Screen Panel     Status: Normal   Result Value Ref Range    Amphetamines Urine Screen Negative Screen Negative    Barbituates Urine Screen Negative Screen Negative    Benzodiazepine Urine Screen Negative Screen Negative    Cannabinoids Urine Screen Negative Screen Negative    Cocaine Urine Screen Negative Screen " Negative    Fentanyl Qual Urine Screen Negative Screen Negative    Opiates Urine Screen Negative Screen Negative    PCP Urine Screen Negative Screen Negative   Urine Drug Screen     Status: Normal    Narrative    The following orders were created for panel order Urine Drug Screen.  Procedure                               Abnormality         Status                     ---------                               -----------         ------                     Urine Drug Screen Panel[[id number]]      Normal              Final result                 Please view results for these tests on the individual orders.     Medications   OLANZapine zydis (zyPREXA) ODT tab 10 mg (has no administration in time range)   LORazepam (ATIVAN) tablet 1 mg (has no administration in time range)   OLANZapine zydis (zyPREXA) ODT tab 10 mg (10 mg Oral $Given 12/2/23 0809)     Labs Ordered and Resulted from Time of ED Arrival to Time of ED Departure   HCG QUALITATIVE URINE - Normal       Result Value    hCG Urine Qualitative Negative     URINE DRUG SCREEN PANEL - Normal    Amphetamines Urine Screen Negative      Barbituates Urine Screen Negative      Benzodiazepine Urine Screen Negative      Cannabinoids Urine Screen Negative      Cocaine Urine Screen Negative      Fentanyl Qual Urine Screen Negative      Opiates Urine Screen Negative      PCP Urine Screen Negative     COMPREHENSIVE METABOLIC PANEL   LITHIUM LEVEL     No orders to display          Critical care was not performed.     Medical Decision Making  The patient's presentation was of high complexity (a chronic illness severe exacerbation, progression, or side effect of treatment).    The patient's evaluation involved:  an assessment requiring an independent historian (sister provides collateral information)  review of external note(s) from 1 sources (review of hospitalization notes March 2023 in epic)  ordering and/or review of 3+ test(s) in this encounter (see separate area of note for  details)  discussion of management or test interpretation with another health professional ([facility] )    The patient's management necessitated moderate risk (prescription drug management including medications given in the ED) and high risk (a decision regarding hospitalization).    Assessment & Plan    Patient with a history of bipolar disorder, presenting due to severe manic symptoms including insomnia, excessive spending, Scientology delusions, paranoia, and noncompliance with medications.  On exam the patient is anxious, tangential and pressured.  She is pacing in the room and requesting as needed medications.  Received Zyprexa and Ativan with some minimal improvement.  Agreed to a behavioral assessment.  She is medically cleared for behavioral assessment.  She is not pregnant and her urine drug screen is negative.  The patient was also seen by the [facility] , please refer to their extensive note/evaluation which was reviewed with me and is documented in EPIC on 12/2/2023 for further details.  Patient with known severe bipolar illness, previously depressed, now noncompliant and in the midst of a severe manic episode with some psychotic behavior, excessive spending, high risk behaviors.  She is appropriate for and will agree to voluntary admission.  If she attempts to leave could be reassessed but would likely be holdable.  He lacks some insight and judgment, and given her high risk behaviors is somewhat vulnerable and could represent a risk of harm to herself.      I have reviewed the nursing notes. I have reviewed the findings, diagnosis, plan and need for follow up with the patient.    New Prescriptions    No medications on file       Final diagnoses:   Severe manic bipolar 1 disorder with psychotic behavior (H)       Breezy Sanchez MD  [facility] EMERGENCY DEPARTMENT  12/2/2023     Breezy Sanchez MD  12/02/23 1219

## 2023-12-02 NOTE — ED NOTES
"  Patient has made numerous comments about harming herself. The comments she has made, \" I hate this place, I need to burn in hell\". \" I wanna die rather than staying at this place.\" \" I feel like killing myself.\" Pt's nurse was alerted about current behavior. Room was made safe and unsafe belongings were placed in lockers.   "

## 2023-12-02 NOTE — CONSULTS
Diagnostic Evaluation Consultation  Crisis Assessment    Patient Name: Cristina Boyd  Age:  23 year old  Legal Sex: female  Gender Identity: female  Pronouns:   Race: Black or   Ethnicity: Not  or   Language: English      Patient was assessed: In person      Patient location: [facility] EMERGENCY DEPARTMENT                             ED12    Referral Data and Chief Complaint  Cristina Boyd presents to the ED with family/friends. Patient is presenting to the ED for the following concerns: Verbal agitation, Significant behavioral change, Other (see comment) (Adela).   Factors that make the mental health crisis life threatening or complex are:  Patient has diagnosis of MDD and Bi Polar 1 with Psychotic Features. Patient is currently manic..      Informed Consent and Assessment Methods  Explained the crisis assessment process, including applicable information disclosures and limits to confidentiality, assessed understanding of the process, and obtained consent to proceed with the assessment.  Assessment methods included conducting a formal interview with patient, review of medical records, collaboration with medical staff, and obtaining relevant collateral information from family and community providers when available.  : done     Patient response to interventions: eager to participate, acceptance expressed, verbalizes understanding  Coping skills were attempted to reduce the crisis:  Mindfulness. Self advocating.     History of the Crisis   Patient currently has not slept in days, up with adela and experiencing intermittent sleep. Patient is hyperverbal and labile, tearing up several times, and experiencing paranoia. Patient describes her low point as starting in June and lasting the entire summer where she was near catatonic per collateral. Adela uptick began mid October and has been increasing since, resulting in the current presentation. Patient is intermittent with her  medications. Once feeling good she feels she no longer needs them and stops taking them. She has been back on her prescribed meds (Lithium 600mg; Seroquel 25mg; Zyprexa PRN 10mg) for one week. COllateral says she is going outside at all hours of the night. She has gone on shopping and spending sprees spending thousands of dollars and other evidence of impulse issues. Collateal also notes a Amish preoccupation with Voodoo (patient is Protestant). Patient was last seen emergently on 3/5/23 at [facility] for SI and shaina and admitted for five days. Prior to that on 6/3/18 patient was admitted for 10 days at [facility] for same/similar. Patient has a PCP in [place] [facility], [place] [phone],  [phone]. patient has no other providers.    Brief Psychosocial History  Family:  Single, Children no  Support System:  Parent(s), Sibling(s)  Employment Status:  unemployed  Source of Income:  none  Financial Environmental Concerns:  none  Current Hobbies:  family functions, television/movies/videos  Barriers in Personal Life:  mental health concerns    Significant Clinical History  Current Anxiety Symptoms:  racing thoughts, anxious  Current Depression/Trauma:  difficulty concentrating, impaired decision making, irritable  Current Somatic Symptoms:  racing thoughts, anxious  Current Psychosis/Thought Disturbance:  impulsive, hyperactive, elated mood, hyperverbal, high risk behavior, distractability  Current Eating Symptoms:  loss of appetite  Chemical Use History:  Alcohol: None  Benzodiazepines: None  Opiates: None  Cocaine: None  Marijuana: None  Other Use: None  Withdrawal Symptoms:  (None noted.)  Addictions:  (None noted)   Past diagnosis:  Depression, Bipolar Disorder  Family history:  No known history of mental health or chemical health concerns  Past treatment:  Primary Care, Psychiatric Medication Management, Inpatient  Hospitalization  Details of most recent treatment:  In clinic management currently  Other relevant history:  None noted       Collateral Information  Is there collateral information: Yes     Collateral information name, relationship, phone number:  Lila, sister, [phone]    What happened today: She has become more manic every day. She has not slept much in days. She is suffering shaina.     What is different about patient's functioning: She is hyperverbal, off on spending sprees, having Synagogue preoccupations, and sheis not medication compliant. This will descend into psychosis at some point.     Concern about alcohol/drug use:  No    What do you think the patient needs:  Inpatient    Has patient made comments about wanting to kill themselves/others: no    If d/c is recommended, can they take part in safety/aftercare planning:  yes        Risk Assessment  Elliott Suicide Severity Rating Scale Full Clinical Version:  Suicidal Ideation  Q6 Suicide Behavior (Lifetime): no          Elliott Suicide Severity Rating Scale Recent:   Suicidal Ideation (Recent)  Q1 Wished to be Dead (Past Month): no  Q2 Suicidal Thoughts (Past Month): no  Intensity of Ideation (Recent)  Most Severe Ideation Rating (Past 1 Month):  (None noted.)  Description of Most Severe Ideation (Past 1 Month): None noted.  Frequency (Past 1 Month):  (None noted.)  Duration (Past 1 Month):  (None noted.)  Controllability (Past 1 Month):  (None noted.)  Deterrents (Past 1 Month): Does not apply  Reasons for Ideation (Past 1 Month): Does not apply  Suicidal Behavior (Recent)  Actual Attempt (Past 3 Months): No  Total Number of Actual Attempts (Past 3 Months): 0  Actual Attempt Description (Past 3 Months): None noted.  Has subject engaged in non-suicidal self-injurious behavior? (Past 3 Months): No  Interrupted Attempts (Past 3 Months): No  Total Number of Interrupted Attempts (Past 3 Months): 0  Interrupted Attempt Description (Past 3 Months): None  noted.  Aborted or Self-Interrupted Attempt (Past 3 Months): No  Total Number of Aborted or Self-Interrupted Attempts (Past 3 Months): 0  Aborted or Self-Interrupted Attempt Description (Past 3 Months): None noted.  Preparatory Acts or Behavior (Past 3 Months): No  Total Number of Preparatory Acts (Past 3 Months): 0  Preparatory Acts or Behavior Description (Past 3 Months): None noted.    Environmental or Psychosocial Events: challenging interpersonal relationships, impulsivity/recklessness  Protective Factors: Protective Factors: strong bond to family unit, community support, or employment, lives in a responsibly safe and stable environment, good treatment engagement, sense of importance of health and wellness, able to access care without barriers, supportive ongoing medical and mental health care relationships, help seeking, sense of belonging, cultural, spiritual , or Cheondoism beliefs associated with meaning and value in life, optimistic outlook - identification of future goals, constructive use of leisure time, enjoyable activities, resilience    Does the patient have thoughts of harming others? Feels Like Hurting Others: no  Previous Attempt to Hurt Others: no  Current presentation:  (Manic and hypervrbal.)  Is the patient engaging in sexually inappropriate behavior?: no    Is the patient engaging in sexually inappropriate behavior?  no        Mental Status Exam   Affect: Labile, Dramatic  Appearance: Appropriate  Attention Span/Concentration: Attentive  Eye Contact: Engaged    Fund of Knowledge: Appropriate   Language /Speech Content: Fluent, Expressive Speech  Language /Speech Volume: Normal  Language /Speech Rate/Productions: Hyperverbal, Pressured  Recent Memory: Intact, Variable  Remote Memory: Intact, Variable  Mood: Anxious  Orientation to Person: Yes   Orientation to Place: Yes  Orientation to Time of Day: Yes  Orientation to Date: Yes     Situation (Do they understand why they are here?):  Yes  Psychomotor Behavior: Normal  Thought Content: Clear  Thought Form: Paranoia, Flight of Ideas        Medication  Psychotropic medications:   Medication Orders - Psychiatric (From admission, onward)      Start     Dose/Rate Route Frequency Ordered Stop    12/02/23 0920  OLANZapine zydis (zyPREXA) ODT tab 10 mg        Note to Pharmacy: DO NOT USE THIS FIELD FOR ADMIN INSTRUCTIONS; INFORMATION DOES NOT SHOW ON MAR. USE THE FIELD ABOVE MARKED ADMIN INSTRUCTIONS    10 mg Oral ONCE 12/02/23 0919 12/02/23 0920  LORazepam (ATIVAN) tablet 1 mg        Note to Pharmacy: DO NOT USE THIS FIELD FOR ADMIN INSTRUCTIONS; INFORMATION DOES NOT SHOW ON MAR. USE THE FIELD ABOVE MARKED ADMIN INSTRUCTIONS    1 mg Oral ONCE 12/02/23 0919               Current Care Team  Patient Care Team:  Felice Edmonds APRN CNP as PCP - General (Family Medicine)  Zhang Agosto MD as MD (Psychiatry)  Ina Mc MD as MD (Endocrinology, Diabetes, and Metabolism)  Sarai Jerry MD as MD (Endocrinology, Diabetes, and Metabolism)  Sarai Jerry MD as Assigned Endocrinology Provider  Felice Edmonds APRN CNP as Assigned PCP  Juani Burns APRN CNP as Assigned Behavioral Health Provider    Diagnosis  Patient Active Problem List   Diagnosis Code    Adela (H) F30.9    Bipolar affective disorder, current episode manic with psychotic symptoms (H) F31.2    Hx of psychiatric care Z92.89    Bipolar I disorder, current or most recent episode manic, with psychotic features (H) F31.2    Lymphadenopathy R59.1    Bipolar 1 disorder (H) F31.9    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, unspecified thyrotoxicosis type E05.90    Hypercalcemia E83.52    Myalgia M79.10    Hyponatremia E87.1    Weakness R53.1       Primary Problem This Admission  Active Hospital Problems    *Bipolar affective disorder, current episode manic with psychotic symptoms (H)      Adela (H)        Clinical Summary and Substantiation of Recommendations   Patient is  experiencing shaina after an extended period of catatonia this past summer. Patient is not med compliant due to discontinuing meds when she is feeling good and thinks she no longer needs them. Patient is exhibiting impulsive behaviors, engaging in spending sprees and trips, shopping. Patinet is exhibiting Congregational preoccupation with a current fixation on Holiness (patient is Caodaism), and collateral endorses delusions and manic behaviors, insomnia and up at all hours. Patient is mildly labile in assessment and is endorsing admission voluntarily.          Severe psychiatric, behavioral or other comorbid conditions are appropriate for management at inpatient mental health as indicated by at least one of the following: Psychiatric Symptoms  Severe dysfunction in daily living is present as indicated by at least one of the following: Extreme disruption in vegetative function, Other evidence of severe dysfunction  Situation and expectations are appropriate for inpatient care: Voluntary treatment at lower level of care is not feasible, Patient management/treatment at lower level of care is not feasible or is inappropriate  Inpatient mental health services are necessary to meet patient needs and at least one of the following: Specific condition related to admission diagnosis is present and judged likely to further improve at proposed level of care, Specific condition related to admission diagnosis is present and judged likely to deteriorate in absence of treatment at proposed level of care      Patient coping skills attempted to reduce the crisis:  Mindfulness. Self advocating.    Disposition  Recommended disposition: Inpatient Mental Health        Reviewed case and recommendations with attending provider. Attending Name: Dr. NGOC Sanchez MD       Attending concurs with disposition: yes       Patient and/or validated legal guardian concurs with disposition:   yes       Final disposition:  inpatient mental health    Legal  status on admission: Voluntary/Patient has signed consent for treatment    Assessment Details   Total duration spent with the patient: 30 min     CPT code(s) utilized: 41906 - Psychotherapy for Crisis - 60 (30-74*) min    Reese Freeman MA Psychotherapist  [facility] - Triage & Transition Services  Callback: [phone]

## 2023-12-02 NOTE — PLAN OF CARE
Cristina Boyd  December 2, 2023  Plan of Care Hand-off Note     Patient Care Path: inpatient mental health    Plan for Care:   Patient is experiencing shaina after an extended period of catatonia this past summer. Patient is not med compliant due to discontinuing meds when she is feeling good and thinks she no longer needs them. Patient is exhibiting impulsive behaviors, engaging in spending sprees and trips, shopping. Patinet is exhibiting Orthodoxy preoccupation with a current fixation on Bahai (patient is Shinto), and collateral endorses delusions and manic behaviors, insomnia and up at all hours. Patient is mildly labile in assessment and is endorsing admission voluntarily.    Identified Goals and Safety Issues: Stabilize patient's mental health symptoms back to baseline. Emphasis on medication compliance.    Overview:  Ryan Bravo, mother, [phone] Lila. sister, [phone]          Legal Status: Legal Status at Admission: Voluntary/Patient has signed consent for treatment    Psychiatry Consult:  Yes       Updated attending MD and RN  regarding plan of care.           Reese Freeman MA

## 2023-12-02 NOTE — PHARMACY-ADMISSION MEDICATION HISTORY
Pharmacy Intern Admission Medication History    Admission medication history is complete. The information provided in this note is only as accurate as the sources available at the time of the update.    Information Source(s): Patient and CareEverywhere/SureScripts via in-person    Pertinent Information:   - Patient appeared not wanting to talk to the pharmacy intern, but did confirm that she's currently taking lithium 300 mg tablet and quetiapine 25 mg tablet. Consistent with SureScripts fill history.     Changes made to PTA medication list:  Added: None  Deleted: None  Changed: None    Allergies reviewed with patient and updates made in EHR: yes    Medication History Completed By: aLne Nelson 12/2/2023 1:51 PM    PTA Med List   Medication Sig Last Dose    lithium ER (LITHOBID) 300 MG CR tablet Take 2 tablets (600 mg) by mouth At Bedtime 12/1/2023 at Unknown    QUEtiapine (SEROQUEL) 25 MG tablet Take 1 tablet (25 mg) by mouth nightly as needed (sleep) 12/1/2023 at Unknown

## 2023-12-03 ENCOUNTER — TELEPHONE (OUTPATIENT)
Dept: BEHAVIORAL HEALTH | Facility: CLINIC | Age: 23
End: 2023-12-03
Payer: COMMERCIAL

## 2023-12-03 LAB
SARS-COV-2 RNA RESP QL NAA+PROBE: NEGATIVE
T4 FREE SERPL-MCNC: 1.45 NG/DL (ref 0.9–1.7)
TSH SERPL DL<=0.005 MIU/L-ACNC: 4.83 UIU/ML (ref 0.3–4.2)

## 2023-12-03 PROCEDURE — 250N000013 HC RX MED GY IP 250 OP 250 PS 637

## 2023-12-03 PROCEDURE — 250N000013 HC RX MED GY IP 250 OP 250 PS 637: Performed by: FAMILY MEDICINE

## 2023-12-03 PROCEDURE — 99223 1ST HOSP IP/OBS HIGH 75: CPT

## 2023-12-03 PROCEDURE — 87635 SARS-COV-2 COVID-19 AMP PRB: CPT | Performed by: EMERGENCY MEDICINE

## 2023-12-03 RX ORDER — HYDROXYZINE HYDROCHLORIDE 25 MG/1
50 TABLET, FILM COATED ORAL EVERY 6 HOURS PRN
Status: DISCONTINUED | OUTPATIENT
Start: 2023-12-03 | End: 2023-12-05 | Stop reason: HOSPADM

## 2023-12-03 RX ORDER — OLANZAPINE 10 MG/2ML
10 INJECTION, POWDER, FOR SOLUTION INTRAMUSCULAR 2 TIMES DAILY PRN
Status: DISCONTINUED | OUTPATIENT
Start: 2023-12-03 | End: 2023-12-05 | Stop reason: HOSPADM

## 2023-12-03 RX ORDER — HYDROXYZINE HYDROCHLORIDE 25 MG/1
25 TABLET, FILM COATED ORAL EVERY 6 HOURS PRN
Status: DISCONTINUED | OUTPATIENT
Start: 2023-12-03 | End: 2023-12-05 | Stop reason: HOSPADM

## 2023-12-03 RX ORDER — OLANZAPINE 10 MG/1
10 TABLET, ORALLY DISINTEGRATING ORAL 2 TIMES DAILY PRN
Status: DISCONTINUED | OUTPATIENT
Start: 2023-12-03 | End: 2023-12-05 | Stop reason: HOSPADM

## 2023-12-03 RX ORDER — OLANZAPINE 10 MG/1
10 TABLET, ORALLY DISINTEGRATING ORAL AT BEDTIME
Status: DISCONTINUED | OUTPATIENT
Start: 2023-12-03 | End: 2023-12-05 | Stop reason: HOSPADM

## 2023-12-03 RX ORDER — OLANZAPINE 10 MG/1
10 TABLET, ORALLY DISINTEGRATING ORAL ONCE
Status: COMPLETED | OUTPATIENT
Start: 2023-12-03 | End: 2023-12-03

## 2023-12-03 RX ADMIN — OLANZAPINE 10 MG: 10 TABLET, ORALLY DISINTEGRATING ORAL at 13:32

## 2023-12-03 RX ADMIN — OLANZAPINE 10 MG: 10 TABLET, ORALLY DISINTEGRATING ORAL at 21:06

## 2023-12-03 RX ADMIN — QUETIAPINE FUMARATE 25 MG: 25 TABLET ORAL at 21:59

## 2023-12-03 RX ADMIN — QUETIAPINE FUMARATE 25 MG: 25 TABLET ORAL at 03:35

## 2023-12-03 RX ADMIN — LITHIUM CARBONATE 600 MG: 300 TABLET, EXTENDED RELEASE ORAL at 21:06

## 2023-12-03 ASSESSMENT — ACTIVITIES OF DAILY LIVING (ADL)
ADLS_ACUITY_SCORE: 35

## 2023-12-03 NOTE — ED NOTES
Patient notified that she would be transferring to [facility] [place], verbalized understanding

## 2023-12-03 NOTE — ED NOTES
Pt up several times throughout the night but groggy w/ slurred speech and a little disoriented. 0130 pt up to br . Covid test done as ordered. 0330 requested  something else for sleep. Seroquel 25 mg po given and pt back to sleep . 0600 Asleep. 0610 awake again and weighted blanket given to help sleep.

## 2023-12-03 NOTE — TELEPHONE ENCOUNTER
[place]: [place]  Access Inpatient Bed Call Log 12/3/23   @7:10 am:    Intake has called facilities that have not updated the bed status within the last 12 hours.   ([facility])                 [facility] is posting 0 beds.             [facility] is posting 0 beds. [phone]/3 Per call at 7:11 am to [facility], they are at cap.    [facility] is posting 0 beds. Negative covid required.                 [facility] is posting 0 beds. Neg covid. No high school or [facility]. [phone]; 12/3 per call at 7:13 am to [place], they are at cap.    [facility] is posting 0 beds. [phone]   [facility] is posting 0 beds. [phone]. 12/3 Per call at 4:13 PM to [place] no psych beds available.  [facility] is posting 2 beds for Young Adults ages 18-26. Negative covid. [phone]. Per call at 7:15 am to [facility], they have 2 beds avail.  12/3 will review for admission.  [facility] is posting 0 beds.         [facility] is posting 2 beds. [phone]. 12/3 Per call at 11:50 am to Intake at [facility] and will reevaluate 12/4 at 10 am.    12:07 PM Per call to [facility] will review for admission to fax or send secure email.    12:33 PM Fax sent to  for review.  2:18 PM Per call from Cheri Pt accepted to unit pending 72 HH is placed before transferring to facility. Per Cheri they are requesting d/t the fact there are no Providers in facility on Sundays.  2:22 PM Writer called [facility] to request if MD would be willing to place hold before patient transfers to facility. Per CRN/pts nurse patient and family does not want [facility] and to advised to cancel review.  2:36 PM LVM at  for CB.  2:40 PM Writer received cb from Cheri at  informed of above message.    Patient remains on the work list pending appropriate bed availability.

## 2023-12-03 NOTE — ED PROVIDER NOTES
"[facility] ED Mental Health Handoff Note:       Brief HPI:  This is a 23 year old female signed out to me by Dr. Warner.  See initial ED Provider note for full details of the presentation. Interval history is pertinent for no reported events or changes on the shift prior.    Home meds reviewed and ordered/administered: Yes    Medically stable for inpatient mental health admission: Yes.    Evaluated by mental health: Yes. The recommendation is for inpatient mental health treatment. Bed search in process    Safety concerns: At the time I received sign out, there were no safety concerns.    Hold Status:  Active Orders   N/A            Exam:   Patient Vitals for the past 24 hrs:   BP Temp Temp src Pulse Resp SpO2 Height Weight   12/02/23 1951 108/73 97.8  F (36.6  C) Oral 71 18 100 % -- --   12/02/23 0800 (!) 122/92 98.2  F (36.8  C) Oral 69 20 98 % 1.676 m (5' 6\") 63.8 kg (140 lb 9.6 oz)           ED Course:    Medications   lithium ER (LITHOBID) CR tablet 600 mg (600 mg Oral $Given 12/2/23 2124)   QUEtiapine (SEROquel) tablet 25 mg (25 mg Oral $Given 12/3/23 0335)   OLANZapine zydis (zyPREXA) ODT tab 10 mg (10 mg Oral $Given 12/2/23 0809)   OLANZapine zydis (zyPREXA) ODT tab 10 mg (10 mg Oral $Given 12/2/23 1310)   LORazepam (ATIVAN) tablet 1 mg (1 mg Oral $Given 12/2/23 1322)   LORazepam (ATIVAN) tablet 1 mg (1 mg Oral $Given 12/2/23 1646)            There were no significant events during my shift.    Patient was signed out to the oncoming provider      Impression:    ICD-10-CM    1. Severe manic bipolar 1 disorder with psychotic behavior (H)  F31.2           Plan:    Awaiting inpatient mental health admission/transfer.      RESULTS:   Results for orders placed or performed during the hospital encounter of 12/02/23 (from the past 24 hour(s))   [facility] Assessment Consult Order:     Status: None ()    Collection Time: 12/02/23  8:04 AM    Reese Proctor     12/2/2023  " 1:09 PM  Diagnostic Evaluation Consultation  Crisis Assessment    Patient Name: Cristina Boyd  Age:  23 year old  Legal Sex: female  Gender Identity: female  Pronouns:   Race: Black or   Ethnicity: Not  or   Language: English      Patient was assessed: In person      Patient location: [facility] EMERGENCY DEPARTMENT                             ED12    Referral Data and Chief Complaint  Cristina Boyd presents to the ED with family/friends. Patient is   presenting to the ED for the following concerns: Verbal   agitation, Significant behavioral change, Other (see comment)   (Adela).   Factors that make the mental health crisis life   threatening or complex are:  Patient has diagnosis of MDD and Bi   Polar 1 with Psychotic Features. Patient is currently manic..      Informed Consent and Assessment Methods  Explained the crisis assessment process, including applicable   information disclosures and limits to confidentiality, assessed   understanding of the process, and obtained consent to proceed   with the assessment.  Assessment methods included conducting a   formal interview with patient, review of medical records,   collaboration with medical staff, and obtaining relevant   collateral information from family and community providers when   available.  : done     Patient response to interventions: eager to participate,   acceptance expressed, verbalizes understanding  Coping skills were attempted to reduce the crisis:  Mindfulness.   Self advocating.     History of the Crisis   Patient currently has not slept in days, up with adela and   experiencing intermittent sleep. Patient is hyperverbal and   labile, tearing up several times, and experiencing paranoia.   Patient describes her low point as starting in June and lasting   the entire summer where she was near catatonic per collateral.   Daela uptick began mid October and has been increasing since,   resulting in the current  presentation. Patient is intermittent   with her medications. Once feeling good she feels she no longer   needs them and stops taking them. She has been back on her   prescribed meds (Lithium 600mg; Seroquel 25mg; Zyprexa PRN 10mg)   for one week. COllateral says she is going outside at all hours   of the night. She has gone on shopping and spending sprees   spending thousands of dollars and other evidence of impulse   issues. Collateal also notes a Quaker preoccupation with   Latter-day (patient is Shinto). Patient was last seen   emergently on 3/5/23 at [facility] for SI and shaina and admitted for   five days. Prior to that on 6/3/18 patient was admitted for 10   days at [facility] for same/similar. Patient has a PCP in [place] [facility], [address] [address],  [phone]. patient   has no other providers.    Brief Psychosocial History  Family:  Single, Children no  Support System:  Parent(s), Sibling(s)  Employment Status:  unemployed  Source of Income:  none  Financial Environmental Concerns:  none  Current Hobbies:  family functions, television/movies/videos  Barriers in Personal Life:  mental health concerns    Significant Clinical History  Current Anxiety Symptoms:  racing thoughts, anxious  Current Depression/Trauma:  difficulty concentrating, impaired   decision making, irritable  Current Somatic Symptoms:  racing thoughts, anxious  Current Psychosis/Thought Disturbance:  impulsive, hyperactive,   elated mood, hyperverbal, high risk behavior, distractability  Current Eating Symptoms:  loss of appetite  Chemical Use History:  Alcohol: None  Benzodiazepines: None  Opiates: None  Cocaine: None  Marijuana: None  Other Use: None  Withdrawal Symptoms:  (None noted.)  Addictions:  (None noted)   Past diagnosis:  Depression, Bipolar Disorder  Family history:  No known history of mental health or chemical   health concerns  Past  treatment:  Primary Care, Psychiatric Medication Management,   Inpatient Hospitalization  Details of most recent treatment:  In clinic management currently  Other relevant history:  None noted       Collateral Information  Is there collateral information: Yes     Collateral information name, relationship, phone number:    Lila sister, [phone]    What happened today: She has become more manic every day. She has   not slept much in days. She is suffering shaina.     What is different about patient's functioning: She is   hyperverbal, off on spending sprees, having Mandaen   preoccupations, and sheis not medication compliant. This will   descend into psychosis at some point.     Concern about alcohol/drug use:  No    What do you think the patient needs:  Inpatient    Has patient made comments about wanting to kill   themselves/others: no    If d/c is recommended, can they take part in safety/aftercare   planning:  yes        Risk Assessment  [place] Suicide Severity Rating Scale Full Clinical Version:  Suicidal Ideation  Q6 Suicide Behavior (Lifetime): no          [place] Suicide Severity Rating Scale Recent:   Suicidal Ideation (Recent)  Q1 Wished to be Dead (Past Month): no  Q2 Suicidal Thoughts (Past Month): no  Intensity of Ideation (Recent)  Most Severe Ideation Rating (Past 1 Month):  (None noted.)  Description of Most Severe Ideation (Past 1 Month): None noted.  Frequency (Past 1 Month):  (None noted.)  Duration (Past 1 Month):  (None noted.)  Controllability (Past 1 Month):  (None noted.)  Deterrents (Past 1 Month): Does not apply  Reasons for Ideation (Past 1 Month): Does not apply  Suicidal Behavior (Recent)  Actual Attempt (Past 3 Months): No  Total Number of Actual Attempts (Past 3 Months): 0  Actual Attempt Description (Past 3 Months): None noted.  Has subject engaged in non-suicidal self-injurious behavior?   (Past 3 Months): No  Interrupted Attempts (Past 3 Months): No  Total Number of  Interrupted Attempts (Past 3 Months): 0  Interrupted Attempt Description (Past 3 Months): None noted.  Aborted or Self-Interrupted Attempt (Past 3 Months): No  Total Number of Aborted or Self-Interrupted Attempts (Past 3   Months): 0  Aborted or Self-Interrupted Attempt Description (Past 3 Months):   None noted.  Preparatory Acts or Behavior (Past 3 Months): No  Total Number of Preparatory Acts (Past 3 Months): 0  Preparatory Acts or Behavior Description (Past 3 Months): None   noted.    Environmental or Psychosocial Events: challenging interpersonal   relationships, impulsivity/recklessness  Protective Factors: Protective Factors: strong bond to family   unit, community support, or employment, lives in a responsibly   safe and stable environment, good treatment engagement, sense of   importance of health and wellness, able to access care without   barriers, supportive ongoing medical and mental health care   relationships, help seeking, sense of belonging, cultural,   spiritual , or Synagogue beliefs associated with meaning and   value in life, optimistic outlook - identification of future   goals, constructive use of leisure time, enjoyable activities,   resilience    Does the patient have thoughts of harming others? Feels Like   Hurting Others: no  Previous Attempt to Hurt Others: no  Current presentation:  (Manic and hypervrbal.)  Is the patient engaging in sexually inappropriate behavior?: no    Is the patient engaging in sexually inappropriate behavior?  no          Mental Status Exam   Affect: Labile, Dramatic  Appearance: Appropriate  Attention Span/Concentration: Attentive  Eye Contact: Engaged    Fund of Knowledge: Appropriate   Language /Speech Content: Fluent, Expressive Speech  Language /Speech Volume: Normal  Language /Speech Rate/Productions: Hyperverbal, Pressured  Recent Memory: Intact, Variable  Remote Memory: Intact, Variable  Mood: Anxious  Orientation to Person: Yes   Orientation to Place:  Yes  Orientation to Time of Day: Yes  Orientation to Date: Yes     Situation (Do they understand why they are here?): Yes  Psychomotor Behavior: Normal  Thought Content: Clear  Thought Form: Paranoia, Flight of Ideas        Medication  Psychotropic medications:   Medication Orders - Psychiatric (From admission, onward)      Start     Dose/Rate Route Frequency Ordered Stop    12/02/23 0920  OLANZapine zydis (zyPREXA) ODT tab 10 mg        Note to Pharmacy: DO NOT USE THIS FIELD FOR ADMIN INSTRUCTIONS;   INFORMATION DOES NOT SHOW ON MAR. USE THE FIELD ABOVE MARKED   ADMIN INSTRUCTIONS    10 mg Oral ONCE 12/02/23 0919 12/02/23 0920  LORazepam (ATIVAN) tablet 1 mg        Note to Pharmacy: DO NOT USE THIS FIELD FOR ADMIN INSTRUCTIONS;   INFORMATION DOES NOT SHOW ON MAR. USE THE FIELD ABOVE MARKED   ADMIN INSTRUCTIONS    1 mg Oral ONCE 12/02/23 0919               Current Care Team  Patient Care Team:  Felice Edmonds APRN CNP as PCP - General (Family Medicine)  Zhang Agosto MD as MD (Psychiatry)  Ina Mc MD as MD (Endocrinology, Diabetes, and   Metabolism)  Sarai Jerry MD as MD (Endocrinology, Diabetes, and   Metabolism)  Sarai Jerry MD as Assigned Endocrinology Provider  Felice Edmonds APRN CNP as Assigned PCP  Juani Burns APRN CNP as Assigned Behavioral Health   Provider    Diagnosis  Patient Active Problem List   Diagnosis Code    Adela (H) F30.9    Bipolar affective disorder, current episode manic with psychotic   symptoms (H) F31.2    Hx of psychiatric care Z92.89    Bipolar I disorder, current or most recent episode manic, with   psychotic features (H) F31.2    Lymphadenopathy R59.1    Bipolar 1 disorder (H) F31.9    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, unspecified thyrotoxicosis   type E05.90    Hypercalcemia E83.52    Myalgia M79.10    Hyponatremia E87.1    Weakness R53.1       Primary Problem This Admission  Active Hospital Problems    *Bipolar affective disorder,  current episode manic with   psychotic symptoms (H)      Adela (H)        Clinical Summary and Substantiation of Recommendations   Patient is experiencing adela after an extended period of   catatonia this past summer. Patient is not med compliant due to   discontinuing meds when she is feeling good and thinks she no   longer needs them. Patient is exhibiting impulsive behaviors,   engaging in spending sprees and trips, shopping. Patinet is   exhibiting Scientology preoccupation with a current fixation on   Jehovah's witness (patient is Nondenominational), and collateral endorses   delusions and manic behaviors, insomnia and up at all hours.   Patient is mildly labile in assessment and is endorsing admission   voluntarily.          Severe psychiatric, behavioral or other comorbid conditions are   appropriate for management at inpatient mental health as   indicated by at least one of the following: Psychiatric Symptoms  Severe dysfunction in daily living is present as indicated by at   least one of the following: Extreme disruption in vegetative   function, Other evidence of severe dysfunction  Situation and expectations are appropriate for inpatient care:   Voluntary treatment at lower level of care is not feasible,   Patient management/treatment at lower level of care is not   feasible or is inappropriate  Inpatient mental health services are necessary to meet patient   needs and at least one of the following: Specific condition   related to admission diagnosis is present and judged likely to   further improve at proposed level of care, Specific condition   related to admission diagnosis is present and judged likely to   deteriorate in absence of treatment at proposed level of care      Patient coping skills attempted to reduce the crisis:    Mindfulness. Self advocating.    Disposition  Recommended disposition: Inpatient Mental Health        Reviewed case and recommendations with attending provider.   Attending Name: Dr. GROSSMAN  MD Daniel       Attending concurs with disposition: yes       Patient and/or validated legal guardian concurs with disposition:     yes       Final disposition:  inpatient mental health    Legal status on admission: Voluntary/Patient has signed consent   for treatment    Assessment Details   Total duration spent with the patient: 30 min     CPT code(s) utilized: 51312 - Psychotherapy for Crisis - 60   (30-74*) min    Reese Freeman MA Psychotherapist  [facility] - Triage & Transition Services  Callback: [phone]           Urine Drug Screen     Status: Normal    Collection Time: 12/02/23  8:58 AM    Narrative    The following orders were created for panel order Urine Drug Screen.  Procedure                               Abnormality         Status                     ---------                               -----------         ------                     Urine Drug Screen Panel[[id number]]      Normal              Final result                 Please view results for these tests on the individual orders.   HCG qualitative urine (UPT)     Status: Normal    Collection Time: 12/02/23  8:58 AM   Result Value Ref Range    hCG Urine Qualitative Negative Negative   Urine Drug Screen Panel     Status: Normal    Collection Time: 12/02/23  8:58 AM   Result Value Ref Range    Amphetamines Urine Screen Negative Screen Negative    Barbituates Urine Screen Negative Screen Negative    Benzodiazepine Urine Screen Negative Screen Negative    Cannabinoids Urine Screen Negative Screen Negative    Cocaine Urine Screen Negative Screen Negative    Fentanyl Qual Urine Screen Negative Screen Negative    Opiates Urine Screen Negative Screen Negative    PCP Urine Screen Negative Screen Negative   CBC with platelets differential     Status: Abnormal    Collection Time: 12/02/23  1:10 PM    Narrative    The following orders were created for panel order CBC with platelets differential.  Procedure                               Abnormality          Status                     ---------                               -----------         ------                     CBC with platelets and d...[[id number]]  Abnormal            Final result                 Please view results for these tests on the individual orders.   Comprehensive metabolic panel     Status: Abnormal    Collection Time: 12/02/23  1:10 PM   Result Value Ref Range    Sodium 136 135 - 145 mmol/L    Potassium 4.2 3.4 - 5.3 mmol/L    Carbon Dioxide (CO2) 26 22 - 29 mmol/L    Anion Gap 8 7 - 15 mmol/L    Urea Nitrogen 10.0 6.0 - 20.0 mg/dL    Creatinine 0.65 0.51 - 0.95 mg/dL    GFR Estimate >90 >60 mL/min/1.73m2    Calcium 9.2 8.6 - 10.0 mg/dL    Chloride 102 98 - 107 mmol/L    Glucose 105 (H) 70 - 99 mg/dL    Alkaline Phosphatase 96 40 - 150 U/L    AST 26 0 - 45 U/L    ALT 21 0 - 50 U/L    Protein Total 8.4 (H) 6.4 - 8.3 g/dL    Albumin 4.3 3.5 - 5.2 g/dL    Bilirubin Total 0.2 <=1.2 mg/dL   Lithium level     Status: Abnormal    Collection Time: 12/02/23  1:10 PM   Result Value Ref Range    Lithium 0.37 (L) 0.60 - 1.20 mmol/L   CBC with platelets and differential     Status: Abnormal    Collection Time: 12/02/23  1:10 PM   Result Value Ref Range    WBC Count 7.1 4.0 - 11.0 10e3/uL    RBC Count 4.05 3.80 - 5.20 10e6/uL    Hemoglobin 11.9 11.7 - 15.7 g/dL    Hematocrit 36.5 35.0 - 47.0 %    MCV 90 78 - 100 fL    MCH 29.4 26.5 - 33.0 pg    MCHC 32.6 31.5 - 36.5 g/dL    RDW 15.8 (H) 10.0 - 15.0 %    Platelet Count 357 150 - 450 10e3/uL    % Neutrophils 48 %    % Lymphocytes 40 %    % Monocytes 9 %    % Eosinophils 2 %    % Basophils 1 %    % Immature Granulocytes 0 %    NRBCs per 100 WBC 0 <1 /100    Absolute Neutrophils 3.4 1.6 - 8.3 10e3/uL    Absolute Lymphocytes 2.8 0.8 - 5.3 10e3/uL    Absolute Monocytes 0.7 0.0 - 1.3 10e3/uL    Absolute Eosinophils 0.2 0.0 - 0.7 10e3/uL    Absolute Basophils 0.0 0.0 - 0.2 10e3/uL    Absolute Immature Granulocytes 0.0 <=0.4 10e3/uL    Absolute NRBCs 0.0 10e3/uL    Asymptomatic COVID-19 Virus (Coronavirus) by PCR Nose     Status: Normal    Collection Time: 12/03/23  1:39 AM    Specimen: Nose; Swab   Result Value Ref Range    SARS CoV2 PCR Negative Negative    Narrative    Testing was performed using the Xpert Xpress SARS-CoV-2 Assay on the Cepheid Gene-Xpert Instrument Systems. Additional information about this Emergency Use Authorization (EUA) assay can be found via the Lab Guide. This test should be ordered for the detection of SARS-CoV-2 in individuals who meet SARS-CoV-2 clinical and/or epidemiological criteria as well as from individuals without symptoms or other reasons to suspect COVID-19. Test performance for asymptomatic patients has only been established in anterior nasal swab specimens. This test is for in vitro diagnostic use under the FDA EUA for laboratories certified under CLIA to perform high complexity testing. This test has not been FDA cleared or approved. A negative result does not rule out the presence of PCR inhibitors in the specimen or target RNA concentration below the limit of detection for the assay. The possibility of a false negative should be considered if the patient's recent exposure or clinical presentation suggests COVID-19. This test was validated by the [facility]. This laboratory is certified under the Clinical Laboratory Improvement Amendments (CLIA) as qualified to perform high complexity laboratory testing.               MD Daniel Castillo David, MD  12/03/23 1555

## 2023-12-03 NOTE — ED NOTES
19:10 pm  23 yof received to [facility] 7. VSS. Groggy but attempting to stay awake. Pt redirected to bed several times but then would arouse and make requests. SI- not right now. HI- no SIB- no AVH- not this minute. Street drugs- no ETOH- no . NC w/ meds , but side effect of constipation. Not sleeping well recently. Moving slowly about unit.

## 2023-12-03 NOTE — ED NOTES
Patient standing up and closing her eyes for seconds at a time. Rn gave patient warm blankets and weighted blanket and helped to lay on bed

## 2023-12-03 NOTE — TELEPHONE ENCOUNTER
Called [facility] @ 00:42 to request Covid for outside reviews    No appropriate beds are currently available within the  system. Bed search update ([facility]) @ 12:10AM:          [facility]: @ cap per website   [facility]: @ cap per website   [facility]: @ cap per website. Per Alejandra @ 00:12, they are full   [facility]: @ cap per website   [facility]: @ cap per website   [facility]: Posting 2 beds (Young Adult unit: No recent aggressions, violence or sexual assault. Neg covid required).     [facility]: @ cap per website   [facility]: @ cap per website   [facility]: Posting 2 beds. Per Liza @ 00:14, they are at full capacity until at least 10AM     Pt remains on work list until appropriate placement is available

## 2023-12-03 NOTE — CONSULTS
"  Cristina Boyd MRN# [id number]   Age: 23 year old YOB: 2000   Date of Admission to ED: 12/2/2023    In person visit Details:     Patient was assessed and interviewed face-to-face in person with this writer giuseppe. Patient was observed to be able to participate in the assessment as evidenced by verbal consent. Assessment methods included conducting a formal interview with patient, review of medical records, collaboration with medical staff, and obtaining relevant collateral information from family and community providers when available.        Reason for Consult:   This note is being entered to supplement the psychiatry consultation note that was completed on December 2, 2023 by the licensed mental health professional Reese Freeman   have reviewed the pertinent clinical details related to their encounter. I am being consulted to offer additional guidance on psychiatric pharmacological interventions    Writer met patient in her room face-to-face by herself, patient is alert and oriented x 4 pleasant and cooperative during assessment and interview.  Currently patient denied any suicidal ideation or homicidal ideation or self injury behavior.  Patient states she is here because of her shaina she was tangential and hyperverbal during assessment and interview.  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 she is voluntary for inpatient mental health unit.  Patient states she is in the hospital because she stopped taking her lithium and olanzapine, patient said \" I started taking my olanzapine and lithium 3 weeks ago, when I do not take my medication and become bipolar and manic.\"    There is genetic loading for none known.  Medical history does not appear to be significant.  Substance use does not  appear to be playing a contributing role in the patient's presentation.  " Patient appears to cope with stress/frustration/emotion by withdrawing.  Stressors include chronic mental health issues, school issues, peer issues, and family dynamics.  Patient's support system includes family.Protective factors: family Risk factors: maladaptive coping, school issues peer issues, family dynamics, and past behaviors.        I have reviewed the nursing notes. I have reviewed the findings, diagnosis, plan and need for follow up with the patient.         HPI:     Cristina Boyd is a 23 year old female who has a history of bipolar 1 disorder and shaina.  Recently struggling with increased manic symptoms.  Has not slept for multiple days, is up pacing, had medications including Seroquel and lithium increased but is not finding any benefit from these or Zyprexa.  Her behavior and thought processes have been very erratic.  Sister finds it more more difficult for her to manage at home and so they came for further evaluation.  She is requesting Zyprexa and Ativan.       Pt has not required locked seclusion or restraints in the past 24 hours to maintain safety, please refer to RN documentation for further details.  Substance use does not appear to be playing a contributing role in the patient's presentation.  Brief Therapeutic Intervention(s): *  Provided active listening, unconditional positive regard, and validation. Engaged in cognitive restructuring/ reframing, looked at common cognitive distortions and challenged negative thoughts. *Engaged in guided discovery, explored patient's perspectives and helped expand them through socratic dialogue. Provided positive reinforcement for progress towards goals, gains in knowledge, and application of skills previously taught.  Engaged in social skills training. Explored and identified early warning signs to anger.        Past Psychiatric History:     See DEC  note        Substance Use and History:     See DEC  note        Past Medical History:   PAST  MEDICAL HISTORY:   Past Medical History:   Diagnosis Date    Bipolar disorder (H)     Depressive disorder     Infection due to 2019 novel coronavirus 2020    summer 2020 by patient report    Infection due to 2019 novel coronavirus 12/2021    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, unspecified thyrotoxicosis type 11/16/2022       PAST SURGICAL HISTORY:   Past Surgical History:   Procedure Laterality Date    BRONCHOSCOPY (RIGID OR FLEXIBLE), DIAGNOSTIC N/A 6/16/2018    Procedure: COMBINED BRONCHOSCOPY (RIGID OR FLEXIBLE), LAVAGE;;  Surgeon: Manjeet Holland MD;  Location:  GI               Allergies:   No Known Allergies          Medications:   I have reviewed this patient's current medications  Current Facility-Administered Medications   Medication    lithium ER (LITHOBID) CR tablet 600 mg    QUEtiapine (SEROquel) tablet 25 mg     Current Outpatient Medications   Medication Sig    lithium ER (LITHOBID) 300 MG CR tablet Take 2 tablets (600 mg) by mouth At Bedtime    QUEtiapine (SEROQUEL) 25 MG tablet Take 1 tablet (25 mg) by mouth nightly as needed (sleep)              Family History:   FAMILY HISTORY:   Family History   Problem Relation Age of Onset    Impaired Fasting Glucose Father         prediabetes    Thyroid Disease Maternal Grandmother     Diabetes Maternal Grandmother     Depression Maternal Uncle     Depression Other               Social History:   Lives with her family, 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     - Collateral information from the famly/friend: none         PTA Medications:   (Not in a hospital admission)         Allergies:   No Known Allergies       Labs:     Recent Results (from the past 48 hour(s))   HCG qualitative urine (UPT)    Collection Time: 12/02/23  8:58 AM   Result Value Ref Range    hCG Urine Qualitative Negative Negative   Urine Drug Screen Panel    Collection Time: 12/02/23  8:58 AM   Result Value Ref Range    Amphetamines Urine Screen Negative Screen Negative     Barbituates Urine Screen Negative Screen Negative    Benzodiazepine Urine Screen Negative Screen Negative    Cannabinoids Urine Screen Negative Screen Negative    Cocaine Urine Screen Negative Screen Negative    Fentanyl Qual Urine Screen Negative Screen Negative    Opiates Urine Screen Negative Screen Negative    PCP Urine Screen Negative Screen Negative   Comprehensive metabolic panel    Collection Time: 12/02/23  1:10 PM   Result Value Ref Range    Sodium 136 135 - 145 mmol/L    Potassium 4.2 3.4 - 5.3 mmol/L    Carbon Dioxide (CO2) 26 22 - 29 mmol/L    Anion Gap 8 7 - 15 mmol/L    Urea Nitrogen 10.0 6.0 - 20.0 mg/dL    Creatinine 0.65 0.51 - 0.95 mg/dL    GFR Estimate >90 >60 mL/min/1.73m2    Calcium 9.2 8.6 - 10.0 mg/dL    Chloride 102 98 - 107 mmol/L    Glucose 105 (H) 70 - 99 mg/dL    Alkaline Phosphatase 96 40 - 150 U/L    AST 26 0 - 45 U/L    ALT 21 0 - 50 U/L    Protein Total 8.4 (H) 6.4 - 8.3 g/dL    Albumin 4.3 3.5 - 5.2 g/dL    Bilirubin Total 0.2 <=1.2 mg/dL   Lithium level    Collection Time: 12/02/23  1:10 PM   Result Value Ref Range    Lithium 0.37 (L) 0.60 - 1.20 mmol/L   CBC with platelets and differential    Collection Time: 12/02/23  1:10 PM   Result Value Ref Range    WBC Count 7.1 4.0 - 11.0 10e3/uL    RBC Count 4.05 3.80 - 5.20 10e6/uL    Hemoglobin 11.9 11.7 - 15.7 g/dL    Hematocrit 36.5 35.0 - 47.0 %    MCV 90 78 - 100 fL    MCH 29.4 26.5 - 33.0 pg    MCHC 32.6 31.5 - 36.5 g/dL    RDW 15.8 (H) 10.0 - 15.0 %    Platelet Count 357 150 - 450 10e3/uL    % Neutrophils 48 %    % Lymphocytes 40 %    % Monocytes 9 %    % Eosinophils 2 %    % Basophils 1 %    % Immature Granulocytes 0 %    NRBCs per 100 WBC 0 <1 /100    Absolute Neutrophils 3.4 1.6 - 8.3 10e3/uL    Absolute Lymphocytes 2.8 0.8 - 5.3 10e3/uL    Absolute Monocytes 0.7 0.0 - 1.3 10e3/uL    Absolute Eosinophils 0.2 0.0 - 0.7 10e3/uL    Absolute Basophils 0.0 0.0 - 0.2 10e3/uL    Absolute Immature Granulocytes 0.0 <=0.4 10e3/uL     "Absolute NRBCs 0.0 10e3/uL   Asymptomatic COVID-19 Virus (Coronavirus) by PCR Nose    Collection Time: 12/03/23  1:39 AM    Specimen: Nose; Swab   Result Value Ref Range    SARS CoV2 PCR Negative Negative          Physical and Psychiatric Examination:     /73   Pulse 71   Temp 97.8  F (36.6  C) (Oral)   Resp 18   Ht 1.676 m (5' 6\")   Wt 63.8 kg (140 lb 9.6 oz)   SpO2 100%   BMI 22.69 kg/m    Weight is 140 lbs 9.6 oz  Body mass index is 22.69 kg/m .    Mental Status Exam:  Appearance: awake, alert  Attitude:  cooperative  Eye Contact:  good  Mood:  anxious  Affect:  appropriate and in normal range  Speech:  clear, coherent  Language: fluent and intact in English  Psychomotor, Gait, Musculoskeletal:  no evidence of tardive dyskinesia, dystonia, or tics  Thought Process:  logical, linear, goal oriented, and tangental  Associations:  no loose associations  Thought Content:  no evidence of suicidal ideation or homicidal ideation  Insight:  limited  Judgement:  limited  Oriented to:  time, person, and place  Attention Span and Concentration:  intact  Recent and Remote Memory:  intact  Fund of Knowledge:  appropriate         Diagnoses:   Severe manic bipolar 1 disorder with psychotic behavior (H)         Recommendations:         1. Continue to recommend inpatient psychiatric hospitalizations for further stabilization   2.  Continue her current medication Zyprexa 10 mg at bedtime and lithium 600 mg at bedtime Seroquel as needed for insomnia  3.  Consult psychiatry as needed  4.   Refer to psychiatric provider for medication management. *   treatment per ED team    - Consulted with , ED physician Kaylin asked if they would like this writer to enter orders in the EHR,  patient's ED RN regarding this case.    Please call [facility]/DEC at [phone] if you have follow-up questions or wish to place another consult.  Claudio Nicole, Psychiatric Nurse practitioner    Attestation:  Time with:  Patient: 30 minutes  Treatment " Team: 30 Minutes  Chart Review: 30 minutes    Total time spent was 90 minutes. Over 50% of times was spent counseling and coordination of care.    I thank  primary ED provider* care team very much for letting me participate in the care of this patient.    I, Claudio Nicole, CNP, APRN, Psychiatric Nurse Practitioner have personally performed an examination of this patient.  I have edited the note to reflect all relevant changes.  I have discussed this patient with the care team December 3, 2023.  I have reviewed all vitals and laboratory findings.    Disclaimer: This note consists of symbols derived from keyboarding,

## 2023-12-03 NOTE — TELEPHONE ENCOUNTER
R: [place]  Access Inpatient Bed Call Log 12/2/23   @10:00 PM    Intake has called facilities that have not updated the bed status within the last 12 hours.                             [facility] is posting 0 beds.            [facility] is posting 0 beds. [phone].   [facility] is posting 0 beds. Negative covid required.  Per [facility] at 10:00PM, at capacity.  Try back at 10AM 12/3.              [facility] is posting 0 beds. Neg covid. No high school or [facility]. [phone];  Per [facility], no beds available through the weekend.  Try back Monday.  [facility] is posting 0 beds. [phone].  Per [facility] at 10:00PM, at capacity.  Try back at 10AM 12/3.   [facility] is posting 0 beds. [phone].   [facility] is posting 3 beds for Young Adults ages 18-26. Negative covid. [phone].   [facility] is posting 1 bed.    Per [facility] at 10:00PM, at capacity.  Try back at 10AM 12/3.    [facility] is posting 2 beds. [phone].  Per [facility] at 10:00PM, at capacity.  Try back at 10AM 12/3.       Pt remains on the work list pending appropriate bed availability.

## 2023-12-04 ENCOUNTER — TELEPHONE (OUTPATIENT)
Dept: BEHAVIORAL HEALTH | Facility: CLINIC | Age: 23
End: 2023-12-04
Payer: COMMERCIAL

## 2023-12-04 PROCEDURE — 250N000013 HC RX MED GY IP 250 OP 250 PS 637: Performed by: EMERGENCY MEDICINE

## 2023-12-04 PROCEDURE — 250N000013 HC RX MED GY IP 250 OP 250 PS 637

## 2023-12-04 PROCEDURE — 250N000013 HC RX MED GY IP 250 OP 250 PS 637: Performed by: FAMILY MEDICINE

## 2023-12-04 RX ORDER — IBUPROFEN 600 MG/1
600 TABLET, FILM COATED ORAL ONCE
Status: COMPLETED | OUTPATIENT
Start: 2023-12-04 | End: 2023-12-04

## 2023-12-04 RX ADMIN — QUETIAPINE FUMARATE 25 MG: 25 TABLET ORAL at 21:04

## 2023-12-04 RX ADMIN — HYDROXYZINE HYDROCHLORIDE 25 MG: 25 TABLET, FILM COATED ORAL at 00:34

## 2023-12-04 RX ADMIN — IBUPROFEN 600 MG: 600 TABLET, FILM COATED ORAL at 10:50

## 2023-12-04 RX ADMIN — OLANZAPINE 10 MG: 10 TABLET, ORALLY DISINTEGRATING ORAL at 21:04

## 2023-12-04 RX ADMIN — LITHIUM CARBONATE 600 MG: 300 TABLET, EXTENDED RELEASE ORAL at 21:04

## 2023-12-04 ASSESSMENT — ACTIVITIES OF DAILY LIVING (ADL)
ADLS_ACUITY_SCORE: 35

## 2023-12-04 NOTE — ED PROVIDER NOTES
[facility] ED Mental Health Handoff Note:       Brief HPI:  This is a 23 year old female with a history as per chart review significant for bipolar 1 disorder and shaina arriving secondary to escalating symptoms of shaina with poor sleep, pacing and erratic thought.  Patient did have DEC and psychiatry evaluation recommended inpatient psychiatric hospitalization for stabilization.      Home meds reviewed and ordered/administered: Yes    Medically stable for inpatient mental health admission: Yes.    Evaluated by mental health: Yes. The recommendation is for inpatient mental health treatment. Bed search in process    Safety concerns: At the time I received sign out, there were no safety concerns.    Hold Status:  Active Orders   N/A         Exam:   No data found.        ED Course:    Medications   lithium ER (LITHOBID) CR tablet 600 mg (600 mg Oral $Given 12/3/23 2106)   QUEtiapine (SEROquel) tablet 25 mg (25 mg Oral $Given 12/3/23 2159)   OLANZapine zydis (zyPREXA) ODT tab 10 mg (10 mg Oral $Given 12/3/23 2106)   OLANZapine zydis (zyPREXA) ODT tab 10 mg (has no administration in time range)     Or   OLANZapine (zyPREXA) injection 10 mg (has no administration in time range)   hydrOXYzine (ATARAX) tablet 25 mg (25 mg Oral $Given 12/4/23 0034)     Or   hydrOXYzine (ATARAX) tablet 50 mg ( Oral See Alternative 12/4/23 0034)   OLANZapine zydis (zyPREXA) ODT tab 10 mg (10 mg Oral $Given 12/2/23 0809)   OLANZapine zydis (zyPREXA) ODT tab 10 mg (10 mg Oral $Given 12/2/23 1310)   LORazepam (ATIVAN) tablet 1 mg (1 mg Oral $Given 12/2/23 1322)   LORazepam (ATIVAN) tablet 1 mg (1 mg Oral $Given 12/2/23 1646)   OLANZapine zydis (zyPREXA) ODT tab 10 mg (10 mg Oral $Given 12/3/23 1332)            There were no significant events during my shift.    Patient was signed out to the oncoming provider.      Impression:    ICD-10-CM    1. Severe manic bipolar 1 disorder with psychotic behavior (H)  F31.2           Plan:    Awaiting  inpatient mental health admission/transfer.      RESULTS:   No results found for this visit on 12/02/23 (from the past 24 hour(s)).          MD Ashley Graf, Ankit Rivera MD  12/04/23 5906

## 2023-12-04 NOTE — ED PROVIDER NOTES
[facility] ED Mental Health Handoff Note:       Brief HPI:  This is a 23 year old female signed out to me.  See initial ED Provider note for full details of the presentation. Interval history is pertinent for ongoing ED boarding. Remains symptomatic. No acute concerns.    Home meds reviewed and ordered/administered: Yes    Medically stable for inpatient mental health admission: Yes.    Evaluated by mental health: Yes. The recommendation is for inpatient mental health treatment. Bed search in process    Safety concerns: At the time I received sign out, there were no safety concerns.    Hold Status:  Active Orders   N/A       Exam:   No data found.      ED Course:    Medications   lithium ER (LITHOBID) CR tablet 600 mg (600 mg Oral $Given 12/3/23 2106)   QUEtiapine (SEROquel) tablet 25 mg (25 mg Oral $Given 12/3/23 2159)   OLANZapine zydis (zyPREXA) ODT tab 10 mg (10 mg Oral $Given 12/3/23 2106)   OLANZapine zydis (zyPREXA) ODT tab 10 mg (has no administration in time range)     Or   OLANZapine (zyPREXA) injection 10 mg (has no administration in time range)   hydrOXYzine (ATARAX) tablet 25 mg (25 mg Oral $Given 12/4/23 0034)     Or   hydrOXYzine (ATARAX) tablet 50 mg ( Oral See Alternative 12/4/23 0034)   OLANZapine zydis (zyPREXA) ODT tab 10 mg (10 mg Oral $Given 12/2/23 0809)   OLANZapine zydis (zyPREXA) ODT tab 10 mg (10 mg Oral $Given 12/2/23 1310)   LORazepam (ATIVAN) tablet 1 mg (1 mg Oral $Given 12/2/23 1322)   LORazepam (ATIVAN) tablet 1 mg (1 mg Oral $Given 12/2/23 1646)   OLANZapine zydis (zyPREXA) ODT tab 10 mg (10 mg Oral $Given 12/3/23 1332)   ibuprofen (ADVIL/MOTRIN) tablet 600 mg (600 mg Oral $Given 12/4/23 1050)            There were no significant events during my shift.    Impression:    ICD-10-CM    1. Severe manic bipolar 1 disorder with psychotic behavior (H)  F31.2           Plan:    Awaiting mental health evaluation/recommendations.      RESULTS:   No results found for this visit on  12/02/23 (from the past 24 hour(s)).          MD Filiberto Medina Dung Hoang, MD  12/04/23 5812

## 2023-12-04 NOTE — TELEPHONE ENCOUNTER
[place]: [place]  Access Inpatient Bed Call Log 12/4/23   @7:04 am:  (metro):  Intake has called facilities that have not updated the bed status within the last 12 hours.                               [facility] is posting 0 beds.             [facility] is posting 0 beds. [phone]. Per call at 7:05 am to [facility], they are at cap.    [facility] is posting 0 beds. Negative covid required.                 [facility] is posting 0 beds. Neg covid. No high school or [facility]. [phone]; per call at 7:06 am to [facility], they are at cap.   [facility] is posting 0 beds. [phone]   [facility] is posting 0 beds. [phone].    [facility] is posting 2 beds for Young Adults ages 18-26. Negative covid. [phone]. Per call at 7:08 am to [facility], they have 3 beds avail. Pt declined placement here on 12/3 per notes on [facility].   [facility] is posting 0 beds.         [facility] is posting 0 beds. [phone]     Pt remains on the work list pending appropriate bed availability.

## 2023-12-04 NOTE — PROGRESS NOTES
Triage & Transition Services, Extended Care     Client Name: Cristina Boyd    Date: December 4, 2023    Service Type: attended group session  Session Start Time:  1429    Session End Time: 1500  Session Length: 31  Site Location: [facility] EMERGENCY DEPARTMENT                             [id number]  Total Number ofAttendees: 3  Topic:   emotions/expression   Response: listened actively, cooperative with task     THAIS Reeves  Extended Care Coordinator  [phone]

## 2023-12-04 NOTE — PROGRESS NOTES
Triage & Transition Services, Extended Care     Client Name: Cristina Boyd    Date: December 4, 2023    Service Type: (P) attended group session  Session Start Time:  (P) 1105    Session End Time: (P) 1125  Session Length: (P) 20  Site Location: [facility] EMERGENCY DEPARTMENT                             [id number]  Total Number ofAttendees: (P) 1  Topic:   (P) relaxation techniques   Response: (P) listened actively, cooperative with task, discussed personal experience with topic     RUTHY Barnett   Licensed Mental Health Professional (LMHP), Extended Care  [phone]

## 2023-12-04 NOTE — PROGRESS NOTES
"Triage & Transition Services, Extended Care     Therapy Progress Note    Patient: Cristina goes by \"Cristina,\" uses she/her pronouns  Date of Service: December 4, 2023  Site of Service: [facility] EMERGENCY DEPARTMENT                             [id number]  Patient was seen yes  Mode of Assessment: Virtual: iPad    Presentation Summary: Patient is awake, alert willing to engage in video visit with this writer.  Speech continues to be pressured, patient is hyper verbal.  Reports she is experiencing less shaina, states she slept well last night however does not know what other people thought about her sleep.  Patient indicates she is now committed to taking her medication consistently, does not want to continue to have significant disruptions in her life.  Also reports that when she is manic she has lots of good ideas but is too paranoid to act on them.  Patient tends to minimize excessive spending during recent shaina, states that she had not spend any money while she was severely depressed over the summer so does not feel her spending was out of line.  Does acknowledge having episodes of increased ranting, saying inappropriate or offensive statements to family members.  Reports frustration at times with her family, feels like they are too observant of her moods, if she is experiencing slight increase in energy, \" hypomania\", family often will suggest that she is experiencing shaina and needs to be hospitalized.  Patient reports having a sister who is a psych nurse, is most frustrated at times with this sister even though she trusts her judgment.  Patient states that she is wanting to pursue higher education for speech therapy, has been taking additional class work.  Thought processes tangential, with flight of ideas.  Reports feeling very hungry, wanting to eat multiple times during the day.  Is concerned about this.  Does report an period between [id number] when she felt her mental health was well managed with " medication.  At this time patient was attending college in [place] and living alone.  Patient able to identify several things she enjoys doing including going on walks, going to the gym, watching movies, hanging out with friends, attending classes, and teaching.  Patient has limited outpatient supports at this time, reports having medication management through [facility], states often having a different psychiatrist every year as they are often residents.  Patient continues to endorse desire to come inpatient for further stabilization.  Is hopeful placement is found soon as reports being tired of being in the emergency room.  Patient denies SI, SIB, HI denies the presence of AV hallucinations.    Therapeutic Intervention(s) Provided: Reviewed healthy living that supports positive mental health, including looking at sleep hygiene, regular movement, nutrition, and regular socialization.    Current Symptoms: racing thoughts     elated mood, flight of ideas, impulsive (Adela, hyperverbal) increased appetite    Mental Status Exam   Affect: Dramatic  Appearance: Appropriate  Attention Span/Concentration: Attentive  Eye Contact: Engaged    Fund of Knowledge: Appropriate   Language /Speech Content: Fluent  Language /Speech Volume: Normal  Language /Speech Rate/Productions: Pressured, Hyperverbal  Recent Memory: Variable  Remote Memory: Variable  Mood: Other (please comment) (Manic)  Orientation to Person: Yes   Orientation to Place: Yes  Orientation to Time of Day: Yes  Orientation to Date: Yes     Situation (Do they understand why they are here?): Yes  Psychomotor Behavior: Normal  Thought Content: Clear  Thought Form: Flight of Ideas, Tangential    Treatment Objective(s) Addressed: rapport building, identifying an appropriate aftercare plan, assessing safety    Patient Response to Interventions: acceptance expressed, verbalizes understanding    Progress Towards Goals: Patient Reports  Symptoms Are: improving (Patient reports decrease in manic symptoms, felt she was able to sleep well last night.)  Patient Progress Toward Goals: is making progress  Comment: Engages medical team for appropriate needs, has been compliant with medication, reporting decrease in symptoms  Next Step to Work Toward Discharge: symptom stabilization  Symptom Stabilization Comment: Patient needs to continue to stabilize on medication, reduction in adela, improved insight    Case Management:      Plan: inpatient mental health  yes provider (Dr. De Los Santos)       Clinical Substantiation: Patient continuing to experience symptoms of adela including pressured speech, flight of ideas, limited insight, poor decision-making.  Recommendation for inpatient mental health for safety, stabilization of symptoms, and further assessment.  Patient reports experiencing some lessening of symptoms while awaiting inpatient bed, will continue to monitor and reassess while waiting.  Patient remains voluntary    Legal Status: Legal Status at Admission: Voluntary/Patient has signed consent for treatment    Session Status: Time session started: 1127  Time session ended: 1144  Session Duration (minutes): 17 minutes  Session Number: 1    Time Spent: 17 minutes    CPT Code: CPT Codes: 10569 - Psychotherapy (with patient) - 30 (16-37*) min    Diagnosis:   Patient Active Problem List   Diagnosis Code    Adela (H) F30.9    Bipolar affective disorder, current episode manic with psychotic symptoms (H) F31.2    Hx of psychiatric care Z92.89    Bipolar I disorder, current or most recent episode manic, with psychotic features (H) F31.2    Lymphadenopathy R59.1    Bipolar 1 disorder (H) F31.9    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, unspecified thyrotoxicosis type E05.90    Hypercalcemia E83.52    Myalgia M79.10    Hyponatremia E87.1    Weakness R53.1       Primary Problem This Admission:     Active Hospital Problems    *Bipolar I disorder, current or most recent  episode manic, with psychotic features (H)      Adela (H)        Leah Esteban, [facility]   Licensed Mental Health Professional (LMHP), [facility]  [phone]

## 2023-12-04 NOTE — ED NOTES
Multiple requests and staff seeking behavior observed this shift while awake. Patient needs frequent redirection. One hour request enforced. PRN Hydroxyzine offered per request for anxiety. Otherwise, uneventful night. Safety precautons in place. No further behavior escalations, redirectable. Noted using headphones for distractions on other occasions. No complaint of pain distress/discomfort.

## 2023-12-04 NOTE — ED NOTES
Patient was calm and cooperative during the shift. Patient was engaged in the milieu with peers. Patient contracted for safety. Patient's family visited this evening and the visit went well. Will continue to monitor for behaviors.

## 2023-12-04 NOTE — TELEPHONE ENCOUNTER
No appropriate beds are currently available within the  system. Bed search update (metro, no PC) @ 2:25AM:          [facility]: @ cap per website   [facility]: @ cap per website   [facility]: @ cap per website. Per Alejandra @ 02:26, they are full    [facility]: @ cap per website   [facility]: @ cap per website   [facility]: Posting 2 beds (Young Adult unit: No recent aggressions, violence or sexual assault. Neg covid required). Pt and family do not want to seek placement here    [facility]: @ cap per website   [facility]: @ cap per website   [facility]: @ cap per website    Pt remains on work list until appropriate placement is available

## 2023-12-05 ENCOUNTER — TELEPHONE (OUTPATIENT)
Dept: BEHAVIORAL HEALTH | Facility: CLINIC | Age: 23
End: 2023-12-05
Payer: COMMERCIAL

## 2023-12-05 VITALS
BODY MASS INDEX: 22.6 KG/M2 | OXYGEN SATURATION: 100 % | TEMPERATURE: 98.2 F | WEIGHT: 140.6 LBS | HEIGHT: 66 IN | SYSTOLIC BLOOD PRESSURE: 114 MMHG | RESPIRATION RATE: 18 BRPM | HEART RATE: 92 BPM | DIASTOLIC BLOOD PRESSURE: 79 MMHG

## 2023-12-05 PROCEDURE — 99418 PROLNG IP/OBS E/M EA 15 MIN: CPT

## 2023-12-05 PROCEDURE — 99233 SBSQ HOSP IP/OBS HIGH 50: CPT

## 2023-12-05 RX ORDER — QUETIAPINE FUMARATE 25 MG/1
25 TABLET, FILM COATED ORAL
Qty: 30 TABLET | Refills: 0 | Status: SHIPPED | OUTPATIENT
Start: 2023-12-05 | End: 2023-12-08

## 2023-12-05 RX ORDER — LITHIUM CARBONATE 300 MG/1
600 TABLET, FILM COATED, EXTENDED RELEASE ORAL AT BEDTIME
Qty: 60 TABLET | Refills: 0 | Status: SHIPPED | OUTPATIENT
Start: 2023-12-05 | End: 2023-12-08

## 2023-12-05 RX ORDER — HYDROXYZINE HYDROCHLORIDE 50 MG/1
50 TABLET, FILM COATED ORAL EVERY 8 HOURS PRN
Qty: 90 TABLET | Refills: 0 | Status: SHIPPED | OUTPATIENT
Start: 2023-12-05 | End: 2023-12-08

## 2023-12-05 RX ORDER — OLANZAPINE 10 MG/1
10 TABLET ORAL AT BEDTIME
Qty: 30 TABLET | Refills: 0 | Status: SHIPPED | OUTPATIENT
Start: 2023-12-05 | End: 2024-03-18

## 2023-12-05 ASSESSMENT — ACTIVITIES OF DAILY LIVING (ADL)
ADLS_ACUITY_SCORE: 35

## 2023-12-05 ASSESSMENT — COLUMBIA-SUICIDE SEVERITY RATING SCALE - C-SSRS
TOTAL  NUMBER OF ABORTED OR SELF INTERRUPTED ATTEMPTS SINCE LAST CONTACT: NO
ATTEMPT SINCE LAST CONTACT: NO
2. HAVE YOU ACTUALLY HAD ANY THOUGHTS OF KILLING YOURSELF?: NO
6. HAVE YOU EVER DONE ANYTHING, STARTED TO DO ANYTHING, OR PREPARED TO DO ANYTHING TO END YOUR LIFE?: NO
1. SINCE LAST CONTACT, HAVE YOU WISHED YOU WERE DEAD OR WISHED YOU COULD GO TO SLEEP AND NOT WAKE UP?: NO
TOTAL  NUMBER OF INTERRUPTED ATTEMPTS SINCE LAST CONTACT: NO
SUICIDE, SINCE LAST CONTACT: NO

## 2023-12-05 NOTE — PROGRESS NOTES
"Triage and Transition Services Extended Care Reassessment     Patient: Cristina goes by \"Cristina,\" uses she/her pronouns  Date of Service: December 5, 2023  Site of Service: [facility] EMERGENCY DEPARTMENT                             [id number]  Patient was seen yes  Mode of Assessment: In person     Reason for Reassessment:  (Adela)    History of Patient's Original Emergency Room Encounter: Patient currently has not slept in days, up with adela and experiencing intermittent sleep. Patient is hyperverbal and labile, tearing up several times, and experiencing paranoia. Patient describes her low point as starting in June and lasting the entire summer where she was near catatonic per collateral. Adela uptick began mid October and has been increasing since, resulting in the current presentation. Patient is intermittent with her medications. Once feeling good she feels she no longer needs them and stops taking them. She has been back on her prescribed meds (Lithium 600mg; Seroquel 25mg; Zyprexa PRN 10mg) for one week. COllateral says she is going outside at all hours of the night. She has gone on shopping and spending sprees spending thousands of dollars and other evidence of impulse issues. Collateal also notes a Latter day preoccupation with Hoah[employer]ism (patient is Pentecostal). Patient was last seen emergently on 3/5/23 at [facility] for SI and adela and admitted for five days. Prior to that on 6/3/18 patient was admitted for 10 days at [facility] for same/similar. Patient has a PCP in [place] [facility], [address] [address], [phone]. patient has no other providers.    Current Patient Presentation:      Presentation Summary: Patient is alert and oriented x4. Patient engaged fully in the assessment process. Patient is in full behavioral control. Patient is pleasant, with normal mood and affect. Patient's thoughts are notably more organized. " "Patient reports she is \"100% out of psychosis\". Patient reports when she initially arrived on Saturday, she had \"called her cousin fat\" due to \"PMS and shaina\". Patient reports she was irritable at that time, and worried her cousin was going to \"beat my ass and hate me forever\". Patient reports she was scared her family would harm her at the time, and agreed to come into the hospital because she knew she wasn't safe. Patient reports her mental health was \"playing mind games\" on her at the time. Patient reports she has been compliant with her medication regimen since arriving to our emergency department. Patient is able to recall her prescribed medications and dosages. Patient reports before arrival, she thought her Zypexa was PRN but now realizes she needs to take it on a scheduled basis. Patient is no longer experiencing symptoms of shaina. Patient has been sleeping and eating well. Patient is goal oriented, discusses plans for future employment and schooling. Patient has been reading a book on bipolar disorder since her arrival to our emergency department and notes the importance of keeping to a schedule. Patient denies any concern for SIB, SI or HI. Patient reports plan to follow up with the [facility] psychiatry clinic after discharge, where she is established with one of their residents. Patient reports she was supposed to see them this month but the provider had a family emergency, so she is now scheduled for an appointment in 1/2024. Patient reports desire to stay with the clinic and declines a sooner appointment elsewhere, even bridging services. Patient declines need for individual therapy as she prefers to talk to her family, including sister Monique and mother Joy. Patient is able to engage in safety planning.    Changes Observed Since Initial Assessment: decrease in presenting symptoms    Therapeutic Interventions Provided: Engaged in safety planning, Explored and identified early warning signs to shaina, " Reviewed healthy living that supports positive mental health, including looking at sleep hygiene, regular movement, nutrition, and regular socialization., Coached on coping techniques/relaxation skills to help improve distress tolerance and managing intense emotions., Engaged in activity scheduling and behavioral activation, looking at and reviewing the prior week's goals, problem solving any barriers and acknowledging successes, as well as setting new goals.    Current Symptoms: racing thoughts     elated mood, flight of ideas, impulsive (Adela, hyperverbal) increased appetite    Mental Status Exam   Affect: Appropriate  Appearance: Appropriate  Attention Span/Concentration: Attentive  Eye Contact: Engaged    Fund of Knowledge: Appropriate   Language /Speech Content: Fluent  Language /Speech Volume: Normal  Language /Speech Rate/Productions: Hyperverbal  Recent Memory: Intact  Remote Memory: Intact  Mood: Normal  Orientation to Person: Yes   Orientation to Place: Yes  Orientation to Time of Day: Yes  Orientation to Date: Yes     Situation (Do they understand why they are here?): Yes  Psychomotor Behavior: Normal  Thought Content: Clear  Thought Form: Intact    Treatment Objective(s) Addressed: rapport building, safety planning, identifying an appropriate aftercare plan, exploring obstacles to safety in the community, identifying additional supports    Patient Response to Interventions: eager to participate, acceptance expressed, verbalizes understanding    Progress Towards Goals:  Patient Reports Symptoms Are: improving  Patient Progress Toward Goals: is making progress  Comment: Engages medical team for appropriate needs, has been compliant with medication, reporting decrease in symptoms  Next Step to Work Toward Discharge: patient ability to engage in safety planning  Symptom Stabilization Comment: Patient does not appear to be experiencing symptoms of acute adela any more.  Ability to Engage Comment: Patient  engaged fully in [facility] safety plan.    Case Management:      C-SSRS Since Last Contact:   1. Wish to be Dead (Since Last Contact): No  2. Non-Specific Active Suicidal Thoughts (Since Last Contact): No     Actual Attempt (Since Last Contact): No  Has subject engaged in non-suicidal self-injurious behavior? (Since Last Contact): No  Interrupted Attempts (Since Last Contact): No  Aborted or Self-Interrupted Attempt (Since Last Contact): No  Preparatory Acts or Behavior (Since Last Contact): No  Suicide (Since Last Contact): No     Calculated C-SSRS Risk Score (Since Last Contact): No Risk Indicated    Plan: Final Disposition / Recommended Care Path: discharge  Plan for Care reviewed with assigned Medical Provider: yes  Plan for Care Team Review: provider  Comments: Results pending  Patient and/or validated legal guardian concurs: yes  Clinical Substantiation: Patient has engaged in BEC programming, LMHP and psychiatric consult service providers. Patient has been compliant with medication regimen. Patient shows improved insight and organized thought process. Patient has improving symptoms of adela. Patient denies any SIB, SI, or HI. Patient denies any auditory or visual  hallucinations. Patient is goal oriented in future plans for outpatient follow up, school and work responsibilities. Patient will be discharged.    Legal Status: Legal Status at Admission: Voluntary/Patient has signed consent for treatment    Session Status: Time session started: 0935  Time session ended: 0951  Session Duration (minutes): 16 minutes  Session Number: 2  Anticipated number of sessions or this episode of care: 2    Session Start Time: 0935  Session Stop Time: 0951  CPT codes: 76074 - Psychotherapy (with patient) - 30 (16-37*) min  Time Spent: 16 minutes      CPT code(s) utilized: 58027 - Psychotherapy (with patient) - 30 (16-37*) min    Diagnosis:   Patient Active Problem List   Diagnosis Code    Adela (H) F30.9    Bipolar  affective disorder, current episode manic with psychotic symptoms (H) F31.2    Hx of psychiatric care Z92.89    Bipolar I disorder, current or most recent episode manic, with psychotic features (H) F31.2    Lymphadenopathy R59.1    Bipolar 1 disorder (H) F31.9    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, unspecified thyrotoxicosis type E05.90    Hypercalcemia E83.52    Myalgia M79.10    Hyponatremia E87.1    Weakness R53.1       Primary Problem This Admission: Active Hospital Problems    Bipolar I disorder, current or most recent episode manic, with psychotic features (H)      *Bipolar affective disorder, current episode manic with psychotic symptoms (H)      Adela (H)        RUTHY Barnett   Licensed Mental Health Professional (LMHP), [facility]  [phone]

## 2023-12-05 NOTE — PROGRESS NOTES
Triage & Transition Services, Extended Care     Client Name: Cristina Boyd    Date: December 4, 2023    Service Type: attended group session  Session Start Time:  1730    Session End Time: 1741  Session Length: 11  Site Location: [facility] EMERGENCY DEPARTMENT                             [id number]  Total Number ofAttendees: 3  Topic:   relaxation techniques   Response: unable to sequence the task, cooperative with task     THAIS Reeves  Extended Care Coordinator  [phone]

## 2023-12-05 NOTE — TELEPHONE ENCOUNTER
R: [place]  Access Inpatient Bed Call Log 12/4/23   @3:10 PM:    Intake has called facilities that have not updated the bed status within the last 12 hours.  ([facility])                       [facility] is posting 0 beds.             [facility] is posting 0 beds. [phone]. 12/4 Per call at 7:05 am to [place], they are at cap.    [facility] is posting 0 beds. Negative covid required.                 [facility] is posting 0 beds. Neg covid. No high school or [facility]. [phone]; 12/4 per call at 7:06 am to [facility], they are at cap.   [facility] is posting 0 beds. [phone]   [facility] is posting 0 beds. [phone].    [facility] is posting 2 beds for Young Adults ages 18-26. Negative covid. [phone]. Per call at 7:08 am to Jacinda, they have 3 beds avail. 12/4 Pt and family declined acceptance to facility on 12/3.  [facility] is posting 0 beds.         [facility] is posting 0 beds. [phone]     Patient remains on the work list pending appropriate bed availability.

## 2023-12-05 NOTE — TELEPHONE ENCOUNTER
R: [place]  Access Inpatient Bed Call Log  12/5/2023 12:36 AM  Intake has called facilities that have not updated their bed status within the last 12 hours.??      ADULTS:     *[facility]  [place] -- [facility]: @ cap per website.  [place] -- [facility]:  @ cap per website.  [place] -- [facility]: @cap per website  [place] -- [facility]: @ cap per website.   [place] -- [facility]: @cap per website.  [facility] -- [facility]: @ cap per website.  [place] -- [facility]/ beds Posting 4 beds. Ages 18-25, Voluntary only, COVID test req'd, NO aggression, physical or sexual assault, violence hx or drug abuse  [place] -- [facility]: @ cap per website.  [place] -- [facility]: @ cap per website.  [place] -- [facility]:  @ cap per website.       Pt remains on waitlist pending appropriate placement availability.

## 2023-12-05 NOTE — ED NOTES
Patient was calm and cooperative during the shift. Patient denied pain and other psych symptoms. Patient contracted for safety. Patient's family visited this evening the visit went well. No major concerns during the shift. Will continue to monitor for behaviors.

## 2023-12-05 NOTE — CONSULTS
"  Cristina Boyd MRN# [id number]   Age: 23 year old YOB: 2000   Date of Admission to ED: 12/2/2023    In person visit Details:     Patient was assessed and interviewed face-to-face in person with this writekrystal chin. Patient was observed to be able to participate in the assessment as evidenced by verbal consent. Assessment methods included conducting a formal interview with patient, review of medical records, collaboration with medical staff, and obtaining relevant collateral information from family and community providers when available.        Reason for Consult:   This note is being entered to supplement the psychiatry consultation note that was completed on December 2, 2023 by the licensed mental health professional Reese Freeman   have reviewed the pertinent clinical details related to their encounter. I am being consulted to offer additional guidance on psychiatric pharmacological interventions    Writer met patient in her room face-to-face by herself patient pleasant and cooperative during assessment and interview currently denied any suicidal ideation or homicidal ideation or self injury behavior.  Patient was presented very well comparing to her initial coming to emergency room patient asked to be discharged home today.  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  Currently patient is stated she is interested in Cap[place] she says she hears on the advertisement on TV writer encouraged her to talk to her outpatient psychiatrist to switch her from Zyprexa to Caplyta if she like it.    Patient currently had a book which her sister bought for her how to manage bipolar shaina she has been making note from the book and she has been writing recommendation when this writer discussing with her about medications and mental health.  Currently patient denied any visual hallucination and auditory hallucination patient does not use any substance.      *Patient grossly appears to be cognitively intact. Insight and judgement have improved since to emergency room . Patient is aware of consequences of medication non-compliance .  Patient has not exhibited aggressive or violence behaviors while staying in [facility]. Has notable risk factors for self-harm, including substance use and trauma, past history of attempt. However, risk is mitigated by ability to volunteer a safety plan and history of seeking help when needed. Patient does not have immediate access to firearms. Therefore, based on all available evidence including the factors cited above, she  does not appear to be at imminent risk for self-harm, does not meet criteria for a 72-hr hold, and therefore remains appropriate for ongoing outpatient level of care. Patient agreed to further reduce risk of self-harm by adhering to the safety plan and agreed to remain medication adherent. Additional steps taken to minimize risk include: medication optimization, close psychiatric follow up and provision of crisis resources. Patient expressed understanding of risk associated with medication non-adherence including increased risk of harm to self or others.         Protective Factors: strong bond to family unit, community support, or employment, responsibilities and duties to others, families , lives in a responsibly safe and stable environment,  good treatment engagement, sense of importance of health and wellness, able to access care without barriers, supportive ongoing medical and mental health care relationships      There is genetic loading for none known.  Medical history does not appear to be significant.  Substance use does not  appear to be playing a contributing role in the patient's presentation.  Patient appears to cope with stress/frustration/emotion by withdrawing.  Stressors include chronic mental health issues, school issues, peer issues, and family dynamics.  Patient's support system includes family.Protective factors: family Risk factors: maladaptive coping, school issues peer issues, family dynamics, and past behaviors.        I have reviewed the nursing notes. I have reviewed the findings, diagnosis, plan and need for follow up with the patient.         HPI:     Cristina Boyd is a 23 year old female who has a history of bipolar 1 disorder and shaina.  Recently struggling with increased manic symptoms.  Has not slept for multiple days, is up pacing, had medications including Seroquel and lithium increased but is not finding any benefit from these or Zyprexa.  Her behavior and thought processes have been very erratic.  Sister finds it more more difficult for her to manage at home and so they came for further evaluation.  She is requesting Zyprexa and Ativan.       Pt has not required locked seclusion or restraints in the past 24 hours to maintain safety, please refer to RN documentation for further details.  Substance use does not appear to be playing a contributing role in the patient's presentation.  Brief Therapeutic Intervention(s): *  Provided active listening, unconditional positive regard, and validation. Engaged in cognitive restructuring/ reframing, looked at common cognitive distortions and challenged negative thoughts. *Engaged in guided discovery, explored patient's perspectives and helped expand them through socratic dialogue.  Provided positive reinforcement for progress towards goals, gains in knowledge, and application of skills previously taught.  Engaged in social skills training. Explored and identified early warning signs to anger.        Past Psychiatric History:     See DEC  note        Substance Use and History:     See DEC  note        Past Medical History:   PAST MEDICAL HISTORY:   Past Medical History:   Diagnosis Date    Bipolar disorder (H)     Depressive disorder     Infection due to 2019 novel coronavirus 2020    summer 2020 by patient report    Infection due to 2019 novel coronavirus 12/2021    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, unspecified thyrotoxicosis type 11/16/2022       PAST SURGICAL HISTORY:   Past Surgical History:   Procedure Laterality Date    BRONCHOSCOPY (RIGID OR FLEXIBLE), DIAGNOSTIC N/A 6/16/2018    Procedure: COMBINED BRONCHOSCOPY (RIGID OR FLEXIBLE), LAVAGE;;  Surgeon: Manjeet Holland MD;  Location:  GI               Allergies:   No Known Allergies          Medications:   I have reviewed this patient's current medications  Current Facility-Administered Medications   Medication    hydrOXYzine (ATARAX) tablet 25 mg    Or    hydrOXYzine (ATARAX) tablet 50 mg    lithium ER (LITHOBID) CR tablet 600 mg    OLANZapine zydis (zyPREXA) ODT tab 10 mg    Or    OLANZapine (zyPREXA) injection 10 mg    OLANZapine zydis (zyPREXA) ODT tab 10 mg    QUEtiapine (SEROquel) tablet 25 mg     Current Outpatient Medications   Medication Sig    lithium ER (LITHOBID) 300 MG CR tablet Take 2 tablets (600 mg) by mouth At Bedtime    QUEtiapine (SEROQUEL) 25 MG tablet Take 1 tablet (25 mg) by mouth nightly as needed (sleep)              Family History:   FAMILY HISTORY:   Family History   Problem Relation Age of Onset    Impaired Fasting Glucose Father         prediabetes    Thyroid Disease Maternal Grandmother     Diabetes Maternal Grandmother     Depression Maternal Uncle     Depression Other                "Social History:   Lives with her family, 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         Physical and Psychiatric Examination:     /79   Pulse 92   Temp 98.2  F (36.8  C) (Oral)   Resp 18   Ht 1.676 m (5' 6\")   Wt 63.8 kg (140 lb 9.6 oz)   SpO2 100%   BMI 22.69 kg/m    Weight is 140 lbs 9.6 oz  Body mass index is 22.69 kg/m .    Mental Status Exam:  Appearance: awake, alert  Attitude:  cooperative  Eye Contact:  good  Mood:  anxious  Affect:  appropriate and in normal range  Speech:  clear, coherent  Language: fluent and intact in English  Psychomotor, Gait, Musculoskeletal:  no evidence of tardive dyskinesia, dystonia, or tics  Thought Process:  logical, linear, goal oriented, and tangental  Associations:  no loose associations  Thought Content:  no evidence of suicidal ideation or homicidal ideation  Insight:  limited  Judgement:  limited  Oriented to:  time, person, and place  Attention Span and Concentration:  intact  Recent and Remote Memory:  intact  Fund of Knowledge:  appropriate         Diagnoses:   Severe manic bipolar 1 disorder with psychotic behavior (H)         Recommendations:     1.  Discharge home to her family  2.  Continue  her current medication Zyprexa 10 mg at bedtime and lithium 600 mg at bedtime Seroquel as needed for insomnia  3.  Consult psychiatry as needed  4.   Refer to psychiatric provider for medication management. *   treatment per ED team    - Consulted with  Miguel GEE , ED physician Kaylin asked if they would like this writer to enter orders in the EHR,  patient's ED RN regarding this case.    Please call [facility]/DEC at [phone] if you have follow-up questions or wish to place another consult.  Claudio Nicole, " Psychiatric Nurse practitioner    Attestation:  Time with:  Patient: 30 minutes  Treatment Team: 30 Minutes  Chart Review: 30 minutes    Total time spent was 90 minutes. Over 50% of times was spent counseling and coordination of care.    I thank  primary ED provider* care team very much for letting me participate in the care of this patient.    I, Claudio Nicole, CNP, APRN, Psychiatric Nurse Practitioner have personally performed an examination of this patient.  I have edited the note to reflect all relevant changes.  I have discussed this patient with the care team December 5, 2023.  I have reviewed all vitals and laboratory findings.    Disclaimer: This note consists of symbols derived from keyboarding,

## 2023-12-05 NOTE — ED NOTES
0730 Hrs. - Pt awake, visible in milieu. Pt has been interacting with peers. Pt hyper-verbal, frequently seeking staff but easily redirectable. She ate breakfast. VSS, denied pain. No behavioral concerns.    1035 Hrs - Patient agreeable to discharge plan. Discharge instructions reviewed with patient including follow-up care plan. Medications: reviewed. Reviewed safety plan and outpatient resources. Denies SI and HI. All belongings that were brought into the hospital have been returned to patient. At time of departure pt had no question and no unmet needs. Escorted off the unit at 1035 accompanied by [facility] staff. Discharged to home via taxi.

## 2023-12-05 NOTE — ED NOTES
0009 - Patient chart reviewed.  No new labs or orders at this time.  Patient currently resting with eyes closed.  No signs of distress or labored breathing.  Chest rise visualized.    0600 Patient has been awake for a couple hours.  Friendly and engaging with staff, but hyperverbal and tangential at times.  Easily redirectable, no thoughts of SI/HI, but states feeling weird on her medications.  Counseled to speak to MD about status of meds.  No needs or concerns at this time.  Currently awake and reading in milieu.

## 2023-12-05 NOTE — TELEPHONE ENCOUNTER
11:56 AM: Intake was informed by EC/[facility] chat that Pt can be removed from work list. Pt moved to discharge list. Intake no longer following.

## 2023-12-07 ENCOUNTER — TELEPHONE (OUTPATIENT)
Dept: PSYCHIATRY | Facility: CLINIC | Age: 23
End: 2023-12-07
Payer: COMMERCIAL

## 2023-12-07 ENCOUNTER — PATIENT OUTREACH (OUTPATIENT)
Dept: CARE COORDINATION | Facility: CLINIC | Age: 23
End: 2023-12-07
Payer: COMMERCIAL

## 2023-12-07 NOTE — CONFIDENTIAL NOTE
Patient scheduled for Urgent Add On appt with Dr. Carr on Fri 12/8 at 1:00 pm, 60 minutes, in person.  A family member will also be in attendance.      Patient's sister, Stephie ([phone]), confirmed 1:00 appointment.    _________    Patient's sister, Stephie, called the clinic to express concerns for her sister's safety.  Patient has a history of Bipolar I.  She was hospitalized at the [facility] ED 12/2 - 12/5 for severe manic symptoms including insomnia, excessive spending, Judaism delusions, paranoia, and noncompliance with medications.  She did not meet the criteria for a 72 hour hold and was discharged to outpatient care.    Per Stephie, patient has been aimlessly wandering outside and not dressed appropriate for the weather.  She is disorganized, hyperverbal, impulsively spending money, irritable with family members, making paranoid statements about family members.  Per her sister, patient has been medication compliant since she returned home.  She is taking her scheduled medications, which were not changed.  She is taking PRN Seroquel and hydroxyzine.  No SI/SIB/HI statements observed.    Family prefers to not bring pt back to ED.    Discharge Recommendations:  1.  Discharge home to her family  2.  Continue  her current medication Zyprexa 10 mg at bedtime and lithium 600 mg at bedtime Seroquel as needed for insomnia  3.  Consult psychiatry as needed  4.   Refer to psychiatric provider for medication management. *   treatment per ED team    Patient needs an urgent appointment.  Last seen 5/9 by Dr. Yanes.  Transfer appt with Dr. Gottlieb on 1/4/24

## 2023-12-07 NOTE — LETTER
Cristina Boyd  [address] [place] [address]    Dear Cristina Boyd,      I am a team member within the Connected [facility] Center with [facility]. I recently tried to reach you to ensure you were doing well following a recent visit within our health system. I also wanted to take this chance to introduce Clinic Care Coordination.     Below is a description of Clinic Care Coordination and how this team can further assist you:       The Clinic Care Coordination team is made up of a Registered Nurse, , Financial Resource Worker, and a Community Health Worker who understand and can help navigate the health care system. The goal of clinic care coordination is to help you manage your health, improve access to care, and achieve optimal health outcomes. They work alongside your provider to assist you in determining your health and social needs, obtain health care and community resources, and provide you with necessary information and education. Clinic Care Coordination can work with you through any barriers and develop a care plan that helps coordinate and strengthen the relationship between you and your care team.    If you wish to connect with the Clinic Care Coordination Team, please let your [facility] Primary Care Provider or Clinic Care Team know and they can place a referral. The Clinic Care Coordination team will then reach out by phone to further support you.    We are focused on providing you with the highest-quality healthcare experience possible.    Sincerely,   Your care team with [facility]

## 2023-12-07 NOTE — PROGRESS NOTES
[facility] Care Resource Center Contact  [facility]/Voicemail     Clinical Data: Care Coordination ED-sourced Outreach-     Outreach attempted x 2.  Left message on patient's voicemail, providing [facility]'s 24/7 scheduling and nurse triage phone number [phone] ([phone]) for questions/concerns and/or to schedule an appt with an [facility] provider.      Care Coordination introduction letter with explanation of Clinic Care Coordination services sent to patient via to bet. Clinic Care Coordination services remain available via referral if needed.    Plan: [facility] will do no further outreaches at this time.       EVELINA Lua  Connected Care Resource [place], [facility]    *Connected Care Resource Team does NOT follow patient ongoing. Referrals are identified based on internal discharge reports and the outreach is to ensure patient has an understanding of their discharge instructions.

## 2023-12-08 ENCOUNTER — OFFICE VISIT (OUTPATIENT)
Dept: FAMILY MEDICINE | Facility: CLINIC | Age: 23
End: 2023-12-08
Payer: COMMERCIAL

## 2023-12-08 ENCOUNTER — TELEPHONE (OUTPATIENT)
Dept: FAMILY MEDICINE | Facility: CLINIC | Age: 23
End: 2023-12-08

## 2023-12-08 ENCOUNTER — OFFICE VISIT (OUTPATIENT)
Dept: PSYCHIATRY | Facility: CLINIC | Age: 23
End: 2023-12-08
Attending: PSYCHIATRY & NEUROLOGY
Payer: COMMERCIAL

## 2023-12-08 VITALS
OXYGEN SATURATION: 99 % | SYSTOLIC BLOOD PRESSURE: 126 MMHG | DIASTOLIC BLOOD PRESSURE: 81 MMHG | HEIGHT: 66 IN | HEART RATE: 99 BPM | TEMPERATURE: 98.1 F | WEIGHT: 148 LBS | RESPIRATION RATE: 24 BRPM | BODY MASS INDEX: 23.78 KG/M2

## 2023-12-08 VITALS
HEART RATE: 111 BPM | WEIGHT: 150.4 LBS | SYSTOLIC BLOOD PRESSURE: 116 MMHG | DIASTOLIC BLOOD PRESSURE: 80 MMHG | BODY MASS INDEX: 24.17 KG/M2

## 2023-12-08 DIAGNOSIS — F31.9 BIPOLAR 1 DISORDER (H): Primary | ICD-10-CM

## 2023-12-08 DIAGNOSIS — Z56.0 UNEMPLOYED: ICD-10-CM

## 2023-12-08 DIAGNOSIS — Z51.81 ENCOUNTER FOR THERAPEUTIC DRUG MONITORING: ICD-10-CM

## 2023-12-08 PROCEDURE — 99495 TRANSJ CARE MGMT MOD F2F 14D: CPT

## 2023-12-08 PROCEDURE — 99214 OFFICE O/P EST MOD 30 MIN: CPT | Mod: GC

## 2023-12-08 PROCEDURE — 90833 PSYTX W PT W E/M 30 MIN: CPT | Mod: HN

## 2023-12-08 PROCEDURE — 99215 OFFICE O/P EST HI 40 MIN: CPT

## 2023-12-08 RX ORDER — OLANZAPINE 2.5 MG/1
2.5 TABLET, FILM COATED ORAL 2 TIMES DAILY PRN
Qty: 60 TABLET | Refills: 0 | Status: SHIPPED | OUTPATIENT
Start: 2023-12-08 | End: 2024-01-08

## 2023-12-08 RX ORDER — LITHIUM CARBONATE 450 MG
900 TABLET, EXTENDED RELEASE ORAL AT BEDTIME
Qty: 60 TABLET | Refills: 1 | Status: SHIPPED | OUTPATIENT
Start: 2023-12-08 | End: 2024-03-18

## 2023-12-08 SDOH — ECONOMIC STABILITY - INCOME SECURITY: UNEMPLOYMENT, UNSPECIFIED: Z56.0

## 2023-12-08 ASSESSMENT — ENCOUNTER SYMPTOMS
CHILLS: 0
SHORTNESS OF BREATH: 0
FEVER: 0
ABDOMINAL PAIN: 0
DIARRHEA: 0
FREQUENCY: 0
COUGH: 0
DIZZINESS: 0
WEAKNESS: 0
HEMATURIA: 0
HEMATOCHEZIA: 0
PALPITATIONS: 0
ARTHRALGIAS: 0
NERVOUS/ANXIOUS: 0
CONSTIPATION: 0
NAUSEA: 0
EYE PAIN: 0
HEARTBURN: 0
BREAST MASS: 0
SORE THROAT: 0
HEADACHES: 0
MYALGIAS: 0
PARESTHESIAS: 0
DYSURIA: 0
JOINT SWELLING: 0

## 2023-12-08 ASSESSMENT — PAIN SCALES - GENERAL: PAINLEVEL: NO PAIN (0)

## 2023-12-08 NOTE — PROGRESS NOTES
[facility]  Psychiatry Clinic  PSYCHIATRY PROGRESS NOTE     CARE TEAM:  PCP- Felice Edmonds    Psychotherapist- None       Cristina is a 23 year old who uses the name Cristina and pronouns she, her, hers, presenting for an urgent appointment following recent active treatment in Emergency Department for manic episode with psychosis.      DIAGNOSIS     Bipolar I disorder, most recent manic episode, current residual manic symptoms without current psychosis     ASSESSMENT     Cristina is a 24 yo person with past psychiatric diagnosis listed above, who is seen today for follow-up urgent added on visit, following recent ED stay and discharge. She had been working with Dr. Yanes and has a transfer of care visit scheduled with Dr. Gottlieb on 1/4/24.     It appears she had been experiencing worsening mood symptoms in the context of medication non-adherence, which led to ED visit at [place] [place] [facility] where she stayed since 12/2 - 12/5 due to concerns for severe manic symptoms including insomnia, excessive spending, Methodist delusions, paranoia in the context of medication non-adherence. Appears that symptoms improved, she requested discharge and did not meet the criteria for a 72 hour hold; and she was discharged as was considered to be appropriate for continuing management in the outpatient settings.    Patient's sister (psych nurse at [facility]) was present during porrtions of the interview with patient's permission.     Today, Cristina reports she had been off of medications since ~beginning of summer 2023 as she was feeling well and felt like she did not need to continue taking them, eventually resulted in a prominent depressive episode since ~mid summer followed by a brief period of euthymia and eventually experiencing hypomanic/manic symptoms, this in the context of medication non-adherence.  Today Cristina and her sister report that pt had consistently been taking Lithium 600 mg PO at bed time since  coming back from [place] on Nov 13 where symptoms worsened and resulted in full manic episode eventually leading to recent ED 4-day stay where she was restarted with Olanzapine 10 mg PO qhs and symptoms improved, was determined  she did not meet criteria for a 72 hr hold and was discharged home on 12/5/23 with family.  They both report medication adherence has been an issue over the past months and are aware that led to presenting shaina.    Today she reports  persistence of residual shaina that although has improved with recent addition of olanzapine, she is still experiencing prominent irritability, rushing thoughts that lead to anxiety/agitation. Seems like despite taking lithium consistently recent lithium lab was sub therapeutic. She has tolerated medications well without noticing side effects except some increased day-time sedation and weight gain over past weeks. Denies dry mouth/increased thirst, constipation, abnormal movements, chest pain, palpitations, SOB, any pain or other symptoms.     We discussed medication changes including cautiously increase lithium to 900 mg qhs with recommendation to monitor symptoms/side effects and follow up with PCP/endocrinology. Will closely monitor for side effects of lithium, including thyroid function, that was recently evidencing mild increased TSH/T4. She met with PCP earlier today and she will be seen by Endocrinology. Renal function ok. Ok with having [facility] reach out in 1 week to follow up on the above.     Given significant daytime sedation we will discontinue hydroxyzine and PRN Seroquel which reports had not been too helpful, add Olanzapine small doses PRN to target agitation/anxiety/shaina. Patient and sister deny concerns for perceptual disturbances.   No other questions or concerns.     Future Considerations:  -Coordinate care with PCP/endocrinology as able/needed. to monitor thyroid fx/recs regarding lithium use. Patient with family hx of thyroid issues.    -Obtain Li level q 6 months or more often if adherence is a concerns.   -Goal is monotherapy with Lithium once mood stability is achieved.   -Consider switching to a more metabolically  neutral mood-stabilizing atypical antipsychotic  -Continue encouraging/offering psychotherapy       Psychotropic Drug Interactions:  [PSYCHCLINICDDI]  none  Management: N/A    [place] was not [place] today: not using controlled substances    Risk Statements:   Treatment Risk- Risks, benefits, alternatives and potential adverse effects have been discussed and are understood.   Safety Risk-Cristina did not appear to be an imminent safety risk to self or others.  We discussed her safety/crisis plan and resources - Cristina and her sister agreed to call [facility] or on-call team, if worsened symptoms, medication concerns or safety concerns arise; and willing to seek ED evaluation, if immediate safety concerns or severe symptoms.        PLAN                                                                                                                1) Meds-   - Increase Lithium CR to 900 mg at bedtime   - Discontinue hydroxyzine 50 mg PO TID (started at the ED Dec 2)   - Discontinue Seroquel 25-50mg PRN for insomnia, hypomania sx.   - Continue Zyprexa 10mg at bedtime (re-started in hospital Dec 2)  -Start Olanzapine 2.5 mg  PO BID PRN for agitation/shaina/sleep      Other:   -continue vitamin D3 2000IU daily (OTC)  -melatonin 2.5mg at bedtime PRN (OTC)    2) Psychotherapy- Offered. Patient not interested at this time.       3) Next due-  Labs- lithium level last obtained 12/2/2023: 0.37,  will order repeat lab (Day 5 to 7 after dose increase) instructed to Please obtain roughly 12 hours after last lithium dose. Renal Fx ok. Thyroid abnorl labs, see lab section .                *note: does have history of subclinical hypothyroidism*   EKG- Determine next due/need.   Rating scales- PHQ9, GAD7    4) Referrals-  none     5)  "Other:  [facility] outreach call in 1-2 weeks, Nurse pool alerted.   Dr. Alicea alerted to consider further steps/earlier appointment as able.   Patient to follow-up with PCP/ endocrinogy re: thyroid fx.       6) Dispo- RTC on 1/4/23 with Dr. Gottlieb for a transfer of care visit.    PERTINENT ITEM HISTORY                                                                                          [most recent eval 10/14/22]     The following section contains information copied from previous note by Dr. Hart on 7/30/21 and has been reviewed, edited, and verified by the patient.      This patient first experienced mental health issues in February 2016 and has received treatment for Bipolar I most recent episode manic with history of psychosis requiring active treatment in ED 12/2-12/5/2023.  Notably, the patient has history of multiple hospitalizations with shaina and psychosis, including most recently July 2018. She has a history of difficult adherence to medications followed by decompensation and hospitalization.  After her hospitalization in July 2018 she did complete a Day Program.    Pertinent Items Include:  shaina, inconsistently taking medications    SUBJECTIVE     Patient is here today for an urgent add-on visit following recent ED visit and discharge.   Per Eyad Landaverde RN note:  \"Patient's sister, Stephie, called the clinic to express concerns for her sister's safety.  Patient has a history of Bipolar I.  She was hospitalized at the [place] ED 12/2 - 12/5 for severe manic symptoms including insomnia, excessive spending, Denominational delusions, paranoia, and noncompliance with medications.  She did not meet the criteria for a 72 hour hold and was discharged to outpatient care.     Per Stephie, patient has been aimlessly wandering outside and not dressed appropriate for the weather.  She is disorganized, hyperverbal, impulsively spending money, irritable with family members, making paranoid statements about family " "members.  Per her sister, patient has been medication compliant since she returned home.  She is taking her scheduled medications, which were not changed.  She is taking PRN Seroquel and hydroxyzine.  No SI/SIB/HI statements observed.     Family prefers to not bring pt back to ED.    Discharge Recommendations:  1.  Discharge home to her family  2.  Continue her current medication Zyprexa 10 mg at bedtime and lithium 600 mg at bedtime Seroquel as needed for insomnia  3.  Consult psychiatry as needed  4.   Refer to psychiatric provider for medication management.    treatment per ED team\"     Patient needs an urgent appointment.  Last seen here on 5/9/23 by Dr. Yanes.  Transfer appt with Dr. Gottlieb on 1/4/24.    Per today's interview:   Updates: Present during visit is her sister  -Zyprexa: Taking 10 mg at bedtime, yesterday took 20mg.  -Seroquel: 25 Mg, has not taken consistently, instead has taken more hydroxyzine.   -Reports weight gain ~15 lbs over past 3 weeks, been on Zyprexa since 12/2, may not be completely due to this   -Went to [place], was not sleeping, was spending, implulsively, having rushing thoughts.  -Reports stopped Lithium mid summer, was having significant depressive symptoms. Was not taking meds.   -Spent all her savings (~3000$) on free duty stores,  bags, etc.   -Came back from [place] on Nov 12, restarted Lithium and seroquel on Nov 13.   -Met with PCP this AM: Will be seeing Endocrinology due to recent thyroid labs  -Asymptomatic    -\"My biggest problem is that I don't have a car right now\"   -\"My parents took my money\"  Reports is due to overspending and understands this   -Sister is a psych nurse at [employer]. \"Treats me like a patient.\" And sometimes does not like having \"too many eyes on (her\" and being asked if took her meds and being supervised to do this. Offered family meeting/therapy.  -Worked as a  last year. Stopped working June this summer due to increasing " "symptoms.  -Mood: \"Ok\" jeromy was depressed in the summer   -When not sleeping ok feels like someone is out there trying to get her. Denies this being a concern today, feels safe at home  -Appetite: Ok   -Weight changes: As above   -Sleep: Average gets ~7 -8 hrs. Wakes up around 5-6 AM   -BM/constipation: Denies   -Tremor/abnormal movements:Denies, none observed   -Side effects? Chest pain, palpitations. Other side effects: Denies   -Perceptual disturbances: Denies   -Safety: Feels safe at home     Recent Psych Symptoms:   Depression:  low energy, hypersomnia, appetite changes, weight changes, poor concentration /memory, and indecisiveness  Elevated:  distractibility , racing thoughts, dysregulation, and prominent irritability, hypertalkative, overall improved since recent ED visit  Psychosis:  none  Anxiety:  excessive worry, feeling fearful, and nervous/overwhelmed  Trauma Related:  none  Insomnia:  No  Other:  No    Recent Substance Use:  None reported     Pertinent Negatives: No suicidal or violent ideation, self-injury, psychosis, shaina and harmful substance use  Adverse Effects: none reported       MENTAL STATUS EXAM     Alertness: alert  and groggy, took extra PRN this AM   Appearance: well groomed  Behavior/Demeanor: cooperative and calm, with good  eye contact   Speech: increased rate  Language: intact  Psychomotor: normal or unremarkable  Mood: \"good\"   Affect: full range and somewhat elevated range; congruent to: mood- yes, content- yes  Thought Process/Associations: unremarkable - logical and linear, though mildly rapid rate of thoughts; no paranoia/delusions, nor grandiosity.  Thought Content:  Reports none currently ;  Denies suicidal & violent ideation and delusions, delusions , phobia , and magical thinking  Perception:  Reports none;  Denies hallucinations, visual distortion seen as shadows , depersonalization, and derealization  Insight: fair and adequate for safety   Judgment: fair and adequate " for safety  Cognition: does  appear grossly intact; formal cognitive testing was not done  Gait and Station: unremarkable     MEDICAL HISTORY and ALLERGY     ALLERGIES: Patient has no known allergies.    Patient Active Problem List   Diagnosis    Adela (H)    Bipolar affective disorder, current episode manic with psychotic symptoms (H)    Hx of psychiatric care    Bipolar I disorder, current or most recent episode manic, with psychotic features (H)    Lymphadenopathy    Bipolar 1 disorder (H)    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, unspecified thyrotoxicosis type    Hypercalcemia    Myalgia    Hyponatremia    Weakness        MEDICAL REVIEW OF SYSTEMS   Contraception- deferred  Pregnant- deferred  A comprehensive review of systems was performed and is negative other than noted in the HPI.     MEDICATIONS     Current Outpatient Medications   Medication Sig Dispense Refill    hydrOXYzine HCl (ATARAX) 50 MG tablet Take 1 tablet (50 mg) by mouth every 8 hours as needed for anxiety 90 tablet 0    lithium ER (LITHOBID) 300 MG CR tablet Take 2 tablets (600 mg) by mouth at bedtime 60 tablet 0    OLANZapine (ZYPREXA) 10 MG tablet Take 1 tablet (10 mg) by mouth at bedtime 30 tablet 0    QUEtiapine (SEROQUEL) 25 MG tablet Take 1 tablet (25 mg) by mouth nightly as needed (sleep) 30 tablet 0      VITALS   There were no vitals taken for this visit.        LABS and DATA         3/13/2023    12:03 PM 5/9/2023     9:43 AM 11/21/2023     1:41 PM   PHQ   PHQ-9 Total Score 0 8 3   Q9: Thoughts of better off dead/self-harm past 2 weeks Not at all Not at all Not at all     Recent Labs   Lab Test 12/02/23  1310 09/29/23  2306 09/29/23  1443 03/08/23 1827 03/06/23  1229 11/10/22  1750   * 81   < >  --    < >  --    A1C  --   --   --  5.6  --  5.8*    < > = values in this interval not displayed.     Recent Labs   Lab Test 03/08/23 1827 01/13/17  0936   CHOL 100 83   TRIG 66 34   LDL 44 36   HDL 43* 40*     Recent Labs   Lab Test  12/02/23  1310 09/29/23  1443   AST 26 30   ALT 21 17   ALKPHOS 96 67     Recent Labs   Lab Test 12/02/23  1310 09/29/23  1443 09/28/22  1931 07/09/21  1159 07/29/20  1200   WBC 7.1 2.6*   < > 9.2 10.4   ANEU  --   --   --  5.5 5.3   HGB 11.9 11.6*   < > 11.7 12.1    209   < > 316 331    < > = values in this interval not displayed.      Recent Labs   Lab Test 12/02/23  1310 03/10/23  1229 11/10/22  1750   LITHIUM 0.37* 0.5* 0.5*     Recent Labs   Lab Test 12/02/23  1310 11/13/23  0957 09/29/23  2306   CR 0.65 0.61 0.54   GFRESTIMATED >90 >90 >90     --  138   POTASSIUM 4.2  --  3.9   BHANU 9.2 9.2 9.2     Recent Labs   Lab Test 09/29/23  2359 11/10/22  1750   SG 1.017 1.025     Recent Labs   Lab Test 12/02/23  1310 11/13/23  0957   TSH 4.83* 2.07     HCG urine on 12/2/2023 at [place] ED was negative.      Component  Ref Range & Units 4 wk ago  (12/2/23) 10 mo ago  (3/5/23) 1 yr ago  (10/11/22) 1 yr ago  (9/28/22) 5 yr ago  (6/30/18) 5 yr ago  (6/1/18) 5 yr ago  (4/2/18)     Amphetamines Urine  Screen Negative Screen Negative Screen Negative CM Screen Negative CM Screen Negative CM Negative R, CM Negative R, CM Negative R, CM   Comment: Cutoff for a negative amphetamine is less than 500 ng/mL.    Barbituates Urine  Screen Negative Screen Negative Screen Negative CM Screen Negative CM Screen Negative CM Negative R, CM Negative R, CM Negative R, CM   Comment: Cutoff for a negative barbiturate is less than 200 ng/mL.    Benzodiazepine Urine  Screen Negative Screen Negative Screen Negative CM  Screen Negative CM      Comment: Cutoff for a negative benzodiazepine is less than 100 ng/mL.    Cannabinoids Urine  Screen Negative Screen Negative Screen Negative CM Screen Negative CM Screen Negative CM Negative R, CM Negative R, CM Negative R, CM   Comment: Cutoff for a negative cannabinoid is less than 50 ng/mL.    Cocaine Urine  Screen Negative Screen Negative Screen Negative CM  Screen Negative CM      Comment:  Cutoff for a negative cocaine is less than 300 ng/mL.    Fentanyl Qual Urine  Screen Negative Screen Negative         Comment: Cutoff for negative fentanyl is less than 5 ng/mL.    Opiates Urine  Screen Negative Screen Negative Screen Negative CM Screen Negative CM Screen Negative CM Negative R, CM Negative R, CM Negative R, CM   Comment: Cutoff for a negative opiate is less than 300 ng/mL.    PCP Urine  Screen Negative Screen Negative   Screen Negative CM      Comment: Cutoff for a negative PCP is less than 25 ng/mL.   Resulting Agency [facility]              Specimen Collected: 12/02/23  8:58 AM             ECG 2/8/2016 QTc = 404ms        PROVIDER: Tommy Carr MD    Level of Medical Decision Making:   - At least 1 chronic problem that is not stable  - Engaged in prescription drug management during visit (discussed any medication benefits, side effects, alternatives, etc.)      Psychiatry Individual Psychotherapy Note   Psychotherapy start time - 1330  Psychotherapy end time - 1350   Date treatment plan last reviewed with patient - 12/8/23  Subjective: This supportive psychotherapy session addressed issues related to goals of therapy and current psychosocial stressors. Patient's reaction: Pre-contemplation, Preparatory, Action, and Maintenance in the context of mental status appropriate for ambulatory setting.    Interactive complexity indicated? No  Plan: RTC in timeframe noted above  Psychotherapy services during this visit included myself and the patient.   Treatment Plan      SYMPTOMS; PROBLEMS   MEASURABLE GOALS;    FUNCTIONAL IMPROVEMENT / GAINS INTERVENTIONS DISCHARGE CRITERIA   Anxiety: excessive worry and feeling fearful  Adela/Hypomania: increased energy, decreased sleep need, increased activity, distractibility , racing thoughts, excessive spending, inter-episode mood instability, and dysregulation  Psychosocial: mental health symptoms   reduce depressive  episodes, learn best practices for sleep, reduce panic attacks/ excessive worry, exercise for 20 minutes 3-7 days a week , learn 2 new ways of coping with routine stressors, take medications as prescribed on a daily basis, and improve nutrition Supportive / psychodynamic marked symptom improvement, symptom resolution, reduced visit frequency, achievement of  functional goals, and transition to CBT therapy       Patient staffed in clinic with Dr. Agosto who will sign the note.  Supervisor is Dr. Cochran.

## 2023-12-08 NOTE — PATIENT INSTRUCTIONS
Schedule annual exam in 1-2 months to get vaccines updated and discuss future plan for cervical cancer screening.     Expect a phone call from a care coordinator to get advise on pursing health insurance through [place] and ask questions about unemployment resources.     Follow up with mental health specialist.

## 2023-12-08 NOTE — Clinical Note
Pls call pt, remind her she has an appointment at 1 PM today with mental health specialist for follow up. Please remind her to ask at the appointment when she needs her next labs rechecked.

## 2023-12-08 NOTE — PROGRESS NOTES
SUBJECTIVE:   Cristina is a 23 year old, presenting for the following:  Physical        12/8/2023     9:05 AM   Additional Questions   Roomed by Paz MOSS MA   Accompanied by n/a         12/8/2023     9:05 AM   Patient Reported Additional Medications   Patient reports taking the following new medications Magnesium, Vitamin D chew       Healthy Habits:     Getting at least 3 servings of Calcium per day:  NO    Bi-annual eye exam:  Yes    Dental care twice a year:  NO    Sleep apnea or symptoms of sleep apnea:  None    Diet:  Regular (no restrictions)    Frequency of exercise:  4-5 days/week    Duration of exercise:  15-30 minutes    Taking medications regularly:  Yes    Barriers to taking medications:  None    Medication side effects:  Other    Additional concerns today:  No    Recently hospitalized for FELTON    Current Outpatient Medications   Medication Instructions    hydrOXYzine HCl (ATARAX) 50 mg, Oral, EVERY 8 HOURS PRN    lithium ER (LITHOBID) 600 mg, Oral, AT BEDTIME    OLANZapine (ZYPREXA) 10 mg, Oral, AT BEDTIME    QUEtiapine (SEROQUEL) 25 mg, Oral, AT BEDTIME PRN        Taking seroquel every night but will use only as needed.   Hydroxyzine every 8 hours if needed for anxiety - its helpful.         Social History     Tobacco Use    Smoking status: Never    Smokeless tobacco: Never   Substance Use Topics    Alcohol use: No     {Rooming staff  Click this link to complete the Prescreen if response below is not for today's visit  Alcohol Use Prescreen >3 drinks/day or > 7 drinks/week.  If the prescreen question answer is YES, complete the full AUDIT  :988466}        12/8/2023     8:36 AM   Alcohol Use   Prescreen: >3 drinks/day or >7 drinks/week? No     Reviewed orders with patient.  Reviewed health maintenance and updated orders accordingly - { :321187}  {Chronicprobdata (optional):161703}    Breast Cancer Screening:    FHS-7:        No data to display              {If any of the questions to the BCRA  "(FHS-7) are answered yes, consider ordering referral for genetic counseling (Optional) :157474}  {AMB Mammogram Decision Support (Optional) :523489}  Pertinent mammograms are reviewed under the imaging tab.    History of abnormal Pap smear: { :221255}     Reviewed and updated as needed this visit by clinical staff   Tobacco  Allergies  Meds              Reviewed and updated as needed this visit by Provider                 {HISTORY OPTIONS (Optional):436449}    Review of Systems   Constitutional:  Negative for chills and fever.   HENT:  Negative for congestion, ear pain, hearing loss and sore throat.    Eyes:  Negative for pain and visual disturbance.   Respiratory:  Negative for cough and shortness of breath.    Cardiovascular:  Negative for chest pain, palpitations and peripheral edema.   Gastrointestinal:  Negative for abdominal pain, constipation, diarrhea, heartburn, hematochezia and nausea.   Breasts:  Negative for tenderness, breast mass and discharge.   Genitourinary:  Negative for dysuria, frequency, genital sores, hematuria, pelvic pain, urgency, vaginal bleeding and vaginal discharge.   Musculoskeletal:  Negative for arthralgias, joint swelling and myalgias.   Skin:  Negative for rash.   Neurological:  Negative for dizziness, weakness, headaches and paresthesias.   Psychiatric/Behavioral:  Negative for mood changes. The patient is not nervous/anxious.      {FEMALE ROS (Optional):904804}     OBJECTIVE:   /81 (BP Location: Right arm, Patient Position: Chair, Cuff Size: Adult Regular)   Pulse 99   Temp 98.1  F (36.7  C) (Tympanic)   Resp 24   Ht 1.68 m (5' 6.14\")   Wt 67.1 kg (148 lb)   SpO2 99%   BMI 23.79 kg/m    Physical Exam  {Exam Choices (Optional):806185}    {Diagnostic Test Results (Optional):875266}    ASSESSMENT/PLAN:   {Diag Picklist:850161}    {Patient advised of split billing (Optional):047472}      COUNSELING:  {FEMALE COUNSELING MESSAGES:216722::\"Reviewed preventive health " "counseling, as reflected in patient instructions\"}        She reports that she has never smoked. She has never used smokeless tobacco.      {Counseling Resources  [place] Preventive Services Task Force  Cholesterol Screening  Health diet/nutrition  Pooled Cohorts Equation Calculator  USDA's MyPlate  ASA Prophylaxis  Lung CA Screening  Osteoporosis prevention/bone health :527696}  {Breast Cancer Risk Calculator  BRCA-Related Cancer Risk Assessment FHS-7 Tool :940486}  LINDEN Garces [facility]  "

## 2023-12-08 NOTE — TELEPHONE ENCOUNTER
RN left  for patient requesting gabriel back to clinic.    RN called to discuss providers message.      Felice Edmonds, APRN CNP  P Ne Rn Triage  Pls call pt, remind her she has an appointment at 1 PM today with mental health specialist for follow up. Please remind her to ask at the appointment when she needs her next labs rechecked.    Pritesh Calvert RN, BSN, PHN  [facility]

## 2023-12-08 NOTE — LETTER
Cristina      We have been trying to reach out to you regarding your need for labs.  Felice Edmonds did want you to ask your mental health provider when you next needed labs drawn.

## 2023-12-08 NOTE — PROGRESS NOTES
Assessment & Plan     Bipolar 1 disorder (H)  Chronic, recently exacerbated, meds adjusted in hospital and has follow up with mental health in 1 month.   - Primary Care - Care Coordination Referral    Unemployed  Unable to continue employment since last year, no income, currently living with parents and on their health insurance. She will turn 24 in march and needs advising on obtaining health insurance.   - Primary Care - Care Coordination Referral             MED REC REQUIRED  Post Medication Reconciliation Status: discharge medications reconciled, continue medications without change  See Patient Instructions    LINDEN Garces CNP  [facility] FELIX Evans is a 23 year old, presenting for the following health issues:  Hospital F/U      12/8/2023    10:15 AM   Additional Questions   Roomed by Paz MOSS MA   Accompanied by n/a         12/8/2023    10:15 AM   Patient Reported Additional Medications   Patient reports taking the following new medications none       HPI       Patient feels her condition has improved.   She wants to continue taking her meds. She reports adherence to current plan.  She wants to adhere to lab checks.  Reports her sister is a psych RN and very helpful.     She has a goal of getting health insurance before march since her health ins will no longer work as she will turn 24 and no longer qualify for parents plan.   She wants to get a job, sick of not having money.   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   She wants to travel and teach [place] to make money before grad school.         Hospital Follow-up Visit:  Hospital/Nursing Home/IP Rehab Facility: [facility]  Date of Admission: 12/2/23  Date of Discharge: 12/5/23  Reason(s) for Admission: manic episode, bipolar I    Was your hospitalization related to COVID-19? No   Problems taking medications regularly:  No  Medication  changes since discharge: None  Problems adhering to non-medication therapy:  None    Summary of hospitalization:  [facility] discharge summary reviewed  Diagnostic Tests/Treatments reviewed.  Follow up needed: will need lab monitoring for lithium.  Other Healthcare Providers Involved in Patient s Care:         Specialist appointment - mental health  per patient.   Update since discharge: stable.         Plan of care communicated with patient         Psych NP discharge note 12//5:  1.  Discharge home to her family  2.  Continue  her current medication Zyprexa 10 mg at bedtime and lithium 600 mg at bedtime Seroquel as needed for insomnia  3.  Consult psychiatry as needed  4.   Refer to psychiatric provider for medication management. *   treatment per ED team     - Consulted with  [facility] , ED physician Kaylin asked if they would like this writer to enter orders in the EHR,  patient's ED RN regarding this case.     Please call [facility]/DEC at [phone] if you have follow-up questions or wish to place another consult.  Claudio Nicole, Psychiatric Nurse practitioner    Final discharge note from hospitalization LISCW:   Plan: Final Disposition / Recommended Care Path: discharge  Plan for Care reviewed with assigned Medical Provider: yes  Plan for Care Team Review: provider  Comments: Results pending  Patient and/or validated legal guardian concurs: yes  Clinical Substantiation: Patient has engaged in BEC programming, LMHP and psychiatric consult service providers. Patient has been compliant with medication regimen. Patient shows improved insight and organized thought process. Patient has improving symptoms of shaina. Patient denies any SIB, SI, or HI. Patient denies any auditory or visual  hallucinations. Patient is goal oriented in future plans for outpatient follow up, school and work responsibilities. Patient will be discharged.     Current Outpatient Medications   Medication Instructions    hydrOXYzine  "HCl (ATARAX) 50 mg, Oral, EVERY 8 HOURS PRN    lithium ER (LITHOBID) 600 mg, Oral, AT BEDTIME    OLANZapine (ZYPREXA) 10 mg, Oral, AT BEDTIME    QUEtiapine (SEROQUEL) 25 mg, Oral, AT BEDTIME PRN            Review of Systems   Constitutional, HEENT, cardiovascular, pulmonary, gi and gu systems are negative, except as otherwise noted.      Objective    /81 (BP Location: Right arm, Patient Position: Chair, Cuff Size: Adult Regular)   Pulse 99   Temp 98.1  F (36.7  C) (Tympanic)   Resp 24   Ht 1.68 m (5' 6.14\")   Wt 67.1 kg (148 lb)   SpO2 99%   BMI 23.79 kg/m    Body mass index is 23.79 kg/m .  Physical Exam   GENERAL: healthy, alert and no distress, dressed appropriately for weather with winter coat, hat  EYES: Eyes grossly normal to inspection, PERRL and conjunctivae and sclerae normal  CV/RESP: rate WDL, respiratory rate normal, effort is unlabored, even/symmetric. No cough, wheezing, or signs of distress. No cyanosis or notable peripheral edema.  MS: no gross musculoskeletal defects noted, no edema  PSYCH: affect is pleasant and cooperative but restless, came out of treatment room 4 times to ask when provider available, rapid speech but thoughts are logical and linear.                       "

## 2023-12-10 NOTE — PATIENT INSTRUCTIONS
**For crisis resources, please see the information at the end of this document**   Patient Education    Thank you for coming to the [facility] [place] [facility].     Lab Testing:  If you had lab testing today and your results are reassuring or normal they will be mailed to you or sent through [employer] within 7 days. If the lab tests need quick action we will call you with the results. The phone number we will call with results is # [phone]. If this is not the best number please call our clinic and change the number.     Medication Refills:  If you need any refills please call your pharmacy and they will contact us. Our fax number for refills is [phone].   Three business days of notice are needed for general medication refill requests.   Five business days of notice are needed for controlled substance refill requests.   If you need to change to a different pharmacy, please contact the new pharmacy directly. The new pharmacy will help you get your medications transferred.     Contact Us:  Please call [phone] during business hours (8-5:00 M-F).   If you have medication related questions after clinic hours, or on the weekend, please call [phone].     Financial Assistance [phone]   Medical Records [phone]       MENTAL HEALTH CRISIS RESOURCES:  For a emergency help, please call 911 or go to the nearest Emergency Department.     Emergency Walk-In Options:   EmPATH Unit @ [facility] ([place]): [phone] - Specialized mental health emergency area designed to be calming  [facility] ([place]): [phone]  [facility] ([place]): [phone]  [facility]): [phone]    [facility] Crisis Information:   [place]: [phone]  [place]: [phone]  [place] ([place]) - Adult: [phone]     Child: [phone]  [place] - Adult: [phone]     Child: [phone]  [place]:  [phone]  List of all [facility] resources:   https://[place].gov/dhs/people-we-serve/adults/health-care/mental-health/resources/crisis-contacts.jsp    National Crisis Information:   Crisis Text Line: Text  [place]  to 672141  Suicide & Crisis Lifeline: 988  National Suicide Prevention Lifeline: 9-390-588-TALK (1-941.702.3347)       For online chat options, visit https://suicidepreventionlifeline.org/chat/  Poison Control Center: 5-129-749-0910  Trans Lifeline: 4-969-159-0310 - Hotline for transgender people of all ages  The [employer]: 0-528-679-4088 - Hotline for LGBT youth     For Non-Emergency Support:   Fast Tracker: Mental Health & Substance Use Disorder Resources -   https://www.[place] [facility] [place].org/

## 2023-12-11 ENCOUNTER — PATIENT OUTREACH (OUTPATIENT)
Dept: CARE COORDINATION | Facility: CLINIC | Age: 23
End: 2023-12-11
Payer: COMMERCIAL

## 2023-12-11 ENCOUNTER — TELEPHONE (OUTPATIENT)
Dept: PSYCHIATRY | Facility: CLINIC | Age: 23
End: 2023-12-11
Payer: COMMERCIAL

## 2023-12-11 NOTE — TELEPHONE ENCOUNTER
[facility] Call Center    Phone Message    May a detailed message be left on voicemail: yes     Reason for Call: Other: Patient needs lab orders sent to [facility], needs to check lithium levels.     Action Taken: Message routed to:  Other: P PSYCHIATRY NURSE-P    Travel Screening: Not Applicable

## 2023-12-11 NOTE — PROGRESS NOTES
Community Health Worker Initial Outreach    CHW Initial Information Gathering:  Referral Source: PCP  Preferred Hospital: [facility]  [phone]  Preferred Urgent Care: Other  Current living arrangement:: I live in a private home with family  Type of residence:: Private home - stairs  Community Resources: None  Supplies Currently Used at Home: None  Equipment Currently Used at Home: none  Informal Support system:: Family  No PCP office visit in Past Year: No  Transportation means:: Regular car  CHW Additional Questions  If ED/Hospital discharge, follow-up appointment scheduled as recommended?: N/A  Medication changes made following ED/Hospital discharge?: N/A  MyChart active?: Yes  Patient sent Social Determinants of Health questionnaire?: No    Patient accepts CC: Yes. Patient scheduled for assessment with CC NHAN on 12/18 at 2 pm. Patient noted desire to discuss insurance after she turns 24 and is off parent's insurance.     EVELINA Gutierrez [place], Samson English [place] and [facility]  [phone]

## 2023-12-12 ENCOUNTER — LAB (OUTPATIENT)
Dept: LAB | Facility: CLINIC | Age: 23
End: 2023-12-12
Payer: COMMERCIAL

## 2023-12-12 DIAGNOSIS — F31.9 BIPOLAR 1 DISORDER (H): ICD-10-CM

## 2023-12-12 LAB — LITHIUM SERPL-SCNC: 0.8 MMOL/L (ref 0.6–1.2)

## 2023-12-12 PROCEDURE — 36415 COLL VENOUS BLD VENIPUNCTURE: CPT

## 2023-12-12 PROCEDURE — 80178 ASSAY OF LITHIUM: CPT

## 2023-12-15 ENCOUNTER — TELEPHONE (OUTPATIENT)
Dept: PSYCHIATRY | Facility: CLINIC | Age: 23
End: 2023-12-15
Payer: COMMERCIAL

## 2023-12-15 NOTE — TELEPHONE ENCOUNTER
----- Message from Kathy Macias RN sent at 12/11/2023  7:21 AM CST -----  Dr. Surendra Montalvo and I met with this patient as an added -on visit on 12/8 due to recent discharge from ED stay and ongoing residual manic symptoms. This is Dr. Jennifer Gottlieb's patient and he will be meeting with her on Jan/4/24/   We recommended cautiously increase lithium to 900 mg qhs with recommendation of monitor symptoms/side effects and follow up with PCP/endocrinology.. Will closely monitor for side effects, thyroid function, that was recently evidencing mild increased TSH/T4. She met with PCP earlier on 12/8 and she will be seen by Endocrinology. Renal function ok. She agreed with having [facility] reaching out  out in 1 week the follow up on the above.     Cc'ing Dr. Gottlieb as FYI for now and to follow lithium lab ordered for 12/15. And defer further decisions.       Thank you in advance.   Tommy Kong.     Follow Up:  Writer attempted to call patient to check in. LVM requesting a call back at the main clinic number.

## 2023-12-19 NOTE — TELEPHONE ENCOUNTER
Writer called patient and was able to connect. Patient states that things have been going well. Speech is clear and coherent with regular rate and rhythm. Patient has been taking her increased lithium and denies any medication side effects. Patient denies any symptoms of shaina. Patient reports she has been getting good sleep.     Patient states that she has not followed up with endocrinology, but states she will try to do this.    Patient denies any other questions or concerns at this time and will contact the clinic if this changes.

## 2023-12-19 NOTE — TELEPHONE ENCOUNTER
Looks like patient had lithium lab drawn 12/12.    No further action needed closing encounter.      [place], RN    Triage Nurse  [facility]

## 2024-01-02 ENCOUNTER — OFFICE VISIT (OUTPATIENT)
Dept: FAMILY MEDICINE | Facility: CLINIC | Age: 24
End: 2024-01-02
Payer: COMMERCIAL

## 2024-01-02 ENCOUNTER — HOSPITAL ENCOUNTER (OUTPATIENT)
Dept: GENERAL RADIOLOGY | Facility: HOSPITAL | Age: 24
Discharge: HOME | End: 2024-01-02
Payer: COMMERCIAL

## 2024-01-02 VITALS
RESPIRATION RATE: 16 BRPM | TEMPERATURE: 99.2 F | OXYGEN SATURATION: 99 % | DIASTOLIC BLOOD PRESSURE: 80 MMHG | HEART RATE: 96 BPM | SYSTOLIC BLOOD PRESSURE: 118 MMHG | WEIGHT: 158.1 LBS | BODY MASS INDEX: 25.41 KG/M2

## 2024-01-02 DIAGNOSIS — J40 BRONCHITIS: ICD-10-CM

## 2024-01-02 DIAGNOSIS — J40 BRONCHITIS: Primary | ICD-10-CM

## 2024-01-02 LAB
BASOPHILS # BLD AUTO: 0.1 10E3/UL (ref 0–0.2)
BASOPHILS NFR BLD AUTO: 1 %
DEPRECATED S PYO AG THROAT QL EIA: NEGATIVE
EOSINOPHIL # BLD AUTO: 0.1 10E3/UL (ref 0–0.7)
EOSINOPHIL NFR BLD AUTO: 1 %
ERYTHROCYTE [DISTWIDTH] IN BLOOD BY AUTOMATED COUNT: 16.5 % (ref 10–15)
FLUAV AG SPEC QL IA: NEGATIVE
FLUBV AG SPEC QL IA: NEGATIVE
GROUP A STREP BY PCR: NOT DETECTED
HCT VFR BLD AUTO: 35.5 % (ref 35–47)
HGB BLD-MCNC: 11.7 G/DL (ref 11.7–15.7)
IMM GRANULOCYTES # BLD: 0 10E3/UL
IMM GRANULOCYTES NFR BLD: 0 %
LYMPHOCYTES # BLD AUTO: 2.2 10E3/UL (ref 0.8–5.3)
LYMPHOCYTES NFR BLD AUTO: 25 %
MCH RBC QN AUTO: 29.8 PG (ref 26.5–33)
MCHC RBC AUTO-ENTMCNC: 33 G/DL (ref 31.5–36.5)
MCV RBC AUTO: 91 FL (ref 78–100)
MONOCYTES # BLD AUTO: 0.9 10E3/UL (ref 0–1.3)
MONOCYTES NFR BLD AUTO: 11 %
NEUTROPHILS # BLD AUTO: 5.4 10E3/UL (ref 1.6–8.3)
NEUTROPHILS NFR BLD AUTO: 62 %
PLATELET # BLD AUTO: 342 10E3/UL (ref 150–450)
RBC # BLD AUTO: 3.92 10E6/UL (ref 3.8–5.2)
SARS-COV-2 RNA RESP QL NAA+PROBE: NEGATIVE
WBC # BLD AUTO: 8.7 10E3/UL (ref 4–11)

## 2024-01-02 PROCEDURE — 71046 X-RAY EXAM CHEST 2 VIEWS: CPT

## 2024-01-02 PROCEDURE — 36415 COLL VENOUS BLD VENIPUNCTURE: CPT

## 2024-01-02 PROCEDURE — 85025 COMPLETE CBC W/AUTO DIFF WBC: CPT

## 2024-01-02 PROCEDURE — 87804 INFLUENZA ASSAY W/OPTIC: CPT

## 2024-01-02 PROCEDURE — 99214 OFFICE O/P EST MOD 30 MIN: CPT

## 2024-01-02 PROCEDURE — 87635 SARS-COV-2 COVID-19 AMP PRB: CPT

## 2024-01-02 PROCEDURE — 87651 STREP A DNA AMP PROBE: CPT

## 2024-01-02 RX ORDER — PREDNISONE 20 MG/1
20 TABLET ORAL DAILY
Qty: 5 TABLET | Refills: 0 | Status: SHIPPED | OUTPATIENT
Start: 2024-01-02 | End: 2024-01-07

## 2024-01-02 RX ORDER — BENZONATATE 200 MG/1
200 CAPSULE ORAL 3 TIMES DAILY PRN
Qty: 30 CAPSULE | Refills: 0 | Status: SHIPPED | OUTPATIENT
Start: 2024-01-02 | End: 2024-02-09

## 2024-01-02 RX ORDER — FLUTICASONE PROPIONATE 220 UG/1
1 AEROSOL, METERED RESPIRATORY (INHALATION) 2 TIMES DAILY
Qty: 30 G | Refills: 0 | Status: SHIPPED | OUTPATIENT
Start: 2024-01-02

## 2024-01-02 NOTE — PROGRESS NOTES
URGENT CARE  Assessment & Plan   Assessment:   Cristina Boyd is a 23 year old female who's clinical presentation today is consistent with:   1. Bronchitis  - Influenza A & B Antigen - Clinic Collect  - Streptococcus A Rapid Screen w/Reflex to PCR - Clinic Collect  - COVID-19 Virus (Coronavirus) by PCR Nose  - XR Chest 2 Views; Future  - CBC with platelets and differential;  - fluticasone (FLOVENT HFA) 220 MCG/ACT inhaler;  - predniSONE (DELTASONE) 20 MG tablet  - benzonatate (TESSALON) 200 MG capsule;   Plan:  Will treat patient today for bronchitis supportively and symptomatically. Patient's chest xray and labs were reassuring}, no suspicion for bacterial infectious lung pathology. Will attempt to alleviate patient's symptoms today w/ oral steroids}, inhalers} and antitussives}; side effects of medications reviewed. Also encouraged patient to continue with OTC cold/flu medications and encouraged fluids and rest. Additionally we discussed if symptoms do not improve after starting today's treatment (or if symptoms worsen) to follow up in 5-7 days.    No alarm signs or symptoms present   Differential Diagnoses for this patient's chief complaint that I considered include:  Bacterial vs viral etiology of URI, covid, pharyngitis/tonsillitis, pneumonia, bronchitis, Common cold, allergic rhinitis, seasonal allergies, ABRS, viral sinusitis, cough, pertussis, Mononucleosis, tonsillitis, chronic sinusitis, meningococcal disease     Patient is agreeable to treatment plan and state they will follow-up if symptoms do not improve and/or if symptoms worsen   see patient's AVS 'monitor for' section for specific patient instructions given and discussed regarding what to watch for and when to follow up    LINDEN Evans [facility]      ______________________________________________________________________      Subjective     HPI: Cristina Boyd  is a 23 year old  female who presents today for evaluation  the following concerns:   Patient  endorses a cough today which they state they have had for over a week    Patient reports congestion, cough, throat pain, and shivering    Patient states it hurts to breath but denies wheezing or shortness of breath     Review of Systems:  Pertinent review of systems as reflected in HPI, otherwise negative.     Objective    Physical Exam:  Vitals:    01/02/24 1147   BP: 118/80   Pulse: 96   Resp: 16   Temp: 99.2  F (37.3  C)   TempSrc: Oral   SpO2: 99%   Weight: 71.7 kg (158 lb 1.6 oz)      General: Alert and oriented, no acute distress, Vital signs reviewed: afebrile},  Normotensive   Psy/mental status: Cooperative, nonanxious  SKIN: Intact, no rashes  EYES: EOMs intact, PERRLA bilaterally   Conjunctiva: Clear bilaterally, no injection or erythema present  EARS: TMs intact, translucent gray in color with normal landmarks present no erythema  or bulging tympanic membrane   Canals are without swelling, however have a mild amount of cerumen, no impaction  NOSE:  mucosa moist               No frontal or maxillary sinus tenderness present bilaterally  MOUTH/THROAT: lips, tongue, & oral mucosa appear normal upon inspection                Posterior oropharynx is erythematous but without exudate, lesions or tonsillar  Edema, no dysphonia, no unilateral tonsillar edema, no uvular deviation,   no signs of peritonsillar abscess  NECK: supple, has full range of motion with no meningeal signs              No lymphadenopathy present  LUNG: normal work of breathing, good respiratory effort without retractions, good air  movement, non labored, inspection reveals normal chest expansion w/  inspiration            Lung sounds are clear  to auscultation bilaterally,            No rales/rhonic/crackles noted           cough noted has harsh but non productive     LABS:   Results for orders placed or performed during the hospital encounter of 01/02/24   XR Chest 2 Views     Status: None    Narrative     EXAM: XR CHEST 2 VIEWS  LOCATION: [facility]  DATE: 1/2/2024    INDICATION: Cough.  COMPARISON: Chest CT 06/07/2018.      Impression    IMPRESSION: Negative chest. Lungs are clear.     Results for orders placed or performed in visit on 01/02/24   CBC with platelets and differential     Status: Abnormal   Result Value Ref Range    WBC Count 8.7 4.0 - 11.0 10e3/uL    RBC Count 3.92 3.80 - 5.20 10e6/uL    Hemoglobin 11.7 11.7 - 15.7 g/dL    Hematocrit 35.5 35.0 - 47.0 %    MCV 91 78 - 100 fL    MCH 29.8 26.5 - 33.0 pg    MCHC 33.0 31.5 - 36.5 g/dL    RDW 16.5 (H) 10.0 - 15.0 %    Platelet Count 342 150 - 450 10e3/uL    % Neutrophils 62 %    % Lymphocytes 25 %    % Monocytes 11 %    % Eosinophils 1 %    % Basophils 1 %    % Immature Granulocytes 0 %    Absolute Neutrophils 5.4 1.6 - 8.3 10e3/uL    Absolute Lymphocytes 2.2 0.8 - 5.3 10e3/uL    Absolute Monocytes 0.9 0.0 - 1.3 10e3/uL    Absolute Eosinophils 0.1 0.0 - 0.7 10e3/uL    Absolute Basophils 0.1 0.0 - 0.2 10e3/uL    Absolute Immature Granulocytes 0.0 <=0.4 10e3/uL   Influenza A & B Antigen - Clinic Collect     Status: Normal    Specimen: Nose; Swab   Result Value Ref Range    Influenza A antigen Negative Negative    Influenza B antigen Negative Negative    Narrative    Test results must be correlated with clinical data. If necessary, results should be confirmed by a molecular assay or viral culture.   Streptococcus A Rapid Screen w/Reflex to PCR - Clinic Collect     Status: Normal    Specimen: Throat; Swab   Result Value Ref Range    Group A Strep antigen Negative Negative   CBC with platelets and differential     Status: Abnormal    Narrative    The following orders were created for panel order CBC with platelets and differential.  Procedure                               Abnormality         Status                     ---------                               -----------         ------                     CBC with platelets and  harshad..[[id number]]  Abnormal            Final result                 Please view results for these tests on the individual orders.       Imaging:   All images were personally read by this provider (myself).   Per my independent interpretation the xray shows no signs of consolidation or infiltrates suggesting pneumonia        ______________________________________________________________________    I explained my diagnostic considerations and recommendations to the patient, who voiced understanding and agreement with the treatment plan.   All questions were answered.   We discussed potential side effects, risks and benefits of any prescribed or recommended therapies, as well as expectations for response to treatments.  Please see AVS for any patient instructions & handouts given.   Patient was advised to contact the Nurse Care Line, their Primary Care provider, Urgent Care, or the Emergency Department if there are new or worsening symptoms, or call 911 for emergencies.

## 2024-01-03 ASSESSMENT — PATIENT HEALTH QUESTIONNAIRE - PHQ9
SUM OF ALL RESPONSES TO PHQ QUESTIONS 1-9: 5
10. IF YOU CHECKED OFF ANY PROBLEMS, HOW DIFFICULT HAVE THESE PROBLEMS MADE IT FOR YOU TO DO YOUR WORK, TAKE CARE OF THINGS AT HOME, OR GET ALONG WITH OTHER PEOPLE: SOMEWHAT DIFFICULT
SUM OF ALL RESPONSES TO PHQ QUESTIONS 1-9: 5

## 2024-01-04 ENCOUNTER — VIRTUAL VISIT (OUTPATIENT)
Dept: PSYCHIATRY | Facility: CLINIC | Age: 24
End: 2024-01-04
Attending: STUDENT IN AN ORGANIZED HEALTH CARE EDUCATION/TRAINING PROGRAM
Payer: COMMERCIAL

## 2024-01-04 DIAGNOSIS — F31.9 BIPOLAR 1 DISORDER (H): ICD-10-CM

## 2024-01-04 DIAGNOSIS — F31.9 BIPOLAR 1 DISORDER (H): Primary | ICD-10-CM

## 2024-01-04 DIAGNOSIS — F41.1 GAD (GENERALIZED ANXIETY DISORDER): ICD-10-CM

## 2024-01-04 PROCEDURE — 90792 PSYCH DIAG EVAL W/MED SRVCS: CPT | Mod: 95

## 2024-01-04 NOTE — PROGRESS NOTES
"   [facility]  TRANSFER of CARE DIAGNOSTIC ASSESSMENT     CARE TEAM:    PCP- Felice Edmonds  Therapist- None, but she is interested in having a therapist.    Cristina is a 23 year old who uses the name Cristina and pronouns she, her, hers, presenting for a transfer of care appointment following recent urgent visit by Dr Carr after an active treatment in Emergency Department for manic episode with psychosis.      Chief Concern     \"Preventing the next episode of shaina\"     Diagnoses     Bipolar I disorder, most recent manic episode, current residual manic symptoms without current psychosis     Assessment     Cristina is a 22 yo person with past psychiatric diagnosis listed above, who is seen today for a transfer of care visit, following recent ED stay and discharge. She had been working with Dr. Yanes before.    It appears she had been experiencing worsening mood symptoms in the context of medication non-adherence, which led to ED visit at [place] [facility] where she stayed since 12/2 - 12/5 due to concerns for severe manic symptoms including insomnia, excessive spending, Pentecostalism delusions, paranoia in the context of medication non-adherence. Appears that symptoms improved, she requested discharge and did not meet the criteria for a 72 hour hold; and she was discharged as was considered to be appropriate for continuing management in the outpatient settings.    Patient's sister (psych nurse at [employer]) and also other family members are supporting her and she lives with the family.     Today, Cristina was reporting being reasonably stable on higher dose of lithium without a significant side effects other than polyuria.She believes lithium is the only thing that work for her.    She reports she had been off of medications since ~beginning of summer 2023 as she was feeling well and felt like she did not need to continue taking them, eventually resulted in a " prominent depressive episode since ~mid summer followed by a brief period of euthymia and eventually experiencing hypomanic/manic symptoms, this in the context of medication non-adherence.   She reports  persistence of residual shaina that although has improved with recent addition of olanzapine. Her recent lithium lab on mid December 2023 is in therapeutic level 0.8. She has tolerated medications well without noticing side effects except some increased day-time sedation and mild weight gain over past weeks. Denies constipation, abnormal movements, chest pain, palpitations, SOB, any pain or other symptoms.     We discussed keeping the medication without any changes for now with recommendation to monitor symptoms/side effects and follow up with PCP/endocrinology. Will closely monitor for side effects of lithium, including thyroid function, that was recently evidencing mild increased TSH/T4.  Renal function ok.    No safety concerns, other questions or concerns.     Future Considerations:  -Coordinate care with PCP/endocrinology as able/needed. to monitor thyroid fx/recs regarding lithium use. Patient with family hx of thyroid issues.   -Obtain Li level q 6 months or more often if adherence is a concerns.   -Goal is monotherapy with Lithium once mood stability is achieved.   -Consider switching to a more metabolically  neutral mood-stabilizing atypical antipsychotic  -Continue encouraging/offering psychotherapy       Psychotropic Drug Interactions:  [PSYCHCLINICDDI]  none  Management: N/A    [place]MP was not checked today: not using controlled substances    Risk Statements:   Treatment Risk- Risks, benefits, alternatives and potential adverse effects have been discussed and are understood.   Safety Risk-Cristina did not appear to be an imminent safety risk to self or others.  We discussed her safety/crisis plan and resources - Cristina and her sister agreed to call [facility] or on-call team, if worsened symptoms,  medication concerns or safety concerns arise; and willing to seek ED evaluation, if immediate safety concerns or severe symptoms.        Plan     1) Meds-   - Continue Lithium CR to 900 mg at bedtime   - Continue Zyprexa 10mg at bedtime (re-started in hospital Dec 2)  - Continue Olanzapine 2.5 mg  PO BID PRN for agitation/shaina/sleep (2-3 times last week)     Other:   -continue vitamin D3 2000IU daily (OTC)  -melatonin 2.5mg at bedtime PRN (OTC)    2) Psychotherapy- Offered. Patient is interested.      3) Next due-  Labs- lithium level last obtained 12/2/2023: 0.37,  will order repeat lab (Day 5 to 7 after dose increase) instructed to Please obtain roughly 12 hours after last lithium dose. Renal Fx ok. Thyroid abnorl labs, see lab section .                *note: does have history of subclinical hypothyroidism*   EKG- Will be asked next due/need. Last one on chart in 2016  Rating scales- PHQ9, GAD7    4) Referrals-  Supportive psychotherapy without deep historical reviews    5) Other:  Patient to follow-up with PCP/ endocrinogy re: thyroid fx.       6) Dispo- RTC on 4 weeks after her return from [place]     Pertinent Background                                                   [most recent eval 01/04/24]     The following section contains information copied from previous note by Dr. Dr Yanes and Dr Hart and has been reviewed, edited, and verified by the patient.      This patient first experienced mental health issues in February 2016 and has received treatment for Bipolar I most recent episode manic with history of psychosis requiring active treatment in ED 12/2-12/5/2023.  Notably, the patient has history of multiple hospitalizations with shaina and psychosis, including most recently July 2018 and then Dec 2023. She has a history of difficult adherence to medications followed by decompensation and hospitalization.  After her hospitalization in July 2018 she did complete a Day Program.    Pertinent Items  "Include: shaina, inconsistently taking medications     Subjective     Per today's interview:   - Cristina is reporting having a flu like symptoms, cold and runny nose which made her feel bad during last week.   - is planning to go to [place] to \"escape from [place] cold\".  -\"My parents took my money\"  Reports is due to overspending and understands this   -Sister is a psych nurse at [employer]. \"Treats me like a patient.\" And sometimes does not like having \"too many eyes on (her\" and being asked if took her meds and being supervised to do this. Offered family meeting/therapy.  -Worked as a  last year. Stopped working June this summer due to increasing symptoms.  -Mood: \"Ok\" shares was depressed in the summer   -When not sleeping ok feels like someone is out there trying to get her. Denies this being a concern today, feels safe at home  -Appetite: Ok   -Weight changes: As above   -Sleep: Average gets ~7 -8 hrs. Wakes up around 5-6 AM   -BM/constipation: Denies   -Tremor/abnormal movements:Denies, none observed   -Side effects? Chest pain, palpitations. Other side effects: Denies   -Perceptual disturbances: Denies   -Safety: Feels safe at home     Recent Psych Symptoms:   Depression:  low energy, hypersomnia, appetite changes, weight changes, poor concentration /memory, and indecisiveness  Elevated:  distractibility , racing thoughts, dysregulation, and prominent irritability, hypertalkative, overall improved since recent ED visit  Psychosis:  none  Anxiety:  excessive worry, feeling fearful, and nervous/overwhelmed  Trauma Related:  none  Insomnia:  No  Other:  No    Recent Substance Use:  None reported     Pertinent Negatives: No suicidal or violent ideation, self-injury, psychosis, shaina and harmful substance use  Adverse Effects: none reported     Mental Status Exam     Alertness: alert  and oriented  Appearance: adequately groomed  Behavior/Demeanor: cooperative and pleasant, with good  eye contact " "  Speech: increased rate and mildly pressured   Language: intact  Psychomotor: calm occasionally mildly restless  Mood: description consistent with euthymia  Affect: full range; congruent to: mood- yes, content- yes  Thought Process/Associations: unremarkable and overinclusive   Thought Content:  Reports none;  Denies suicidal & violent ideation and delusions  Perception:  Reports none;  Denies hallucinations  Insight: adequate  Judgment: good  Cognition: does  appear grossly intact; formal cognitive testing was not done  Gait and Station: N/A (telehealth)     Social History                                 pt reported     Financial/ Work- 7th grade . She received her bachelors degree from [employer] now works on her master in speech therapy  Partner/ - single  Children- no      Living situation- She currently lives with her family in [place]. She lives with her mom, dad, sister, and three brothers.  Social/ Spiritual Support- family is very supportive     Feels Safe at Home- yes   Legal- None     Trauma History (self-report)- no (does reports having a difficult time while living in [place] as most of the rest of her family living there were boys and felt isolated).  Early History/Education-  Grew up in [place] \"but also grew up in [place]\" (lived in [place] for 3 years between [age]) with mother, Joy, Father, Americo, Oldest sibling. Has 4 younger siblings (Polly, Sam, Drew, Cristi).  Went to college at [employer] before transfer to [employer].     Family Mental Health History                                 pt reported     Cousin with bipolar; Uncle with substance use; Grandmother \"with thyroid problems\" and diabetes.     Past Psychiatric History     SIB- no  Suicide Attempt [#, most recent]- No   Suicidal Ideation Hx- patient denies  Violence/Aggression Hx- No   Psychosis Hx- Yes: paranoia (most recently during 2018)   Eating Disorder Hx- No   Other- Caught " "typhoid while in [place] and recalls that after that is when she first started experiencing depression, but wasn't sure what was going on. She then spent some time with her cousin \"in a nicer city\" and felt better over the summer (age 14). Her first manic episode was in the winter of her senior year of high school, \"a few months before I turned 16.\" Was last hospitalized in 2018 and in 2019 was the first winter that she did not experience an episode of shaina.  Psych Hosp [#, most recent]- Yes: multiple. Most recent July 2018   Commitment- No   TMS/ECT- No   Outpatient Programs - Yes: IOP at [facility] discharge 7/19/19   Other - none    Past Use- none reported     Treatment- #, most recent- No   Medical Consequences- No   Legal Consequences- No   Other- none      Past Psych Med Trials     {Per chart:  Medication  Dose (mg) Effect  Dates of Use   aripiprazole 30   2017, 2018    quetiapine 400mg +   \"eye twitch\" 2018             Depakote 1000   2017   lithium 900   2017 - present             benztropine     2017             propranolol 20 BID   2018   lorazepam 1 Effective for sleep regulation (2 wk course) 2017-  2020      Vitals   LMP 12/30/2023   Pulse Readings from Last 3 Encounters:   01/02/24 96   12/08/23 111   12/08/23 99     Wt Readings from Last 3 Encounters:   01/02/24 71.7 kg (158 lb 1.6 oz)   12/08/23 68.2 kg (150 lb 6.4 oz)   12/08/23 67.1 kg (148 lb)     BP Readings from Last 3 Encounters:   01/02/24 118/80   12/08/23 116/80   12/08/23 126/81        Medical History     ALLERGIES: Patient has no known allergies.    Patient Active Problem List   Diagnosis    Shaina (H)    Bipolar affective disorder, current episode manic with psychotic symptoms (H)    Hx of psychiatric care    Bipolar I disorder, current or most recent episode manic, with psychotic features (H)    Lymphadenopathy    Bipolar 1 disorder (H)    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, unspecified thyrotoxicosis type    Hypercalcemia    Myalgia    " Hyponatremia    Weakness        Medications     Current Outpatient Medications   Medication Sig Dispense Refill    benzonatate (TESSALON) 200 MG capsule Take 1 capsule (200 mg) by mouth 3 times daily as needed for cough 30 capsule 0    fluticasone (FLOVENT HFA) 220 MCG/ACT inhaler Inhale 1 puff into the lungs 2 times daily 30 g 0    lithium ER (ESKALITH CR/LITHOBID) 450 MG CR tablet Take 2 tablets (900 mg) by mouth at bedtime 60 tablet 1    OLANZapine (ZYPREXA) 10 MG tablet Take 1 tablet (10 mg) by mouth at bedtime 30 tablet 0    OLANZapine (ZYPREXA) 2.5 MG tablet Take 1 tablet (2.5 mg) by mouth 2 times daily as needed (agitation/shaina) (Patient not taking: Reported on 1/2/2024) 60 tablet 0    predniSONE (DELTASONE) 20 MG tablet Take 1 tablet (20 mg) by mouth daily for 5 days 5 tablet 0        Labs and Data         5/9/2023     9:44 AM 12/8/2023    12:47 PM 1/2/2024     9:11 AM   PROMIS-10 Total Score w/o Sub Scores   PROMIS TOTAL - SUBSCORES 14 27 28         1/30/2017    10:07 AM 10/21/2022    11:29 AM   CAGE-AID Total Score   Total Score 0 0   Total Score MyChart  0 (A total score of 2 or greater is considered clinically significant)         5/9/2023     9:43 AM 11/21/2023     1:41 PM 1/3/2024    10:54 PM   PHQ-9 SCORE   PHQ-9 Total Score MyChart 8 (Mild depression) 3 (Minimal depression) 5 (Mild depression)   PHQ-9 Total Score 8 3 5         10/21/2022    11:00 AM 3/13/2023    12:04 PM 11/21/2023     1:42 PM   REGINO-7 SCORE   Total Score  0 (minimal anxiety) 6 (mild anxiety)   Total Score 7 0 6       Liver/Kidney Function, TSH Metabolic Blood counts   Recent Labs   Lab Test 12/02/23  1310 11/13/23  0957   AST 26  --    ALT 21  --    ALKPHOS 96  --    CR 0.65 0.61     Recent Labs   Lab Test 12/02/23  1310   TSH 4.83*    Recent Labs   Lab Test 03/08/23  1827   CHOL 100   TRIG 66   LDL 44   HDL 43*     Recent Labs   Lab Test 03/08/23  1827   A1C 5.6     Recent Labs   Lab Test 12/02/23  1310   *    Recent Labs    Lab Test 01/02/24  1247   WBC 8.7   HGB 11.7   HCT 35.5   MCV 91              ECG 2/8/2016 QTc = 404ms      PROVIDER: Jennifer Gottlieb MD    Level of Medical Decision Making:   - At least 1 chronic problem that is not stable  - Engaged in prescription drug management during visit (discussed any medication benefits, side effects, alternatives, etc.)     {    Patient staffed in clinic with Dr. Guzman who will sign the note.  Supervisor is Dr. Guzman.

## 2024-01-04 NOTE — PATIENT INSTRUCTIONS
**For crisis resources, please see the information at the end of this document**   Patient Education    Thank you for coming to the [facility] [place] [facility].    Lab Testing:  If you had lab testing today and your results are reassuring or normal they will be mailed to you or sent through [employer] within 7 days. If the lab tests need quick action we will call you with the results. The phone number we will call with results is # [phone] (home) . If this is not the best number please call our clinic and change the number.    Medication Refills:  If you need any refills please call your pharmacy and they will contact us. Our fax number for refills is [phone]. Please allow three business for refill processing. If you need to  your refill at a new pharmacy, please contact the new pharmacy directly. The new pharmacy will help you get your medications transferred.     Scheduling:  If you have any concerns about today's visit or wish to schedule another appointment please call our office during normal business hours [phone] (8-5:00 M-F)    Contact Us:  Please call [phone] during business hours (8-5:00 M-F).  If after clinic hours, or on the weekend, please call  [phone].    Financial Assistance [phone]  [employer] Billing [phone]  Central Billing Office, [facility]: [phone]  [place] Billing [phone]  Medical Records [phone]  [place] Patient Bill of Rights [email or web address]       MENTAL HEALTH CRISIS RESOURCES:  For a emergency help, please call 911 or go to the nearest Emergency Department.     Emergency Walk-In Options:   EmPATH Unit @ [place] [facility] ([place]): [phone] - Specialized mental health emergency area designed to be calming  [facility] ([place]): [phone]  [facility] ([place]):  [phone]  [facility] ([place]): [phone]    [facility] Crisis Information:   [place]: [phone]  Luis Enrique: [phone]  [place] CHARLES) - Adult: [phone]     Child: [phone]  Joe - Adult: [phone]     Child: [phone]  [place]: [phone]  List of all [facility] resources:   https://[place].gov/dhs/people-we-serve/adults/health-care/mental-health/resources/crisis-contacts.jsp    National Crisis Information:   Crisis Text Line: Text  [place]  to 524130  National Suicide Prevention Lifeline: 0-870-907-RPHL (1-492.618.1471)       For online chat options, visit https://suicidepreventionlifeline.org/chat/  Poison Control Center: 1-862.771.8286  Trans Lifeline: 1-982.563.2240 - Hotline for transgender people of all ages  The [employer] Project: 3-128-333-5119 - Hotline for LGBT youth     For Non-Emergency Support:   Fast Tracker: Mental Health & Substance Use Disorder Resources -   https://www.fasttrackPomme de Terran.org/       Again thank you for choosing [facility] [place] [facility] and please let us know how we can best partner with you to improve you and your family's health.    You may be receiving a survey regarding this appointment. We would love to have your feedback, both positive and negative. The survey is done by an external company, so your answers are anonymous.

## 2024-01-04 NOTE — PROGRESS NOTES
Virtual Visit Details    Type of service:  Video Visit     Originating Location (pt. Location): Home    Distant Location (provider location):  On-site  Platform used for Video Visit: Marcello

## 2024-01-04 NOTE — NURSING NOTE
Is the patient currently in the state of [place]? YES    Visit mode:VIDEO    If the visit is dropped, the patient can be reconnected by: VIDEO VISIT: Send to e-mail at: [email or web address]    Will anyone else be joining the visit? NO  (If patient encounters technical issues they should call [phone])    How would you like to obtain your AVS? MyChart    Are changes needed to the allergy or medication list? No    Reason for visit: No chief complaint on file.    Verna AMAYAF

## 2024-01-05 ENCOUNTER — HOSPITAL ENCOUNTER (EMERGENCY)
Facility: CLINIC | Age: 24
Discharge: HOME | End: 2024-01-05
Attending: EMERGENCY MEDICINE | Admitting: EMERGENCY MEDICINE
Payer: COMMERCIAL

## 2024-01-05 VITALS
RESPIRATION RATE: 16 BRPM | WEIGHT: 158.9 LBS | OXYGEN SATURATION: 98 % | HEART RATE: 78 BPM | TEMPERATURE: 98.3 F | BODY MASS INDEX: 25.54 KG/M2 | SYSTOLIC BLOOD PRESSURE: 116 MMHG | DIASTOLIC BLOOD PRESSURE: 74 MMHG

## 2024-01-05 DIAGNOSIS — F31.12 BIPOLAR 1 DISORDER, MANIC, MODERATE (H): ICD-10-CM

## 2024-01-05 PROBLEM — F31.10: Status: ACTIVE | Noted: 2024-01-05

## 2024-01-05 PROBLEM — F31.10: Status: RESOLVED | Noted: 2024-01-05 | Resolved: 2024-01-05

## 2024-01-05 LAB
ANION GAP SERPL CALCULATED.3IONS-SCNC: 10 MMOL/L (ref 7–15)
BASOPHILS # BLD AUTO: 0 10E3/UL (ref 0–0.2)
BASOPHILS NFR BLD AUTO: 0 %
BUN SERPL-MCNC: 9.6 MG/DL (ref 6–20)
CALCIUM SERPL-MCNC: 8.9 MG/DL (ref 8.6–10)
CHLORIDE SERPL-SCNC: 102 MMOL/L (ref 98–107)
CREAT SERPL-MCNC: 0.61 MG/DL (ref 0.51–0.95)
DEPRECATED HCO3 PLAS-SCNC: 23 MMOL/L (ref 22–29)
EGFRCR SERPLBLD CKD-EPI 2021: >90 ML/MIN/1.73M2
EOSINOPHIL # BLD AUTO: 0.2 10E3/UL (ref 0–0.7)
EOSINOPHIL NFR BLD AUTO: 2 %
ERYTHROCYTE [DISTWIDTH] IN BLOOD BY AUTOMATED COUNT: 16.3 % (ref 10–15)
FLUAV RNA SPEC QL NAA+PROBE: NEGATIVE
FLUBV RNA RESP QL NAA+PROBE: NEGATIVE
GLUCOSE SERPL-MCNC: 150 MG/DL (ref 70–99)
HCT VFR BLD AUTO: 32.8 % (ref 35–47)
HGB BLD-MCNC: 10.4 G/DL (ref 11.7–15.7)
IMM GRANULOCYTES # BLD: 0 10E3/UL
IMM GRANULOCYTES NFR BLD: 0 %
LITHIUM SERPL-SCNC: 0.66 MMOL/L (ref 0.6–1.2)
LYMPHOCYTES # BLD AUTO: 2.1 10E3/UL (ref 0.8–5.3)
LYMPHOCYTES NFR BLD AUTO: 22 %
MCH RBC QN AUTO: 29.6 PG (ref 26.5–33)
MCHC RBC AUTO-ENTMCNC: 31.7 G/DL (ref 31.5–36.5)
MCV RBC AUTO: 93 FL (ref 78–100)
MONOCYTES # BLD AUTO: 0.8 10E3/UL (ref 0–1.3)
MONOCYTES NFR BLD AUTO: 9 %
NEUTROPHILS # BLD AUTO: 6.3 10E3/UL (ref 1.6–8.3)
NEUTROPHILS NFR BLD AUTO: 67 %
NRBC # BLD AUTO: 0 10E3/UL
NRBC BLD AUTO-RTO: 0 /100
PLATELET # BLD AUTO: 320 10E3/UL (ref 150–450)
POTASSIUM SERPL-SCNC: 3.6 MMOL/L (ref 3.4–5.3)
RBC # BLD AUTO: 3.51 10E6/UL (ref 3.8–5.2)
RSV RNA SPEC NAA+PROBE: NEGATIVE
SARS-COV-2 RNA RESP QL NAA+PROBE: NEGATIVE
SODIUM SERPL-SCNC: 135 MMOL/L (ref 135–145)
WBC # BLD AUTO: 9.6 10E3/UL (ref 4–11)

## 2024-01-05 PROCEDURE — 80178 ASSAY OF LITHIUM: CPT | Performed by: EMERGENCY MEDICINE

## 2024-01-05 PROCEDURE — 99285 EMERGENCY DEPT VISIT HI MDM: CPT | Performed by: EMERGENCY MEDICINE

## 2024-01-05 PROCEDURE — 80048 BASIC METABOLIC PNL TOTAL CA: CPT | Performed by: EMERGENCY MEDICINE

## 2024-01-05 PROCEDURE — 85025 COMPLETE CBC W/AUTO DIFF WBC: CPT | Performed by: EMERGENCY MEDICINE

## 2024-01-05 PROCEDURE — 87637 SARSCOV2&INF A&B&RSV AMP PRB: CPT | Performed by: EMERGENCY MEDICINE

## 2024-01-05 PROCEDURE — 99283 EMERGENCY DEPT VISIT LOW MDM: CPT | Performed by: EMERGENCY MEDICINE

## 2024-01-05 PROCEDURE — 250N000013 HC RX MED GY IP 250 OP 250 PS 637: Performed by: EMERGENCY MEDICINE

## 2024-01-05 PROCEDURE — 36415 COLL VENOUS BLD VENIPUNCTURE: CPT | Performed by: EMERGENCY MEDICINE

## 2024-01-05 RX ORDER — OLANZAPINE 5 MG/1
5 TABLET ORAL 2 TIMES DAILY PRN
Qty: 20 TABLET | Refills: 0 | Status: SHIPPED | OUTPATIENT
Start: 2024-01-05 | End: 2024-02-12

## 2024-01-05 RX ORDER — OLANZAPINE 10 MG/1
10 TABLET, ORALLY DISINTEGRATING ORAL ONCE
Status: COMPLETED | OUTPATIENT
Start: 2024-01-05 | End: 2024-01-05

## 2024-01-05 RX ORDER — OLANZAPINE 2.5 MG/1
2.5 TABLET, FILM COATED ORAL 2 TIMES DAILY PRN
Status: DISCONTINUED | OUTPATIENT
Start: 2024-01-05 | End: 2024-01-05 | Stop reason: HOSPADM

## 2024-01-05 RX ORDER — LITHIUM CARBONATE 450 MG
900 TABLET, EXTENDED RELEASE ORAL AT BEDTIME
Status: DISCONTINUED | OUTPATIENT
Start: 2024-01-05 | End: 2024-01-05 | Stop reason: HOSPADM

## 2024-01-05 RX ADMIN — OLANZAPINE 10 MG: 10 TABLET, ORALLY DISINTEGRATING ORAL at 04:09

## 2024-01-05 ASSESSMENT — ACTIVITIES OF DAILY LIVING (ADL)
ADLS_ACUITY_SCORE: 35

## 2024-01-05 NOTE — ED TRIAGE NOTES
Patient reports she has not slept well in a couple of days. She thinks it is due to feeling sick. Patient is falling asleep during the triage.     Triage Assessment (Adult)       Row Name 01/05/24 0113          Triage Assessment    Airway WDL WDL        Respiratory WDL    Respiratory WDL WDL        Skin Circulation/Temperature WDL    Skin Circulation/Temperature WDL WDL        Cardiac WDL    Cardiac WDL WDL        Peripheral/Neurovascular WDL    Peripheral Neurovascular WDL WDL        Cognitive/Neuro/Behavioral WDL    Cognitive/Neuro/Behavioral WDL WDL

## 2024-01-05 NOTE — DISCHARGE INSTRUCTIONS
Follow-up with your primary care provider.  Return to the emergency department as needed for any new or worsening symptoms.  You have been scheduled with the [facility] for a Diagnostic Assessment appointment on Wednesday 1/24/24 date at 10:30 AM . Please allow up to 90 -120 minutes for your appointment. This is an IN-PERSON appointment.    [address] [address]      *Follow the signs to the Emergency Room and park in the Yellow or Red Ramp.  You can obtain a reduced parking fee of $2 after your appointment.  The office is located next to [facility].  The  office number is [phone].    Please arrive before you scheduled appointment time, late arrivals are subject to the need to reschedule.   Please bring contact names and phone numbers for Emergency Contacts, therapist, psychiatrist, PO, attorneys, or other relevant contacts that may be needed.    *Face masking is optional.    Please note: Parents must accompany any patient under the age of 18. Any patient under legal guardianship will need to bring court documents.    Adult appointments can last up to 90+ minutes.    You will receive a phone call 2-4 days prior to your scheduled time to confirm and remind you of the appointment.      You will receive intake forms via Inside ([email or web address] to be completed prior to your appointment.  If able, please complete this prior to your appointment to reduce the appt length of time.      If you need to change this appointment for any reason, please call our Behavioral Access Scheduling office at [phone].  Please note, we ask for at least a 24-hour notice.  Any late cancelations will be considered a no-show.          Date/Time: 1/25/2024 9:40 AM  Virtual  Type: Dr. Gottlieb- NAVIGATE follow up  Location: [address] [place] [address]    If you need to cancel or reschedule please call [phone] at least 24  hours prior to your scheduled appointment.         Date: Sunday, 1/7/2024  Time: 1:00 pm - 2:00 pm  Provider: Cisco Kennedy MA  [facility]  Location: [facility], [facility], [place] [address]  Phone: [phone]  Type: Teletherapy    Patient Instructions  For all appointments: you will be sent information to fill out the intake paperwork online. Please complete required paperwork prior to your appointment. For telehealth appointments: you will also be sent a link to attend the appointment remotely

## 2024-01-05 NOTE — CONSULTS
Diagnostic Evaluation Consultation  Crisis Assessment    Patient Name: Cristina Boyd  Age:  23 year old  Legal Sex: female  Gender Identity: female  Pronouns:   Race: Black or   Ethnicity: Not  or   Language: English      Patient was assessed: Virtual: iPad Crisis Assessment Start Time: 0437 Crisis Assessment Stop Time: 0509  Patient location: [facility] EMERGENCY DEPARTMENT                             ED12    Referral Data and Chief Complaint  Cristina Boyd presents to the ED with family/friends. Patient is presenting to the ED for the following concerns: Anxiety, Other (see comment) (shaina and insomnia).   Factors that make the mental health crisis life threatening or complex are:  Patient struggling with ongoing symptoms of shaina including insomnia issues over the last several days.      Informed Consent and Assessment Methods  Explained the crisis assessment process, including applicable information disclosures and limits to confidentiality, assessed understanding of the process, and obtained consent to proceed with the assessment.  Assessment methods included conducting a formal interview with patient, review of medical records, collaboration with medical staff, and obtaining relevant collateral information from family and community providers when available.  : done     Patient response to interventions: verbalizes understanding, acceptance expressed, eager to participate  Coping skills were attempted to reduce the crisis:  speaking to siblings regarding concerns     History of the Crisis   Patient is a 23-year-old female with history of bipolar 1 disorder and generalized anxiety disorder who comes into the hospital brought in by her brother due to ongoing concerns of shaina and sleep deprivation.  Patient also reports of physical symptoms including a fever and bodyaches.  She states that she has not been getting any consistent sleep over the last several days and will  "only be able to sleep in short spurts.  She feels that \"too high of energy\" is what keeps her from sleeping.  While the patient does have a history of delusions, Catholic preoccupation and paranoia, she denies many of those symptoms currently.      She also reports a previous history of suicidal ideation, and while she does continue to endorse passive thoughts that sometimes life would be better if she was not alive, she denies any specific plan or intent of wanting to hurt herself at this moment.  She feels that she is not holding up her \" end of the deal\" with God where she is required to be praying 5 times per day which is not what she has been doing lately.  She reports a sense of negative self worth with not having a job or being in a relationship.  Patient last worked in May 2023.    Brief Psychosocial History  Family:  Single, Children no  Support System:  Sibling(s)  Employment Status:  unemployed  Source of Income:  none  Financial Environmental Concerns:  none  Current Hobbies:  cooking/baking, television/movies/videos, reading, exercise/fitness  Barriers in Personal Life:  mental health concerns    Significant Clinical History  Current Anxiety Symptoms:  racing thoughts, excessive worry, anxious  Current Depression/Trauma:  difficulty concentrating, thoughts of death/suicide, hopelessness, helplessness, crying or feels like crying  Current Somatic Symptoms:  anxious, racing thoughts  Current Psychosis/Thought Disturbance:  impulsive, elated mood, high risk behavior, distractability, hyperactive  Current Eating Symptoms:     Chemical Use History:  Alcohol: None  Benzodiazepines: None  Opiates: None  Cocaine: None  Marijuana: None  Other Use: None   Past diagnosis:  Bipolar Disorder, Depression  Family history:  No known history of mental health or chemical health concerns  Past treatment:  Primary Care, Psychiatric Medication Management, Inpatient Hospitalization  Details of most recent treatment:  pt " established with psychiatry. Pt is working with doctor to get upcoming therapy appointment scheduled  Other relevant history:          Collateral Information  Is there collateral information: No (Attempted to reach pt's mother, Joy Bravo,  [phone]. Left voicemail for mom to call back. Also called Americo Faria, father,  [phone], but no answer)     Collateral information name, relationship, phone number:       What happened today:       What is different about patient's functioning:       Concern about alcohol/drug use:      What do you think the patient needs:      Has patient made comments about wanting to kill themselves/others:      If d/c is recommended, can they take part in safety/aftercare planning:       Additional collateral information:        Risk Assessment  [place] Suicide Severity Rating Scale Full Clinical Version:  Suicidal Ideation  Q1 Wish to be Dead (Lifetime): Yes  Q2 Non-Specific Active Suicidal Thoughts (Lifetime): Yes  3. Active Suicidal Ideation with any Methods (Not Plan) Without Intent to Act (Lifetime): Yes  Q4 Active Suicidal Ideation with Some Intent to Act, Without Specific Plan (Lifetime): Yes  Q5 Active Suicidal Ideation with Specific Plan and Intent (Lifetime): No  Q6 Suicide Behavior (Lifetime): no     Suicidal Behavior (Lifetime)  Actual Attempt (Lifetime): No  Has subject engaged in non-suicidal self-injurious behavior? (Lifetime): No  Interrupted Attempts (Lifetime): No  Aborted or Self-Interrupted Attempt (Lifetime): No  Preparatory Acts or Behavior (Lifetime): No    [place] Suicide Severity Rating Scale Recent:   Suicidal Ideation (Recent)  Q1 Wished to be Dead (Past Month): yes  Q2 Suicidal Thoughts (Past Month): yes  Q3 Suicidal Thought Method: yes  Q4 Suicidal Intent without Specific Plan: no  Q5 Suicide Intent with Specific Plan: no  Level of Risk per Screen: moderate risk  Intensity of Ideation (Recent)  Most Severe Ideation Rating (Past 1 Month):  3  Frequency (Past 1 Month): 2-5 times in week  Duration (Past 1 Month): Less than 1 hour/some of the time  Controllability (Past 1 Month): Can control thoughts with little difficulty  Deterrents (Past 1 Month): Deterrents definitely stopped you from attempting suicide  Reasons for Ideation (Past 1 Month): Completely to end or stop the pain (You couldn't go on living with the pain or how you were feeling)  Suicidal Behavior (Recent)  Actual Attempt (Past 3 Months): No  Has subject engaged in non-suicidal self-injurious behavior? (Past 3 Months): No  Interrupted Attempts (Past 3 Months): No  Aborted or Self-Interrupted Attempt (Past 3 Months): No  Preparatory Acts or Behavior (Past 3 Months): No    Environmental or Psychosocial Events: impulsivity/recklessness, unemployment/underemployment, neither working nor attending school  Protective Factors: Protective Factors: good treatment engagement, sense of importance of health and wellness, able to access care without barriers, help seeking, cultural, spiritual , or Bahai beliefs associated with meaning and value in life, strong bond to family unit, community support, or employment, lives in a responsibly safe and stable environment, supportive ongoing medical and mental health care relationships    Does the patient have thoughts of harming others? Feels Like Hurting Others: no  Previous Attempt to Hurt Others: no  Is the patient engaging in sexually inappropriate behavior?: no    Is the patient engaging in sexually inappropriate behavior?  no        Mental Status Exam   Affect: Appropriate  Appearance: Appropriate  Attention Span/Concentration: Inattentive, Attentive  Eye Contact: Variable    Fund of Knowledge: Appropriate   Language /Speech Content: Fluent  Language /Speech Volume: Normal  Language /Speech Rate/Productions: Normal  Recent Memory: Intact  Remote Memory: Intact  Mood: Normal, Sad  Orientation to Person: Yes   Orientation to Place: Yes  Orientation to  Time of Day: Yes  Orientation to Date: Yes     Situation (Do they understand why they are here?): Yes  Psychomotor Behavior: Normal  Thought Content: Clear  Thought Form: Intact     Mini-Cog Assessment  Number of Words Recalled:    Clock-Drawing Test:     Three Item Recall:    Mini-Cog Total Score:       Medication  Psychotropic medications:   Medication Orders - Psychiatric (From admission, onward)      None             Current Care Team  Patient Care Team:  Felice Edmonds APRN CNP as PCP - General (Family Medicine)  Zhang Agosto MD as MD (Psychiatry)  Ina Mc MD as MD (Endocrinology, Diabetes, and Metabolism)  Sarai Jerry MD as MD (Endocrinology, Diabetes, and Metabolism)  Sarai Jerry MD as Assigned Endocrinology Provider  Felice Edmonds APRN CNP as Assigned PCP  Juani Burns APRN CNP as Assigned Behavioral Health Provider    Diagnosis  Patient Active Problem List   Diagnosis Code    Shaina (H) F30.9    Bipolar affective disorder, current episode manic with psychotic symptoms (H) F31.2    Hx of psychiatric care Z92.89    Bipolar I disorder, current or most recent episode manic, with psychotic features (H) F31.2    Lymphadenopathy R59.1    Bipolar 1 disorder (H) F31.9    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, unspecified thyrotoxicosis type E05.90    Hypercalcemia E83.52    Myalgia M79.10    Hyponatremia E87.1    Weakness R53.1    Bipolar affective disorder, current episode manic without psychotic symptoms (H) F31.10       Primary Problem This Admission  Active Hospital Problems    Bipolar disorder, current episode manic without psychotic features, unspecified (H)        Clinical Summary and Substantiation of Recommendations   Patient comes in the hospital due to ongoing concerns with shaina which has been resulted in the patient having difficulty sleeping over the last several days.  Patient feels that to live energy is what keeps her from being able to settle herself and get  the sleep that she needs.  Patient did connect with psychiatry yesterday and does feel good about her current medication regimen, however feels that the medications have not yet been useful for her to get a good night sleep.  Patient also is interested in getting connected to therapy in which we scheduled an appointment for this coming Sunday.  The patient does have a history of delusions and paranoia, however denies any of that during this hospital visit.      Patient is currently not interested in remaining in the hospital, however the patient was given Zyprexa medication and will allow the patient to sleep to determine whether remaining in the hospital will be necessary to improve stabilization.  The patient will be met with extended care later to determine next disposition. It is most likely that the patient will discharge upon waking and will encourage her to follow up with outpatient providers along with her scheduled therapy appointment this coming Sunday.  Discussed case with attending doctor who was in agreement with plan.          Patient coping skills attempted to reduce the crisis:  speaking to siblings regarding concerns    Disposition  Recommended disposition: Observation, Individual Therapy, Medication Management        Reviewed case and recommendations with attending provider. Attending Name: Dr. Velasquez       Attending concurs with disposition: yes       Patient and/or validated legal guardian concurs with disposition:   yes       Final disposition:  observation    Legal status on admission:      Assessment Details   Total duration spent with the patient: 32 min     CPT code(s) utilized: 81059 - Psychotherapy for Crisis - 60 (30-74*) min    Genaro Calvert Psychotherapist  [facility] - Triage & Transition Services  Callback: [phone]

## 2024-01-05 NOTE — ED PROVIDER NOTES
Emergency Department Patient Sign-out       Brief HPI:  This is a 23 year old female signed out to me by Dr. Velasquez .  See initial ED Provider note for details of the presentation.          Patient is medically cleared for admission to a Behavioral Health unit.      The patient is not on a hold.      The patient has not required medication for agitation.    Patient was seen by both the [facility]  and the physician on the prior shift.  Patient who is suffering from insomnia in the context of bipolar illness and current hypomanic state.  Has also suffered from a recent viral illness.  Her swab for RSV COVID and influenza was negative and she was medically cleared.  She is not currently paranoid or showing signs of psychosis but was deeply troubled and hypomania exacerbated by insomnia and so she was provided a dose of Zyprexa in hopes that she would sleep and then be reevaluated by the extended care team with a plan for likely discharge depending on the outcome of her brief stay here.            Exam:   Patient Vitals for the past 24 hrs:   BP Temp Temp src Pulse Resp SpO2 Weight   01/05/24 0115 -- -- -- -- -- -- 72.1 kg (158 lb 14.4 oz)   01/05/24 0113 126/83 98.3  F (36.8  C) Oral 106 18 94 % --     General: Patient is in no acute distress and is resting comfortably.  HEENT: Normocephalic atraumatic  Neck: Supple  Cardiovascular: Heart rate normal  Pulmonary: Patient is in no respiratory distress  Extremities: No signs of any significant or life-threatening trauma.  Neurologic: No new focal neurologic deficits.  Psychiatric: Patient is cooperative and alert.  She is pacing around the room in the emergency department.  Her speech is clear and coherent.  No signs of hallucinations or delusional thinking.  No suicidal or homicidal thoughts.  Insight fair judgment fair.      ED RESULTS:   Results for orders placed or performed during the hospital encounter of 01/05/24 (from the past 24 hour(s))   CBC with  platelets differential     Status: Abnormal    Collection Time: 01/05/24  2:09 AM    Narrative    The following orders were created for panel order CBC with platelets differential.  Procedure                               Abnormality         Status                     ---------                               -----------         ------                     CBC with platelets and d...[[id number]]  Abnormal            Final result                 Please view results for these tests on the individual orders.   Basic metabolic panel     Status: Abnormal    Collection Time: 01/05/24  2:09 AM   Result Value Ref Range    Sodium 135 135 - 145 mmol/L    Potassium 3.6 3.4 - 5.3 mmol/L    Chloride 102 98 - 107 mmol/L    Carbon Dioxide (CO2) 23 22 - 29 mmol/L    Anion Gap 10 7 - 15 mmol/L    Urea Nitrogen 9.6 6.0 - 20.0 mg/dL    Creatinine 0.61 0.51 - 0.95 mg/dL    GFR Estimate >90 >60 mL/min/1.73m2    Calcium 8.9 8.6 - 10.0 mg/dL    Glucose 150 (H) 70 - 99 mg/dL   Lithium level     Status: Normal    Collection Time: 01/05/24  2:09 AM   Result Value Ref Range    Lithium 0.66 0.60 - 1.20 mmol/L   CBC with platelets and differential     Status: Abnormal    Collection Time: 01/05/24  2:09 AM   Result Value Ref Range    WBC Count 9.6 4.0 - 11.0 10e3/uL    RBC Count 3.51 (L) 3.80 - 5.20 10e6/uL    Hemoglobin 10.4 (L) 11.7 - 15.7 g/dL    Hematocrit 32.8 (L) 35.0 - 47.0 %    MCV 93 78 - 100 fL    MCH 29.6 26.5 - 33.0 pg    MCHC 31.7 31.5 - 36.5 g/dL    RDW 16.3 (H) 10.0 - 15.0 %    Platelet Count 320 150 - 450 10e3/uL    % Neutrophils 67 %    % Lymphocytes 22 %    % Monocytes 9 %    % Eosinophils 2 %    % Basophils 0 %    % Immature Granulocytes 0 %    NRBCs per 100 WBC 0 <1 /100    Absolute Neutrophils 6.3 1.6 - 8.3 10e3/uL    Absolute Lymphocytes 2.1 0.8 - 5.3 10e3/uL    Absolute Monocytes 0.8 0.0 - 1.3 10e3/uL    Absolute Eosinophils 0.2 0.0 - 0.7 10e3/uL    Absolute Basophils 0.0 0.0 - 0.2 10e3/uL    Absolute Immature Granulocytes 0.0  <=0.4 10e3/uL    Absolute NRBCs 0.0 10e3/uL   Symptomatic Influenza A/B, RSV, & SARS-CoV2 PCR (COVID-19) Nasopharyngeal     Status: Normal    Collection Time: 01/05/24  2:10 AM    Specimen: Nasopharyngeal; Swab   Result Value Ref Range    Influenza A PCR Negative Negative    Influenza B PCR Negative Negative    RSV PCR Negative Negative    SARS CoV2 PCR Negative Negative    Narrative    Testing was performed using the Xpert Xpress CoV2/Flu/RSV Assay on the [employer] GeneXpert Instrument. This test should be ordered for the detection of SARS-CoV-2, influenza, and RSV viruses in individuals who meet clinical and/or epidemiological criteria. Test performance is unknown in asymptomatic patients. This test is for in vitro diagnostic use under the FDA EUA for laboratories certified under CLIA to perform high or moderate complexity testing. This test has not been FDA cleared or approved. A negative result does not rule out the presence of PCR inhibitors in the specimen or target RNA in concentration below the limit of detection for the assay. If only one viral target is positive but coinfection with multiple targets is suspected, the sample should be re-tested with another FDA cleared, approved, or authorized test, if coinfection would change clinical management. This test was validated by the [facility]. These laboratories are certified under the Clinical Laboratory Improvement Amendments of 1988 (CLIA-88) as qualified to perform high complexity laboratory testing.   [facility] Assessment Consult Order:     Status: None ()    Collection Time: 01/05/24  4:05 AM    Genaro Ch     1/5/2024  6:45 AM  Diagnostic Evaluation Consultation  Crisis Assessment    Patient Name: Cristina Boyd  Age:  23 year old  Legal Sex: female  Gender Identity: female  Pronouns:   Race: Black or   Ethnicity: Not  or   Language: English      Patient was  "assessed: Virtual: iPad Crisis Assessment Start Time:   0437 Crisis Assessment Stop Time: 0509  Patient location: [facility] EMERGENCY DEPARTMENT                             ED12    Referral Data and Chief Complaint  Cristina Boyd presents to the ED with family/friends. Patient is   presenting to the ED for the following concerns: Anxiety, Other   (see comment) (shaina and insomnia).   Factors that make the   mental health crisis life threatening or complex are:  Patient   struggling with ongoing symptoms of shaina including insomnia   issues over the last several days.      Informed Consent and Assessment Methods  Explained the crisis assessment process, including applicable   information disclosures and limits to confidentiality, assessed   understanding of the process, and obtained consent to proceed   with the assessment.  Assessment methods included conducting a   formal interview with patient, review of medical records,   collaboration with medical staff, and obtaining relevant   collateral information from family and community providers when   available.  : done     Patient response to interventions: verbalizes understanding,   acceptance expressed, eager to participate  Coping skills were attempted to reduce the crisis:  speaking to   siblings regarding concerns     History of the Crisis   Patient is a 23-year-old female with history of bipolar 1   disorder and generalized anxiety disorder who comes into the   hospital brought in by her brother due to ongoing concerns of   shaina and sleep deprivation.  Patient also reports of physical   symptoms including a fever and bodyaches.  She states that she   has not been getting any consistent sleep over the last several   days and will only be able to sleep in short spurts.  She feels   that \"too high of energy\" is what keeps her from sleeping.  While   the patient does have a history of delusions, Zoroastrian   preoccupation and paranoia, she denies many " "of those symptoms   currently.      She also reports a previous history of suicidal ideation, and   while she does continue to endorse passive thoughts that   sometimes life would be better if she was not alive, she denies   any specific plan or intent of wanting to hurt herself at this   moment.  She feels that she is not holding up her \" end of the   deal\" with God where she is required to be praying 5 times per   day which is not what she has been doing lately.  She reports a   sense of negative self worth with not having a job or being in a   relationship.  Patient last worked in May 2023.    Brief Psychosocial History  Family:  Single, Children no  Support System:  Sibling(s)  Employment Status:  unemployed  Source of Income:  none  Financial Environmental Concerns:  none  Current Hobbies:  cooking/baking, television/movies/videos,   reading, exercise/fitness  Barriers in Personal Life:  mental health concerns    Significant Clinical History  Current Anxiety Symptoms:  racing thoughts, excessive worry,   anxious  Current Depression/Trauma:  difficulty concentrating, thoughts of   death/suicide, hopelessness, helplessness, crying or feels like   crying  Current Somatic Symptoms:  anxious, racing thoughts  Current Psychosis/Thought Disturbance:  impulsive, elated mood,   high risk behavior, distractability, hyperactive  Current Eating Symptoms:     Chemical Use History:  Alcohol: None  Benzodiazepines: None  Opiates: None  Cocaine: None  Marijuana: None  Other Use: None   Past diagnosis:  Bipolar Disorder, Depression  Family history:  No known history of mental health or chemical   health concerns  Past treatment:  Primary Care, Psychiatric Medication Management,   Inpatient Hospitalization  Details of most recent treatment:  pt established with   psychiatry. Pt is working with doctor to get upcoming therapy   appointment scheduled  Other relevant history:          Collateral Information  Is there collateral " information: No (Attempted to reach pt's   mother, Joy Bravo,  [phone]. Left voicemail for mom to call   back. Also called Americo Faria, father,  [phone], but no   answer)     Collateral information name, relationship, phone number:       What happened today:       What is different about patient's functioning:       Concern about alcohol/drug use:      What do you think the patient needs:      Has patient made comments about wanting to kill   themselves/others:      If d/c is recommended, can they take part in safety/aftercare   planning:       Additional collateral information:        Risk Assessment  Stanly Suicide Severity Rating Scale Full Clinical Version:  Suicidal Ideation  Q1 Wish to be Dead (Lifetime): Yes  Q2 Non-Specific Active Suicidal Thoughts (Lifetime): Yes  3. Active Suicidal Ideation with any Methods (Not Plan) Without   Intent to Act (Lifetime): Yes  Q4 Active Suicidal Ideation with Some Intent to Act, Without   Specific Plan (Lifetime): Yes  Q5 Active Suicidal Ideation with Specific Plan and Intent   (Lifetime): No  Q6 Suicide Behavior (Lifetime): no     Suicidal Behavior (Lifetime)  Actual Attempt (Lifetime): No  Has subject engaged in non-suicidal self-injurious behavior?   (Lifetime): No  Interrupted Attempts (Lifetime): No  Aborted or Self-Interrupted Attempt (Lifetime): No  Preparatory Acts or Behavior (Lifetime): No    Stanly Suicide Severity Rating Scale Recent:   Suicidal Ideation (Recent)  Q1 Wished to be Dead (Past Month): yes  Q2 Suicidal Thoughts (Past Month): yes  Q3 Suicidal Thought Method: yes  Q4 Suicidal Intent without Specific Plan: no  Q5 Suicide Intent with Specific Plan: no  Level of Risk per Screen: moderate risk  Intensity of Ideation (Recent)  Most Severe Ideation Rating (Past 1 Month): 3  Frequency (Past 1 Month): 2-5 times in week  Duration (Past 1 Month): Less than 1 hour/some of the time  Controllability (Past 1 Month): Can control thoughts with little    difficulty  Deterrents (Past 1 Month): Deterrents definitely stopped you from   attempting suicide  Reasons for Ideation (Past 1 Month): Completely to end or stop   the pain (You couldn't go on living with the pain or how you were   feeling)  Suicidal Behavior (Recent)  Actual Attempt (Past 3 Months): No  Has subject engaged in non-suicidal self-injurious behavior?   (Past 3 Months): No  Interrupted Attempts (Past 3 Months): No  Aborted or Self-Interrupted Attempt (Past 3 Months): No  Preparatory Acts or Behavior (Past 3 Months): No    Environmental or Psychosocial Events: impulsivity/recklessness,   unemployment/underemployment, neither working nor attending   school  Protective Factors: Protective Factors: good treatment   engagement, sense of importance of health and wellness, able to   access care without barriers, help seeking, cultural, spiritual ,   or Roman Catholic beliefs associated with meaning and value in life,   strong bond to family unit, community support, or employment,   lives in a responsibly safe and stable environment, supportive   ongoing medical and mental health care relationships    Does the patient have thoughts of harming others? Feels Like   Hurting Others: no  Previous Attempt to Hurt Others: no  Is the patient engaging in sexually inappropriate behavior?: no    Is the patient engaging in sexually inappropriate behavior?  no          Mental Status Exam   Affect: Appropriate  Appearance: Appropriate  Attention Span/Concentration: Inattentive, Attentive  Eye Contact: Variable    Fund of Knowledge: Appropriate   Language /Speech Content: Fluent  Language /Speech Volume: Normal  Language /Speech Rate/Productions: Normal  Recent Memory: Intact  Remote Memory: Intact  Mood: Normal, Sad  Orientation to Person: Yes   Orientation to Place: Yes  Orientation to Time of Day: Yes  Orientation to Date: Yes     Situation (Do they understand why they are here?): Yes  Psychomotor Behavior: Normal  Thought  Content: Clear  Thought Form: Intact     Mini-Cog Assessment  Number of Words Recalled:    Clock-Drawing Test:     Three Item Recall:    Mini-Cog Total Score:       Medication  Psychotropic medications:   Medication Orders - Psychiatric (From admission, onward)      None             Current Care Team  Patient Care Team:  Felice Edmonds APRN CNP as PCP - General (Family Medicine)  Zhang Agosto MD as MD (Psychiatry)  Ina Mc MD as MD (Endocrinology, Diabetes, and   Metabolism)  Sarai Jerry MD as MD (Endocrinology, Diabetes, and   Metabolism)  Sarai Jerry MD as Assigned Endocrinology Provider  Felice Edmonds APRN CNP as Assigned PCP  Juani Burns APRN CNP as Assigned Behavioral Health   Provider    Diagnosis  Patient Active Problem List   Diagnosis Code    Shaina (H) F30.9    Bipolar affective disorder, current episode manic with psychotic   symptoms (H) F31.2    Hx of psychiatric care Z92.89    Bipolar I disorder, current or most recent episode manic, with   psychotic features (H) F31.2    Lymphadenopathy R59.1    Bipolar 1 disorder (H) F31.9    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, unspecified thyrotoxicosis   type E05.90    Hypercalcemia E83.52    Myalgia M79.10    Hyponatremia E87.1    Weakness R53.1    Bipolar affective disorder, current episode manic without   psychotic symptoms (H) F31.10       Primary Problem This Admission  Active Hospital Problems    Bipolar disorder, current episode manic without psychotic   features, unspecified (H)        Clinical Summary and Substantiation of Recommendations   Patient comes in the hospital due to ongoing concerns with shaina   which has been resulted in the patient having difficulty sleeping   over the last several days.  Patient feels that to live energy is   what keeps her from being able to settle herself and get the   sleep that she needs.  Patient did connect with psychiatry   yesterday and does feel good about her current  medication   regimen, however feels that the medications have not yet been   useful for her to get a good night sleep.  Patient also is   interested in getting connected to therapy in which we scheduled   an appointment for this coming Sunday.  The patient does have a   history of delusions and paranoia, however denies any of that   during this hospital visit.      Patient is currently not interested in remaining in the   hospital, however the patient was given Zyprexa medication and   will allow the patient to sleep to determine whether remaining in   the hospital will be necessary to improve stabilization.  The   patient will be met with extended care later to determine next   disposition. It is most likely that the patient will discharge   upon waking and will encourage her to follow up with outpatient   providers along with her scheduled therapy appointment this   coming Sunday.  Discussed case with attending doctor who was in   agreement with plan.          Patient coping skills attempted to reduce the crisis:  speaking   to siblings regarding concerns    Disposition  Recommended disposition: Observation, Individual Therapy,   Medication Management        Reviewed case and recommendations with attending provider.   Attending Name: Dr. Velasquez       Attending concurs with disposition: yes       Patient and/or validated legal guardian concurs with disposition:     yes       Final disposition:  observation    Legal status on admission:      Assessment Details   Total duration spent with the patient: 32 min     CPT code(s) utilized: 95901 - Psychotherapy for Crisis - 60   (30-74*) min    Genaro Calvert Psychotherapist  [facility] - Triage & Transition Services  Callback: [phone]               ED MEDICATIONS:   Medications   OLANZapine zydis (zyPREXA) ODT tab 10 mg (10 mg Oral $Given 1/5/24 3571)         Impression:    ICD-10-CM    1. Bipolar 1 disorder, manic, moderate (H)  F31.12           Plan:    Pending  studies include reassessment after period of time.    Patient slept for 2 and half hours in the ED and was requesting discharge.  She still is pacing and exhibiting some signs of hypomania but appears to be more towards her baseline.  She does not meet criteria for inpatient admission based on her full evaluation on the earlier shift and her current presentation.  Was seen by the extended care team, please refer to their documentation in epic dated 1/5/2025 for which was reviewed by myself as well.  His father is here now we will take the patient home.  Will increase her Zyprexa to 5 mg twice daily as needed.  She has a referral to day treatment placed.  Also has extensive outpatient providers in place.  We discussed the indications for emergency department return and follow-up.  Stable for discharge.      MD Daniel Castillo David, MD  01/05/24 1015

## 2024-01-05 NOTE — TELEPHONE ENCOUNTER
Medication requested: OLANZAPINE 2.5 MG TABLET   Last refilled: 12-8-23  Qty: 60      Last seen: 1-4-24  RTC: 4 weeks  Cancel: 0  No-show: 0  Next appt: none    Refill decision:   Refill pended and routed to the provider for review/determination due to :   TOREY to Clinic RN,Recent ER Visit 1-5-24 for bipolar 1 disorder  OK to refill?

## 2024-01-05 NOTE — CONSULTS
"Triage and Transition Services Extended Care Reassessment     Patient: Cristina goes by \"Cristina,\" uses she/her pronouns  Date of Service: January 5, 2024  Site of Service: [facility] EMERGENCY DEPARTMENT                             ED12  Patient was seen yes  Mode of Assessment: In person     Reason for Reassessment:      History of Patient's Original Emergency Room Encounter: Patient is a 23 year old female with history of bipolar 1 disorder and generalized anxiety disorder who was accompanied to ED by her older brother due to shaina.  Patient reported that her family cannnot sleep when she is awake and that for the past two days that she has been pacing and unable to sleep.  Patient denied current SI/HI SIB, and able to engage in safety planning.  During interview, patient was minimally able to track with hyperverbal speech and disorganized thoughts.  Family, older sister Kehinde reported that patient has been up all night for the past couple of months and keeps the family awake due to talking all night and going into her family member's bedrooms to talk at all hours of the night.  This  provided the sister with a referral to day treatment fo crisis stabilization, intensive therapy and medication management.  The sister expressed interest in doing day treatment and planned on speaking to the parents about this option.  Both older sister and father were present with disposition planning with patient.    Current Patient Presentation:  Patient has been resting in ED and family is in agreement with discharge home with plan for day treatment through [facility]. Patient articulated that she will cooperate with the discharge plan and exprssed interest in the day treatment program so that she can access therapy and keep occupied during the day. Patient reported that she was able to engage in safety planning and denied SI/HI/SIB plan/intent. Patient's thought process has improved in that she is more " coherent with her thougths and speech. Patient is future oriented. Patient has been resting in ED and family is in agreement with discharge home with plan for day treatment through [facility]. Patient articulated that she will cooperate with the discharge plan and exprssed interest in the day treatment program so that she can access therapy and keep occupied during the day. Patient reported that she was able to engage in safety planning and denied SI/HI/SIB plan/intent. Patient's thought process has improved in that she is more coherent with her thougths and speech. Patient is future oriented.       Changes Observed Since Initial Assessment: decrease in presenting symptoms, patient/family request    Therapeutic Interventions Provided:  Provided positive regard for patient, involved patient and family in safety planning with services in place.  Family will contact [facility] if patient wants to leave home against parent's wishes.      Current Symptoms:     Patient able to engage in safety planning, denied SI/HI SIB and expressed desire to attend day treatment here at [facility].  Patient agreed to cooperate with family rules about not leaving the home without a coat or without notifying them.  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    Mental Status Exam   Affect: Appropriate  Appearance: Appropriate  Attention Span/Concentration: Inattentive, Attentive  Eye Contact: Variable    Fund of Knowledge: Appropriate   Language /Speech Content: Fluent  Language /Speech Volume: Normal  Language /Speech Rate/Productions: Normal  Recent Memory: Intact  Remote Memory: Intact  Mood: Normal, Sad  Orientation to Person: Yes   Orientation to Place: Yes  Orientation to Time of Day: Yes  Orientation to Date: Yes     Situation (Do they understand why they are here?): Yes  Psychomotor Behavior: Normal  Thought Content: Clear  Thought Form: Intact    Treatment Objective(s)  Addressed:  Safety planning, medication adherence.    Patient Response to Interventions:  Receptive and in agreement with disposition.    Progress Towards Goals:   Symptoms improved with rest.      C-SSRS Since Last Contact:         Suicidal Ideation (Recent)     Q1 Wished to be Dead (Past Month) yes yes   Q2 Suicidal Thoughts (Past Month) yes no   Q3 Suicidal Thought Method yes no   Q4 Suicidal Intent without Specific Plan no no   Q5 Suicide Intent with Specific Plan no no   Level of Risk per Screen moderate risk low risk   Intensity of Ideation (Recent)     Most Severe Ideation Rating (Past 1 Month) 3 2   Description of Most Severe Ideation (Past 1 Month) -- --   Frequency (Past 1 Month) 2-5 times in week Once a week   Duration (Past 1 Month) Less than 1 hour/some of the time --   Controllability (Past 1 Month) Can control thoughts with little difficulty Can control thoughts with little difficulty   Deterrents (Past 1 Month) Deterrents definitely stopped you from attempting suicide Deterrents definitely stopped you from attempting suicide   Reasons for Ideation (Past 1 Month) Completely to end or stop the pain (You couldn't go on living with the pain or how you were feeling) Completely to get attention, revenge, or a reaction from others   Suicidal Behavior (Recent)     Actual Attempt (Past 3 Months) No No   Total Number of Actual Attempts (Past 3 Months) -- 0   Actual Attempt Description (Past 3 Months) -- none noted   Has subject engaged in non-suicidal self-injurious behavior? (Past 3 Months) No No   Interrupted Attempts (Past 3 Months) No No   Total Number of Interrupted Attempts (Past 3 Months) -- 0   Interrupted Attempt Description (Past 3 Months) -- 0   Aborted or Self-Interrupted Attempt (Past 3 Months) No No   Total Number of Aborted or Self-Interrupted Attempts (Past 3 Months) -- 0   Aborted or Self-Interrupted Attempt Description (Past 3 Months) -- 0   Preparatory Acts or Behavior (Past 3 Months) No No    Total Number of Preparatory Acts (Past 3 Months) -- 0   Preparatory Acts or Behavior Description (Past 3 Months) --                 Plan: Final Disposition / Recommended Care Path: discharge  Plan for Care reviewed with assigned Medical Provider: yes  Plan for Care Team Review: provider, RN  Comments: Dr. Sanchez is in agreement with discharge plan.  Clinical Substantiation: Patient has been resting in ED and family is in agreement with discharge home with plan for day treatment through [facility].  Patient articulated that she will cooperate with the discharge plan and exprssed interest in the day treatment program so that she can access therapy and keep occupied during the day.  Patient reported that she was able to engage in safety planning and denied SI/HI/SIB plan/intent.  Patient's thought process has improved in that she is more coherent with her thougths and speech.  Patient is future oriented.    Legal Status:  Voluntary    Session Status:  1    Session Start Time:  0900  Session Stop Time:  0925  CPT codes:  54867  Time Spent:  25        Diagnosis:   Patient Active Problem List   Diagnosis Code    Adela (H) F30.9    Bipolar affective disorder, current episode manic with psychotic symptoms (H) F31.2    Hx of psychiatric care Z92.89    Bipolar I disorder, current or most recent episode manic, with psychotic features (H) F31.2    Lymphadenopathy R59.1    Bipolar 1 disorder (H) F31.9    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, unspecified thyrotoxicosis type E05.90    Hypercalcemia E83.52    Myalgia M79.10    Hyponatremia E87.1    Weakness R53.1    Bipolar disorder, current episode manic without psychotic features, unspecified (H) F31.10       Primary Problem This Admission: Active Hospital Problems    Bipolar disorder, current episode manic without psychotic features, unspecified (H)        Sarita Paul [facility]   Licensed Mental Health Professional (LMHP), [facility]  [phone]

## 2024-01-05 NOTE — ED PROVIDER NOTES
"    [facility] EMERGENCY DEPARTMENT ([facility])    1/05/24      ED PROVIDER NOTE       History     Chief Complaint   Patient presents with    Insomnia     Patient reports she has not slept well in a couple of days. She thinks it is due to feeling sick. Patient is falling asleep during the triage.     MYRNA Boyd is a 23 year old female who presents emergency department with insomnia that began on 1/1/2024.  Patient states that she has tried to take NyQuil and melatonin without relief.  Patient is also been taking her lithium, 10 Mg is approximately as well as her albuterol inhaler in excess.  Her friends describe her as having more energy than usual.  Patient states that she is \"definitely\" manic in the ED.  Patient also reports upper respiratory symptoms.  She was evaluated on 1/2/2021 for upper respiratory symptoms, to which COVID and CXR were negative, and she was diagnosed with bronchitis.    Past Medical History  Past Medical History:   Diagnosis Date    Bipolar disorder (H)     Depressive disorder      of psychiatric care 02/21/2017    Infection due to 2019 novel coronavirus 2020    summer 2020 by patient report    Infection due to 2019 novel coronavirus 12/2021    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, unspecified thyrotoxicosis type 11/16/2022     Past Surgical History:   Procedure Laterality Date    BRONCHOSCOPY (RIGID OR FLEXIBLE), DIAGNOSTIC N/A 6/16/2018    Procedure: COMBINED BRONCHOSCOPY (RIGID OR FLEXIBLE), LAVAGE;;  Surgeon: Manjeet Holland MD;  Location: [facility]     benzonatate (TESSALON) 200 MG capsule  fluticasone (FLOVENT HFA) 220 MCG/ACT inhaler  lithium ER (ESKALITH CR/LITHOBID) 450 MG CR tablet  OLANZapine (ZYPREXA) 10 MG tablet  OLANZapine (ZYPREXA) 2.5 MG tablet  predniSONE (DELTASONE) 20 MG tablet      No Known Allergies  Family History  Family History   Problem Relation Age of Onset    Impaired Fasting Glucose Father         prediabetes    Thyroid Disease Maternal " Grandmother     Diabetes Maternal Grandmother     Diabetes Paternal Grandmother          early in her 60 s    Depression Maternal Uncle     Depression Other      Social History   Social History     Tobacco Use    Smoking status: Never    Smokeless tobacco: Never   Vaping Use    Vaping Use: Never used   Substance Use Topics    Alcohol use: No    Drug use: No     Comment: x1 marijuana, in          A complete review of systems was performed with pertinent positives and negatives noted in the HPI, and all other systems negative.    Physical Exam   BP: 126/83  Pulse: 106  Temp: 98.3  F (36.8  C)  Resp: 18  Weight: 72.1 kg (158 lb 14.4 oz)  SpO2: 94 %  Physical Exam  Vitals and nursing note reviewed.   Constitutional:       General: She is not in acute distress.     Appearance: She is well-developed. She is not ill-appearing.   HENT:      Head: Normocephalic and atraumatic.      Right Ear: External ear normal.      Left Ear: External ear normal.      Nose: Nose normal.   Eyes:      Extraocular Movements: Extraocular movements intact.      Conjunctiva/sclera: Conjunctivae normal.   Pulmonary:      Effort: Pulmonary effort is normal. No respiratory distress.   Abdominal:      General: There is no distension.   Musculoskeletal:         General: No swelling or deformity.      Cervical back: Normal range of motion and neck supple.   Skin:     General: Skin is warm and dry.   Neurological:      Mental Status: Mental status is at baseline.      Comments: Alert, oriented, drowsy at times with fast pressured speech at other times   Psychiatric:         Mood and Affect: Mood normal.         Behavior: Behavior normal.           ED Course, Procedures, & Data      Procedures           Results for orders placed or performed during the hospital encounter of 24   Basic metabolic panel     Status: Abnormal   Result Value Ref Range    Sodium 135 135 - 145 mmol/L    Potassium 3.6 3.4 - 5.3 mmol/L    Chloride 102 98 -  107 mmol/L    Carbon Dioxide (CO2) 23 22 - 29 mmol/L    Anion Gap 10 7 - 15 mmol/L    Urea Nitrogen 9.6 6.0 - 20.0 mg/dL    Creatinine 0.61 0.51 - 0.95 mg/dL    GFR Estimate >90 >60 mL/min/1.73m2    Calcium 8.9 8.6 - 10.0 mg/dL    Glucose 150 (H) 70 - 99 mg/dL   Lithium level     Status: Normal   Result Value Ref Range    Lithium 0.66 0.60 - 1.20 mmol/L   Symptomatic Influenza A/B, RSV, & SARS-CoV2 PCR (COVID-19) Nasopharyngeal     Status: Normal    Specimen: Nasopharyngeal; Swab   Result Value Ref Range    Influenza A PCR Negative Negative    Influenza B PCR Negative Negative    RSV PCR Negative Negative    SARS CoV2 PCR Negative Negative    Narrative    Testing was performed using the Xpert Xpress CoV2/Flu/RSV Assay on the [employer] Instrument. This test should be ordered for the detection of SARS-CoV-2, influenza, and RSV viruses in individuals who meet clinical and/or epidemiological criteria. Test performance is unknown in asymptomatic patients. This test is for in vitro diagnostic use under the FDA EUA for laboratories certified under CLIA to perform high or moderate complexity testing. This test has not been FDA cleared or approved. A negative result does not rule out the presence of PCR inhibitors in the specimen or target RNA in concentration below the limit of detection for the assay. If only one viral target is positive but coinfection with multiple targets is suspected, the sample should be re-tested with another FDA cleared, approved, or authorized test, if coinfection would change clinical management. This test was validated by the [facility] American Board of Addiction Medicine (ABAM). These laboratories are certified under the Clinical Laboratory Improvement Amendments of 1988 (CLIA-88) as qualified to perform high complexity laboratory testing.   CBC with platelets and differential     Status: Abnormal   Result Value Ref Range    WBC Count 9.6 4.0 - 11.0 10e3/uL    RBC Count 3.51 (L) 3.80 - 5.20 10e6/uL     Hemoglobin 10.4 (L) 11.7 - 15.7 g/dL    Hematocrit 32.8 (L) 35.0 - 47.0 %    MCV 93 78 - 100 fL    MCH 29.6 26.5 - 33.0 pg    MCHC 31.7 31.5 - 36.5 g/dL    RDW 16.3 (H) 10.0 - 15.0 %    Platelet Count 320 150 - 450 10e3/uL    % Neutrophils 67 %    % Lymphocytes 22 %    % Monocytes 9 %    % Eosinophils 2 %    % Basophils 0 %    % Immature Granulocytes 0 %    NRBCs per 100 WBC 0 <1 /100    Absolute Neutrophils 6.3 1.6 - 8.3 10e3/uL    Absolute Lymphocytes 2.1 0.8 - 5.3 10e3/uL    Absolute Monocytes 0.8 0.0 - 1.3 10e3/uL    Absolute Eosinophils 0.2 0.0 - 0.7 10e3/uL    Absolute Basophils 0.0 0.0 - 0.2 10e3/uL    Absolute Immature Granulocytes 0.0 <=0.4 10e3/uL    Absolute NRBCs 0.0 10e3/uL   CBC with platelets differential     Status: Abnormal    Narrative    The following orders were created for panel order CBC with platelets differential.  Procedure                               Abnormality         Status                     ---------                               -----------         ------                     CBC with platelets and d...[[id number]]  Abnormal            Final result                 Please view results for these tests on the individual orders.   \     Results for orders placed or performed during the hospital encounter of 01/05/24   Basic metabolic panel     Status: Abnormal   Result Value Ref Range    Sodium 135 135 - 145 mmol/L    Potassium 3.6 3.4 - 5.3 mmol/L    Chloride 102 98 - 107 mmol/L    Carbon Dioxide (CO2) 23 22 - 29 mmol/L    Anion Gap 10 7 - 15 mmol/L    Urea Nitrogen 9.6 6.0 - 20.0 mg/dL    Creatinine 0.61 0.51 - 0.95 mg/dL    GFR Estimate >90 >60 mL/min/1.73m2    Calcium 8.9 8.6 - 10.0 mg/dL    Glucose 150 (H) 70 - 99 mg/dL   Lithium level     Status: Normal   Result Value Ref Range    Lithium 0.66 0.60 - 1.20 mmol/L   Symptomatic Influenza A/B, RSV, & SARS-CoV2 PCR (COVID-19) Nasopharyngeal     Status: Normal    Specimen: Nasopharyngeal; Swab   Result Value Ref Range    Influenza A PCR  Negative Negative    Influenza B PCR Negative Negative    RSV PCR Negative Negative    SARS CoV2 PCR Negative Negative    Narrative    Testing was performed using the Xpert Xpress CoV2/Flu/RSV Assay on the [employer] Instrument. This test should be ordered for the detection of SARS-CoV-2, influenza, and RSV viruses in individuals who meet clinical and/or epidemiological criteria. Test performance is unknown in asymptomatic patients. This test is for in vitro diagnostic use under the FDA EUA for laboratories certified under CLIA to perform high or moderate complexity testing. This test has not been FDA cleared or approved. A negative result does not rule out the presence of PCR inhibitors in the specimen or target RNA in concentration below the limit of detection for the assay. If only one viral target is positive but coinfection with multiple targets is suspected, the sample should be re-tested with another FDA cleared, approved, or authorized test, if coinfection would change clinical management. This test was validated by the [facility] [employer]. These laboratories are certified under the Clinical Laboratory Improvement Amendments of 1988 (CLIA-88) as qualified to perform high complexity laboratory testing.   CBC with platelets and differential     Status: Abnormal   Result Value Ref Range    WBC Count 9.6 4.0 - 11.0 10e3/uL    RBC Count 3.51 (L) 3.80 - 5.20 10e6/uL    Hemoglobin 10.4 (L) 11.7 - 15.7 g/dL    Hematocrit 32.8 (L) 35.0 - 47.0 %    MCV 93 78 - 100 fL    MCH 29.6 26.5 - 33.0 pg    MCHC 31.7 31.5 - 36.5 g/dL    RDW 16.3 (H) 10.0 - 15.0 %    Platelet Count 320 150 - 450 10e3/uL    % Neutrophils 67 %    % Lymphocytes 22 %    % Monocytes 9 %    % Eosinophils 2 %    % Basophils 0 %    % Immature Granulocytes 0 %    NRBCs per 100 WBC 0 <1 /100    Absolute Neutrophils 6.3 1.6 - 8.3 10e3/uL    Absolute Lymphocytes 2.1 0.8 - 5.3 10e3/uL    Absolute Monocytes 0.8 0.0 - 1.3 10e3/uL    Absolute  Eosinophils 0.2 0.0 - 0.7 10e3/uL    Absolute Basophils 0.0 0.0 - 0.2 10e3/uL    Absolute Immature Granulocytes 0.0 <=0.4 10e3/uL    Absolute NRBCs 0.0 10e3/uL   CBC with platelets differential     Status: Abnormal    Narrative    The following orders were created for panel order CBC with platelets differential.  Procedure                               Abnormality         Status                     ---------                               -----------         ------                     CBC with platelets and d...[[id number]]  Abnormal            Final result                 Please view results for these tests on the individual orders.     Medications   OLANZapine zydis (zyPREXA) ODT tab 10 mg (10 mg Oral $Given 1/5/24 4319)     Labs Ordered and Resulted from Time of ED Arrival to Time of ED Departure   BASIC METABOLIC PANEL - Abnormal       Result Value    Sodium 135      Potassium 3.6      Chloride 102      Carbon Dioxide (CO2) 23      Anion Gap 10      Urea Nitrogen 9.6      Creatinine 0.61      GFR Estimate >90      Calcium 8.9      Glucose 150 (*)    CBC WITH PLATELETS AND DIFFERENTIAL - Abnormal    WBC Count 9.6      RBC Count 3.51 (*)     Hemoglobin 10.4 (*)     Hematocrit 32.8 (*)     MCV 93      MCH 29.6      MCHC 31.7      RDW 16.3 (*)     Platelet Count 320      % Neutrophils 67      % Lymphocytes 22      % Monocytes 9      % Eosinophils 2      % Basophils 0      % Immature Granulocytes 0      NRBCs per 100 WBC 0      Absolute Neutrophils 6.3      Absolute Lymphocytes 2.1      Absolute Monocytes 0.8      Absolute Eosinophils 0.2      Absolute Basophils 0.0      Absolute Immature Granulocytes 0.0      Absolute NRBCs 0.0     LITHIUM LEVEL - Normal    Lithium 0.66     INFLUENZA A/B, RSV, & SARS-COV2 PCR - Normal    Influenza A PCR Negative      Influenza B PCR Negative      RSV PCR Negative      SARS CoV2 PCR Negative     HCG QUALITATIVE URINE     No orders to display          Medical Decision Making  The  patient's presentation is strongly suggestive of high complexity (a chronic illness severe exacerbation, progression, or side effect of treatment).    The patient's evaluation involved:  review of external note(s) from 3+ sources (Most recent H&P in addition to clinic and ED note)  review of 2 test result(s) ordered prior to this encounter (Most recent BMP and CBC)  3+ tests ordered today including BMP CBC and lithium level  Management discussed with another healthcare provider the mental health   The patient's management involved moderate risk (prescription drug management including medications given in the ED).      Assessment & Plan    23-year-old female with a history of bipolar disorder presents to us with symptoms consistent with a manic state.  She has had a great deal of difficulty sleeping.  She reports she has not been able to sleep because of her nasal congestion.  Viral studies today were normal.  Vital signs show little tachycardia but are otherwise unremarkable.  She does have fast pressured speech consistent with degree of shaina.  She does not have any current paranoia or evidence of psychosis.  She was given a dose of Zyprexa.  We are going to encourage her to sleep and reevaluate her tomorrow via extended care.  The patient was seen by the DEC  as well.  They do not feel she needs to be admitted today either.  Patient care will be signed out to the oncoming physician    I have reviewed the nursing notes. I have reviewed the findings, diagnosis, plan and need for follow up with the patient.    New Prescriptions    No medications on file       Final diagnoses:   Bipolar 1 disorder, manic, moderate (H)       Sekou Sue, am serving as a trained medical scribe to document services personally performed by Wood Velasquez DO based on the provider's statements to me on January 5, 2024.  This document has been checked and approved by the attending provider.    I,  Wood Velasquez DO, was physically present and have reviewed and verified the accuracy of this note documented by Sekou Munoz, medical scribe.      Wood Velasquez DO    [facility] EMERGENCY DEPARTMENT  1/5/2024     Wood Velasquez DO  01/05/24 0618

## 2024-01-08 DIAGNOSIS — F31.9 BIPOLAR 1 DISORDER (H): ICD-10-CM

## 2024-01-08 RX ORDER — OLANZAPINE 5 MG/1
5 TABLET ORAL 2 TIMES DAILY PRN
Qty: 60 TABLET | Refills: 0 | Status: SHIPPED | OUTPATIENT
Start: 2024-01-08 | End: 2024-02-07

## 2024-01-09 ENCOUNTER — TELEPHONE (OUTPATIENT)
Dept: PSYCHIATRY | Facility: CLINIC | Age: 24
End: 2024-01-09
Payer: COMMERCIAL

## 2024-01-09 RX ORDER — LITHIUM CARBONATE 450 MG
900 TABLET, EXTENDED RELEASE ORAL AT BEDTIME
Qty: 60 TABLET | Refills: 1 | OUTPATIENT
Start: 2024-01-09

## 2024-01-09 NOTE — TELEPHONE ENCOUNTER
"Rcvd Discharge Summary: yes  Compare Pt Discharge summary to that in EPIC: yes  Able to get meds filled: yes- increased Zyprexa to 5 mg BID PRN  Mood: \"much calmer\"  SE: denies any side effects related to the increased BID PRN Zyprexa    DC Plan:   Admit Date: 1/5/24  Discharge date: 1/5/24  Next appt: 1/25/24  Referrals (therapy, daytx, homecare, ect): per the ED notes, patient was referred to day treatment  Crisis Plan: return to ER and/or call this clinic  Contact Numbers Reviewed: yes    TCM:  Direct contact made with pt within 2 business days? yes  Pt is schedule in clinic within 14 days of discharge? no  Pt qualifies for TCM visit? No    *Per Cristina, she's feeling much better/more calm since her visit to the ER on 1/5. Patient's speech remained pressured at times. Although, she reports she no longer feels manic. She moved forward with the Zyprexa 5 mg BID PRN and reports she has used both doses every day since her ER visit and feels it has been effective. She denies any side effects. Cristina continues to take Lithium but said she discontinued the Zyprexa 10 mg at bedtime. Still, she reports sleeping much better over the last 4 nights.    Provider has opening this Thursday, 1/11. Will see if he would like to meet with the patient. Cristina reports she's available and could attend.    Adina Altamirano RN on 1/9/2024 at 9:47 AM      "

## 2024-01-09 NOTE — TELEPHONE ENCOUNTER
Placed a phone call to Cristina. No answer. LVM offering an appt for 1/12 afternoon. Requested a c/b if she'd like to officially schedule this. Writer also mentioned in the VM that she should resume the Zyprexa 10 mg at bedtime and asked that she c/b if a refill is needed.    Adina Altamirano RN on 1/9/2024 at 3:16 PM

## 2024-01-09 NOTE — TELEPHONE ENCOUNTER
Date of Last Office Visit: 1/4/2024  [facility] Mental Health & Addiction [facility]  Jennifer Gottlieb MD     Date of Next Office Visit: 1/12/2024   No shows since last visit: 0  Cancellations since last visit: 0     Medication requested:  lithium ER (ESKALITH CR/LITHOBID) 450 MG CR tablet  Date last ordered: 12/8/2023 Qty:60  Refills: 1             Sig - Route: Take 2 tablets (900 mg) by mouth at bedtime - Oral  Sent to pharmacy as: [place] Carbon[employer]  MG Oral Tablet Extended Release (ESKALITH CR/LITHOBID)  Class: E-Prescribe      Refill decision: Refused, request too soon.

## 2024-01-09 NOTE — TELEPHONE ENCOUNTER
Appears patient is now scheduled for Friday, 1/12 with Dr. Gottlieb.    Adina Altamirano RN on 1/9/2024 at 3:45 PM

## 2024-01-12 ENCOUNTER — VIRTUAL VISIT (OUTPATIENT)
Dept: PSYCHIATRY | Facility: CLINIC | Age: 24
End: 2024-01-12
Attending: PSYCHIATRY & NEUROLOGY
Payer: COMMERCIAL

## 2024-01-12 DIAGNOSIS — F31.9 BIPOLAR 1 DISORDER (H): Primary | ICD-10-CM

## 2024-01-12 PROCEDURE — 99214 OFFICE O/P EST MOD 30 MIN: CPT | Mod: 95

## 2024-01-12 NOTE — NURSING NOTE
Is the patient currently in the state of [place]? YES    Visit mode:VIDEO    If the visit is dropped, the patient can be reconnected by: VIDEO VISIT: Text to cell phone:   Telephone Information:   Mobile [phone]       Will anyone else be joining the visit? NO  (If patient encounters technical issues they should call [phone]    How would you like to obtain your AVS? MyChart    Are changes needed to the allergy or medication list? Pt declined allergy review and Pt declined med review    Reason for visit: RECHECK    Leila YUSUF

## 2024-01-12 NOTE — PATIENT INSTRUCTIONS
**For crisis resources, please see the information at the end of this document**   Patient Education    Thank you for coming to the [facility] [place] [facility].     Lab Testing:  If you had lab testing today and your results are reassuring or normal they will be mailed to you or sent through [employer] within 7 days. If the lab tests need quick action we will call you with the results. The phone number we will call with results is # [phone]. If this is not the best number please call our clinic and change the number.     Medication Refills:  If you need any refills please call your pharmacy and they will contact us. Our fax number for refills is [phone].   Three business days of notice are needed for general medication refill requests.   Five business days of notice are needed for controlled substance refill requests.   If you need to change to a different pharmacy, please contact the new pharmacy directly. The new pharmacy will help you get your medications transferred.     Contact Us:  Please call [phone] during business hours (8-5:00 M-F).   If you have medication related questions after clinic hours, or on the weekend, please call [phone].     Financial Assistance [phone]   Medical Records [phone]       MENTAL HEALTH CRISIS RESOURCES:  For a emergency help, please call 911 or go to the nearest Emergency Department.     Emergency Walk-In Options:   EmPATH Unit @ [facility] ([place]): [phone] - Specialized mental health emergency area designed to be calming  [facility] ([place]): [phone]  [facility] ([place]): [phone]  [facility]): [phone]    [facility] Crisis Information:   [place]: [phone]  [place]: [phone]  [place] ([place]) - Adult: [phone]     Child: [phone]  [place] - Adult: [phone]     Child: [phone]  [place]:  [phone]  List of all [facility] resources:   https://[place].gov/dhs/people-we-serve/adults/health-care/mental-health/resources/crisis-contacts.jsp    National Crisis Information:   Crisis Text Line: Text  [place]  to 104245  Suicide & Crisis Lifeline: 988  National Suicide Prevention Lifeline: 6-505-919-TALK (1-301.569.3026)       For online chat options, visit https://suicidepreventionlifeline.org/chat/  Poison Control Center: 4-091-754-2380  Trans Lifeline: 2-341-817-2420 - Hotline for transgender people of all ages  [employer]: 4-388-149-4089 - Hotline for LGBT youth     For Non-Emergency Support:   Fast Tracker: Mental Health & Substance Use Disorder Resources -   https://www.[employer].org/

## 2024-01-12 NOTE — PROGRESS NOTES
"Virtual Visit Details    Type of service:  Video Visit     Originating Location (pt. Location): Home    Distant Location (provider location):  On-site  Platform used for Video Visit: [facility]  MEDICAL PROGRESS NOTE     CARE TEAM:    PCP- Felice Edmonds  Therapist- None, but she is interested in having a therapist.    Cristina is a 23 year old who uses the name Cristina and pronouns she, her, hers, presenting for a urgent appointment following recent active treatment in Emergency Department for manic episode.      Chief Concern     \"I'm doing well now with enough sleep\"     Diagnoses     Bipolar I disorder, most recent manic episode, current residual manic symptoms without current psychosis     Assessment     Cristina is a 24 yo person with past psychiatric diagnosis listed above, who is seen today for an urgent follow up visit, following recent ED stay and discharge after our last visit.    It appears she had been experiencing worsening mood symptoms in the context of medication non-adherence, which led to ED visit at [facility] where she stayed since 12/2 - 12/5 due to concerns for severe manic symptoms including insomnia, excessive spending, Church delusions, paranoia in the context of medication non-adherence. Appears that symptoms improved, she requested discharge and did not meet the criteria for a 72 hour hold; and she was discharged as was considered to be appropriate for continuing management in the outpatient settings.    On 1/4/2024 following our last visit, she started having an shaina episode in the context of lack of sleep and cold/flu, doing extra physical activity in gym and extensive sauna. She spent a night in ED and discharged the day after. Today, she reports being able to sleep and pretty stable.    Patient's sister (psych nurse at [employer]) and also other family members are supporting her and she lives with the " family.     Today, Cristina was reporting being reasonably stable on lithium and Zyprexa without a significant side effects other than polyuria.She believes lithium is the only thing that work for her.    She reports she had been off of medications since ~beginning of summer 2023 as she was feeling well and felt like she did not need to continue taking them, eventually resulted in a prominent depressive episode since ~mid summer followed by a brief period of euthymia and eventually experiencing hypomanic/manic symptoms, this in the context of medication non-adherence.   She reports  persistence of residual shaina that although has improved with recent addition of olanzapine. Her recent lithium lab on mid December 2023 is in therapeutic level 0.8. She has tolerated medications well without noticing side effects except some increased day-time sedation and mild weight gain over past weeks. Denies constipation, abnormal movements, chest pain, palpitations, SOB, any pain or other symptoms.     We discussed keeping the medication without any changes for now with recommendation to monitor symptoms/side effects and follow up with PCP/endocrinology. Will closely monitor for side effects of lithium, including thyroid function, that was recently evidencing mild increased TSH/T4.  Renal function ok.    No safety concerns, other questions or concerns.     Future Considerations:  -Coordinate care with PCP/endocrinology as able/needed. to monitor thyroid fx/recs regarding lithium use. Patient with family hx of thyroid issues.   -Obtain Li level q 6 months or more often if adherence is a concerns.   -Goal is monotherapy with Lithium once mood stability is achieved.   -Consider switching to a more metabolically  neutral mood-stabilizing atypical antipsychotic  -Continue encouraging/offering psychotherapy       Psychotropic Drug Interactions:  [PSYCHCLINICDDI]  none  Management: N/A    [place]MP was not checked today: not using controlled  substances    Risk Statements:   Treatment Risk- Risks, benefits, alternatives and potential adverse effects have been discussed and are understood.   Safety Risk-Cristina did not appear to be an imminent safety risk to self or others.  We discussed her safety/crisis plan and resources - Cristina and her sister agreed to call [facility] or on-call team, if worsened symptoms, medication concerns or safety concerns arise; and willing to seek ED evaluation, if immediate safety concerns or severe symptoms.        Plan     1) Meds-   - Continue Lithium CR to 900 mg at bedtime   - Continue Zyprexa 10mg at bedtime (re-started in hospital Dec 2)(she was taking only 5 mg till today)  - Continue olanzapine 2.5 mg  PO BID PRN for agitation/shaina/sleep (2-3 times last week)     Other:   -continue vitamin D3 2000IU daily (OTC)  -melatonin 2.5mg at bedtime PRN (OTC)    2) Psychotherapy- Offered. Patient is interested.      3) Next due-  Labs- lithium level last obtained 12/2/2023: 0.37,  will order repeat lab (Day 5 to 7 after dose increase) instructed to Please obtain roughly 12 hours after last lithium dose. Renal Fx ok. Thyroid abnorl labs, see lab section .                *note: does have history of subclinical hypothyroidism*   EKG- Will be asked next due/need. Last one on chart in 2016  Rating scales- PHQ9, GAD7    4) Referrals-  Supportive psychotherapy without deep historical reviews    5) Other:  Patient to follow-up with PCP/ endocrinogy re: thyroid fx.       6) Dispo- RTC on 4 weeks      Pertinent Background                                                   [most recent eval 01/04/24]     The following section contains information copied from previous note by Dr. Dr Yanes and Dr Hart and has been reviewed, edited, and verified by the patient.      This patient first experienced mental health issues in February 2016 and has received treatment for Bipolar I most recent episode manic with history of psychosis  "requiring active treatment in ED 12/2-12/5/2023.  Notably, the patient has history of multiple hospitalizations with shaina and psychosis, including most recently July 2018 and then Dec 2023. She has a history of difficult adherence to medications followed by decompensation and hospitalization.  After her hospitalization in July 2018 she did complete a Day Program.    Pertinent Items Include: shaina, inconsistently taking medications     Subjective     Per today's interview:   - Cristina is reporting recovering fully from flu like symptoms, cold and runny nose which made her feel bad during last week. Now, she feels very good.  -Mood: \"good\"  -When not sleeping ok feels like someone is out there trying to get her. She reports sleeping well today.  - Denies this being a concern today, feels safe at home  -Appetite: Ok   -Weight changes: As above   -Sleep: Average gets ~7 -8 hrs. Wakes up around 5-6 AM   -BM/constipation: Denies   -Tremor/abnormal movements:Denies, none observed   -Side effects? Chest pain, palpitations. Other side effects: Denies   -Perceptual disturbances: Denies   -Safety: Feels safe at home     Recent Psych Symptoms:   Depression:  low energy, hypersomnia, appetite changes, weight changes, poor concentration /memory, and indecisiveness  Elevated:  distractibility , racing thoughts, dysregulation, and prominent irritability, hypertalkative, overall improved since recent ED visit  Psychosis:  none  Anxiety:  excessive worry, feeling fearful, and nervous/overwhelmed  Trauma Related:  none  Insomnia:  No  Other:  No    Recent Substance Use:  None reported     Pertinent Negatives: No suicidal or violent ideation, self-injury, psychosis, shaina and harmful substance use  Adverse Effects: none reported     Mental Status Exam     Alertness: alert  and oriented  Appearance: adequately groomed  Behavior/Demeanor: cooperative and pleasant, with good  eye contact   Speech: increased rate and mildly pressured " "  Language: intact  Psychomotor: calm occasionally mildly restless  Mood: description consistent with euthymia  Affect: full range; congruent to: mood- yes, content- yes  Thought Process/Associations: unremarkable and overinclusive   Thought Content:  Reports none;  Denies suicidal & violent ideation and delusions  Perception:  Reports none;  Denies hallucinations  Insight: adequate  Judgment: good  Cognition: does  appear grossly intact; formal cognitive testing was not done  Gait and Station: N/A (telehealth)     Social History                                 pt reported     Financial/ Work- 7th grade . She received her bachelors degree from [employer] now works on her master in speech therapy  Partner/ - single  Children- no      Living situation- She currently lives with her family in [place]. She lives with her mom, dad, sister, and three brothers.  Social/ Spiritual Support- family is very supportive     Feels Safe at Home- yes   Legal- None     Trauma History (self-report)- no (does reports having a difficult time while living in [place] as most of the rest of her family living there were boys and felt isolated).  Early History/Education-  Grew up in [place] \"but also grew up in [place]\" (lived in [place] for 3 years between [age]) with mother, Joy, Father, Americo, Oldest sibling. Has 4 younger siblings (Polly, Sam, Drew, Cristi).  Went to college at [employer] before transfer to [employer].     Family Mental Health History                                 pt reported     Cousin with bipolar; Uncle with substance use; Grandmother \"with thyroid problems\" and diabetes.     Past Psychiatric History     SIB- no  Suicide Attempt [#, most recent]- No   Suicidal Ideation Hx- patient denies  Violence/Aggression Hx- No   Psychosis Hx- Yes: paranoia (most recently during 2018)   Eating Disorder Hx- No   Other- Caught typhoid while in [place] and recalls that after " "that is when she first started experiencing depression, but wasn't sure what was going on. She then spent some time with her cousin \"in a nicer city\" and felt better over the summer (age 14). Her first manic episode was in the winter of her senior year of high school, \"a few months before I turned 16.\" Was last hospitalized in 2018 and in 2019 was the first winter that she did not experience an episode of adela.  Psych Hosp [#, most recent]- Yes: multiple. Most recent July 2018   Commitment- No   TMS/ECT- No   Outpatient Programs - Yes: IOP at [facility] discharge 7/19/19   Other - none    Past Use- none reported     Treatment- #, most recent- No   Medical Consequences- No   Legal Consequences- No   Other- none      Past Psych Med Trials     {Per chart:  Medication  Dose (mg) Effect  Dates of Use   aripiprazole 30   2017, 2018    quetiapine 400mg +   \"eye twitch\" 2018             Depakote 1000   2017   lithium 900   2017 - present             benztropine     2017             propranolol 20 BID   2018   lorazepam 1 Effective for sleep regulation (2 wk course) 2017-  2020      Vitals   LMP 12/30/2023   Pulse Readings from Last 3 Encounters:   01/05/24 78   01/02/24 96   12/08/23 111     Wt Readings from Last 3 Encounters:   01/05/24 72.1 kg (158 lb 14.4 oz)   01/02/24 71.7 kg (158 lb 1.6 oz)   12/08/23 68.2 kg (150 lb 6.4 oz)     BP Readings from Last 3 Encounters:   01/05/24 116/74   01/02/24 118/80   12/08/23 116/80        Medical History     ALLERGIES: Patient has no known allergies.    Patient Active Problem List   Diagnosis    Adela (H)    Bipolar affective disorder, current episode manic with psychotic symptoms (H)    Hx of psychiatric care    Bipolar I disorder, current or most recent episode manic, with psychotic features (H)    Lymphadenopathy    Bipolar 1 disorder (H)    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, unspecified thyrotoxicosis type    Hypercalcemia    Myalgia    Hyponatremia    Weakness    Bipolar " disorder, current episode manic without psychotic features, unspecified (H)        Medications     Current Outpatient Medications   Medication Sig Dispense Refill    benzonatate (TESSALON) 200 MG capsule Take 1 capsule (200 mg) by mouth 3 times daily as needed for cough 30 capsule 0    fluticasone (FLOVENT HFA) 220 MCG/ACT inhaler Inhale 1 puff into the lungs 2 times daily 30 g 0    lithium ER (ESKALITH CR/LITHOBID) 450 MG CR tablet Take 2 tablets (900 mg) by mouth at bedtime 60 tablet 1    OLANZapine (ZYPREXA) 10 MG tablet Take 1 tablet (10 mg) by mouth at bedtime 30 tablet 0    OLANZapine (ZYPREXA) 5 MG tablet Take 1 tablet (5 mg) by mouth 2 times daily as needed (agitation/shaina) 60 tablet 0    OLANZapine (ZYPREXA) 5 MG tablet Take 1 tablet (5 mg) by mouth 2 times daily as needed 20 tablet 0        Labs and Data         5/9/2023     9:44 AM 12/8/2023    12:47 PM 1/2/2024     9:11 AM   PROMIS-10 Total Score w/o Sub Scores   PROMIS TOTAL - SUBSCORES 14 27 28         1/30/2017    10:07 AM 10/21/2022    11:29 AM   CAGE-AID Total Score   Total Score 0 0   Total Score MyChart  0 (A total score of 2 or greater is considered clinically significant)         5/9/2023     9:43 AM 11/21/2023     1:41 PM 1/3/2024    10:54 PM   PHQ-9 SCORE   PHQ-9 Total Score MyChart 8 (Mild depression) 3 (Minimal depression) 5 (Mild depression)   PHQ-9 Total Score 8 3 5         10/21/2022    11:00 AM 3/13/2023    12:04 PM 11/21/2023     1:42 PM   REGINO-7 SCORE   Total Score  0 (minimal anxiety) 6 (mild anxiety)   Total Score 7 0 6       Liver/Kidney Function, TSH Metabolic Blood counts   Recent Labs   Lab Test 01/05/24  0209 12/02/23  1310   AST  --  26   ALT  --  21   ALKPHOS  --  96   CR 0.61 0.65     Recent Labs   Lab Test 12/02/23  1310   TSH 4.83*    Recent Labs   Lab Test 03/08/23  1827   CHOL 100   TRIG 66   LDL 44   HDL 43*     Recent Labs   Lab Test 03/08/23  1827   A1C 5.6     Recent Labs   Lab Test 01/05/24  0209   *    Recent  Labs   Lab Test 01/05/24  0209   WBC 9.6   HGB 10.4*   HCT 32.8*   MCV 93              ECG 2/8/2016 QTc = 404ms      PROVIDER: Jennifer Gottlieb MD    Level of Medical Decision Making:   - At least 1 chronic problem that is not stable  - Engaged in prescription drug management during visit (discussed any medication benefits, side effects, alternatives, etc.)     {    Patient staffed in clinic with Dr. Clark who will sign the note.  Supervisor is Dr. Guzman.

## 2024-02-07 DIAGNOSIS — F31.9 BIPOLAR 1 DISORDER (H): ICD-10-CM

## 2024-02-07 RX ORDER — OLANZAPINE 5 MG/1
5 TABLET ORAL 2 TIMES DAILY PRN
Qty: 60 TABLET | Refills: 0 | Status: SHIPPED | OUTPATIENT
Start: 2024-02-07 | End: 2024-03-18

## 2024-02-07 NOTE — TELEPHONE ENCOUNTER
Date of Last Office Visit: 1/12/2024  [facility] Mental Health & Addiction [facility]     Jennifer Gottlieb MD     Date of Next Office Visit: 2/15/2024   No shows since last visit: 0  Cancellations since last visit: 0  ------------------------------  Medication requested:  OLANZapine (ZYPREXA) 5 MG tablet   Date last ordered: 1/8/2024  Qty: 60 Refills: 0     Sig - Route: Take 1 tablet (5 mg) by mouth 2 times daily as needed (agitation/shaina) - Oral   Sent to pharmacy as: OLANZapine 5 MG Oral Tablet (zyPREXA)   Class: E-Prescribe  ------------------------------     Last Visit Treatment Plan     Last encounter not signed.       Refill decision: Refill pended and routed to the provider for review/determination due to the following criteria not met: Last encounter not signed. There are two different orders for Zyprexa - one written on 1/5/24 for 20 tabs and another 1/8 for 60 tabs.    Medication unable to be refilled by RN due to criteria not met as indicated.                 []Eligibility - not seen in the last year              []Supervision - no future appointment              []Compliance - no shows, cancellations or lapse in therapy              [x]Verification - order discrepancy              []Controlled medication              []Medication not included in policy              []90-day supply request              [x]Other: Last encounter not signed.

## 2024-02-09 ENCOUNTER — OFFICE VISIT (OUTPATIENT)
Dept: FAMILY MEDICINE | Facility: CLINIC | Age: 24
End: 2024-02-09
Payer: COMMERCIAL

## 2024-02-09 ENCOUNTER — TELEPHONE (OUTPATIENT)
Dept: ENDOCRINOLOGY | Facility: CLINIC | Age: 24
End: 2024-02-09

## 2024-02-09 VITALS
DIASTOLIC BLOOD PRESSURE: 79 MMHG | TEMPERATURE: 98.6 F | OXYGEN SATURATION: 100 % | SYSTOLIC BLOOD PRESSURE: 115 MMHG | HEART RATE: 93 BPM | RESPIRATION RATE: 23 BRPM | WEIGHT: 159.8 LBS | HEIGHT: 66 IN | BODY MASS INDEX: 25.68 KG/M2

## 2024-02-09 DIAGNOSIS — E03.8 SUBCLINICAL HYPOTHYROIDISM: Primary | ICD-10-CM

## 2024-02-09 LAB
ALBUMIN SERPL BCG-MCNC: 4.1 G/DL (ref 3.5–5.2)
ALP SERPL-CCNC: 65 U/L (ref 40–150)
ALT SERPL W P-5'-P-CCNC: 8 U/L (ref 0–50)
AST SERPL W P-5'-P-CCNC: 24 U/L (ref 0–45)
BILIRUB DIRECT SERPL-MCNC: <0.2 MG/DL (ref 0–0.3)
BILIRUB SERPL-MCNC: 0.3 MG/DL
PROT SERPL-MCNC: 8.1 G/DL (ref 6.4–8.3)
TSH SERPL DL<=0.005 MIU/L-ACNC: 3.01 UIU/ML (ref 0.3–4.2)

## 2024-02-09 PROCEDURE — 36415 COLL VENOUS BLD VENIPUNCTURE: CPT | Performed by: FAMILY MEDICINE

## 2024-02-09 PROCEDURE — 84443 ASSAY THYROID STIM HORMONE: CPT | Performed by: FAMILY MEDICINE

## 2024-02-09 PROCEDURE — 80076 HEPATIC FUNCTION PANEL: CPT | Performed by: FAMILY MEDICINE

## 2024-02-09 PROCEDURE — 99213 OFFICE O/P EST LOW 20 MIN: CPT | Performed by: FAMILY MEDICINE

## 2024-02-09 NOTE — TELEPHONE ENCOUNTER
Patient call:     Appointment type: return endocrine   Provider: Dr. Jerry   Return date: first available CHELY   Speciality phone number: [phone]  Additional appointment(s) needed:   Additional notes: LVM and sent myc x1       Sarai Jerry MD Miller, Anitha SIDHU RN20 hours ago (4:16 PM)     Ok.  Is someone going to help her schedule followup?  OK to use return Chely     Please note that the above appointment(s) will require manual scheduling as they are marked as CHELY and will not appear using auto search. Do not schedule the patient if another patient has already been scheduled in the requested appointment slot.       Pepito Matthews on 2/9/2024 at 12:22 PM

## 2024-02-09 NOTE — PROGRESS NOTES
"  Assessment & Plan     Subclinical hypothyroidism  Patient history of bipolar she is on lithium.  Her last TSH in December of last year was at 4.83.  She has no other symptoms, no weight loss, she has no symptoms of hypothyroidism.    Repeat her TSH today.    - TSH with free T4 reflex; Future  - Hepatic panel (Albumin, ALT, AST, Bili, Alk Phos, TP); Future  - TSH with free T4 reflex  - Hepatic panel (Albumin, ALT, AST, Bili, Alk Phos, TP)    BMI  Estimated body mass index is 25.79 kg/m  as calculated from the following:    Height as of this encounter: 1.676 m (5' 6\").    Weight as of this encounter: 72.5 kg (159 lb 12.8 oz).      Emely Evans is a 23 year old, presenting for the following health issues:   Patient with history of bipolar she does take lithium she follows with psychiatry.  Her previous TSH from December last year was at the higher side.  Otherwise she has no other symptoms.    Hypothyroidism Follow-up    Since last visit, patient describes the following symptoms: weight gain of 3 lbs due to meds, anxiety, and fatigue, cold all the time and sensitive to the cold and skin is sensitive        Review of Systems  Constitutional, HEENT, cardiovascular, pulmonary, gi and gu systems are negative, except as otherwise noted.      Objective    /79 (BP Location: Right arm, Patient Position: Chair, Cuff Size: Adult Regular)   Pulse 93   Temp 98.6  F (37  C) (Oral)   Resp 23   Ht 1.676 m (5' 6\")   Wt 72.5 kg (159 lb 12.8 oz)   LMP 12/30/2023   SpO2 100%   BMI 25.79 kg/m    Body mass index is 25.79 kg/m .  Physical Exam   GENERAL: alert and no distress  PSYCH: mentation appears normal, affect normal/bright    Orders Placed This Encounter   Procedures    TSH with free T4 reflex    Hepatic panel (Albumin, ALT, AST, Bili, Alk Phos, TP)            Signed Electronically by: Dashawn Dixon MD    "

## 2024-02-10 ENCOUNTER — HEALTH MAINTENANCE LETTER (OUTPATIENT)
Age: 24
End: 2024-02-10

## 2024-02-12 ENCOUNTER — HOSPITAL ENCOUNTER (EMERGENCY)
Facility: CLINIC | Age: 24
Discharge: HOME | End: 2024-02-13
Attending: PSYCHIATRY & NEUROLOGY | Admitting: PSYCHIATRY & NEUROLOGY
Payer: COMMERCIAL

## 2024-02-12 DIAGNOSIS — F31.12 BIPOLAR AFFECTIVE DISORDER, CURRENTLY MANIC, MODERATE (H): ICD-10-CM

## 2024-02-12 PROBLEM — Z91.148 NONCOMPLIANCE WITH MEDICATION REGIMEN: Status: ACTIVE | Noted: 2024-02-12

## 2024-02-12 LAB
ALBUMIN SERPL BCG-MCNC: 4.1 G/DL (ref 3.5–5.2)
ALP SERPL-CCNC: 75 U/L (ref 40–150)
ALT SERPL W P-5'-P-CCNC: 8 U/L (ref 0–50)
AMPHETAMINES UR QL SCN: ABNORMAL
ANION GAP SERPL CALCULATED.3IONS-SCNC: 6 MMOL/L (ref 7–15)
APAP SERPL-MCNC: <5 UG/ML (ref 10–30)
AST SERPL W P-5'-P-CCNC: 21 U/L (ref 0–45)
BARBITURATES UR QL SCN: ABNORMAL
BASOPHILS # BLD AUTO: 0 10E3/UL (ref 0–0.2)
BASOPHILS NFR BLD AUTO: 1 %
BENZODIAZ UR QL SCN: ABNORMAL
BILIRUB SERPL-MCNC: 0.2 MG/DL
BUN SERPL-MCNC: 7 MG/DL (ref 6–20)
BZE UR QL SCN: ABNORMAL
CALCIUM SERPL-MCNC: 9.7 MG/DL (ref 8.6–10)
CANNABINOIDS UR QL SCN: ABNORMAL
CHLORIDE SERPL-SCNC: 103 MMOL/L (ref 98–107)
CREAT SERPL-MCNC: 0.65 MG/DL (ref 0.51–0.95)
DEPRECATED HCO3 PLAS-SCNC: 26 MMOL/L (ref 22–29)
EGFRCR SERPLBLD CKD-EPI 2021: >90 ML/MIN/1.73M2
EOSINOPHIL # BLD AUTO: 0.2 10E3/UL (ref 0–0.7)
EOSINOPHIL NFR BLD AUTO: 3 %
ERYTHROCYTE [DISTWIDTH] IN BLOOD BY AUTOMATED COUNT: 14.4 % (ref 10–15)
FENTANYL UR QL: ABNORMAL
FLUAV RNA SPEC QL NAA+PROBE: NEGATIVE
FLUBV RNA RESP QL NAA+PROBE: NEGATIVE
GLUCOSE SERPL-MCNC: 107 MG/DL (ref 70–99)
HCG UR QL: NEGATIVE
HCT VFR BLD AUTO: 37 % (ref 35–47)
HGB BLD-MCNC: 11.8 G/DL (ref 11.7–15.7)
IMM GRANULOCYTES # BLD: 0 10E3/UL
IMM GRANULOCYTES NFR BLD: 0 %
LITHIUM SERPL-SCNC: 0.83 MMOL/L (ref 0.6–1.2)
LYMPHOCYTES # BLD AUTO: 2.1 10E3/UL (ref 0.8–5.3)
LYMPHOCYTES NFR BLD AUTO: 25 %
MCH RBC QN AUTO: 28.5 PG (ref 26.5–33)
MCHC RBC AUTO-ENTMCNC: 31.9 G/DL (ref 31.5–36.5)
MCV RBC AUTO: 89 FL (ref 78–100)
MONOCYTES # BLD AUTO: 0.8 10E3/UL (ref 0–1.3)
MONOCYTES NFR BLD AUTO: 9 %
NEUTROPHILS # BLD AUTO: 5.3 10E3/UL (ref 1.6–8.3)
NEUTROPHILS NFR BLD AUTO: 62 %
NRBC # BLD AUTO: 0 10E3/UL
NRBC BLD AUTO-RTO: 0 /100
OPIATES UR QL SCN: ABNORMAL
PCP QUAL URINE (ROCHE): ABNORMAL
PLATELET # BLD AUTO: 271 10E3/UL (ref 150–450)
POTASSIUM SERPL-SCNC: 4 MMOL/L (ref 3.4–5.3)
PROT SERPL-MCNC: 8 G/DL (ref 6.4–8.3)
RBC # BLD AUTO: 4.14 10E6/UL (ref 3.8–5.2)
RSV RNA SPEC NAA+PROBE: NEGATIVE
SALICYLATES SERPL-MCNC: <0.3 MG/DL
SARS-COV-2 RNA RESP QL NAA+PROBE: NEGATIVE
SODIUM SERPL-SCNC: 135 MMOL/L (ref 135–145)
TSH SERPL DL<=0.005 MIU/L-ACNC: 2.19 UIU/ML (ref 0.3–4.2)
WBC # BLD AUTO: 8.4 10E3/UL (ref 4–11)

## 2024-02-12 PROCEDURE — 99285 EMERGENCY DEPT VISIT HI MDM: CPT | Performed by: PSYCHIATRY & NEUROLOGY

## 2024-02-12 PROCEDURE — 80307 DRUG TEST PRSMV CHEM ANLYZR: CPT | Performed by: PSYCHIATRY & NEUROLOGY

## 2024-02-12 PROCEDURE — 81025 URINE PREGNANCY TEST: CPT | Performed by: PSYCHIATRY & NEUROLOGY

## 2024-02-12 PROCEDURE — 99285 EMERGENCY DEPT VISIT HI MDM: CPT | Mod: 25 | Performed by: PSYCHIATRY & NEUROLOGY

## 2024-02-12 PROCEDURE — 250N000013 HC RX MED GY IP 250 OP 250 PS 637: Performed by: PSYCHIATRY & NEUROLOGY

## 2024-02-12 PROCEDURE — 84443 ASSAY THYROID STIM HORMONE: CPT | Performed by: PSYCHIATRY & NEUROLOGY

## 2024-02-12 PROCEDURE — 80143 DRUG ASSAY ACETAMINOPHEN: CPT | Performed by: EMERGENCY MEDICINE

## 2024-02-12 PROCEDURE — 87637 SARSCOV2&INF A&B&RSV AMP PRB: CPT | Mod: 59 | Performed by: EMERGENCY MEDICINE

## 2024-02-12 PROCEDURE — 94640 AIRWAY INHALATION TREATMENT: CPT | Performed by: PSYCHIATRY & NEUROLOGY

## 2024-02-12 PROCEDURE — 80179 DRUG ASSAY SALICYLATE: CPT | Performed by: EMERGENCY MEDICINE

## 2024-02-12 PROCEDURE — 80053 COMPREHEN METABOLIC PANEL: CPT | Performed by: PSYCHIATRY & NEUROLOGY

## 2024-02-12 PROCEDURE — 85004 AUTOMATED DIFF WBC COUNT: CPT | Performed by: PSYCHIATRY & NEUROLOGY

## 2024-02-12 PROCEDURE — 80178 ASSAY OF LITHIUM: CPT | Performed by: PSYCHIATRY & NEUROLOGY

## 2024-02-12 PROCEDURE — 36415 COLL VENOUS BLD VENIPUNCTURE: CPT | Performed by: PSYCHIATRY & NEUROLOGY

## 2024-02-12 RX ORDER — POLYETHYLENE GLYCOL 3350 17 G
2 POWDER IN PACKET (EA) ORAL
Status: DISCONTINUED | OUTPATIENT
Start: 2024-02-12 | End: 2024-02-13 | Stop reason: HOSPADM

## 2024-02-12 RX ORDER — OLANZAPINE 10 MG/1
10 TABLET ORAL AT BEDTIME
Status: DISCONTINUED | OUTPATIENT
Start: 2024-02-12 | End: 2024-02-13 | Stop reason: HOSPADM

## 2024-02-12 RX ORDER — LITHIUM CARBONATE 450 MG
900 TABLET, EXTENDED RELEASE ORAL AT BEDTIME
Status: DISCONTINUED | OUTPATIENT
Start: 2024-02-12 | End: 2024-02-13 | Stop reason: HOSPADM

## 2024-02-12 RX ORDER — OLANZAPINE 5 MG/1
5 TABLET ORAL 2 TIMES DAILY PRN
Status: DISCONTINUED | OUTPATIENT
Start: 2024-02-12 | End: 2024-02-13 | Stop reason: HOSPADM

## 2024-02-12 RX ORDER — FLUTICASONE PROPIONATE 220 UG/1
1 AEROSOL, METERED RESPIRATORY (INHALATION) 2 TIMES DAILY
Status: DISCONTINUED | OUTPATIENT
Start: 2024-02-12 | End: 2024-02-13 | Stop reason: HOSPADM

## 2024-02-12 RX ORDER — OLANZAPINE 5 MG/1
5 TABLET, ORALLY DISINTEGRATING ORAL ONCE
Status: COMPLETED | OUTPATIENT
Start: 2024-02-12 | End: 2024-02-12

## 2024-02-12 RX ADMIN — FLUTICASONE PROPIONATE 1 PUFF: 220 AEROSOL, METERED RESPIRATORY (INHALATION) at 20:07

## 2024-02-12 RX ADMIN — OLANZAPINE 10 MG: 10 TABLET, FILM COATED ORAL at 23:26

## 2024-02-12 RX ADMIN — LITHIUM CARBONATE 900 MG: 450 TABLET, EXTENDED RELEASE ORAL at 23:26

## 2024-02-12 RX ADMIN — OLANZAPINE 5 MG: 5 TABLET, ORALLY DISINTEGRATING ORAL at 14:09

## 2024-02-12 ASSESSMENT — ACTIVITIES OF DAILY LIVING (ADL)
ADLS_ACUITY_SCORE: 35

## 2024-02-12 NOTE — ED TRIAGE NOTES
Patient arrives unable to sleep x few days. Hx bipolar disorder. Taking lithium, seroquel, zyprexa, compliant with all medications. Took hyproxyzine to help with sleep, no relief. 2 years ago had similar episode, does not remember outcome.    A+O x 4. Ambulatory but choosing to sit in wheelchair r/t feeling tired. States she was at the gym for hours. Accompanied by mother.

## 2024-02-12 NOTE — ED NOTES
Pt very anxious and stated they need to go, doesn't understand why they can't have discharge papers. Pt's father is sitting with patient in room, pt pacing and getting more worked up. Pt told writer that they plan to get motel tonight because they don't trust their mother or cousin. States mother is having them on continuous observation and they don't feel safe and their cousin is a thief and they know they are stealing their things. Pt said they only came to the ED so their mother would leave them alone. Endorses that they take their medication everyday (350mg of lithium and 2.5mg of Zyprexa).
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
## 2024-02-12 NOTE — DISCHARGE INSTRUCTIONS
We have initiated a referral to the [facility] program through [facility] .  They can be reached at [phone]
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
## 2024-02-12 NOTE — ED PROVIDER NOTES
ED Provider Note  [facility]      History     Chief Complaint   Patient presents with    Manic Behavior     HPI  Cristina Boyd is a 23 year old female who is here brought in by mother with concerns for acute paranoia and inability to sleep. Patient admits to feeling upset that mother and sister are watching her every moment of the day and night and she feels paranoid. She is fearful for her life. It is the reason she cannot fall asleep. She has no money currently in her checking account but plans on buying a ticket to go to [place] as soon as she gets her next Social Security income check. She has not talked to her cousins in [place] about her plan and intends on showing up at their doorsteps.    Patient reports taking her meds as prescribed and feels that they hare not helping. She did admit to feeling better after a dose of Zyprexa 2.5 mg this morning.     Patient presents as irritable, loud in speech and paranoid and likely delusional. She does not want to be at home and requests staying here overnight for her safety and security.    Past Medical History  Past Medical History:   Diagnosis Date    Bipolar 1 disorder (H)     Infection due to 2019 novel coronavirus 2020    summer 2020 by patient report    Infection due to 2019 novel coronavirus 12/2021    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, unspecified thyrotoxicosis type 11/16/2022     Past Surgical History:   Procedure Laterality Date    BRONCHOSCOPY (RIGID OR FLEXIBLE), DIAGNOSTIC N/A 6/16/2018    Procedure: COMBINED BRONCHOSCOPY (RIGID OR FLEXIBLE), LAVAGE;;  Surgeon: Manjeet Holland MD;  Location:  GI     fluticasone (FLOVENT HFA) 220 MCG/ACT inhaler  lithium ER (ESKALITH CR/LITHOBID) 450 MG CR tablet  OLANZapine (ZYPREXA) 10 MG tablet  OLANZapine (ZYPREXA) 5 MG tablet  OLANZapine (ZYPREXA) 5 MG tablet      No Known Allergies  Family History  Family History   Problem Relation Age of Onset    Impaired Fasting Glucose  Father         prediabetes    Thyroid Disease Maternal Grandmother     Diabetes Maternal Grandmother     Diabetes Paternal Grandmother          early in her 60 s    Depression Maternal Uncle     Depression Other      Social History   Social History     Tobacco Use    Smoking status: Never    Smokeless tobacco: Never   Vaping Use    Vaping Use: Never used   Substance Use Topics    Alcohol use: No    Drug use: No     Comment: x1 marijuana, in          A medically appropriate review of systems was performed with pertinent positives and negatives noted in the HPI, and all other systems negative.    Physical Exam   BP: 114/80  Pulse: 111  Temp: 98.8  F (37.1  C)  Resp: 18  SpO2: 100 %  Physical Exam  Vitals and nursing note reviewed.   HENT:      Head: Normocephalic.   Eyes:      Pupils: Pupils are equal, round, and reactive to light.   Pulmonary:      Effort: Pulmonary effort is normal.   Musculoskeletal:         General: Normal range of motion.      Cervical back: Normal range of motion.   Neurological:      General: No focal deficit present.      Mental Status: She is alert.   Psychiatric:         Attention and Perception: Attention and perception normal. She does not perceive auditory or visual hallucinations.         Mood and Affect: Mood normal. Affect is labile, angry and inappropriate.         Speech: Speech is rapid and pressured and tangential.         Behavior: Behavior normal. Behavior is not agitated, aggressive, hyperactive or combative. Behavior is cooperative.         Thought Content: Thought content is paranoid and delusional. Thought content does not include homicidal or suicidal ideation.         Cognition and Memory: Cognition and memory normal.         Judgment: Judgment is inappropriate.           ED Course, Procedures, & Data      Procedures       A consult was attained from the [facility] service. The case was discussed with the  from that service. The consulting service's  recommendations were provided at 1:30 PM. 10 minutes spent reviewing case, care and disposition. 10 minutes spent reviewing prior records and intervention.    Observation Addendum  With this Addendum, this ED Provider Note may also serve as an Observation H&P    Observation Initiation Date: Feb 12, 2024    Patient presenting with shaina and paranoia. She requests staying here overnight as she is fearful for her safety if she was home, as mother and sister are constantly watching over her.    A DEC assessment was completed, and the case was discussed with the . The  recommended ED OBS. See separate DEC note from today's date for details on the assessment.    During the initial care period, the patient did require medications for agitation, and did not require restraints/seclusion for patient and/or provider safety. Olanzapine 5 mg provided to address patient's acute paranoia.    The patient's outpatient medications were not reconciled and ordered.     The patient was found to have a psychiatric condition that would benefit from an observation stay in the emergency department for further psychiatric stabilization and/or coordination of a safe disposition. The observation plan includes serial assessments of psychiatric condition, potential administration of medications if indicated, further disposition pending the patient's psychiatric course during the monitoring period.   -----               No results found for any visits on 02/12/24.  Medications   OLANZapine zydis (zyPREXA) ODT tab 5 mg (has no administration in time range)     Labs Ordered and Resulted from Time of ED Arrival to Time of ED Departure - No data to display  No orders to display          Critical care was not performed.     Medical Decision Making  The patient's presentation was of high complexity (a chronic illness severe exacerbation, progression, or side effect of treatment).    The patient's evaluation involved:  an assessment  requiring an independent historian (see separate area of note for details)  review of external note(s) from 3+ sources (see separate area of note for details)  review of 3+ test result(s) ordered prior to this encounter (see separate area of note for details)  ordering and/or review of 3+ test(s) in this encounter (CBC, CMP, lithium level and TSH)    The patient's management necessitated moderate risk (limitations due to social determinants of health (inadequate community  support)), high risk (drug therapy requiring intensive monitoring (olanzapine 5 mg to address paranoia)), and high risk (a decision regarding hospitalization).    Assessment & Plan    Patient is here brought in by mother as she appears acutely paranoid and in distressed as a result. She has not been sleeping, as she is fearful for her life and feels that mother is watching her too closely. She feels trapped as she has no money to escape. Otherwise her plan is to go to [place] to escape being under mother's constant vigilance. Patient is too paranoid and scared to be at home presently and wants to stay at least the night. She will be made ED OBS. If she does not stabilize the following day, she would benefit from an admission for further stabilization.    I have reviewed the nursing notes. I have reviewed the findings, diagnosis, plan and need for follow up with the patient.    New Prescriptions    No medications on file       Final diagnoses:   Bipolar affective disorder, currently manic, moderate (H)       nAmol De Los Santos MD  [facility] EMERGENCY DEPARTMENT  2/12/2024     Anmol De Los Santos MD  02/12/24 [id number]

## 2024-02-12 NOTE — PHARMACY-ADMISSION MEDICATION HISTORY
Admission Medication History status for the 2/12/2024 admission is complete.  See EPIC admission navigator for Prior to Admission medications.    Medication history sources:  pt's Mom and Dad  at  , dispense report, care everywhere     Medication history source reliability: Poor    Medication adherence:  Poor    Changes made to PTA medication list (reason)  Added: n/a  Deleted: n/a  Changed: n/a    Additional medication history information (including reliability of information, actions taken by pharmacist): none     Time spent in this activity: 25 minutes     Medication history completed by: Suzette Nicole Pharm.D    Prior to Admission medications    Medication Sig Last Dose Taking? Auth Provider Long Term End Date   fluticasone (FLOVENT HFA) 220 MCG/ACT inhaler Inhale 1 puff into the lungs 2 times daily 2/11/2024 Yes Angeli Pozo APRN CNP Yes    lithium ER (ESKALITH CR/LITHOBID) 450 MG CR tablet Take 2 tablets (900 mg) by mouth at bedtime 2/11/2024 Yes Zhang Agosto MD Yes    OLANZapine (ZYPREXA) 10 MG tablet Take 1 tablet (10 mg) by mouth at bedtime 2/11/2024 Yes Claudio Nicole APRN CNP Yes    OLANZapine (ZYPREXA) 5 MG tablet Take 1 tablet (5 mg) by mouth 2 times daily as needed (agitation/shaina) 2/12/2024 Yes Liya Guzman MD Yes

## 2024-02-13 ENCOUNTER — OFFICE VISIT (OUTPATIENT)
Dept: ENDOCRINOLOGY | Facility: CLINIC | Age: 24
End: 2024-02-13
Payer: COMMERCIAL

## 2024-02-13 VITALS
WEIGHT: 162 LBS | SYSTOLIC BLOOD PRESSURE: 114 MMHG | HEIGHT: 66 IN | DIASTOLIC BLOOD PRESSURE: 81 MMHG | BODY MASS INDEX: 26.03 KG/M2 | OXYGEN SATURATION: 100 % | HEART RATE: 94 BPM

## 2024-02-13 VITALS
HEART RATE: 89 BPM | SYSTOLIC BLOOD PRESSURE: 119 MMHG | OXYGEN SATURATION: 100 % | RESPIRATION RATE: 18 BRPM | TEMPERATURE: 98.2 F | DIASTOLIC BLOOD PRESSURE: 75 MMHG

## 2024-02-13 DIAGNOSIS — R94.6 THYROID FUNCTION TEST ABNORMAL: Primary | ICD-10-CM

## 2024-02-13 PROCEDURE — 99215 OFFICE O/P EST HI 40 MIN: CPT | Performed by: INTERNAL MEDICINE

## 2024-02-13 RX ADMIN — FLUTICASONE PROPIONATE 1 PUFF: 220 AEROSOL, METERED RESPIRATORY (INHALATION) at 09:51

## 2024-02-13 ASSESSMENT — ACTIVITIES OF DAILY LIVING (ADL)
ADLS_ACUITY_SCORE: 35

## 2024-02-13 ASSESSMENT — PAIN SCALES - GENERAL: PAINLEVEL: SEVERE PAIN (6)

## 2024-02-13 ASSESSMENT — COLUMBIA-SUICIDE SEVERITY RATING SCALE - C-SSRS
2. HAVE YOU ACTUALLY HAD ANY THOUGHTS OF KILLING YOURSELF?: NO
SUICIDE, SINCE LAST CONTACT: NO
1. SINCE LAST CONTACT, HAVE YOU WISHED YOU WERE DEAD OR WISHED YOU COULD GO TO SLEEP AND NOT WAKE UP?: NO
ATTEMPT SINCE LAST CONTACT: NO
TOTAL  NUMBER OF INTERRUPTED ATTEMPTS SINCE LAST CONTACT: NO
6. HAVE YOU EVER DONE ANYTHING, STARTED TO DO ANYTHING, OR PREPARED TO DO ANYTHING TO END YOUR LIFE?: NO
TOTAL  NUMBER OF ABORTED OR SELF INTERRUPTED ATTEMPTS SINCE LAST CONTACT: NO
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
## 2024-02-13 NOTE — ED NOTES
Patient awake at this time, requesting cranberry juice and a book to read. Request provided at this time. Patient ambulatory and calm/alert at this time.
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
## 2024-02-13 NOTE — CONSULTS
DEC Consult Order placed. DEC assessment completed by Saadia King on 2/12/24 at 1:06pm. Consult acknowledged and completed.     Angeli Santos
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
## 2024-02-15 ENCOUNTER — VIRTUAL VISIT (OUTPATIENT)
Dept: PSYCHIATRY | Facility: CLINIC | Age: 24
End: 2024-02-15
Attending: STUDENT IN AN ORGANIZED HEALTH CARE EDUCATION/TRAINING PROGRAM
Payer: COMMERCIAL

## 2024-02-15 DIAGNOSIS — F31.9 BIPOLAR 1 DISORDER (H): Primary | ICD-10-CM

## 2024-02-15 PROCEDURE — 99214 OFFICE O/P EST MOD 30 MIN: CPT | Mod: 95

## 2024-02-15 ASSESSMENT — PATIENT HEALTH QUESTIONNAIRE - PHQ9
SUM OF ALL RESPONSES TO PHQ QUESTIONS 1-9: 9
SUM OF ALL RESPONSES TO PHQ QUESTIONS 1-9: 9
10. IF YOU CHECKED OFF ANY PROBLEMS, HOW DIFFICULT HAVE THESE PROBLEMS MADE IT FOR YOU TO DO YOUR WORK, TAKE CARE OF THINGS AT HOME, OR GET ALONG WITH OTHER PEOPLE: SOMEWHAT DIFFICULT
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
## 2024-02-15 NOTE — NURSING NOTE
Is the patient currently in the state of [place]? YES    Visit mode:VIDEO    If the visit is dropped, the patient can be reconnected by: VIDEO VISIT: Send to e-mail at: [email or web address]    Will anyone else be joining the visit? NO  (If patient encounters technical issues they should call [phone]    How would you like to obtain your AVS? MyChart    Are changes needed to the allergy or medication list? No    Reason for visit: RECHECK    Ankit YUSUF

## 2024-02-15 NOTE — PATIENT INSTRUCTIONS
**For crisis resources, please see the information at the end of this document**   Patient Education    Thank you for coming to the [facility] [place] [facility].    Lab Testing:  If you had lab testing today and your results are reassuring or normal they will be mailed to you or sent through [employer] within 7 days. If the lab tests need quick action we will call you with the results. The phone number we will call with results is # [phone] (home) . If this is not the best number please call our clinic and change the number.    Medication Refills:  If you need any refills please call your pharmacy and they will contact us. Our fax number for refills is [phone]. Please allow three business for refill processing. If you need to  your refill at a new pharmacy, please contact the new pharmacy directly. The new pharmacy will help you get your medications transferred.     Scheduling:  If you have any concerns about today's visit or wish to schedule another appointment please call our office during normal business hours [phone] (8-5:00 M-F)    Contact Us:  Please call [phone] during business hours (8-5:00 M-F).  If after clinic hours, or on the weekend, please call  [phone].    Financial Assistance [phone]  [facility] Billing [phone]  Central Billing Office, [facility]: [phone]  [facility] Billing [phone]  Medical Records [phone]  [facility] Patient Bill of Rights [email or web address]       MENTAL HEALTH CRISIS RESOURCES:  For a emergency help, please call 911 or go to the nearest Emergency Department.     Emergency Walk-In Options:   EmPATH Unit @ [facility] ([place]): [phone] - Specialized mental health emergency area designed to be calming  [facility] ([place]): [phone]  [facility] ([place]):  [phone]  [facility] ([place]): [phone]    [facility] Crisis Information:   [place]: [phone]  Luis Enrique: [phone]  [place] CHARLES) - Adult: [phone]     Child: [phone]  Joe - Adult: [phone]     Child: [phone]  [place]: [phone]  List of all [facility] resources:   https://[place].gov/dhs/people-we-serve/adults/health-care/mental-health/resources/crisis-contacts.jsp    National Crisis Information:   Crisis Text Line: Text  [place]  to 728634  National Suicide Prevention Lifeline: 1-070-014-HRGI (1-458.217.5475)       For online chat options, visit https://suicidepreventionlifeline.org/chat/  Poison Control Center: 1-338.295.5350  Trans Lifeline: 1-823.821.9489 - Hotline for transgender people of all ages  The [employer] Project: 6-413-413-6682 - Hotline for LGBT youth     For Non-Emergency Support:   Fast Tracker: Mental Health & Substance Use Disorder Resources -   https://www.fasttrackmentionn.org/       Again thank you for choosing [facility] [place] [facility] and please let us know how we can best partner with you to improve you and your family's health.    You may be receiving a survey regarding this appointment. We would love to have your feedback, both positive and negative. The survey is done by an external company, so your answers are anonymous.

## 2024-02-21 ENCOUNTER — MYC MEDICAL ADVICE (OUTPATIENT)
Dept: PSYCHIATRY | Facility: CLINIC | Age: 24
End: 2024-02-21
Payer: MEDICAID

## 2024-03-06 ENCOUNTER — MYC MEDICAL ADVICE (OUTPATIENT)
Dept: PSYCHIATRY | Facility: CLINIC | Age: 24
End: 2024-03-06
Payer: MEDICAID

## 2024-03-18 DIAGNOSIS — F31.9 BIPOLAR 1 DISORDER (H): ICD-10-CM

## 2024-03-18 NOTE — TELEPHONE ENCOUNTER
[facility]    Phone Message    May a detailed message be left on voicemail: yes     Reason for Call: Medication Refill Request    Has the patient contacted the pharmacy for the refill? Yes   Name of medication being requested: lithium & zyprexa   Provider who prescribed the medication: Bull  Pharmacy:    [facility] [id number]   Date medication is needed: ASAP    Action Taken: Message routed to:  Other: Nurse    Travel Screening: Not Applicable

## 2024-03-18 NOTE — TELEPHONE ENCOUNTER
Last seen: 2/15/24  RTC: 4 weeks  Cancel: none  No-show: none  Next appt: none scheduled     Incoming refill from Patient via phone    Medication requested:   Pending Prescriptions:                       Disp   Refills    lithium (ESKALITH CR/LITHOBID) 450 MG CR *60 tab*0            Sig: Take 2 tablets (900 mg) by mouth at bedtime    OLANZapine (ZYPREXA) 10 MG tablet         30 tab*0            Sig: Take 1 tablet (10 mg) by mouth at bedtime    OLANZapine (ZYPREXA) 5 MG tablet          60 tab*0            Sig: Take 1 tablet (5 mg) by mouth 2 times daily as           needed (agitation/shaina)            From chart note:   - Continue Lithium CR to 900 mg at bedtime   - Continue Zyprexa 10mg at bedtime (re-started in hospital Dec 2)  - Continue Olanzapine 5 mg  PO BID PRN for agitation/shaina/sleep (2-3 times last week)        Medication unable to be refilled by RN due to criteria not met as indicated.                 []Eligibility - not seen in the last year              [x]Supervision - no future appointment              [x]Compliance - no shows, cancellations or lapse in therapy              []Verification - order discrepancy              []Controlled medication              []Medication not included in policy              []90-day supply request              []Other:

## 2024-03-19 RX ORDER — OLANZAPINE 5 MG/1
5 TABLET ORAL 2 TIMES DAILY PRN
Qty: 60 TABLET | Refills: 0 | Status: SHIPPED | OUTPATIENT
Start: 2024-03-19 | End: 2024-05-31

## 2024-03-19 RX ORDER — OLANZAPINE 10 MG/1
10 TABLET ORAL AT BEDTIME
Qty: 30 TABLET | Refills: 0 | Status: SHIPPED | OUTPATIENT
Start: 2024-03-19 | End: 2024-05-31

## 2024-03-19 RX ORDER — LITHIUM CARBONATE 450 MG
900 TABLET, EXTENDED RELEASE ORAL AT BEDTIME
Qty: 60 TABLET | Refills: 0 | Status: SHIPPED | OUTPATIENT
Start: 2024-03-19 | End: 2024-05-31

## 2024-03-19 NOTE — TELEPHONE ENCOUNTER
Attempted to call Cristina, no answer. Left VM requesting return call at clinic.  [employer] message sent to pt.

## 2024-03-29 ENCOUNTER — TELEPHONE (OUTPATIENT)
Dept: PSYCHIATRY | Facility: CLINIC | Age: 24
End: 2024-03-29
Payer: MEDICAID

## 2024-03-29 NOTE — TELEPHONE ENCOUNTER
[facility] Call Center    Phone Message    May a detailed message be left on voicemail: yes     Reason for Call: Other: Patient called requesting assistance from a  in order to help get new insurance. The patient turned 24 this March, and was told they will be off their parent's insurance by the end of the month. Because of this, they were needing help ASAP in getting new insurance.     Action Taken: Message routed to:  Other: [facility] psychiatry social work pool, [facility] psychiatry nurse pool    Travel Screening: Not Applicable

## 2024-04-01 ENCOUNTER — PATIENT OUTREACH (OUTPATIENT)
Dept: CARE COORDINATION | Facility: CLINIC | Age: 24
End: 2024-04-01
Payer: MEDICAID

## 2024-04-01 NOTE — LETTER
[facility] CARE COORDINATION  [facility]  April 3, 2024    Cristina Boyd  [address] [place] [address]      Dear Cristina,    I am a clinic care coordinator who works with Jennifer Gottlieb MD with the [facility]. I have been trying to reach you recently to introduce Clinic Care Coordination. Below is a description of clinic care coordination and how I can further assist you.       The clinic care coordination team is made up of a registered nurse, , and financial resource worker who understand the health care system. The goal of clinic care coordination is to help you manage your health and improve access to the health care system. Our team works alongside your provider to assist you in determining your health and social needs. We can help you obtain health care and community resources, providing you with necessary information and education. We can work with you through any barriers and develop a care plan that helps coordinate and strengthen the communication between you and your care team.  Our services are voluntary and are offered without charge to you personally.    Please feel free to contact me with any questions or concerns regarding care coordination and what we can offer.      We are focused on providing you with the highest-quality healthcare experience possible.    Sincerely,     THAIS Crocker  Clinic Care Coordination  [facility]  [email or web address]  [phone]

## 2024-04-01 NOTE — PROGRESS NOTES
Clinic Care Coordination Contact  [facility]/Voicemail    Clinical Data: Care Coordinator Outreach    Outreach Documentation Number of Outreach Attempt   4/1/2024  11:11 AM 1     Left message on patient's voicemail with call back information and requested return call.    Plan: Care Coordinator will try to reach patient again in 1-2 business days.    Angela Lawton EMERALD  Clinic Care Coordination  [facility]  [email or web address]  [phone]

## 2024-04-03 NOTE — PROGRESS NOTES
Clinic Care Coordination Contact  [facility]/Voicemail    Clinical Data: Care Coordinator Outreach    Outreach Documentation Number of Outreach Attempt   4/1/2024  11:11 AM 1   4/3/2024  10:14 AM 2     Left message on patient's voicemail with call back information and requested return call.    Plan: Care Coordinator will send unable to contact letter with care coordinator contact information via [employer]. Care Coordinator will try to reach patient again in 3-5 business days.    THAIS Crocker  Clinic Care Coordination  [facility]  [email or web address]  [phone]

## 2024-04-10 NOTE — PROGRESS NOTES
Clinic Care Coordination Contact  [facility]/Voic[facility]    Clinical Data: Care Coordinator Outreach    Outreach Documentation Number of Outreach Attempt   4/1/2024  11:11 AM 1   4/3/2024  10:14 AM 2   4/10/2024  10:30 AM 3     Left message on patient's voicemail with call back information and requested return call.    Plan: Care Coordinator will do no further outreaches at this time.    Angela Lawton EMERALD  Clinic Care Coordination  [facility]  [email or web address]  [phone]

## 2024-05-08 ENCOUNTER — PATIENT OUTREACH (OUTPATIENT)
Dept: CARE COORDINATION | Facility: CLINIC | Age: 24
End: 2024-05-08
Payer: MEDICAID

## 2024-05-31 DIAGNOSIS — F31.9 BIPOLAR 1 DISORDER (H): ICD-10-CM

## 2024-05-31 NOTE — TELEPHONE ENCOUNTER
[facility]    Phone Message    May a detailed message be left on voicemail: yes     Reason for Call: Medication Refill Request    Has the patient contacted the pharmacy for the refill? Yes   Name of medication being requested: Lithium 450mg         Olanzapine 10mg         Olanzapine 5mg  Provider who prescribed the medication: Dr. Gottlieb  Pharmacy: [facility] [address] [address]   Date medication is needed: 6/4/2024       Patient called requesting refills for their prescription of Lithium, and their prescriptions of Olanzapine.     Action Taken: Message routed to:  Other: [facility] psychiatry nurse pool    Travel Screening: Not Applicable

## 2024-05-31 NOTE — TELEPHONE ENCOUNTER
Last seen: 2/15/24  RTC: 4 weeks  Cancel: none  No-show: 4/25/24  Next appt: none     Incoming refill from Patient via phone  Writer called the pharmacy and confirmed her last refills of all three meds was on 3/19/24 for a 30 d/s. Called Cristina to discuss this further. She reports she had extra and did not stop her medications. Cristina said she has a few days remaining and is calling to be proactive.     Medication requested:   Pending Prescriptions:                       Disp   Refills    lithium (ESKALITH CR/LITHOBID) 450 MG CR *60 tab*0            Sig: Take 2 tablets (900 mg) by mouth at bedtime    OLANZapine (ZYPREXA) 10 MG tablet         30 tab*0            Sig: Take 1 tablet (10 mg) by mouth at bedtime    OLANZapine (ZYPREXA) 5 MG tablet          60 tab*0            Sig: Take 1 tablet (5 mg) by mouth 2 times daily as           needed (agitation/shaina)    From chart note:   - Continue Lithium CR to 900 mg at bedtime   - Continue Zyprexa 10mg at bedtime (re-started in hospital Dec 2)  - Continue Olanzapine 5 mg  PO BID PRN for agitation/shaina/sleep (2-3 times last week)    Medication unable to be refilled by RN due to criteria not met as indicated.                 []Eligibility - not seen in the last year              []Supervision - no future appointment              [x]Compliance - no shows, cancellations or lapse in therapy              []Verification - order discrepancy              []Controlled medication              []Medication not included in policy              []90-day supply request              []Other:      Adina Altamirano RN on 5/31/2024 at 3:47 PM

## 2024-06-02 RX ORDER — OLANZAPINE 5 MG/1
5 TABLET ORAL 2 TIMES DAILY PRN
Qty: 60 TABLET | Refills: 0 | Status: SHIPPED | OUTPATIENT
Start: 2024-06-02

## 2024-06-02 RX ORDER — LITHIUM CARBONATE 450 MG
900 TABLET, EXTENDED RELEASE ORAL AT BEDTIME
Qty: 60 TABLET | Refills: 0 | Status: SHIPPED | OUTPATIENT
Start: 2024-06-02 | End: 2024-07-02

## 2024-06-02 RX ORDER — OLANZAPINE 10 MG/1
10 TABLET ORAL AT BEDTIME
Qty: 30 TABLET | Refills: 0 | Status: SHIPPED | OUTPATIENT
Start: 2024-06-02

## 2024-07-02 DIAGNOSIS — F31.9 BIPOLAR 1 DISORDER (H): ICD-10-CM

## 2024-07-02 RX ORDER — LITHIUM CARBONATE 450 MG
900 TABLET, EXTENDED RELEASE ORAL AT BEDTIME
Qty: 120 TABLET | Refills: 0 | Status: SHIPPED | OUTPATIENT
Start: 2024-07-02

## 2024-07-02 NOTE — TELEPHONE ENCOUNTER
Last seen: 2/15/24  RTC: 4 weeks  Cancel: none  No-show: 4/25/24  Next appt: none           Incoming refill from Patient via phone    Medication requested:   Pending Prescriptions:                       Disp   Refills    lithium (ESKALITH CR/LITHOBID) 450 MG CR *60 tab*0            Sig: Take 2 tablets (900 mg) by mouth at bedtime        Last refill per       From chart note:   Continue Lithium CR to 900 mg at bedtime        Medication unable to be refilled by RN due to criteria not met as indicated.                 []Eligibility - not seen in the last year              []Supervision - no future appointment              [x]Compliance - no shows, cancellations or lapse in therapy              []Verification - order discrepancy              []Controlled medication              []Medication not included in policy              []90-day supply request              []Other:

## 2024-07-02 NOTE — TELEPHONE ENCOUNTER
Health Call Center    Phone Message    May a detailed message be left on voicemail: yes     Reason for Call: Medication Refill Request    Has the patient contacted the pharmacy for the refill? Yes   Name of medication being requested: Lithium  Provider who prescribed the medication: Dr. Gottlieb  Pharmacy: [facility] [address] [address] [address]   Date medication is needed: 7/5/2024      Patient called requesting a refill of their prescription of Lithium 450mg. The patient still has multiple days remaining of their current prescription, however they will be traveling out of the [place] and was hoping to get a 2 month refill to accommodate this. The patient declined scheduling their transfer appointment for now.    Action Taken: Message routed to:  Other: [facility] psychiatry nurse pool    Travel Screening: Not Applicable     Date of Service:

## 2024-08-12 NOTE — PROGRESS NOTES
"Very difficult night w/ only approx 2 hrs sleep overnight.  Upon awakening early in shift exhibiting grossly unsteady gait presenting a high fall risk.  Speech w/unclear mumbling. Quite disorganized, manic, paranoid and delusional w/mood lability. - behaviors noted to be much worse tonight than on previous nights.  Medicated w/ prn Hydroxyzine and Seroquel w/o any observed effectiveness.  Refused Ativan this morning stating as nurse attempted to scan her arm band - \"I don't want to take it, I feel like I'm being electrocuted\".  Insists on being in lounge area or pacing halls,  defiant and irritable.  Unable to utilize any diversion/Tipps strategies.  Very  brief intermittent periods  of relaxed behavior.  Has completed her menses.  Tolerating fluids well.   Gait has improved though remains unsteady at times w/o observed ridigity. SIO by staff continually for safety, observation, and support.  Every effort made to keep pt. as comfortable as possible  W/ needs met proactively.  Safe, therapeutic environment maintained.   " Not applicable

## 2024-11-14 DIAGNOSIS — F20.9 SUBCHRONIC SCHIZOPHRENIA (H): Primary | ICD-10-CM

## 2024-11-15 ENCOUNTER — LAB (OUTPATIENT)
Dept: LAB | Facility: CLINIC | Age: 24
End: 2024-11-15
Payer: COMMERCIAL

## 2024-11-15 DIAGNOSIS — F20.9 SUBCHRONIC SCHIZOPHRENIA (H): ICD-10-CM

## 2024-11-15 LAB
ANION GAP SERPL CALCULATED.3IONS-SCNC: 10 MMOL/L (ref 7–15)
BUN SERPL-MCNC: 8.9 MG/DL (ref 6–20)
CALCIUM SERPL-MCNC: 9.6 MG/DL (ref 8.8–10.4)
CHLORIDE SERPL-SCNC: 105 MMOL/L (ref 98–107)
CREAT SERPL-MCNC: 0.58 MG/DL (ref 0.51–0.95)
EGFRCR SERPLBLD CKD-EPI 2021: >90 ML/MIN/1.73M2
GLUCOSE SERPL-MCNC: 91 MG/DL (ref 70–99)
HCO3 SERPL-SCNC: 22 MMOL/L (ref 22–29)
LITHIUM SERPL-SCNC: 0.9 MMOL/L (ref 0.6–1.2)
POTASSIUM SERPL-SCNC: 4.2 MMOL/L (ref 3.4–5.3)
SODIUM SERPL-SCNC: 137 MMOL/L (ref 135–145)
T4 FREE SERPL-MCNC: 1.44 NG/DL (ref 0.9–1.7)
TSH SERPL DL<=0.005 MIU/L-ACNC: 2.56 UIU/ML (ref 0.3–4.2)

## 2024-11-15 PROCEDURE — 80178 ASSAY OF LITHIUM: CPT

## 2024-11-15 PROCEDURE — 36415 COLL VENOUS BLD VENIPUNCTURE: CPT

## 2024-11-15 PROCEDURE — 80048 BASIC METABOLIC PNL TOTAL CA: CPT

## 2024-11-15 PROCEDURE — 84443 ASSAY THYROID STIM HORMONE: CPT

## 2024-11-15 PROCEDURE — 84439 ASSAY OF FREE THYROXINE: CPT

## 2025-01-06 ENCOUNTER — TELEPHONE (OUTPATIENT)
Dept: FAMILY MEDICINE | Facility: CLINIC | Age: 25
End: 2025-01-06
Payer: COMMERCIAL

## 2025-01-06 NOTE — TELEPHONE ENCOUNTER
Dione Delacruz MD [place], Negin Kam,  I am not sure why this patient is on my schedule.  Says it is a rescheduled physical from when eFlice was out ill.      Unless there is something I am not understanding, this should be scheduled on Felice's schedule.     Copied from CC Chart.    Negin Gilliland [facility]

## 2025-01-06 NOTE — TELEPHONE ENCOUNTER
Called patient and left a detailed voicemail message that I was calling to help patient get rescheduled on to Felice Edmonds schedule.    LESLEY Pinzon  [facility]

## 2025-02-24 ENCOUNTER — MYC MEDICAL ADVICE (OUTPATIENT)
Dept: FAMILY MEDICINE | Facility: CLINIC | Age: 25
End: 2025-02-24
Payer: COMMERCIAL

## 2025-02-24 DIAGNOSIS — F31.9 BIPOLAR 1 DISORDER (H): Primary | ICD-10-CM

## 2025-03-08 ENCOUNTER — HEALTH MAINTENANCE LETTER (OUTPATIENT)
Age: 25
End: 2025-03-08

## 2025-04-09 ENCOUNTER — OFFICE VISIT (OUTPATIENT)
Dept: FAMILY MEDICINE | Facility: CLINIC | Age: 25
End: 2025-04-09
Payer: COMMERCIAL

## 2025-04-09 VITALS
SYSTOLIC BLOOD PRESSURE: 104 MMHG | WEIGHT: 186 LBS | HEIGHT: 66 IN | HEART RATE: 82 BPM | BODY MASS INDEX: 29.89 KG/M2 | RESPIRATION RATE: 22 BRPM | OXYGEN SATURATION: 97 % | TEMPERATURE: 98.2 F | DIASTOLIC BLOOD PRESSURE: 71 MMHG

## 2025-04-09 DIAGNOSIS — R05.1 ACUTE COUGH: ICD-10-CM

## 2025-04-09 DIAGNOSIS — J01.90 ACUTE SINUSITIS WITH SYMPTOMS > 10 DAYS: Primary | ICD-10-CM

## 2025-04-09 DIAGNOSIS — R09.81 NASAL CONGESTION: ICD-10-CM

## 2025-04-09 LAB
DEPRECATED S PYO AG THROAT QL EIA: NEGATIVE
FLUAV AG SPEC QL IA: NEGATIVE
FLUBV AG SPEC QL IA: NEGATIVE
S PYO DNA THROAT QL NAA+PROBE: NOT DETECTED

## 2025-04-09 PROCEDURE — 87651 STREP A DNA AMP PROBE: CPT | Performed by: PHYSICIAN ASSISTANT

## 2025-04-09 PROCEDURE — 87804 INFLUENZA ASSAY W/OPTIC: CPT | Performed by: PHYSICIAN ASSISTANT

## 2025-04-09 PROCEDURE — 87635 SARS-COV-2 COVID-19 AMP PRB: CPT | Performed by: PHYSICIAN ASSISTANT

## 2025-04-09 PROCEDURE — 3078F DIAST BP <80 MM HG: CPT | Performed by: PHYSICIAN ASSISTANT

## 2025-04-09 PROCEDURE — 99213 OFFICE O/P EST LOW 20 MIN: CPT | Performed by: PHYSICIAN ASSISTANT

## 2025-04-09 PROCEDURE — 3074F SYST BP LT 130 MM HG: CPT | Performed by: PHYSICIAN ASSISTANT

## 2025-04-09 RX ORDER — LORATADINE 10 MG/1
10 TABLET ORAL DAILY
Qty: 90 TABLET | Refills: 1 | Status: SHIPPED | OUTPATIENT
Start: 2025-04-09

## 2025-04-09 NOTE — PROGRESS NOTES
"  Assessment & Plan     Acute sinusitis with symptoms > 10 days  Discussed with patient symptoms are consistent with sinus infection. Symptoms have been persisting long enough where an antibiotic would be warranted. Side effects of medication discussed. In addition I recommend warm compress's over the sinus's, nasal sprays, and sinus rinses. OTC pain relievers as needed. If symptoms do not improve after completing antibiotic then please follow up in clinic or sooner if symptoms worsen  - amoxicillin-clavulanate (AUGMENTIN) 875-125 MG tablet; Take 1 tablet by mouth 2 times daily for 7 days.    Acute cough  Strep and flu negative. Covid pending  - COVID-19 Virus (Coronavirus) by PCR Nose  - Influenza A & B Antigen - Clinic Collect  - Streptococcus A Rapid Screen w/Reflex to PCR - Clinic Collect  - Group A Streptococcus PCR Throat Swab    Nasal congestion  Advised patient to start antihistamine as she may have seasonal allergies contributing her symptoms as well. Patient agree's with this plan and has no further questions  - loratadine (CLARITIN) 10 MG tablet; Take 1 tablet (10 mg) by mouth daily.          BMI  Estimated body mass index is 30.02 kg/m  as calculated from the following:    Height as of this encounter: 1.676 m (5' 6\").    Weight as of this encounter: 84.4 kg (186 lb).             Subjective   Cristina is a 25 year old, presenting for the following health issues:  URI    History of Present Illness       Reason for visit:  2 weeks of being sick  Symptom onset:  1-2 weeks ago  Symptoms include:  Cough sore throat runny nose body aches  Symptom intensity:  Severe  Symptom progression:  Staying the same  Had these symptoms before:  No  What makes it worse:  Talking sleeping  What makes it better:  Nasal spray   She is taking medications regularly.          Acute Illness  Acute illness concerns: URI  Onset/Duration: 2 weeks - constant   Symptoms:  Fever: No  Chills/Sweats: YES- chills  Headache (location?): YES- " "at night a few time over this illness  Sinus Pressure: YES- improved in the last few days  Conjunctivitis:  No  Ear Pain: YES: bilateral  Rhinorrhea: YES  Congestion: YES   Sore Throat: YES  Cough: YES - coughing more at the beginning, still having coughing fits through out the day, sometimes feels like she is going to throw up.   Wheeze: No  Decreased Appetite: No  Nausea: No  Vomiting: No  Diarrhea: No  Dysuria/Freq.: No  Dysuria or Hematuria: No  Fatigue/Achiness: YES  Sick/Strep Exposure: YES- family  Therapies tried and outcome: nasal spray, Claritin for 10 days,        Review of Systems  Constitutional, HEENT, cardiovascular, pulmonary, gi and gu systems are negative, except as otherwise noted.      Objective    /71   Pulse 82   Temp 98.2  F (36.8  C) (Oral)   Resp 22   Ht 1.676 m (5' 6\")   Wt 84.4 kg (186 lb)   LMP 03/09/2025 (Approximate)   SpO2 97%   BMI 30.02 kg/m    Body mass index is 30.02 kg/m .  Physical Exam  Constitutional:       Appearance: Normal appearance.   HENT:      Right Ear: Tympanic membrane and ear canal normal.      Left Ear: Tympanic membrane and ear canal normal.      Nose: Congestion and rhinorrhea present.      Right Sinus: Maxillary sinus tenderness present. No frontal sinus tenderness.      Left Sinus: Maxillary sinus tenderness present. No frontal sinus tenderness.      Mouth/Throat:      Mouth: Mucous membranes are moist.      Pharynx: Oropharynx is clear. Posterior oropharyngeal erythema and postnasal drip present.   Cardiovascular:      Rate and Rhythm: Normal rate and regular rhythm.   Pulmonary:      Effort: Pulmonary effort is normal.      Breath sounds: Normal breath sounds.   Lymphadenopathy:      Cervical: No cervical adenopathy.   Neurological:      Mental Status: She is alert.   Psychiatric:         Mood and Affect: Mood normal.         Behavior: Behavior normal.            Results for orders placed or performed in visit on 04/09/25   Influenza A & B " Antigen - Clinic Collect     Status: Normal    Specimen: Nasopharyngeal; Swab   Result Value Ref Range    Influenza A antigen Negative Negative    Influenza B antigen Negative Negative    Narrative    Test results must be correlated with clinical data. If necessary, results should be confirmed by a molecular assay or viral culture.   Streptococcus A Rapid Screen w/Reflex to PCR - Clinic Collect     Status: Normal    Specimen: Throat; Swab   Result Value Ref Range    Group A Strep antigen Negative Negative           Signed Electronically by: JOVANI Cox

## 2025-04-09 NOTE — PATIENT INSTRUCTIONS

## 2025-04-10 LAB — SARS-COV-2 RNA RESP QL NAA+PROBE: NEGATIVE

## 2025-04-25 ENCOUNTER — HOSPITAL ENCOUNTER (EMERGENCY)
Facility: CLINIC | Age: 25
Discharge: HOME | End: 2025-04-25
Attending: EMERGENCY MEDICINE | Admitting: EMERGENCY MEDICINE
Payer: COMMERCIAL

## 2025-04-25 ENCOUNTER — APPOINTMENT (OUTPATIENT)
Dept: FAMILY MEDICINE | Facility: CLINIC | Age: 25
End: 2025-04-25
Payer: COMMERCIAL

## 2025-04-25 VITALS
HEART RATE: 115 BPM | WEIGHT: 185 LBS | HEIGHT: 66 IN | TEMPERATURE: 98.1 F | BODY MASS INDEX: 29.73 KG/M2 | SYSTOLIC BLOOD PRESSURE: 105 MMHG | OXYGEN SATURATION: 98 % | RESPIRATION RATE: 16 BRPM | DIASTOLIC BLOOD PRESSURE: 79 MMHG

## 2025-04-25 DIAGNOSIS — R10.9 ABDOMINAL DISCOMFORT: ICD-10-CM

## 2025-04-25 DIAGNOSIS — K59.00 CONSTIPATION, UNSPECIFIED CONSTIPATION TYPE: ICD-10-CM

## 2025-04-25 LAB
ALBUMIN SERPL BCG-MCNC: 4 G/DL (ref 3.5–5.2)
ALP SERPL-CCNC: 99 U/L (ref 40–150)
ALT SERPL W P-5'-P-CCNC: 21 U/L (ref 0–50)
ANION GAP SERPL CALCULATED.3IONS-SCNC: 11 MMOL/L (ref 7–15)
AST SERPL W P-5'-P-CCNC: 21 U/L (ref 0–45)
BASOPHILS # BLD AUTO: 0 10E3/UL (ref 0–0.2)
BASOPHILS NFR BLD AUTO: 0 %
BILIRUB SERPL-MCNC: 0.3 MG/DL
BUN SERPL-MCNC: 8.7 MG/DL (ref 6–20)
CALCIUM SERPL-MCNC: 9.4 MG/DL (ref 8.8–10.4)
CHLORIDE SERPL-SCNC: 100 MMOL/L (ref 98–107)
CREAT SERPL-MCNC: 0.68 MG/DL (ref 0.51–0.95)
EGFRCR SERPLBLD CKD-EPI 2021: >90 ML/MIN/1.73M2
EOSINOPHIL # BLD AUTO: 0.2 10E3/UL (ref 0–0.7)
EOSINOPHIL NFR BLD AUTO: 3 %
ERYTHROCYTE [DISTWIDTH] IN BLOOD BY AUTOMATED COUNT: 15.4 % (ref 10–15)
GLUCOSE SERPL-MCNC: 117 MG/DL (ref 70–99)
HCG SERPL QL: NEGATIVE
HCO3 SERPL-SCNC: 23 MMOL/L (ref 22–29)
HCT VFR BLD AUTO: 37.3 % (ref 35–47)
HGB BLD-MCNC: 12.3 G/DL (ref 11.7–15.7)
IMM GRANULOCYTES # BLD: 0 10E3/UL
IMM GRANULOCYTES NFR BLD: 0 %
LITHIUM SERPL-SCNC: 0.48 MMOL/L (ref 0.6–1.2)
LYMPHOCYTES # BLD AUTO: 2.1 10E3/UL (ref 0.8–5.3)
LYMPHOCYTES NFR BLD AUTO: 26 %
MAGNESIUM SERPL-MCNC: 2.2 MG/DL (ref 1.7–2.3)
MCH RBC QN AUTO: 28 PG (ref 26.5–33)
MCHC RBC AUTO-ENTMCNC: 33 G/DL (ref 31.5–36.5)
MCV RBC AUTO: 85 FL (ref 78–100)
MONOCYTES # BLD AUTO: 0.8 10E3/UL (ref 0–1.3)
MONOCYTES NFR BLD AUTO: 11 %
NEUTROPHILS # BLD AUTO: 4.8 10E3/UL (ref 1.6–8.3)
NEUTROPHILS NFR BLD AUTO: 60 %
NRBC # BLD AUTO: 0 10E3/UL
NRBC BLD AUTO-RTO: 0 /100
PLATELET # BLD AUTO: 370 10E3/UL (ref 150–450)
POTASSIUM SERPL-SCNC: 3.6 MMOL/L (ref 3.4–5.3)
PROT SERPL-MCNC: 8.8 G/DL (ref 6.4–8.3)
RBC # BLD AUTO: 4.4 10E6/UL (ref 3.8–5.2)
SODIUM SERPL-SCNC: 134 MMOL/L (ref 135–145)
T4 FREE SERPL-MCNC: 1.36 NG/DL (ref 0.9–1.7)
TSH SERPL DL<=0.005 MIU/L-ACNC: 4.34 UIU/ML (ref 0.3–4.2)
WBC # BLD AUTO: 8 10E3/UL (ref 4–11)

## 2025-04-25 PROCEDURE — 83735 ASSAY OF MAGNESIUM: CPT | Performed by: EMERGENCY MEDICINE

## 2025-04-25 PROCEDURE — 84155 ASSAY OF PROTEIN SERUM: CPT | Performed by: EMERGENCY MEDICINE

## 2025-04-25 PROCEDURE — 36415 COLL VENOUS BLD VENIPUNCTURE: CPT | Performed by: EMERGENCY MEDICINE

## 2025-04-25 PROCEDURE — 84703 CHORIONIC GONADOTROPIN ASSAY: CPT | Performed by: EMERGENCY MEDICINE

## 2025-04-25 PROCEDURE — 80178 ASSAY OF LITHIUM: CPT | Performed by: EMERGENCY MEDICINE

## 2025-04-25 PROCEDURE — 99284 EMERGENCY DEPT VISIT MOD MDM: CPT | Mod: 25 | Performed by: EMERGENCY MEDICINE

## 2025-04-25 PROCEDURE — 258N000003 HC RX IP 258 OP 636: Performed by: EMERGENCY MEDICINE

## 2025-04-25 PROCEDURE — 85004 AUTOMATED DIFF WBC COUNT: CPT | Performed by: EMERGENCY MEDICINE

## 2025-04-25 PROCEDURE — 96360 HYDRATION IV INFUSION INIT: CPT | Performed by: EMERGENCY MEDICINE

## 2025-04-25 PROCEDURE — 84439 ASSAY OF FREE THYROXINE: CPT | Performed by: EMERGENCY MEDICINE

## 2025-04-25 PROCEDURE — 84443 ASSAY THYROID STIM HORMONE: CPT | Performed by: EMERGENCY MEDICINE

## 2025-04-25 PROCEDURE — 99283 EMERGENCY DEPT VISIT LOW MDM: CPT | Performed by: EMERGENCY MEDICINE

## 2025-04-25 RX ORDER — DOCUSATE SODIUM 100 MG/1
100 CAPSULE, LIQUID FILLED ORAL 2 TIMES DAILY
Qty: 30 CAPSULE | Refills: 0 | Status: SHIPPED | OUTPATIENT
Start: 2025-04-25 | End: 2025-05-10

## 2025-04-25 RX ORDER — POLYETHYLENE GLYCOL 3350 17 G/17G
1 POWDER, FOR SOLUTION ORAL DAILY
Qty: 527 G | Refills: 0 | Status: SHIPPED | OUTPATIENT
Start: 2025-04-25 | End: 2025-05-25

## 2025-04-25 RX ADMIN — SODIUM CHLORIDE 1000 ML: 9 INJECTION, SOLUTION INTRAVENOUS at 01:52

## 2025-04-25 ASSESSMENT — COLUMBIA-SUICIDE SEVERITY RATING SCALE - C-SSRS
2. HAVE YOU ACTUALLY HAD ANY THOUGHTS OF KILLING YOURSELF IN THE PAST MONTH?: NO
6. HAVE YOU EVER DONE ANYTHING, STARTED TO DO ANYTHING, OR PREPARED TO DO ANYTHING TO END YOUR LIFE?: NO
1. IN THE PAST MONTH, HAVE YOU WISHED YOU WERE DEAD OR WISHED YOU COULD GO TO SLEEP AND NOT WAKE UP?: NO

## 2025-04-25 ASSESSMENT — ACTIVITIES OF DAILY LIVING (ADL)
ADLS_ACUITY_SCORE: 56

## 2025-04-25 NOTE — Clinical Note
Oliver was seen and treated in our emergency department on 4/25/2025.  She may return to school on 04/25/2025.  OK to return to school on 4/25/25, however Cristina will need a few more days to finish assignments and exams.     If you have any questions or concerns, please don't hesitate to call.      Zaid Turner MD

## 2025-04-25 NOTE — ED TRIAGE NOTES
Pt. states had sinus infection end of April on antibiotics,  now having lower abd. cramping and is constipated.  Nauseated no vomiting,  having trouble sleeping.     Triage Assessment (Adult)       Row Name 04/25/25 0059          Triage Assessment    Airway WDL WDL        Respiratory WDL    Respiratory WDL WDL        Skin Circulation/Temperature WDL    Skin Circulation/Temperature WDL WDL        Cardiac WDL    Cardiac WDL X;rhythm     Pulse Rate & Regularity tachycardic        Peripheral/Neurovascular WDL    Peripheral Neurovascular WDL WDL        Cognitive/Neuro/Behavioral WDL    Cognitive/Neuro/Behavioral WDL WDL

## 2025-04-25 NOTE — ED PROVIDER NOTES
ED Provider Note  [facility]      History     Chief Complaint   Patient presents with    Sinusitis    Constipation     HPI  Cristina Boyd is a 25 year old female with a history of bipolar 1 disorder on daily lithium and. Zyprexa as needed who presents to the emergency department for sinusitis and constipation.  Patient reports ongoing sinusitis currently on antibiotics with some improvement but occasional discomfort in the sinuses.  Denies fevers.  For the past 2 days she reports that she has had decreased appetite, has felt a distended abdomen, and has not had a bowel movement in a couple days.  She feels that she is constipated.  Denies any history of bowel obstruction, has no history of abdominal surgeries, and is able to tolerate p.o. intake of water without vomiting.  No additional complaints at this time.    Per chart review: The patient was seen in clinic on 4/9/2 acute sinusitis. She was given a 7 day course of Amoxicillin to treat a sinus infection.    Past Medical History  Past Medical History:   Diagnosis Date    Bipolar 1 disorder (H)     Infection due to 2019 novel coronavirus 2020    summer 2020 by patient report    Infection due to 2019 novel coronavirus 12/2021    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, unspecified thyrotoxicosis type 11/16/2022     Past Surgical History:   Procedure Laterality Date    BRONCHOSCOPY (RIGID OR FLEXIBLE), DIAGNOSTIC N/A 6/16/2018    Procedure: COMBINED BRONCHOSCOPY (RIGID OR FLEXIBLE), LAVAGE;;  Surgeon: Manjeet Holland MD;  Location:  GI     docusate sodium (COLACE) 100 MG capsule  lithium (ESKALITH CR/LITHOBID) 450 MG CR tablet  loratadine (CLARITIN) 10 MG tablet  OLANZapine (ZYPREXA) 5 MG tablet  polyethylene glycol (MIRALAX) 17 GM/Dose powder  fluticasone (FLOVENT HFA) 220 MCG/ACT inhaler  OLANZapine (ZYPREXA) 10 MG tablet      Allergies   Allergen Reactions    Hydroxyzine Other (See Comments)     Brain fog     Family History  Family  "History   Problem Relation Age of Onset    Impaired Fasting Glucose Father         prediabetes    Thyroid Disease Maternal Grandmother     Diabetes Maternal Grandmother     Diabetes Paternal Grandmother          early in her 60 s    Depression Maternal Uncle     Depression Other      Social History   Social History     Tobacco Use    Smoking status: Never    Smokeless tobacco: Never   Vaping Use    Vaping status: Never Used   Substance Use Topics    Alcohol use: No    Drug use: No     Comment: x1 marijuana, in       Past medical history, past surgical history, medications, allergies, family history, and social history were reviewed with the patient. No additional pertinent items.   A medically appropriate review of systems was performed with pertinent positives and negatives noted in the HPI, and all other systems negative.    Physical Exam   BP: 105/79  Pulse: 115  Temp: 98.1  F (36.7  C)  Resp: 16  Height: 167.6 cm (5' 6\")  Weight: 83.9 kg (185 lb)  SpO2: 97 %  Physical Exam  Vitals and nursing note reviewed.   Constitutional:       General: She is not in acute distress.     Appearance: Normal appearance. She is not diaphoretic.   HENT:      Head: Atraumatic.      Mouth/Throat:      Mouth: Mucous membranes are moist.   Eyes:      General: No scleral icterus.     Conjunctiva/sclera: Conjunctivae normal.   Cardiovascular:      Rate and Rhythm: Regular rhythm. Tachycardia present.      Heart sounds: Normal heart sounds.   Pulmonary:      Effort: Pulmonary effort is normal. No respiratory distress.      Breath sounds: Normal breath sounds.   Abdominal:      General: There is distension.      Palpations: Abdomen is soft.      Tenderness: There is no abdominal tenderness. There is no guarding or rebound.   Musculoskeletal:      Cervical back: Neck supple.   Skin:     General: Skin is warm.      Findings: No rash.   Neurological:      General: No focal deficit present.      Mental Status: She is alert " and oriented to person, place, and time.   Psychiatric:         Mood and Affect: Mood normal.         Behavior: Behavior normal.           ED Course, Procedures, & Data      Procedures                Results for orders placed or performed during the hospital encounter of 04/25/25   Comprehensive metabolic panel     Status: Abnormal   Result Value Ref Range    Sodium 134 (L) 135 - 145 mmol/L    Potassium 3.6 3.4 - 5.3 mmol/L    Carbon Dioxide (CO2) 23 22 - 29 mmol/L    Anion Gap 11 7 - 15 mmol/L    Urea Nitrogen 8.7 6.0 - 20.0 mg/dL    Creatinine 0.68 0.51 - 0.95 mg/dL    GFR Estimate >90 >60 mL/min/1.73m2    Calcium 9.4 8.8 - 10.4 mg/dL    Chloride 100 98 - 107 mmol/L    Glucose 117 (H) 70 - 99 mg/dL    Alkaline Phosphatase 99 40 - 150 U/L    AST 21 0 - 45 U/L    ALT 21 0 - 50 U/L    Protein Total 8.8 (H) 6.4 - 8.3 g/dL    Albumin 4.0 3.5 - 5.2 g/dL    Bilirubin Total 0.3 <=1.2 mg/dL   HCG qualitative pregnancy (blood)     Status: Normal   Result Value Ref Range    hCG Serum Qualitative Negative Negative   Magnesium     Status: Normal   Result Value Ref Range    Magnesium 2.2 1.7 - 2.3 mg/dL   TSH with free T4 reflex     Status: Abnormal   Result Value Ref Range    TSH 4.34 (H) 0.30 - 4.20 uIU/mL   Lithium level     Status: Abnormal   Result Value Ref Range    Lithium 0.48 (L) 0.60 - 1.20 mmol/L   CBC with platelets and differential     Status: Abnormal   Result Value Ref Range    WBC Count 8.0 4.0 - 11.0 10e3/uL    RBC Count 4.40 3.80 - 5.20 10e6/uL    Hemoglobin 12.3 11.7 - 15.7 g/dL    Hematocrit 37.3 35.0 - 47.0 %    MCV 85 78 - 100 fL    MCH 28.0 26.5 - 33.0 pg    MCHC 33.0 31.5 - 36.5 g/dL    RDW 15.4 (H) 10.0 - 15.0 %    Platelet Count 370 150 - 450 10e3/uL    % Neutrophils 60 %    % Lymphocytes 26 %    % Monocytes 11 %    % Eosinophils 3 %    % Basophils 0 %    % Immature Granulocytes 0 %    NRBCs per 100 WBC 0 <1 /100    Absolute Neutrophils 4.8 1.6 - 8.3 10e3/uL    Absolute Lymphocytes 2.1 0.8 - 5.3  10e3/uL    Absolute Monocytes 0.8 0.0 - 1.3 10e3/uL    Absolute Eosinophils 0.2 0.0 - 0.7 10e3/uL    Absolute Basophils 0.0 0.0 - 0.2 10e3/uL    Absolute Immature Granulocytes 0.0 <=0.4 10e3/uL    Absolute NRBCs 0.0 10e3/uL   T4 free     Status: Normal   Result Value Ref Range    Free T4 1.36 0.90 - 1.70 ng/dL   CBC with Platelets & Differential     Status: Abnormal    Narrative    The following orders were created for panel order CBC with Platelets & Differential.  Procedure                               Abnormality         Status                     ---------                               -----------         ------                     CBC with platelets and ...[[id number]]  Abnormal            Final result                 Please view results for these tests on the individual orders.     Medications   sodium chloride 0.9% BOLUS 1,000 mL (0 mLs Intravenous Stopped 4/25/25 0253)     Labs Ordered and Resulted from Time of ED Arrival to Time of ED Departure   COMPREHENSIVE METABOLIC PANEL - Abnormal       Result Value    Sodium 134 (*)     Potassium 3.6      Carbon Dioxide (CO2) 23      Anion Gap 11      Urea Nitrogen 8.7      Creatinine 0.68      GFR Estimate >90      Calcium 9.4      Chloride 100      Glucose 117 (*)     Alkaline Phosphatase 99      AST 21      ALT 21      Protein Total 8.8 (*)     Albumin 4.0      Bilirubin Total 0.3     TSH WITH FREE T4 REFLEX - Abnormal    TSH 4.34 (*)    LITHIUM LEVEL - Abnormal    Lithium 0.48 (*)    CBC WITH PLATELETS AND DIFFERENTIAL - Abnormal    WBC Count 8.0      RBC Count 4.40      Hemoglobin 12.3      Hematocrit 37.3      MCV 85      MCH 28.0      MCHC 33.0      RDW 15.4 (*)     Platelet Count 370      % Neutrophils 60      % Lymphocytes 26      % Monocytes 11      % Eosinophils 3      % Basophils 0      % Immature Granulocytes 0      NRBCs per 100 WBC 0      Absolute Neutrophils 4.8      Absolute Lymphocytes 2.1      Absolute Monocytes 0.8      Absolute Eosinophils 0.2       Absolute Basophils 0.0      Absolute Immature Granulocytes 0.0      Absolute NRBCs 0.0     HCG QUALITATIVE PREGNANCY - Normal    hCG Serum Qualitative Negative     MAGNESIUM - Normal    Magnesium 2.2     T4 FREE - Normal    Free T4 1.36       No orders to display          Critical care was not performed.     Medical Decision Making  The patient's presentation was of low complexity (an acute and uncomplicated illness or injury).    The patient's evaluation involved:  review of external note(s) from 1 sources (see separate area of note for details)  review of 3+ test result(s) ordered prior to this encounter (see separate area of note for details)  ordering and/or review of 3+ test(s) in this encounter (see separate area of note for details)    The patient's management necessitated only low risk treatment.    Assessment & Plan    Cristina Boyd is a 25 year old female with a history of bipolar 1 disorder on daily lithium and. Zyprexa as needed who presents to the emergency department for sinusitis and constipation.  Patient is nontoxic-appearing on exam, tachycardic with regular rhythm, has other vital signs within normal limits.  Her abdomen is mildly distended, but overall nontender, soft, and has no rebound or guarding.  She is likely constipated as per her complaint.  Given her tachycardia, complaint of not eating well for 2 days, and the fact she is on lithium, she was worked up with lab work as above including lithium level, basic metabolic panel to check her creatinine among other electrolytes, CBC, and thyroid studies.  Pregnancy test also checked.  Patient given IV fluids.    Labwork as above, relatively reassuring without acute cause for patient's complaints. Patient did have a bowel movement here and felt significantly improved. Abd reassuring on re-examination. Patient discharged with bowel regimen, outpatient follow up and return precautions.     I have reviewed the nursing notes. I have reviewed the  findings, diagnosis, plan and need for follow up with the patient.    Discharge Medication List as of 4/25/2025  4:02 AM        START taking these medications    Details   docusate sodium (COLACE) 100 MG capsule Take 1 capsule (100 mg) by mouth 2 times daily for 15 days., Disp-30 capsule, R-0, E-Prescribe      polyethylene glycol (MIRALAX) 17 GM/Dose powder Take 17 g (1 Capful) by mouth daily., Disp-527 g, R-0, E-Prescribe             Final diagnoses:   Abdominal discomfort   Constipation, unspecified constipation type       Zaid Turner MD  [facility] EMERGENCY DEPARTMENT  4/25/2025     Zaid Turner MD  04/25/25 0702

## 2025-04-28 ENCOUNTER — TELEPHONE (OUTPATIENT)
Dept: FAMILY MEDICINE | Facility: CLINIC | Age: 25
End: 2025-04-28

## 2025-04-28 ENCOUNTER — OFFICE VISIT (OUTPATIENT)
Dept: FAMILY MEDICINE | Facility: CLINIC | Age: 25
End: 2025-04-28
Payer: COMMERCIAL

## 2025-04-28 VITALS
BODY MASS INDEX: 29.99 KG/M2 | HEIGHT: 66 IN | HEART RATE: 122 BPM | WEIGHT: 186.6 LBS | DIASTOLIC BLOOD PRESSURE: 85 MMHG | TEMPERATURE: 98.5 F | SYSTOLIC BLOOD PRESSURE: 124 MMHG | OXYGEN SATURATION: 95 % | RESPIRATION RATE: 16 BRPM

## 2025-04-28 DIAGNOSIS — K59.00 CONSTIPATION, UNSPECIFIED CONSTIPATION TYPE: ICD-10-CM

## 2025-04-28 DIAGNOSIS — F31.9 BIPOLAR 1 DISORDER (H): Primary | ICD-10-CM

## 2025-04-28 DIAGNOSIS — E87.1 HYPONATREMIA: ICD-10-CM

## 2025-04-28 PROCEDURE — 3074F SYST BP LT 130 MM HG: CPT

## 2025-04-28 PROCEDURE — G2211 COMPLEX E/M VISIT ADD ON: HCPCS

## 2025-04-28 PROCEDURE — 99214 OFFICE O/P EST MOD 30 MIN: CPT

## 2025-04-28 PROCEDURE — 3079F DIAST BP 80-89 MM HG: CPT

## 2025-04-28 NOTE — PATIENT INSTRUCTIONS
Take your medications that you missed this morning. You can also take hydroxyzine if you need.    Schedule appointment with your mental health provider ASAP to discuss your low lithium level.    You NEED to schedule consistent therapy appointments - you should make this a priority.     Schedule annual preventative visit with me in 1-3 months     IF you get sick, schedule a doctors appointment BEFORE you are so sick you need hospital visit.

## 2025-04-28 NOTE — TELEPHONE ENCOUNTER
Letter was completed for patient for school. LVM for patient if she would like us to mail it or she would like to . Pt may also get it through [employer].     Jorje Herrera MA

## 2025-04-28 NOTE — PROGRESS NOTES
"  Assessment & Plan     Bipolar 1 disorder (H)  Chronic, not well controlled. She has not followed up with her psychiatrist recently but she does have appointment in June with [place] provider. Her lithium level was low at recent ER visit and her sodium was low. She has not taken her medications today and did appear emotional and easily distracted throughout the visit.   She is not currently doing therapy. I do believe her mental health has been difficult to manage and has impacted her ability to follow up and participate in school activities.  I signed a note she can provider to her school noting her chronic health issues were exacerbated in December 2024 and if possible allowing her to take final exams but I discussed with the patient I cannot change the schools policies regarding make up dates.   - Adult Mental Health  Referral    Hyponatremia  Acute, mild. Discussed increasing salt intake. Could be due to not taking lithium consistently.     Constipation, unspecified constipation type  Improved since ER visit. Not the main discussion of appointment today. I think a nutrition/dietician would be beneficial for her as I think her diet/health and nutrition understanding is limited.       We did not discuss her neck pain today. She has chronic issues with thyroid and lymphadenopathy. Advised scheduling and annual exam to review her management plan.     MED REC REQUIRED  Post Medication Reconciliation Status: discharge medications reconciled, continue medications without change  BMI  Estimated body mass index is 30.12 kg/m  as calculated from the following:    Height as of this encounter: 1.676 m (5' 6\").    Weight as of this encounter: 84.6 kg (186 lb 9.6 oz).             Emely Evans is a 25 year old, presenting for the following health issues:  ER F/U        4/28/2025    12:50 PM   Additional Questions   Roomed by Jorje ROJO MA   Accompanied by Self     HPI        ED/UC Followup:    Facility:  [facility] ED  Date of visit: 04/25/25  Reason for visit: Abdominal Discomfort, constipation   Current Status: Patient says she has been able to go to the bathroom and is more relieved. Patient says she is feeling very anxious.      Patient is having pain left side of throat, tongue. Pt is wondering is it is due to vaping?  Patient is concerned about anemia as well.     Patient has concerns about school.     Bachelors in Linguistics and TESL minor.  Taking pre-requisites for speech/pathology masters program.   Goes to [employer],   September - December 2024 enrolled in 5 classes.  Arabic, ASL, phenetics, language development, anatomy and physiology.     Dropped [place]. - took withdraw  Finished and passed language development.   Phenetics - did all assignments, missed final exam.  Anatomy/physiology - did all assignments, missed final exam.   ASL - in class right now, would like extension.       Meds  Zyprexa 5 MG  Lithium 450 MG     ER DISCHARGE NOTE  Cristina Boyd is a 25 year old female with a history of bipolar 1 disorder on daily lithium and. Zyprexa as needed who presents to the emergency department for sinusitis and constipation.  Patient is nontoxic-appearing on exam, tachycardic with regular rhythm, has other vital signs within normal limits.  Her abdomen is mildly distended, but overall nontender, soft, and has no rebound or guarding.  She is likely constipated as per her complaint.  Given her tachycardia, complaint of not eating well for 2 days, and the fact she is on lithium, she was worked up with lab work as above including lithium level, basic metabolic panel to check her creatinine among other electrolytes, CBC, and thyroid studies.  Pregnancy test also checked.  Patient given IV fluids.     Labwork as above, relatively reassuring without acute cause for patient's complaints. Patient did have a bowel movement here and felt significantly improved. Abd reassuring on re-examination.  "Patient discharged with bowel regimen, outpatient follow up and return precautions.       Review of Systems  Constitutional, HEENT, cardiovascular, pulmonary, gi and gu systems are negative, except as otherwise noted.      Objective    /85 (BP Location: Right arm, Patient Position: Chair, Cuff Size: Adult Regular)   Pulse (!) 122   Temp 98.5  F (36.9  C) (Oral)   Resp 16   Ht 1.676 m (5' 6\")   Wt 84.6 kg (186 lb 9.6 oz)   LMP 04/18/2025 (Approximate)   SpO2 95%   BMI 30.12 kg/m    Body mass index is 30.12 kg/m .  Physical Exam   GENERAL: alert and no distress  NECK: no adenopathy, no asymmetry, masses, or scars  RESP: lungs clear to auscultation - no rales, rhonchi or wheezes  CV: regular rate and rhythm, normal S1 S2, no S3 or S4, no murmur, click or rub, no peripheral edema  ABDOMEN: soft, nontender, no hepatosplenomegaly, no masses and bowel sounds normal  MS: no gross musculoskeletal defects noted, no edema    Admission on 04/25/2025, Discharged on 04/25/2025   Component Date Value Ref Range Status    Sodium 04/25/2025 134 (L)  135 - 145 mmol/L Final    Potassium 04/25/2025 3.6  3.4 - 5.3 mmol/L Final    Carbon Dioxide (CO2) 04/25/2025 23  22 - 29 mmol/L Final    Anion Gap 04/25/2025 11  7 - 15 mmol/L Final    Urea Nitrogen 04/25/2025 8.7  6.0 - 20.0 mg/dL Final    Creatinine 04/25/2025 0.68  0.51 - 0.95 mg/dL Final    GFR Estimate 04/25/2025 >90  >60 mL/min/1.73m2 Final    eGFR calculated using 2021 CKD-EPI equation.    Calcium 04/25/2025 9.4  8.8 - 10.4 mg/dL Final    Chloride 04/25/2025 100  98 - 107 mmol/L Final    Glucose 04/25/2025 117 (H)  70 - 99 mg/dL Final    Alkaline Phosphatase 04/25/2025 99  40 - 150 U/L Final    AST 04/25/2025 21  0 - 45 U/L Final    ALT 04/25/2025 21  0 - 50 U/L Final    Protein Total 04/25/2025 8.8 (H)  6.4 - 8.3 g/dL Final    Albumin 04/25/2025 4.0  3.5 - 5.2 g/dL Final    Bilirubin Total 04/25/2025 0.3  <=1.2 mg/dL Final    hCG Serum Qualitative 04/25/2025 " Negative  Negative Final    This test is for screening purposes.  Results should be interpreted along with the clinical picture.  Confirmation testing is available if warranted by ordering [id number], HCG Quantitative Pregnancy.    Magnesium 04/25/2025 2.2  1.7 - 2.3 mg/dL Final    TSH 04/25/2025 4.34 (H)  0.30 - 4.20 uIU/mL Final    Lithium 04/25/2025 0.48 (L)  0.60 - 1.20 mmol/L Final    Therapeutic: 0.60 - 1.20 mmol/L;   Toxic: >2.00 mmol/L    WBC Count 04/25/2025 8.0  4.0 - 11.0 10e3/uL Final    RBC Count 04/25/2025 4.40  3.80 - 5.20 10e6/uL Final    Hemoglobin 04/25/2025 12.3  11.7 - 15.7 g/dL Final    Hematocrit 04/25/2025 37.3  35.0 - 47.0 % Final    MCV 04/25/2025 85  78 - 100 fL Final    MCH 04/25/2025 28.0  26.5 - 33.0 pg Final    MCHC 04/25/2025 33.0  31.5 - 36.5 g/dL Final    RDW 04/25/2025 15.4 (H)  10.0 - 15.0 % Final    Platelet Count 04/25/2025 370  150 - 450 10e3/uL Final    % Neutrophils 04/25/2025 60  % Final    % Lymphocytes 04/25/2025 26  % Final    % Monocytes 04/25/2025 11  % Final    % Eosinophils 04/25/2025 3  % Final    % Basophils 04/25/2025 0  % Final    % Immature Granulocytes 04/25/2025 0  % Final    NRBCs per 100 WBC 04/25/2025 0  <1 /100 Final    Absolute Neutrophils 04/25/2025 4.8  1.6 - 8.3 10e3/uL Final    Absolute Lymphocytes 04/25/2025 2.1  0.8 - 5.3 10e3/uL Final    Absolute Monocytes 04/25/2025 0.8  0.0 - 1.3 10e3/uL Final    Absolute Eosinophils 04/25/2025 0.2  0.0 - 0.7 10e3/uL Final    Absolute Basophils 04/25/2025 0.0  0.0 - 0.2 10e3/uL Final    Absolute Immature Granulocytes 04/25/2025 0.0  <=0.4 10e3/uL Final    Absolute NRBCs 04/25/2025 0.0  10e3/uL Final    Free T4 04/25/2025 1.36  0.90 - 1.70 ng/dL Final     No results found for this or any previous visit (from the past 24 hours).          The longitudinal plan of care for the diagnosis(es)/condition(s) as documented were addressed during this visit. Due to the added complexity in care, I will continue to support Cristina in  the subsequent management and with ongoing continuity of care.     Signed Electronically by: LINDEN Garces CNP

## 2025-04-28 NOTE — LETTER
2025    Cristina Boyd   2000        To Whom it May Concern;    Please excuse Cristina Boyd from work/school for a healthcare visit on 2025.  She has chronic medical conditions which were exacerbated/uncontrolled in 2024. These conditions significantly impacted her ability to participate in school requirements. If possible, please allow her to take exams she missed during this month.     Sincerely,        LINDEN Garces CNP

## 2025-04-29 ENCOUNTER — PATIENT OUTREACH (OUTPATIENT)
Dept: CARE COORDINATION | Facility: CLINIC | Age: 25
End: 2025-04-29
Payer: COMMERCIAL

## 2025-04-30 ENCOUNTER — MYC MEDICAL ADVICE (OUTPATIENT)
Dept: PSYCHIATRY | Facility: CLINIC | Age: 25
End: 2025-04-30
Payer: COMMERCIAL

## 2025-04-30 ENCOUNTER — HOSPITAL ENCOUNTER (OUTPATIENT)
Facility: CLINIC | Age: 25
Setting detail: OBSERVATION
Discharge: HOME | End: 2025-05-01
Attending: EMERGENCY MEDICINE | Admitting: PSYCHIATRY & NEUROLOGY
Payer: COMMERCIAL

## 2025-04-30 DIAGNOSIS — F31.0 BIPOLAR I DISORDER, MOST RECENT EPISODE HYPOMANIC (H): ICD-10-CM

## 2025-04-30 DIAGNOSIS — F31.9 BIPOLAR 1 DISORDER (H): Primary | ICD-10-CM

## 2025-04-30 PROCEDURE — G0378 HOSPITAL OBSERVATION PER HR: HCPCS

## 2025-04-30 PROCEDURE — 250N000013 HC RX MED GY IP 250 OP 250 PS 637: Performed by: NURSE PRACTITIONER

## 2025-04-30 PROCEDURE — 99222 1ST HOSP IP/OBS MODERATE 55: CPT | Mod: AI | Performed by: NURSE PRACTITIONER

## 2025-04-30 PROCEDURE — 99285 EMERGENCY DEPT VISIT HI MDM: CPT

## 2025-04-30 RX ORDER — ACETAMINOPHEN 325 MG/1
650 TABLET ORAL EVERY 6 HOURS PRN
Status: DISCONTINUED | OUTPATIENT
Start: 2025-04-30 | End: 2025-05-01 | Stop reason: HOSPADM

## 2025-04-30 RX ORDER — RISPERIDONE 2 MG/1
2 TABLET ORAL AT BEDTIME
Status: DISCONTINUED | OUTPATIENT
Start: 2025-04-30 | End: 2025-05-01 | Stop reason: HOSPADM

## 2025-04-30 RX ORDER — LITHIUM CARBONATE 450 MG
900 TABLET, EXTENDED RELEASE ORAL AT BEDTIME
Status: DISCONTINUED | OUTPATIENT
Start: 2025-04-30 | End: 2025-05-01 | Stop reason: HOSPADM

## 2025-04-30 RX ORDER — HYDROXYZINE HYDROCHLORIDE 50 MG/1
50 TABLET, FILM COATED ORAL EVERY 6 HOURS PRN
Status: DISCONTINUED | OUTPATIENT
Start: 2025-04-30 | End: 2025-05-01 | Stop reason: HOSPADM

## 2025-04-30 RX ORDER — OLANZAPINE 10 MG/1
10 TABLET, ORALLY DISINTEGRATING ORAL EVERY 6 HOURS PRN
Status: DISCONTINUED | OUTPATIENT
Start: 2025-04-30 | End: 2025-05-01 | Stop reason: HOSPADM

## 2025-04-30 RX ORDER — HYDROXYZINE HYDROCHLORIDE 50 MG/1
50 TABLET, FILM COATED ORAL 3 TIMES DAILY PRN
COMMUNITY
End: 2025-04-30

## 2025-04-30 RX ADMIN — NICOTINE POLACRILEX 2 MG: 2 GUM, CHEWING BUCCAL at 17:56

## 2025-04-30 RX ADMIN — LITHIUM CARBONATE 900 MG: 450 TABLET ORAL at 20:57

## 2025-04-30 RX ADMIN — OLANZAPINE 10 MG: 10 TABLET, ORALLY DISINTEGRATING ORAL at 19:27

## 2025-04-30 RX ADMIN — RISPERIDONE 2 MG: 2 TABLET, FILM COATED ORAL at 20:57

## 2025-04-30 ASSESSMENT — COLUMBIA-SUICIDE SEVERITY RATING SCALE - C-SSRS
2. HAVE YOU ACTUALLY HAD ANY THOUGHTS OF KILLING YOURSELF IN THE PAST MONTH?: NO
1. IN THE PAST MONTH, HAVE YOU WISHED YOU WERE DEAD OR WISHED YOU COULD GO TO SLEEP AND NOT WAKE UP?: NO
6. HAVE YOU EVER DONE ANYTHING, STARTED TO DO ANYTHING, OR PREPARED TO DO ANYTHING TO END YOUR LIFE?: NO

## 2025-04-30 ASSESSMENT — ACTIVITIES OF DAILY LIVING (ADL)
ADLS_ACUITY_SCORE: 56

## 2025-04-30 NOTE — ED NOTES
[facility]  ED to EMPATH Checklist:      Goal for EMPATH: Anxiety management and [place]    Current Behavior: Anxious and Cooperative    Safety Concerns: None    Legal Hold Status: Voluntary    Medically Cleared by ED provider: Yes    Patient Therapeutically Searched: Not searched - Currently in triage    Belongings: Remain with patient    Independent Ambulation at Baseline: Yes/No: Yes    Participates in Care/Conversation: Yes/No: Yes    Patient Informed about EMPATH: Yes/No: Yes    DEC: Not ordered    Patient Ready to be Transferred to EMPATH? Yes/No: Yes upon medical clearance

## 2025-04-30 NOTE — ED TRIAGE NOTES
Pt reports 1 week of insomnia and being in a manic episode. Pt reports similar episode in the past and has been taking prescribed and prn meds without relief of symptoms. Denies SI/HI.      Triage Assessment (Adult)       Row Name 04/30/25 1458          Triage Assessment    Airway WDL WDL        Respiratory WDL    Respiratory WDL WDL        Skin Circulation/Temperature WDL    Skin Circulation/Temperature WDL WDL        Cardiac WDL    Cardiac WDL WDL        Peripheral/Neurovascular WDL    Peripheral Neurovascular WDL WDL        Cognitive/Neuro/Behavioral WDL    Cognitive/Neuro/Behavioral WDL WDL

## 2025-04-30 NOTE — PROGRESS NOTES
25 year old female with history of bipolar disorder received from ED due to insomnia and manic symptoms. Reports she mainly came here to get help with sleep. States she has only been sleeping a few hours a night. Patient's sister reports ativan has been helpful during past hospitalizations for patient to sleep. Patient reports she has been taking her Lithium as prescribed, and thinks she may need an adjustment to her lithium dose as her recent [place] lab draw was low. Patient reports she has also occasionally been taking her brother's prescription of risperidone and sometimes takes 1 mg-3 mg at a time. States this has been helpful for sleep. Also, reports she takes hydroxyzine 50 mg PRN, which she took earlier today. Denies SI/HI. Denies hallucinations currently. Denies drug or alcohol use. Patient tangential and hyper verbal in conversation. Distractible, and sometimes requires redirection.     Nursing and risk assessments completed. Assessments reviewed with [facility]HP and physician. Admission information reviewed with patient. Patient given a tour of [facility] and instructions on using the facility. Questions regarding [facility] addressed. Pt safety search completed.

## 2025-04-30 NOTE — TELEPHONE ENCOUNTER
Writer placed call to patient to assess for safety as she has not been seen in our clinic in over a year. Patient denies any immediate safety concerns and did not want to discuss other symptoms stating she was trying to take a nap. Writer confirmed that Dr. White was the last psychiatric provider that patient had seen and advised patient to contact her office. Patient also has a new psychiatry appointment with another provider on 6/2. Writer discussed utilizing emergency resources and presenting to the ER or [facility] if she develops acute safety concerns. Patient again states she is trying to nap and requests that writer speak with her sister.     Writer spoke with sister who states that Dr. White's office did not have any availability until 5/15 and that patient needs help sooner. She denies any immediate safety concerns. Writer encouraged sister to bring patient to the ER or [facility] for the most immediate care and sister states she will plan to bring patient this evening. Writer also discussed utilizing [facility] or 911 if needed.

## 2025-04-30 NOTE — ED PROVIDER NOTES
Emergency Department Note      History of Present Illness     Chief Complaint   Manic Behavior and Insomnia      HPI   Cristina Boyd is a 25 year old female with a history of bipolar 1 disorder who presents with anxiety, insomnia, and manic behaviors.  She presents with her sister today.  Sister is concerned that she may not be taking her medications compliantly.  The patient refuses stating that she has been taking her lithium.  We did discuss the low lithium results from an emergency department visit 5 days ago.  In discussion with her outpatient providers they were referred to the emergency department here at [facility] to consider our empath unit.  The patient denies any active suicidal or homicidal ideation.  She has been having significant insomnia and she is most concerned about her insomnia today.  She denies any alcohol or drug abuse.  She denies any active medical complaints such as fever, cough, chest pain, shortness of breath, abdominal pain, nausea, vomiting, or diarrhea.    Independent Historian   None    Review of External Notes   I reviewed patient's emergency department visit at the [facility] on 4/25/2025 with Dr. Turner.  Patient had been seen for abdominal discomfort but also has a history of bipolar 1 disorder on current lithium and Zyprexa use.  Patient had laboratory work performed during that visit which showed a subtherapeutic lithium level.  Renal function electrolytes were within normal range.  CBC unremarkable.      Past Medical History     Medical History and Problem List   Past Medical History:   Diagnosis Date    Bipolar 1 disorder (H)     Infection due to 2019 novel coronavirus 2020    Infection due to 2019 novel coronavirus 12/2021    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, unspecified thyrotoxicosis type 11/16/2022       Medications   docusate sodium (COLACE) 100 MG capsule  fluticasone (FLOVENT HFA) 220 MCG/ACT inhaler  lithium (ESKALITH CR/LITHOBID)  450 MG CR tablet  loratadine (CLARITIN) 10 MG tablet  OLANZapine (ZYPREXA) 10 MG tablet  OLANZapine (ZYPREXA) 5 MG tablet  polyethylene glycol (MIRALAX) 17 GM/Dose powder        Surgical History   Past Surgical History:   Procedure Laterality Date    BRONCHOSCOPY (RIGID OR FLEXIBLE), DIAGNOSTIC N/A 6/16/2018    Procedure: COMBINED BRONCHOSCOPY (RIGID OR FLEXIBLE), LAVAGE;;  Surgeon: Manjeet Holland MD;  Location: [facility]       Physical Exam     Patient Vitals for the past 24 hrs:   BP Temp Temp src Pulse Resp SpO2   04/30/25 1458 120/84 97.1  F (36.2  C) Temporal 117 18 99 %     Physical Exam  General: Alert, appears well-developed and well-nourished. Cooperative.     In no acute distress  HEENT:  Head:  Atraumatic  Ears:  External ears are normal  Mouth/Throat:  Oropharynx is without erythema or exudate and mucous membranes are moist.   Eyes:   Conjunctivae normal and EOM are normal. No scleral icterus.    Pupils are equal, round, and reactive to light.   Neck:   Normal range of motion. Neck supple.  No meningismus.  CV:  Normal rate, regular rhythm, normal heart sounds and radial pulses are 2+ and symmetric.  No murmur.  Resp:  Breath sounds are clear bilaterally    Non-labored, no retractions or accessory muscle use  GI:  Abdomen is soft, no distension, no tenderness. No rebound or guarding.    MS:  Normal range of motion. No edema.    Normal strength in all 4 extremities.     Back atraumatic.    No midline cervical, thoracic, or lumbar tenderness  Skin:  Warm and dry.  No rash or lesions noted.  Neuro:   Alert. Normal strength.  GCS: 15  Psych: Normal mood and flat affect.  Denies SI/HI.    Lymph: No anterior or posterior cervical lymphadenopathy noted.    Diagnostics     Lab Results   Labs Ordered and Resulted from Time of ED Arrival to Time of ED Departure - No data to display    Imaging   No orders to display     Independent Interpretation   None    ED Course      Medications Administered   Medications - No  data to display    Procedures   Procedures     Discussion of Management   None    ED Course        Additional Documentation  None    Medical Decision Making / Diagnosis     CMS Diagnoses: None    MIPS       None    [facility]   Cristnia Boyd is a 25 year old female with a history of bipolar 1 disorder who presents with increasing insomnia and anxiety.  Sister notes that she has been increasingly manic over the last 1 week as well.  Concern for poor medication compliance as lithium level was subtherapeutic on laboratory work 5 days ago.  Patient mildly tachycardic here think secondary to her anxiety.  Thankfully no active chest pain, shortness of breath or abdominal pain.  She did have a large laboratory workup performed 5 days ago and no sinister abnormalities identified.  I did discuss the [facility] unit with the patient and the patient's sister and they seemed in agreement with plans for transfer to [facility] for ongoing mental health evaluation here today.  Patient is most interested in helping with her insomnia symptoms.  Patient medically cleared at this time.  Patient will be transferred to [facility] for definitive psychiatric evaluation and management given manic behaviors and insomnia.    Disposition   The patient was transferred to [facility].     Diagnosis     ICD-10-CM    1. Manic behavior (H)  F30.10       2. Insomnia, unspecified type  G47.00       3. Poor compliance with medication  Z91.148          Discharge Medications   New Prescriptions    No medications on file     MD Parul Barahona Scott, MD  04/30/25 0430

## 2025-05-01 ENCOUNTER — PATIENT OUTREACH (OUTPATIENT)
Dept: CARE COORDINATION | Facility: CLINIC | Age: 25
End: 2025-05-01
Payer: COMMERCIAL

## 2025-05-01 VITALS
OXYGEN SATURATION: 100 % | WEIGHT: 184.9 LBS | HEART RATE: 90 BPM | TEMPERATURE: 98 F | RESPIRATION RATE: 18 BRPM | HEIGHT: 66 IN | DIASTOLIC BLOOD PRESSURE: 75 MMHG | BODY MASS INDEX: 29.72 KG/M2 | SYSTOLIC BLOOD PRESSURE: 107 MMHG

## 2025-05-01 LAB
AMPHETAMINES UR QL SCN: NORMAL
BARBITURATES UR QL SCN: NORMAL
BENZODIAZ UR QL SCN: NORMAL
BZE UR QL SCN: NORMAL
CANNABINOIDS UR QL SCN: NORMAL
FENTANYL UR QL: NORMAL
HCG UR QL: NEGATIVE
LITHIUM SERPL-SCNC: 0.81 MMOL/L (ref 0.6–1.2)
OPIATES UR QL SCN: NORMAL
PCP QUAL URINE (ROCHE): NORMAL

## 2025-05-01 PROCEDURE — 36415 COLL VENOUS BLD VENIPUNCTURE: CPT | Performed by: NURSE PRACTITIONER

## 2025-05-01 PROCEDURE — 80307 DRUG TEST PRSMV CHEM ANLYZR: CPT | Performed by: NURSE PRACTITIONER

## 2025-05-01 PROCEDURE — 80178 ASSAY OF LITHIUM: CPT | Performed by: NURSE PRACTITIONER

## 2025-05-01 PROCEDURE — 250N000013 HC RX MED GY IP 250 OP 250 PS 637: Performed by: NURSE PRACTITIONER

## 2025-05-01 PROCEDURE — G0378 HOSPITAL OBSERVATION PER HR: HCPCS

## 2025-05-01 PROCEDURE — 81025 URINE PREGNANCY TEST: CPT | Performed by: NURSE PRACTITIONER

## 2025-05-01 PROCEDURE — 99239 HOSP IP/OBS DSCHRG MGMT >30: CPT | Performed by: PSYCHIATRY & NEUROLOGY

## 2025-05-01 RX ORDER — RISPERIDONE 2 MG/1
2 TABLET ORAL AT BEDTIME
Qty: 30 TABLET | Refills: 0 | Status: SHIPPED | OUTPATIENT
Start: 2025-05-01

## 2025-05-01 RX ADMIN — NICOTINE POLACRILEX 2 MG: 2 GUM, CHEWING BUCCAL at 07:06

## 2025-05-01 RX ADMIN — OLANZAPINE 10 MG: 10 TABLET, ORALLY DISINTEGRATING ORAL at 00:39

## 2025-05-01 RX ADMIN — ACETAMINOPHEN 650 MG: 325 TABLET ORAL at 00:39

## 2025-05-01 ASSESSMENT — ACTIVITIES OF DAILY LIVING (ADL)
ADLS_ACUITY_SCORE: 56

## 2025-05-01 ASSESSMENT — COLUMBIA-SUICIDE SEVERITY RATING SCALE - C-SSRS
2. HAVE YOU ACTUALLY HAD ANY THOUGHTS OF KILLING YOURSELF?: NO
1. SINCE LAST CONTACT, HAVE YOU WISHED YOU WERE DEAD OR WISHED YOU COULD GO TO SLEEP AND NOT WAKE UP?: NO

## 2025-05-01 NOTE — CONSULTS
Diagnostic Evaluation Consultation  Crisis Assessment    Patient Name: Cristina Boyd  Age:  25 year old  Legal Sex: female  Gender Identity: female  Pronouns:  She/Her/Hers    Race: Black or   Ethnicity: Not  or   Language: English      Patient was assessed: In person   Crisis Assessment Start Date: 04/30/25  Crisis Assessment Start Time: 1635  Crisis Assessment Stop Time: 1705  Patient location: [facility] Emergency Dept                             [id number]    Referral Data and Chief Complaint  Cristina Boyd presents to the ED with family/friends. Patient is presenting to the ED for the following concerns: Paranoia, Other (see comment), Significant behavioral change (Insomnia). Factors that make the mental health crisis life threatening or complex are: Patient presents to the emergency department with family for evaluation of paranoia and insomnia in context of bipolar disorder. Patient reports her sister brought her in to the ED because she hasn't been sleeping well in over a week and he family thinks she is manic. Patient think she is getting 2-3 hours of sleep a night. Patient says she is prescribed Lithium, Zyprexa, and Hydroxyzine for her mental health and takes them daily as prescribed. Per chart review, on 4/24/25, patient was evaluated at [facility] for abdominal discomfort and had laboratory work performed during that visit which showed a subtherapeutic lithium level. Patient reports several current stressors; finishing up a speech pathology program and finding work, finances, and living w/ family including a 16 year old brother also diagnosed with bipolar disorder. Patient is pleasant with some tangential thinking and speech, she is oriented x 4 (person, place, situation, and time). Patient denies suicidal and homicidal ideation. At the time of the assessment she does not endorse auditory or visual hallucinations..      Informed Consent and Assessment  "Methods  Explained the crisis assessment process, including applicable information disclosures and limits to confidentiality, assessed understanding of the process, and obtained consent to proceed with the assessment.  Assessment methods included conducting a formal interview with patient, review of medical records, collaboration with medical staff, and obtaining relevant collateral information from family and community providers when available.  : done     History of the Crisis   Pt is 25 year old w/ a hx of Bipolar 1 Disorder and Generalized Anxiety Disorder. Pt has a hx of IPMH starting in 2018, she denies prior MH commitments, suicide attempts, and SIB. Pt works with a psychiatry provider and says she takes Lithium, Zyprexa, and Hydroxyzine. Pt has also been using her brother's rispidone to help her sleep at night. Pt's sister reports her behavior as \"labile and irritable\",  her family reports she hasn't been sleeping well, she appears paranoid and is fearful there is someone in her room watching her, and is spending a lot of time wandering in and outside of the house. Pt recently started vaping and selling her items for money. Pt's family is worried school is stressing the pt out. There are no substance concerns at this time. Pt denies SI/HI/AH/VH. Pt works with a medication provider but is interested in a referral for a new provider and therapist.    Brief Psychosocial History  Family:  Single, Children no  Support System:  Parent(s), Sibling(s)  Employment Status:  unemployed, student  Source of Income:  none  Financial Environmental Concerns:  unemployed  Current Hobbies:  television/movies/videos  Barriers in Personal Life:  financial concerns, mental health concerns    Significant Clinical History  Current Anxiety Symptoms:  racing thoughts, excessive worry, anxious (Pt is worried about school and not being able to find work.)  Current Depression/Trauma:  difficulty concentrating, impaired decision making, " "irritable  Current Somatic Symptoms:  racing thoughts, excessive worry, anxious  Current Psychosis/Thought Disturbance:  forgetful, impulsive, elated mood, agitation, hyperverbal, distractability, flight of ideas  Current Eating Symptoms:  increased appetite, recent weight gain (Pt reports increased appetite and weight gain since 2023, she thinks its due to her medications.)  Chemical Use History:  Alcohol: None  Benzodiazepines: None  Opiates: None  Cocaine: None  Marijuana: None  Other Use: Other (comments) (Pt recently started vaping nicotine.)   Past diagnosis:  Anxiety Disorder, Bipolar Disorder  Family history:  Bipolar Disorder, Schizophrenia  Past treatment:  Primary Care, Psychiatric Medication Management, Inpatient Hospitalization  Details of most recent treatment:  Pt works with a psychiatry provider, she is requesting a referral for a new provider as she doesn't like the current provider.  Other relevant history:  Pt was referred for [facility] program 2-3 months ago, unclear if she followed up.    Have there been any medication changes in the past two weeks:  no       Is the patient compliant with medications:  no  Per chart review, labs were performed in ED at [facility] on 4/25 and pt has subtherapeutic levels of Lithium.     Collateral Information  Is there collateral information: Yes     Collateral information name, relationship, phone number:  Pt signed ALLI for her sister, Polly Boyd [phone]    What happened today: Pt's sister brought her in to the hospital today, she describes sister as \"labile, agitated, restless, paranoid, and hyperverbal\" the past few days. Pt appeared somewhat confused today, she thought she was coming to the hospital for surgery. Pt has a hx of bipolar disorder.     What is different about patient's functioning: Pt hasn't been sleeping the past few days, wandering, restlessness, agitation, and paranoia. Pt is reporting she feels like someone is in her room and doesn't feel " safe there. Pt started using a nicotine vape which is unlike her, she is selling her stuff for money. Pt is in school, sister thinks that it's causing a lot of stress, pt may have failed some college courses. Pt is currently living with parents and siblings.     What do you think the patient needs:      Has patient made comments about wanting to kill themselves/others: no    If d/c is recommended, can they take part in safety/aftercare planning:  yes    Additional collateral information:        Risk Assessment  Wright Suicide Severity Rating Scale Full Clinical Version:  Suicidal Ideation  Q6 Suicide Behavior (Lifetime): no     Suicidal Behavior (Lifetime)  Actual Attempt (Lifetime): No    Wright Suicide Severity Rating Scale Recent:   Suicidal Ideation (Recent)  Q1 Wished to be Dead (Past Month): no  Q2 Suicidal Thoughts (Past Month): no  Level of Risk per Screen: no risks indicated     Suicidal Behavior (Recent)  Actual Attempt (Past 3 Months): No  Has subject engaged in non-suicidal self-injurious behavior? (Past 3 Months): No  Interrupted Attempts (Past 3 Months): No  Aborted or Self-Interrupted Attempt (Past 3 Months): No  Preparatory Acts or Behavior (Past 3 Months): No    Environmental or Psychosocial Events: work or task failure, challenging interpersonal relationships, impulsivity/recklessness, unemployment/underemployment, other life stressors  Protective Factors: Protective Factors: strong bond to family unit, community support, or employment, lives in a responsibly safe and stable environment, sense of importance of health and wellness, sense of belonging, cultural, spiritual , or [employer] beliefs associated with meaning and value in life, optimistic outlook - identification of future goals, constructive use of leisure time, enjoyable activities, resilience, reality testing ability    Does the patient have thoughts of harming others? Feels Like Hurting Others: no  Previous Attempt to Hurt Others:  no  Current presentation:  (Pt presents as pleasant, oriented, and cooperative.)  Is the patient engaging in sexually inappropriate behavior?: no  Does Patient have a known history of aggressive behavior: No  Has aggression occurred as a result of MH concerns/diagnosis: N/A pt denies.  Does patient have history of aggression in hospital: N/A pt denies.    Is the patient engaging in sexually inappropriate behavior?  no        Mental Status Exam   Affect: Appropriate  Appearance: Appropriate  Attention Span/Concentration: Attentive  Eye Contact: Variable    Fund of Knowledge: Appropriate   Language /Speech Content: Fluent  Language /Speech Volume: Normal  Language /Speech Rate/Productions: Normal  Recent Memory: Variable  Remote Memory: Variable  Mood: Normal  Orientation to Person: Yes   Orientation to Place: Yes  Orientation to Time of Day: Yes  Orientation to Date: Yes     Situation (Do they understand why they are here?): Yes  Psychomotor Behavior: Normal  Thought Content: Paranoia, Clear  Thought Form: Goal Directed     Mini-Cog Assessment  Number of Words Recalled:    Clock-Drawing Test:     Three Item Recall:    Mini-Cog Total Score:       Medication  Psychotropic medications:   Medication Orders - Psychiatric (From admission, onward)      Start     Dose/Rate Route Frequency Ordered Stop    04/30/25 2200  lithium (ESKALITH CR/LITHOBID) CR tablet 900 mg         900 mg Oral AT BEDTIME 04/30/25 2042 04/30/25 2200  risperiDONE (risperDAL) tablet 2 mg         2 mg Oral AT BEDTIME 04/30/25 2042 04/30/25 2050  hydrOXYzine HCl (ATARAX) tablet 50 mg         50 mg Oral EVERY 6 HOURS PRN 04/30/25 2051 04/30/25 1813  OLANZapine zydis (zyPREXA) ODT tab 10 mg         10 mg Oral EVERY 6 HOURS PRN 04/30/25 1813      04/30/25 1744  nicotine (NICORETTE) gum 2 mg         2 mg Buccal EVERY 1 HOUR PRN 04/30/25 1745               Current Care Team  Patient Care Team:  Felice Edmonds APRN CNP as PCP - General (Family  Medicine)  Ina Mc MD as MD (Endocrinology, Diabetes, and Metabolism)  Sarai Jerry MD as MD (Endocrinology, Diabetes, and Metabolism)  Felice Edmonds APRN CNP as Assigned PCP  Wendy Reese MD as MD (Endocrinology, Diabetes, and Metabolism)    Diagnosis  Patient Active Problem List   Diagnosis Code    Bipolar 1 disorder (H) F31.9    Lymphadenopathy R59.1    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, unspecified thyrotoxicosis type E05.90    Hypercalcemia E83.52    Myalgia M79.10    Hyponatremia E87.1    Weakness R53.1    Noncompliance with medication regimen Z91.148    Bipolar I disorder, most recent episode hypomanic (H) F31.0       Primary Problem This Admission  Active Hospital Problems    Bipolar I disorder, most recent episode hypomanic (H)      Noncompliance with medication regimen      Bipolar 1 disorder (H)        Clinical Summary and Substantiation of Recommendations   Clinical Substantiation:  Pt is a 25 year old woman w/ a hx of Bipolar I Disorder, she presents to the ED for evaluation of paranoia and insomnia that has been going on for over a week. Pt works w/ a psychiatry provider and says she is prescribed Lithium, Zyprexa, and Hydroxydione, pt also reports taking her brother's risperidone to help her sleep at times. Per chart review, 4/25/25, pt was assessed in the ED at [facility] with abdominal discomfort and had laboratory work performed during that visit which showed a subtherapeutic lithium level. Pt is interested in meeting with a provider for medication adjustments and a referrals for a new psychiatry provider and individual therapist. Pt is able to safety plan with [facility] and is interested in discharging home this evening. Pt appears to be appropriate for outpatient, community levels of care and does not need to remain in the Emergency Department or be hospitalized.    Goals for crisis stabilization:  Medication adjustments and referrals for outpatient MH providers.    Next steps  for Care Team:  Pt will follow up with her outpatient mental health providers.    Treatment Objectives Addressed:  rapport building, processing feelings, safety planning, identifying an appropriate aftercare plan, identifying additional supports, assessing safety    Therapeutic Interventions:  Engaged in safety planning, Reviewed healthy living that supports positive mental health, including looking at sleep hygiene, regular movement, nutrition, and regular socialization.    Has a specific means been identified for suicidal/homicide actions: No    If yes, describe:       Explain action steps toward mitigation:       Document completion of mitigation actions:       The follow up action still needed prior to discharge:       Patient coping skills attempted to reduce the crisis:  Pt wants to rest and have her medications adjusted.    Disposition  Recommended referrals: Individual Therapy, Medication Management, Programmatic Care        Reviewed case and recommendations with attending provider. Attending Name: oB Gibson       Attending concurs with disposition: yes       Patient and/or validated legal guardian concurs with disposition:   yes       Final disposition:  observation                            Legal status: Voluntary/Patient has signed consent for treatment                                                                                                                                 Reviewed court records: yes       Assessment Details   Total duration spent with the patient: 30 min     CPT code(s) utilized: 52406 - Psychotherapy for Crisis - 60 (30-74*) min    MARIKA Aguilar, Psychotherapist  [facility] - Triage & Transition Services  Callback: [phone]

## 2025-05-01 NOTE — PROGRESS NOTES
"Triage and Transition Services Extended Care Reassessment     Patient: Cristina goes by \"Cristina,\" uses she/her pronouns  Date of Service: May 1, 2025  Site of Service: [facility] Emergency Dept                             [id number]    Patient was seen yes  Mode of Assessment: In person     Reason for Reassessment:  (discharge)    History of Patient's Original Emergency Room Encounter: dx hx of Bipolar I Disorder and  REGINO.   presented due to poor sleep, anxiety, hypomania    Current Patient Presentation: calm, cooperative, slightly pressured speech, slightly tangential thoughts yet future oriented    Presentation Summary: Patient reports feeling better after taking a shower this morning.  She shares sleep here last night was disrupted as she kept waking up yet able to fall back to sleep.  When asked if she had any concerns this morning to address, patient focused on her brother at home and what he was doing that was impacting her.   Patient had difficulty redirecting thoughts to what she has control of in the moment where she is triggered by her brother. After some prompting, patient agrees to try removing self from the situation, breathing and mindfulness.  She also expresses frequent thoughts of needing to sell gold jewelry to pay for things she needs- new bed, 's license fees, new airpods.  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  Patient is engaged in discussing appointments for new outpatient providers however she did not recall scheduling those appointments with staff last evening already.  Patient is unsure of where she is going to discharge to today - parent's, Aunt's, or a motel.  Patient indicates her family is not answering their phones yet this morning.  Patient denies SI, HI, AH, VH, paranoia and delusions.    Changes Observed Since Initial Assessment: decrease in presenting symptoms, " patient/family request    Therapeutic Interventions Provided: Engaged in safety planning, Engaged in guided discovery, explored patient's perspectives and helped expand them through socratic dialogue., Coached on coping techniques/relaxation skills to help improve distress tolerance and managing intense emotions., Taught the link between thoughts, feelings, and behaviors., Engaged in social skills training.    Current Symptoms:  (denies and does not appear)  (denies and none observed)  (denies and none observed) impulsive increased appetite    Mental Status Exam   Affect: Appropriate  Appearance: Appropriate  Attention Span/Concentration: Attentive  Eye Contact: Engaged    Fund of Knowledge: Appropriate   Language /Speech Content: Fluent  Language /Speech Volume: Normal  Language /Speech Rate/Productions: Normal  Recent Memory: Variable  Remote Memory: Variable  Mood: Normal  Orientation to Person: Yes   Orientation to Place: Yes  Orientation to Time of Day: Yes  Orientation to Date: Yes     Situation (Do they understand why they are here?): Yes  Psychomotor Behavior: Normal  Thought Content: Clear  Thought Form: Tangential, Goal Directed    Treatment Objective(s) Addressed: rapport building, orienting the patient to therapy, processing feelings, identifying an appropriate aftercare plan, assessing safety, identifying additional supports, exploring obstacles to safety in the community    Patient Response to Interventions: eager to participate, acceptance expressed, verbalizes understanding    Progress Towards Goals:  Patient Reports Symptoms Are: stable  Patient Progress Toward Goals: is making progress  Comment: slept better, wants outpatient appointments  Next Step to Work Toward Discharge: follow up on referrals  Symptom Stabilization Comment: attend scheduled appointments for therapy, psychiatry, and complete Programmatic Care intake    Case Management: Case Management Included: collaborating with patient's  support system  Details on Collaborating with Patient's Support System: sister Matias, [phone]  Summary of Interaction: Sister returned writer's voice message from earlier.  She was provided updated on patient sleeping during the night with some disruption and informed of medication changes made by provider last night. Sister has some hesitation with patient's return so soon but with education understands patient is able to make own decisions.  Sister does indicate patient is able to return home when ready.    C-SSRS Since Last Contact:   1. Wish to be Dead (Since Last Contact): No  2. Non-Specific Active Suicidal Thoughts (Since Last Contact): No           Calculated C-SSRS Risk Score (Since Last Contact): No Risk Indicated    Plan: Final Disposition / Recommended Care Path: discharge  Plan for Care reviewed with assigned Medical Provider: yes  Plan for Care Team Review: provider, RN  Comments: Andish  Patient and/or validated legal guardian concurs: yes  Clinical Substantiation: It is recommendation of writer that patient discharge home to care of family with intent to follow up with new outpatient therapy, established outpatient medication management, and complete intake for Programmatic Care.  Patient readily meets with writer this morning, expresses her desire to discharge, and participated in care coordination.  She reports feeling better than yesterday yet is not sure what is contributing to this improvement.  Her thoughts are tangential, speech is slightly pressured, mood is good, eye contact is good, and affect is congruent with mood.  Patient reports sleep here was disrupted however it was better than at home.  Patient need redirection away from her brother's mental health concerns at home to focus on what she can control.  Patient denies SI, HI, AH, VH, paranoia and delusions.    Legal Status: Legal Status: Voluntary/Patient has signed consent for treatment    Session Status: Time session started:  0835  Time session ended: 0851  Session Duration (minutes): 16 minutes  Session Number: 1  Anticipated number of sessions or this episode of care: 1    Session Start Time: 0835  Session Stop Time: 0851  CPT codes: 22287 - Psychotherapy (with patient) - 30 (16-37*) min  Time Spent: 16 minutes      CPT code(s) utilized: 82197 - Psychotherapy (with patient) - 30 (16-37*) min    Diagnosis:   Patient Active Problem List   Diagnosis Code    Bipolar 1 disorder (H) F31.9    Lymphadenopathy R59.1    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, unspecified thyrotoxicosis type E05.90    Hypercalcemia E83.52    Myalgia M79.10    Hyponatremia E87.1    Weakness R53.1    Noncompliance with medication regimen Z91.148    Bipolar I disorder, most recent episode hypomanic (H) F31.0       Primary Problem This Admission: Active Hospital Problems    Bipolar I disorder, most recent episode hypomanic (H)      Noncompliance with medication regimen      *Bipolar 1 disorder (H)      Samson Haywood, MARCLEINA, [facility] CenterSW  Licensed Mental Health Professional (LMHP)  [facility], [phone]

## 2025-05-01 NOTE — PROGRESS NOTES
Patient agreeable to discharge plan. Discharge instructions reviewed with patient including follow-up care plan. Medications: reviewed. Reviewed safety plan and outpatient resources. Denies SI and HI. All belongings that were brought into the hospital have been returned to patient. Escorted off the unit at 1345 accompanied by [facility] staff. Discharged to home via cab.

## 2025-05-01 NOTE — ED PROVIDER NOTES
"[facility] Unit - Initial Psychiatric Observation Note  [facility] Emergency Department  Observation Initiation Date: Apr 30, 2025    Cristina Boyd MRN: [id number]   Age: 25 year old YOB: 2000     History     Chief Complaint   Patient presents with    Manic Behavior    Insomnia     HPI  Cristina Boyd is a 25 year old female with a past history notable for bipolar disorder who presented to the emergency department for evaluation of worsening insomnia.  Patient was medically evaluated in the emergency department and determined to be medically stable for transfer to [facility] for further psychiatric assessment.  Chart reviewed, noting patient recently presented to the [facility] ED on 4/25 in the context of abdominal pain.  Patient was given medications for constipation and discharged home.    On interview with patient, she reports coming to the emergency department due to inability to sleep over the last week.  Reports that she is only sleeping around 3 to 4 hours per night.  She indicates her main stressor to be her 16-year-old brother, who she notes was also diagnosed with bipolar disorder and has been experiencing shaina this last week.  She indicates that her brother has not been sleeping at night, which is a trigger for her.  Reports she and her brother live with their parents, and indicates a good relationship with her parents.  Patient also discusses that her bed broke recently, which has made it more difficult to sleep.  Reports she built her bed frame a couple of years ago, and it recently broke.  Reports her room \"is a mess.\"  She endorses adherence to her prescribed lithium.  She is not certain whether she is still prescribed olanzapine, but has not been taking this recently.  She reports there have been nights where she has used her brothers risperidone, taking 1 to 3 mg, and has found this very effective in allowing her to fall asleep. She denies auditory or visual hallucinations. No overt paranoia noted. " Presents with mild thought disorganization. She is agreeable to remain on observation overnight. Denies suicidal or homicidal thoughts, plans, or intent.       Past Medical History  Past Medical History:   Diagnosis Date    Bipolar 1 disorder (H)     Infection due to  novel coronavirus 2020    summer 2020 by patient report    Infection due to  novel coronavirus 2021    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, unspecified thyrotoxicosis type 2022     Past Surgical History:   Procedure Laterality Date    BRONCHOSCOPY (RIGID OR FLEXIBLE), DIAGNOSTIC N/A 2018    Procedure: COMBINED BRONCHOSCOPY (RIGID OR FLEXIBLE), LAVAGE;;  Surgeon: Manjeet Holland MD;  Location:  GI     lithium (ESKALITH CR/LITHOBID) 450 MG CR tablet  docusate sodium (COLACE) 100 MG capsule  fluticasone (FLOVENT HFA) 220 MCG/ACT inhaler  loratadine (CLARITIN) 10 MG tablet  OLANZapine (ZYPREXA) 10 MG tablet  OLANZapine (ZYPREXA) 5 MG tablet  polyethylene glycol (MIRALAX) 17 GM/Dose powder      No Known Allergies  Family History  Family History   Problem Relation Age of Onset    Impaired Fasting Glucose Father         prediabetes    Thyroid Disease Maternal Grandmother     Diabetes Maternal Grandmother     Diabetes Paternal Grandmother          early in her 60 s    Depression Maternal Uncle     Depression Other      Social History   Social History     Tobacco Use    Smoking status: Never    Smokeless tobacco: Never   Vaping Use    Vaping status: Some Days    Substances: Nicotine    Devices: Disposable   Substance Use Topics    Alcohol use: No    Drug use: No     Comment: x1 marijuana, in       Past medical history, past surgical history, medications, allergies, family history, and social history were reviewed with the patient. No additional pertinent items.       Review of Systems  A medically appropriate review of systems was performed with pertinent positives and negatives noted in the HPI, and all other systems  "negative.    Physical Examination   BP: 120/84  Pulse: 117  Temp: 97.1  F (36.2  C)  Resp: 18  Height: 167.6 cm (5' 6\")  Weight: 83.9 kg (184 lb 14.4 oz)  SpO2: 99 %    Physical Exam  General: Appears stated age.   Neuro: Alert and fully oriented. Extremities appear to demonstrate normal strength on visual inspection.   Integumentary/Skin: no rash visualized, normal color    Psychiatric Examination   Appearance: awake, alert, adequately groomed, and appeared as age stated  Attitude:  cooperative  Eye Contact:  fair  Mood:  anxious  Affect:  mood congruent and intensity is blunted  Speech:  clear, coherent  Psychomotor Behavior:  no evidence of tardive dyskinesia, dystonia, or tics  Thought Process:   Mild disorganization  Associations:  no loose associations  Thought Content:   Denies suicidal or homicidal thinking.  Denies auditory or visual hallucinations.  Did not elicit overt paranoia.  Insight:  fair  Judgement:  fair  Oriented to:  time, person, and place  Attention Span and Concentration:  fair  Recent and Remote Memory:  fair  Language: able to name/identify objects without impairment  Fund of Knowledge: intact with awareness of current and past events    ED Course        Labs Ordered and Resulted from Time of ED Arrival to Time of ED Departure - No data to display    Assessments & Plan (with Medical Decision Making)   Patient presenting with complaints of worsening sleep over the last week, only sleeping 3 to 4 hours per night.  She has been restless, pacing, and anxious appearing.  Received a dose of olanzapine, which did lead to some improvement in the symptoms.  She endorses good efficacy with risperidone, stating she has tried her brother's medication.  She was not sure whether she was still taking Zyprexa, we will discontinue this and trial of risperidone in its place. Nursing notes reviewed noting no acute issues.     I have reviewed the assessment completed by the [facility].     During the observation " period, the patient did not require medications for agitation, and did not require restraints/seclusion for patient and/or provider safety.     The patient was found to have a psychiatric condition that would benefit from an observation stay in the emergency department for further psychiatric stabilization and/or coordination of a safe disposition. The observation plan includes serial assessments of psychiatric condition, potential administration of medications if indicated, further disposition pending the patient's psychiatric course during the monitoring period.     Preliminary diagnosis:    ICD-10-CM    1. Bipolar I disorder, most recent episode hypomanic (H)  F31.0            Treatment Plan:  - Continue lithium  mg at bedtime.  We will check a 12-hour level tomorrow morning.  Level was checked on 4/25 at 1 AM and resulted at 0.48.  Generally the 12-hour level is used for dosing.  We will await the level prior to making any changes.  Patient endorses adherence.  - Start risperidone 2 mg at bedtime for mood stabilization to augment lithium.  Reports she has tried her brother's risperidone, utilizing 1 to 3 mg in the past and finds this quite effective for sleep.  - urine hcg and utox ordered  - PRNs ordered, including acetaminophen for pain, hydroxyzine for anxiety, Zyprexa for agitation, and nicotine replacement  - Patient will remain on observation overnight.  Reassess tomorrow    --  Bo Harvey CNP   [facility] EMERGENCY DEPT  [facility] Unit      Bo Harvey CNP  04/30/25 [id number]

## 2025-05-01 NOTE — PLAN OF CARE
Cristina Boyd  April 30, 2025  Plan of Care Hand-off Note     Patient Recommended Care Path: observation    Clinical Substantiation:  Pt is a 25 year old woman w/ a hx of Bipolar I Disorder, she presents to the ED for evaluation of paranoia and insomnia that has been going on for over a week. Pt works w/ a psychiatry provider and says she is prescribed Lithium, Zyprexa, and Hydroxyixine, pt also reports taking her brother's risperidone to help her sleep at times. Per chart review, 4/25/25, pt was assessed in the ED at [facility] with abdominal discomfort and had laboratory work performed during that visit which showed a subtherapeutic lithium level. Pt is interested in meeting with a provider for medication adjustments and a referrals for a new psychiatry provider and individual therapist. Pt will remain at [facility] overnight for further observation and medication management.    Goals for crisis stabilization:  Medication adjustments and referrals for outpatient MH providers.    Next steps for Care Team:  Pt will follow up with her outpatient mental health providers.    Treatment Objectives Addressed:  rapport building, processing feelings, safety planning, identifying an appropriate aftercare plan, identifying additional supports, assessing safety    Therapeutic Interventions:  Engaged in safety planning, Reviewed healthy living that supports positive mental health, including looking at sleep hygiene, regular movement, nutrition, and regular socialization.    Has a specific means been identified for suicidal.homicide actions: No  If yes, describe:    Explain action steps toward mitigation:    Document completion of mitigation action:    The follow up action still needed prior to discharge:      Patient coping skills attempted to reduce the crisis:  Pt wants to rest and have her medications adjusted.                          Collateral contact information:  Pt signed ALLI for her sister, Polly Body [phone]    Legal  Status: Voluntary/Patient has signed consent for treatment                                                                                                                                 Reviewed court records: yes     Psychiatry Consult: Yes, MARIKA Gayle

## 2025-05-01 NOTE — ED PROVIDER NOTES
"[facility] Unit - Psychiatric Observation Discharge Summary  [facility] Emergency Department  Discharge Date: 5/1/2025    Cristina Boyd MRN: [id number]   Age: 25 year old YOB: 2000     Brief HPI & Initial ED Course     Chief Complaint   Patient presents with    Manic Behavior    Insomnia     HPI  Cristina Boyd is a 25 year old female with history notable for bipolar disorder who is currently under observation status on the [facility] unit, now approaching 23 hours in the emergency department.  Overnight, there were no acute issues.  On reassessment, the patient reports tolerating Risperdal without side effects.  Sleep was better although not yet ideal.  Mood is improving as she notes decrease in racing thoughts and reduction in anxiety.  She denied suicidal and homicidal thoughts.  There was no indication of psychosis.  She is content with her medication plan and looking forward to returning home today.        Physical Examination   BP: 107/75  Pulse: 90  Temp: 98  F (36.7  C)  Resp: 18  Height: 167.6 cm (5' 6\")  Weight: 83.9 kg (184 lb 14.4 oz)  SpO2: 100 %    Physical Exam  General: Appears stated age.   Neuro: Alert and fully oriented. Extremities appear to demonstrate normal strength on visual inspection.   Integumentary/Skin: no rash visualized, normal color    Psychiatric Examination   Appearance: awake, alert  Attitude:  cooperative  Eye Contact:  fair  Mood:  better  Affect:  appropriate and in normal range  Speech:  pressured speech  Psychomotor Behavior:  no evidence of tardive dyskinesia, dystonia, or tics  Thought Process:  logical and linear  Associations:  no loose associations  Thought Content:  no evidence of suicidal ideation or homicidal ideation and no evidence of psychotic thought  Insight:  fair  Judgement:  fair  Oriented to:  time, person, and place  Attention Span and Concentration:  fair  Recent and Remote Memory:  fair  Language: able to name/identify objects without impairment  Fund " of Knowledge: intact with awareness of current and past events    Results        Labs Ordered and Resulted from Time of ED Arrival to Time of ED Departure   HCG QUALITATIVE URINE - Normal       Result Value    hCG Urine Qualitative Negative     LITHIUM LEVEL - Normal    Lithium 0.81     URINE DRUG SCREEN PANEL - Normal    Amphetamines Urine Screen Negative      Barbituates Urine Screen Negative      Benzodiazepine Urine Screen Negative      Cannabinoids Urine Screen Negative      Cocaine Urine Screen Negative      Fentanyl Qual Urine Screen Negative      Opiates Urine Screen Negative      PCP Urine Screen Negative         Observation Course   The patient was found to have a psychiatric condition that would benefit from an observation stay in the emergency department for further psychiatric stabilization and/or coordination of a safe disposition. The plan upon observation admission included serial assessments of psychiatric condition, potential administration of medications if indicated, further disposition pending the patient's psychiatric course during the monitoring period.     Serial assessments of the patient's psychiatric condition were performed. Nursing notes were reviewed. During the observation period, the patient did not require medications for agitation, and did not require restraints/seclusion for patient and/or provider safety.     After a period of working with the treatment team on the [facility] unit, the patient's mental state improved to allow a safe transition to outpatient care. After counseling on the diagnosis, work-up, and treatment plan, the patient was discharged. Close follow-up with a psychiatrist and/or therapist was recommended and community psychiatric resources were provided. Patient is to return to the ED if any urgent or potentially life-threatening concerns.     Discharge Diagnoses:   Final diagnoses:   Bipolar I disorder, most recent episode hypomanic (H)          Treatment Plan:  -  Continue lithium  mg nightly for mood stabilization  - Serum lithium level this morning was noted to be 0.81 reflecting adequate dosing.  - Additional labs reviewed: Urine drug screen and pregnancy test were negative  - Continue Risperdal 2 mg nightly for additional mood stabilization.  Noting this to be a recent addition and the patient is awaiting full therapeutic value of the medication in the upcoming weeks.  - Referral for intensive outpatient programming  - Resume outpatient medication management appointments and psychotherapy  - Discharge home today.      At the time of discharge, the patient's acute suicide risk was determined to be low due to the following factors: Reduction in the intensity of mood/anxiety symptoms that preceded the admission, denial of suicidal thoughts, denies feeling helpless or hopeless, not currently under the influence of alcohol or illicit substances, denies experiencing command hallucinations, no immediate access to firearms. The patient's acute risk could be higher if noncompliant with their treatment plan, medications, follow-up appointments or using illicit substances or alcohol. Protective factors include: social supports, stable housing    I spent more than 30 minutes on discharge day activities.    --  Modesto Reed MD  [facility] EMERGENCY DEPT  [facility] Unit       Modesto Reed MD  05/01/25 [id number]

## 2025-05-01 NOTE — DISCHARGE INSTRUCTIONS
Patient Navigation Hub - Scheduled Appointment    You have been scheduled a telephone appointment with the Mental Health and Addiction Services Patient Navigation Hub. As a reminder, this is not an in-person appointment. A Navigator will contact you at your personal telephone number on 5/2 at 10AM. You can expect a 15-30 minute appointment. You will discuss programming options and be assisted in next steps. If you have any further questions or concerns, please contact the Patient Navigation Hub at [phone]. Note: You will not be charged for this telephone appointment.    Our Navigators work to be your point-of-contact for trustworthy and compassionate care from Emergency Services to [facility] s Programmatic Care. We will provide resources and communication to help guide you into programmatic care, other internal resources (I.e., Transition Clinic), and/or community programs. Ultimately, our goal is to be the one-stop-shop of communication, coordination, and support for you.    Phone: [phone]  Email: [email or web address]  Fax: [phone]    [facility] Programmatic Care  The following is a list of our programming options that may fit your next steps:    Programs  Mental Health  Intensive Outpatient Program (IOP)  Partial Hospitalization Program (PHP)  Day Treatment  Substance Use Disorder  Intensive Outpatient Program  Outpatient Group  Mental Health & Substance Use Disorder  Co-Occurring Intensive Outpatient Program  Residential  Available Locations  [facility], [place], [place], [place]  Note: Specific program options vary by location.    Transition Clinic  After leaving Emergency Services, it may take up to 30 days to enter a program. Knowing support is important during this period of time, we offer an urgent model of mental health care via the Transition Clinic. We encourage you to discuss this with your Navigator during  your telephone appointment.    FAQ  What can I expect after leaving Emergency Services?  If not already, you will soon be contacted by a Navigator who will work with you to find a program that fits your needs. If you desire to enter one of our [facility] programs, you will enter our programmatic care intake where an assessment will likely be needed over telephone, virtually, or in-person. After this assessment, a Navigator will connect with you again to provide a handoff to the specific program for next steps.   Please note that it may take up to 30 days for you to begin a program. If an extended wait occurs, please consider services at the Transition Clinic.  What is programmatic care?  It is a highly structured and comprehensive approach designed to address mental health and substance use disorders.   Programmatic care is typically used when individuals have not responded well to less intensive treatments or when they require a higher level of intervention due to the severity of their condition. It can be found in various settings, such as an outpatient location, residential treatment facilities, or inpatient hospitals.  Each program varies in duration and weekly commitments. Ask a Navigator for more program details.       Aftercare Plan    Follow up with established providers and supports as scheduled. Continue taking medications as prescribed. Abstain from drugs and alcohol. Utilize your [facility] crisis team as needed. They are available 24/7. Contact information is listed below.     The following appointment(s) and/or referral(s) were made on your behalf. If you need to make changes or cancel please contact the clinic/provider directly.     Your Upcoming Appointments:    Type: Therapy - (In-Person)  Date: Friday, 5/2/2025    Time: 1:00 pm - 2:00 pm  Provider: Bc Conner PsyD, LP  Location: [facility], [facility], [address] [place] [address], [address], [place]  [address]  Phone: [phone]  Patient instructions: Website will give directions to us for in person, [email or web address] Please arrive 20 minutes early for in person appointments. Bring insurance card. Metered street or ramp parking.    ----------------------------------------    Type: Medication Mgmt - (In-Person)  Date: Monday, 5/5/2025    Time: 2:00 pm - 3:00 pm  Provider: Esthela Whitlock  MSN  CNP,PMHNP,RN  Location: [address], [place] [address]  Phone: [phone]  Patient instructions: All paperwork to be completed online prior to appointment, or appointment may be cancelled. Paperwork link will be sent via email.    Remember: give the referrals 3 sessions prior to calling it quits. Do you trust them? Do you feel understood? Do you think they can help? Check in with yourself after each session    If I am feeling unsafe or I am in a crisis, I will:   Contact my established care providers   Call the National Suicide Prevention Lifeline: 117.460.6864   Go to the nearest emergency room   Call 911     Warning signs that I or other people might notice when a crisis is developing for me: changes to sleep, appetite or mood, increased anger, agitation or irritability, feeling depressed or hopeless, spending more time alone or talking less, increased crying, decreased productivity, seeing or hearing things that aren't there, thoughts of not wanting to live anymore or of actually killing myself, thoughts of hurting others    Things I am able to do on my own to cope or help me feel better: watching a favorite tv show or movie, listening to music I enjoy, going outside and breathing fresh air, going for a walk or exercising, taking a shower or bath, a cold or hot beverage, a healthy snack, drawing/coloring/painting, journaling, singing or dancing, deep breathing     I can try practicing square breathing when I begin to feel anxious - inhale through the nose for the  count of 4 and the first line on the square. Exhale through the mouth for the count of 4 for the second line of the square. Repeat to complete the square. Repeat the square as many times as needed.    I can also use my five senses to practice mindfulness and grounding. What are five things I can see, four things I can hear, three things I can feel, two things I can smell, and one thing I can taste.     Things that I am able to do with others to cope or help me feel better: sometimes just talking or spending time with someone else, sharing a meal or having coffee, watching a movie or playing a game, going for a walk or exercising    I can also use community resources including mental health hotlines, [facility] crisis teams, or apps.     Things I can use or do for distraction: movies/tv, music, reading, games, drawing/coloring/painting or other art, essential oils, exercise, cleaning/organizing, puzzles, crossword puzzles, word search, Sudoku       I can also download a meditation or relaxation courtney, like Calm, Headspace, or Insight Timer (all three offer a free version)    Changes I can make to support my mental health and wellness: Attend scheduled mental health therapy and psychiatric appointments. Take my medications as prescribed. Maintain a daily schedule/routine. Abstain from all mood altering substances, including drugs, alcohol, or medications not currently prescribed to me. Implement a self-care routine.      People in my life that I can ask for help: friends or family, trusted teachers/staff/colleagues, trusted members of my community or place of Christian, mental health crisis lines, or 911    Your [facility] has a mental health crisis team you can call 24/7: [facility], [phone]    Other things that are important when I m in crisis: to remember that the feelings I am having right now are temporary, and it won't feel like this forever, and that it is okay and important to ask for help    Crisis Lines  Crisis  "Text Line  Text 561757  You will be connected with a trained live crisis counselor to provide support.  Por priyanka, jamaalo  FIDELIA a 519829 o texto a 442-AYUDAME en WhatsApp  National Hope Line  1.800.SUICIDE [4621126]      Community Resources  Fast Tracker  Linking people to mental health and substance use disorder resources  fasttrackSpiritShop.comn.org   [place] Mental Health Warm Line  Peer to peer support  Monday thru Saturday, 12 pm to 10 pm  615.121.2275 or 9.320.581.3510  Text \"Support\" to 44226  National [place] on Mental Illness (DONAL)  006.455.8607 or 1.888.DONAL.HELPS    Mental Health Apps  My3  https://Topguest.org/  VirtualHopeBox  https://Zwittle/apps/virtual-hope-box/    Additional Information  Today you were seen by a licensed mental health professional through Triage and Transition services, [facility] ([facility])  for a crisis assessment in the Emergency Department at [facility].  It is recommended that you follow up with your established providers (psychiatrist, mental health therapist, and/or primary care doctor - as relevant) as soon as possible. Coordinators from [facility] will be calling you in the next 24-48 hours to ensure that you have the resources you need.  You can also contact [facility] coordinators directly at [phone]. You may have been scheduled for or offered an appointment with a mental health provider. [facility] maintains an extensive network of licensed behavioral health providers to connect patients with the services they need.  We do not charge providers a fee to participate in our referral network.  We match patients with providers based on a patient's specific needs, insurance coverage, and location.  Our first effort will be to refer you to a provider within your care system, and will utilize providers outside your care system as needed.        "

## 2025-05-01 NOTE — PROGRESS NOTES
Pt came out of [facility] and came up to the nursing desk. Pt asked about how can she be discharged. Pt was explained that she needs to meet with the provider first. Pt seem to understand and was agreeable with the plan. Pt was calm and cooperative.

## 2025-05-03 ENCOUNTER — PATIENT OUTREACH (OUTPATIENT)
Dept: CARE COORDINATION | Facility: CLINIC | Age: 25
End: 2025-05-03
Payer: COMMERCIAL

## 2025-05-03 NOTE — PROGRESS NOTES
Clinic Care Coordination Contact  [facility]/Voicemail    Clinical Data: Care Coordinator Outreach        Left message on patient's voicemail with call back information and requested return call.     Care Coordinator will do no further outreaches at this time.    Josefina Toussaint [facility]  Clinic Care Coordination  [facility]   Phone: [phone]  [email or web address]

## 2025-05-21 ENCOUNTER — VIRTUAL VISIT (OUTPATIENT)
Dept: PSYCHIATRY | Facility: CLINIC | Age: 25
End: 2025-05-21
Payer: COMMERCIAL

## 2025-05-21 DIAGNOSIS — F31.9 BIPOLAR 1 DISORDER (H): ICD-10-CM

## 2025-05-21 RX ORDER — OLANZAPINE 5 MG/1
5 TABLET, FILM COATED ORAL AT BEDTIME
Qty: 90 TABLET | Refills: 1 | Status: SHIPPED | OUTPATIENT
Start: 2025-05-21

## 2025-05-21 ASSESSMENT — PATIENT HEALTH QUESTIONNAIRE - PHQ9
SUM OF ALL RESPONSES TO PHQ QUESTIONS 1-9: 11
SUM OF ALL RESPONSES TO PHQ QUESTIONS 1-9: 11
10. IF YOU CHECKED OFF ANY PROBLEMS, HOW DIFFICULT HAVE THESE PROBLEMS MADE IT FOR YOU TO DO YOUR WORK, TAKE CARE OF THINGS AT HOME, OR GET ALONG WITH OTHER PEOPLE: EXTREMELY DIFFICULT

## 2025-05-21 ASSESSMENT — ENCOUNTER SYMPTOMS: COUGH: 1

## 2025-05-21 ASSESSMENT — PAIN SCALES - GENERAL: PAINLEVEL_OUTOF10: SEVERE PAIN (8)

## 2025-05-21 NOTE — PROGRESS NOTES
"Virtual Visit Details    Type of service:  Video Visit   Video Start Time: {video visit start/end time for provider to select:148740}  Video End Time:{video visit start/end time for provider to select:654170}    Originating Location (pt. Location): {video visit patient location:752529::\"Home\"}  {PROVIDER LOCATION On-site should be selected for visits conducted from your clinic location or adjoining [facility] hospital, academic office, or other nearby [facility] building. Off-site should be selected for all other provider locations, including home:362828}  Distant Location (provider location):  {virtual location provider:975966}  Platform used for Video Visit: {Virtual Visit Platforms:616446::\"Customcells\"}  "

## 2025-05-21 NOTE — PATIENT INSTRUCTIONS
Thank you for coming to the [facility] [place].    Plan      Medications:  START olanzapine (zyprexa) 5 mg tablet; take 1 tablet at bedtime for mood, racing thoughts, and sleep. Script sent for #90. 1 refill.  CONTINUE lithium CR (Eskalith CR/Lithobid) 450 mg tablet; take 2 tablets (900 mg) at bedtime for mood. No script sent.  Continue all other medications per primary care provider.                 New Labs/Orders/Referrals: none     Follow up: Schedule an appointment with me in 4 weeks or sooner as needed.  Call [facility] at [phone] to schedule     Safety plan reviewed. To the Emergency Department as needed or call after hours crisis line at [phone] or [phone].   [place] Crisis Text Line: Text [place] to 833870 or Suicide LifeLine Chat: suicidepreventionGreengage Mobileline.org/chat  Follow up with primary care provider as planned or sooner if needed for acute medical concerns.  Call the psychiatric nurse line with medication questions or concerns at [phone].  ClearApphart may be used to communicate with your provider, but this is not intended to be used for emergencies.    Education and Resources     Resources  National [place] On Mental Illness ([facility])  [facility]  Improves the lives of children and adults with mental illnesses and their families by providing free classes on mental illnesses and support groups for adults with mental illnesses, parents and family members. For more information:  Phone: 783.658.4901  Toll free: 2-372-RARI-HELPS  Website: www.[facility].org  [address] [facility], [facility] [address], [place] [address]    Online go to: www.[facility].info  Contains information on the community services individuals and communities need to sustain and improve their daily lives--health care and childcare, job training, education and recreation, penitentiary, disability and social service information. This directory contains information on  nonprofit and public health and human service programs and some for-profit programs such as housing.    Urgent Care for Adult Mental Health   Serving [facility] & [address]   Phone: 102.765.8809    Local Crisis Line Numbers   1. [facility] 134-051-0407  2. Community Outreach Psychiatric Emergencies (COPE) 993.193.9695  3. [facility] 577-130-9330 or 429-837-1890  4. National Suicide Prevention Lifeline 1-100.591.1782 (TALK)    National Crisis Information: Call 988 Suicide and Crisis Lifeline  Crisis Text Line (free 24/7):  call **CRISIS (**694650) Crisis or use the texting option by texting 290421.   National Suicide Prevention Lifeline: Call 988  Poison Control Center: 1-685.981.9385  Trans Lifeline: 1-462.241.3410 - Hotline for transgender people of all ages  The Alpesh Project: 6-104-871-8196 - Hotline for LGBT youth   List of all [facility] resources: https://[place].gov/dhs/people-we-serve/adults/health-care/mental-health/resources/crisis-contacts.jsp    Emergency Walk-In Options:   [facility] Unit @ [facility] ([place]): [phone] - Specialized mental health emergency area designed to be calming  [facility] ([place]): [phone]  [facility] ([place]): [phone]  [facility]): [phone]    For Non-Emergency Support:   Fast Tracker: Mental Health & Substance Use Disorder Resources -   https://www.fasttrackermn.org/    Emergency & Urgently Needed Care:   For emergencies call 911 and/or get medical help right away.      Contact Information     Contact Us:  Please call [phone] during business hours (8-5:00 M-F).   If you have medication related questions after clinic hours, or on the weekend, please call [phone].     Financial Assistance [phone]   Medical Records [phone]     Medication Refills:  If you need any refills please call your pharmacy and they will  contact us. Our fax number for refills is [phone].   Three business days of notice are needed for general medication refill requests.   Five business days of notice are needed for controlled substance refill requests.   If you need to change to a different pharmacy, please contact the new pharmacy directly. The new pharmacy will help you get your medications transferred.     Lab Testing:  If you had lab testing today and your results are reassuring or normal they will be mailed to you or sent through Offerti within 7 days. If the lab tests need quick action we will call you with the results. The phone number we will call with results is # [phone]. If this is not the best number please call our clinic and change the number.     Again thank you for choosing [facility] [place] and please let us know how we can best partner with you to improve you and your family's health.    You may be receiving a survey regarding this appointment. We would love to have your feedback, both positive and negative. The survey is done by an external company, so your answers are anonymous.

## 2025-05-21 NOTE — NURSING NOTE
Current patient location: [facility] [address]  [facility] [address]    Is the patient currently in the state of [place]? YES    Visit mode: VIDEO    If the visit is dropped, the patient can be reconnected by:VIDEO VISIT: Text to cell phone:   Telephone Information:   Mobile [phone]    and VIDEO VISIT: Send to e-mail at: [email or web address]    Will anyone else be joining the visit? NO  (If patient encounters technical issues they should call [phone])    Are changes needed to the allergy or medication list? No    Are refills needed on medications prescribed by this physician? NO    Rooming Documentation:  Pt started completing qnrs via [employer], but did not finish. VF was unable to reach pt for check in until 9am, so qnrs incomplete due to time.     Reason for visit: Consult    Patricia YUSUF

## 2025-05-21 NOTE — PROGRESS NOTES
"Virtual Visit Details  Type of service:  Video Visit     Originating Location (pt. Location): Home  Distant Location (provider location):  Off-site  Platform used for Video Visit: [facility] [place] [address], [place], [place], [id number] [place] [address]  [phone]     OUTPATIENT PSYCHIATRIC EVALUATION     2025    Provider: LINDEN Hyde CNP      Appointment Start Time: 9:12 AM  Appointment End Time: 9:38 AM     Name: Cristina Boyd   : 2000                    Preferred Name: Cristina    Identification: Cristina Boyd is 25 year old Black or  Not  or  female who is referred from LINDEN Escalera CNP     Persons Present in Session / Source of Information:  alone.     RECORDS AVAILABLE FOR REVIEW: EHR records through [facility] , previous psychiatric progress note, and Care Everywhere accessed.       Telemedicine Visit: The patient's condition can be safely assessed and treated via synchronous audio and visual telemedicine encounter.   Consent:  The patient/guardian has verbally consented to: the potential risks and benefits of telemedicine (video visit or phone) versus in person care; bill my insurance or make self-payment for services provided; and responsibility for payment of non-covered services.  As the provider I attest to compliance with applicable laws and regulations related to telemedicine.    Chief Complaint      \"Sleep problems and sleep apnea\"     History of Present Illness     Cristina Boyd is 25 year old female with a past psychiatric history of bipolar 1 disorder who has been referred by primary care to panel psychiatry services to establish care for medication management.    Patient reports experiencing sleep problems and \"sleep apnea\" for several weeks. She describes her sleep as fragmented, totaling approximately four to five hours per night. She has difficulty both falling " "asleep and staying asleep. When asked to elaborate on her sleep apnea concerns, patient reports she recently diagnosed herself with sleep apnea as she experiences excessive daytime sleepiness and has difficulty waking in the mornings. Patient denies completing a sleep study or completing an evaluation with sleep medicine. Patient reports her sleep concerns worsened after her most recent emergency room visit 4/30/25. Patient reports stopping risperdal 2 mg at bedtime shortly after the emergency room visit as it was causing nerve problems (patient could not elaborate on this).     Per emergency room visit 4/30/25:  \"On interview with patient, she reports coming to the emergency department due to inability to sleep over the last week.  Reports that she is only sleeping around 3 to 4 hours per night.  She indicates her main stressor to be her 16-year-old brother, who she notes was also diagnosed with bipolar disorder and has been experiencing hsaina this last week.  She indicates that her brother has not been sleeping at night, which is a trigger for her.  Reports she and her brother live with their parents, and indicates a good relationship with her parents.  Patient also discusses that her bed broke recently, which has made it more difficult to sleep.  Reports she built her bed frame a couple of years ago, and it recently broke.  Reports her room \"is a mess.\"  She endorses adherence to her prescribed lithium.  She is not certain whether she is still prescribed olanzapine, but has not been taking this recently.  She reports there have been nights where she has used her brothers risperidone, taking 1 to 3 mg, and has found this very effective in allowing her to fall asleep. She denies auditory or visual hallucinations. No overt paranoia noted. Presents with mild thought disorganization. She is agreeable to remain on observation overnight. Denies suicidal or homicidal thoughts, plans, or intent.\"    Patient reports " "feeling depressed with low energy levels throughout the day. Her mood has been irritable, and she experiences racing thoughts. Despite these challenges, she denies having suicidal or self-harm thoughts at present. She acknowledges a history of suicide attempts and referred to a recent incident occurring earlier this year when she contemplated using a rope but did not act on these thoughts (patient struggled to elaborate). Patient was diagnosed with bipolar disorder at age 15. She recalls a period during the pandemic when she lived alone in [place] and her symptoms were well managed while taking lithium and possibly seroquel. Chart review reveals multiple psychiatric hospitalizations (2016 x1, 2017 x2, 2018 x2, 2023 x1) and emergency room visits.    Past Psychiatric History      Diagnoses: Bipolar 1 disorder    Psychiatric Providers:  Most recently Jennifer Gottlieb MD through [facility]  Psychotherapy: None    History of Psychiatric Hospitalizations:   - Inpatient: [facility] 3/5/23-3/10/23  - IOP/PHP/Day treatment: Yes \"years ago\"  - Commitment: Denies  - ECT or TMS: Denies  History of Suicide Ideations: Yes most recently a few months ago \"I saw a rope\" denies acting on   History of Suicide Attempts: Yes: \"a couple\"   History of Self-injurious Behavior: Denies a history of SIB.  Current:  No  History of Violence/Aggression: Denies    Previous Psychotropic Medication Trials:  Abilify  Quetiapine: eye twitch  Depakote   Lithium  Benztropine   Propranolol  Lorazepam     Medication Compliance: No  Pharmacogenomic Testing Completed: No    Medical History       Head injuries: \"I don't remember\"  Seizures: \"I don't remember\"  Cardiac events: \"I do....I don't know\"  Thyroid dysfunction: Denies  Tardive Dyskinesia: Denies     Primary Care Provider: Felice Edmonds     Past Medical History:   Diagnosis Date    Bipolar 1 disorder (H)     Infection due to 2019 novel coronavirus 2020    " "summer 2020 by patient report    Infection due to 2019 novel coronavirus 2021    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, unspecified thyrotoxicosis type 2022     Past Surgical History:   Procedure Laterality Date    BRONCHOSCOPY (RIGID OR FLEXIBLE), DIAGNOSTIC N/A 2018    Procedure: COMBINED BRONCHOSCOPY (RIGID OR FLEXIBLE), LAVAGE;;  Surgeon: Manjeet Holland MD;  Location: [facility]     Family History      Family Psychiatric History  Immediate family: Bipolar (brother)  Family history of suicide attempts or completions: Denies  Family treatment history: unsure    Family Medical History  Family History   Problem Relation Age of Onset    Impaired Fasting Glucose Father         prediabetes    Thyroid Disease Maternal Grandmother     Diabetes Maternal Grandmother     Diabetes Paternal Grandmother          early in her 60 s    Depression Maternal Uncle     Depression Other      Social History       Origin: [place]  Siblings: 4  Education: Currently in school   Employment: Received a job offer working part time for \"[employer]\" has not responded to offer yet  Relationship status: Single.   Living Situation: [place], [place] with family (parents and siblings).   Trauma/Abuse history: Previous trauma/Abuse experience  (did not disclose detail)   history: No  Legal History: No: Patient denies any legal history   Firearms: No: Patient denies    Support Systems/Strengths/Hobbies: Patient identified sister and mother as their support system.  Current Stressors: school    Substance Abuse History       ETOH: Denies  THC: Historical use, last used \"when I was younger\"  Opiates: Denies  Benzodiazepines: Denies  Stimulants: Denies  Nicotine: Historical use, last used \"a couple days ago\"  Caffeine: Current use, 30 mg-40 mg daily  Other: N/A    History of Treatment: Denies    Allergies      Patient has no known allergies.    Current Medications       [place]MP: I have queried the [place] and/or [place] Prescription " Monitoring Program for this patient for the preceding 12 months, or reviewed the report provided by my proxy delegate. I have not identified any concerns.     Current Prescribed Psychotropic Medications:  lithium  mg nightly for mood stabilization  Risperdal 2 mg nightly for additional mood stabilization. (Stopped after recent ER visit)    Current Outpatient Medications   Medication Sig Dispense Refill    lithium (ESKALITH CR/LITHOBID) 450 MG CR tablet Take 2 tablets (900 mg) by mouth at bedtime 120 tablet 0    fluticasone (FLOVENT HFA) 220 MCG/ACT inhaler Inhale 1 puff into the lungs 2 times daily (Patient not taking: Reported on 4/28/2025) 30 g 0    loratadine (CLARITIN) 10 MG tablet Take 1 tablet (10 mg) by mouth daily. (Patient not taking: Reported on 4/28/2025) 90 tablet 1    polyethylene glycol (MIRALAX) 17 GM/Dose powder Take 17 g (1 Capful) by mouth daily. 527 g 0    risperiDONE (RISPERDAL) 2 MG tablet Take 1 tablet (2 mg) by mouth at bedtime. (Patient not taking: Reported on 5/21/2025) 30 tablet 0      Medical Review of Systems      10 systems (general, cardiovascular, respiratory, eyes, ENT, endocrine, GI, , M/S, neurological) were reviewed.     Review of Systems   Respiratory:  Positive for cough.    All other systems reviewed and are negative.     Patient's last menstrual period was 05/20/2025 (exact date).  Pregnancy status: Not pregnant    Psychiatric Review of Systems      Comprehensive review of symptoms completed, pertinent positives noted below:    Depression: depressed mood and low energy  Anxiety: excessive worry and restlessness/feeling on edge  Panic: no symptoms  Adela: irritable mood, decreased need for sleep, and flight of ideas/racing thoughts  Psychosis: hallucinations (prior to ER visit)  OCD: no symptoms  Trauma: no symptoms  Eating: decrease appetite  Disruptive/Impulse/Conduct: No symptoms  Neurodevelopmental: no symptoms  Neurocognitive: no symptoms  Substance/Addiction: no  symptoms  Sleep: Duration: 4-5 hrs/night. difficulty initiating sleep and difficulty maintaining sleep  Safety: Denies SI, SIB (thoughts or behaviors), HI, property destruction, and aggression.    Vitals      Virtual visit. Not completed today.     Labs      Most recent laboratory results reviewed and pertinent results include:     Recent Labs   Lab Test 04/25/25  0149   WBC 8.0   HGB 12.3   HCT 37.3   MCV 85      ANEU 4.8     Recent Labs   Lab Test 04/25/25  0149   *   POTASSIUM 3.6   CHLORIDE 100   CO2 23   *   BHANU 9.4   MAG 2.2   BUN 8.7   CR 0.68   GFRESTIMATED >90   ALBUMIN 4.0   PROTTOTAL 8.8*   AST 21   ALT 21   ALKPHOS 99   BILITOTAL 0.3     Recent Labs   Lab Test 03/08/23  1827   CHOL 100   LDL 44   HDL 43*   TRIG 66   A1C 5.6     Recent Labs   Lab Test 04/25/25  0149   TSH 4.34*   T4 1.36     Recent Labs   Lab Test 11/10/22  1750   VITDT 21   IRON 38   B12 564     Lithium level 5/1/25: 0.81    EKG: Most recent EKG from 2/9/16 reviewed. QTc interval 404.      Mental Status Exam      Appearance: disheveled  Behavior: passive, with poor eye contact   Speech:  increased latency of response  Attention/Concentration: poor  Mood:   Affect: blunted and indifferent; was congruent to mood; was congruent to content  Thought Process:  response delay and thought blocking  Thought Content  Perception Disturbances: Reports none;  Denies auditory hallucinations and visual hallucinations   Suicidality: No evidence of active or passive SI.  Homicidality: No evidence of HI, or any intention to harm others.   Insight: fair  Judgment: limited and adequate for safety  Cognition: does  appear grossly intact; formal cognitive testing was not done  Recent and Remote Memory: grossly intact to interview. Not formally assessed.   Fund of Knowledge: appropriate  Gait and Station: unable to assess virtually    Screening Tools          5/21/2025     7:50 AM 2/15/2024     9:02 AM 1/3/2024    10:54 PM   PHQ   PHQ-9  Total Score 11  9 5   Q9: Thoughts of better off dead/self-harm past 2 weeks Nearly every day Several days Not at all   F/U: Thoughts of suicide or self-harm No Yes    F/U: Self harm-plan  Yes    F/U: Self-harm action  No    F/U: Safety concerns Yes No        Patient-reported     Risk Assessment      Feels safe in home: Yes   Suicidal ideation: Denies  Hx of suicide attempts:  Yes   Hx of impulsivity: Yes   Hope for the future: present   Hx of Command hallucinations or current psychosis: No  Hx of Self-injurious behaviors: No Current:  No  Family member  by suicide:  No    Suicide Risk Factors: diagnosis of a mental disorder (especially depression or mood disorders), recent stressor or loss (school),  or single status, prior suicide attempts, and problem solving deficits    Risk is mitigated by the following protective factors: dedication to family or friends , safe and stable environment, daily obligations, access to a variety of clinical interventions, abstinence from substances, living with other people, access to behavioral health care, no access to weapons, and no current SI    Based on review of above risk and protective factors and today's exam, Cristina does not appear to be an imminent risk of harm to self or others, does not meet criteria for a 72-hr hold, and therefore remains appropriate for ongoing outpatient level of care. The patient convincingly denies suicidality and homicidality today. There was no deceit detected, and the patient presented in a manner that was believable. Local community safety resources reviewed as needed and routinely attached to AVS. The patient/guardian understands to call 911 or go to the nearest ED if urgent or life threatening symptoms occur.    Assessment     Cristina Boyd is 25 year old female with a past psychiatric history of bipolar 1 disorder who presents today to establish care for medication management.    Significant symptoms include difficulty initiating  "sleep and difficulty maintaining sleep    Psychiatric history reveals multiple psychiatric hospitalizations (2016 x1, 2017 x2, 2018 x2, 2023 x1), \"a couple\" suicide attempts (unsupported by records), and no history of self-injurious behaviors. Recent ER visit 4/30/25 for decompensation and medication noncompliance. There is genetic loading for bipolar disorder. Medical history does not appear to be significant. Substance use does not appear to be significantly contributing to the patient's current presentation. Stressors include school. Patient's support system includes her mother and sister.    Patient presents mildly disorganized and as a questionable historian.      Regarding medication management, we agreed to restart olanzapine 5 mg at bedtime in place of Risperdal as it was previously well-tolerated and effective for sleep management. We discussed the importance of medication adherence and how sleep disturbances relate to her bipolar 1 diagnosis. I will plan to clarify patient's history further once disorganization and sleep improve. We will also reassess the need for a sleep medicine referral if sleep quality concerns persist.    Medication side effects and alternatives reviewed. Health promotion activities recommended and reviewed today. All questions addressed. Education and counseling completed regarding risks and benefits of medications and psychotherapy options.     There are no language or communication issues or need for modification in treatment.   There are no ethnic, cultural or Buddhist factors that may be relevant for treatment.  Patient identified their preferred language to be English.  Patient does not need the assistance of an  or other support involved in treatment.     Diagnosis      Bipolar 1 disorder (H)     Medical Comorbidities Include:   Patient Active Problem List    Diagnosis Date Noted    Bipolar I disorder, most recent episode hypomanic (H) 04/30/2025     Priority: " Medium    Noncompliance with medication regimen 02/12/2024     Priority: Medium    Myalgia 09/29/2023     Priority: Medium    Hyponatremia 09/29/2023     Priority: Medium    Weakness 09/29/2023     Priority: Medium    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, unspecified thyrotoxicosis type 11/16/2022     Priority: Medium    Hypercalcemia 11/16/2022     Priority: Medium    Lymphadenopathy 06/13/2018     Priority: Medium    Bipolar 1 disorder (H) 02/01/2017     Priority: Medium     Plan      Medications:  START olanzapine (zyprexa) 5 mg tablet; take 1 tablet at bedtime for mood, racing thoughts, and sleep. Script sent for #90. 1 refill.  CONTINUE lithium CR (Eskalith CR/Lithobid) 450 mg tablet; take 2 tablets (900 mg) at bedtime for mood. No script sent.  Continue all other medications per primary care provider.      New Labs/Orders/Referrals: none    Follow up: Schedule an appointment with me in 4 weeks or sooner as needed.  Call [facility] at [phone] to schedule    Safety plan reviewed. To the Emergency Department as needed or call after hours crisis line at [phone] or [phone].   [place] Crisis Text Line: Text [place] to 652615 or Suicide LifeLine Chat: suicidepreventionlifeline.org/chat  Follow up with primary care provider as planned or sooner if needed for acute medical concerns.  Call the psychiatric nurse line with medication questions or concerns at [phone].  MyChart may be used to communicate with your provider, but this is not intended to be used for emergencies.    Education and Resources     Community Resources:    National Suicide Prevention Lifeline: 821.938.1679 (TTY: 558.612.5286). Call anytime for help.  (www.suicidepreventionlifeline.org)  National [place] on Mental Illness (www.norberto.org): 770.547.8514 or 218-625-9045.   Mental Health Association (www.mentalhealth.org): 774.633.1666 or 702-186-8832.  [place] Crisis Text Line: Text [place] to 793279  Suicide LifeLine Chat:  suicidepreventionlifeline.org/chat    Patient Education:  If applicable the following has been discussed with the patient, parent/guardian, and or attending family member during the appointment:  Risks of polypharmacy and possible drug interactions with current medication list + common OTC products, herbs, and supplements. Moving forward, it is suggested to intermittently check-in with a clinic or retail pharmacist whenever new medications or OTC/h/s are consumed.  Recommendation to adhere to CDC guidelines as it relates alcohol consumption. If taking benzodiazepines, you should abstain from alcohol intake due to increased risks of CNS and respiratory depression, as well as psychomotor impairment.  If possible, it is recommended to avoid concurrent use of prescribed: opioids  +  benzodiazepines due to increased risks of CNS and respiratory depression, as well as the increased risk of overdose.  Recommendation to minimize and or abstain from THC use (unless the pt. is prescribed medical marijuana).  Recommendation to abstain from illicit substances including but not limited to the following: heroin, street fentanyl, cocaine, methamphetamines, and bath salts.  Do not take opioids, stimulants, and or other prescription medications unless they are specifically prescribed for you.  Recommendation to abstain from tobacco/smoking, alcohol, THC, and all illicit substances if trying to become or are pregnant.  Black Box Warnings associated with the prescribed psychotropic(s).  Potential adverse effects of antipsychotics including but not limited to the following: weight gain, metabolic syndrome, EPS/Tardive Dyskinesias.  Potential CV and neurological adverse effects of stimulants including but not limited to the following:  sudden death, MI, stroke, HTN, cardiomyopathy (long term use) as well as seizures.  The benefits of healthy lifestyle modifications such as regular physical activity, sleep hygiene, a balanced diet,  practicing mindfulness, and stress management.  Safety recommendations such as securing all prescription and OTC medications, sharps, and caustic substances in addition to removing all firearms and ammunition from the home.     Administrative Billing:   Level of Medical Decision Making:   - *HIGH ACUITY* - At least 1 chronic problem with severe exacerbation, progression, or side effects of treatment  - Drug therapy requiring intensive (at least quarterly) monitoring for toxicity (not for efficacy)    Supportive therapy was provided, focusing on reflective listening and solution focused problem solving.    The longitudinal plan of care for the diagnosis(es)/condition(s) as documented were addressed during this visit. Due to the added complexity in care, I will continue to support Cristina in the subsequent management and with ongoing continuity of care.     Signed:   Kathy Hebert, CECILIO, APRN, PMHNP-BC....................  5/21/2025   9:06 AM     Disclaimer: This note consists of symbols derived from keyboarding, dictation and/or voice recognition software. As a result, there may be errors in the script that have gone undetected. Please consider this when interpreting information found in this chart.  Answers submitted by the patient for this visit:  Patient Health Questionnaire (Submitted on 5/21/2025)  If you checked off any problems, how difficult have these problems made it for you to do your work, take care of things at home, or get along with other people?: Extremely difficult  PHQ9 TOTAL SCORE: 11

## (undated) RX ORDER — LIDOCAINE HYDROCHLORIDE 10 MG/ML
INJECTION, SOLUTION EPIDURAL; INFILTRATION; INTRACAUDAL; PERINEURAL
Status: DISPENSED
Start: 2018-06-16

## (undated) RX ORDER — LIDOCAINE HYDROCHLORIDE 40 MG/ML
SOLUTION TOPICAL
Status: DISPENSED
Start: 2018-06-16

## (undated) RX ORDER — DIPHENHYDRAMINE HYDROCHLORIDE 50 MG/ML
INJECTION INTRAMUSCULAR; INTRAVENOUS
Status: DISPENSED
Start: 2018-06-16

## (undated) RX ORDER — FENTANYL CITRATE 50 UG/ML
INJECTION, SOLUTION INTRAMUSCULAR; INTRAVENOUS
Status: DISPENSED
Start: 2018-06-18

## (undated) RX ORDER — LIDOCAINE HYDROCHLORIDE 10 MG/ML
INJECTION, SOLUTION EPIDURAL; INFILTRATION; INTRACAUDAL; PERINEURAL
Status: DISPENSED
Start: 2018-06-18

## (undated) RX ORDER — ALBUTEROL SULFATE 0.83 MG/ML
SOLUTION RESPIRATORY (INHALATION)
Status: DISPENSED
Start: 2018-06-16

## (undated) RX ORDER — FENTANYL CITRATE 50 UG/ML
INJECTION, SOLUTION INTRAMUSCULAR; INTRAVENOUS
Status: DISPENSED
Start: 2018-06-16